# Patient Record
Sex: FEMALE | Race: WHITE | NOT HISPANIC OR LATINO | Employment: UNEMPLOYED | URBAN - METROPOLITAN AREA
[De-identification: names, ages, dates, MRNs, and addresses within clinical notes are randomized per-mention and may not be internally consistent; named-entity substitution may affect disease eponyms.]

---

## 2018-02-05 ENCOUNTER — CONVERSION ENCOUNTER (OUTPATIENT)
Dept: FAMILY MEDICINE CLINIC | Facility: CLINIC | Age: 48
End: 2018-02-05

## 2019-06-04 VITALS
DIASTOLIC BLOOD PRESSURE: 97 MMHG | SYSTOLIC BLOOD PRESSURE: 162 MMHG | HEIGHT: 64 IN | WEIGHT: 160 LBS | BODY MASS INDEX: 27.31 KG/M2 | RESPIRATION RATE: 20 BRPM | OXYGEN SATURATION: 97 % | HEART RATE: 94 BPM

## 2019-08-31 ENCOUNTER — APPOINTMENT (OUTPATIENT)
Dept: GENERAL RADIOLOGY | Facility: HOSPITAL | Age: 49
End: 2019-08-31

## 2019-08-31 ENCOUNTER — HOSPITAL ENCOUNTER (EMERGENCY)
Facility: HOSPITAL | Age: 49
Discharge: HOME | End: 2019-09-01
Attending: EMERGENCY MEDICINE | Admitting: EMERGENCY MEDICINE

## 2019-08-31 ENCOUNTER — APPOINTMENT (OUTPATIENT)
Dept: CT IMAGING | Facility: HOSPITAL | Age: 49
End: 2019-08-31

## 2019-08-31 DIAGNOSIS — M79.604 PAIN OF RIGHT LOWER EXTREMITY: ICD-10-CM

## 2019-08-31 DIAGNOSIS — R60.9 EDEMA, UNSPECIFIED TYPE: Primary | ICD-10-CM

## 2019-08-31 LAB
ALBUMIN SERPL-MCNC: 3.4 G/DL (ref 3.5–4.8)
ALBUMIN/GLOB SERPL: 1.2 G/DL (ref 1–1.7)
ALP SERPL-CCNC: 77 U/L (ref 32–91)
ALT SERPL W P-5'-P-CCNC: 17 U/L (ref 14–54)
ANION GAP SERPL CALCULATED.3IONS-SCNC: 14.8 MMOL/L (ref 5–15)
APTT PPP: 24 SECONDS (ref 24–31)
AST SERPL-CCNC: 17 U/L (ref 15–41)
BASOPHILS # BLD AUTO: 0.1 10*3/MM3 (ref 0–0.2)
BASOPHILS NFR BLD AUTO: 0.8 % (ref 0–1.5)
BILIRUB SERPL-MCNC: 0.3 MG/DL (ref 0.3–1.2)
BNP SERPL-MCNC: 12 PG/ML
BUN BLD-MCNC: 15 MG/DL (ref 8–20)
BUN/CREAT SERPL: 16.7 (ref 5.4–26.2)
CALCIUM SPEC-SCNC: 8.7 MG/DL (ref 8.9–10.3)
CHLORIDE SERPL-SCNC: 104 MMOL/L (ref 101–111)
CO2 SERPL-SCNC: 24 MMOL/L (ref 22–32)
CREAT BLD-MCNC: 0.9 MG/DL (ref 0.4–1)
D DIMER PPP FEU-MCNC: 0.66 MCGFEU/ML (ref 0.17–0.59)
DEPRECATED RDW RBC AUTO: 44.6 FL (ref 37–54)
EOSINOPHIL # BLD AUTO: 0.1 10*3/MM3 (ref 0–0.4)
EOSINOPHIL NFR BLD AUTO: 1.6 % (ref 0.3–6.2)
ERYTHROCYTE [DISTWIDTH] IN BLOOD BY AUTOMATED COUNT: 12.9 % (ref 12.3–15.4)
GFR SERPL CREATININE-BSD FRML MDRD: 67 ML/MIN/1.73
GLOBULIN UR ELPH-MCNC: 2.8 GM/DL (ref 2.5–3.8)
GLUCOSE BLD-MCNC: 100 MG/DL (ref 65–99)
HCT VFR BLD AUTO: 40.3 % (ref 34–46.6)
HGB BLD-MCNC: 13.8 G/DL (ref 12–15.9)
HOLD SPECIMEN: NORMAL
INR PPP: 0.94 (ref 0.9–1.1)
LYMPHOCYTES # BLD AUTO: 2.1 10*3/MM3 (ref 0.7–3.1)
LYMPHOCYTES NFR BLD AUTO: 26.9 % (ref 19.6–45.3)
MCH RBC QN AUTO: 34.1 PG (ref 26.6–33)
MCHC RBC AUTO-ENTMCNC: 34.2 G/DL (ref 31.5–35.7)
MCV RBC AUTO: 99.7 FL (ref 79–97)
MONOCYTES # BLD AUTO: 0.5 10*3/MM3 (ref 0.1–0.9)
MONOCYTES NFR BLD AUTO: 6.2 % (ref 5–12)
NEUTROPHILS # BLD AUTO: 5.1 10*3/MM3 (ref 1.7–7)
NEUTROPHILS NFR BLD AUTO: 64.5 % (ref 42.7–76)
NRBC BLD AUTO-RTO: 0 /100 WBC (ref 0–0.2)
PLATELET # BLD AUTO: 287 10*3/MM3 (ref 140–450)
PMV BLD AUTO: 6.4 FL (ref 6–12)
POTASSIUM BLD-SCNC: 3.8 MMOL/L (ref 3.6–5.1)
PROT SERPL-MCNC: 6.2 G/DL (ref 6.1–7.9)
PROTHROMBIN TIME: 9.8 SECONDS (ref 9.6–11.7)
RBC # BLD AUTO: 4.04 10*6/MM3 (ref 3.77–5.28)
SODIUM BLD-SCNC: 139 MMOL/L (ref 136–144)
TROPONIN I SERPL-MCNC: <0.03 NG/ML (ref 0–0.03)
TSH SERPL DL<=0.05 MIU/L-ACNC: 2.21 UIU/ML (ref 0.34–5.6)
WBC NRBC COR # BLD: 7.9 10*3/MM3 (ref 3.4–10.8)
WHOLE BLOOD HOLD SPECIMEN: NORMAL
WHOLE BLOOD HOLD SPECIMEN: NORMAL

## 2019-08-31 PROCEDURE — 85379 FIBRIN DEGRADATION QUANT: CPT | Performed by: NURSE PRACTITIONER

## 2019-08-31 PROCEDURE — 84484 ASSAY OF TROPONIN QUANT: CPT | Performed by: NURSE PRACTITIONER

## 2019-08-31 PROCEDURE — 85730 THROMBOPLASTIN TIME PARTIAL: CPT | Performed by: EMERGENCY MEDICINE

## 2019-08-31 PROCEDURE — 71275 CT ANGIOGRAPHY CHEST: CPT

## 2019-08-31 PROCEDURE — 85610 PROTHROMBIN TIME: CPT | Performed by: EMERGENCY MEDICINE

## 2019-08-31 PROCEDURE — 80053 COMPREHEN METABOLIC PANEL: CPT | Performed by: NURSE PRACTITIONER

## 2019-08-31 PROCEDURE — 85025 COMPLETE CBC W/AUTO DIFF WBC: CPT | Performed by: NURSE PRACTITIONER

## 2019-08-31 PROCEDURE — 99283 EMERGENCY DEPT VISIT LOW MDM: CPT

## 2019-08-31 PROCEDURE — 84443 ASSAY THYROID STIM HORMONE: CPT | Performed by: EMERGENCY MEDICINE

## 2019-08-31 PROCEDURE — 71045 X-RAY EXAM CHEST 1 VIEW: CPT

## 2019-08-31 PROCEDURE — 0 IOPAMIDOL PER 1 ML: Performed by: EMERGENCY MEDICINE

## 2019-08-31 PROCEDURE — 83880 ASSAY OF NATRIURETIC PEPTIDE: CPT | Performed by: EMERGENCY MEDICINE

## 2019-08-31 PROCEDURE — 93005 ELECTROCARDIOGRAM TRACING: CPT | Performed by: NURSE PRACTITIONER

## 2019-08-31 PROCEDURE — 96372 THER/PROPH/DIAG INJ SC/IM: CPT

## 2019-08-31 RX ORDER — SODIUM CHLORIDE 0.9 % (FLUSH) 0.9 %
10 SYRINGE (ML) INJECTION AS NEEDED
Status: DISCONTINUED | OUTPATIENT
Start: 2019-08-31 | End: 2019-09-01 | Stop reason: HOSPADM

## 2019-08-31 RX ADMIN — IOPAMIDOL 100 ML: 755 INJECTION, SOLUTION INTRAVENOUS at 23:27

## 2019-09-01 ENCOUNTER — HOSPITAL ENCOUNTER (OUTPATIENT)
Dept: CARDIOLOGY | Facility: HOSPITAL | Age: 49
Discharge: HOME | End: 2019-09-01

## 2019-09-01 ENCOUNTER — TRANSCRIBE ORDERS (OUTPATIENT)
Dept: ADMINISTRATIVE | Facility: HOSPITAL | Age: 49
End: 2019-09-01

## 2019-09-01 VITALS
SYSTOLIC BLOOD PRESSURE: 150 MMHG | WEIGHT: 162.26 LBS | TEMPERATURE: 98.4 F | HEIGHT: 64 IN | OXYGEN SATURATION: 98 % | DIASTOLIC BLOOD PRESSURE: 88 MMHG | RESPIRATION RATE: 18 BRPM | BODY MASS INDEX: 27.7 KG/M2 | HEART RATE: 71 BPM

## 2019-09-01 DIAGNOSIS — M79.604 PAIN IN BOTH LOWER EXTREMITIES: Primary | ICD-10-CM

## 2019-09-01 DIAGNOSIS — M79.604 PAIN IN BOTH LOWER EXTREMITIES: ICD-10-CM

## 2019-09-01 DIAGNOSIS — M79.605 PAIN IN BOTH LOWER EXTREMITIES: Primary | ICD-10-CM

## 2019-09-01 DIAGNOSIS — M79.605 PAIN IN BOTH LOWER EXTREMITIES: ICD-10-CM

## 2019-09-01 LAB

## 2019-09-01 PROCEDURE — 96372 THER/PROPH/DIAG INJ SC/IM: CPT

## 2019-09-01 PROCEDURE — 25010000002 ENOXAPARIN PER 10 MG: Performed by: EMERGENCY MEDICINE

## 2019-09-01 PROCEDURE — 93970 EXTREMITY STUDY: CPT

## 2019-09-01 RX ADMIN — ENOXAPARIN SODIUM 70 MG: 80 INJECTION SUBCUTANEOUS at 00:36

## 2019-09-01 NOTE — ED NOTES
outpt script given to pt with instructions to come back between 8-10AM on 9-1-19 alondra Elizabeth Mills, RN  09/01/19 0047

## 2019-09-01 NOTE — ED PROVIDER NOTES
Subjective   49-year-old female complains of bilateral lower extremity swelling, right greater than left upon awakening this morning.  She does stand on her feet for long hours working but states this is a normal routine for her and denies any change in routine.  She had episode of substernal chest pain 2 weeks ago for 30 minutes, none since then.  Patient has had some mild shortness of air with cough and congestion with productive purulent sputum but no hemoptysis.  She denies any history of DVT or PE, recent surgeries or procedures or admissions to the hospital.  Patient did travel to [place] but this was in early July.            Review of Systems   HENT: Positive for rhinorrhea.    Respiratory: Positive for cough and shortness of breath.    Cardiovascular: Positive for chest pain and leg swelling.   All other systems reviewed and are negative.      Past Medical History:   Diagnosis Date   • COPD (chronic obstructive pulmonary disease) (CMS/[facility])        Allergies   Allergen Reactions   • Aspirin GI Intolerance   • Ibuprofen Itching       Past Surgical History:   Procedure Laterality Date   • LUNG SURGERY         History reviewed. No pertinent family history.    Social History     Socioeconomic History   • Marital status: Single     Spouse name: Not on file   • Number of children: Not on file   • Years of education: Not on file   • Highest education level: Not on file   Tobacco Use   • Smoking status: Current Every Day Smoker     Packs/day: 0.50     Types: Cigarettes           Objective   Physical Exam   Constitutional: She is oriented to person, place, and time. She appears well-developed and well-nourished.   HENT:   Head: Normocephalic and atraumatic.   Mouth/Throat: Oropharynx is clear and moist.   Eyes: Conjunctivae are normal. Pupils are equal, round, and reactive to light.   Neck: Normal range of motion. Neck supple.   Cardiovascular: Normal rate, regular rhythm, normal heart sounds and intact distal  pulses.   Pulmonary/Chest: Effort normal and breath sounds normal.   Abdominal: Soft. Bowel sounds are normal.   Musculoskeletal: Normal range of motion.   Right lower extremity 1+ edema with calf tenderness and mild associated venous stasis.  Lower extremity without tenderness or pitting edema.   Neurological: She is alert and oriented to person, place, and time.   Skin: Skin is warm and dry. Capillary refill takes less than 2 seconds.   Psychiatric: She has a normal mood and affect. Her behavior is normal.       Procedures           ED Course  ED Course as of Sep 01 0026   Sat Aug 31, 2019   2239 EKG interpretation: Normal sinus rhythm, rate 84, no acute ST change  [JR]      ED Course User Index  [JR] Rohit Mitchell MD                  [facility]  Number of Diagnoses or Management Options  Edema, unspecified type:   Pain of right lower extremity:   Diagnosis management comments: Results for orders placed or performed during the hospital encounter of 08/31/19  -Comprehensive Metabolic Panel       Result                      Value             Ref Range           Glucose                     100 (H)           65 - 99 mg/dL       BUN                         15                8 - 20 mg/dL        Creatinine                  0.90              0.40 - 1.00 *       Sodium                      139               136 - 144 mm*       Potassium                   3.8               3.6 - 5.1 mm*       Chloride                    104               101 - 111 mm*       CO2                         24.0              22.0 - 32.0 *       Calcium                     8.7 (L)           8.9 - 10.3 m*       Total Protein               6.2               6.1 - 7.9 g/*       Albumin                     3.40 (L)          3.50 - 4.80 *       ALT (SGPT)                  17                14 - 54 U/L         AST (SGOT)                  17                15 - 41 U/L         Alkaline Phosphatase        77                32 - 91 U/L         Total Bilirubin              0.3               0.3 - 1.2 mg*       eGFR Non  Amer       67                >60 mL/min/1*       Globulin                    2.8               2.5 - 3.8 gm*       A/G Ratio                   1.2               1.0 - 1.7 g/*       BUN/Creatinine Ratio        16.7              5.4 - 26.2          Anion Gap                   14.8              5.0 - 15.0 m*  -Troponin       Result                      Value             Ref Range           Troponin I                  <0.030            0.000 - 0.03*  -D-dimer, Quantitative       Result                      Value             Ref Range           D-Dimer, Quantitative       0.66 (H)          0.17 - 0.59 *  -CBC Auto Differential       Result                      Value             Ref Range           WBC                         7.90              3.40 - 10.80*       RBC                         4.04              3.77 - 5.28 *       Hemoglobin                  13.8              12.0 - 15.9 *       Hematocrit                  40.3              34.0 - 46.6 %       MCV                         99.7 (H)          79.0 - 97.0 *       MCH                         34.1 (H)          26.6 - 33.0 *       MCHC                        34.2              31.5 - 35.7 *       RDW                         12.9              12.3 - 15.4 %       RDW-SD                      44.6              37.0 - 54.0 *       MPV                         6.4               6.0 - 12.0 fL       Platelets                   287               140 - 450 10*       Neutrophil %                64.5              42.7 - 76.0 %       Lymphocyte %                26.9              19.6 - 45.3 %       Monocyte %                  6.2               5.0 - 12.0 %        Eosinophil %                1.6               0.3 - 6.2 %         Basophil %                  0.8               0.0 - 1.5 %         Neutrophils, Absolute       5.10              1.70 - 7.00 *       Lymphocytes, Absolute       2.10              0.70 - 3.10 *        Monocytes, Absolute         0.50              0.10 - 0.90 *       Eosinophils, Absolute       0.10              0.00 - 0.40 *       Basophils, Absolute         0.10              0.00 - 0.20 *       nRBC                        0.0               0.0 - 0.2 /1*  -TSH       Result                      Value             Ref Range           TSH                         2.210             0.340 - 5.60*  -BNP       Result                      Value             Ref Range           BNP                         12.0              <=100.0 pg/mL  -Protime-INR       Result                      Value             Ref Range           Protime                     9.8               9.6 - 11.7 S*       INR                         0.94              0.90 - 1.10    -aPTT       Result                      Value             Ref Range           PTT                         24.0              24.0 - 31.0 *  -Light Blue Top       Result                      Value             Ref Range           Extra Tube                                                    hold for add-on  -Green Top (Gel)       Result                      Value             Ref Range           Extra Tube                                                    Hold for add-ons.  -Lavender Top       Result                      Value             Ref Range           Extra Tube                                                    hold for add-on  Ct Chest Pulmonary Embolism With Contrast    Result Date: 8/31/2019  1. No evidence of pulmonary embolism. No evidence of an acute intrathoracic process. 2. Severe emphysema with bullous changes in the right apex. 3. Question postoperative changes to the left apex. 4. Atherosclerosis. Electronically signed by:  Shoaib Root M.D.  8/31/2019 9:54 PM      Patient has had surgery on her left lung in the past.  Feels well, VSS, Lovenox will be given and patient understands need to return for US in the morning.  Did explain lovenox only protective for 12  hours.  Patient also understands need to return for any cp or soa, none at present.       Amount and/or Complexity of Data Reviewed  Clinical lab tests: reviewed  Tests in the radiology section of CPT®: reviewed  Tests in the medicine section of CPT®: reviewed          Final diagnoses:   Edema, unspecified type   Pain of right lower extremity            DannebrogRohit adair MD  09/01/19 0026

## 2019-09-02 ENCOUNTER — APPOINTMENT (OUTPATIENT)
Dept: CARDIOLOGY | Facility: HOSPITAL | Age: 49
End: 2019-09-02

## 2019-09-29 ENCOUNTER — HOSPITAL ENCOUNTER (EMERGENCY)
Facility: HOSPITAL | Age: 49
Discharge: HOME | End: 2019-09-29
Attending: EMERGENCY MEDICINE | Admitting: EMERGENCY MEDICINE

## 2019-09-29 ENCOUNTER — APPOINTMENT (OUTPATIENT)
Dept: CT IMAGING | Facility: HOSPITAL | Age: 49
End: 2019-09-29

## 2019-09-29 VITALS
RESPIRATION RATE: 16 BRPM | OXYGEN SATURATION: 97 % | HEIGHT: 64 IN | DIASTOLIC BLOOD PRESSURE: 83 MMHG | TEMPERATURE: 97.9 F | SYSTOLIC BLOOD PRESSURE: 115 MMHG | WEIGHT: 154.54 LBS | BODY MASS INDEX: 26.38 KG/M2 | HEART RATE: 84 BPM

## 2019-09-29 DIAGNOSIS — S02.40CA CLOSED FRACTURE OF RIGHT SIDE OF MAXILLA, INITIAL ENCOUNTER (HCC): Primary | ICD-10-CM

## 2019-09-29 DIAGNOSIS — S02.85XA CLOSED FRACTURE OF RIGHT ORBIT, INITIAL ENCOUNTER (HCC): ICD-10-CM

## 2019-09-29 PROCEDURE — 70486 CT MAXILLOFACIAL W/O DYE: CPT

## 2019-09-29 PROCEDURE — 70450 CT HEAD/BRAIN W/O DYE: CPT

## 2019-09-29 PROCEDURE — 99283 EMERGENCY DEPT VISIT LOW MDM: CPT

## 2019-09-29 RX ORDER — HYDROCODONE BITARTRATE AND ACETAMINOPHEN 5; 325 MG/1; MG/1
1 TABLET ORAL ONCE
Status: COMPLETED | OUTPATIENT
Start: 2019-09-29 | End: 2019-09-29

## 2019-09-29 RX ADMIN — HYDROCODONE BITARTRATE AND ACETAMINOPHEN 1 TABLET: 5; 325 TABLET ORAL at 02:47

## 2020-01-09 ENCOUNTER — APPOINTMENT (OUTPATIENT)
Dept: CT IMAGING | Facility: HOSPITAL | Age: 50
End: 2020-01-09

## 2020-01-09 ENCOUNTER — HOSPITAL ENCOUNTER (EMERGENCY)
Facility: HOSPITAL | Age: 50
Discharge: HOME | End: 2020-01-09
Attending: EMERGENCY MEDICINE | Admitting: EMERGENCY MEDICINE

## 2020-01-09 VITALS
SYSTOLIC BLOOD PRESSURE: 134 MMHG | HEIGHT: 64 IN | WEIGHT: 140 LBS | DIASTOLIC BLOOD PRESSURE: 81 MMHG | TEMPERATURE: 97.9 F | HEART RATE: 89 BPM | OXYGEN SATURATION: 99 % | RESPIRATION RATE: 15 BRPM | BODY MASS INDEX: 23.9 KG/M2

## 2020-01-09 DIAGNOSIS — S02.32XA CLOSED FRACTURE OF LEFT ORBITAL FLOOR, INITIAL ENCOUNTER (HCC): ICD-10-CM

## 2020-01-09 DIAGNOSIS — S02.40DA CLOSED FRACTURE OF LEFT SIDE OF MAXILLA, INITIAL ENCOUNTER (HCC): ICD-10-CM

## 2020-01-09 DIAGNOSIS — S02.2XXA CLOSED NONDISPLACED FRACTURE OF NASAL BONE, INITIAL ENCOUNTER: Primary | ICD-10-CM
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
## 2020-01-09 PROCEDURE — 70450 CT HEAD/BRAIN W/O DYE: CPT

## 2020-01-09 PROCEDURE — 99284 EMERGENCY DEPT VISIT MOD MDM: CPT

## 2020-01-09 PROCEDURE — 70486 CT MAXILLOFACIAL W/O DYE: CPT

## 2020-01-09 RX ORDER — TRAMADOL HYDROCHLORIDE 50 MG/1
50 TABLET ORAL EVERY 8 HOURS PRN
Qty: 12 TABLET | Refills: 0 | OUTPATIENT
Start: 2020-01-09 | End: 2021-04-04

## 2020-01-09 RX ORDER — TRAMADOL HYDROCHLORIDE 50 MG/1
50 TABLET ORAL ONCE
Status: COMPLETED | OUTPATIENT
Start: 2020-01-09 | End: 2020-01-09

## 2020-01-09 RX ADMIN — TRAMADOL HYDROCHLORIDE 50 MG: 50 TABLET, FILM COATED ORAL at 06:30
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
## 2020-01-09 NOTE — ED NOTES
Phan the advocate from [facility] called back and reports she will be here to talk to the pt around 0700, nurse asked pt if she would be willing to stay here to talk to the advocate and pt agreed that she would stay to talk to the advocate     Hannah Elaine, RN  01/09/20 0628
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
## 2020-01-09 NOTE — ED NOTES
Pt given cab voucher and again agreed to use cab service to ride home. Pt states she feels safe going to her home.     Guera Carpio, RN  01/09/20 0983

## 2020-01-09 NOTE — ED NOTES
Cab service says they will be at hospital to  pt in 30 minutes.  Pt notified, agreed, and assisted to get dressed.     Guera Carpio RN  01/09/20 0918

## 2020-01-09 NOTE — ED NOTES
Pt drowsy, case mgt to call  and attempt to obtain a cab voucher home d/t pt arriving via ems and not having anyone to come and get her.      Brynn Live RN  01/09/20 0794
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
## 2020-01-09 NOTE — ED NOTES
Cab voucher obtained from , placed phone calls to both numbers listed on voucher to cab company twice and left voicemail. No answer and no return call at this time. Will attempt again if no return call. Pt sleeping in room, no distress noted, will cont to monitor. ED charge informed on delay in discharge.      Brynn Live RN  01/09/20 0869
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
## 2020-01-09 NOTE — ED NOTES
Cab voucher given by case management.  Called cab service but no one answered, left message.     Guera Carpio, RN  01/09/20 0801

## 2020-01-09 NOTE — ED NOTES
[facility] contacted, nurse spoke with Dolores about getting an advocate for the patient.     Hannah Elaine, RN  01/09/20 0600

## 2020-01-09 NOTE — ED NOTES
Second call to cab service with no answer. Left another voicemail     Carlos Chamorro  01/09/20 2288

## 2020-04-09 ENCOUNTER — HOSPITAL ENCOUNTER (EMERGENCY)
Facility: HOSPITAL | Age: 50
Discharge: HOME | End: 2020-04-09
Admitting: EMERGENCY MEDICINE

## 2020-04-09 VITALS
BODY MASS INDEX: 26.72 KG/M2 | WEIGHT: 156.53 LBS | OXYGEN SATURATION: 100 % | TEMPERATURE: 97.7 F | SYSTOLIC BLOOD PRESSURE: 165 MMHG | RESPIRATION RATE: 18 BRPM | DIASTOLIC BLOOD PRESSURE: 95 MMHG | HEART RATE: 98 BPM | HEIGHT: 64 IN

## 2020-04-09 DIAGNOSIS — F41.9 ANXIETY: Primary | ICD-10-CM

## 2020-04-09 PROCEDURE — 99283 EMERGENCY DEPT VISIT LOW MDM: CPT

## 2020-04-09 RX ORDER — HYDROXYZINE PAMOATE 50 MG/1
50 CAPSULE ORAL 3 TIMES DAILY PRN
Qty: 20 CAPSULE | Refills: 0 | OUTPATIENT
Start: 2020-04-09 | End: 2021-04-04

## 2020-04-09 RX ORDER — LORAZEPAM 1 MG/1
1 TABLET ORAL ONCE
Status: COMPLETED | OUTPATIENT
Start: 2020-04-09 | End: 2020-04-09

## 2020-04-09 RX ADMIN — LORAZEPAM 1 MG: 1 TABLET ORAL at 00:36

## 2020-04-09 NOTE — DISCHARGE INSTRUCTIONS
Follow-up with your primary care provider for further management of your anxiety    Use Vistaril as needed-for anxiety.    Return if worse

## 2020-04-09 NOTE — ED NOTES
Patient presents to ED with complaints of increased anxiety. Denies chest pain or palpitation.      Rani Browne RN  04/09/20 0033
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
## 2020-05-12 ENCOUNTER — LAB REQUISITION (OUTPATIENT)
Dept: LAB | Facility: HOSPITAL | Age: 50
End: 2020-05-12

## 2020-05-12 DIAGNOSIS — Z00.00 ENCOUNTER FOR GENERAL ADULT MEDICAL EXAMINATION WITHOUT ABNORMAL FINDINGS: ICD-10-CM

## 2020-05-12 PROCEDURE — U0003 INFECTIOUS AGENT DETECTION BY NUCLEIC ACID (DNA OR RNA); SEVERE ACUTE RESPIRATORY SYNDROME CORONAVIRUS 2 (SARS-COV-2) (CORONAVIRUS DISEASE [COVID-19]), AMPLIFIED PROBE TECHNIQUE, MAKING USE OF HIGH THROUGHPUT TECHNOLOGIES AS DESCRIBED BY CMS-2020-01-R: HCPCS | Performed by: EMERGENCY MEDICINE

## 2020-05-14 LAB — SARS-COV-2 RNA RESP QL NAA+PROBE: NOT DETECTED

## 2021-04-03 PROCEDURE — 99283 EMERGENCY DEPT VISIT LOW MDM: CPT

## 2021-04-03 RX ORDER — BUDESONIDE AND FORMOTEROL FUMARATE DIHYDRATE 80; 4.5 UG/1; UG/1
2 AEROSOL RESPIRATORY (INHALATION)
COMMUNITY
End: 2021-11-16 | Stop reason: SDUPTHER

## 2021-04-04 ENCOUNTER — HOSPITAL ENCOUNTER (EMERGENCY)
Facility: HOSPITAL | Age: 51
Discharge: HOME | End: 2021-04-04
Admitting: EMERGENCY MEDICINE

## 2021-04-04 ENCOUNTER — APPOINTMENT (OUTPATIENT)
Dept: CT IMAGING | Facility: HOSPITAL | Age: 51
End: 2021-04-04

## 2021-04-04 VITALS
TEMPERATURE: 98 F | WEIGHT: 177.91 LBS | HEIGHT: 64 IN | BODY MASS INDEX: 30.37 KG/M2 | RESPIRATION RATE: 16 BRPM | HEART RATE: 78 BPM | SYSTOLIC BLOOD PRESSURE: 121 MMHG | OXYGEN SATURATION: 98 % | DIASTOLIC BLOOD PRESSURE: 85 MMHG
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
## 2021-04-04 DIAGNOSIS — S16.1XXA STRAIN OF NECK MUSCLE, INITIAL ENCOUNTER: ICD-10-CM

## 2021-04-04 DIAGNOSIS — S39.012A STRAIN OF LUMBAR REGION, INITIAL ENCOUNTER: ICD-10-CM

## 2021-04-04 PROCEDURE — 72125 CT NECK SPINE W/O DYE: CPT

## 2021-04-04 PROCEDURE — 72131 CT LUMBAR SPINE W/O DYE: CPT

## 2021-04-04 RX ORDER — METHOCARBAMOL 500 MG/1
500 TABLET, FILM COATED ORAL ONCE
Status: COMPLETED | OUTPATIENT
Start: 2021-04-04 | End: 2021-04-04

## 2021-04-04 RX ORDER — LIDOCAINE 50 MG/G
1 PATCH TOPICAL ONCE
Status: DISCONTINUED | OUTPATIENT
Start: 2021-04-04 | End: 2021-04-04 | Stop reason: HOSPADM

## 2021-04-04 RX ORDER — METHOCARBAMOL 750 MG/1
750 TABLET, FILM COATED ORAL 3 TIMES DAILY
Qty: 45 TABLET | Refills: 0 | Status: SHIPPED | OUTPATIENT
Start: 2021-04-04 | End: 2021-11-24

## 2021-04-04 RX ORDER — LIDOCAINE 50 MG/G
1 PATCH TOPICAL EVERY 24 HOURS
Qty: 30 PATCH | Refills: 0 | Status: SHIPPED | OUTPATIENT
Start: 2021-04-04

## 2021-04-04 RX ORDER — ACETAMINOPHEN 500 MG
500 TABLET ORAL EVERY 6 HOURS PRN
Qty: 45 TABLET | Refills: 0 | Status: SHIPPED | OUTPATIENT
End: 2021-11-16

## 2021-04-04 RX ADMIN — METHOCARBAMOL 500 MG: 500 TABLET, FILM COATED ORAL at 00:53

## 2021-04-04 RX ADMIN — LIDOCAINE 1 PATCH: 50 PATCH TOPICAL at 00:53

## 2021-04-04 NOTE — DISCHARGE INSTRUCTIONS
Return to the ER for any worsening symptoms follow-up with your primary care provider for any further work-up and management if you do not have a PCP see the above referral.  Use heat or ice for 15-minute increments at home if helpful.
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
## 2021-05-14 ENCOUNTER — HOSPITAL ENCOUNTER (EMERGENCY)
Facility: HOSPITAL | Age: 51
Discharge: HOME | End: 2021-05-14
Admitting: EMERGENCY MEDICINE

## 2021-05-14 VITALS
TEMPERATURE: 98.4 F | HEIGHT: 64 IN | HEART RATE: 95 BPM | DIASTOLIC BLOOD PRESSURE: 65 MMHG | SYSTOLIC BLOOD PRESSURE: 145 MMHG | WEIGHT: 176.59 LBS | RESPIRATION RATE: 16 BRPM | OXYGEN SATURATION: 100 % | BODY MASS INDEX: 30.15 KG/M2

## 2021-05-14 DIAGNOSIS — N76.4 ABSCESS OF RIGHT GENITAL LABIA: Primary | ICD-10-CM

## 2021-05-14 PROCEDURE — 87070 CULTURE OTHR SPECIMN AEROBIC: CPT | Performed by: NURSE PRACTITIONER

## 2021-05-14 PROCEDURE — 87147 CULTURE TYPE IMMUNOLOGIC: CPT | Performed by: NURSE PRACTITIONER

## 2021-05-14 PROCEDURE — 99283 EMERGENCY DEPT VISIT LOW MDM: CPT

## 2021-05-14 PROCEDURE — 87205 SMEAR GRAM STAIN: CPT | Performed by: NURSE PRACTITIONER

## 2021-05-14 PROCEDURE — 87186 SC STD MICRODIL/AGAR DIL: CPT | Performed by: NURSE PRACTITIONER

## 2021-05-14 RX ORDER — SULFAMETHOXAZOLE AND TRIMETHOPRIM 800; 160 MG/1; MG/1
1 TABLET ORAL 2 TIMES DAILY
Qty: 20 TABLET | Refills: 0 | Status: SHIPPED | OUTPATIENT
Start: 2021-05-14 | End: 2021-05-24

## 2021-05-16 LAB
BACTERIA SPEC AEROBE CULT: ABNORMAL
GRAM STN SPEC: ABNORMAL
GRAM STN SPEC: ABNORMAL

## 2021-06-13 ENCOUNTER — HOSPITAL ENCOUNTER (EMERGENCY)
Facility: HOSPITAL | Age: 51
Discharge: HOME | End: 2021-06-13
Attending: EMERGENCY MEDICINE | Admitting: EMERGENCY MEDICINE

## 2021-06-13 VITALS
HEIGHT: 64 IN | TEMPERATURE: 98.4 F | BODY MASS INDEX: 30.63 KG/M2 | SYSTOLIC BLOOD PRESSURE: 142 MMHG | OXYGEN SATURATION: 97 % | HEART RATE: 84 BPM | WEIGHT: 179.4 LBS | RESPIRATION RATE: 16 BRPM | DIASTOLIC BLOOD PRESSURE: 61 MMHG

## 2021-06-13 DIAGNOSIS — R60.9 DEPENDENT EDEMA: Primary | ICD-10-CM

## 2021-06-13 LAB
ANION GAP SERPL CALCULATED.3IONS-SCNC: 11 MMOL/L (ref 5–15)
BASOPHILS # BLD AUTO: 0.1 10*3/MM3 (ref 0–0.2)
BASOPHILS NFR BLD AUTO: 1 % (ref 0–1.5)
BUN SERPL-MCNC: 20 MG/DL (ref 6–20)
BUN/CREAT SERPL: 29.4 (ref 7–25)
CALCIUM SPEC-SCNC: 9.1 MG/DL (ref 8.6–10.5)
CHLORIDE SERPL-SCNC: 104 MMOL/L (ref 98–107)
CO2 SERPL-SCNC: 23 MMOL/L (ref 22–29)
CREAT SERPL-MCNC: 0.68 MG/DL (ref 0.57–1)
D DIMER PPP FEU-MCNC: 0.57 MG/L (FEU) (ref 0–0.59)
DEPRECATED RDW RBC AUTO: 43.8 FL (ref 37–54)
EOSINOPHIL # BLD AUTO: 0.1 10*3/MM3 (ref 0–0.4)
EOSINOPHIL NFR BLD AUTO: 1.8 % (ref 0.3–6.2)
ERYTHROCYTE [DISTWIDTH] IN BLOOD BY AUTOMATED COUNT: 13.1 % (ref 12.3–15.4)
GFR SERPL CREATININE-BSD FRML MDRD: 92 ML/MIN/1.73
GLUCOSE SERPL-MCNC: 150 MG/DL (ref 65–99)
HCT VFR BLD AUTO: 37.1 % (ref 34–46.6)
HGB BLD-MCNC: 12.8 G/DL (ref 12–15.9)
HOLD SPECIMEN: NORMAL
LYMPHOCYTES # BLD AUTO: 2.9 10*3/MM3 (ref 0.7–3.1)
LYMPHOCYTES NFR BLD AUTO: 34.6 % (ref 19.6–45.3)
MCH RBC QN AUTO: 32.8 PG (ref 26.6–33)
MCHC RBC AUTO-ENTMCNC: 34.5 G/DL (ref 31.5–35.7)
MCV RBC AUTO: 95.1 FL (ref 79–97)
MONOCYTES # BLD AUTO: 0.5 10*3/MM3 (ref 0.1–0.9)
MONOCYTES NFR BLD AUTO: 6 % (ref 5–12)
NEUTROPHILS NFR BLD AUTO: 4.7 10*3/MM3 (ref 1.7–7)
NEUTROPHILS NFR BLD AUTO: 56.6 % (ref 42.7–76)
NRBC BLD AUTO-RTO: 0.1 /100 WBC (ref 0–0.2)
NT-PROBNP SERPL-MCNC: 32 PG/ML (ref 0–900)
PLATELET # BLD AUTO: 321 10*3/MM3 (ref 140–450)
PMV BLD AUTO: 6.4 FL (ref 6–12)
POTASSIUM SERPL-SCNC: 3.9 MMOL/L (ref 3.5–5.2)
RBC # BLD AUTO: 3.9 10*6/MM3 (ref 3.77–5.28)
SODIUM SERPL-SCNC: 138 MMOL/L (ref 136–145)
WBC # BLD AUTO: 8.3 10*3/MM3 (ref 3.4–10.8)

## 2021-06-13 PROCEDURE — 83880 ASSAY OF NATRIURETIC PEPTIDE: CPT | Performed by: EMERGENCY MEDICINE

## 2021-06-13 PROCEDURE — 99283 EMERGENCY DEPT VISIT LOW MDM: CPT

## 2021-06-13 PROCEDURE — 85379 FIBRIN DEGRADATION QUANT: CPT | Performed by: EMERGENCY MEDICINE

## 2021-06-13 PROCEDURE — 80048 BASIC METABOLIC PNL TOTAL CA: CPT | Performed by: EMERGENCY MEDICINE

## 2021-06-13 PROCEDURE — 85025 COMPLETE CBC W/AUTO DIFF WBC: CPT | Performed by: EMERGENCY MEDICINE

## 2021-06-13 RX ORDER — FUROSEMIDE 20 MG/1
20 TABLET ORAL DAILY
Qty: 4 TABLET | Refills: 0 | Status: SHIPPED | OUTPATIENT
Start: 2021-06-13 | End: 2021-11-16 | Stop reason: SDUPTHER

## 2021-06-13 NOTE — DISCHARGE INSTRUCTIONS
Rest and elevate the extremities, avoid the heat when possible.  Lasix for edema.  Consider support hose for the lower extremity swelling.  Return for shortness of breath, increased pain or any other concerns.

## 2021-06-13 NOTE — ED PROVIDER NOTES
Subjective   History of Present Illness  Bilateral leg edema  51-year-old female describes some bilateral leg edema present for the last 1 week.  She states it improves when she elevates her legs and comes back during the day when she is up and working.  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  She reports no chest pain or shortness of breath or trauma.  Review of Systems   Constitutional: Negative.    HENT: Negative.    Respiratory: Negative.    Cardiovascular: Positive for leg swelling.   Gastrointestinal: Negative.    Genitourinary: Negative.    Musculoskeletal: Negative.    Skin: Negative.    Neurological: Negative.        Past Medical History:   Diagnosis Date   • COPD (chronic obstructive pulmonary disease) (CMS/[facility])        Allergies   Allergen Reactions   • Aspirin GI Intolerance   • Ibuprofen Itching       Past Surgical History:   Procedure Laterality Date   • LUNG SURGERY         No family history on file.    Social History     Socioeconomic History   • Marital status:      Spouse name: Not on file   • Number of children: Not on file   • Years of education: Not on file   • Highest education level: Not on file   Tobacco Use   • Smoking status: Current Every Day Smoker     Packs/day: 0.50     Types: Cigarettes     Prior to Admission medications    Medication Sig Start Date End Date Taking? Authorizing Provider   acetaminophen (TYLENOL) 500 MG tablet Take 1 tablet by mouth Every 6 (Six) Hours As Needed for Mild Pain .    Gisele Cesar PA-C   budesonide-formoterol (SYMBICORT) 80-4.5 MCG/ACT inhaler Inhale 2 puffs 2 (Two) Times a Day.    Provider, MD Yamsani   lidocaine (LIDODERM) 5 % Place 1 patch on the skin as directed by provider Daily. Remove & Discard patch within 12 hours or as directed by MD 4/4/21   Gisele Cesar PA-C   methocarbamol (ROBAXIN) 750 MG tablet Take 1 tablet by mouth 3 (Three) Times a Day. 4/4/21   Gisele Cesar PA-C     /62   Pulse 88  "  Temp 98.6 °F (37 °C) (Oral)   Resp 16   Ht 162.6 cm (64\")   Wt 81.4 kg (179 lb 6.4 oz)   LMP  (LMP Unknown)   SpO2 96%   Breastfeeding No   BMI 30.79 kg/m²   I examined the patient using the appropriate personal protective equipment.          Objective   Physical Exam  General: Well-developed well-appearing, no acute distress, alert and appropriate  Eyes:  sclera nonicteric  HEENT: Mucous membranes moist, no mucosal swelling  Neck: Supple, no nuchal rigidity, no lymphadenopathy  Respirations: Respirations nonlabored, equal breath sounds bilaterally, clear lungs  Heart regular rate and rhythm, no murmurs rubs or gallops,   Abdomen soft nontender nondistended, no hepatosplenomegaly, no hernia, no mass, normal bowel sounds, no CVA tenderness  Extremities bilateral lower extremity pitting edema in the lower legs and ankles and feet, normal pulses in the bilateral lower extremities normal sensorimotor function, calves and thighs are symmetric and nontender  Neuro cranial nerves grossly intact, no focal limb deficits  Psych oriented, pleasant affect  Skin no rash, brisk cap refill  Procedures           ED Course            Results for orders placed or performed during the hospital encounter of 06/13/21   Basic Metabolic Panel    Specimen: Blood   Result Value Ref Range    Glucose 150 (H) 65 - 99 mg/dL    BUN 20 6 - 20 mg/dL    Creatinine 0.68 0.57 - 1.00 mg/dL    Sodium 138 136 - 145 mmol/L    Potassium 3.9 3.5 - 5.2 mmol/L    Chloride 104 98 - 107 mmol/L    CO2 23.0 22.0 - 29.0 mmol/L    Calcium 9.1 8.6 - 10.5 mg/dL    eGFR Non African Amer 92 >60 mL/min/1.73    BUN/Creatinine Ratio 29.4 (H) 7.0 - 25.0    Anion Gap 11.0 5.0 - 15.0 mmol/L   D-dimer, Quantitative    Specimen: Blood   Result Value Ref Range    D-Dimer, Quantitative 0.57 0.00 - 0.59 mg/L (FEU)   BNP    Specimen: Blood   Result Value Ref Range    proBNP 32.0 0.0 - 900.0 pg/mL   CBC Auto Differential    Specimen: Blood   Result Value Ref Range    WBC " 8.30 3.40 - 10.80 10*3/mm3    RBC 3.90 3.77 - 5.28 10*6/mm3    Hemoglobin 12.8 12.0 - 15.9 g/dL    Hematocrit 37.1 34.0 - 46.6 %    MCV 95.1 79.0 - 97.0 fL    MCH 32.8 26.6 - 33.0 pg    MCHC 34.5 31.5 - 35.7 g/dL    RDW 13.1 12.3 - 15.4 %    RDW-SD 43.8 37.0 - 54.0 fl    MPV 6.4 6.0 - 12.0 fL    Platelets 321 140 - 450 10*3/mm3    Neutrophil % 56.6 42.7 - 76.0 %    Lymphocyte % 34.6 19.6 - 45.3 %    Monocyte % 6.0 5.0 - 12.0 %    Eosinophil % 1.8 0.3 - 6.2 %    Basophil % 1.0 0.0 - 1.5 %    Neutrophils, Absolute 4.70 1.70 - 7.00 10*3/mm3    Lymphocytes, Absolute 2.90 0.70 - 3.10 10*3/mm3    Monocytes, Absolute 0.50 0.10 - 0.90 10*3/mm3    Eosinophils, Absolute 0.10 0.00 - 0.40 10*3/mm3    Basophils, Absolute 0.10 0.00 - 0.20 10*3/mm3    nRBC 0.1 0.0 - 0.2 /100 WBC   Gold Top - SST   Result Value Ref Range    Extra Tube Hold for add-ons.                                        MDM  Patient has findings of some lower extremity edema bilaterally.  DVT was felt to be unlikely due to the bilateral nature and the normal D-dimer.  There is no cellulitic change.  BNP is normal and she is having no respiratory symptoms to suggest congestive heart failure.  She has normal renal function.  She is prescribed a very short course of Lasix for the edema but was advised to also avoid the heat and to consider wearing compression stockings for the dependent edema.  She was given warning signs for return and discharged in good condition.  Final diagnoses:   Dependent edema       ED Disposition  ED Disposition     ED Disposition Condition Comment    Discharge Stable           Dwight Weber MD  [address] [place], [place] [id number] [phone]    Schedule an appointment as soon as possible for a visit in 1 week           Medication List      New Prescriptions    furosemide 20 MG tablet  Commonly known as: LASIX  Take 1 tablet by mouth Daily.           Where to Get Your Medications      These medications were sent to [id number] - [place], [place] - [address] AT [address] [facility] [address] - [phone]  - [phone] FX  [address] [facility] [place] [address]    Phone: [phone]   · furosemide 20 MG tablet          Stiven Evans MD  06/13/21 1950

## 2021-09-29 ENCOUNTER — HOSPITAL ENCOUNTER (EMERGENCY)
Facility: HOSPITAL | Age: 51
Discharge: HOME | End: 2021-09-29
Attending: EMERGENCY MEDICINE | Admitting: EMERGENCY MEDICINE

## 2021-09-29 VITALS
RESPIRATION RATE: 16 BRPM | SYSTOLIC BLOOD PRESSURE: 155 MMHG | WEIGHT: 192.68 LBS | OXYGEN SATURATION: 100 % | DIASTOLIC BLOOD PRESSURE: 88 MMHG | BODY MASS INDEX: 32.9 KG/M2 | HEIGHT: 64 IN | TEMPERATURE: 98.1 F | HEART RATE: 76 BPM

## 2021-09-29 DIAGNOSIS — R60.9 DEPENDENT EDEMA: Primary | ICD-10-CM

## 2021-09-29 LAB
ALBUMIN SERPL-MCNC: 3.7 G/DL (ref 3.5–5.2)
ALBUMIN/GLOB SERPL: 1.2 G/DL
ALP SERPL-CCNC: 88 U/L (ref 39–117)
ALT SERPL W P-5'-P-CCNC: 21 U/L (ref 1–33)
ANION GAP SERPL CALCULATED.3IONS-SCNC: 13 MMOL/L (ref 5–15)
AST SERPL-CCNC: 17 U/L (ref 1–32)
BASOPHILS # BLD AUTO: 0 10*3/MM3 (ref 0–0.2)
BASOPHILS NFR BLD AUTO: 0.5 % (ref 0–1.5)
BILIRUB SERPL-MCNC: 0.3 MG/DL (ref 0–1.2)
BUN SERPL-MCNC: 15 MG/DL (ref 6–20)
BUN/CREAT SERPL: 21.1 (ref 7–25)
CALCIUM SPEC-SCNC: 8.6 MG/DL (ref 8.6–10.5)
CHLORIDE SERPL-SCNC: 104 MMOL/L (ref 98–107)
CO2 SERPL-SCNC: 22 MMOL/L (ref 22–29)
CREAT SERPL-MCNC: 0.71 MG/DL (ref 0.57–1)
DEPRECATED RDW RBC AUTO: 43.8 FL (ref 37–54)
EOSINOPHIL # BLD AUTO: 0.2 10*3/MM3 (ref 0–0.4)
EOSINOPHIL NFR BLD AUTO: 2.8 % (ref 0.3–6.2)
ERYTHROCYTE [DISTWIDTH] IN BLOOD BY AUTOMATED COUNT: 13.2 % (ref 12.3–15.4)
GFR SERPL CREATININE-BSD FRML MDRD: 87 ML/MIN/1.73
GLOBULIN UR ELPH-MCNC: 3 GM/DL
GLUCOSE SERPL-MCNC: 115 MG/DL (ref 65–99)
HCT VFR BLD AUTO: 37.2 % (ref 34–46.6)
HGB BLD-MCNC: 12.9 G/DL (ref 12–15.9)
HOLD SPECIMEN: NORMAL
LYMPHOCYTES # BLD AUTO: 3 10*3/MM3 (ref 0.7–3.1)
LYMPHOCYTES NFR BLD AUTO: 35.5 % (ref 19.6–45.3)
MCH RBC QN AUTO: 33.2 PG (ref 26.6–33)
MCHC RBC AUTO-ENTMCNC: 34.7 G/DL (ref 31.5–35.7)
MCV RBC AUTO: 95.8 FL (ref 79–97)
MONOCYTES # BLD AUTO: 0.6 10*3/MM3 (ref 0.1–0.9)
MONOCYTES NFR BLD AUTO: 6.7 % (ref 5–12)
NEUTROPHILS NFR BLD AUTO: 4.6 10*3/MM3 (ref 1.7–7)
NEUTROPHILS NFR BLD AUTO: 54.5 % (ref 42.7–76)
NRBC BLD AUTO-RTO: 0.1 /100 WBC (ref 0–0.2)
NT-PROBNP SERPL-MCNC: 23.2 PG/ML (ref 0–900)
PLATELET # BLD AUTO: 291 10*3/MM3 (ref 140–450)
PMV BLD AUTO: 6.6 FL (ref 6–12)
POTASSIUM SERPL-SCNC: 3.8 MMOL/L (ref 3.5–5.2)
PROT SERPL-MCNC: 6.7 G/DL (ref 6–8.5)
RBC # BLD AUTO: 3.88 10*6/MM3 (ref 3.77–5.28)
SODIUM SERPL-SCNC: 139 MMOL/L (ref 136–145)
TROPONIN T SERPL-MCNC: <0.01 NG/ML (ref 0–0.03)
TSH SERPL DL<=0.05 MIU/L-ACNC: 4.06 UIU/ML (ref 0.27–4.2)
WBC # BLD AUTO: 8.4 10*3/MM3 (ref 3.4–10.8)
WHOLE BLOOD HOLD SPECIMEN: NORMAL

## 2021-09-29 PROCEDURE — 80050 GENERAL HEALTH PANEL: CPT | Performed by: EMERGENCY MEDICINE

## 2021-09-29 PROCEDURE — 83880 ASSAY OF NATRIURETIC PEPTIDE: CPT | Performed by: EMERGENCY MEDICINE

## 2021-09-29 PROCEDURE — 99283 EMERGENCY DEPT VISIT LOW MDM: CPT

## 2021-09-29 PROCEDURE — 84484 ASSAY OF TROPONIN QUANT: CPT | Performed by: EMERGENCY MEDICINE

## 2021-09-29 PROCEDURE — 93005 ELECTROCARDIOGRAM TRACING: CPT | Performed by: EMERGENCY MEDICINE

## 2021-09-30 LAB — QT INTERVAL: 375 MS

## 2021-11-16 ENCOUNTER — OFFICE VISIT (OUTPATIENT)
Dept: FAMILY MEDICINE CLINIC | Facility: CLINIC | Age: 51
End: 2021-11-16

## 2021-11-16 VITALS
SYSTOLIC BLOOD PRESSURE: 170 MMHG | DIASTOLIC BLOOD PRESSURE: 98 MMHG | WEIGHT: 199 LBS | BODY MASS INDEX: 35.26 KG/M2 | HEIGHT: 63 IN | HEART RATE: 103 BPM | OXYGEN SATURATION: 95 % | TEMPERATURE: 98.4 F | RESPIRATION RATE: 16 BRPM

## 2021-11-16 DIAGNOSIS — M51.36 LUMBAR DEGENERATIVE DISC DISEASE: ICD-10-CM

## 2021-11-16 DIAGNOSIS — R60.9 DEPENDENT EDEMA: ICD-10-CM

## 2021-11-16 DIAGNOSIS — M54.12 CERVICAL RADICULOPATHY: ICD-10-CM

## 2021-11-16 DIAGNOSIS — J43.9 BULLOUS EMPHYSEMA (HCC): ICD-10-CM

## 2021-11-16 DIAGNOSIS — Z12.11 ENCOUNTER FOR SCREENING COLONOSCOPY: ICD-10-CM

## 2021-11-16 DIAGNOSIS — I10 BENIGN ESSENTIAL HYPERTENSION: Primary | ICD-10-CM

## 2021-11-16 DIAGNOSIS — Z23 NEED FOR VACCINATION: ICD-10-CM

## 2021-11-16 DIAGNOSIS — Z12.31 SCREENING MAMMOGRAM FOR BREAST CANCER: ICD-10-CM

## 2021-11-16 DIAGNOSIS — F17.200 TOBACCO USE DISORDER: ICD-10-CM

## 2021-11-16 DIAGNOSIS — E78.2 MIXED HYPERLIPIDEMIA: ICD-10-CM

## 2021-11-16 PROCEDURE — 90732 PPSV23 VACC 2 YRS+ SUBQ/IM: CPT | Performed by: PHYSICIAN ASSISTANT

## 2021-11-16 PROCEDURE — 90471 IMMUNIZATION ADMIN: CPT | Performed by: PHYSICIAN ASSISTANT

## 2021-11-16 PROCEDURE — 99203 OFFICE O/P NEW LOW 30 MIN: CPT | Performed by: PHYSICIAN ASSISTANT

## 2021-11-16 RX ORDER — GABAPENTIN 300 MG/1
300 CAPSULE ORAL 3 TIMES DAILY
Qty: 90 CAPSULE | Refills: 0 | Status: SHIPPED | OUTPATIENT
Start: 2021-11-16 | End: 2021-12-20

## 2021-11-16 RX ORDER — LISINOPRIL 10 MG/1
10 TABLET ORAL DAILY
Qty: 30 TABLET | Refills: 2 | Status: SHIPPED | OUTPATIENT
Start: 2021-11-16 | End: 2022-03-07

## 2021-11-16 RX ORDER — NICOTINE 21 MG/24HR
1 PATCH, TRANSDERMAL 24 HOURS TRANSDERMAL EVERY 24 HOURS
Qty: 30 PATCH | Refills: 2 | Status: SHIPPED | OUTPATIENT
Start: 2021-11-16

## 2021-11-16 RX ORDER — POTASSIUM CHLORIDE 750 MG/1
10 TABLET, FILM COATED, EXTENDED RELEASE ORAL DAILY
Qty: 30 TABLET | Refills: 0 | Status: SHIPPED | OUTPATIENT
Start: 2021-11-16 | End: 2021-12-20

## 2021-11-16 RX ORDER — FUROSEMIDE 20 MG/1
20 TABLET ORAL DAILY
Qty: 30 TABLET | Refills: 0 | Status: SHIPPED | OUTPATIENT
Start: 2021-11-16 | End: 2021-12-20

## 2021-11-16 RX ORDER — BUDESONIDE AND FORMOTEROL FUMARATE DIHYDRATE 160; 4.5 UG/1; UG/1
AEROSOL RESPIRATORY (INHALATION)
COMMUNITY
Start: 2015-06-10 | End: 2021-11-16 | Stop reason: SDUPTHER

## 2021-11-16 RX ORDER — BUDESONIDE AND FORMOTEROL FUMARATE DIHYDRATE 160; 4.5 UG/1; UG/1
2 AEROSOL RESPIRATORY (INHALATION)
Qty: 1 EACH | Refills: 12 | Status: SHIPPED | OUTPATIENT
Start: 2021-11-16
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
## 2021-11-24 ENCOUNTER — OFFICE VISIT (OUTPATIENT)
Dept: PAIN MEDICINE | Facility: CLINIC | Age: 51
End: 2021-11-24

## 2021-11-24 VITALS
SYSTOLIC BLOOD PRESSURE: 123 MMHG | HEART RATE: 92 BPM | DIASTOLIC BLOOD PRESSURE: 82 MMHG | RESPIRATION RATE: 16 BRPM | OXYGEN SATURATION: 96 % | HEIGHT: 63 IN | BODY MASS INDEX: 35.26 KG/M2 | WEIGHT: 199 LBS

## 2021-11-24 DIAGNOSIS — M47.812 CERVICAL SPONDYLOSIS: ICD-10-CM

## 2021-11-24 DIAGNOSIS — M51.36 DDD (DEGENERATIVE DISC DISEASE), LUMBAR: Primary | ICD-10-CM

## 2021-11-24 DIAGNOSIS — M47.816 LUMBAR SPONDYLOSIS: ICD-10-CM

## 2021-11-24 DIAGNOSIS — M50.30 DDD (DEGENERATIVE DISC DISEASE), CERVICAL: ICD-10-CM

## 2021-11-24 PROCEDURE — 99204 OFFICE O/P NEW MOD 45 MIN: CPT | Performed by: STUDENT IN AN ORGANIZED HEALTH CARE EDUCATION/TRAINING PROGRAM

## 2021-11-24 RX ORDER — MELOXICAM 15 MG/1
15 TABLET ORAL DAILY
Qty: 30 TABLET | Refills: 0 | Status: SHIPPED | OUTPATIENT
Start: 2021-11-24 | End: 2021-12-24

## 2021-11-24 NOTE — PROGRESS NOTES
Patient screened positive for depression based on a PHQ-9 score of 11 on 11/24/2021. Follow-up recommendations include: Suicide Risk Assessment performed.
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
## 2021-12-07 ENCOUNTER — HOSPITAL ENCOUNTER (OUTPATIENT)
Dept: MAMMOGRAPHY | Facility: HOSPITAL | Age: 51
Discharge: HOME | End: 2021-12-07
Admitting: PHYSICIAN ASSISTANT

## 2021-12-07 DIAGNOSIS — Z12.31 SCREENING MAMMOGRAM FOR BREAST CANCER: ICD-10-CM

## 2021-12-07 DIAGNOSIS — R92.8 ABNORMAL MAMMOGRAM OF BOTH BREASTS: Primary | ICD-10-CM

## 2021-12-07 PROCEDURE — 77063 BREAST TOMOSYNTHESIS BI: CPT

## 2021-12-07 PROCEDURE — 77067 SCR MAMMO BI INCL CAD: CPT

## 2021-12-15 ENCOUNTER — APPOINTMENT (OUTPATIENT)
Dept: MRI IMAGING | Facility: HOSPITAL | Age: 51
End: 2021-12-15

## 2021-12-17 ENCOUNTER — TREATMENT (OUTPATIENT)
Dept: PHYSICAL THERAPY | Facility: CLINIC | Age: 51
End: 2021-12-17

## 2021-12-17 DIAGNOSIS — M50.30 DDD (DEGENERATIVE DISC DISEASE), CERVICAL: Primary | ICD-10-CM

## 2021-12-17 DIAGNOSIS — M51.36 LUMBAR DEGENERATIVE DISC DISEASE: ICD-10-CM

## 2021-12-17 DIAGNOSIS — M54.12 CERVICAL RADICULOPATHY: ICD-10-CM

## 2021-12-17 DIAGNOSIS — M51.36 DDD (DEGENERATIVE DISC DISEASE), LUMBAR: ICD-10-CM

## 2021-12-17 DIAGNOSIS — R60.9 DEPENDENT EDEMA: ICD-10-CM

## 2021-12-17 PROCEDURE — 97162 PT EVAL MOD COMPLEX 30 MIN: CPT | Performed by: PHYSICAL THERAPIST

## 2021-12-17 NOTE — PROGRESS NOTES
Physical Therapy Initial Evaluation and Plan of Care    Patient: Yarelis Jackson   : 1970  Diagnosis/ICD-10 Code:  DDD (degenerative disc disease), cervical [M50.30]  Referring practitioner: Candido Irving DO  Date of Initial Visit: 2021  Today's Date: 2021  Patient seen for 1 sessions           Subjective Questionnaire: NDI: 52%;  Oswestry:  58%       Subjective Evaluation    History of Present Illness  Mechanism of injury: Patient presents to physical therapy with cc of neck pain, pain in BUE's (right worse than left).  Patient also reports back pain, along with pain in BLE's (right worse than left).  Reports that these symptoms have been present for years, but have gotten worse recently.  Patient feels that her arms and legs have gotten weak recently.  Patient reports difficulty with ADL's that include prolonged sitting and standing.  Reports difficulty sleeping due to pain.  Patient wishes to decrease pain with ADL's.      Patient did PT intervention years ago for same symptoms and reports decrease in pain level with manual therapy.  Reports that she had tried mechanical traction, however had increase in pain after this intervention.      Pain  Current pain ratin  Quality: throbbing  Relieving factors: heat and change in position (does bridge activity to reduce back pain)  Aggravating factors: sleeping, prolonged positioning, lifting and standing  Progression: worsening    Social Support  Lives in: one-story house  Lives with: parents    Hand dominance: right    Treatments  Previous treatment: medication  Patient Goals  Patient goals for therapy: decreased pain, increased strength and increased motion             Objective          Palpation     Right   Hypertonic in the lumbar paraspinals. Tenderness of the lumbar paraspinals.     Tenderness     Left Hip   Tenderness in the PSIS.     Right Hip   Tenderness in the PSIS.     Additional Tenderness Details  TTP T2-T4, alondra upper trapezius,  C5-C6     Active Range of Motion   Cervical/Thoracic Spine   Cervical    Flexion: 35 degrees   Extension: 20 degrees with pain  Left lateral flexion: 20 degrees   Right lateral flexion: 20 degrees with pain  Left rotation: 60 degrees   Right rotation: 40 degrees with pain    Additional Active Range of Motion Details  Lumbar AROM:    Extension:  50% with pain  Flexion:  25%   R/L sidebendin%     Passive Range of Motion   Left Hip   External rotation (prone): 45 degrees with pain  Internal rotation (90/90): 20 degrees with pain    Right Hip   External rotation (prone): 45 degrees with pain  Internal rotation (90/90): 20 degrees with pain    Strength/Myotome Testing     Left Shoulder     Planes of Motion   Flexion: 4+   Abduction: 4+   External rotation at 0°: 4+   Internal rotation at 0°: 4+     Right Shoulder     Planes of Motion   Flexion: 4   Abduction: 4   External rotation at 0°: 4   Internal rotation at 0°: 4+     Left Elbow   Flexion: 4+  Extension: 4+    Right Elbow   Flexion: 4+  Extension: 4+    Left Wrist/Hand   Wrist extension: 5  Wrist flexion: 5    Right Wrist/Hand   Wrist extension: 4+  Wrist flexion: 5    Left Hip   Planes of Motion   Flexion: 5  Extension: 4+  Abduction: 4+  External rotation: 5  Internal rotation: 5    Right Hip   Planes of Motion   Flexion: 5  Extension: 4  Abduction: 4  External rotation: 5  Internal rotation: 5    Left Knee   Flexion: 4+  Extension: 5    Right Knee   Flexion: 4  Extension: 5    Left Ankle/Foot   Dorsiflexion: 5    Right Ankle/Foot   Dorsiflexion: 5    Tests     Lumbar     Left   Positive quadrant.     Right   Positive quadrant.           Assessment & Plan     Assessment  Impairments: abnormal or restricted ROM, impaired physical strength, lacks appropriate home exercise program and pain with function  Functional Limitations: lifting, sleeping, uncomfortable because of pain, sitting and standing  Assessment details: Patient presents to physical therapy with  s/s congruent with MD diagnosis of DDD in cervical/lumbar spine.  Patient demonstrates AROM deficits in both cervical and lumbar spine.  Also noted strength deficits in BUE's and BLE's.  Patient would benefit from PT intervention in order to decrease pain, as well as address these deficits so that she may complete household chore activities with less pain/limitation.   Prognosis: good    Goals  Plan Goals: In two weeks, patient will report at least 25% reduction in pain level.    In two weeks, patient will demonstrate at least 25% improvement in AROM in cervical spine and lumbar spine.     In four weeks, patient will demonstrate at least 60 degrees of cervical rotation bilaterally.   In four weeks, patient will demonstrate lumbar AROM WFL without pain level over 2/10.  In four weeks, patient will demonstrate decreased perceived disability by decreasing score NDI and Oswestry by at least 12% each.    Plan  Therapy options: will be seen for skilled therapy services  Planned modality interventions: cryotherapy and thermotherapy (hydrocollator packs)  Planned therapy interventions: manual therapy, neuromuscular re-education, soft tissue mobilization, spinal/joint mobilization, strengthening, stretching, therapeutic activities, joint mobilization, home exercise program, functional ROM exercises and abdominal trunk stabilization  Duration in visits: 20  Plan details: 1-2 times per week        Manual Therapy:         mins  55097;  Therapeutic Exercise:    5     mins  61559;     Neuromuscular Britney:        mins  31731;    Therapeutic Activity:          mins  62363;     Gait Training:           mins  27572;     Ultrasound:          mins  25606;    Electrical Stimulation:         mins  20259 ( );  Dry Needling          mins self-pay    Timed Treatment:   5   mins   Total Treatment:     45   mins    PT SIGNATURE: David Oneal PT   DATE TREATMENT INITIATED: 12/17/2021    Initial Certification  Certification Period:  3/17/2022  I certify that the therapy services are furnished while this patient is under my care.  The services outlined above are required by this patient, and will be reviewed every 90 days.     PHYSICIAN: Candido Irving, DO      DATE:     Please sign and return via fax to [phone].. Thank you, [facility].

## 2021-12-20 RX ORDER — FUROSEMIDE 20 MG/1
20 TABLET ORAL DAILY
Qty: 30 TABLET | Refills: 0 | Status: SHIPPED | OUTPATIENT
Start: 2021-12-20 | End: 2021-12-23 | Stop reason: SDUPTHER

## 2021-12-20 RX ORDER — GABAPENTIN 300 MG/1
300 CAPSULE ORAL 3 TIMES DAILY
Qty: 90 CAPSULE | Refills: 0 | Status: SHIPPED | OUTPATIENT
Start: 2021-12-20 | End: 2022-01-24

## 2021-12-20 RX ORDER — POTASSIUM CHLORIDE 750 MG/1
TABLET, FILM COATED, EXTENDED RELEASE ORAL
Qty: 30 TABLET | Refills: 0 | Status: SHIPPED | OUTPATIENT
Start: 2021-12-20 | End: 2021-12-23 | Stop reason: SDUPTHER

## 2021-12-22 ENCOUNTER — HOSPITAL ENCOUNTER (OUTPATIENT)
Dept: ULTRASOUND IMAGING | Facility: HOSPITAL | Age: 51
Discharge: HOME | End: 2021-12-22

## 2021-12-22 ENCOUNTER — HOSPITAL ENCOUNTER (OUTPATIENT)
Dept: MAMMOGRAPHY | Facility: HOSPITAL | Age: 51
Discharge: HOME | End: 2021-12-22

## 2021-12-22 DIAGNOSIS — N63.10 MASS OF RIGHT BREAST: ICD-10-CM

## 2021-12-22 PROCEDURE — 77066 DX MAMMO INCL CAD BI: CPT

## 2021-12-22 PROCEDURE — G0279 TOMOSYNTHESIS, MAMMO: HCPCS

## 2021-12-22 PROCEDURE — 76642 ULTRASOUND BREAST LIMITED: CPT

## 2021-12-23 ENCOUNTER — OFFICE VISIT (OUTPATIENT)
Dept: FAMILY MEDICINE CLINIC | Facility: CLINIC | Age: 51
End: 2021-12-23

## 2021-12-23 VITALS
WEIGHT: 198 LBS | HEIGHT: 63 IN | RESPIRATION RATE: 16 BRPM | OXYGEN SATURATION: 96 % | DIASTOLIC BLOOD PRESSURE: 84 MMHG | TEMPERATURE: 98 F | SYSTOLIC BLOOD PRESSURE: 144 MMHG | BODY MASS INDEX: 35.08 KG/M2 | HEART RATE: 111 BPM

## 2021-12-23 DIAGNOSIS — Z01.419 WELL WOMAN EXAM WITH ROUTINE GYNECOLOGICAL EXAM: Primary | ICD-10-CM

## 2021-12-23 DIAGNOSIS — Z11.59 NEED FOR HEPATITIS C SCREENING TEST: ICD-10-CM

## 2021-12-23 DIAGNOSIS — R92.8 ABNORMAL MAMMOGRAM OF LEFT BREAST: ICD-10-CM

## 2021-12-23 DIAGNOSIS — F41.9 ANXIETY AND DEPRESSION: ICD-10-CM

## 2021-12-23 DIAGNOSIS — I10 BENIGN ESSENTIAL HYPERTENSION: ICD-10-CM

## 2021-12-23 DIAGNOSIS — R60.9 DEPENDENT EDEMA: ICD-10-CM

## 2021-12-23 DIAGNOSIS — F32.A ANXIETY AND DEPRESSION: ICD-10-CM

## 2021-12-23 PROCEDURE — 99396 PREV VISIT EST AGE 40-64: CPT | Performed by: PHYSICIAN ASSISTANT

## 2021-12-23 RX ORDER — CITALOPRAM 20 MG/1
20 TABLET ORAL DAILY
Qty: 30 TABLET | Refills: 2 | Status: SHIPPED | OUTPATIENT
Start: 2021-12-23 | End: 2022-04-11

## 2021-12-23 RX ORDER — POTASSIUM CHLORIDE 750 MG/1
10 TABLET, FILM COATED, EXTENDED RELEASE ORAL 2 TIMES DAILY
Qty: 60 TABLET | Refills: 2 | Status: SHIPPED | OUTPATIENT
Start: 2021-12-23 | End: 2022-01-28

## 2021-12-23 RX ORDER — HYDROXYZINE 50 MG/1
50 TABLET, FILM COATED ORAL 3 TIMES DAILY PRN
Qty: 60 TABLET | Refills: 2 | Status: SHIPPED | OUTPATIENT
Start: 2021-12-23 | End: 2022-04-11

## 2021-12-23 RX ORDER — FUROSEMIDE 20 MG/1
20 TABLET ORAL 2 TIMES DAILY
Qty: 60 TABLET | Refills: 2 | Status: SHIPPED | OUTPATIENT
Start: 2021-12-23 | End: 2022-01-28

## 2021-12-23 NOTE — PROGRESS NOTES
Subjective   Yarelis Jackson is a 51 y.o. female.     Pt presents for well woman with pap.  She recently had mammogram which showed some abnormalities.  She had diagnostic mammogram and it is suggested to repeat diagnostic mammogram of left breast in 6 months. Order has been placed in system.  Smoking- She is still smoking but working on decreasing smoking.  She had to recently put down her dog who was 16 years old and has been having a difficult time with anxiety and depression.  She also has been having more depression this time of year due to the death of her father around this time of year.  She denies any SI/HI.  Colon cancer screening- referral placed at last visit 1 month ago. She didn't realize she needed to take the paperwork to them but is going to do so.  She is due for lipid panel which was ordered at last visit. Also needs screening hepatitis C done.       The following portions of the patient's history were reviewed and updated as appropriate: allergies, current medications, past family history, past medical history, past social history, past surgical history and problem list.  Past Medical History:   Diagnosis Date   • COPD (chronic obstructive pulmonary disease) (HCC)      Past Surgical History:   Procedure Laterality Date   • LUNG SURGERY       Family History   Problem Relation Age of Onset   • Diabetes Mother    • Cancer Paternal Uncle    • Cancer Paternal Grandmother    • COPD Paternal Grandmother    • Diabetes Paternal Grandmother      Social History     Socioeconomic History   • Marital status:    Tobacco Use   • Smoking status: Current Every Day Smoker     Packs/day: 0.50     Types: Cigarettes   • Smokeless tobacco: Never Used   Vaping Use   • Vaping Use: Never used   Substance and Sexual Activity   • Alcohol use: Not Currently   • Drug use: Never   • Sexual activity: Defer         Current Outpatient Medications:   •  budesonide-formoterol (Symbicort) 160-4.5 MCG/ACT inhaler, Inhale 2  puffs 2 (Two) Times a Day., Disp: 1 each, Rfl: 12  •  citalopram (CeleXA) 20 MG tablet, Take 1 tablet by mouth Daily., Disp: 30 tablet, Rfl: 2  •  furosemide (LASIX) 20 MG tablet, Take 1 tablet by mouth 2 (Two) Times a Day., Disp: 60 tablet, Rfl: 2  •  gabapentin (NEURONTIN) 300 MG capsule, Take 1 capsule by mouth 3 (Three) Times a Day., Disp: 90 capsule, Rfl: 0  •  hydrOXYzine (ATARAX) 50 MG tablet, Take 1 tablet by mouth 3 (Three) Times a Day As Needed for Anxiety., Disp: 60 tablet, Rfl: 2  •  lidocaine (LIDODERM) 5 %, Place 1 patch on the skin as directed by provider Daily. Remove & Discard patch within 12 hours or as directed by MD, Disp: 30 patch, Rfl: 0  •  lisinopril (PRINIVIL,ZESTRIL) 10 MG tablet, Take 1 tablet by mouth Daily., Disp: 30 tablet, Rfl: 2  •  meloxicam (MOBIC) 15 MG tablet, Take 1 tablet by mouth Daily for 30 days., Disp: 30 tablet, Rfl: 0  •  nicotine (NICODERM CQ) 14 MG/24HR patch, Place 1 patch on the skin as directed by provider Daily., Disp: 30 patch, Rfl: 2  •  potassium chloride 10 MEQ CR tablet, Take 1 tablet by mouth 2 (Two) Times a Day., Disp: 60 tablet, Rfl: 2    Review of Systems   Constitutional: Negative for activity change, appetite change, chills, diaphoresis, fatigue, fever, unexpected weight gain and unexpected weight loss.   Respiratory: Negative for cough, chest tightness, shortness of breath and wheezing.    Cardiovascular: Positive for leg swelling. Negative for chest pain and palpitations.   Gastrointestinal: Negative for abdominal pain, constipation, diarrhea, nausea, vomiting and indigestion.   Genitourinary: Negative for decreased urine volume, dysuria, vaginal bleeding, vaginal discharge and vaginal pain.   Musculoskeletal: Negative for gait problem.   Neurological: Negative for dizziness, weakness, light-headedness, headache and confusion.   Psychiatric/Behavioral: Positive for sleep disturbance, depressed mood and stress. Negative for self-injury and suicidal ideas.  "The patient is nervous/anxious.      /84 (BP Location: Right arm, Patient Position: Sitting, Cuff Size: Large Adult)   Pulse 111   Temp 98 °F (36.7 °C) (Temporal)   Resp 16   Ht 160 cm (62.99\")   Wt 89.8 kg (198 lb)   LMP  (LMP Unknown)   SpO2 96%   BMI 35.09 kg/m²       Objective   Physical Exam  Vitals and nursing note reviewed. Exam conducted with a chaperone present.   Constitutional:       General: She is not in acute distress.     Appearance: Normal appearance. She is normal weight.   HENT:      Head: Normocephalic and atraumatic.      Right Ear: Tympanic membrane, ear canal and external ear normal.      Left Ear: Tympanic membrane, ear canal and external ear normal.      Nose: Nose normal.      Mouth/Throat:      Mouth: Mucous membranes are moist.      Pharynx: Oropharynx is clear.   Eyes:      Extraocular Movements: Extraocular movements intact.      Conjunctiva/sclera: Conjunctivae normal.      Pupils: Pupils are equal, round, and reactive to light.   Neck:      Thyroid: No thyroid mass, thyromegaly or thyroid tenderness.   Cardiovascular:      Rate and Rhythm: Normal rate and regular rhythm.      Heart sounds: Normal heart sounds.   Pulmonary:      Effort: Pulmonary effort is normal. No respiratory distress.      Breath sounds: Normal breath sounds. No wheezing, rhonchi or rales.   Chest:   Breasts:      Right: Normal. No swelling, bleeding, inverted nipple, mass, nipple discharge, skin change or tenderness.      Left: Tenderness present. No swelling, bleeding, inverted nipple, mass, nipple discharge or skin change.        Comments: Slight tenderness with palpation of left breast on lateral aspect.  Abdominal:      General: Abdomen is flat. Bowel sounds are normal. There is no distension.      Palpations: Abdomen is soft. There is no mass.      Tenderness: There is no abdominal tenderness.   Genitourinary:     Vagina: Tenderness present.      Cervix: Friability present. No lesion.      " Uterus: Normal.       Adnexa: Right adnexa normal and left adnexa normal.   Musculoskeletal:         General: Normal range of motion.      Cervical back: Normal range of motion and neck supple.      Right lower leg: No edema.      Left lower leg: No edema.   Skin:     General: Skin is warm and dry.   Neurological:      General: No focal deficit present.      Mental Status: She is alert and oriented to person, place, and time.   Psychiatric:         Attention and Perception: Attention and perception normal.         Mood and Affect: Mood is anxious and depressed.         Speech: Speech normal.         Behavior: Behavior normal. Behavior is cooperative.         Thought Content: Thought content normal.         Cognition and Memory: Cognition normal.         Judgment: Judgment normal.         Procedures     Assessment/Plan   Diagnoses and all orders for this visit:    1. Well woman exam with routine gynecological exam (Primary)  Comments:  well woman with pap done today.  Pt to work on increasing exercise to at least 20 minutes per day and also work on health diet.  Orders:  -     IGP,Aptima HPV,Age Gdln    2. Abnormal mammogram of left breast  Comments:  Pt to get repeat follow up diagnostic mammogram of left breast done in 6 months.  Orders placed.  Orders:  -     Mammo Diagnostic Digital Tomosynthesis Left With CAD; Future  -     US Breast Left Limited; Future    3. Need for hepatitis C screening test  Comments:  Pt to return for labs at her convenience.  Orders:  -     Hepatitis C antibody; Future    4. Dependent edema  Comments:  Restart lasix and may take as needed for edema.  Discussed that when taking lasix, also take potassium.  If not improving, pt to let me know.  Orders:  -     furosemide (LASIX) 20 MG tablet; Take 1 tablet by mouth 2 (Two) Times a Day.  Dispense: 60 tablet; Refill: 2  -     potassium chloride 10 MEQ CR tablet; Take 1 tablet by mouth 2 (Two) Times a Day.  Dispense: 60 tablet; Refill: 2  -      Basic metabolic panel; Future    5. Benign essential hypertension  Comments:  continue current meds. Work on low sodium diet and weight loss.  Orders:  -     Basic metabolic panel; Future    6. Anxiety and depression  Comments:  Will start on celexa and pt to use hydroxyzine as needed.  Orders:  -     citalopram (CeleXA) 20 MG tablet; Take 1 tablet by mouth Daily.  Dispense: 30 tablet; Refill: 2  -     hydrOXYzine (ATARAX) 50 MG tablet; Take 1 tablet by mouth 3 (Three) Times a Day As Needed for Anxiety.  Dispense: 60 tablet; Refill: 2

## 2021-12-23 NOTE — PATIENT INSTRUCTIONS
Please return for fasting labs at your convenience.  Work on exercising at least 20 minutes daily and eating healthy diet. Please schedule an appointment for dental visit.

## 2021-12-28 DIAGNOSIS — A59.01 TRICHOMONIASIS OF VAGINA: Primary | ICD-10-CM

## 2021-12-28 LAB
AGE GDLN ACOG TESTING: NORMAL
CYTOLOGIST CVX/VAG CYTO: NORMAL
CYTOLOGY CVX/VAG DOC CYTO: NORMAL
CYTOLOGY CVX/VAG DOC THIN PREP: NORMAL
DX ICD CODE: NORMAL
HIV 1 & 2 AB SER-IMP: NORMAL
HPV I/H RISK 4 DNA CVX QL PROBE+SIG AMP: NEGATIVE
OTHER STN SPEC: NORMAL
STAT OF ADQ CVX/VAG CYTO-IMP: NORMAL

## 2021-12-28 RX ORDER — METRONIDAZOLE 500 MG/1
2000 TABLET ORAL ONCE
Qty: 4 TABLET | Refills: 0 | Status: SHIPPED | OUTPATIENT
Start: 2021-12-28 | End: 2021-12-28
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
## 2022-01-24 ENCOUNTER — OFFICE VISIT (OUTPATIENT)
Dept: PAIN MEDICINE | Facility: CLINIC | Age: 52
End: 2022-01-24

## 2022-01-24 ENCOUNTER — TELEPHONE (OUTPATIENT)
Dept: PAIN MEDICINE | Facility: CLINIC | Age: 52
End: 2022-01-24

## 2022-01-24 VITALS
RESPIRATION RATE: 16 BRPM | SYSTOLIC BLOOD PRESSURE: 156 MMHG | BODY MASS INDEX: 35.08 KG/M2 | WEIGHT: 198 LBS | OXYGEN SATURATION: 98 % | HEART RATE: 101 BPM | DIASTOLIC BLOOD PRESSURE: 89 MMHG | HEIGHT: 63 IN

## 2022-01-24 DIAGNOSIS — M50.30 DDD (DEGENERATIVE DISC DISEASE), CERVICAL: ICD-10-CM

## 2022-01-24 DIAGNOSIS — M47.816 LUMBAR SPONDYLOSIS: ICD-10-CM

## 2022-01-24 DIAGNOSIS — M51.36 DDD (DEGENERATIVE DISC DISEASE), LUMBAR: Primary | ICD-10-CM

## 2022-01-24 DIAGNOSIS — M47.812 CERVICAL SPONDYLOSIS: ICD-10-CM

## 2022-01-24 DIAGNOSIS — G43.009 MIGRAINE WITHOUT AURA AND WITHOUT STATUS MIGRAINOSUS, NOT INTRACTABLE: ICD-10-CM

## 2022-01-24 PROCEDURE — 99214 OFFICE O/P EST MOD 30 MIN: CPT | Performed by: STUDENT IN AN ORGANIZED HEALTH CARE EDUCATION/TRAINING PROGRAM

## 2022-01-24 RX ORDER — DICLOFENAC SODIUM 75 MG/1
75 TABLET, DELAYED RELEASE ORAL 2 TIMES DAILY
Qty: 60 TABLET | Refills: 1 | Status: SHIPPED | OUTPATIENT
Start: 2022-01-24 | End: 2022-04-11

## 2022-01-24 RX ORDER — GABAPENTIN 400 MG/1
400 CAPSULE ORAL 3 TIMES DAILY
Qty: 90 CAPSULE | Refills: 1 | Status: SHIPPED | OUTPATIENT
Start: 2022-01-24 | End: 2022-04-11

## 2022-01-28 DIAGNOSIS — R60.9 DEPENDENT EDEMA: ICD-10-CM

## 2022-01-28 RX ORDER — FUROSEMIDE 20 MG/1
TABLET ORAL
Qty: 30 TABLET | Refills: 0 | Status: SHIPPED | OUTPATIENT
Start: 2022-01-28

## 2022-01-28 RX ORDER — POTASSIUM CHLORIDE 750 MG/1
TABLET, FILM COATED, EXTENDED RELEASE ORAL
Qty: 30 TABLET | Refills: 0 | Status: SHIPPED | OUTPATIENT
Start: 2022-01-28

## 2022-01-31 ENCOUNTER — PROCEDURE VISIT (OUTPATIENT)
Dept: PAIN MEDICINE | Facility: CLINIC | Age: 52
End: 2022-01-31

## 2022-01-31 VITALS
DIASTOLIC BLOOD PRESSURE: 82 MMHG | RESPIRATION RATE: 16 BRPM | HEART RATE: 89 BPM | WEIGHT: 198 LBS | SYSTOLIC BLOOD PRESSURE: 145 MMHG | BODY MASS INDEX: 35.08 KG/M2 | OXYGEN SATURATION: 97 % | HEIGHT: 63 IN

## 2022-01-31 DIAGNOSIS — M54.81 BILATERAL OCCIPITAL NEURALGIA: Primary | ICD-10-CM

## 2022-01-31 PROCEDURE — 64405 NJX AA&/STRD GR OCPL NRV: CPT | Performed by: STUDENT IN AN ORGANIZED HEALTH CARE EDUCATION/TRAINING PROGRAM

## 2022-01-31 PROCEDURE — 64450 NJX AA&/STRD OTHER PN/BRANCH: CPT | Performed by: STUDENT IN AN ORGANIZED HEALTH CARE EDUCATION/TRAINING PROGRAM

## 2022-01-31 RX ORDER — BUPIVACAINE HYDROCHLORIDE 2.5 MG/ML
10 INJECTION, SOLUTION INFILTRATION; PERINEURAL ONCE
Status: COMPLETED | OUTPATIENT
Start: 2022-01-31 | End: 2022-01-31

## 2022-01-31 RX ORDER — METHYLPREDNISOLONE ACETATE 40 MG/ML
40 INJECTION, SUSPENSION INTRA-ARTICULAR; INTRALESIONAL; INTRAMUSCULAR; SOFT TISSUE ONCE
Status: COMPLETED | OUTPATIENT
Start: 2022-01-31 | End: 2022-01-31

## 2022-01-31 RX ADMIN — METHYLPREDNISOLONE ACETATE 40 MG: 40 INJECTION, SUSPENSION INTRA-ARTICULAR; INTRALESIONAL; INTRAMUSCULAR; SOFT TISSUE at 16:19

## 2022-01-31 RX ADMIN — BUPIVACAINE HYDROCHLORIDE 10 ML: 2.5 INJECTION, SOLUTION INFILTRATION; PERINEURAL at 16:13

## 2022-01-31 NOTE — PROGRESS NOTES
H&P:   H and P reviewed from previous visit and no changes to patient's clinical presentation. Will proceed with procedure as planned. Patient denies history of DM and being on blood thinners.    Candido Irving DO  Pain Management   [facility]         Procedure note:   Pre-procedure diagnosis: B/l occipital neuralgia    The patient's chart was reviewed.  After obtaining written informed consent, the patient was placed in a sitting position with the cervical spine flexed and the forehead on a padded bedside table.  The right occipital artery was palpated at the level of the superior nuchal ridge.  The area was prepped with antiseptic solution.  A 27 gauge 1 1/2 inch needle is inserted just medial to the artery and advanced perpendicular until the needle approached the periostium of the underlying occipital bone.  Then, the needle was redirected superiorly.  After gentle aspiration, 2 ml of the solution was injected in a fanlike manner.  The right lesser occipital nerve was blocked by redirecting the needle laterally and slightly inferiorly.  After gentle aspiration, an additional 2 ml of the solution was injected. The procedure was repeated to inject around the left greater and lesser occipital nerve. A total mixture of 10 ml of 0.25% marcaine with 40 mg depo-medrol was injected slowly.    The patient tolerated the procedure well.  The needle was flushed and withdrawn, and a Band-Aid was applied.    Candido Irving DO  Pain Management   [facility]

## 2022-03-07 DIAGNOSIS — I10 BENIGN ESSENTIAL HYPERTENSION: ICD-10-CM

## 2022-03-07 RX ORDER — LISINOPRIL 10 MG/1
TABLET ORAL
Qty: 30 TABLET | Refills: 2 | Status: SHIPPED | OUTPATIENT
Start: 2022-03-07

## 2022-04-11 DIAGNOSIS — F32.A ANXIETY AND DEPRESSION: ICD-10-CM

## 2022-04-11 DIAGNOSIS — F41.9 ANXIETY AND DEPRESSION: ICD-10-CM

## 2022-04-11 RX ORDER — HYDROXYZINE 50 MG/1
TABLET, FILM COATED ORAL
Qty: 60 TABLET | Refills: 2 | Status: SHIPPED | OUTPATIENT
Start: 2022-04-11

## 2022-04-11 RX ORDER — GABAPENTIN 400 MG/1
400 CAPSULE ORAL 3 TIMES DAILY
Qty: 90 CAPSULE | Refills: 0 | Status: SHIPPED | OUTPATIENT
Start: 2022-04-11 | End: 2022-05-11

## 2022-04-11 RX ORDER — CITALOPRAM 20 MG/1
TABLET ORAL
Qty: 30 TABLET | Refills: 2 | Status: SHIPPED | OUTPATIENT
Start: 2022-04-11

## 2022-04-11 RX ORDER — DICLOFENAC SODIUM 75 MG/1
75 TABLET, DELAYED RELEASE ORAL 2 TIMES DAILY
Qty: 60 TABLET | Refills: 0 | Status: SHIPPED | OUTPATIENT
Start: 2022-04-11 | End: 2022-05-11

## 2022-04-11 NOTE — TELEPHONE ENCOUNTER
Rx Refill Note  Requested Prescriptions     Pending Prescriptions Disp Refills   • diclofenac (VOLTAREN) 75 MG EC tablet [Pharmacy Med Name: diclofenac sodium 75 mg tablet,delayed release] 60 tablet 1     Sig: TAKE ONE TABLET BY MOUTH TWICE DAILY   • gabapentin (NEURONTIN) 400 MG capsule [Pharmacy Med Name: gabapentin 400 mg capsule] 90 capsule 1     Sig: Take 1 capsule by mouth 3 (Three) Times a Day for 60 days.      Last office visit with prescribing clinician: 1/24/2022      Next office visit with prescribing clinician: Visit date not found            Dolly Queen MA  04/11/22, 10:28 EDT

## 2022-04-25 ENCOUNTER — TELEPHONE (OUTPATIENT)
Dept: PAIN MEDICINE | Facility: CLINIC | Age: 52
End: 2022-04-25
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
## 2022-06-29 ENCOUNTER — APPOINTMENT (OUTPATIENT)
Dept: MAMMOGRAPHY | Facility: HOSPITAL | Age: 52
End: 2022-06-29

## 2022-06-29 ENCOUNTER — HOSPITAL ENCOUNTER (OUTPATIENT)
Dept: ULTRASOUND IMAGING | Facility: HOSPITAL | Age: 52
End: 2022-06-29

## 2022-08-09 ENCOUNTER — TELEPHONE (OUTPATIENT)
Dept: NEUROLOGY | Facility: CLINIC | Age: 52
End: 2022-08-09

## 2022-12-12 ENCOUNTER — HOSPITAL ENCOUNTER (EMERGENCY)
Facility: HOSPITAL | Age: 52
Discharge: HOME | End: 2022-12-12
Attending: EMERGENCY MEDICINE | Admitting: EMERGENCY MEDICINE

## 2022-12-12 VITALS
OXYGEN SATURATION: 99 % | RESPIRATION RATE: 17 BRPM | DIASTOLIC BLOOD PRESSURE: 99 MMHG | HEIGHT: 64 IN | BODY MASS INDEX: 32.17 KG/M2 | SYSTOLIC BLOOD PRESSURE: 169 MMHG | TEMPERATURE: 98.2 F | HEART RATE: 89 BPM | WEIGHT: 188.4 LBS

## 2022-12-12 DIAGNOSIS — S01.01XA LACERATION OF SCALP, INITIAL ENCOUNTER: Primary | ICD-10-CM
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
## 2022-12-12 DIAGNOSIS — S00.83XA CONTUSION OF FACE, INITIAL ENCOUNTER: ICD-10-CM

## 2022-12-12 PROCEDURE — 25010000002 TETANUS-DIPHTH-ACELL PERTUSSIS 5-2.5-18.5 LF-MCG/0.5 SUSPENSION PREFILLED SYRINGE: Performed by: NURSE PRACTITIONER

## 2022-12-12 PROCEDURE — 99282 EMERGENCY DEPT VISIT SF MDM: CPT

## 2022-12-12 PROCEDURE — 90715 TDAP VACCINE 7 YRS/> IM: CPT | Performed by: NURSE PRACTITIONER

## 2022-12-12 PROCEDURE — 90471 IMMUNIZATION ADMIN: CPT | Performed by: NURSE PRACTITIONER

## 2022-12-12 RX ADMIN — TETANUS TOXOID, REDUCED DIPHTHERIA TOXOID AND ACELLULAR PERTUSSIS VACCINE, ADSORBED 0.5 ML: 5; 2.5; 8; 8; 2.5 SUSPENSION INTRAMUSCULAR at 11:57

## 2022-12-12 NOTE — DISCHARGE INSTRUCTIONS
Cleanse twice daily with antibacterial soap and water, then apply bacitracin.  Rotate Tylenol Motrin as needed for pain.  Apply ice every 2 hours while awake, on for 20 minutes.  Have staples removed in 10 days by primary care.  Return to the ER for new or worsening symptoms.

## 2022-12-12 NOTE — ED NOTES
Wound cleansed with ns and peroxide with provider at bedside. Small laceration noted to posterior scalp. Bleeding controlled.
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
## 2023-01-30 ENCOUNTER — TELEPHONE (OUTPATIENT)
Dept: FAMILY MEDICINE CLINIC | Facility: CLINIC | Age: 53
End: 2023-01-30
Payer: MEDICAID

## 2023-02-02 NOTE — TELEPHONE ENCOUNTER
She states she had a bad reaction to the flu shot years ago and is worried about having one with the COVID vaccine. She also said she has bad lungs and is worried about not being able to breathe if she were to get sick from the vaccine.

## 2023-04-04 ENCOUNTER — APPOINTMENT (OUTPATIENT)
Dept: CT IMAGING | Facility: HOSPITAL | Age: 53
End: 2023-04-04
Payer: MEDICAID

## 2023-04-04 ENCOUNTER — APPOINTMENT (OUTPATIENT)
Dept: GENERAL RADIOLOGY | Facility: HOSPITAL | Age: 53
End: 2023-04-04
Payer: MEDICAID

## 2023-04-04 ENCOUNTER — HOSPITAL ENCOUNTER (EMERGENCY)
Facility: HOSPITAL | Age: 53
Discharge: HOME | End: 2023-04-04
Attending: EMERGENCY MEDICINE | Admitting: EMERGENCY MEDICINE
Payer: MEDICAID

## 2023-04-04 VITALS
DIASTOLIC BLOOD PRESSURE: 93 MMHG | OXYGEN SATURATION: 91 % | HEIGHT: 64 IN | WEIGHT: 216.27 LBS | TEMPERATURE: 97.7 F | SYSTOLIC BLOOD PRESSURE: 155 MMHG | BODY MASS INDEX: 36.92 KG/M2 | HEART RATE: 95 BPM | RESPIRATION RATE: 22 BRPM

## 2023-04-04 DIAGNOSIS — J44.1 COPD EXACERBATION: ICD-10-CM

## 2023-04-04 DIAGNOSIS — J18.9 PNEUMONIA OF RIGHT LOWER LOBE DUE TO INFECTIOUS ORGANISM: ICD-10-CM

## 2023-04-04 DIAGNOSIS — R06.02 SHORTNESS OF BREATH: Primary | ICD-10-CM

## 2023-04-04 LAB
ALBUMIN SERPL-MCNC: 3.8 G/DL (ref 3.5–5.2)
ALBUMIN/GLOB SERPL: 1.1 G/DL
ALP SERPL-CCNC: 109 U/L (ref 39–117)
ALT SERPL W P-5'-P-CCNC: 32 U/L (ref 1–33)
ANION GAP SERPL CALCULATED.3IONS-SCNC: 10 MMOL/L (ref 5–15)
APTT PPP: 24.3 SECONDS (ref 24–31)
AST SERPL-CCNC: 24 U/L (ref 1–32)
BASOPHILS # BLD AUTO: 0 10*3/MM3 (ref 0–0.2)
BASOPHILS NFR BLD AUTO: 0.4 % (ref 0–1.5)
BILIRUB SERPL-MCNC: 0.2 MG/DL (ref 0–1.2)
BUN SERPL-MCNC: 17 MG/DL (ref 6–20)
BUN/CREAT SERPL: 23.3 (ref 7–25)
CALCIUM SPEC-SCNC: 9.4 MG/DL (ref 8.6–10.5)
CHLORIDE SERPL-SCNC: 104 MMOL/L (ref 98–107)
CO2 SERPL-SCNC: 24 MMOL/L (ref 22–29)
CREAT SERPL-MCNC: 0.73 MG/DL (ref 0.57–1)
D DIMER PPP FEU-MCNC: 0.68 MG/L (FEU) (ref 0–0.51)
DEPRECATED RDW RBC AUTO: 44.6 FL (ref 37–54)
EGFRCR SERPLBLD CKD-EPI 2021: 99.7 ML/MIN/1.73
EOSINOPHIL # BLD AUTO: 0.1 10*3/MM3 (ref 0–0.4)
EOSINOPHIL NFR BLD AUTO: 1.6 % (ref 0.3–6.2)
ERYTHROCYTE [DISTWIDTH] IN BLOOD BY AUTOMATED COUNT: 13.3 % (ref 12.3–15.4)
FLUAV SUBTYP SPEC NAA+PROBE: NOT DETECTED
FLUBV RNA ISLT QL NAA+PROBE: NOT DETECTED
GLOBULIN UR ELPH-MCNC: 3.5 GM/DL
GLUCOSE SERPL-MCNC: 111 MG/DL (ref 65–99)
HCT VFR BLD AUTO: 39.2 % (ref 34–46.6)
HGB BLD-MCNC: 12.9 G/DL (ref 12–15.9)
INR PPP: 0.97 (ref 0.93–1.1)
LYMPHOCYTES # BLD AUTO: 3.4 10*3/MM3 (ref 0.7–3.1)
LYMPHOCYTES NFR BLD AUTO: 48 % (ref 19.6–45.3)
MCH RBC QN AUTO: 31.9 PG (ref 26.6–33)
MCHC RBC AUTO-ENTMCNC: 33 G/DL (ref 31.5–35.7)
MCV RBC AUTO: 96.6 FL (ref 79–97)
MONOCYTES # BLD AUTO: 0.5 10*3/MM3 (ref 0.1–0.9)
MONOCYTES NFR BLD AUTO: 7.4 % (ref 5–12)
NEUTROPHILS NFR BLD AUTO: 3 10*3/MM3 (ref 1.7–7)
NEUTROPHILS NFR BLD AUTO: 42.6 % (ref 42.7–76)
NRBC BLD AUTO-RTO: 0.1 /100 WBC (ref 0–0.2)
NT-PROBNP SERPL-MCNC: <36 PG/ML (ref 0–900)
PLATELET # BLD AUTO: 345 10*3/MM3 (ref 140–450)
PMV BLD AUTO: 6.7 FL (ref 6–12)
POTASSIUM SERPL-SCNC: 4 MMOL/L (ref 3.5–5.2)
PROT SERPL-MCNC: 7.3 G/DL (ref 6–8.5)
PROTHROMBIN TIME: 10 SECONDS (ref 9.6–11.7)
QT INTERVAL: 338 MS
RBC # BLD AUTO: 4.06 10*6/MM3 (ref 3.77–5.28)
SARS-COV-2 RNA RESP QL NAA+PROBE: NOT DETECTED
SODIUM SERPL-SCNC: 138 MMOL/L (ref 136–145)
TROPONIN T SERPL HS-MCNC: 8 NG/L
WBC NRBC COR # BLD: 7 10*3/MM3 (ref 3.4–10.8)

## 2023-04-04 PROCEDURE — 83880 ASSAY OF NATRIURETIC PEPTIDE: CPT | Performed by: NURSE PRACTITIONER

## 2023-04-04 PROCEDURE — 71045 X-RAY EXAM CHEST 1 VIEW: CPT

## 2023-04-04 PROCEDURE — 85025 COMPLETE CBC W/AUTO DIFF WBC: CPT | Performed by: NURSE PRACTITIONER

## 2023-04-04 PROCEDURE — 99284 EMERGENCY DEPT VISIT MOD MDM: CPT

## 2023-04-04 PROCEDURE — 84484 ASSAY OF TROPONIN QUANT: CPT | Performed by: NURSE PRACTITIONER

## 2023-04-04 PROCEDURE — 93005 ELECTROCARDIOGRAM TRACING: CPT | Performed by: EMERGENCY MEDICINE

## 2023-04-04 PROCEDURE — 80053 COMPREHEN METABOLIC PANEL: CPT | Performed by: NURSE PRACTITIONER

## 2023-04-04 PROCEDURE — 85610 PROTHROMBIN TIME: CPT | Performed by: NURSE PRACTITIONER

## 2023-04-04 PROCEDURE — 87636 SARSCOV2 & INF A&B AMP PRB: CPT | Performed by: EMERGENCY MEDICINE

## 2023-04-04 PROCEDURE — 71275 CT ANGIOGRAPHY CHEST: CPT

## 2023-04-04 PROCEDURE — 25510000001 IOPAMIDOL PER 1 ML: Performed by: EMERGENCY MEDICINE

## 2023-04-04 PROCEDURE — 85730 THROMBOPLASTIN TIME PARTIAL: CPT | Performed by: NURSE PRACTITIONER

## 2023-04-04 PROCEDURE — 85379 FIBRIN DEGRADATION QUANT: CPT | Performed by: NURSE PRACTITIONER

## 2023-04-04 PROCEDURE — 93005 ELECTROCARDIOGRAM TRACING: CPT

## 2023-04-04 RX ORDER — SODIUM CHLORIDE 0.9 % (FLUSH) 0.9 %
10 SYRINGE (ML) INJECTION AS NEEDED
Status: DISCONTINUED | OUTPATIENT
Start: 2023-04-04 | End: 2023-04-04 | Stop reason: HOSPADM

## 2023-04-04 RX ORDER — METHYLPREDNISOLONE 4 MG/1
TABLET ORAL
Qty: 21 TABLET | Refills: 0 | Status: SHIPPED | OUTPATIENT
Start: 2023-04-04

## 2023-04-04 RX ORDER — ALBUTEROL SULFATE 90 UG/1
2 AEROSOL, METERED RESPIRATORY (INHALATION) EVERY 4 HOURS PRN
Qty: 18 G | Refills: 0 | Status: SHIPPED | OUTPATIENT
Start: 2023-04-04

## 2023-04-04 RX ORDER — DOXYCYCLINE HYCLATE 100 MG
100 TABLET ORAL 2 TIMES DAILY
Qty: 20 TABLET | Refills: 0 | Status: SHIPPED | OUTPATIENT
Start: 2023-04-04 | End: 2023-04-14

## 2023-04-04 RX ADMIN — IOPAMIDOL 100 ML: 755 INJECTION, SOLUTION INTRAVENOUS at 09:39

## 2023-04-04 NOTE — ED PROVIDER NOTES
Subjective   History of Present Illness  Patient is a 52-year-old obese white female with history of COPD not oxygen dependent presents today from home with complaints of increased shortness of breath and nonproductive cough for the last 2 to 3 days.  She states she has not felt well.  She does complain of some sharp chest pain when she coughs or takes a deep breath.  She denies any nasal congestion drainage.  She denies fever today but states she felt like she had a fever yesterday.  No other chest pain.  She denies nausea vomiting diarrhea black or bloody stools.  She does report some swelling in both lower extremities over the last week or so.  She denies any calf tenderness.  No recent travel or prolonged immobilization.        Review of Systems   Constitutional: Positive for fever.   HENT: Negative for congestion.    Respiratory: Positive for cough and shortness of breath.    Cardiovascular: Positive for chest pain and leg swelling. Negative for palpitations.   Gastrointestinal: Negative for abdominal pain, blood in stool, diarrhea, nausea and vomiting.       Past Medical History:   Diagnosis Date   • COPD (chronic obstructive pulmonary disease)    • Low back pain    • Migraine    • Neck pain        Allergies   Allergen Reactions   • Aspirin GI Intolerance   • Ibuprofen Itching       Past Surgical History:   Procedure Laterality Date   • LUNG SURGERY         Family History   Problem Relation Age of Onset   • Diabetes Mother    • Cancer Paternal Uncle    • Cancer Paternal Grandmother    • COPD Paternal Grandmother    • Diabetes Paternal Grandmother        Social History     Socioeconomic History   • Marital status:    Tobacco Use   • Smoking status: Every Day     Packs/day: 0.50     Types: Cigarettes   • Smokeless tobacco: Never   Vaping Use   • Vaping Use: Never used   Substance and Sexual Activity   • Alcohol use: Not Currently   • Drug use: Never   • Sexual activity: Defer           Objective    Physical Exam  Vital signs and triage nurse note reviewed.  Constitutional: Awake, alert; well-developed and well-nourished. No acute distress is noted.  Obese.  HEENT: Normocephalic, atraumatic; pupils are PERRL with intact EOM; oropharynx is pink and moist without exudate or erythema.  No drooling or pooling of oral secretions.  Neck: Supple, full range of motion without pain; no cervical lymphadenopathy. Normal phonation.  Cardiovascular: Regular rate and rhythm, normal S1-S2.  No murmur noted.  Pulmonary: Respiratory effort regular nonlabored, breath sounds clear to auscultation all fields.  Abdomen: Soft, nontender, nondistended with normoactive bowel sounds; no rebound or guarding.  Musculoskeletal: Independent range of motion of all extremities with no palpable tenderness or edema.  Neuro: Alert oriented x3, speech is clear and appropriate, GCS 15.    Skin: Flesh tone, warm, dry, intact; no erythematous or petechial rash or lesion.      Procedures           ED Course      Labs Reviewed   COMPREHENSIVE METABOLIC PANEL - Abnormal; Notable for the following components:       Result Value    Glucose 111 (*)     All other components within normal limits    Narrative:     GFR Normal >60  Chronic Kidney Disease <60  Kidney Failure <15     D-DIMER, QUANTITATIVE - Abnormal; Notable for the following components:    D-Dimer, Quantitative 0.68 (*)     All other components within normal limits    Narrative:     According to the assay 's published package insert, a normal (<0.50 mg/L (FEU)) D-dimer result in conjunction with a non-high clinical probability assessment, excludes deep vein thrombosis (DVT) and pulmonary embolism (PE) with high sensitivity.    D-dimer values increase with age and this can make VTE exclusion of an older population difficult. To address this, the [employer] College [employer], based on best available evidence and recent guidelines, recommends that clinicians use age-adjusted  "D-dimer thresholds in patients greater than 50 years of age with: a) a low probability of PE who do not meet all Pulmonary Embolism Rule Out Criteria, or b) in those with intermediate probability of PE.   The formula for an age-adjusted D-dimer cut-off is \"age/100\".  For example, a 60 year old patient would have an age-adjusted cut-off of 0.60 mg/L (FEU) and an 80 year old 0.80 mg/L (FEU).   CBC WITH AUTO DIFFERENTIAL - Abnormal; Notable for the following components:    Neutrophil % 42.6 (*)     Lymphocyte % 48.0 (*)     Lymphocytes, Absolute 3.40 (*)     All other components within normal limits   COVID-19 AND FLU A/B, NP SWAB IN TRANSPORT MEDIA 8-12 HR TAT - Normal    Narrative:     Fact sheet for providers: https://www.fda.gov/media/736275/download    Fact sheet for patients: https://www.[employer].gov/media/611485/download    Test performed by PCR.   PROTIME-INR - Normal   APTT - Normal   TROPONIN - Normal    Narrative:     High Sensitive Troponin T Reference Range:  <10.0 ng/L- Negative Female for AMI  <15.0 ng/L- Negative Male for AMI  >=10 - Abnormal Female indicating possible myocardial injury.  >=15 - Abnormal Male indicating possible myocardial injury.   Clinicians would have to utilize clinical acumen, EKG, Troponin, and serial changes to determine if it is an Acute Myocardial Infarction or myocardial injury due to an underlying chronic condition.        BNP (IN-HOUSE) - Normal    Narrative:     Among patients with dyspnea, NT-proBNP is highly sensitive for the detection of acute congestive heart failure. In addition NT-proBNP of <300 pg/ml effectively rules out acute congestive heart failure with 99% negative predictive value.     CBC AND DIFFERENTIAL    Narrative:     The following orders were created for panel order CBC & Differential.  Procedure                               Abnormality         Status                     ---------                               -----------         ------                   "   CBC Auto Differential[[id number]]        Abnormal            Final result                 Please view results for these tests on the individual orders.     XR Chest 1 View    Result Date: 4/4/2023  Impression: 1. No acute process. 2. Emphysema with biapical scarring. Electronically Signed: Gaurav Jd  4/4/2023 8:16 AM EDT  Workstation ID: [id number]    CT Angiogram Chest Pulmonary Embolism    Result Date: 4/4/2023  1.No evidence of acute pulmonary embolism. 2.Centrilobular/tree-in-bud nodularity within the right lower lobe, concerning for atypical infiltrate. 3.Enlarged right hilar lymph node measuring 2.7 x 1.8 cm, possibly reactive. 4.Pulmonary emphysema. 5.Follow-up chest CT in 3 months may be useful to ensure resolution of these findings. Electronically Signed: Prabhu Godoy  4/4/2023 10:02 AM EDT  Workstation ID: [id number]    Medications   sodium chloride 0.9 % flush 10 mL (has no administration in time range)   iopamidol (ISOVUE-370) 76 % injection 100 mL (100 mL Intravenous Given 4/4/23 0939)                                          Medical Decision Making  Patient presents today from home with complaints of increased shortness of breath over the last couple of days with a nonproductive cough and some sharp pain in her chest when she coughs or takes a deep breath.  Also reports some swelling in both of her legs.  Differentials include but not limited to COPD exacerbation, CHF, bronchitis, viral respiratory illness, pleural effusion, PE    Patient had above exam evaluation.  She is placed on continuous cardiac monitor.  IV was established.  Labs EKG and chest x-ray were obtained.    Work-up: My EKG interpretation reveals a sinus rhythm with ventricular rate of 95, no acute ST or T wave changes, this is corroborated by Dr. Evans.  CBC and metabolic panel are unremarkable.  High-sensitivity troponin within normal limits at 8.  proBNP within normal limits at less than 36.0.  COVID and flu both negative.  D-dimer  mildly elevated at 0.68.  Chest x-ray interpretation shows no acute cardiopulmonary process, emphysema.  CT PE protocol reviewed by me interpreted by the radiologist shows no evidence of pulmonary embolism.  Concern for atypical infiltrate in the right lower lobe, enlarged right hilar lymph node likely reactive.  Emphysema.    On reexamination, the patient is resting quietly and in no distress.  She has no new complaints.  She remains hemodynamically stable.  Her O2 saturation remained stable in the mid 90s on room air.  She is ambulated to the restroom without difficulty.  Findings were discussed with her.    Diagnosis and treatment plan discussed with patient.  Patient agreeable to plan.   I discussed findings with patient who voices understanding of discharge instructions, signs and symptoms requiring return to ED; discharged improved and in stable condition with follow up for re-evaluation.  Prescriptions for doxycycline, Medrol Dosepak, albuterol inhaler.    Amount and/or Complexity of Data Reviewed  Labs: ordered.  Radiology: ordered.  ECG/medicine tests: ordered.      Risk  Prescription drug management.          Final diagnoses:   Shortness of breath   Pneumonia of right lower lobe due to infectious organism   COPD exacerbation       ED Disposition  ED Disposition     ED Disposition   Discharge    Condition   Stable    Comment   --             Ania Mayes MD  [address] [facility] [address]  [phone]    Schedule an appointment as soon as possible for a visit       PATIENT CONNECTION - [facility] [address] [phone]  Schedule an appointment as soon as possible for a visit            Medication List      New Prescriptions    albuterol sulfate  (90 Base) MCG/ACT inhaler  Commonly known as: PROVENTIL HFA;VENTOLIN HFA;PROAIR HFA  Inhale 2 puffs Every 4 (Four) Hours As Needed for Wheezing.     doxycycline 100 MG tablet  Commonly known as: VIBRAMYICN  Take 1 tablet by mouth 2  (Two) Times a Day for 10 days.     methylPREDNISolone 4 MG dose pack  Commonly known as: MEDROL  Take as directed on package instructions.           Where to Get Your Medications      These medications were sent to [facility] - [place], [place] -[id number] - [place], [place] - [address] [facility] - [phone]  - [phone]   [address] [place] [address] [place] [address]    Phone: [phone]   · albuterol sulfate  (90 Base) MCG/ACT inhaler  · doxycycline 100 MG tablet  · methylPREDNISolone 4 MG dose pack          Maria Isabel Urrutia, NICHOLAS  04/04/23 1026

## 2023-04-07 ENCOUNTER — TRANSCRIBE ORDERS (OUTPATIENT)
Dept: ADMINISTRATIVE | Facility: HOSPITAL | Age: 53
End: 2023-04-07
Payer: MEDICAID

## 2023-04-07 DIAGNOSIS — R59.0 HILAR LYMPHADENOPATHY: ICD-10-CM

## 2023-04-07 DIAGNOSIS — J44.9 CHRONIC OBSTRUCTIVE PULMONARY DISEASE, UNSPECIFIED COPD TYPE: Primary | ICD-10-CM

## 2023-04-13 ENCOUNTER — TRANSCRIBE ORDERS (OUTPATIENT)
Dept: ADMINISTRATIVE | Facility: HOSPITAL | Age: 53
End: 2023-04-13
Payer: MEDICAID

## 2023-04-13 DIAGNOSIS — J44.9 CHRONIC OBSTRUCTIVE PULMONARY DISEASE, UNSPECIFIED COPD TYPE: Primary | ICD-10-CM

## 2023-05-05 ENCOUNTER — HOSPITAL ENCOUNTER (OUTPATIENT)
Dept: RESPIRATORY THERAPY | Facility: HOSPITAL | Age: 53
Discharge: HOME | End: 2023-05-05
Payer: MEDICAID

## 2023-05-05 VITALS — HEART RATE: 84 BPM | RESPIRATION RATE: 18 BRPM

## 2023-05-05 DIAGNOSIS — J44.9 CHRONIC OBSTRUCTIVE PULMONARY DISEASE, UNSPECIFIED COPD TYPE: ICD-10-CM

## 2023-05-05 PROCEDURE — 94727 GAS DIL/WSHOT DETER LNG VOL: CPT

## 2023-05-05 PROCEDURE — 94060 EVALUATION OF WHEEZING: CPT

## 2023-05-05 PROCEDURE — 94729 DIFFUSING CAPACITY: CPT

## 2023-05-05 PROCEDURE — 94664 DEMO&/EVAL PT USE INHALER: CPT

## 2023-05-05 RX ORDER — ALBUTEROL SULFATE 90 UG/1
2 AEROSOL, METERED RESPIRATORY (INHALATION) ONCE
Status: COMPLETED | OUTPATIENT
Start: 2023-05-05 | End: 2023-05-05

## 2023-05-05 RX ADMIN — ALBUTEROL SULFATE 2 PUFF: 108 INHALANT RESPIRATORY (INHALATION) at 07:15

## 2024-06-21 ENCOUNTER — TELEPHONE (OUTPATIENT)
Dept: FAMILY MEDICINE CLINIC | Facility: CLINIC | Age: 54
End: 2024-06-21

## 2024-06-21 NOTE — TELEPHONE ENCOUNTER
Caller: DAREN MORALES    Best call back number [phone]    What orders are you requesting (i.e. lab or imaging): INCONTINENCE SUPPLIES FAXED ON 6/18/2024    CALLING TO CONFIRM RECEIPT

## 2024-12-18 ENCOUNTER — APPOINTMENT (OUTPATIENT)
Dept: GENERAL RADIOLOGY | Facility: HOSPITAL | Age: 54
End: 2024-12-18
Payer: MEDICAID

## 2024-12-18 ENCOUNTER — APPOINTMENT (OUTPATIENT)
Dept: CT IMAGING | Facility: HOSPITAL | Age: 54
End: 2024-12-18
Payer: MEDICAID

## 2024-12-18 ENCOUNTER — HOSPITAL ENCOUNTER (INPATIENT)
Facility: HOSPITAL | Age: 54
LOS: 23 days | Discharge: HOME | End: 2025-01-10
Attending: EMERGENCY MEDICINE | Admitting: INTERNAL MEDICINE
Payer: MEDICAID

## 2024-12-18 ENCOUNTER — APPOINTMENT (OUTPATIENT)
Dept: ULTRASOUND IMAGING | Facility: HOSPITAL | Age: 54
End: 2024-12-18
Payer: MEDICAID

## 2024-12-18 DIAGNOSIS — N17.9 AKI (ACUTE KIDNEY INJURY): ICD-10-CM

## 2024-12-18 DIAGNOSIS — E87.0 HYPERNATREMIA: ICD-10-CM

## 2024-12-18 DIAGNOSIS — G93.40 ACUTE ENCEPHALOPATHY: ICD-10-CM

## 2024-12-18 DIAGNOSIS — R57.9 SHOCK: ICD-10-CM

## 2024-12-18 DIAGNOSIS — E13.10 DIABETIC KETOACIDOSIS WITHOUT COMA ASSOCIATED WITH OTHER SPECIFIED DIABETES MELLITUS: Primary | ICD-10-CM

## 2024-12-18 DIAGNOSIS — I74.9 ARTERIAL THROMBOSIS: ICD-10-CM

## 2024-12-18 DIAGNOSIS — Z98.890 H/O FASCIOTOMY: ICD-10-CM

## 2024-12-18 PROBLEM — E11.10 DKA (DIABETIC KETOACIDOSIS): Status: ACTIVE | Noted: 2024-12-18

## 2024-12-18 LAB
ACETONE BLD QL: ABNORMAL
ALBUMIN SERPL-MCNC: 3.8 G/DL (ref 3.5–5.2)
ALBUMIN/GLOB SERPL: 0.9 G/DL
ALP SERPL-CCNC: 246 U/L (ref 39–117)
ALT SERPL W P-5'-P-CCNC: 178 U/L (ref 1–33)
AMMONIA BLD-SCNC: <10 UMOL/L (ref 11–51)
AMPHET+METHAMPHET UR QL: NEGATIVE
AMPHETAMINES UR QL: NEGATIVE
ANION GAP SERPL CALCULATED.3IONS-SCNC: 15 MMOL/L (ref 10–20)
ANION GAP SERPL CALCULATED.3IONS-SCNC: 18.7 MMOL/L (ref 5–15)
ANION GAP SERPL CALCULATED.3IONS-SCNC: 29.3 MMOL/L (ref 5–15)
ANION GAP SERPL CALCULATED.3IONS-SCNC: 31.9 MMOL/L (ref 5–15)
ANION GAP SERPL CALCULATED.3IONS-SCNC: ABNORMAL MMOL/L
APAP SERPL-MCNC: <5 MCG/ML (ref 0–30)
ARTERIAL PATENCY WRIST A: POSITIVE
ARTERIAL PATENCY WRIST A: POSITIVE
AST SERPL-CCNC: 106 U/L (ref 1–32)
ATMOSPHERIC PRESS: ABNORMAL MM[HG]
ATMOSPHERIC PRESS: ABNORMAL MM[HG]
B PARAPERT DNA SPEC QL NAA+PROBE: NOT DETECTED
B PERT DNA SPEC QL NAA+PROBE: NOT DETECTED
B-OH-BUTYR SERPL-SCNC: 10.79 MMOL/L (ref 0.02–0.27)
BACTERIA UR QL AUTO: ABNORMAL /HPF
BARBITURATES UR QL SCN: NEGATIVE
BASE EXCESS BLDA CALC-SCNC: -0.7 MMOL/L (ref 0–3)
BASE EXCESS BLDA CALC-SCNC: -13.6 MMOL/L (ref 0–3)
BASOPHILS # BLD AUTO: 0.07 10*3/MM3 (ref 0–0.2)
BASOPHILS NFR BLD AUTO: 0.5 % (ref 0–1.5)
BDY SITE: ABNORMAL
BDY SITE: ABNORMAL
BENZODIAZ UR QL SCN: NEGATIVE
BILIRUB SERPL-MCNC: 0.2 MG/DL (ref 0–1.2)
BILIRUB UR QL STRIP: NEGATIVE
BUN BLDA-MCNC: 80 MG/DL (ref 8–26)
BUN SERPL-MCNC: 71 MG/DL (ref 6–20)
BUN SERPL-MCNC: 79 MG/DL (ref 6–20)
BUN SERPL-MCNC: 88 MG/DL (ref 6–20)
BUN SERPL-MCNC: 88 MG/DL (ref 6–20)
BUN/CREAT SERPL: 41.3 (ref 7–25)
BUN/CREAT SERPL: 41.9 (ref 7–25)
BUN/CREAT SERPL: 50 (ref 7–25)
BUN/CREAT SERPL: 53.7 (ref 7–25)
BUPRENORPHINE SERPL-MCNC: NEGATIVE NG/ML
C PNEUM DNA NPH QL NAA+NON-PROBE: NOT DETECTED
CA-I BLDA-SCNC: 1.04 MMOL/L (ref 1.12–1.32)
CA-I BLDA-SCNC: 1.29 MMOL/L (ref 1.15–1.33)
CALCIUM SPEC-SCNC: 10.2 MG/DL (ref 8.6–10.5)
CALCIUM SPEC-SCNC: 9.2 MG/DL (ref 8.6–10.5)
CALCIUM SPEC-SCNC: 9.7 MG/DL (ref 8.6–10.5)
CALCIUM SPEC-SCNC: 9.9 MG/DL (ref 8.6–10.5)
CANNABINOIDS SERPL QL: NEGATIVE
CHLORIDE BLDA-SCNC: 136 MMOL/L (ref 98–109)
CHLORIDE SERPL-SCNC: 114 MMOL/L (ref 98–107)
CHLORIDE SERPL-SCNC: 117 MMOL/L (ref 98–107)
CHLORIDE SERPL-SCNC: 139 MMOL/L (ref 98–107)
CHLORIDE SERPL-SCNC: >140 MMOL/L (ref 98–107)
CK SERPL-CCNC: 123 U/L (ref 20–180)
CK SERPL-CCNC: 17 U/L (ref 20–180)
CLARITY UR: CLEAR
CO2 BLDA-SCNC: 12 MMOL/L (ref 24–29)
CO2 BLDA-SCNC: 15.1 MMOL/L (ref 22–29)
CO2 SERPL-SCNC: 10.1 MMOL/L (ref 22–29)
CO2 SERPL-SCNC: 14.3 MMOL/L (ref 22–29)
CO2 SERPL-SCNC: 14.7 MMOL/L (ref 22–29)
CO2 SERPL-SCNC: 2.2 MMOL/L (ref 22–29)
COCAINE UR QL: NEGATIVE
COLOR UR: YELLOW
CREAT BLDA-MCNC: 1.35 MG/DL (ref 0.6–1.3)
CREAT BLDA-MCNC: 1.8 MG/DL (ref 0.6–1.3)
CREAT SERPL-MCNC: 1.42 MG/DL (ref 0.57–1)
CREAT SERPL-MCNC: 1.47 MG/DL (ref 0.57–1)
CREAT SERPL-MCNC: 2.1 MG/DL (ref 0.57–1)
CREAT SERPL-MCNC: 2.13 MG/DL (ref 0.57–1)
D-LACTATE SERPL-SCNC: 1.5 MMOL/L (ref 0.2–2)
D-LACTATE SERPL-SCNC: 1.9 MMOL/L (ref 0.5–2)
D-LACTATE SERPL-SCNC: 2 MMOL/L (ref 0.2–2)
D-LACTATE SERPL-SCNC: 2.1 MMOL/L (ref 0.3–2)
DEPRECATED RDW RBC AUTO: 49.1 FL (ref 37–54)
EGFRCR SERPLBLD CKD-EPI 2021: 27.2 ML/MIN/1.73
EGFRCR SERPLBLD CKD-EPI 2021: 27.7 ML/MIN/1.73
EGFRCR SERPLBLD CKD-EPI 2021: 33.3 ML/MIN/1.73
EGFRCR SERPLBLD CKD-EPI 2021: 42.5 ML/MIN/1.73
EGFRCR SERPLBLD CKD-EPI 2021: 44.3 ML/MIN/1.73
EGFRCR SERPLBLD CKD-EPI 2021: 47.1 ML/MIN/1.73
EOSINOPHIL # BLD AUTO: 0 10*3/MM3 (ref 0–0.4)
EOSINOPHIL NFR BLD AUTO: 0 % (ref 0.3–6.2)
ERYTHROCYTE [DISTWIDTH] IN BLOOD BY AUTOMATED COUNT: 12.1 % (ref 12.3–15.4)
ETHANOL UR QL: <0.01 %
FLUAV SUBTYP SPEC NAA+PROBE: NOT DETECTED
FLUBV RNA ISLT QL NAA+PROBE: NOT DETECTED
GEN 5 1HR TROPONIN T REFLEX: 13 NG/L
GGT SERPL-CCNC: 120 U/L (ref 5–36)
GLOBULIN UR ELPH-MCNC: 4.3 GM/DL
GLUCOSE BLDC GLUCOMTR-MCNC: 136 MG/DL (ref 74–100)
GLUCOSE BLDC GLUCOMTR-MCNC: 136 MG/DL (ref 74–100)
GLUCOSE BLDC GLUCOMTR-MCNC: 138 MG/DL (ref 70–105)
GLUCOSE BLDC GLUCOMTR-MCNC: 144 MG/DL (ref 70–105)
GLUCOSE BLDC GLUCOMTR-MCNC: 197 MG/DL (ref 70–105)
GLUCOSE BLDC GLUCOMTR-MCNC: 237 MG/DL (ref 70–105)
GLUCOSE BLDC GLUCOMTR-MCNC: 257 MG/DL (ref 70–105)
GLUCOSE BLDC GLUCOMTR-MCNC: 287 MG/DL (ref 70–105)
GLUCOSE BLDC GLUCOMTR-MCNC: 311 MG/DL (ref 70–105)
GLUCOSE BLDC GLUCOMTR-MCNC: 322 MG/DL (ref 70–105)
GLUCOSE BLDC GLUCOMTR-MCNC: 345 MG/DL (ref 70–105)
GLUCOSE BLDC GLUCOMTR-MCNC: 386 MG/DL (ref 70–105)
GLUCOSE BLDC GLUCOMTR-MCNC: 416 MG/DL (ref 70–105)
GLUCOSE BLDC GLUCOMTR-MCNC: 505 MG/DL (ref 70–105)
GLUCOSE BLDC GLUCOMTR-MCNC: 585 MG/DL (ref 70–105)
GLUCOSE BLDC GLUCOMTR-MCNC: 599 MG/DL (ref 70–105)
GLUCOSE BLDC GLUCOMTR-MCNC: >600 MG/DL (ref 70–105)
GLUCOSE BLDC GLUCOMTR-MCNC: >700 MG/DL (ref 70–105)
GLUCOSE BLDC GLUCOMTR-MCNC: >700 MG/DL (ref 74–100)
GLUCOSE SERPL-MCNC: 179 MG/DL (ref 65–99)
GLUCOSE SERPL-MCNC: 384 MG/DL (ref 65–99)
GLUCOSE SERPL-MCNC: 976 MG/DL (ref 65–99)
GLUCOSE SERPL-MCNC: 987 MG/DL (ref 65–99)
GLUCOSE UR STRIP-MCNC: ABNORMAL MG/DL
HADV DNA SPEC NAA+PROBE: NOT DETECTED
HAV IGM SERPL QL IA: NORMAL
HBA1C MFR BLD: 15.8 % (ref 4.8–5.6)
HBV CORE IGM SERPL QL IA: NORMAL
HBV SURFACE AG SERPL QL IA: NORMAL
HCO3 BLDA-SCNC: 14 MMOL/L (ref 21–28)
HCO3 BLDA-SCNC: 24.7 MMOL/L (ref 21–28)
HCOV 229E RNA SPEC QL NAA+PROBE: NOT DETECTED
HCOV HKU1 RNA SPEC QL NAA+PROBE: NOT DETECTED
HCOV NL63 RNA SPEC QL NAA+PROBE: NOT DETECTED
HCOV OC43 RNA SPEC QL NAA+PROBE: NOT DETECTED
HCT VFR BLD AUTO: 54.9 % (ref 34–46.6)
HCT VFR BLDA CALC: 43 % (ref 38–51)
HCT VFR BLDA CALC: 53 % (ref 38–51)
HCV AB SER QL: NORMAL
HEMODILUTION: NO
HEMODILUTION: NO
HGB BLD-MCNC: 17.1 G/DL (ref 12–15.9)
HGB BLDA-MCNC: 14.6 G/DL (ref 12–17)
HGB BLDA-MCNC: 18 G/DL (ref 12–17)
HGB UR QL STRIP.AUTO: ABNORMAL
HIV 1+2 AB+HIV1 P24 AG SERPL QL IA: NORMAL
HMPV RNA NPH QL NAA+NON-PROBE: NOT DETECTED
HOLD SPECIMEN: NORMAL
HOLD SPECIMEN: NORMAL
HPIV1 RNA ISLT QL NAA+PROBE: NOT DETECTED
HPIV2 RNA SPEC QL NAA+PROBE: NOT DETECTED
HPIV3 RNA NPH QL NAA+PROBE: NOT DETECTED
HPIV4 P GENE NPH QL NAA+PROBE: NOT DETECTED
HYALINE CASTS UR QL AUTO: ABNORMAL /LPF
IMM GRANULOCYTES # BLD AUTO: 0.18 10*3/MM3 (ref 0–0.05)
IMM GRANULOCYTES NFR BLD AUTO: 1.2 % (ref 0–0.5)
INHALED O2 CONCENTRATION: 21 %
INHALED O2 CONCENTRATION: 21 %
KETONES UR QL STRIP: ABNORMAL
LEUKOCYTE ESTERASE UR QL STRIP.AUTO: NEGATIVE
LYMPHOCYTES # BLD AUTO: 2.22 10*3/MM3 (ref 0.7–3.1)
LYMPHOCYTES NFR BLD AUTO: 15.2 % (ref 19.6–45.3)
Lab: ABNORMAL
M PNEUMO IGG SER IA-ACNC: NOT DETECTED
MAGNESIUM SERPL-MCNC: 2.6 MG/DL (ref 1.6–2.6)
MAGNESIUM SERPL-MCNC: 3.1 MG/DL (ref 1.6–2.6)
MAGNESIUM SERPL-MCNC: 3.3 MG/DL (ref 1.6–2.6)
MAGNESIUM SERPL-MCNC: 3.7 MG/DL (ref 1.6–2.6)
MAGNESIUM SERPL-MCNC: 3.7 MG/DL (ref 1.6–2.6)
MCH RBC QN AUTO: 33.6 PG (ref 26.6–33)
MCHC RBC AUTO-ENTMCNC: 31.1 G/DL (ref 31.5–35.7)
MCV RBC AUTO: 107.9 FL (ref 79–97)
METHADONE UR QL SCN: NEGATIVE
MODALITY: ABNORMAL
MODALITY: ABNORMAL
MONOCYTES # BLD AUTO: 0.74 10*3/MM3 (ref 0.1–0.9)
MONOCYTES NFR BLD AUTO: 5.1 % (ref 5–12)
MRSA DNA SPEC QL NAA+PROBE: ABNORMAL
NEUTROPHILS NFR BLD AUTO: 11.44 10*3/MM3 (ref 1.7–7)
NEUTROPHILS NFR BLD AUTO: 78 % (ref 42.7–76)
NITRITE UR QL STRIP: NEGATIVE
NOTIFIED WHO: ABNORMAL
NRBC BLD AUTO-RTO: 0 /100 WBC (ref 0–0.2)
OPIATES UR QL: NEGATIVE
OSMOLALITY SERPL: 461 MOSM/KG (ref 275–295)
OXYCODONE UR QL SCN: NEGATIVE
PCO2 BLDA: 37.5 MM HG (ref 35–48)
PCO2 BLDA: 42.6 MM HG (ref 35–48)
PCP UR QL SCN: NEGATIVE
PH BLDA: 7.18 PH UNITS (ref 7.35–7.45)
PH BLDA: 7.37 PH UNITS (ref 7.35–7.45)
PH UR STRIP.AUTO: <=5 [PH] (ref 5–8)
PHOSPHATE SERPL-MCNC: 1.5 MG/DL (ref 2.5–4.5)
PHOSPHATE SERPL-MCNC: 2.4 MG/DL (ref 2.5–4.5)
PHOSPHATE SERPL-MCNC: 2.9 MG/DL (ref 2.5–4.5)
PHOSPHATE SERPL-MCNC: 7 MG/DL (ref 2.5–4.5)
PHOSPHATE SERPL-MCNC: 7.1 MG/DL (ref 2.5–4.5)
PLATELET # BLD AUTO: 370 10*3/MM3 (ref 140–450)
PMV BLD AUTO: 11 FL (ref 6–12)
PO2 BLD: 309 MM[HG] (ref 0–500)
PO2 BLD: 370 MM[HG] (ref 0–500)
PO2 BLDA: 64.8 MM HG (ref 83–108)
PO2 BLDA: 77.7 MM HG (ref 83–108)
POTASSIUM BLDA-SCNC: 3.6 MMOL/L (ref 3.5–4.9)
POTASSIUM BLDA-SCNC: 5.3 MMOL/L (ref 3.5–4.5)
POTASSIUM SERPL-SCNC: 3.6 MMOL/L (ref 3.5–5.2)
POTASSIUM SERPL-SCNC: 3.7 MMOL/L (ref 3.5–5.2)
POTASSIUM SERPL-SCNC: 3.8 MMOL/L (ref 3.5–5.2)
POTASSIUM SERPL-SCNC: 4.4 MMOL/L (ref 3.5–5.2)
PROT SERPL-MCNC: 8.1 G/DL (ref 6–8.5)
PROT UR QL STRIP: ABNORMAL
QT INTERVAL: 355 MS
QTC INTERVAL: 473 MS
RBC # BLD AUTO: 5.09 10*6/MM3 (ref 3.77–5.28)
RBC # UR STRIP: ABNORMAL /HPF
READ BACK: ABNORMAL
REF LAB TEST METHOD: ABNORMAL
RHINOVIRUS RNA SPEC NAA+PROBE: NOT DETECTED
RSV RNA NPH QL NAA+NON-PROBE: NOT DETECTED
SALICYLATES SERPL-MCNC: <0.5 MG/DL
SAO2 % BLDCOA: 91.7 % (ref 94–98)
SAO2 % BLDCOA: 91.7 % (ref 94–98)
SARS-COV-2 RNA RESP QL NAA+PROBE: NOT DETECTED
SODIUM BLD-SCNC: 159 MMOL/L (ref 138–146)
SODIUM BLD-SCNC: >180 MMOL/L (ref 138–146)
SODIUM SERPL-SCNC: 158 MMOL/L (ref 136–145)
SODIUM SERPL-SCNC: 159 MMOL/L (ref 136–145)
SODIUM SERPL-SCNC: 172 MMOL/L (ref 136–145)
SODIUM SERPL-SCNC: 176 MMOL/L (ref 136–145)
SP GR UR STRIP: 1.03 (ref 1–1.03)
SQUAMOUS #/AREA URNS HPF: ABNORMAL /HPF
TRICYCLICS UR QL SCN: NEGATIVE
TROPONIN T % DELTA: -13 %
TROPONIN T NUMERIC DELTA: -2 NG/L
TROPONIN T SERPL HS-MCNC: 15 NG/L
UROBILINOGEN UR QL STRIP: ABNORMAL
WBC # UR STRIP: ABNORMAL /HPF
WBC NRBC COR # BLD AUTO: 14.65 10*3/MM3 (ref 3.4–10.8)
WHOLE BLOOD HOLD COAG: NORMAL
WHOLE BLOOD HOLD SPECIMEN: NORMAL

## 2024-12-18 PROCEDURE — 36415 COLL VENOUS BLD VENIPUNCTURE: CPT

## 2024-12-18 PROCEDURE — 25010000002 SODIUM CHLORIDE 0.45 % WITH KCL 20 MEQ 20-0.45 MEQ/L-% SOLUTION: Performed by: EMERGENCY MEDICINE

## 2024-12-18 PROCEDURE — 84100 ASSAY OF PHOSPHORUS: CPT | Performed by: EMERGENCY MEDICINE

## 2024-12-18 PROCEDURE — 84484 ASSAY OF TROPONIN QUANT: CPT | Performed by: EMERGENCY MEDICINE

## 2024-12-18 PROCEDURE — 25010000003 DEXTROSE 5 % SOLUTION: Performed by: NURSE PRACTITIONER

## 2024-12-18 PROCEDURE — 80053 COMPREHEN METABOLIC PANEL: CPT | Performed by: EMERGENCY MEDICINE

## 2024-12-18 PROCEDURE — 36600 WITHDRAWAL OF ARTERIAL BLOOD: CPT | Performed by: EMERGENCY MEDICINE

## 2024-12-18 PROCEDURE — 87641 MR-STAPH DNA AMP PROBE: CPT | Performed by: NURSE PRACTITIONER

## 2024-12-18 PROCEDURE — 25010000002 ZIPRASIDONE MESYLATE PER 10 MG: Performed by: EMERGENCY MEDICINE

## 2024-12-18 PROCEDURE — 99291 CRITICAL CARE FIRST HOUR: CPT

## 2024-12-18 PROCEDURE — 25010000002 HEPARIN (PORCINE) PER 1000 UNITS: Performed by: NURSE PRACTITIONER

## 2024-12-18 PROCEDURE — 83605 ASSAY OF LACTIC ACID: CPT

## 2024-12-18 PROCEDURE — 80143 DRUG ASSAY ACETAMINOPHEN: CPT | Performed by: EMERGENCY MEDICINE

## 2024-12-18 PROCEDURE — 70450 CT HEAD/BRAIN W/O DYE: CPT

## 2024-12-18 PROCEDURE — 82550 ASSAY OF CK (CPK): CPT | Performed by: EMERGENCY MEDICINE

## 2024-12-18 PROCEDURE — 82550 ASSAY OF CK (CPK): CPT | Performed by: NURSE PRACTITIONER

## 2024-12-18 PROCEDURE — 82009 KETONE BODYS QUAL: CPT | Performed by: EMERGENCY MEDICINE

## 2024-12-18 PROCEDURE — 82977 ASSAY OF GGT: CPT | Performed by: NURSE PRACTITIONER

## 2024-12-18 PROCEDURE — G0432 EIA HIV-1/HIV-2 SCREEN: HCPCS | Performed by: NURSE PRACTITIONER

## 2024-12-18 PROCEDURE — 25810000003 SODIUM CHLORIDE 0.9 % SOLUTION: Performed by: NURSE PRACTITIONER

## 2024-12-18 PROCEDURE — P9612 CATHETERIZE FOR URINE SPEC: HCPCS

## 2024-12-18 PROCEDURE — 82010 KETONE BODYS QUAN: CPT | Performed by: NURSE PRACTITIONER

## 2024-12-18 PROCEDURE — 80074 ACUTE HEPATITIS PANEL: CPT | Performed by: NURSE PRACTITIONER

## 2024-12-18 PROCEDURE — 83036 HEMOGLOBIN GLYCOSYLATED A1C: CPT | Performed by: EMERGENCY MEDICINE

## 2024-12-18 PROCEDURE — 80051 ELECTROLYTE PANEL: CPT

## 2024-12-18 PROCEDURE — 71045 X-RAY EXAM CHEST 1 VIEW: CPT

## 2024-12-18 PROCEDURE — 85025 COMPLETE CBC W/AUTO DIFF WBC: CPT | Performed by: EMERGENCY MEDICINE

## 2024-12-18 PROCEDURE — 82077 ASSAY SPEC XCP UR&BREATH IA: CPT | Performed by: EMERGENCY MEDICINE

## 2024-12-18 PROCEDURE — 25010000002 HYDROMORPHONE PER 4 MG: Performed by: NURSE PRACTITIONER

## 2024-12-18 PROCEDURE — 76705 ECHO EXAM OF ABDOMEN: CPT

## 2024-12-18 PROCEDURE — 36600 WITHDRAWAL OF ARTERIAL BLOOD: CPT | Performed by: STUDENT IN AN ORGANIZED HEALTH CARE EDUCATION/TRAINING PROGRAM

## 2024-12-18 PROCEDURE — 81001 URINALYSIS AUTO W/SCOPE: CPT | Performed by: EMERGENCY MEDICINE

## 2024-12-18 PROCEDURE — 93005 ELECTROCARDIOGRAM TRACING: CPT | Performed by: EMERGENCY MEDICINE

## 2024-12-18 PROCEDURE — 85014 HEMATOCRIT: CPT

## 2024-12-18 PROCEDURE — 0202U NFCT DS 22 TRGT SARS-COV-2: CPT | Performed by: EMERGENCY MEDICINE

## 2024-12-18 PROCEDURE — 83605 ASSAY OF LACTIC ACID: CPT | Performed by: EMERGENCY MEDICINE

## 2024-12-18 PROCEDURE — 80047 BASIC METABLC PNL IONIZED CA: CPT

## 2024-12-18 PROCEDURE — 82140 ASSAY OF AMMONIA: CPT | Performed by: EMERGENCY MEDICINE

## 2024-12-18 PROCEDURE — 80179 DRUG ASSAY SALICYLATE: CPT | Performed by: EMERGENCY MEDICINE

## 2024-12-18 PROCEDURE — 82948 REAGENT STRIP/BLOOD GLUCOSE: CPT

## 2024-12-18 PROCEDURE — 85018 HEMOGLOBIN: CPT

## 2024-12-18 PROCEDURE — 83735 ASSAY OF MAGNESIUM: CPT | Performed by: EMERGENCY MEDICINE

## 2024-12-18 PROCEDURE — 82803 BLOOD GASES ANY COMBINATION: CPT | Performed by: EMERGENCY MEDICINE

## 2024-12-18 PROCEDURE — 82565 ASSAY OF CREATININE: CPT

## 2024-12-18 PROCEDURE — 25810000003 SODIUM CHLORIDE 0.9 % SOLUTION: Performed by: EMERGENCY MEDICINE

## 2024-12-18 PROCEDURE — 87040 BLOOD CULTURE FOR BACTERIA: CPT | Performed by: EMERGENCY MEDICINE

## 2024-12-18 PROCEDURE — 80306 DRUG TEST PRSMV INSTRMNT: CPT | Performed by: EMERGENCY MEDICINE

## 2024-12-18 PROCEDURE — 93010 ELECTROCARDIOGRAM REPORT: CPT | Performed by: INTERNAL MEDICINE

## 2024-12-18 PROCEDURE — 83930 ASSAY OF BLOOD OSMOLALITY: CPT | Performed by: EMERGENCY MEDICINE

## 2024-12-18 PROCEDURE — 82330 ASSAY OF CALCIUM: CPT

## 2024-12-18 PROCEDURE — 82803 BLOOD GASES ANY COMBINATION: CPT | Performed by: STUDENT IN AN ORGANIZED HEALTH CARE EDUCATION/TRAINING PROGRAM

## 2024-12-18 RX ORDER — FAMOTIDINE 10 MG/ML
20 INJECTION, SOLUTION INTRAVENOUS DAILY
Status: DISCONTINUED | OUTPATIENT
Start: 2024-12-18 | End: 2024-12-19

## 2024-12-18 RX ORDER — IBUPROFEN 600 MG/1
1 TABLET ORAL
Status: DISCONTINUED | OUTPATIENT
Start: 2024-12-18 | End: 2024-12-23

## 2024-12-18 RX ORDER — SODIUM CHLORIDE 0.9 % (FLUSH) 0.9 %
10 SYRINGE (ML) INJECTION EVERY 12 HOURS SCHEDULED
Status: DISCONTINUED | OUTPATIENT
Start: 2024-12-18 | End: 2025-01-10 | Stop reason: HOSPADM

## 2024-12-18 RX ORDER — AMOXICILLIN 250 MG
2 CAPSULE ORAL 2 TIMES DAILY PRN
Status: DISCONTINUED | OUTPATIENT
Start: 2024-12-18 | End: 2025-01-10 | Stop reason: HOSPADM

## 2024-12-18 RX ORDER — ACETAMINOPHEN 325 MG/1
650 TABLET ORAL EVERY 4 HOURS PRN
Status: DISCONTINUED | OUTPATIENT
Start: 2024-12-18 | End: 2025-01-10 | Stop reason: HOSPADM

## 2024-12-18 RX ORDER — NICOTINE POLACRILEX 4 MG
15 LOZENGE BUCCAL
Status: DISCONTINUED | OUTPATIENT
Start: 2024-12-18 | End: 2024-12-23

## 2024-12-18 RX ORDER — DEXTROSE MONOHYDRATE, SODIUM CHLORIDE, AND POTASSIUM CHLORIDE 50; 1.49; 4.5 G/1000ML; G/1000ML; G/1000ML
250 INJECTION, SOLUTION INTRAVENOUS CONTINUOUS PRN
Status: DISCONTINUED | OUTPATIENT
Start: 2024-12-18 | End: 2024-12-19

## 2024-12-18 RX ORDER — NYSTATIN 100000 U/G
1 CREAM TOPICAL EVERY 8 HOURS SCHEDULED
Status: DISCONTINUED | OUTPATIENT
Start: 2024-12-18 | End: 2025-01-10 | Stop reason: HOSPADM

## 2024-12-18 RX ORDER — ONDANSETRON 2 MG/ML
4 INJECTION INTRAMUSCULAR; INTRAVENOUS EVERY 6 HOURS PRN
Status: DISCONTINUED | OUTPATIENT
Start: 2024-12-18 | End: 2025-01-10 | Stop reason: HOSPADM

## 2024-12-18 RX ORDER — HEPARIN SODIUM 5000 [USP'U]/ML
5000 INJECTION, SOLUTION INTRAVENOUS; SUBCUTANEOUS EVERY 8 HOURS SCHEDULED
Status: DISCONTINUED | OUTPATIENT
Start: 2024-12-18 | End: 2024-12-19

## 2024-12-18 RX ORDER — FENTANYL/ROPIVACAINE/NS/PF 2-625MCG/1
15 PLASTIC BAG, INJECTION (ML) EPIDURAL
Status: COMPLETED | OUTPATIENT
Start: 2024-12-18 | End: 2024-12-18

## 2024-12-18 RX ORDER — IPRATROPIUM BROMIDE AND ALBUTEROL SULFATE 2.5; .5 MG/3ML; MG/3ML
3 SOLUTION RESPIRATORY (INHALATION) EVERY 6 HOURS PRN
Status: DISCONTINUED | OUTPATIENT
Start: 2024-12-18 | End: 2024-12-26 | Stop reason: DRUGHIGH

## 2024-12-18 RX ORDER — NITROGLYCERIN 0.4 MG/1
0.4 TABLET SUBLINGUAL
Status: DISCONTINUED | OUTPATIENT
Start: 2024-12-18 | End: 2024-12-25

## 2024-12-18 RX ORDER — POLYETHYLENE GLYCOL 3350 17 G/17G
17 POWDER, FOR SOLUTION ORAL DAILY PRN
Status: DISCONTINUED | OUTPATIENT
Start: 2024-12-18 | End: 2025-01-10 | Stop reason: HOSPADM

## 2024-12-18 RX ORDER — SODIUM CHLORIDE 450 MG/100ML
200 INJECTION, SOLUTION INTRAVENOUS CONTINUOUS PRN
Status: DISCONTINUED | OUTPATIENT
Start: 2024-12-18 | End: 2024-12-19

## 2024-12-18 RX ORDER — SODIUM CHLORIDE 9 MG/ML
200 INJECTION, SOLUTION INTRAVENOUS CONTINUOUS PRN
Status: DISCONTINUED | OUTPATIENT
Start: 2024-12-18 | End: 2024-12-18

## 2024-12-18 RX ORDER — SODIUM CHLORIDE 9 MG/ML
40 INJECTION, SOLUTION INTRAVENOUS AS NEEDED
Status: DISCONTINUED | OUTPATIENT
Start: 2024-12-18 | End: 2024-12-23

## 2024-12-18 RX ORDER — SODIUM CHLORIDE AND POTASSIUM CHLORIDE 150; 900 MG/100ML; MG/100ML
200 INJECTION, SOLUTION INTRAVENOUS CONTINUOUS PRN
Status: DISCONTINUED | OUTPATIENT
Start: 2024-12-18 | End: 2024-12-18

## 2024-12-18 RX ORDER — SODIUM CHLORIDE AND POTASSIUM CHLORIDE 300; 900 MG/100ML; MG/100ML
200 INJECTION, SOLUTION INTRAVENOUS CONTINUOUS PRN
Status: DISCONTINUED | OUTPATIENT
Start: 2024-12-18 | End: 2024-12-18

## 2024-12-18 RX ORDER — DEXTROSE MONOHYDRATE AND SODIUM CHLORIDE 5; .9 G/100ML; G/100ML
125 INJECTION, SOLUTION INTRAVENOUS CONTINUOUS PRN
Status: DISCONTINUED | OUTPATIENT
Start: 2024-12-18 | End: 2024-12-19

## 2024-12-18 RX ORDER — SODIUM CHLORIDE AND POTASSIUM CHLORIDE 150; 450 MG/100ML; MG/100ML
200 INJECTION, SOLUTION INTRAVENOUS CONTINUOUS PRN
Status: DISCONTINUED | OUTPATIENT
Start: 2024-12-18 | End: 2024-12-19

## 2024-12-18 RX ORDER — SODIUM CHLORIDE 0.9 % (FLUSH) 0.9 %
10 SYRINGE (ML) INJECTION AS NEEDED
Status: DISCONTINUED | OUTPATIENT
Start: 2024-12-18 | End: 2025-01-10 | Stop reason: HOSPADM

## 2024-12-18 RX ORDER — PERMETHRIN 50 MG/G
1 CREAM TOPICAL ONCE
Status: COMPLETED | OUTPATIENT
Start: 2024-12-18 | End: 2024-12-19

## 2024-12-18 RX ORDER — DEXTROSE MONOHYDRATE, SODIUM CHLORIDE, AND POTASSIUM CHLORIDE 50; 2.98; 9 G/1000ML; G/1000ML; G/1000ML
125 INJECTION, SOLUTION INTRAVENOUS CONTINUOUS PRN
Status: DISCONTINUED | OUTPATIENT
Start: 2024-12-18 | End: 2024-12-19

## 2024-12-18 RX ORDER — DEXTROSE MONOHYDRATE 25 G/50ML
10-50 INJECTION, SOLUTION INTRAVENOUS
Status: DISCONTINUED | OUTPATIENT
Start: 2024-12-18 | End: 2024-12-23

## 2024-12-18 RX ORDER — ONDANSETRON 4 MG/1
4 TABLET, ORALLY DISINTEGRATING ORAL EVERY 6 HOURS PRN
Status: DISCONTINUED | OUTPATIENT
Start: 2024-12-18 | End: 2025-01-10 | Stop reason: HOSPADM

## 2024-12-18 RX ORDER — BISACODYL 5 MG/1
5 TABLET, DELAYED RELEASE ORAL DAILY PRN
Status: DISCONTINUED | OUTPATIENT
Start: 2024-12-18 | End: 2025-01-10 | Stop reason: HOSPADM

## 2024-12-18 RX ORDER — PERMETHRIN 50 MG/G
1 CREAM TOPICAL ONCE
Status: COMPLETED | OUTPATIENT
Start: 2024-12-25 | End: 2024-12-25

## 2024-12-18 RX ORDER — SODIUM CHLORIDE 0.9 % (FLUSH) 0.9 %
10 SYRINGE (ML) INJECTION EVERY 12 HOURS SCHEDULED
Status: DISCONTINUED | OUTPATIENT
Start: 2024-12-18 | End: 2024-12-23

## 2024-12-18 RX ORDER — DEXTROSE MONOHYDRATE, SODIUM CHLORIDE, AND POTASSIUM CHLORIDE 50; 2.98; 4.5 G/1000ML; G/1000ML; G/1000ML
250 INJECTION, SOLUTION INTRAVENOUS CONTINUOUS PRN
Status: DISCONTINUED | OUTPATIENT
Start: 2024-12-18 | End: 2024-12-19

## 2024-12-18 RX ORDER — SODIUM CHLORIDE 9 MG/ML
40 INJECTION, SOLUTION INTRAVENOUS AS NEEDED
Status: DISCONTINUED | OUTPATIENT
Start: 2024-12-18 | End: 2025-01-10 | Stop reason: HOSPADM

## 2024-12-18 RX ORDER — HYDROMORPHONE HYDROCHLORIDE 1 MG/ML
0.5 INJECTION, SOLUTION INTRAMUSCULAR; INTRAVENOUS; SUBCUTANEOUS
Status: DISCONTINUED | OUTPATIENT
Start: 2024-12-18 | End: 2024-12-23

## 2024-12-18 RX ORDER — DEXTROSE MONOHYDRATE, SODIUM CHLORIDE, AND POTASSIUM CHLORIDE 50; 1.49; 9 G/1000ML; G/1000ML; G/1000ML
125 INJECTION, SOLUTION INTRAVENOUS CONTINUOUS PRN
Status: DISCONTINUED | OUTPATIENT
Start: 2024-12-18 | End: 2024-12-19

## 2024-12-18 RX ORDER — SODIUM CHLORIDE 0.9 % (FLUSH) 0.9 %
10 SYRINGE (ML) INJECTION AS NEEDED
Status: DISCONTINUED | OUTPATIENT
Start: 2024-12-18 | End: 2024-12-23

## 2024-12-18 RX ORDER — ACETAMINOPHEN 650 MG/1
650 SUPPOSITORY RECTAL EVERY 4 HOURS PRN
Status: DISCONTINUED | OUTPATIENT
Start: 2024-12-18 | End: 2025-01-10 | Stop reason: HOSPADM

## 2024-12-18 RX ORDER — DEXTROSE MONOHYDRATE AND SODIUM CHLORIDE 5; .45 G/100ML; G/100ML
250 INJECTION, SOLUTION INTRAVENOUS CONTINUOUS PRN
Status: DISCONTINUED | OUTPATIENT
Start: 2024-12-18 | End: 2024-12-19

## 2024-12-18 RX ORDER — ALUMINA, MAGNESIA, AND SIMETHICONE 2400; 2400; 240 MG/30ML; MG/30ML; MG/30ML
15 SUSPENSION ORAL EVERY 6 HOURS PRN
Status: DISCONTINUED | OUTPATIENT
Start: 2024-12-18 | End: 2025-01-10 | Stop reason: HOSPADM

## 2024-12-18 RX ORDER — BISACODYL 10 MG
10 SUPPOSITORY, RECTAL RECTAL DAILY PRN
Status: DISCONTINUED | OUTPATIENT
Start: 2024-12-18 | End: 2025-01-10 | Stop reason: HOSPADM

## 2024-12-18 RX ORDER — DEXTROSE MONOHYDRATE 50 MG/ML
150 INJECTION, SOLUTION INTRAVENOUS CONTINUOUS
Status: DISCONTINUED | OUTPATIENT
Start: 2024-12-18 | End: 2024-12-19

## 2024-12-18 RX ADMIN — INSULIN HUMAN 5.4 UNITS/HR: 1 INJECTION, SOLUTION INTRAVENOUS at 05:28

## 2024-12-18 RX ADMIN — DEXTROSE MONOHYDRATE 150 ML/HR: 50 INJECTION, SOLUTION INTRAVENOUS at 21:02

## 2024-12-18 RX ADMIN — HEPARIN SODIUM 5000 UNITS: 5000 INJECTION INTRAVENOUS; SUBCUTANEOUS at 22:09

## 2024-12-18 RX ADMIN — NYSTATIN 1 APPLICATION: 100000 CREAM TOPICAL at 22:09

## 2024-12-18 RX ADMIN — HYDROMORPHONE HYDROCHLORIDE 0.5 MG: 1 INJECTION, SOLUTION INTRAMUSCULAR; INTRAVENOUS; SUBCUTANEOUS at 22:22

## 2024-12-18 RX ADMIN — SODIUM CHLORIDE AND POTASSIUM CHLORIDE 200 ML/HR: 4.5; 1.49 INJECTION, SOLUTION INTRAVENOUS at 07:36

## 2024-12-18 RX ADMIN — HYDROMORPHONE HYDROCHLORIDE 0.5 MG: 1 INJECTION, SOLUTION INTRAMUSCULAR; INTRAVENOUS; SUBCUTANEOUS at 20:11

## 2024-12-18 RX ADMIN — PERMETHRIN CREAM 5% W/W 1 APPLICATION: 50 CREAM TOPICAL at 12:46

## 2024-12-18 RX ADMIN — SODIUM CHLORIDE 15 MMOL: 9 INJECTION, SOLUTION INTRAVENOUS at 20:56

## 2024-12-18 RX ADMIN — Medication 10 ML: at 08:05

## 2024-12-18 RX ADMIN — FAMOTIDINE 20 MG: 10 INJECTION INTRAVENOUS at 11:19

## 2024-12-18 RX ADMIN — MUPIROCIN 1 APPLICATION: 20 OINTMENT TOPICAL at 22:09

## 2024-12-18 RX ADMIN — DEXTROSE MONOHYDRATE, SODIUM CHLORIDE, AND POTASSIUM CHLORIDE 250 ML/HR: 50; 1.49; 4.5 INJECTION, SOLUTION INTRAVENOUS at 16:25

## 2024-12-18 RX ADMIN — SODIUM CHLORIDE 15 MMOL: 9 INJECTION, SOLUTION INTRAVENOUS at 17:42

## 2024-12-18 RX ADMIN — HEPARIN SODIUM 5000 UNITS: 5000 INJECTION INTRAVENOUS; SUBCUTANEOUS at 13:37

## 2024-12-18 RX ADMIN — Medication 10 ML: at 22:09

## 2024-12-18 RX ADMIN — SODIUM CHLORIDE 1000 ML/HR: 9 INJECTION, SOLUTION INTRAVENOUS at 05:26

## 2024-12-18 RX ADMIN — Medication 10 ML: at 22:08

## 2024-12-18 RX ADMIN — SODIUM CHLORIDE AND POTASSIUM CHLORIDE 200 ML/HR: 4.5; 1.49 INJECTION, SOLUTION INTRAVENOUS at 14:49

## 2024-12-18 RX ADMIN — ZIPRASIDONE MESYLATE 10 MG: 20 INJECTION, POWDER, LYOPHILIZED, FOR SOLUTION INTRAMUSCULAR at 07:09

## 2024-12-18 RX ADMIN — INSULIN HUMAN 10.8 UNITS/HR: 1 INJECTION, SOLUTION INTRAVENOUS at 19:00

## 2024-12-18 RX ADMIN — NYSTATIN 1 APPLICATION: 100000 CREAM TOPICAL at 13:17

## 2024-12-18 NOTE — ED NOTES
Nursing report ED to floor  Yarelis Jackson  54 y.o.  female    HPI:   Chief Complaint   Patient presents with    Altered Mental Status       Admitting doctor:   Shamar Rodriguez DO    Admitting diagnosis:   The primary encounter diagnosis was Diabetic ketoacidosis without coma associated with other specified diabetes mellitus. Diagnoses of MARGOT (acute kidney injury) and Hypernatremia were also pertinent to this visit.    Code status:   Current Code Status       Date Active Code Status Order ID Comments User Context       12/18/2024 0830 CPR (Attempt to Resuscitate) [id number]  Charly Hernandez APRN ED        Question Answer    Code Status (Patient has no pulse and is not breathing) CPR (Attempt to Resuscitate)    Medical Interventions (Patient has pulse or is breathing) Full Support                    Allergies:   Aspirin and Ibuprofen    Isolation:  No active isolations     Fall Risk:  Fall Risk Assessment was completed, and patient is at high risk for falls.   Predictive Model Details         11 (Low) Factor Value    Calculated 12/18/2024 09:26 Age 54    Risk of Fall Model Active Peripheral IV Present     Imaging order in this encounter Present     Chloride 117 mmol/L     Magnesium 2.6 mg/dL     Number of Distinct Medication Classes administered 4     Diastolic BP 67     Creatinine 2.13 mg/dL     Tobacco Use Current     Carlos Scale not on file      U/L     Albumin 3.8 g/dL     Cardiac Assessment X     Respiratory Rate 16     Days after Admission 0.196     Calcium 9.7 mg/dL     Total Bilirubin 0.2 mg/dL     Potassium 3.8 mmol/L         Weight:       12/18/24  0527   Weight: 92.5 kg (203 lb 14.4 oz)       Intake and Output    Intake/Output Summary (Last 24 hours) at 12/18/2024 0930  Last data filed at 12/18/2024 0650  Gross per 24 hour   Intake 1000 ml   Output --   Net 1000 ml       Diet:   Dietary Orders (From admission, onward)       Start     Ordered    12/18/24 0512  Patient is on Glucommander   Continuous        Question Answer Comment   Tray Note: Glucommander    Patient is on Glucommander: Yes        12/18/24 0512    12/18/24 0512  NPO Diet NPO Type: Strict NPO  Diet Effective Now        Question:  NPO Type  Answer:  Strict NPO    12/18/24 0512                     Most recent vitals:   Vitals:    12/18/24 0758 12/18/24 0759 12/18/24 0850 12/18/24 0916   BP:   98/64 104/67   Pulse: 109  103 104   Resp:       Temp:       TempSrc:       SpO2:  92% 92% 93%   Weight:       Height:           Active LDAs/IV Access:   Lines, Drains & Airways       Active LDAs       Name Placement date Placement time Site Days    Peripheral IV 12/18/24 0443 Left Antecubital 12/18/24 0443  Antecubital  less than 1    Peripheral IV 12/18/24 0531 Right Antecubital 12/18/24 0531  Antecubital  less than 1    Peripheral IV 12/18/24 0531 Left;Posterior Hand 12/18/24 0531  Hand  less than 1                    Skin Condition:   Skin Assessments (last day)       None             Labs (abnormal labs have a star):   Labs Reviewed   BLOOD GAS, ARTERIAL - Abnormal; Notable for the following components:       Result Value    pH, Arterial 7.179 (*)     pO2, Arterial 77.7 (*)     HCO3, Arterial 14.0 (*)     Base Excess, Arterial -13.6 (*)     O2 Saturation, Arterial 91.7 (*)     CO2 Content 15.1 (*)     All other components within normal limits   COMPREHENSIVE METABOLIC PANEL - Abnormal; Notable for the following components:    Glucose 987 (*)     BUN 88 (*)     Creatinine 2.10 (*)     Sodium 158 (*)     Chloride 114 (*)     CO2 14.7 (*)     ALT (SGPT) 178 (*)     AST (SGOT) 106 (*)     Alkaline Phosphatase 246 (*)     BUN/Creatinine Ratio 41.9 (*)     Anion Gap 29.3 (*)     eGFR 27.7 (*)     All other components within normal limits    Narrative:     GFR Categories in Chronic Kidney Disease (CKD)      GFR Category          GFR (mL/min/1.73)    Interpretation  G1                     90 or greater         Normal or high (1)  G2                       60-89                Mild decrease (1)  G3a                   45-59                Mild to moderate decrease  G3b                   30-44                Moderate to severe decrease  G4                    15-29                Severe decrease  G5                    14 or less           Kidney failure          (1)In the absence of evidence of kidney disease, neither GFR category G1 or G2 fulfill the criteria for CKD.    eGFR calculation 2021 CKD-EPI creatinine equation, which does not include race as a factor   AMMONIA - Abnormal; Notable for the following components:    Ammonia <10 (*)     All other components within normal limits   CBC WITH AUTO DIFFERENTIAL - Abnormal; Notable for the following components:    WBC 14.65 (*)     Hemoglobin 17.1 (*)     Hematocrit 54.9 (*)     .9 (*)     MCH 33.6 (*)     MCHC 31.1 (*)     RDW 12.1 (*)     Neutrophil % 78.0 (*)     Lymphocyte % 15.2 (*)     Eosinophil % 0.0 (*)     Immature Grans % 1.2 (*)     Neutrophils, Absolute 11.44 (*)     Immature Grans, Absolute 0.18 (*)     All other components within normal limits   URINALYSIS W/ CULTURE IF INDICATED - Abnormal; Notable for the following components:    Specific Gravity, UA 1.031 (*)     Glucose, UA >=1000 mg/dL (3+) (*)     Ketones, UA 15 mg/dL (1+) (*)     Blood, UA Moderate (2+) (*)     Protein,  mg/dL (2+) (*)     All other components within normal limits    Narrative:     In absence of clinical symptoms, the presence of pyuria, bacteria, and/or nitrites on the urinalysis result does not correlate with infection.   ACETONE - Abnormal; Notable for the following components:    Acetone Moderate (*)     All other components within normal limits   PHOSPHORUS - Abnormal; Notable for the following components:    Phosphorus 7.0 (*)     All other components within normal limits   MAGNESIUM - Abnormal; Notable for the following components:    Magnesium 3.7 (*)     All other components within normal limits    OSMOLALITY, SERUM - Abnormal; Notable for the following components:    Osmolality 461 (*)     All other components within normal limits   HEMOGLOBIN A1C - Abnormal; Notable for the following components:    Hemoglobin A1C 15.80 (*)     All other components within normal limits   TROPONIN - Abnormal; Notable for the following components:    HS Troponin T 15 (*)     All other components within normal limits    Narrative:     High Sensitive Troponin T Reference Range:  <14.0 ng/L- Negative Female for AMI  <22.0 ng/L- Negative Male for AMI  >=14 - Abnormal Female indicating possible myocardial injury.  >=22 - Abnormal Male indicating possible myocardial injury.   Clinicians would have to utilize clinical acumen, EKG, Troponin, and serial changes to determine if it is an Acute Myocardial Infarction or myocardial injury due to an underlying chronic condition.        BASIC METABOLIC PANEL - Abnormal; Notable for the following components:    Glucose 976 (*)     BUN 88 (*)     Creatinine 2.13 (*)     Sodium 159 (*)     Chloride 117 (*)     CO2 10.1 (*)     BUN/Creatinine Ratio 41.3 (*)     Anion Gap 31.9 (*)     eGFR 27.2 (*)     All other components within normal limits    Narrative:     GFR Categories in Chronic Kidney Disease (CKD)      GFR Category          GFR (mL/min/1.73)    Interpretation  G1                     90 or greater         Normal or high (1)  G2                      60-89                Mild decrease (1)  G3a                   45-59                Mild to moderate decrease  G3b                   30-44                Moderate to severe decrease  G4                    15-29                Severe decrease  G5                    14 or less           Kidney failure          (1)In the absence of evidence of kidney disease, neither GFR category G1 or G2 fulfill the criteria for CKD.    eGFR calculation 2021 CKD-EPI creatinine equation, which does not include race as a factor   MAGNESIUM - Abnormal; Notable for  the following components:    Magnesium 3.7 (*)     All other components within normal limits   PHOSPHORUS - Abnormal; Notable for the following components:    Phosphorus 7.1 (*)     All other components within normal limits   PHOSPHORUS - Abnormal; Notable for the following components:    Phosphorus 2.4 (*)     All other components within normal limits   URINALYSIS, MICROSCOPIC ONLY - Abnormal; Notable for the following components:    WBC, UA 3-5 (*)     All other components within normal limits   POCT GLUCOSE FINGERSTICK - Abnormal; Notable for the following components:    Glucose >600 (*)     All other components within normal limits   POC LACTATE - Abnormal; Notable for the following components:    Lactate 2.1 (*)     All other components within normal limits   POCT CHEM 8 - Abnormal; Notable for the following components:    Glucose >700 (*)     BUN 80 (*)     Creatinine 1.80 (*)     Sodium 159 (*)     Chloride 136 (*)     Total CO2 12 (*)     Hemoglobin 18.0 (*)     Hematocrit 53 (*)     Ionized Calcium 1.04 (*)     eGFR 33.3 (*)     All other components within normal limits   POCT GLUCOSE FINGERSTICK - Abnormal; Notable for the following components:    Glucose >700 (*)     All other components within normal limits   POCT GLUCOSE FINGERSTICK - Abnormal; Notable for the following components:    Glucose >600 (*)     All other components within normal limits   POCT GLUCOSE FINGERSTICK - Abnormal; Notable for the following components:    Glucose >600 (*)     All other components within normal limits   POCT GLUCOSE FINGERSTICK - Abnormal; Notable for the following components:    Glucose 585 (*)     All other components within normal limits   RESPIRATORY PANEL PCR W/ COVID-19 (SARS-COV-2), NP SWAB IN UTM/VTP, 2 HR TAT - Normal    Narrative:     In the setting of a positive respiratory panel with a viral infection PLUS a negative procalcitonin without other underlying concern for bacterial infection, consider observing  off antibiotics or discontinuation of antibiotics and continue supportive care. If the respiratory panel is positive for atypical bacterial infection (Bordetella pertussis, Chlamydophila pneumoniae, or Mycoplasma pneumoniae), consider antibiotic de-escalation to target atypical bacterial infection.   ACETAMINOPHEN LEVEL - Normal    Narrative:     Acetaminophen Therapeutic Range  5-20 ug/mL      Hours after ingestion            Toxic Value    4 Hours                           150 ug/mL    8 Hours                            70 ug/mL   12 Hours                            40 ug/mL   16 Hours                            20 ug/mL    These values apply to a single ingestion only.    URINE DRUG SCREEN - Normal    Narrative:     Cutoff For Drugs Screened:    Amphetamines               500 ng/ml  Barbiturates               200 ng/ml  Benzodiazepines            150 ng/ml  Cocaine                    150 ng/ml  Methadone                  200 ng/ml  Opiates                    100 ng/ml  Phencyclidine               25 ng/ml  THC                         50 ng/ml  Methamphetamine            500 ng/ml  Tricyclic Antidepressants  300 ng/ml  Oxycodone                  100 ng/ml  Buprenorphine               10 ng/ml    The normal value for all drugs tested is negative. This report includes unconfirmed screening results, with the cutoff values listed, to be used for medical treatment purposes only.  Unconfirmed results must not be used for non-medical purposes such as employment or legal testing.  Clinical consideration should be applied to any drug of abuse test, particularly when unconfirmed results are used.    All urine drugs of abuse requests without chain of custody are for medical screening purposes only.  False positives are possible.     SALICYLATE LEVEL - Normal   CK - Normal   LACTIC ACID, REFLEX - Normal   MAGNESIUM - Normal   HEPATITIS PANEL, ACUTE - Normal    Narrative:     Results may be falsely decreased if patient taking  Biotin.    POC LACTATE - Normal   BLOOD CULTURE   BLOOD CULTURE   MRSA SCREEN, PCR   RAINBOW DRAW    Narrative:     The following orders were created for panel order [place] Draw.  Procedure                               Abnormality         Status                     ---------                               -----------         ------                     Green Top (Gel)[[id number]]                                  Final result               Lavender Top[[id number]]                                     Final result               Gold Top - SST[[id number]]                                   Final result               Light Blue Top[[id number]]                                   Final result                 Please view results for these tests on the individual orders.   ETHANOL    Narrative:     Plasma Ethanol Clinical Symptoms:    ETOH (%)               Clinical Symptom  .01-.05              No apparent influence  .03-.12              Euphoria, Diminished judgment and attention   .09-.25              Impaired comprehension, Muscle incoordination  .18-.30              Confusion, Staggered gait, Slurred speech  .25-.40              Markedly decreased response to stimuli, unable to stand or                        walk, vomitting, sleep or stupor  .35-.50              Comatose, Anesthesia, Subnormal body temperature       HIGH SENSITIVITIY TROPONIN T 1HR    Narrative:     High Sensitive Troponin T Reference Range:  <14.0 ng/L- Negative Female for AMI  <22.0 ng/L- Negative Male for AMI  >=14 - Abnormal Female indicating possible myocardial injury.  >=22 - Abnormal Male indicating possible myocardial injury.   Clinicians would have to utilize clinical acumen, EKG, Troponin, and serial changes to determine if it is an Acute Myocardial Infarction or myocardial injury due to an underlying chronic condition.        BASIC METABOLIC PANEL   BASIC METABOLIC PANEL   MAGNESIUM   PHOSPHORUS   BETA HYDROXYBUTYRATE QUANTITATIVE   HIV-1 AND  HIV-2 ANTIBODIES    Narrative:     The following orders were created for panel order HIV-1 & HIV-2 Antibodies.  Procedure                               Abnormality         Status                     ---------                               -----------         ------                     HIV-1 / O / 2 Ag / Antibody[[id number]]                                                   Please view results for these tests on the individual orders.   HIV-1/O/2 ANTIGEN/ANTIBODY   CBC AND DIFFERENTIAL    Narrative:     The following orders were created for panel order CBC & Differential.  Procedure                               Abnormality         Status                     ---------                               -----------         ------                     CBC Auto Differential[[id number]]        Abnormal            Final result               Scan Slide[[id number]]                                                                    Please view results for these tests on the individual orders.   GREEN TOP   LAVENDER TOP   GOLD TOP - SST   LIGHT BLUE TOP   KETONE BODIES SERUM    Narrative:     The following orders were created for panel order Ketone Bodies, Serum (Not performed at [place]).  Procedure                               Abnormality         Status                     ---------                               -----------         ------                     Acetone[[id number]]                      Abnormal            Final result                 Please view results for these tests on the individual orders.       LOC:  responds to painful stimuli    Telemetry:  Critical Care    Cardiac Monitoring Ordered: yes    EKG:   ECG 12 Lead Altered Mental Status   Preliminary Result   HEART QVKQ=807  bpm   RR Zstsjgmd=734  ms   UT Ohijyxso=504  ms   P Horizontal Axis=-8  deg   P Front Axis=64  deg   QRSD Interval=94  ms   QT Vzanwjgz=701  ms   PIdD=278  ms   QRS Axis=42  deg   T Wave Axis=73  deg   - OTHERWISE NORMAL ECG -   Sinus  tachycardia   Date and Time of Study:2024-12-18 04:52:23          Medications Given in the ED:   Medications   sodium chloride 0.9 % flush 10 mL (has no administration in time range)   sodium chloride 0.9 % flush 10 mL (10 mL Intravenous Given 12/18/24 0805)   sodium chloride 0.9 % flush 10 mL (has no administration in time range)   sodium chloride 0.9 % infusion 40 mL (has no administration in time range)   dextrose (GLUTOSE) oral gel 15 g (has no administration in time range)   dextrose (D50W) (25 g/50 mL) IV injection 10-50 mL (has no administration in time range)   Glucagon (GLUCAGEN) injection 1 mg (has no administration in time range)   sodium chloride 0.9 % bolus (0 mL/hr Intravenous Stopped 12/18/24 0650)   sodium chloride 0.9 % infusion (has no administration in time range)   sodium chloride 0.9 % with KCl 20 mEq/L infusion (has no administration in time range)   sodium chloride 0.9 % with KCl 40 mEq/L infusion (has no administration in time range)   dextrose 5 % and sodium chloride 0.9 % infusion (has no administration in time range)   dextrose 5 % and sodium chloride 0.9 % with KCl 20 mEq/L infusion (has no administration in time range)   dextrose 5 % and sodium chloride 0.9 % with KCl 40 mEq/L infusion (has no administration in time range)   sodium chloride 0.45 % infusion (has no administration in time range)   sodium chloride 0.45 % with KCl 20 mEq/L infusion (200 mL/hr Intravenous New Bag 12/18/24 0736)   sodium chloride 0.45 % 1,000 mL with potassium chloride 40 mEq infusion (has no administration in time range)   dextrose 5 % and sodium chloride 0.45 % infusion (has no administration in time range)   dextrose 5 % and sodium chloride 0.45 % with KCl 20 mEq/L infusion (has no administration in time range)   dextrose 5 % and sodium chloride 0.45 % with KCl 40 mEq/L infusion (has no administration in time range)   insulin regular 1 unit/mL in 0.9% sodium chloride (Glucommander) (6.6 Units/hr Intravenous  Rate Change (DUAL SIGN) 12/18/24 8348)   Potassium Replacement - Follow Nurse / BPA Driven Protocol (has no administration in time range)   Magnesium Standard Dose Replacement - Follow Nurse / BPA Driven Protocol (has no administration in time range)   Phosphorus Replacement - Follow Nurse / BPA Driven Protocol (has no administration in time range)   Calcium Replacement - Follow Nurse / BPA Driven Protocol (has no administration in time range)   nitroglycerin (NITROSTAT) SL tablet 0.4 mg (has no administration in time range)   sodium chloride 0.9 % flush 10 mL (has no administration in time range)   sodium chloride 0.9 % flush 10 mL (has no administration in time range)   sodium chloride 0.9 % infusion 40 mL (has no administration in time range)   mupirocin (BACTROBAN) 2 % nasal ointment 1 Application (has no administration in time range)   ipratropium-albuterol (DUO-NEB) nebulizer solution 3 mL (has no administration in time range)   aluminum-magnesium hydroxide-simethicone (MAALOX MAX) 400-400-40 MG/5ML suspension 15 mL (has no administration in time range)   sennosides-docusate (PERICOLACE) 8.6-50 MG per tablet 2 tablet (has no administration in time range)     And   polyethylene glycol (MIRALAX) packet 17 g (has no administration in time range)     And   bisacodyl (DULCOLAX) EC tablet 5 mg (has no administration in time range)     And   bisacodyl (DULCOLAX) suppository 10 mg (has no administration in time range)   acetaminophen (TYLENOL) tablet 650 mg (has no administration in time range)     Or   acetaminophen (TYLENOL) suppository 650 mg (has no administration in time range)   ondansetron ODT (ZOFRAN-ODT) disintegrating tablet 4 mg (has no administration in time range)     Or   ondansetron (ZOFRAN) injection 4 mg (has no administration in time range)   heparin (porcine) 5000 UNIT/ML injection 5,000 Units (has no administration in time range)   famotidine (PEPCID) injection 20 mg (has no administration in time  range)   ziprasidone (GEODON) 10 mg in sterile water (preservative free) 0.5 mL injection (10 mg Intramuscular Given 12/18/24 0709)       Imaging results:  CT Head Without Contrast    Result Date: 12/18/2024  Impression: 1. No acute intracranial findings. 2. Features suggestive of mild chronic microvascular disease. Electronically Signed: Mariah Keenan MD  12/18/2024 8:09 AM EST  Workstation ID: [id number]    XR Chest 1 View    Result Date: 12/18/2024  Impression: 1. No acute process. 2. Severe emphysema. Electronically Signed: Gaurav Miles MD  12/18/2024 7:53 AM EST  Workstation ID: [id number]     Social issues:   Social History     Socioeconomic History    Marital status:    Tobacco Use    Smoking status: Every Day     Current packs/day: 0.50     Types: Cigarettes    Smokeless tobacco: Never   Vaping Use    Vaping status: Never Used   Substance and Sexual Activity    Alcohol use: Not Currently    Drug use: Never    Sexual activity: Defer       NIH Stroke Scale:  Interval: (not recorded)  1a. Level of Consciousness: (not recorded)  1b. LOC Questions: (not recorded)  1c. LOC Commands: (not recorded)  2. Best Gaze: (not recorded)  3. Visual: (not recorded)  4. Facial Palsy: (not recorded)  5a. Motor Arm, Left: (not recorded)  5b. Motor Arm, Right: (not recorded)  6a. Motor Leg, Left: (not recorded)  6b. Motor Leg, Right: (not recorded)  7. Limb Ataxia: (not recorded)  8. Sensory: (not recorded)  9. Best Language: (not recorded)  10. Dysarthria: (not recorded)  11. Extinction and Inattention (formerly Neglect): (not recorded)    Total (NIH Stroke Scale): (not recorded)     Additional notable assessment information:     Nursing report ED to floor:  LORRAINE Coon RN   12/18/24 09:30 EST

## 2024-12-18 NOTE — ED PROVIDER NOTES
Subjective   History of Present Illness  54-year-old female with altered mental status of uncertain duration.  Patient is drowsy and mumbles only for me.  Patient was oriented to person and time of her nurse.  Patient was found to be hyperglycemic with Accu-Chek greater than 600 with no history of diabetes.      Review of Systems   Unable to perform ROS: Mental status change       Past Medical History:   Diagnosis Date    COPD (chronic obstructive pulmonary disease)     Low back pain     Migraine     Neck pain        Allergies   Allergen Reactions    Aspirin GI Intolerance    Ibuprofen Itching       Past Surgical History:   Procedure Laterality Date    LUNG SURGERY         Family History   Problem Relation Age of Onset    Diabetes Mother     Cancer Paternal Uncle     Cancer Paternal Grandmother     COPD Paternal Grandmother     Diabetes Paternal Grandmother        Social History     Socioeconomic History    Marital status:    Tobacco Use    Smoking status: Every Day     Current packs/day: 0.50     Types: Cigarettes    Smokeless tobacco: Never   Vaping Use    Vaping status: Never Used   Substance and Sexual Activity    Alcohol use: Not Currently    Drug use: Never    Sexual activity: Defer           Objective   Physical Exam  Constitutional:       Comments: Drowsy, follows simple commands mumbles only, airway intact   HENT:      Head: Normocephalic and atraumatic.      Mouth/Throat:      Mouth: Mucous membranes are dry.   Eyes:      Pupils: Pupils are equal, round, and reactive to light.   Cardiovascular:      Rate and Rhythm: Tachycardia present.   Pulmonary:      Effort: Pulmonary effort is normal.      Breath sounds: Normal breath sounds.   Abdominal:      General: Bowel sounds are normal.      Palpations: Abdomen is soft.   Skin:     General: Skin is warm and dry.   Neurological:      Comments: Drowsy, oriented to person for me, moves all extremities, no gross focal deficits appreciated          Procedures           ED Course                                                       Medical Decision Making  Patient became agitated requiring sedation with Geodon which has helped significantly.  Patient is resting comfortably, arouses to voice, gag intact, stable on the monitor.  Per patient's mother, patient has been ill since Friday with polyuria and polydipsia.  Unfortunately the patient elected to manage this with milk and grape soft drink.  Patient has no known history of diabetes.  Patient is on the DKA protocol, case discussed with Khalif with intensivist service, agrees with admission to the ICU.    Critical care time 45 minutes    Problems Addressed:  MARGOT (acute kidney injury): complicated acute illness or injury  Diabetic ketoacidosis without coma associated with other specified diabetes mellitus: complicated acute illness or injury  Hypernatremia: complicated acute illness or injury    Amount and/or Complexity of Data Reviewed  Independent Historian: parent and caregiver     Details: As above  Labs: ordered.     Details: BUN 88  Radiology: ordered.  ECG/medicine tests: ordered and independent interpretation performed.     Details: EKG interpretation: Sinus tachycardia, rate 106, no acute ST elevation    Risk  OTC drugs.  Prescription drug management.  Decision regarding hospitalization.        Final diagnoses:   Diabetic ketoacidosis without coma associated with other specified diabetes mellitus   MARGOT (acute kidney injury)   Hypernatremia       ED Disposition  ED Disposition       ED Disposition   Decision to Admit    Condition   --    Comment   Level of Care: Critical Care [6]   Diagnosis: DKA (diabetic ketoacidosis) [297458]   Admitting Physician: VIVI CLARK [[id number]]   Attending Physician: VIVI CLARK [[id number]]   Certification: I Certify That Inpatient Hospital Services Are Medically Necessary For Greater Than 2 Midnights                 No follow-up provider specified.        Medication List      No changes were made to your prescriptions during this visit.            Rohit Mitchell MD  12/18/24 0828       Rohit Mitchell MD  12/18/24 0856

## 2024-12-18 NOTE — CASE MANAGEMENT/SOCIAL WORK
Discharge Planning Assessment   Cesar     Patient Name: Yarelis Jackson  MRN: [id number]  Today's Date: 12/18/2024    Admit Date: 12/18/2024    Plan: Home with parents   Discharge Needs Assessment       Row Name 12/18/24 1119       Living Environment    People in Home parent(s)    Name(s) of People in Home Ledy-Mom, Dad    Current Living Arrangements home    Potentially Unsafe Housing Conditions none    In the past 12 months has the electric, gas, oil, or water company threatened to shut off services in your home? No    Primary Care Provided by self    Provides Primary Care For no one    Family Caregiver if Needed child(patricia), adult    Quality of Family Relationships helpful;involved;supportive    Able to Return to Prior Arrangements yes       Resource/Environmental Concerns    Resource/Environmental Concerns none    Transportation Concerns none       Transportation Needs    In the past 12 months, has lack of transportation kept you from medical appointments or from getting medications? no    In the past 12 months, has lack of transportation kept you from meetings, work, or from getting things needed for daily living? No       Food Insecurity    Within the past 12 months, you worried that your food would run out before you got the money to buy more. Never true    Within the past 12 months, the food you bought just didn't last and you didn't have money to get more. Never true       Transition Planning    Patient/Family Anticipates Transition to home    Patient/Family Anticipated Services at Transition none    Transportation Anticipated car, drives self;family or friend will provide       Discharge Needs Assessment    Equipment Currently Used at Home none    Concerns to be Addressed discharge planning    Do you want help finding or keeping work or a job? I do not need or want help    Do you want help with school or training? For example, starting or completing job training or getting a high school diploma, GED or  equivalent No    Anticipated Changes Related to Illness none    Equipment Needed After Discharge glucometer                   Discharge Plan       Row Name 12/18/24 1126       Plan    Plan Home with parents    Plan Comments CM met with patient at the bedside to review discharge planning. Patient lives in her parent's house.  Pt. has been independent with all IADLs. Son or parents to transport patient at d/c. Confirmed PCP, insurance, and pharmacy. Patient accepts [facility]. Patient denies any difficulty affording food, utilities, or medications. Patient is not current with any HHC/OPPT/OT services. DC Barriers: Insulin gtt, Lab monitoring, telemetry, Diabetes Educator, Intensivist.                Demographic Summary       Row Name 12/18/24 1114       General Information    Admission Type inpatient    Arrived From home    Referral Source admission list    Reason for Consult discharge planning    Preferred Language English       Contact Information    Permission Granted to Share Info With ;facility                    Functional Status       Row Name 12/18/24 1115       Functional Status    Usual Activity Tolerance good    Current Activity Tolerance poor       Functional Status, IADL    Medications independent    Meal Preparation independent    Housekeeping independent    Laundry independent    Shopping independent    If for any reason you need help with day-to-day activities such as bathing, preparing meals, shopping, managing finances, etc., do you get the help you need? I don't need any help     [place] [address]  Phone: [phone]  Fax: [phone]

## 2024-12-18 NOTE — H&P
Critical Care History and Physical     Yarelis Jackson : 1970 MRN:[id number] LOS:0 ROOM:      Reason for admission: DKA (diabetic ketoacidosis)     Assessment / Plan     Diabetic ketoacidosis without coma, with new onset diabetes mellitus, type 2  Etiology uncertain, though new onset diabetes mellitus.  A1c 15.80 on admission, no prior available.  Anion gap of 31.9 on admission.  On no home medication regimen.  Acetone moderate, checking beta hydroxybutyrate.  DKA protocol initiated with IVF and insulin gtt.    Insulin drip initiated per DKA protocol.  Adjust iv fluids based on glucoses, corrected sodium, and potassium per protocol.  Accuchecks every hour and serial labs as ordered.  Continue protocol until resolved per protocol recommendations.  Leukocytosis (WBC 14.65 without bandemia), Lactate 1.5, no evidence of infection.  Follow blood cultures; monitor off antimicrobials.    Metabolic acidosis, increased anion gap  Secondary to DKA.  IVF as ordered.  Monitor and trend labs.    Acute Kidney Injury  Remains nonoliguric. Baseline creatinine 0.90.  Creatinine 2.10 on admission.  Likely prerenal. Possible intrinsic component due to ATN from dehydration, nausea, vomiting, and and hypotension.   C/w IV fluids as ordered.  Monitor Input/Output very closely.   Avoids NSAIDs, nephrotoxic medications, and hypotension.  Net IO Since Admission: 1,000 mL [24 1213]   Check CK.    Acute metabolic encephalopathy  Likely metabolic encephalopathy, secondary to DKA.  CT of the head: Without acute abnormalities.  UDS-negative.  Required Geodon x 1 dose in ED.  Currently in restraints as patient is pulling at lines.  Monitor for further needs for antipsychotic administration.    Acute hypernatremia  Secondary to KA and dehydration  Corrected sodium 180 on admission, Osmolality 461.  IV fluids per DKA protocol  Monitor and trend labs as ordered.    Cutaneous candidiasis of groin  Likely secondary to uncontrolled  diabetes mellitus  Nystatin ordered    Bilateral leg rash, concern for scabies infection  Permethrin regimen ordered.  Place in contract precautions.    Transaminitis  Check US of Liver.  Hepatitis panel-negative.  Monitor and trend LFTs.  Check GGT.    COPD: Not in exacerbation.  Duonebs ordered as needed.  Obesity: Body mass index is 35 kg/m².      Code Status (Patient has no pulse and is not breathing): CPR (Attempt to Resuscitate)  Medical Interventions (Patient has pulse or is breathing): Full Support       Nutrition:   NPO Diet NPO Type: Strict NPO     VTE Prophylaxis:  Pharmacologic VTE prophylaxis orders are present.       History of Present illness     Yarelis Jackson is a 54 y.o. female with PMH of COPD, migraine, and obesity, and presented to the hospital for altered mental status, and was admitted with a principal diagnosis of DKA (diabetic ketoacidosis).  Patient presented and per ER documentation, patient was oriented to person and time.  Per patient's mother, patient had been feeling ill since Friday with noted polyuria and polydipsia.  It was managed by giving patient milk and a grape soft drink.  Patient became increasingly agitated, preventing treatment, and was given a dose of Geodon in the ED.    In the ED, labs were obtained with the following abnormalities: Sodium 159, chloride 117, CO2 10.1, anion gap 31.9, BUN 88, creatinine 2.13, glucose 976, alk phos 246, , , hemoglobin A1c 15.8, moderate acetone, osmolality 461, WBC 14.65 without bandemia.  UA with >1000 glucose, 15 mg/dL of ketones, moderate blood and proteinuria noted, this did not reflex to culture.  Patient had an ABG with pH 7.179, pCO2 37.5, pO2 77.7, HCO3 14, with O2 saturation 91.7%.  Patient was determined to be in DKA, and was started with IV fluid boluses per DKA protocol as well as an insulin drip.  Patient did have blood cultures obtained, but per ED provider, patient has no obvious signs or symptoms of active  section, therefore no antimicrobials were strict.    ACP: No advance care planning documentation on file.  Patient is listed as , and in that event, her spouse would be next of kin and decision maker.  Patient's son and mother have been physically present during this admission.    Patient was seen and examined on 12/18/24 at 08:32 EST .      Past Medical/Surgical/Social/Family History & Allergies     Past Medical History:   Diagnosis Date    COPD (chronic obstructive pulmonary disease)     Low back pain     Migraine     Neck pain       Past Surgical History:   Procedure Laterality Date    LUNG SURGERY        Social History     Socioeconomic History    Marital status:    Tobacco Use    Smoking status: Every Day     Current packs/day: 0.50     Types: Cigarettes    Smokeless tobacco: Never   Vaping Use    Vaping status: Never Used   Substance and Sexual Activity    Alcohol use: Not Currently    Drug use: Never    Sexual activity: Defer      Family History   Problem Relation Age of Onset    Diabetes Mother     Cancer Paternal Uncle     Cancer Paternal Grandmother     COPD Paternal Grandmother     Diabetes Paternal Grandmother       Allergies   Allergen Reactions    Aspirin GI Intolerance    Ibuprofen Itching      Social Drivers of Health     Tobacco Use: High Risk (1/31/2022)    Patient History     Smoking Tobacco Use: Every Day     Smokeless Tobacco Use: Never     Passive Exposure: Not on file   Alcohol Use: Not on file   Financial Resource Strain: Not on file   Food Insecurity: Not on file   Transportation Needs: Not on file   Physical Activity: Not on file   Stress: Not on file   Social Connections: Not on file   Interpersonal Safety: Unknown (12/18/2024)    Abuse Screen     Unsafe at Home or Work/School: unable to answer (comment required)     Feels Threatened by Someone?: unable to answer (comment required)     Does Anyone Keep You from Contacting Others or Doint Things Outside the Home?: unable to  answer (comment required)     Physical Sign of Abuse Present: patient unable to answer   Depression: At risk (1/24/2022)    PHQ-2     PHQ-2 Score: 13   Housing Stability: Not on file   Utilities: Not on file   Health Literacy: Not on file   Employment: Not on file   Disabilities: Not on file        Home Medications     Prior to Admission medications    Medication Sig Start Date End Date Taking? Authorizing Provider   albuterol sulfate  (90 Base) MCG/ACT inhaler Inhale 2 puffs Every 4 (Four) Hours As Needed for Wheezing. 4/4/23   Maria Isabel Urrutia APRN   budesonide-formoterol (Symbicort) 160-4.5 MCG/ACT inhaler Inhale 2 puffs 2 (Two) Times a Day. 11/16/21   Katie Duran PA   citalopram (CeleXA) 20 MG tablet TAKE ONE TABLET BY MOUTH DAILY 4/11/22   Katie Duran PA   furosemide (LASIX) 20 MG tablet TAKE ONE TABLET BY MOUTH DAILY 1/28/22   Cintia Marrero MD   gabapentin (NEURONTIN) 400 MG capsule Take 1 capsule by mouth 3 (Three) Times a Day for 30 days. 4/11/22 5/11/22  Candido Irving DO   hydrOXYzine (ATARAX) 50 MG tablet TAKE ONE TABLET BY MOUTH THREE TIMES DAILY AS NEEDED FOR ANXIETY 4/11/22   Katie Duran PA   lidocaine (LIDODERM) 5 % Place 1 patch on the skin as directed by provider Daily. Remove & Discard patch within 12 hours or as directed by MD 4/4/21   Gisele Cesar PA-C   lisinopril (PRINIVIL,ZESTRIL) 10 MG tablet TAKE ONE TABLET BY MOUTH EVERY DAY 3/7/22   Katie Duran PA   methylPREDNISolone (MEDROL) 4 MG dose pack Take as directed on package instructions. 4/4/23   Maria Isabel Urrutia APRN   nicotine (NICODERM CQ) 14 MG/24HR patch Place 1 patch on the skin as directed by provider Daily. 11/16/21   Katie Duran PA   potassium chloride 10 MEQ CR tablet TAKE ONE TABLET BY MOUTH EVERY DAY 1/28/22   Cintia Marrero MD        Objective / Physical Exam     Vital signs:  Temp: 97 °F (36.1 °C)  BP: 108/69  Heart Rate: 109  Resp: 16  SpO2: 92 %  Weight: 92.5 kg (203 lb 14.4  oz)    Admission Weight: Weight: 92.5 kg (203 lb 14.4 oz)    Physical Exam  Vitals and nursing note reviewed.   Constitutional:       General: She is not in acute distress.     Appearance: She is ill-appearing and toxic-appearing. She is not diaphoretic.      Interventions: She is restrained.      Comments: Drowsy and confused.   HENT:      Head: Normocephalic and atraumatic.      Nose: Nose normal.      Mouth/Throat:      Mouth: Mucous membranes are dry.      Pharynx: No oropharyngeal exudate.   Eyes:      General: No scleral icterus.     Extraocular Movements: Extraocular movements intact.      Pupils: Pupils are equal, round, and reactive to light.   Cardiovascular:      Rate and Rhythm: Regular rhythm. Tachycardia present.      Pulses: Normal pulses.      Heart sounds: Normal heart sounds.   Pulmonary:      Effort: No respiratory distress.      Comments: Diminished bibasilar breath sounds, tachypneic but without accessory muscle use.  Abdominal:      General: Bowel sounds are normal. There is no distension.      Palpations: Abdomen is soft.      Tenderness: There is no abdominal tenderness.   Musculoskeletal:      Right lower leg: No edema.      Left lower leg: No edema.      Comments: Mottling of bilateral lower extremities.   Skin:     General: Skin is dry.      Findings: Erythema and rash present.      Comments: Erythemic rash noted in bilateral groin folds and perennial area, consistent with fungal component.  Bilateral lower extremities with mottling as well as red bumps almost appearing as bites, after discussion with Dr. Rodriguez, concern for scabies.  Cool extremities.   Neurological:      Mental Status: She is easily aroused. She is disoriented.      Comments: Incomprehensive speech, eyes open, looking around, unable to assess orientation, moving all extremities.  No obvious focal deficits noted.   Psychiatric:      Comments: Anxious/restless.          Labs     Results from last 7 days   Lab Units  12/18/24  0510 12/18/24  0504   WBC 10*3/mm3  --  14.65*   HEMATOCRIT %  --  54.9*   HEMATOCRIT POC % 53*  --    PLATELETS 10*3/mm3  --  370      Results from last 7 days   Lab Units 12/18/24  0557 12/18/24  0510 12/18/24  0504   SODIUM mmol/L 159*  --  158*   POTASSIUM mmol/L 3.8  --  3.6   CHLORIDE mmol/L 117*  --  114*   CO2 mmol/L 10.1*  --  14.7*   BUN mg/dL 88*  --  88*   CREATININE mg/dL 2.13* 1.80* 2.10*        Imaging     Chest X ray: My independent assessment showed advanced emphysema but without effusions or infiltrates.    EKG: My independent evaluation showed sinus tachycardia without ST or T wave abnormalities, heart rate 106, QTc 470.    Current Medications     Scheduled Meds:  famotidine, 20 mg, Intravenous, Daily  heparin (porcine), 5,000 Units, Subcutaneous, Q8H  mupirocin, 1 Application, Each Nare, BID  nystatin, 1 Application, Topical, Q8H  permethrin, 1 Application, Topical, Once   And  [START ON 12/25/2024] permethrin, 1 Application, Topical, Once  sodium chloride, 10 mL, Intravenous, Q12H  sodium chloride, 10 mL, Intravenous, Q12H         Continuous Infusions:  dextrose 5 % and sodium chloride 0.45 %, 125 mL/hr  dextrose 5 % and sodium chloride 0.45 % with KCl 20 mEq/L, 125 mL/hr  dextrose 5 % and sodium chloride 0.45 % with KCl 40 mEq/L, 125 mL/hr  dextrose 5 % and sodium chloride 0.9 %, 125 mL/hr  dextrose 5 % and sodium chloride 0.9 % with KCl 20 mEq, 125 mL/hr  dextrose 5% and sodium chloride 0.9% with KCl 40 mEq/L, 125 mL/hr  insulin, 0-100 Units/hr, Last Rate: 5.4 Units/hr (12/18/24 0528)  sodium chloride 0.45 % 1,000 mL with potassium chloride 40 mEq infusion, 200 mL/hr  sodium chloride, 200 mL/hr  sodium chloride 0.45 % with KCl 20 mEq, 200 mL/hr, Last Rate: 200 mL/hr (12/18/24 0736)  sodium chloride, 200 mL/hr  sodium chloride 0.9 % with KCl 20 mEq, 200 mL/hr  sodium chloride 0.9 % with KCl 40 mEq/L, 200 mL/hr       Plan discussed with RN. Reviewed all other data in the last 24 hours,  including but not limited to vitals, labs, microbiology, imaging and pertinent notes from other providers.  Plan also discussed with patient's family at the bedside.    Charly Hernandez, APRN   Critical Care  12/18/24   08:32 EST

## 2024-12-18 NOTE — SIGNIFICANT NOTE
12/18/24 1109   Living Situation   Current Living Arrangements home   Potentially Unsafe Housing Conditions none   Food Insecurity   Within the past 12 months, you worried that your food would run out before you got the money to buy more. Never true   Within the past 12 months, the food you bought just didn't last and you didn't have money to get more. Never true   Transportation Needs   In the past 12 months, has lack of transportation kept you from medical appointments or from getting medications? no   In the past 12 months, has lack of transportation kept you from meetings, work, or from getting things needed for daily living? No   Utilities   In the past 12 months has the electric, gas, oil, or water company threatened to shut off services in your home? No   Abuse Screen (yes response referral indicated)   Feels Unsafe at Home or Work/School no   Feels Threatened by Someone no   Does Anyone Try to Keep You From Having Contact with Others or Doing Things Outside Your Home? no   Physical Signs of Abuse Present no   Employment   Do you want help finding or keeping work or a job? I do not need or want help   Family and Community Support   If for any reason you need help with day-to-day activities such as bathing, preparing meals, shopping, managing finances, etc., do you get the help you need? I don't need any help   How often do you feel lonely or isolated from those around you? Patient unable to answer   Education   Preferred Language English   Do you want help with school or training? For example, starting or completing job training or getting a high school diploma, GED or equivalent No   Physical Activity   On average, how many days per week do you engage in moderate to strenuous exercise (like a brisk walk)? Pt Unable   On average, how many minutes do you engage in exercise at this level? Pt Unable   Alcohol Use   Q1: How often do you have a drink containing alcohol? Never   Q2: How many drinks containing  alcohol do you have on a typical day when you are drinking? None   Q3: How often do you have six or more drinks on one occasion? Never   Stress   Do you feel stress - tense, restless, nervous, or anxious, or unable to sleep at night because your mind is troubled all the time - these days? Pt Unable   Mental Health   Little interest or pleasure in doing things Not at all   Feeling down, depressed, or hopeless Not at all   Disabilities   Difficulty Concentrating, Remembering or Making Decisions no   Difficulty Managing Errands Independently no     Lucre[place] [address]  Phone: [phone]  Fax: [phone]

## 2024-12-19 ENCOUNTER — APPOINTMENT (OUTPATIENT)
Dept: CARDIOLOGY | Facility: HOSPITAL | Age: 54
End: 2024-12-19
Payer: MEDICAID

## 2024-12-19 ENCOUNTER — ANESTHESIA (OUTPATIENT)
Dept: PERIOP | Facility: HOSPITAL | Age: 54
End: 2024-12-19
Payer: MEDICAID

## 2024-12-19 ENCOUNTER — APPOINTMENT (OUTPATIENT)
Dept: GENERAL RADIOLOGY | Facility: HOSPITAL | Age: 54
End: 2024-12-19
Payer: MEDICAID

## 2024-12-19 ENCOUNTER — ANESTHESIA EVENT (OUTPATIENT)
Dept: PERIOP | Facility: HOSPITAL | Age: 54
End: 2024-12-19
Payer: MEDICAID

## 2024-12-19 ENCOUNTER — ANCILLARY PROCEDURE (OUTPATIENT)
Dept: PERIOP | Facility: HOSPITAL | Age: 54
End: 2024-12-19
Payer: MEDICAID

## 2024-12-19 ENCOUNTER — APPOINTMENT (OUTPATIENT)
Dept: ULTRASOUND IMAGING | Facility: HOSPITAL | Age: 54
End: 2024-12-19
Payer: MEDICAID

## 2024-12-19 PROBLEM — I74.9 ARTERIAL THROMBOSIS: Status: ACTIVE | Noted: 2024-12-19

## 2024-12-19 LAB
ALBUMIN SERPL-MCNC: 2.8 G/DL (ref 3.5–5.2)
ALBUMIN SERPL-MCNC: 3.2 G/DL (ref 3.5–5.2)
ALBUMIN/GLOB SERPL: 0.9 G/DL
ALP SERPL-CCNC: 145 U/L (ref 39–117)
ALT SERPL W P-5'-P-CCNC: 124 U/L (ref 1–33)
AMYLASE SERPL-CCNC: 311 U/L (ref 28–100)
ANION GAP SERPL CALCULATED.3IONS-SCNC: 16.5 MMOL/L (ref 5–15)
ANION GAP SERPL CALCULATED.3IONS-SCNC: 21.1 MMOL/L (ref 5–15)
ANION GAP SERPL CALCULATED.3IONS-SCNC: 8.1 MMOL/L (ref 5–15)
ANION GAP SERPL CALCULATED.3IONS-SCNC: ABNORMAL MMOL/L
ANION GAP SERPL CALCULATED.3IONS-SCNC: ABNORMAL MMOL/L
APTT PPP: 147.9 SECONDS (ref 22.7–35.4)
APTT PPP: <20 SECONDS (ref 22.7–35.4)
APTT PPP: >200 SECONDS (ref 22.7–35.4)
ARTERIAL PATENCY WRIST A: ABNORMAL
ARTERIAL PATENCY WRIST A: ABNORMAL
ARTICHOKE IGE QN: 67 MG/DL (ref 0–100)
AST SERPL-CCNC: 64 U/L (ref 1–32)
ATMOSPHERIC PRESS: ABNORMAL MM[HG]
ATMOSPHERIC PRESS: ABNORMAL MM[HG]
BASE EXCESS BLDA CALC-SCNC: -3 MMOL/L (ref 0–3)
BASE EXCESS BLDA CALC-SCNC: -5.6 MMOL/L (ref 0–3)
BASOPHILS # BLD AUTO: 0.08 10*3/MM3 (ref 0–0.2)
BASOPHILS NFR BLD AUTO: 0.6 % (ref 0–1.5)
BDY SITE: ABNORMAL
BDY SITE: ABNORMAL
BH CV GRAFT BRACHIAL PRESSURE LEFT: 135 MMHG
BH CV LEA LEFT DPA PRESSURE: 119 MMHG
BH CV LEA LEFT PTA PRESSURE: 137 MMHG
BH CV LEA RIGHT ANT TIBIAL A MID PSV: 0 CM/S
BH CV LEA RIGHT CFA DISTAL PSV: 32.7 CM/S
BH CV LEA RIGHT DFA PROX EDV: 7.5 CM/S
BH CV LEA RIGHT DFA PROX PSV: 12.8 CM/S
BH CV LEA RIGHT DPA PRESSURE: 0 MMHG
BH CV LEA RIGHT EXT ILIAC PSV: 20 CM/S
BH CV LEA RIGHT PERONEAL  MID PSV: 0 CM/S
BH CV LEA RIGHT POPITEAL A  PROX EDV: -5.9 CM/S
BH CV LEA RIGHT POPITEAL A  PROX PSV: -12.4 CM/S
BH CV LEA RIGHT PTA MID PSV: 0 CM/S
BH CV LEA RIGHT PTA PRESSURE: 0 MMHG
BH CV LEA RIGHT SFA DISTAL EDV: -5.7 CM/S
BH CV LEA RIGHT SFA DISTAL PSV: -11.6 CM/S
BH CV LEA RIGHT SFA MID EDV: -8.1 CM/S
BH CV LEA RIGHT SFA MID PSV: -12.4 CM/S
BH CV LEA RIGHT SFA PROX EDV: 9.1 CM/S
BH CV LEA RIGHT SFA PROX PSV: 12.6 CM/S
BH CV LOWER ARTERIAL LEFT ABI RATIO: 1.01
BH CV LOWER ARTERIAL LEFT ABI RATIO: 1.01
BH CV LOWER ARTERIAL LEFT DORSALIS PEDIS SYS MAX: 119
BH CV LOWER ARTERIAL LEFT GREAT TOE SYS MAX: 61
BH CV LOWER ARTERIAL LEFT POST TIBIAL SYS MAX: 137
BH CV LOWER ARTERIAL LEFT TBI RATIO: 0.45
BH CV LOWER ARTERIAL RIGHT ABI RATIO: 0
BH CV LOWER ARTERIAL RIGHT ABI RATIO: 0
BH CV LOWER ARTERIAL RIGHT DORSALIS PEDIS SYS MAX: 0
BH CV LOWER ARTERIAL RIGHT POST TIBIAL SYS MAX: 0
BH CV LOWER ARTERIAL RIGHT TBI RATIO: 0
BH CV VAS PRELIMINARY FINDINGS SCRIPTING: 1
BILIRUB SERPL-MCNC: 0.2 MG/DL (ref 0–1.2)
BUN SERPL-MCNC: 38 MG/DL (ref 6–20)
BUN SERPL-MCNC: 55 MG/DL (ref 6–20)
BUN SERPL-MCNC: 57 MG/DL (ref 6–20)
BUN SERPL-MCNC: 66 MG/DL (ref 6–20)
BUN SERPL-MCNC: 69 MG/DL (ref 6–20)
BUN/CREAT SERPL: 29 (ref 7–25)
BUN/CREAT SERPL: 41 (ref 7–25)
BUN/CREAT SERPL: 41.3 (ref 7–25)
BUN/CREAT SERPL: 43 (ref 7–25)
BUN/CREAT SERPL: 45.4 (ref 7–25)
CA-I SERPL ISE-MCNC: 0.93 MMOL/L (ref 1.15–1.3)
CALCIUM SPEC-SCNC: 6.8 MG/DL (ref 8.6–10.5)
CALCIUM SPEC-SCNC: 7.9 MG/DL (ref 8.6–10.5)
CALCIUM SPEC-SCNC: 8.7 MG/DL (ref 8.6–10.5)
CALCIUM SPEC-SCNC: 8.9 MG/DL (ref 8.6–10.5)
CALCIUM SPEC-SCNC: 9 MG/DL (ref 8.6–10.5)
CHLORIDE SERPL-SCNC: 120 MMOL/L (ref 98–107)
CHLORIDE SERPL-SCNC: 134 MMOL/L (ref 98–107)
CHLORIDE SERPL-SCNC: 136 MMOL/L (ref 98–107)
CHLORIDE SERPL-SCNC: >140 MMOL/L (ref 98–107)
CHLORIDE SERPL-SCNC: >140 MMOL/L (ref 98–107)
CHOLEST SERPL-MCNC: 565 MG/DL (ref 0–200)
CK SERPL-CCNC: 1123 U/L (ref 20–180)
CO2 BLDA-SCNC: 27.1 MMOL/L (ref 22–29)
CO2 BLDA-SCNC: 28.9 MMOL/L (ref 22–29)
CO2 SERPL-SCNC: 14.5 MMOL/L (ref 22–29)
CO2 SERPL-SCNC: 14.9 MMOL/L (ref 22–29)
CO2 SERPL-SCNC: 2.5 MMOL/L (ref 22–29)
CO2 SERPL-SCNC: 2.7 MMOL/L (ref 22–29)
CO2 SERPL-SCNC: 22.9 MMOL/L (ref 22–29)
CORTIS SERPL-MCNC: 30.1 MCG/DL
CREAT SERPL-MCNC: 1.28 MG/DL (ref 0.57–1)
CREAT SERPL-MCNC: 1.31 MG/DL (ref 0.57–1)
CREAT SERPL-MCNC: 1.38 MG/DL (ref 0.57–1)
CREAT SERPL-MCNC: 1.52 MG/DL (ref 0.57–1)
CREAT SERPL-MCNC: 1.61 MG/DL (ref 0.57–1)
D-LACTATE SERPL-SCNC: 2.4 MMOL/L (ref 0.5–2)
DEPRECATED RDW RBC AUTO: 49.6 FL (ref 37–54)
EGFRCR SERPLBLD CKD-EPI 2021: 38.1 ML/MIN/1.73
EGFRCR SERPLBLD CKD-EPI 2021: 40.8 ML/MIN/1.73
EGFRCR SERPLBLD CKD-EPI 2021: 45.9 ML/MIN/1.73
EGFRCR SERPLBLD CKD-EPI 2021: 48.8 ML/MIN/1.73
EGFRCR SERPLBLD CKD-EPI 2021: 50.2 ML/MIN/1.73
EOSINOPHIL # BLD AUTO: 0.01 10*3/MM3 (ref 0–0.4)
EOSINOPHIL NFR BLD AUTO: 0.1 % (ref 0.3–6.2)
EOSINOPHIL SPEC QL MICRO: 0 % EOS/100 CELLS (ref 0–0)
ERYTHROCYTE [DISTWIDTH] IN BLOOD BY AUTOMATED COUNT: 13.1 % (ref 12.3–15.4)
FENTANYL UR-MCNC: NEGATIVE NG/ML
FIBRINOGEN PPP-MCNC: 524 MG/DL (ref 219–464)
GLOBULIN UR ELPH-MCNC: 3.6 GM/DL
GLUCOSE BLDC GLUCOMTR-MCNC: 102 MG/DL (ref 70–105)
GLUCOSE BLDC GLUCOMTR-MCNC: 105 MG/DL (ref 70–105)
GLUCOSE BLDC GLUCOMTR-MCNC: 109 MG/DL (ref 70–105)
GLUCOSE BLDC GLUCOMTR-MCNC: 125 MG/DL (ref 70–105)
GLUCOSE BLDC GLUCOMTR-MCNC: 137 MG/DL (ref 70–105)
GLUCOSE BLDC GLUCOMTR-MCNC: 141 MG/DL (ref 70–105)
GLUCOSE BLDC GLUCOMTR-MCNC: 145 MG/DL (ref 70–105)
GLUCOSE BLDC GLUCOMTR-MCNC: 145 MG/DL (ref 70–105)
GLUCOSE BLDC GLUCOMTR-MCNC: 148 MG/DL (ref 70–105)
GLUCOSE BLDC GLUCOMTR-MCNC: 153 MG/DL (ref 70–105)
GLUCOSE BLDC GLUCOMTR-MCNC: 179 MG/DL (ref 70–105)
GLUCOSE BLDC GLUCOMTR-MCNC: 183 MG/DL (ref 74–100)
GLUCOSE BLDC GLUCOMTR-MCNC: 190 MG/DL (ref 70–105)
GLUCOSE BLDC GLUCOMTR-MCNC: 200 MG/DL (ref 70–105)
GLUCOSE BLDC GLUCOMTR-MCNC: 211 MG/DL (ref 70–105)
GLUCOSE BLDC GLUCOMTR-MCNC: 212 MG/DL (ref 70–105)
GLUCOSE BLDC GLUCOMTR-MCNC: 251 MG/DL (ref 70–105)
GLUCOSE BLDC GLUCOMTR-MCNC: 321 MG/DL (ref 70–105)
GLUCOSE BLDC GLUCOMTR-MCNC: 83 MG/DL (ref 70–105)
GLUCOSE BLDC GLUCOMTR-MCNC: 88 MG/DL (ref 70–105)
GLUCOSE BLDC GLUCOMTR-MCNC: 89 MG/DL (ref 70–105)
GLUCOSE BLDC GLUCOMTR-MCNC: 95 MG/DL (ref 70–105)
GLUCOSE BLDC GLUCOMTR-MCNC: 99 MG/DL (ref 70–105)
GLUCOSE SERPL-MCNC: 151 MG/DL (ref 65–99)
GLUCOSE SERPL-MCNC: 158 MG/DL (ref 65–99)
GLUCOSE SERPL-MCNC: 160 MG/DL (ref 65–99)
GLUCOSE SERPL-MCNC: 168 MG/DL (ref 65–99)
GLUCOSE SERPL-MCNC: 203 MG/DL (ref 65–99)
HBV SURFACE AG SERPL QL IA: NORMAL
HCO3 BLDA-SCNC: 25.3 MMOL/L (ref 21–28)
HCO3 BLDA-SCNC: 26.3 MMOL/L (ref 21–28)
HCT VFR BLD AUTO: 52.2 % (ref 34–46.6)
HDLC SERPL-MCNC: 29 MG/DL (ref 40–60)
HEMODILUTION: NO
HEMODILUTION: NO
HGB BLD-MCNC: 17.1 G/DL (ref 12–15.9)
IMM GRANULOCYTES # BLD AUTO: 0.18 10*3/MM3 (ref 0–0.05)
IMM GRANULOCYTES NFR BLD AUTO: 1.3 % (ref 0–0.5)
INHALED O2 CONCENTRATION: 100 %
INHALED O2 CONCENTRATION: 40 %
INR PPP: 1.05 (ref 0.9–1.1)
L PNEUMO1 AG UR QL IA: NEGATIVE
LDLC SERPL CALC-MCNC: ABNORMAL MG/DL
LDLC/HDLC SERPL: ABNORMAL {RATIO}
LIPASE SERPL-CCNC: 33 U/L (ref 13–60)
LYMPHOCYTES # BLD AUTO: 2.55 10*3/MM3 (ref 0.7–3.1)
LYMPHOCYTES NFR BLD AUTO: 18.5 % (ref 19.6–45.3)
Lab: ABNORMAL
MAGNESIUM SERPL-MCNC: 1.9 MG/DL (ref 1.6–2.6)
MAGNESIUM SERPL-MCNC: 2.4 MG/DL (ref 1.6–2.6)
MAGNESIUM SERPL-MCNC: 2.6 MG/DL (ref 1.6–2.6)
MAGNESIUM SERPL-MCNC: 2.9 MG/DL (ref 1.6–2.6)
MAGNESIUM SERPL-MCNC: 3 MG/DL (ref 1.6–2.6)
MCH RBC QN AUTO: 33.3 PG (ref 26.6–33)
MCHC RBC AUTO-ENTMCNC: 32.8 G/DL (ref 31.5–35.7)
MCV RBC AUTO: 101.8 FL (ref 79–97)
MODALITY: ABNORMAL
MODALITY: ABNORMAL
MONOCYTES # BLD AUTO: 0.97 10*3/MM3 (ref 0.1–0.9)
MONOCYTES NFR BLD AUTO: 7 % (ref 5–12)
NEUTROPHILS NFR BLD AUTO: 10.02 10*3/MM3 (ref 1.7–7)
NEUTROPHILS NFR BLD AUTO: 72.5 % (ref 42.7–76)
NOTIFIED WHO: ABNORMAL
NRBC BLD AUTO-RTO: 0.1 /100 WBC (ref 0–0.2)
OSMOLALITY UR: 843 MOSM/KG (ref 300–800)
PAW @ PEAK INSP FLOW SETTING VENT: 16 CMH2O
PCO2 BLDA: 58.9 MM HG (ref 35–48)
PCO2 BLDA: 86 MM HG (ref 35–48)
PEEP RESPIRATORY: 5 CM[H2O]
PEEP RESPIRATORY: 8 CM[H2O]
PH BLDA: 7.09 PH UNITS (ref 7.35–7.45)
PH BLDA: 7.24 PH UNITS (ref 7.35–7.45)
PHOSPHATE SERPL-MCNC: 3.7 MG/DL (ref 2.5–4.5)
PHOSPHATE SERPL-MCNC: 4.7 MG/DL (ref 2.5–4.5)
PHOSPHATE SERPL-MCNC: 5.1 MG/DL (ref 2.5–4.5)
PHOSPHATE SERPL-MCNC: 6.1 MG/DL (ref 2.5–4.5)
PHOSPHATE SERPL-MCNC: 6.7 MG/DL (ref 2.5–4.5)
PHOSPHATE SERPL-MCNC: 6.7 MG/DL (ref 2.5–4.5)
PLATELET # BLD AUTO: 266 10*3/MM3 (ref 140–450)
PMV BLD AUTO: 10.3 FL (ref 6–12)
PO2 BLD: 144 MM[HG] (ref 0–500)
PO2 BLD: 438 MM[HG] (ref 0–500)
PO2 BLDA: 437.6 MM HG (ref 83–108)
PO2 BLDA: 57.5 MM HG (ref 83–108)
POTASSIUM SERPL-SCNC: 4.3 MMOL/L (ref 3.5–5.2)
POTASSIUM SERPL-SCNC: 4.4 MMOL/L (ref 3.5–5.2)
POTASSIUM SERPL-SCNC: 4.5 MMOL/L (ref 3.5–5.2)
POTASSIUM SERPL-SCNC: 4.7 MMOL/L (ref 3.5–5.2)
POTASSIUM SERPL-SCNC: 5.2 MMOL/L (ref 3.5–5.2)
PROT SERPL-MCNC: 6.8 G/DL (ref 6–8.5)
PROTHROMBIN TIME: 13.8 SECONDS (ref 11.7–14.2)
RBC # BLD AUTO: 5.13 10*6/MM3 (ref 3.77–5.28)
READ BACK: ABNORMAL
RESPIRATORY RATE: 16
RESPIRATORY RATE: 18
RIGHT GROIN CFA SYS: 32.7 CM/SEC
S PNEUM AG SPEC QL LA: NEGATIVE
SAO2 % BLDCOA: 83.6 % (ref 94–98)
SAO2 % BLDCOA: 99.9 % (ref 94–98)
SODIUM SERPL-SCNC: 151 MMOL/L (ref 136–145)
SODIUM SERPL-SCNC: 167 MMOL/L (ref 136–145)
SODIUM SERPL-SCNC: 170 MMOL/L (ref 136–145)
SODIUM SERPL-SCNC: 175 MMOL/L (ref 136–145)
SODIUM SERPL-SCNC: 178 MMOL/L (ref 136–145)
SODIUM UR-SCNC: <20 MMOL/L
T4 FREE SERPL-MCNC: 0.47 NG/DL (ref 0.93–1.7)
TRIGL SERPL-MCNC: 1700 MG/DL (ref 0–150)
TSH SERPL DL<=0.05 MIU/L-ACNC: 4.36 UIU/ML (ref 0.27–4.2)
UPPER ARTERIAL LEFT ARM BRACHIAL SYS MAX: 135
URATE SERPL-MCNC: 14.1 MG/DL (ref 2.4–5.7)
VENTILATOR MODE: ABNORMAL
VENTILATOR MODE: AC
VLDLC SERPL-MCNC: ABNORMAL MG/DL
VT ON VENT VENT: 450 ML
WBC NRBC COR # BLD AUTO: 13.81 10*3/MM3 (ref 3.4–10.8)

## 2024-12-19 PROCEDURE — B548ZZA ULTRASONOGRAPHY OF SUPERIOR VENA CAVA, GUIDANCE: ICD-10-PCS | Performed by: INTERNAL MEDICINE

## 2024-12-19 PROCEDURE — 93926 LOWER EXTREMITY STUDY: CPT

## 2024-12-19 PROCEDURE — C1894 INTRO/SHEATH, NON-LASER: HCPCS | Performed by: SURGERY

## 2024-12-19 PROCEDURE — 84132 ASSAY OF SERUM POTASSIUM: CPT

## 2024-12-19 PROCEDURE — 93926 LOWER EXTREMITY STUDY: CPT | Performed by: SURGERY

## 2024-12-19 PROCEDURE — 34201 REMOVAL OF ARTERY CLOT: CPT | Performed by: SURGERY

## 2024-12-19 PROCEDURE — 25010000002 DESMOPRESSIN PER 1 MCG: Performed by: INTERNAL MEDICINE

## 2024-12-19 PROCEDURE — 82330 ASSAY OF CALCIUM: CPT | Performed by: SURGERY

## 2024-12-19 PROCEDURE — 36246 INS CATH ABD/L-EXT ART 2ND: CPT | Performed by: SURGERY

## 2024-12-19 PROCEDURE — 25510000001 IOPAMIDOL PER 1 ML: Performed by: SURGERY

## 2024-12-19 PROCEDURE — 25010000002 FENTANYL CITRATE (PF) 50 MCG/ML SOLUTION

## 2024-12-19 PROCEDURE — 25010000002 LORAZEPAM PER 2 MG: Performed by: STUDENT IN AN ORGANIZED HEALTH CARE EDUCATION/TRAINING PROGRAM

## 2024-12-19 PROCEDURE — 87899 AGENT NOS ASSAY W/OPTIC: CPT | Performed by: NURSE PRACTITIONER

## 2024-12-19 PROCEDURE — 85347 COAGULATION TIME ACTIVATED: CPT

## 2024-12-19 PROCEDURE — 74018 RADEX ABDOMEN 1 VIEW: CPT

## 2024-12-19 PROCEDURE — 31500 INSERT EMERGENCY AIRWAY: CPT | Performed by: NURSE PRACTITIONER

## 2024-12-19 PROCEDURE — 85730 THROMBOPLASTIN TIME PARTIAL: CPT | Performed by: STUDENT IN AN ORGANIZED HEALTH CARE EDUCATION/TRAINING PROGRAM

## 2024-12-19 PROCEDURE — 83605 ASSAY OF LACTIC ACID: CPT | Performed by: INTERNAL MEDICINE

## 2024-12-19 PROCEDURE — 83735 ASSAY OF MAGNESIUM: CPT | Performed by: EMERGENCY MEDICINE

## 2024-12-19 PROCEDURE — 25810000003 DEXTROSE 5 % AND SODIUM CHLORIDE 0.9 % 5-0.9 % SOLUTION: Performed by: NURSE ANESTHETIST, CERTIFIED REGISTERED

## 2024-12-19 PROCEDURE — 87205 SMEAR GRAM STAIN: CPT | Performed by: INTERNAL MEDICINE

## 2024-12-19 PROCEDURE — 80050 GENERAL HEALTH PANEL: CPT | Performed by: NURSE PRACTITIONER

## 2024-12-19 PROCEDURE — 94002 VENT MGMT INPAT INIT DAY: CPT

## 2024-12-19 PROCEDURE — 25010000002 CEFTRIAXONE PER 250 MG: Performed by: NURSE PRACTITIONER

## 2024-12-19 PROCEDURE — 03HY32Z INSERTION OF MONITORING DEVICE INTO UPPER ARTERY, PERCUTANEOUS APPROACH: ICD-10-PCS | Performed by: INTERNAL MEDICINE

## 2024-12-19 PROCEDURE — 85610 PROTHROMBIN TIME: CPT | Performed by: NURSE PRACTITIONER

## 2024-12-19 PROCEDURE — 82330 ASSAY OF CALCIUM: CPT

## 2024-12-19 PROCEDURE — 80069 RENAL FUNCTION PANEL: CPT | Performed by: SURGERY

## 2024-12-19 PROCEDURE — 25010000002 MIDAZOLAM PER 1 MG

## 2024-12-19 PROCEDURE — 25010000002 VANCOMYCIN 1.75-0.9 GM/500ML-% SOLUTION: Performed by: SURGERY

## 2024-12-19 PROCEDURE — 82533 TOTAL CORTISOL: CPT | Performed by: INTERNAL MEDICINE

## 2024-12-19 PROCEDURE — 25010000002 HEPARIN (PORCINE) PER 1000 UNITS: Performed by: SURGERY

## 2024-12-19 PROCEDURE — 84100 ASSAY OF PHOSPHORUS: CPT | Performed by: EMERGENCY MEDICINE

## 2024-12-19 PROCEDURE — 84550 ASSAY OF BLOOD/URIC ACID: CPT | Performed by: INTERNAL MEDICINE

## 2024-12-19 PROCEDURE — 87186 SC STD MICRODIL/AGAR DIL: CPT | Performed by: NURSE PRACTITIONER

## 2024-12-19 PROCEDURE — 82803 BLOOD GASES ANY COMBINATION: CPT

## 2024-12-19 PROCEDURE — 85730 THROMBOPLASTIN TIME PARTIAL: CPT | Performed by: SURGERY

## 2024-12-19 PROCEDURE — 83721 ASSAY OF BLOOD LIPOPROTEIN: CPT | Performed by: NURSE PRACTITIONER

## 2024-12-19 PROCEDURE — 80307 DRUG TEST PRSMV CHEM ANLYZR: CPT | Performed by: INTERNAL MEDICINE

## 2024-12-19 PROCEDURE — 93922 UPR/L XTREMITY ART 2 LEVELS: CPT

## 2024-12-19 PROCEDURE — 82948 REAGENT STRIP/BLOOD GLUCOSE: CPT

## 2024-12-19 PROCEDURE — 93922 UPR/L XTREMITY ART 2 LEVELS: CPT | Performed by: SURGERY

## 2024-12-19 PROCEDURE — 76705 ECHO EXAM OF ABDOMEN: CPT

## 2024-12-19 PROCEDURE — 84439 ASSAY OF FREE THYROXINE: CPT | Performed by: INTERNAL MEDICINE

## 2024-12-19 PROCEDURE — 94799 UNLISTED PULMONARY SVC/PX: CPT

## 2024-12-19 PROCEDURE — 87449 NOS EACH ORGANISM AG IA: CPT | Performed by: NURSE PRACTITIONER

## 2024-12-19 PROCEDURE — 87205 SMEAR GRAM STAIN: CPT | Performed by: NURSE PRACTITIONER

## 2024-12-19 PROCEDURE — 85014 HEMATOCRIT: CPT

## 2024-12-19 PROCEDURE — 83935 ASSAY OF URINE OSMOLALITY: CPT | Performed by: INTERNAL MEDICINE

## 2024-12-19 PROCEDURE — 99223 1ST HOSP IP/OBS HIGH 75: CPT | Performed by: SURGERY

## 2024-12-19 PROCEDURE — 4A133J1 MONITORING OF ARTERIAL PULSE, PERIPHERAL, PERCUTANEOUS APPROACH: ICD-10-PCS | Performed by: INTERNAL MEDICINE

## 2024-12-19 PROCEDURE — C1769 GUIDE WIRE: HCPCS | Performed by: SURGERY

## 2024-12-19 PROCEDURE — 94761 N-INVAS EAR/PLS OXIMETRY MLT: CPT

## 2024-12-19 PROCEDURE — 87070 CULTURE OTHR SPECIMN AEROBIC: CPT | Performed by: NURSE PRACTITIONER

## 2024-12-19 PROCEDURE — 82803 BLOOD GASES ANY COMBINATION: CPT | Performed by: NURSE PRACTITIONER

## 2024-12-19 PROCEDURE — 85730 THROMBOPLASTIN TIME PARTIAL: CPT | Performed by: NURSE PRACTITIONER

## 2024-12-19 PROCEDURE — C1752 CATH,HEMODIALYSIS,SHORT-TERM: HCPCS

## 2024-12-19 PROCEDURE — 85384 FIBRINOGEN ACTIVITY: CPT | Performed by: STUDENT IN AN ORGANIZED HEALTH CARE EDUCATION/TRAINING PROGRAM

## 2024-12-19 PROCEDURE — 76775 US EXAM ABDO BACK WALL LIM: CPT

## 2024-12-19 PROCEDURE — 84300 ASSAY OF URINE SODIUM: CPT | Performed by: INTERNAL MEDICINE

## 2024-12-19 PROCEDURE — 87340 HEPATITIS B SURFACE AG IA: CPT | Performed by: INTERNAL MEDICINE

## 2024-12-19 PROCEDURE — 25010000003 DEXTROSE 5 % SOLUTION: Performed by: NURSE PRACTITIONER

## 2024-12-19 PROCEDURE — 25010000002 HYDROMORPHONE 1 MG/ML SOLUTION: Performed by: NURSE PRACTITIONER

## 2024-12-19 PROCEDURE — 25010000002 HEPARIN (PORCINE) 25000-0.45 UT/250ML-% SOLUTION: Performed by: NURSE PRACTITIONER

## 2024-12-19 PROCEDURE — 82150 ASSAY OF AMYLASE: CPT | Performed by: NURSE PRACTITIONER

## 2024-12-19 PROCEDURE — 87147 CULTURE TYPE IMMUNOLOGIC: CPT | Performed by: NURSE PRACTITIONER

## 2024-12-19 PROCEDURE — 0BH17EZ INSERTION OF ENDOTRACHEAL AIRWAY INTO TRACHEA, VIA NATURAL OR ARTIFICIAL OPENING: ICD-10-PCS | Performed by: INTERNAL MEDICINE

## 2024-12-19 PROCEDURE — 80048 BASIC METABOLIC PNL TOTAL CA: CPT | Performed by: EMERGENCY MEDICINE

## 2024-12-19 PROCEDURE — 82947 ASSAY GLUCOSE BLOOD QUANT: CPT

## 2024-12-19 PROCEDURE — 5A1955Z RESPIRATORY VENTILATION, GREATER THAN 96 CONSECUTIVE HOURS: ICD-10-PCS | Performed by: SURGERY

## 2024-12-19 PROCEDURE — C1887 CATHETER, GUIDING: HCPCS | Performed by: SURGERY

## 2024-12-19 PROCEDURE — 25010000002 HYDROMORPHONE PER 4 MG: Performed by: NURSE PRACTITIONER

## 2024-12-19 PROCEDURE — 25010000002 MIDAZOLAM PER 1 MG: Performed by: NURSE ANESTHETIST, CERTIFIED REGISTERED

## 2024-12-19 PROCEDURE — 83690 ASSAY OF LIPASE: CPT | Performed by: NURSE PRACTITIONER

## 2024-12-19 PROCEDURE — 25010000002 HEPARIN (PORCINE) PER 1000 UNITS: Performed by: NURSE ANESTHETIST, CERTIFIED REGISTERED

## 2024-12-19 PROCEDURE — 71045 X-RAY EXAM CHEST 1 VIEW: CPT

## 2024-12-19 PROCEDURE — 25010000003 DEXTROSE 5 % SOLUTION: Performed by: INTERNAL MEDICINE

## 2024-12-19 PROCEDURE — 02HV33Z INSERTION OF INFUSION DEVICE INTO SUPERIOR VENA CAVA, PERCUTANEOUS APPROACH: ICD-10-PCS | Performed by: INTERNAL MEDICINE

## 2024-12-19 PROCEDURE — 84100 ASSAY OF PHOSPHORUS: CPT | Performed by: SURGERY

## 2024-12-19 PROCEDURE — C1757 CATH, THROMBECTOMY/EMBOLECT: HCPCS | Performed by: SURGERY

## 2024-12-19 PROCEDURE — 4A133B1 MONITORING OF ARTERIAL PRESSURE, PERIPHERAL, PERCUTANEOUS APPROACH: ICD-10-PCS | Performed by: INTERNAL MEDICINE

## 2024-12-19 PROCEDURE — 83735 ASSAY OF MAGNESIUM: CPT | Performed by: SURGERY

## 2024-12-19 PROCEDURE — 80061 LIPID PANEL: CPT | Performed by: NURSE PRACTITIONER

## 2024-12-19 PROCEDURE — 25010000003 DEXTROSE 5 % SOLUTION: Performed by: SURGERY

## 2024-12-19 PROCEDURE — 25010000002 HEPARIN (PORCINE) PER 1000 UNITS: Performed by: NURSE PRACTITIONER

## 2024-12-19 PROCEDURE — 82550 ASSAY OF CK (CPK): CPT | Performed by: INTERNAL MEDICINE

## 2024-12-19 PROCEDURE — 88304 TISSUE EXAM BY PATHOLOGIST: CPT | Performed by: SURGERY

## 2024-12-19 PROCEDURE — 84295 ASSAY OF SERUM SODIUM: CPT

## 2024-12-19 PROCEDURE — 25810000003 SODIUM CHLORIDE 0.9 % SOLUTION: Performed by: NURSE ANESTHETIST, CERTIFIED REGISTERED

## 2024-12-19 PROCEDURE — 75710 ARTERY X-RAYS ARM/LEG: CPT | Performed by: SURGERY

## 2024-12-19 RX ORDER — CALCIUM CHLORIDE, MAGNESIUM CHLORIDE, SODIUM CHLORIDE, SODIUM BICARBONATE, POTASSIUM CHLORIDE AND SODIUM PHOSPHATE DIBASIC DIHYDRATE 3.68; 3.05; 6.34; 3.09; .314; .187 G/L; G/L; G/L; G/L; G/L; G/L
2000 INJECTION INTRAVENOUS CONTINUOUS
Status: DISCONTINUED | OUTPATIENT
Start: 2024-12-19 | End: 2024-12-20

## 2024-12-19 RX ORDER — LORAZEPAM 2 MG/ML
2 INJECTION INTRAMUSCULAR ONCE
Status: COMPLETED | OUTPATIENT
Start: 2024-12-19 | End: 2024-12-19

## 2024-12-19 RX ORDER — DEXTROSE MONOHYDRATE 50 MG/ML
150 INJECTION, SOLUTION INTRAVENOUS CONTINUOUS
Status: DISCONTINUED | OUTPATIENT
Start: 2024-12-19 | End: 2024-12-20

## 2024-12-19 RX ORDER — CHLORHEXIDINE GLUCONATE ORAL RINSE 1.2 MG/ML
15 SOLUTION DENTAL EVERY 12 HOURS SCHEDULED
Status: DISCONTINUED | OUTPATIENT
Start: 2024-12-19 | End: 2024-12-27

## 2024-12-19 RX ORDER — LANSOPRAZOLE 30 MG/1
30 TABLET, ORALLY DISINTEGRATING, DELAYED RELEASE ORAL
Status: DISCONTINUED | OUTPATIENT
Start: 2024-12-20 | End: 2024-12-30

## 2024-12-19 RX ORDER — ETOMIDATE 2 MG/ML
20 INJECTION INTRAVENOUS ONCE
Status: COMPLETED | OUTPATIENT
Start: 2024-12-19 | End: 2024-12-19

## 2024-12-19 RX ORDER — DEXTROSE MONOHYDRATE AND SODIUM CHLORIDE 5; .9 G/100ML; G/100ML
INJECTION, SOLUTION INTRAVENOUS CONTINUOUS PRN
Status: DISCONTINUED | OUTPATIENT
Start: 2024-12-19 | End: 2024-12-19 | Stop reason: SURG

## 2024-12-19 RX ORDER — ETOMIDATE 2 MG/ML
INJECTION INTRAVENOUS
Status: COMPLETED
Start: 2024-12-19 | End: 2024-12-19

## 2024-12-19 RX ORDER — HEPARIN SODIUM 10000 [USP'U]/100ML
16.9 INJECTION, SOLUTION INTRAVENOUS
Status: DISCONTINUED | OUTPATIENT
Start: 2024-12-19 | End: 2024-12-31

## 2024-12-19 RX ORDER — DEXTROSE MONOHYDRATE 50 MG/ML
999 INJECTION, SOLUTION INTRAVENOUS ONCE
Status: COMPLETED | OUTPATIENT
Start: 2024-12-19 | End: 2024-12-19

## 2024-12-19 RX ORDER — QUETIAPINE FUMARATE 25 MG/1
25 TABLET, FILM COATED ORAL EVERY 12 HOURS SCHEDULED
Status: DISCONTINUED | OUTPATIENT
Start: 2024-12-19 | End: 2024-12-25

## 2024-12-19 RX ORDER — MIDAZOLAM HYDROCHLORIDE 1 MG/ML
2 INJECTION, SOLUTION INTRAMUSCULAR; INTRAVENOUS ONCE
Status: COMPLETED | OUTPATIENT
Start: 2024-12-19 | End: 2024-12-19

## 2024-12-19 RX ORDER — NOREPINEPHRINE BITARTRATE 0.03 MG/ML
.02-.5 INJECTION, SOLUTION INTRAVENOUS
Status: DISCONTINUED | OUTPATIENT
Start: 2024-12-19 | End: 2024-12-19

## 2024-12-19 RX ORDER — LORAZEPAM 2 MG/ML
1 INJECTION INTRAMUSCULAR EVERY 4 HOURS PRN
Status: DISCONTINUED | OUTPATIENT
Start: 2024-12-19 | End: 2024-12-23

## 2024-12-19 RX ORDER — SODIUM CHLORIDE 9 MG/ML
INJECTION, SOLUTION INTRAVENOUS CONTINUOUS PRN
Status: DISCONTINUED | OUTPATIENT
Start: 2024-12-19 | End: 2024-12-19 | Stop reason: SURG

## 2024-12-19 RX ORDER — FENTANYL CITRATE 50 UG/ML
INJECTION, SOLUTION INTRAMUSCULAR; INTRAVENOUS
Status: COMPLETED
Start: 2024-12-19 | End: 2024-12-20

## 2024-12-19 RX ORDER — HEPARIN SODIUM 1000 [USP'U]/ML
INJECTION, SOLUTION INTRAVENOUS; SUBCUTANEOUS AS NEEDED
Status: DISCONTINUED | OUTPATIENT
Start: 2024-12-19 | End: 2024-12-19 | Stop reason: SURG

## 2024-12-19 RX ORDER — ROCURONIUM BROMIDE 10 MG/ML
INJECTION, SOLUTION INTRAVENOUS AS NEEDED
Status: DISCONTINUED | OUTPATIENT
Start: 2024-12-19 | End: 2024-12-19 | Stop reason: SURG

## 2024-12-19 RX ORDER — DEXMEDETOMIDINE HYDROCHLORIDE 4 UG/ML
.2-1.5 INJECTION, SOLUTION INTRAVENOUS
Status: DISCONTINUED | OUTPATIENT
Start: 2024-12-19 | End: 2024-12-21

## 2024-12-19 RX ORDER — VANCOMYCIN 1.75 GRAM/500 ML IN 0.9 % SODIUM CHLORIDE INTRAVENOUS
1750 ONCE
Status: COMPLETED | OUTPATIENT
Start: 2024-12-19 | End: 2024-12-20

## 2024-12-19 RX ORDER — FENTANYL CITRATE-0.9 % NACL/PF 10 MCG/ML
50-300 PLASTIC BAG, INJECTION (ML) INTRAVENOUS
Status: DISCONTINUED | OUTPATIENT
Start: 2024-12-19 | End: 2024-12-21

## 2024-12-19 RX ORDER — HEPARIN SODIUM 1000 [USP'U]/ML
INJECTION, SOLUTION INTRAVENOUS; SUBCUTANEOUS AS NEEDED
Status: DISCONTINUED | OUTPATIENT
Start: 2024-12-19 | End: 2024-12-20

## 2024-12-19 RX ORDER — FENTANYL CITRATE 50 UG/ML
100 INJECTION, SOLUTION INTRAMUSCULAR; INTRAVENOUS ONCE
Status: COMPLETED | OUTPATIENT
Start: 2024-12-19 | End: 2024-12-19

## 2024-12-19 RX ORDER — IOPAMIDOL 755 MG/ML
INJECTION, SOLUTION INTRAVASCULAR AS NEEDED
Status: DISCONTINUED | OUTPATIENT
Start: 2024-12-19 | End: 2024-12-19 | Stop reason: HOSPADM

## 2024-12-19 RX ORDER — CALCIUM GLUCONATE 20 MG/ML
2000 INJECTION, SOLUTION INTRAVENOUS EVERY 8 HOURS
Status: DISPENSED | OUTPATIENT
Start: 2024-12-19 | End: 2024-12-20

## 2024-12-19 RX ORDER — MIDAZOLAM HYDROCHLORIDE 1 MG/ML
INJECTION, SOLUTION INTRAMUSCULAR; INTRAVENOUS
Status: COMPLETED
Start: 2024-12-19 | End: 2024-12-19

## 2024-12-19 RX ORDER — DEXMEDETOMIDINE HYDROCHLORIDE 4 UG/ML
.2-1.5 INJECTION, SOLUTION INTRAVENOUS
Status: DISCONTINUED | OUTPATIENT
Start: 2024-12-19 | End: 2024-12-19

## 2024-12-19 RX ORDER — MIDAZOLAM HYDROCHLORIDE 1 MG/ML
INJECTION, SOLUTION INTRAMUSCULAR; INTRAVENOUS AS NEEDED
Status: DISCONTINUED | OUTPATIENT
Start: 2024-12-19 | End: 2024-12-19 | Stop reason: SURG

## 2024-12-19 RX ORDER — FENTANYL CITRATE 50 UG/ML
INJECTION, SOLUTION INTRAMUSCULAR; INTRAVENOUS
Status: COMPLETED
Start: 2024-12-19 | End: 2024-12-19

## 2024-12-19 RX ADMIN — QUETIAPINE FUMARATE 25 MG: 25 TABLET ORAL at 12:56

## 2024-12-19 RX ADMIN — HEPARIN SODIUM 5000 UNITS: 5000 INJECTION INTRAVENOUS; SUBCUTANEOUS at 06:05

## 2024-12-19 RX ADMIN — Medication 50 MCG/HR: at 11:42

## 2024-12-19 RX ADMIN — MIDAZOLAM HYDROCHLORIDE 2 MG: 1 INJECTION, SOLUTION INTRAMUSCULAR; INTRAVENOUS at 11:40

## 2024-12-19 RX ADMIN — CEFTRIAXONE SODIUM 2000 MG: 2 INJECTION, POWDER, FOR SOLUTION INTRAMUSCULAR; INTRAVENOUS at 15:42

## 2024-12-19 RX ADMIN — ETOMIDATE 20 MG: 2 INJECTION INTRAVENOUS at 11:17

## 2024-12-19 RX ADMIN — DEXMEDETOMIDINE HYDROCHLORIDE IN SODIUM CHLORIDE 0.2 MCG/KG/HR: 4 INJECTION INTRAVENOUS at 01:39

## 2024-12-19 RX ADMIN — HYDROMORPHONE HYDROCHLORIDE 0.5 MG: 1 INJECTION, SOLUTION INTRAMUSCULAR; INTRAVENOUS; SUBCUTANEOUS at 00:23

## 2024-12-19 RX ADMIN — CALCIUM CHLORIDE, MAGNESIUM CHLORIDE, SODIUM CHLORIDE, SODIUM BICARBONATE, POTASSIUM CHLORIDE AND SODIUM PHOSPHATE DIBASIC DIHYDRATE 2000 ML/HR: 3.68; 3.05; 6.34; 3.09; .314; .187 INJECTION INTRAVENOUS at 20:45

## 2024-12-19 RX ADMIN — LORAZEPAM 1 MG: 2 INJECTION INTRAMUSCULAR; INTRAVENOUS at 08:40

## 2024-12-19 RX ADMIN — FAMOTIDINE 20 MG: 10 INJECTION INTRAVENOUS at 08:08

## 2024-12-19 RX ADMIN — LORAZEPAM 2 MG: 2 INJECTION INTRAMUSCULAR; INTRAVENOUS at 00:45

## 2024-12-19 RX ADMIN — INSULIN HUMAN 3.1 UNITS/HR: 1 INJECTION, SOLUTION INTRAVENOUS at 08:42

## 2024-12-19 RX ADMIN — MUPIROCIN 1 APPLICATION: 20 OINTMENT TOPICAL at 08:08

## 2024-12-19 RX ADMIN — NOREPINEPHRINE BITARTRATE 0.06 MCG/KG/MIN: 0.03 INJECTION, SOLUTION INTRAVENOUS at 10:58

## 2024-12-19 RX ADMIN — HYDROMORPHONE HYDROCHLORIDE 0.5 MG: 1 INJECTION, SOLUTION INTRAMUSCULAR; INTRAVENOUS; SUBCUTANEOUS at 08:47

## 2024-12-19 RX ADMIN — CALCIUM CHLORIDE, MAGNESIUM CHLORIDE, SODIUM CHLORIDE, SODIUM BICARBONATE, POTASSIUM CHLORIDE AND SODIUM PHOSPHATE DIBASIC DIHYDRATE 2000 ML/HR: 3.68; 3.05; 6.34; 3.09; .314; .187 INJECTION INTRAVENOUS at 23:15

## 2024-12-19 RX ADMIN — NYSTATIN 1 APPLICATION: 100000 CREAM TOPICAL at 06:06

## 2024-12-19 RX ADMIN — ROCURONIUM BROMIDE 20 MG: 10 INJECTION, SOLUTION INTRAVENOUS at 17:45

## 2024-12-19 RX ADMIN — SODIUM CHLORIDE: 9 INJECTION, SOLUTION INTRAVENOUS at 16:32

## 2024-12-19 RX ADMIN — HEPARIN SODIUM 16.9 UNITS/KG/HR: 10000 INJECTION, SOLUTION INTRAVENOUS at 15:02

## 2024-12-19 RX ADMIN — DEXTROSE MONOHYDRATE AND SODIUM CHLORIDE 150 ML/HR: 5; .9 INJECTION, SOLUTION INTRAVENOUS at 16:24

## 2024-12-19 RX ADMIN — DEXTROSE MONOHYDRATE 999 ML/HR: 50 INJECTION, SOLUTION INTRAVENOUS at 00:45

## 2024-12-19 RX ADMIN — NYSTATIN 1 APPLICATION: 100000 CREAM TOPICAL at 14:26

## 2024-12-19 RX ADMIN — FENTANYL CITRATE 100 MCG: 50 INJECTION, SOLUTION INTRAMUSCULAR; INTRAVENOUS at 11:17

## 2024-12-19 RX ADMIN — HEPARIN SODIUM 7000 UNITS: 1000 INJECTION INTRAVENOUS; SUBCUTANEOUS at 16:55

## 2024-12-19 RX ADMIN — IPRATROPIUM BROMIDE AND ALBUTEROL SULFATE 3 ML: .5; 3 SOLUTION RESPIRATORY (INHALATION) at 12:37

## 2024-12-19 RX ADMIN — CALCIUM CHLORIDE, MAGNESIUM CHLORIDE, SODIUM CHLORIDE, SODIUM BICARBONATE, POTASSIUM CHLORIDE AND SODIUM PHOSPHATE DIBASIC DIHYDRATE 2000 ML/HR: 3.68; 3.05; 6.34; 3.09; .314; .187 INJECTION INTRAVENOUS at 23:14

## 2024-12-19 RX ADMIN — CALCIUM CHLORIDE, MAGNESIUM CHLORIDE, SODIUM CHLORIDE, SODIUM BICARBONATE, POTASSIUM CHLORIDE AND SODIUM PHOSPHATE DIBASIC DIHYDRATE 2000 ML/HR: 3.68; 3.05; 6.34; 3.09; .314; .187 INJECTION INTRAVENOUS at 14:26

## 2024-12-19 RX ADMIN — MIDAZOLAM 2 MG: 1 INJECTION INTRAMUSCULAR; INTRAVENOUS at 11:40

## 2024-12-19 RX ADMIN — DESMOPRESSIN ACETATE 2 MCG: 4 INJECTION, SOLUTION INTRAVENOUS; SUBCUTANEOUS at 11:58

## 2024-12-19 RX ADMIN — Medication 1750 MG: at 22:52

## 2024-12-19 RX ADMIN — Medication 0.16 MCG/KG/MIN: at 16:24

## 2024-12-19 RX ADMIN — ROCURONIUM BROMIDE 50 MG: 10 INJECTION, SOLUTION INTRAVENOUS at 17:05

## 2024-12-19 RX ADMIN — Medication 0.08 MCG/KG/MIN: at 18:43

## 2024-12-19 RX ADMIN — QUETIAPINE FUMARATE 25 MG: 25 TABLET ORAL at 22:56

## 2024-12-19 RX ADMIN — Medication 10 ML: at 08:11

## 2024-12-19 RX ADMIN — MIDAZOLAM 2 MG: 1 INJECTION INTRAMUSCULAR; INTRAVENOUS at 16:32

## 2024-12-19 RX ADMIN — DEXTROSE MONOHYDRATE 150 ML/HR: 50 INJECTION, SOLUTION INTRAVENOUS at 03:10

## 2024-12-19 RX ADMIN — ROCURONIUM BROMIDE 50 MG: 10 INJECTION, SOLUTION INTRAVENOUS at 16:35

## 2024-12-19 RX ADMIN — Medication 300 MCG/HR: at 20:49

## 2024-12-19 RX ADMIN — DEXMEDETOMIDINE HYDROCHLORIDE IN SODIUM CHLORIDE 1 MCG/KG/HR: 4 INJECTION INTRAVENOUS at 09:08

## 2024-12-19 RX ADMIN — DEXMEDETOMIDINE HYDROCHLORIDE IN SODIUM CHLORIDE 1.5 MCG/KG/HR: 4 INJECTION INTRAVENOUS at 11:46

## 2024-12-19 RX ADMIN — DEXTROSE MONOHYDRATE 150 ML/HR: 50 INJECTION, SOLUTION INTRAVENOUS at 20:51

## 2024-12-19 RX ADMIN — CHLORHEXIDINE GLUCONATE, 0.12% ORAL RINSE 15 ML: 1.2 SOLUTION DENTAL at 14:27

## 2024-12-19 RX ADMIN — CHLORHEXIDINE GLUCONATE, 0.12% ORAL RINSE 15 ML: 1.2 SOLUTION DENTAL at 21:42

## 2024-12-19 RX ADMIN — MUPIROCIN 1 APPLICATION: 20 OINTMENT TOPICAL at 21:42

## 2024-12-19 NOTE — CONSULTS
NEPHROLOGY CONSULTATION-----[facility]    [facility]  [phone]  Deidra Starr MD    Patient Care Team:  Katie Duran PA as PCP - General (Physician Assistant)  Uli Starr MD as Consulting Physician (Nephrology)    CC/REASON FOR CONSULTATION: RENAL FAILURE/HYPERNATREMIA    PHYSICIAN REQUESTING CONSULTATION:     History of Present Illness    HPI    Patient is a 54 y.o.  WF whom I was asked to see in consultation for evaluation and management of renal failure/elevated serum creatinine and hypernatremia and acidosis. Patient was admitted with    Patient denies prior knowledge of functional kidney disease, proteinuria, or hematuria. No documented NSAIDs or recent IV dye exposure. No known h/o hepatitis, TB, rheumatic fever, jaundice, SLE, bleeding/bruising disorders. Unknown if on diuretics prior to admission. Unknown if on ACE-I/ARB prior to admission. No herbal med use.    Review of Systems   Unable to perform ROS: Mental status change          Past Medical History:   Diagnosis Date    COPD (chronic obstructive pulmonary disease)     Low back pain     Migraine     Neck pain        Past Surgical History:   Procedure Laterality Date    LUNG SURGERY         Family History   Problem Relation Age of Onset    Diabetes Mother     Cancer Paternal Uncle     Cancer Paternal Grandmother     COPD Paternal Grandmother     Diabetes Paternal Grandmother        Social History     Tobacco Use    Smoking status: Every Day     Current packs/day: 0.50     Types: Cigarettes    Smokeless tobacco: Never   Vaping Use    Vaping status: Never Used   Substance Use Topics    Alcohol use: Not Currently    Drug use: Never       Home Meds:   No medications prior to admission.       Scheduled Meds:  famotidine, 20 mg, Intravenous, Daily  heparin (porcine), 5,000 Units, Subcutaneous, Q8H  mupirocin, 1 Application, Each Nare, BID  nystatin, 1  Application, Topical, Q8H  [START ON 12/25/2024] permethrin, 1 Application, Topical, Once  sodium chloride, 10 mL, Intravenous, Q12H  sodium chloride, 10 mL, Intravenous, Q12H        Continuous Infusions:  dextrose, 150 mL/hr, Last Rate: 150 mL/hr (12/19/24 0310)  dextrose 5 % and sodium chloride 0.45 %, 250 mL/hr  dextrose 5 % and sodium chloride 0.45 % with KCl 20 mEq/L, 250 mL/hr, Last Rate: 250 mL/hr (12/18/24 1625)  dextrose 5 % and sodium chloride 0.45 % with KCl 40 mEq/L, 250 mL/hr  [Held by provider] dextrose 5 % and sodium chloride 0.9 %, 125 mL/hr  [Held by provider] dextrose 5 % and sodium chloride 0.9 % with KCl 20 mEq, 125 mL/hr  [Held by provider] dextrose 5% and sodium chloride 0.9% with KCl 40 mEq/L, 125 mL/hr  insulin, 0-100 Units/hr, Last Rate: 3.1 Units/hr (12/19/24 0743)  sodium chloride 0.45 % 1,000 mL with potassium chloride 40 mEq infusion, 200 mL/hr  sodium chloride, 200 mL/hr  sodium chloride 0.45 % with KCl 20 mEq, 200 mL/hr, Last Rate: Stopped (12/18/24 1543)        PRN Meds:    acetaminophen **OR** acetaminophen    aluminum-magnesium hydroxide-simethicone    senna-docusate sodium **AND** polyethylene glycol **AND** bisacodyl **AND** bisacodyl    Calcium Replacement - Follow Nurse / BPA Driven Protocol    dextrose    dextrose    dextrose 5 % and sodium chloride 0.45 %    dextrose 5 % and sodium chloride 0.45 % with KCl 20 mEq/L    dextrose 5 % and sodium chloride 0.45 % with KCl 40 mEq/L    [Held by provider] dextrose 5 % and sodium chloride 0.9 %    [Held by provider] dextrose 5 % and sodium chloride 0.9 % with KCl 20 mEq    [Held by provider] dextrose 5% and sodium chloride 0.9% with KCl 40 mEq/L    glucagon (human recombinant)    HYDROmorphone    ipratropium-albuterol    LORazepam    Magnesium Standard Dose Replacement - Follow Nurse / BPA Driven Protocol    nitroglycerin    ondansetron ODT **OR** ondansetron    Phosphorus Replacement - Follow Nurse / BPA Driven Protocol    Potassium  "Replacement - Follow Nurse / BPA Driven Protocol    sodium chloride 0.45 % 1,000 mL with potassium chloride 40 mEq infusion    sodium chloride    sodium chloride 0.45 % with KCl 20 mEq    sodium chloride    sodium chloride    sodium chloride    sodium chloride    sodium chloride    Allergies:  Aspirin and Ibuprofen    OBJECTIVE    Vital Signs  Temp:  [98.8 °F (37.1 °C)-100 °F (37.8 °C)] 99.5 °F (37.5 °C)  Heart Rate:  [] 99  Resp:  [20-24] 22  BP: ()/(63-95) 144/84    No intake/output data recorded.  I/O last 3 completed shifts:  In: 5224 [I.V.:3724; Other:500; IV Piggyback:1000]  Out: 100 [Urine:100]    Physical Exam:  General Appearance: CONFUSED AND RESTLESS AND IN RESTRAINTS  Head: normocephalic, without obvious abnormality and atraumatic +DRY/PARCHED OP  Eyes: conjunctivae and sclerae normal and no icterus  Neck: supple and no JVD  Lungs: +SCATTERED RHONCHI  Heart: regular rhythm & normal rate and normal S1, S2 +THOMAS  Chest Wall: no abnormalities observed  Abdomen: normal bowel sounds and soft, nontender  Extremities: moves extremities well, no edema, no cyanosis +DJD  Skin: no bleeding, bruising or rash. +DECREASED SKIN TURGOR  Neurologic: Alert, and oriented. No focal deficits    Results Review:    I reviewed the patient's new clinical results.    WBC WBC   Date Value Ref Range Status   12/18/2024 14.65 (H) 3.40 - 10.80 10*3/mm3 Final      HGB Hemoglobin   Date Value Ref Range Status   12/18/2024 14.6 12.0 - 17.0 g/dL Final   12/18/2024 18.0 (H) 12.0 - 17.0 g/dL Final   12/18/2024 17.1 (H) 12.0 - 15.9 g/dL Final      HCT Hematocrit   Date Value Ref Range Status   12/18/2024 43 38 - 51 % Final   12/18/2024 53 (H) 38 - 51 % Final   12/18/2024 54.9 (H) 34.0 - 46.6 % Final      Platelets No results found for: \"LABPLAT\"   MCV MCV   Date Value Ref Range Status   12/18/2024 107.9 (H) 79.0 - 97.0 fL Final          Sodium Sodium   Date Value Ref Range Status   12/19/2024 175 (C) 136 - 145 mmol/L Final "   12/19/2024 178 (C) 136 - 145 mmol/L Final   12/18/2024 176 (C) 136 - 145 mmol/L Final   12/18/2024 172 (C) 136 - 145 mmol/L Final   12/18/2024 159 (H) 136 - 145 mmol/L Final   12/18/2024 158 (H) 136 - 145 mmol/L Final      Potassium Potassium   Date Value Ref Range Status   12/19/2024 4.5 3.5 - 5.2 mmol/L Final     Comment:     Slight hemolysis detected by analyzer. Result may be falsely elevated.   12/19/2024 4.7 3.5 - 5.2 mmol/L Final     Comment:     Specimen hemolyzed.  Result may be falsely elevated.   12/18/2024 4.4 3.5 - 5.2 mmol/L Final     Comment:     Specimen hemolyzed.  Result may be falsely elevated.   12/18/2024 3.7 3.5 - 5.2 mmol/L Final     Comment:     Specimen hemolyzed.  Result may be falsely elevated.   12/18/2024 3.8 3.5 - 5.2 mmol/L Final     Comment:     Specimen hemolyzed.  Result may be falsely elevated.   12/18/2024 3.6 3.5 - 5.2 mmol/L Final     Comment:     Slight hemolysis detected by analyzer. Result may be falsely elevated.      Chloride Chloride   Date Value Ref Range Status   12/19/2024 >140 (H) 98 - 107 mmol/L Final   12/19/2024 >140 (H) 98 - 107 mmol/L Final   12/18/2024 >140 (H) 98 - 107 mmol/L Final   12/18/2024 139 (H) 98 - 107 mmol/L Final   12/18/2024 117 (H) 98 - 107 mmol/L Final   12/18/2024 114 (H) 98 - 107 mmol/L Final      CO2 CO2   Date Value Ref Range Status   12/19/2024 2.7 (C) 22.0 - 29.0 mmol/L Final   12/19/2024 2.5 (C) 22.0 - 29.0 mmol/L Final   12/18/2024 2.2 (C) 22.0 - 29.0 mmol/L Final   12/18/2024 14.3 (L) 22.0 - 29.0 mmol/L Final   12/18/2024 10.1 (L) 22.0 - 29.0 mmol/L Final   12/18/2024 14.7 (L) 22.0 - 29.0 mmol/L Final      BUN BUN   Date Value Ref Range Status   12/19/2024 66 (H) 6 - 20 mg/dL Final   12/19/2024 69 (H) 6 - 20 mg/dL Final   12/18/2024 71 (H) 6 - 20 mg/dL Final   12/18/2024 79 (H) 6 - 20 mg/dL Final   12/18/2024 88 (H) 6 - 20 mg/dL Final   12/18/2024 88 (H) 6 - 20 mg/dL Final      Creatinine Creatinine   Date Value Ref Range Status  "  12/19/2024 1.61 (H) 0.57 - 1.00 mg/dL Final   12/19/2024 1.52 (H) 0.57 - 1.00 mg/dL Final   12/18/2024 1.35 (H) 0.60 - 1.30 mg/dL Final     Comment:     Serial Number: [id number]:  [id number]   12/18/2024 1.42 (H) 0.57 - 1.00 mg/dL Final   12/18/2024 1.47 (H) 0.57 - 1.00 mg/dL Final   12/18/2024 2.13 (H) 0.57 - 1.00 mg/dL Final   12/18/2024 1.80 (H) 0.60 - 1.30 mg/dL Final   12/18/2024 2.10 (H) 0.57 - 1.00 mg/dL Final      Calcium Calcium   Date Value Ref Range Status   12/19/2024 8.7 8.6 - 10.5 mg/dL Final   12/19/2024 9.0 8.6 - 10.5 mg/dL Final   12/18/2024 9.2 8.6 - 10.5 mg/dL Final   12/18/2024 9.9 8.6 - 10.5 mg/dL Final   12/18/2024 9.7 8.6 - 10.5 mg/dL Final   12/18/2024 10.2 8.6 - 10.5 mg/dL Final      PO4 No results found for: \"CAPO4\"   Albumin Albumin   Date Value Ref Range Status   12/19/2024 3.2 (L) 3.5 - 5.2 g/dL Final   12/18/2024 3.8 3.5 - 5.2 g/dL Final      Magnesium Magnesium   Date Value Ref Range Status   12/19/2024 2.9 (H) 1.6 - 2.6 mg/dL Final   12/19/2024 3.0 (H) 1.6 - 2.6 mg/dL Final   12/18/2024 3.1 (H) 1.6 - 2.6 mg/dL Final   12/18/2024 3.3 (H) 1.6 - 2.6 mg/dL Final   12/18/2024 2.6 1.6 - 2.6 mg/dL Final   12/18/2024 3.7 (H) 1.6 - 2.6 mg/dL Final   12/18/2024 3.7 (H) 1.6 - 2.6 mg/dL Final      Uric Acid No results found for: \"URICACID\"       Imaging Results (Last 72 Hours)       Procedure Component Value Units Date/Time    XR Abdomen KUB [[id number]] Collected: 12/19/24 0111     Updated: 12/19/24 0113    Narrative:      XR ABDOMEN KUB    Date of Exam: 12/19/2024 1:00 AM EST    Indication: ng    Comparison: None available.    Findings:  There is a feeding tube in the stomach. The gas pattern is nonspecific.      Impression:      Impression:  Feeding tube is in the stomach.          Electronically Signed: Maxwell Carrion MD    12/19/2024 1:11 AM EST    Workstation ID: [id number]    US Liver [[id number]] Collected: 12/18/24 0953     Updated: 12/18/24 0956    Narrative:      US LIVER    Date of Exam: " 12/18/2024 9:26 AM EST    Indication: Elevated LFTs.    Comparison: No comparisons available.    Technique: Grayscale and color Doppler ultrasound evaluation of the liver was performed.      Findings:  LIVER:  The liver appears increased in echogenicity.No focal lesions identified. Normal flow is present within the hepatic vasculature. The liver is enlarged measuring 19.1 cm in length. The common bile duct is normal in caliber.        Impression:      Impression:  Hepatomegaly with steatosis.        Electronically Signed: Alea Subramanian MD    12/18/2024 9:54 AM EST    Workstation ID: [id number]    CT Head Without Contrast [[id number]] Collected: 12/18/24 0807     Updated: 12/18/24 0811    Narrative:      CT HEAD WO CONTRAST    Date of Exam: 12/18/2024 7:55 AM EST    Indication: ams.    Comparison: CT head without contrast 1/9/2020.    Technique: Axial CT images were obtained of the head without contrast administration.  Coronal reconstructions were performed.  Automated exposure control and iterative reconstruction methods were used.      Findings:  Study is mildly degraded by motion. Allowing for this limitation, no gross acute intracranial hemorrhage, mass lesion, mass effect or midline shift is seen. The ventricular configuration is stable and within normal limits.. No evidence of acute or   evolving large territory infarct. Mild deep white matter hypodensities are nonspecific but favored to reflect changes of chronic microvascular disease. The paranasal sinuses and mastoid air cells are clear. The calvarium is within normal limits.      Impression:      Impression:    1. No acute intracranial findings.  2. Features suggestive of mild chronic microvascular disease.      Electronically Signed: Mariah Keenan MD    12/18/2024 8:09 AM EST    Workstation ID: [id number]    XR Chest 1 View [[id number]] Collected: 12/18/24 0752     Updated: 12/18/24 0755    Narrative:      XR CHEST 1 VW    Date of Exam: 12/18/2024 7:16 AM  EST    Indication: Assess for Fluid Overload  Assess for Fluid Overload    Comparison: 7/15/2023    Findings:  Heart size is normal. Advanced emphysema with bulla formation at the right lung apex. Stable scarring at the left upper lobe. Suture overlies the left and right lung apices. No discrete consolidation. No pneumothorax or pleural effusion. No findings of   pulmonary edema. Osseous structures appear intact.      Impression:      Impression:  1. No acute process.  2. Severe emphysema.          Electronically Signed: Gaurav Miles MD    12/18/2024 7:53 AM EST    Workstation ID: [id number]              Results for orders placed during the hospital encounter of 12/18/24    XR Abdomen KUB    Narrative  XR ABDOMEN KUB    Date of Exam: 12/19/2024 1:00 AM EST    Indication: ng    Comparison: None available.    Findings:  There is a feeding tube in the stomach. The gas pattern is nonspecific.    Impression  Impression:  Feeding tube is in the stomach.          Electronically Signed: Maxwell Carrion MD  12/19/2024 1:11 AM EST  Workstation ID: [id number]      XR Chest 1 View    Narrative  XR CHEST 1 VW    Date of Exam: 12/18/2024 7:16 AM EST    Indication: Assess for Fluid Overload  Assess for Fluid Overload    Comparison: 7/15/2023    Findings:  Heart size is normal. Advanced emphysema with bulla formation at the right lung apex. Stable scarring at the left upper lobe. Suture overlies the left and right lung apices. No discrete consolidation. No pneumothorax or pleural effusion. No findings of  pulmonary edema. Osseous structures appear intact.    Impression  Impression:  1. No acute process.  2. Severe emphysema.          Electronically Signed: Gaurav Miles MD  12/18/2024 7:53 AM EST  Workstation ID: [id number]      Results for orders placed during the hospital encounter of 07/15/23    XR Chest 1 View    Narrative  XR CHEST 1 VW    Date of Exam: 7/15/2023 12:08 PM EDT    Indication: soa    Comparison:  4/4/2023    Findings:  There is emphysematous change of the lungs. Patient is status post partial right pneumonectomy. Heart size and pulmonary vessels are within normal limits. Lungs are clear bilaterally. There is pleural scarring within the left costophrenic angle. No  evidence pneumothorax. Some minimal parenchymal scarring is seen within the left lung apex.    Impression  Impression:    1. Emphysema.  2. No acute cardiopulmonary disease.      Electronically Signed: Julio Thorpe  7/15/2023 12:26 PM EDT  Workstation ID: [id number]        Results for orders placed during the hospital encounter of 09/01/19    Duplex Venous Lower Extremity - Bilateral CAR    Interpretation Summary  · Normal bilateral lower extremity venous duplex scan.  · Vascularized lymph nodes noted bilaterally.      ASSESSMENT / PLAN      DKA (diabetic ketoacidosis)      RENAL FAILURE------Oliguric. +ARF/MARGOT with historically normal renal function. +ARF/MARGOT secondary to severe prerenal state and ATN from hypotension and Rhabdomyolysis. Hydrate aggressively. Pressor support. Will start emergent CRRT given severe hypersomolar state. Continue aggressive hydration. No NSAIDs. Renal US without obstructive uropathy. Check further urine and serum studies and follow. Dose meds for CrCl less than 10 cc/min    2. HYPERNATREMIA/HYPEROSMOLAR STATE------Insulin gtt, Hypotonic IVFs, and will start CRRT. Free water deficit of greater than 10 liters. Give DDAVP x one. Water flushes with DHT    3. ACIDOSIS----Metabolic. Elevated AGAP. Insulin gtt/IVFs. Start CRRT. Follow lactic    4. DVT PROPHYLAXIS-----On Heparin gtt    5. HYPOCALCEMIA------Replace IV and follow ionized levels    6. DM------Insulin gtt per protocol    7. ANEMIA---------Check Fe     8. OA/DJD/HYPERURICEMIA------Allopurinol . No NSAIDs    9. RHABDOMYOLYSIS-------Hydrate. CRRT. Follow CK. Check Free T4 given elevated TSH    10. GERD/PUD PROPHYLAXIS-----Renal dose adjusted Pepcid    11.  DELIRIUM/TME    12. COPD/ACUTE HYPOXIC RESPIRATORY FAILURE------May need intubated      I discussed the patient's findings and my recommendations with nursing staff and consulting provider    Will follow along closely. Thank you for allowing us to see this patient in renal consultation.    [facility]  [phone]  MD Deidra Mario MD  12/19/24  07:57 EST

## 2024-12-19 NOTE — OP NOTE
Operative Note  Location: [facility]  Date of Admission:  12/18/2024  OR Date: 12/19/2024    Pre-op Diagnosis:  Acute right lower extremity ischemia due to iliac artery thromboembolism    Post-op Diagnosis:  Acute right lower extremity ischemia due to right iliac artery thromboembolism    Procedure:   Right iliac thrombectomy via open groin incision.  Iliac and right lower extremity arteriogram with navigation and cannulation of the below-knee popliteal artery from the groin    Surgeon: Ghassan Celestin MD    Assistant: Olga Barboza [facility], Provided critical assistance in exposure, retraction, and suction that overall decrease blood loss and operative time.    Anesthesia: General    Staff:   Circulator: Breanna Frederick RN  Radiology Technologist: Darinel De La Cruz RRT; Jyothi Roach  Scrub Person: Jojo Davison; Fany Stevens  Assistant: Olga Barboza CSA  was responsible for performing the following activities: Retraction, Suction, Irrigation, and Placing Dressing and her skilled assistance was necessary for the success of this case.    Estimated Blood Loss: 100 mL    Specimen: Right iliofemoral thrombus for permanent histology    Complications: None immediate    Findings: Occlusive clot in iliac artery.  Excellent inflow and normal iliac arteriogram.  Persistent mottling of RLE despite patent femoral vessels.  Arteriogram demonstrates patency all the way down with sluggish flow in tibial arteries, but no thrombus.  Spasm in posterior tibial artery from thrombectomy catheter.    Implants:   Implant Name Type Inv. Item Serial No.  Lot No. LRB No. Used Action   CLIPAPPLR M/ ENDO LIGACLIP 9 3/8IN SM - [id number] Implant CLIPAPPLR M/ ENDO LIGACLIP 9 3/8IN   [employer]  DIV [employer] J AND J [id number] 1 Implanted     Indications: 54-year-old woman critically ill in intensive care unit with new diagnosis of diabetes and now with DKA.  Intubated today, started on CRRT.  Developed acute RLE  ischemia, with no Doppler signals in pedal, popliteal or right femoral artery.  ABIs right 0, left normal.  Duplex with patent external iliac, femoral-popliteal segments, but reduced signal velocity.  Submits now for thrombectomy.  Will forego preoperative imaging in favor of intraoperative imaging.       Procedure:  The patient was brought directly from the intensive care unit to the operating room.  The patient was on scheduled antibiotics.  General anesthesia was induced.  The right and left groins were shaven, and then washed with Hibiclens.  The groins and right lower extremity were then prepared with ChloraPrep and draped in a sterile fashion.  An oblique right inguinal incision was made and carried through the subcutaneous tissue.  The inguinal ligament was exposed and the femoral sheath was opened to reveal a normal appearing but somewhat small common femoral artery, no pulse.  The femoral artery was isolated and the patient was given an additional dose of heparin, given a subtherapeutic preoperative ACT.  Femoral arteries clamped and transverse arteriotomy made.  A single pass of an embolectomy catheter distally revealed no thrombus or obstruction.  The catheter was passed proximally with return of a large amount of acute thrombus and restoration of pulsatile inflow.  An additional catheter passage produced no more clot.  An 8 Arabic sheath was preloaded onto an angled Glidewire and the Glidewire and sheath combination was passed retrograde into the right external iliac artery where its position was held in place with a small Tad shunt clamp.  A gentle arteriogram was performed, demonstrating normal-appearing common, external and internal iliac arteries, with no residual thrombus.  The sheath was removed and the artery reclamped.  The transverse arteriotomy was closed with continuous Prolene suture.  Following restoration of flow, an excellent pulse was palpated in the common femoral artery.   Continuous-wave Doppler signals demonstrated excellent flow in the superficial and profunda femoris arteries, but with the passage of time, the leg remained mottled, and there were no Doppler signals in the pedal arteries.  In the ischemic time of less than 6 hours, I judged that fasciotomy was not indicated.  After a time, I elected to perform a runoff arteriogram.  The common femoral artery was cannulated with a micropuncture kit oriented antegrade with respect to direction of flow.  A runoff arteriogram demonstrated widely patent femoral-popliteal segment with rapid flow to the tibial arteries.  To demonstrate tibial artery anatomy as well as possible, I passed the Glidewire down the micropuncture catheter and exchanged the micropuncture catheter for a 65 cm Navicross catheter.  The catheter was advanced over the wire into the below-knee popliteal artery.  The tibial arteries were patent, with segmental spasm in the posterior tibial artery.  Tibial artery flow was sluggish however.  Having not identified any residual thrombus, the Navicross catheter was removed and I repaired the puncture site with a 6-0 Prolene figure-of-eight suture.  Hemostasis was complete.  Platelet rich plasma was instilled into the wound, and the wound was closed in layers with Vicryl sutures.  Platelet poor plasma was instilled and the skin was closed with staples.  A sterile dressing was applied.  At its conclusion the patient had tolerated the procedure well, and without apparent complications.  Sponge and needle counts were correct.  The patient was returned to the intensive care unit in critical but stable condition.    Ghassan Celestin MD     12/19/2024, 18:18 EST

## 2024-12-19 NOTE — PROCEDURES
"Non-Tunneled Catheter    Date/Time: 12/19/2024 11:40 AM    Performed by: Charly Hernandez APRN  Authorized by: Charly Hernandez APRN  Consent: Written consent obtained.  Risks and benefits: risks, benefits and alternatives were discussed  Consent given by: Next of kin.  Patient understanding: patient states understanding of the procedure being performed  Patient consent: the patient's understanding of the procedure matches consent given  Procedure consent: procedure consent matches procedure scheduled  Relevant documents: relevant documents present and verified  Test results: test results available and properly labeled  Site marked: the operative site was marked  Imaging studies: imaging studies available  Required items: required blood products, implants, devices, and special equipment available  Patient identity confirmed: arm band, hospital-assigned identification number and anonymous protocol, patient vented/unresponsive  Time out: Immediately prior to procedure a \"time out\" was called to verify the correct patient, procedure, equipment, support staff and site/side marked as required.  Indications: vascular access  Anesthesia: local infiltration    Anesthesia:  Local Anesthetic: lidocaine 1% without epinephrine  Anesthetic total: 5 mL    Sedation:  Patient sedated: no    Preparation: skin prepped with ChloraPrep  Skin prep agent dried: skin prep agent completely dried prior to procedure  Sterile barriers: all five maximum sterile barriers used - cap, mask, sterile gown, sterile gloves, and large sterile sheet  Hand hygiene: hand hygiene performed prior to central venous catheter insertion  Location details: right internal jugular  Patient position: flat  Catheter type: triple lumen  Catheter size: 12 Fr (12 Fr. x 16 cm Mahurkar High Pressure Triple Lumen Catheter)  Ultrasound guidance: yes (Sterile probe cover utilized.)  Number of attempts: 1  Successful placement: yes  Post-procedure: line sutured and dressing " applied  Assessment: blood return through all ports, placement verified by x-ray and no pneumothorax on x-ray  Patient tolerance: patient tolerated the procedure well with no immediate complications        Electronically signed by NICHOLAS Chiang, 12/19/24, 2:49 PM EST.

## 2024-12-19 NOTE — PROGRESS NOTES
"Pharmacy Antimicrobial Dosing Service    Subjective:  Yarelis Jackson is a 54 y.o.female admitted with DKA. Pharmacy has been consulted to dose Vancomycin for possible PNA.    PMH: COPD, HTN, T2DM diagnosis this admission      Assessment/Plan    1. Day #1 Vancomycin: Pulse dosing d/t renal dysfxn. CRRT was started today, will plan to pulse dose for now. Loaded with vanc 1.75 g (~20 mg/kg ABW) IV x1. Obtain random levels about every 12 hours. Will push first level out to 06:00 12/20 so day shift can address. Plan to re-dose when level is expected to be < 20 mcg/mL.     2. Day #1 Ceftriaxone: 2 g IV q24h.    Will continue to monitor drug levels, renal function, culture and sensitivities, and patient clinical status.       Objective:  Relevant clinical data and objective history reviewed:  162.6 cm (64\")   88.5 kg (195 lb 1.7 oz)   Ideal body weight: 54.7 kg (120 lb 9.5 oz)  Adjusted ideal body weight: 68.2 kg (150 lb 6.4 oz)  Body mass index is 33.49 kg/m².        Results from last 7 days   Lab Units 12/19/24  1126 12/19/24  0830 12/19/24  0406   CREATININE mg/dL 1.28* 1.38* 1.61*     Estimated Creatinine Clearance: 54.7 mL/min (A) (by C-G formula based on SCr of 1.28 mg/dL (H)).  I/O last 3 completed shifts:  In: 5224 [I.V.:3724; Other:500; IV Piggyback:1000]  Out: 100 [Urine:100]    Results from last 7 days   Lab Units 12/19/24  0831 12/18/24  0504   WBC 10*3/mm3 13.81* 14.65*     Temperature    12/19/24 0400 12/19/24 0800 12/19/24 1000   Temp: 99.5 °F (37.5 °C) 99.2 °F (37.3 °C) 98.8 °F (37.1 °C)     Baseline culture/source/susceptibility:  Microbiology Results (last 10 days)       Procedure Component Value - Date/Time    Eosinophil Smear - Urine, Urine, Clean Catch [[id number]]  (Normal) Collected: 12/19/24 0905    Lab Status: Final result Specimen: Urine, Clean Catch Updated: 12/19/24 0946     Eosinophil Smear 0 % EOS/100 Cells     MRSA Screen, PCR (Inpatient) - Swab, Nares [[id number]]  (Abnormal) Collected: " 12/18/24 0839    Lab Status: Final result Specimen: Swab from Nares Updated: 12/18/24 1024     MRSA PCR MRSA Detected    Narrative:      The negative predictive value of this diagnostic test is high and should only be used to consider de-escalating anti-MRSA therapy. A positive result may indicate colonization with MRSA and must be correlated clinically.    Blood Culture - Blood, Arm, Right [[id number]]  (Normal) Collected: 12/18/24 0557    Lab Status: Preliminary result Specimen: Blood from Arm, Right Updated: 12/19/24 0615     Blood Culture No growth at 24 hours    Narrative:      Less than seven (7) mL's of blood was collected.  Insufficient quantity may yield false negative results.    Blood Culture - Blood, Arm, Right [[id number]]  (Normal) Collected: 12/18/24 0504    Lab Status: Preliminary result Specimen: Blood from Arm, Right Updated: 12/19/24 0515     Blood Culture No growth at 24 hours    Narrative:      Less than seven (7) mL's of blood was collected.  Insufficient quantity may yield false negative results.    Respiratory Panel PCR w/COVID-19(SARS-CoV-2) REBEKAH/JAN/ALFREDO/PAD/COR/VERONICA In-House, NP Swab in UTM/VTM, 2 HR TAT - Swab, Nasopharynx [[id number]]  (Normal) Collected: 12/18/24 0503    Lab Status: Final result Specimen: Swab from Nasopharynx Updated: 12/18/24 0610     ADENOVIRUS, PCR Not Detected     Coronavirus 229E Not Detected     Coronavirus HKU1 Not Detected     Coronavirus NL63 Not Detected     Coronavirus OC43 Not Detected     COVID19 Not Detected     Human Metapneumovirus Not Detected     Human Rhinovirus/Enterovirus Not Detected     Influenza A PCR Not Detected     Influenza B PCR Not Detected     Parainfluenza Virus 1 Not Detected     Parainfluenza Virus 2 Not Detected     Parainfluenza Virus 3 Not Detected     Parainfluenza Virus 4 Not Detected     RSV, PCR Not Detected     Bordetella pertussis pcr Not Detected     Bordetella parapertussis PCR Not Detected     Chlamydophila pneumoniae PCR Not  Detected     Mycoplasma pneumo by PCR Not Detected    Narrative:      In the setting of a positive respiratory panel with a viral infection PLUS a negative procalcitonin without other underlying concern for bacterial infection, consider observing off antibiotics or discontinuation of antibiotics and continue supportive care. If the respiratory panel is positive for atypical bacterial infection (Bordetella pertussis, Chlamydophila pneumoniae, or Mycoplasma pneumoniae), consider antibiotic de-escalation to target atypical bacterial infection.            Zahra Navarro RPH  12/19/24 15:57 EST

## 2024-12-19 NOTE — PROGRESS NOTES
Vascular Surgery    Call placed to patient's mother's phone at number previously documented.  Call went directly to voicemail.  Message left.

## 2024-12-19 NOTE — NURSING NOTE
Pt arrived to ICU from OR at approx 1820.   Upon arrival, BLE appeared mottled and dusky, R > L.   Per CRNA, Dr. Celestin aware of the continued mottling.     No doppler signals on RLE. Doppler signal present on LLE.     CRRT to continue.

## 2024-12-19 NOTE — ANESTHESIA POSTPROCEDURE EVALUATION
Patient: Yarelis Jackson    Procedure Summary       Date: 12/19/24 Room / Location: [facility] OR  / [facility] HYBRID OR    Anesthesia Start: 1624 Anesthesia Stop:     Procedure: FEMORAL right iliofemoral thrombectomy, arteriogram (Right: Thigh) Diagnosis:       Arterial thrombosis      (Arterial thrombosis [I74.9])    Surgeons: Ghassan Celestin MD Provider: Valdez Castillo MD    Anesthesia Type: general ASA Status: 4 - Emergent            Anesthesia Type: general    Vitals  No vitals data found for the desired time range.          Post Anesthesia Care and Evaluation    Patient location during evaluation: ICU  Patient participation: complete - patient cannot participate  Level of consciousness: obtunded/minimal responses  Pain scale: See nurse's notes for pain score.  Pain management: adequate    Airway patency: patent  Anesthetic complications: No anesthetic complications  PONV Status: none  Cardiovascular status: acceptable  Respiratory status: acceptable, ventilator and ETT  Hydration status: acceptable    Comments: Patient seen and examined postoperatively; vital signs stable; SpO2 greater than or equal to 90%; cardiopulmonary status stable; nausea/vomiting adequately controlled; pain adequately controlled; no apparent anesthesia complications; patient discharged from anesthesia care when discharge criteria were met

## 2024-12-19 NOTE — CONSULTS
Diabetes Education    Patient Name:  Yarelis Jackson  YOB: 1970  MRN: [id number]  Admit Date:  12/18/2024    Follow up note: Per nurse documentation, pt condition not appropriate for education. Will attempt follow up at later time.       Electronically signed by:  Dolly Montejo RN  12/19/24 11:34 EST

## 2024-12-19 NOTE — PROCEDURES
"Intubation    Date/Time: 12/19/2024 11:20 AM    Performed by: Charly Hernandez APRN  Authorized by: Charly Hernandez APRN  Consent: Written consent obtained.  Risks and benefits: risks, benefits and alternatives were discussed  Consent given by: Next of kin.  Patient understanding: patient states understanding of the procedure being performed  Patient consent: the patient's understanding of the procedure matches consent given  Procedure consent: procedure consent matches procedure scheduled  Relevant documents: relevant documents present and verified  Test results: test results available and properly labeled  Site marked: the operative site was marked  Imaging studies: imaging studies available  Required items: required blood products, implants, devices, and special equipment available  Patient identity confirmed: arm band, hospital-assigned identification number and anonymous protocol, patient vented/unresponsive  Time out: Immediately prior to procedure a \"time out\" was called to verify the correct patient, procedure, equipment, support staff and site/side marked as required.  Indications: airway protection  Intubation method: GlideScope.  Patient status: Obtunded.  Preoxygenation: BVM  Sedatives: etomidate, fentanyl, midazolam and see MAR for details  Paralytic: none  Laryngoscope size: Mac 3  Tube size: 8.0 mm  Tube type: cuffed  Number of attempts: 1  Cricoid pressure: no  Cords visualized: yes  Post-procedure assessment: CO2 detector  Breath sounds: equal and absent over the epigastrium  Cuff inflated: yes  ETT to lip: 21 cm  Tube secured with: ETT fonseca  Chest x-ray interpreted by me.  Chest x-ray findings: endotracheal tube in appropriate position  Patient tolerance: patient tolerated the procedure well with no immediate complications        Electronically signed by NICHOLAS Chiang, 12/19/24, 2:47 PM EST.    "

## 2024-12-19 NOTE — PLAN OF CARE
Goal Outcome Evaluation:      Patient remains in the ICU but currently is off the floor in the OR. Patient was on CRRT per MD orders prior to leaving for OR blood was returned without difficulty. Current drips was Levophed, Insulin, D5W, Heparin, Fentanyl, and Presadex. Patient has an Mikki placed in the right Radial, A right IJ with pigtail for Dialysis. Patient was sent to OR per Vascular and ICU MD. Pt mother was at bedside and received an update, Son Sukh at bedside also and was given patients 4 rings in denture cup.  Patient Currently has a pillai cath placed.

## 2024-12-19 NOTE — PLAN OF CARE
Goal Outcome Evaluation:      Pt woke up early this shift, able to follow commands and communicate pain level.    Pt became extremely restless and combative, pulling at lines. 1 dose of ativan given, pt has been subdued since. 4 point restraints added at this time.     Nephrology notified of elevated sodium. 1L D5W given. Maintenance rate infusing. Insulin infusing, BGL variable despite glucomander. Dobhoff inserted, q1h water flush of 100 mL going.    Good UOP. No BM. Contact precautions maintained.

## 2024-12-19 NOTE — ANESTHESIA PREPROCEDURE EVALUATION
Anesthesia Evaluation     NPO Solid Status: > 8 hours  NPO Liquid Status: > 8 hours           Airway   Comment: ETT in situ  Dental      Pulmonary - normal exam   (+) COPD,    ROS comment: intubated  Cardiovascular - normal exam    (+) hypertension, hyperlipidemia      Neuro/Psych  (+) headaches, numbness  GI/Hepatic/Renal/Endo    (+) diabetes mellitus (DKA) type 2 poorly controlled    Musculoskeletal     (+) neck pain  Abdominal  - normal exam    Bowel sounds: normal.   Substance History      OB/GYN          Other                    Anesthesia Plan    ASA 4 - emergent     general         Plan discussed with CRNA.    CODE STATUS:    Code Status (Patient has no pulse and is not breathing): CPR (Attempt to Resuscitate)  Medical Interventions (Patient has pulse or is breathing): Full Support

## 2024-12-19 NOTE — PROCEDURES
Insert Arterial Line    Date/Time: 12/19/2024 1:20 PM    Performed by: Charly Hernandez APRN  Authorized by: Charly Hernandez APRN    Universal Protocol:     Written consent obtained?: Yes      Risks and benefits: Risks, benefits and alternatives were discussed      Consent given by: Next of kin.    Patient states understanding of procedure being performed: Yes      Patient's understanding of procedure matches consent: Yes      Procedure consent matches procedure scheduled: Yes      Relevant documents present and verified: Yes      Test results available and properly labeled: Yes      Site marked: Yes      Imaging studies available: Yes      Required items: Required blood products, implants, devices and special equipment available      Patient identity confirmed:  Arm band, hospital-assigned identification number and anonymous protocol, patient vented/unresponsive    Time out: Immediately prior to the procedure a time out was called    A time out verifies correct patient, procedure, equipment, support staff and site/side marked as required:   Preparation:     Preparation: Patient was prepped and draped in usual sterile fashion    Indications:     Indications: multiple ABGs and hemodynamic monitoring    Location:     Location:  Right radial  Anesthesia:     Patient sedated: No    Procedure Details:     Ultrasound Guidance: yes  The ultrasound was used for evaluation of possible access sites.  Vessel patency was confirmed with the ultrasound.  Needle entry into vessel was visualized in realtime with the ultrasound.       Roman's test normal?: Yes      Needle gauge:  20    Number of attempts:  2 (1 failed attempt at left radial, accessed vessel, but was unable to float catheter into vessel.  No adverse events noted regarding patient's left hand.  No evidence of hematoma.)  Post-procedure:     Post-procedure:  Line sutured and dressing applied    Post-procedure CMS:  Unchanged     patient tolerated the procedure well  with no immediate complications      Electronically signed by NICHOLAS Chiang, 12/19/24, 2:52 PM EST.

## 2024-12-19 NOTE — PLAN OF CARE
Goal Outcome Evaluation:     Patient arrived to ICU from ED around 11:00. Family was at bedside. She was wiped down with CHG bath wipes. She is on an insulin gtt, fluids and her phos is being replaced. She is confused, not following any commands and very restless at times. Vitals have been WNL with exception of tachycardia at times.

## 2024-12-19 NOTE — CASE MANAGEMENT/SOCIAL WORK
Continued Stay Note   Cesar     Patient Name: Yarelis Jackson  MRN: [id number]  Today's Date: 12/19/2024    Admit Date: 12/18/2024    Plan: Plan to dc hoem with parents, pending clinical course.   Discharge Plan       Row Name 12/19/24 1009       Plan    Plan Plan to dc hoem with parents, pending clinical course.    Plan Comments DC Barriers: NG TF /w 100ml H2O, NPO, DKA, restraints, DDAVP, FC, 2L NC, Insulin/Dex/D5W @ 150, Nephro/Endocrine following.                 Expected Discharge Date and Time       Expected Discharge Date Expected Discharge Time    Dec 20, 2024               WAYNE Brown RN  ICU/CVU   O: [phone]  C: [phone]  [email or web address]

## 2024-12-19 NOTE — CONSULTS
Name: Yarelis Jackson ADMIT: 2024   : 1970  PCP: Katie Duran PA    MRN: [id number] LOS: 1 days   AGE/SEX: 54 y.o. female  ROOM: [address] [place] [address]    Patient Care Team:  Katie Duran PA as PCP - General (Physician Assistant)  Uli Starr MD as Consulting Physician (Nephrology)    CC: Acute RLE ischemia    History of Present Illness  54-year-old critically ill in ICU after presenting with new diagnosis diabetes and diabetic ketoacidosis.  Asked to see for abrupt change in appearance and vascular examination of the right lower extremity.  Patient intubated, sedated, unresponsive.  Unable to provide any history.    Past Medical History:   Diagnosis Date    COPD (chronic obstructive pulmonary disease)     Low back pain     Migraine     Neck pain      Past Surgical History:   Procedure Laterality Date    LUNG SURGERY       Family History   Problem Relation Age of Onset    Diabetes Mother     Cancer Paternal Uncle     Cancer Paternal Grandmother     COPD Paternal Grandmother     Diabetes Paternal Grandmother      Social History     Tobacco Use    Smoking status: Every Day     Current packs/day: 0.50     Types: Cigarettes    Smokeless tobacco: Never   Vaping Use    Vaping status: Never Used   Substance Use Topics    Alcohol use: Not Currently    Drug use: Never     No medications prior to admission.     chlorhexidine, 15 mL, Mouth/Throat, Q12H  famotidine, 20 mg, Intravenous, Daily  fentaNYL citrate (PF), , ,   heparin, 80 Units/kg, Intravenous, Once  mupirocin, 1 Application, Each Nare, BID  nystatin, 1 Application, Topical, Q8H  [START ON 2024] permethrin, 1 Application, Topical, Once  QUEtiapine, 25 mg, Nasogastric, Q12H  sodium chloride, 10 mL, Intravenous, Q12H  sodium chloride, 10 mL, Intravenous, Q12H    dexmedetomidine, 0.2-1.5 mcg/kg/hr, Last Rate: 1.5 mcg/kg/hr (24 1146)  dextrose, 150 mL/hr, Last Rate: 150 mL/hr (24 0310)  dextrose 5 % and sodium chloride 0.45  %, 250 mL/hr  dextrose 5 % and sodium chloride 0.45 % with KCl 20 mEq/L, 250 mL/hr, Last Rate: 250 mL/hr (12/18/24 1625)  dextrose 5 % and sodium chloride 0.45 % with KCl 40 mEq/L, 250 mL/hr  [Held by provider] dextrose 5 % and sodium chloride 0.9 %, 125 mL/hr  [Held by provider] dextrose 5 % and sodium chloride 0.9 % with KCl 20 mEq, 125 mL/hr  [Held by provider] dextrose 5% and sodium chloride 0.9% with KCl 40 mEq/L, 125 mL/hr  fentanyl 10 mcg/mL,  mcg/hr, Last Rate: 50 mcg/hr (12/19/24 1142)  heparin, 16.9 Units/kg/hr  insulin, 0-100 Units/hr, Last Rate: 12.1 Units/hr (12/19/24 1345)  norepinephrine, 0.02-0.5 mcg/kg/min, Last Rate: 0.16 mcg/kg/min (12/19/24 1430)  Phoxillum BK4/2.5, 2,000 mL/hr, Last Rate: 2,000 mL/hr (12/19/24 1426)  Phoxillum BK4/2.5, 2,000 mL/hr, Last Rate: 2,000 mL/hr (12/19/24 1426)  Phoxillum BK4/2.5, 2,000 mL/hr, Last Rate: 2,000 mL/hr (12/19/24 1426)  sodium chloride 0.45 % 1,000 mL with potassium chloride 40 mEq infusion, 200 mL/hr  sodium chloride, 200 mL/hr  sodium chloride 0.45 % with KCl 20 mEq, 200 mL/hr, Last Rate: Stopped (12/18/24 1543)    Aspirin and Ibuprofen    Vital Signs and Labs:  Vital Signs Patient Vitals for the past 24 hrs:   BP Temp Temp src Pulse Resp SpO2 Weight   12/19/24 1430 -- -- -- 95 -- 94 % --   12/19/24 1415 -- -- -- 90 -- 98 % --   12/19/24 1400 -- -- -- 81 -- 92 % --   12/19/24 1345 -- -- -- 85 -- 92 % --   12/19/24 1330 -- -- -- 84 -- 91 % --   12/19/24 1315 -- -- -- 80 -- 92 % --   12/19/24 1300 -- -- -- 80 -- 94 % --   12/19/24 1245 (!) 82/57 -- -- 69 -- 100 % --   12/19/24 1240 -- -- -- 67 16 100 % --   12/19/24 1237 -- -- -- 66 16 100 % --   12/19/24 1230 (!) 89/58 -- -- 65 -- 100 % --   12/19/24 1215 95/63 -- -- 64 -- 100 % --   12/19/24 1200 96/60 -- -- 64 -- 100 % --   12/19/24 1145 99/66 -- -- 64 -- 100 % --   12/19/24 1130 (!) 86/56 -- -- 64 -- 100 % --   12/19/24 1124 -- -- -- 63 16 100 % --   12/19/24 1115 98/79 -- -- 74 -- 100 % --    12/19/24 1100 (!) 81/46 -- -- 76 -- 95 % --   12/19/24 1045 (!) 76/42 -- -- 77 -- 94 % --   12/19/24 1030 (!) 80/53 -- -- 77 -- 96 % --   12/19/24 1015 90/43 -- -- 81 -- 94 % --   12/19/24 1000 (!) 86/39 98.8 °F (37.1 °C) Axillary 86 25 90 % --   12/19/24 0945 -- -- -- 89 -- 93 % --   12/19/24 0930 112/68 -- -- 91 -- 96 % --   12/19/24 0915 -- -- -- 96 -- 96 % --   12/19/24 0900 150/77 -- -- 104 -- 94 % --   12/19/24 0800 142/95 99.2 °F (37.3 °C) Axillary 99 15 96 % --   12/19/24 0700 154/84 -- -- 96 -- 99 % --   12/19/24 0600 144/84 -- -- 99 22 97 % 88.5 kg (195 lb 1.7 oz)   12/19/24 0500 148/84 -- -- 98 -- 97 % --   12/19/24 0400 131/85 99.5 °F (37.5 °C) Axillary 99 22 96 % --   12/19/24 0300 131/85 -- -- 99 -- 100 % --   12/19/24 0200 141/83 -- -- 107 24 90 % --   12/19/24 0100 -- -- -- 109 -- 92 % --   12/19/24 0000 138/91 98.9 °F (37.2 °C) Axillary 103 -- 90 % --   12/18/24 2300 -- -- -- 100 -- 99 % --   12/18/24 2230 -- -- -- -- -- 92 % --   12/18/24 2200 130/88 -- -- 103 20 97 % --   12/18/24 2100 116/78 -- -- 104 -- 93 % --   12/18/24 2000 134/95 100 °F (37.8 °C) Axillary 109 20 97 % --   12/18/24 1900 118/71 -- -- 106 -- 98 % --   12/18/24 1800 120/78 -- -- 106 -- 98 % --   12/18/24 1700 125/72 -- -- 106 -- 93 % --   12/18/24 1600 115/72 -- -- 106 20 92 % --   12/18/24 1500 117/76 -- -- 106 -- 92 % --     BMI:  Body mass index is 33.49 kg/m².    Physical Exam: BMI 33.5.  Intubated, sedated.  Full ventilatory support, moderate oxygen settings.  Levophed 0.2 mics.  Rhythm sinus, no ectopy.  No right femoral pulse.  Left femoral pulse palpated.  Pedal pulses not palpated in either extremity.  No Doppler signal in right popliteal and pedal arteries.  Good signals on left.  Right calf and thigh cyanotic and cool, left thigh and calf pink, warm.  Good capillary refill.    CBC    Results from last 7 days   Lab Units 12/19/24  0831 12/18/24  2228 12/18/24  0510 12/18/24  0504   WBC 10*3/mm3 13.81*  --   --   14.65*   HEMOGLOBIN g/dL 17.1*  --   --  17.1*   HEMOGLOBIN, POC g/dL  --  14.6 18.0*  --    PLATELETS 10*3/mm3 266  --   --  370     Cr Clearance Estimated Creatinine Clearance: 54.7 mL/min (A) (by C-G formula based on SCr of 1.28 mg/dL (H)).  Coag     HbA1C   Lab Results   Component Value Date    HGBA1C 15.80 (H) 12/18/2024     CMP   Results from last 7 days   Lab Units 12/19/24  1126 12/19/24  0830 12/19/24  0406 12/19/24  0009 12/18/24  2228 12/18/24  2111 12/18/24  1444 12/18/24  0646 12/18/24  0557 12/18/24  0510 12/18/24  0504   SODIUM mmol/L 167* 170* 175* 178*  --  176* 172*  --  159*  --  158*   POTASSIUM mmol/L 4.4 4.3 4.5 4.7  --  4.4 3.7  --  3.8  --  3.6   CHLORIDE mmol/L 136* 134* >140* >140*  --  >140* 139*  --  117*  --  114*   CO2 mmol/L 14.5* 14.9* 2.7* 2.5*  --  2.2* 14.3*  --  10.1*  --  14.7*   BUN mg/dL 55* 57* 66* 69*  --  71* 79*  --  88*  --  88*   CREATININE mg/dL 1.28* 1.38* 1.61* 1.52* 1.35* 1.42* 1.47*  --  2.13*   < > 2.10*   GLUCOSE mg/dL 203* 160* 158* 151*  --  179* 384*  --  976*  --  987*   ALBUMIN g/dL  --   --  3.2*  --   --   --   --   --   --   --  3.8   BILIRUBIN mg/dL  --   --  0.2  --   --   --   --   --   --   --  0.2   ALK PHOS U/L  --   --  145*  --   --   --   --   --   --   --  246*   AST (SGOT) U/L  --   --  64*  --   --   --   --   --   --   --  106*   ALT (SGPT) U/L  --   --  124*  --   --   --   --   --   --   --  178*   AMYLASE U/L  --   --   --  311*  --   --   --   --   --   --   --    LIPASE U/L  --   --   --  33  --   --   --   --   --   --   --    AMMONIA umol/L  --   --   --   --   --   --   --  <10*  --   --   --     < > = values in this interval not displayed.     ABG    Results from last 7 days   Lab Units 12/19/24  1354 12/18/24  2228 12/18/24  0512   PH, ARTERIAL pH units 7.242* 7.371 7.179*   PCO2, ARTERIAL mm Hg 58.9* 42.6 37.5   PO2 ART mm Hg 57.5* 64.8* 77.7*   O2 SATURATION ART % 83.6* 91.7* 91.7*   BASE EXCESS ART mmol/L -3.0* -0.7* -13.6*      Personal data review: Previous notes.    Active Hospital Problems    Diagnosis  POA    **DKA (diabetic ketoacidosis) [E11.10]  Yes    Arterial thrombosis [I74.9]  No      Resolved Hospital Problems   No resolved problems to display.     Assessment & Plan       DKA (diabetic ketoacidosis)    Arterial thrombosis    54 y.o. female who presented to the hospital with altered mental status, identified with diabetes and hemoglobin A1c of 15 with diabetic ketoacidosis.  Had severe electrolyte abnormalities and was critically ill.  Developed worsening ventilatory status today with metabolic acidosis and respiratory compensation, treated with intubation.  Arterial line and right jugular nontunneled dialysis catheter placed both on first attempt without groin manipulation.  Patient developed acute coolness and cyanosis of the right lower extremity with absent Doppler signals in right pedal, popliteal and arteries has good signals and pink, warm lower extremity on left.  Patient being heparinized.  Just now being initiated on CRRT.  Will obtain STACEY and arterial duplex scan.  Based upon involvement of only 1 limb, it appears as if this is acute right lower extremity ischemia rather than pressor-induced ischemia.    I discussed my findings and recommendations with nursing staff and consulting provider.    Ghassan Celestin MD  12/19/24, 14:31 EST    Office contact: [phone]

## 2024-12-19 NOTE — PROGRESS NOTES
Critical Care Progress Note     Yarelis Jackson : 1970 MRN:[id number] LOS:1  Rm: 2308/1     Principal Problem: DKA (diabetic ketoacidosis)     Reason for follow up: All the medical problems listed below    Summary     Yarelis Jackson is a 54 y.o. female with PMH of COPD, migraine, and obesity, and presented to the hospital for altered mental status, and was admitted with a principal diagnosis of DKA (diabetic ketoacidosis).  Patient presented and per ER documentation, patient was oriented to person and time.  Per patient's mother, patient had been feeling ill since Friday with noted polyuria and polydipsia.  It was managed by giving patient milk and a grape soft drink.  Patient became increasingly agitated, preventing treatment, and was given a dose of Geodon in the ED.     In the ED, labs were obtained with the following abnormalities: Sodium 159, chloride 117, CO2 10.1, anion gap 31.9, BUN 88, creatinine 2.13, glucose 976, alk phos 246, , , hemoglobin A1c 15.8, moderate acetone, osmolality 461, WBC 14.65 without bandemia.  UA with >1000 glucose, 15 mg/dL of ketones, moderate blood and proteinuria noted, this did not reflex to culture.  Patient had an ABG with pH 7.179, pCO2 37.5, pO2 77.7, HCO3 14, with O2 saturation 91.7%.  Patient was determined to be in DKA, and was started with IV fluid boluses per DKA protocol as well as an insulin drip.  Patient did have blood cultures obtained, but per ED provider, patient has no obvious signs or symptoms of active section, therefore no antimicrobials were started.    Patient sodium level remained persistent, and IV fluids were changed to withhold any sodium containing products.  Nephrology was consulted.  Patient also underwent placement of feeding tube for free water flush hourly overnight.  On , nephrology decided that patient would proceed with hemodialysis and requested nontunneled catheter placement.  Patient was becoming increasingly  agitated, with concerned that patient was no longer able to protect her own airway.  After discussion with patient's family, patient was intubated, nontunneled catheter placement was completed, and patient did require initiation of vasopressors.  HD was transitioned to CRRT.  Shortly after initiation of CRRT, patient developed an acute change in patient's skin color of her right lower extremity with subsequent loss of pulse.  Vascular surgery was stat consulted as well as completion of stat arterial duplex and ABIs.  Patient was started on a heparin drip.  Vascular surgery promptly evaluated patient and patient was scheduled to go to the OR for emergent thrombectomy.    Significant Events / Subjective     12/19/24 : Decision was made to proceed with hemodialysis, required initiation of Precedex due to agitation, concern for inability to protect airways, after family discussion, proceeded with intubation.  Patient was started on CRRT.  She developed an acute right lower extremity ischemic leg, and vascular surgery was consulted along with initiation of heparin drip.  Patient planned to undergo emergent thrombectomy tonight.  After patient proceeded down for emergent surgery, patient's son came and discussed with the critical care team that they would like to honor a prior wish of this patient, which was that if patient was sick enough to require surgeries and needed very focused type of care, that she would like to undergo all of this at a [facility] facility.  They have requested for patient to be transferred to [facility].  The family was informed that patient is very critically ill, and currently in preparation for planned emergent thrombectomy.  It was stated that it is not currently appropriate to proceed with trying to get patient transferred until after patient undergoes her surgical procedure, and that it would also not be in the patient's best interest to interrupt the treatment and transfer her  overnight.  It would be more prudent to transfer patient in the morning, but that we will do our best as a team to contact [facility] for transfer.  Patient's son excepted this and expressed appreciation and honoring his mother's wishes.  Endocrinology was also consulted.  Check echocardiogram.    IV fluids containing sodium were discontinued, remains on D5 with IV insulin.  Sodium level is starting to trend down 178--175--167.  Sodium HCO3 level beginning to show signs of improvement, anion gap with slow improvement.  Lipid panel reviewed with triglycerides of 1700, amylase 311, and lipase of 33, ultrasound of pancreas was without acute abnormalities.  Following intubation, patient was noted to have purulent sputum in oropharynx, with concerns for potential silent aspiration.  Sputum culture to be obtained.  Patient was initiated on ceftriaxone and vancomycin.  Night shift nurse practitioner is planning to attempt to contact [facility] transfer center, but there may be some issues with getting patient fully excepted due to the number of consulting physicians that is currently seeing this patient.    Assessment / Plan     Diabetic ketoacidosis without coma, with new onset diabetes mellitus, type 2 / Metabolic acidosis, increased anion gap  Etiology uncertain, though new onset diabetes mellitus.  A1c 15.80 on admission, no prior available.  Anion gap of 31.9 on admission.  On no home medication regimen.  Acetone moderate, checking beta hydroxybutyrate.  DKA protocol initiated with IVF and insulin gtt.    Protocol has been tapered to utilize D5 and insulin given patient's profound hypernatremia.  Endocrinology consulted.  Nephrology also consulted.     Acute Kidney Injury  Remains nonoliguric. Baseline creatinine 0.90.  Creatinine 2.10 on admission.  Likely prerenal. Possible intrinsic component due to ATN from dehydration, nausea, vomiting, and and hypotension.   C/w IV fluids as ordered.  Monitor Input/Output very closely.    Avoids NSAIDs, nephrotoxic medications, and hypotension.  Nephrology consulted, planned initiation of CRRT.  Net IO Since Admission: 9,215 mL [12/19/24 1834]    Acute hypernatremia  Secondary to KA and dehydration  Corrected sodium 180 on admission, Osmolality 461.  Sodium is starting to have evidence of trending down, most recent is 175.  IV fluids were changed from DKA protocol to eliminate sodium containing products, with D5W being the IV fluid of choice.  Nephrology was consulted overnight, and patient underwent NG tube placement and was started on free water flush.  Planned initiation of HD, however due to vasopressor needs this was transitioned to CRRT.  Monitor and trend labs as ordered.    Septic Shock   Likely due to aspiration pneumonia.  Check sputum culture  Blood cultures-pending with no growth.  Respiratory viral panel-negative.  MRSA screen positive.  UA did not reflex to culture.  Started on ceftriaxone and vancomycin on 12/19.  On 12/19, patient began to experience hypotension that was felt to potentially be sepsis related, Levophed drip was initiated as patient had been adequately fluid resuscitated in the prior 24 hours.  Titrate vasopressors for a target MAP of 65.     Acute occlusive thrombus of the right iliac artery with limb ischemia  Shortly after patient had been lined in preparation for CRRT, patient developed sudden onset right lower extremity ischemia.  Stat arterial duplex was completed with noted right femoral, deep femoral, and popliteal arteries being patent but with very low amplitude monophasic signals, suggesting severe inflow stenosis or occlusion; no measurable Doppler flow in the anterior or posterior tibial and peroneal arteries.  Bilateral lower extremity ABIs ordered: Right STACEY critically reduced with STACEY of 0 and severe digital insufficiency; left STACEY is normal with moderate digital insufficiency.  Vascular surgery was immediately consulted at the time of the limb  ischemia finding, and he promptly presented to evaluate patient on the floor.  Once the above studies had been completed, patient was scheduled for emergent thrombectomy.  Heparin drip had been ordered stat, when the limb ischemia was first discovered.    Nontraumatic rhabdomyolysis  Initial CK 17, has increased to 1123.  Continue IV fluids as ordered.  Nephrology consulted.    Hypertriglyceridemia, concern for pancreatitis  Triglyceride 1700, amylase 311, lipase 33.  Ultrasound of pancreas ordered, and was with negative findings.  Recheck lipid panel in AM.   Avoid lipid containing substances.    Acute metabolic encephalopathy  Likely metabolic encephalopathy, secondary to DKA.  CT of the head: Without acute abnormalities.  UDS-negative.  Track marks noted on patient's right hand per nursing, checking for urine fentanyl-negative.  Hey WhatsApp 60 yes HIV-negative.  Given IV push of Ativan overnight, discussed with patient's mother, who states that patient does not have a known history of alcohol use or drug use.  Started on Precedex.     Acute respiratory failure without hypercapnia / On mechanical Ventilation  Intubated for airway protection, there was concern with patient's mentation and her inability to clear her own secretions.  Discussed with patient's family, and proceeded with intubation so that patient's care could not be delayed as patient was becoming increasingly restless and agitated.  Ventilator settings noted and adjusted as needed.   When patient is more awake, will attempt a spontaneous breathing trial. Wean PEEP and Fio2 as tolerated.  Minimize sedation and analgesia to target a RASS of 0 to -1.  Sedation with Precedex and fentanyl drips.    Cutaneous candidiasis of groin  Likely secondary to uncontrolled diabetes mellitus  Nystatin ordered     Bilateral leg rash, concern for scabies infection  Permethrin regimen ordered.  Completed first treatment on 12/18, second treatment planned on  12/25/2024.  Keep in contract precautions.     Transaminitis  US of Liver: Hepatomegaly with steatosis.  Hepatitis panel-negative.  Monitor and trend LFTs.  .     COPD: Not in exacerbation.  Duonebs ordered as needed.  Obesity: Body mass index is 35 kg/m².    Disposition: On ventilator, vasopressors, CRRT, appropriate to remain in ICU.    Code status:   Code Status (Patient has no pulse and is not breathing): CPR (Attempt to Resuscitate)  Medical Interventions (Patient has pulse or is breathing): Full Support       Nutrition:   NPO Diet NPO Type: Strict NPO   Tube Feeding: Formula: Other; : tap water flush only; Feeding Type: Continuous; Start at: Other; Other: 0; Then Advance By: Do Not Advance; Water Flush: Other; Other: 100 mL; Every: 1 hour; Water Bolus: None     VTE Prophylaxis:  Pharmacologic VTE prophylaxis orders are present.      Objective / Physical Exam     Vital signs:  Temp: 98.8 °F (37.1 °C)  BP: (!) 89/58  Heart Rate: 67  Resp: 16  SpO2: 100 %  Weight: 88.5 kg (195 lb 1.7 oz)    Admission Weight: Weight: 92.5 kg (203 lb 14.4 oz)  Current Weight: Weight: 88.5 kg (195 lb 1.7 oz)    Input/Output in last 24 hours:    Intake/Output Summary (Last 24 hours) at 12/19/2024 1336  Last data filed at 12/19/2024 1229  Gross per 24 hour   Intake 7832 ml   Output 350 ml   Net 7482 ml      Net IO Since Admission: 8,482 mL [12/19/24 1336]     Physical Exam  Vitals and nursing note reviewed.   Constitutional:       General: She is in acute distress.      Appearance: She is obese. She is ill-appearing and toxic-appearing. She is not diaphoretic.      Interventions: She is sedated, intubated and restrained.   HENT:      Head: Normocephalic and atraumatic.      Nose: Nose normal.      Comments: NGT in place.     Mouth/Throat:      Mouth: Mucous membranes are dry.      Pharynx: No oropharyngeal exudate.      Comments: ET tube in place  Eyes:      General: No scleral icterus.     Comments: Eyelids closed.    Cardiovascular:      Rate and Rhythm: Normal rate and regular rhythm.      Pulses: Normal pulses.      Heart sounds: Normal heart sounds. No murmur heard.     No gallop.   Pulmonary:      Effort: Pulmonary effort is normal. She is intubated.      Breath sounds: Normal breath sounds. No wheezing or rales.   Abdominal:      General: Bowel sounds are normal. There is no distension.      Palpations: Abdomen is soft.      Tenderness: There is no abdominal tenderness.   Musculoskeletal:      Right lower leg: No edema.      Left lower leg: No edema.   Skin:     Comments: Inflammation in groin folds and underneath abdominal fold, consistent with a yeast infection.  Bilateral lower extremities with red bumps, concern for possible scabies.  Patient assessed earlier in the shift with temperature and color being equal in bilateral lower extremities though patient was with persistent mottling.  The mottling was slightly more improved today compared to the date of her admission.  This afternoon, when called to the room due to an acute change to patient's right lower extremity, patient's extremity was cool to the touch, no dopplerable pulse, unable to assess sensation.  Patient's right lower extremity was cyanotic.   Neurological:      General: No focal deficit present.      Comments: Intubated, sedated, easily aroused, not following commands, previously moving all extremities spontaneously.   Psychiatric:      Comments: Intubated, sedated, anxious/restless at times.          Radiology and Labs     Results from last 7 days   Lab Units 12/19/24  0831 12/18/24  2228 12/18/24  0510 12/18/24  0504   WBC 10*3/mm3 13.81*  --   --  14.65*   HEMOGLOBIN g/dL 17.1*  --   --  17.1*   HEMOGLOBIN, POC g/dL  --  14.6 18.0*  --    HEMATOCRIT % 52.2*  --   --  54.9*   HEMATOCRIT POC %  --  43 53*  --    PLATELETS 10*3/mm3 266  --   --  370           Results from last 7 days   Lab Units 12/19/24  1126 12/19/24  0830 12/19/24  0406 12/19/24  0009  12/18/24 2228 12/18/24  2111   SODIUM mmol/L 167* 170* 175* 178*  --  176*   POTASSIUM mmol/L 4.4 4.3 4.5 4.7  --  4.4   CHLORIDE mmol/L 136* 134* >140* >140*  --  >140*   CO2 mmol/L 14.5* 14.9* 2.7* 2.5*  --  2.2*   BUN mg/dL 55* 57* 66* 69*  --  71*   CREATININE mg/dL 1.28* 1.38* 1.61* 1.52* 1.35* 1.42*   GLUCOSE mg/dL 203* 160* 158* 151*  --  179*   MAGNESIUM mg/dL 2.4 2.6 2.9* 3.0*  --  3.1*   PHOSPHORUS mg/dL 4.7* 3.7 6.1* 5.1*  --  2.9      Results from last 7 days   Lab Units 12/19/24  0406 12/18/24  0504   ALK PHOS U/L 145* 246*   AST (SGOT) U/L 64* 106*   ALT (SGPT) U/L 124* 178*     Results from last 7 days   Lab Units 12/18/24  2228 12/18/24  0512   PH, ARTERIAL pH units 7.371 7.179*   PCO2, ARTERIAL mm Hg 42.6 37.5   PO2 ART mm Hg 64.8* 77.7*   O2 SATURATION ART % 91.7* 91.7*   FIO2 % 21 21   HCO3 ART mmol/L 24.7 14.0*   BASE EXCESS ART mmol/L -0.7* -13.6*       XR Chest 1 View    Result Date: 12/19/2024  Impression: Interval placement of various support lines as noted. No acute process of the lung fields. Electronically Signed: Emilia Orellana MD  12/19/2024 12:27 PM EST  Workstation ID: [id number]    US Renal Bilateral    Result Date: 12/19/2024  Impression: Unremarkable renal ultrasound. Electronically Signed: Luciana Guerra MD  12/19/2024 11:22 AM EST  Workstation ID: [id number]    US Pancreas    Result Date: 12/19/2024  Impression: Negative exam. Electronically Signed: Emilia Orellana MD  12/19/2024 11:05 AM EST  Workstation ID: [id number]    XR Abdomen KUB    Result Date: 12/19/2024  Impression: Feeding tube is in the stomach. Electronically Signed: Maxwell Carrion MD  12/19/2024 1:11 AM EST  Workstation ID: [id number]    US Liver    Result Date: 12/18/2024  Impression: Hepatomegaly with steatosis. Electronically Signed: Alea Subramanian MD  12/18/2024 9:54 AM EST  Workstation ID: [id number]    CT Head Without Contrast    Result Date: 12/18/2024  Impression: 1. No acute intracranial findings. 2. Features  suggestive of mild chronic microvascular disease. Electronically Signed: Mariah Keenan MD  12/18/2024 8:09 AM EST  Workstation ID: [id number]    XR Chest 1 View    Result Date: 12/18/2024  Impression: 1. No acute process. 2. Severe emphysema. Electronically Signed: Gaurav Miles MD  12/18/2024 7:53 AM EST  Workstation ID: [id number]     Current medications     Scheduled Meds:   famotidine, 20 mg, Intravenous, Daily  fentaNYL citrate (PF), , ,   heparin (porcine), 5,000 Units, Subcutaneous, Q8H  mupirocin, 1 Application, Each Nare, BID  nystatin, 1 Application, Topical, Q8H  [START ON 12/25/2024] permethrin, 1 Application, Topical, Once  QUEtiapine, 25 mg, Nasogastric, Q12H  sodium chloride, 10 mL, Intravenous, Q12H  sodium chloride, 10 mL, Intravenous, Q12H        Continuous Infusions:   dexmedetomidine, 0.2-1.5 mcg/kg/hr, Last Rate: 1.5 mcg/kg/hr (12/19/24 1146)  dextrose, 150 mL/hr, Last Rate: 150 mL/hr (12/19/24 0310)  dextrose 5 % and sodium chloride 0.45 %, 250 mL/hr  dextrose 5 % and sodium chloride 0.45 % with KCl 20 mEq/L, 250 mL/hr, Last Rate: 250 mL/hr (12/18/24 1625)  dextrose 5 % and sodium chloride 0.45 % with KCl 40 mEq/L, 250 mL/hr  [Held by provider] dextrose 5 % and sodium chloride 0.9 %, 125 mL/hr  [Held by provider] dextrose 5 % and sodium chloride 0.9 % with KCl 20 mEq, 125 mL/hr  [Held by provider] dextrose 5% and sodium chloride 0.9% with KCl 40 mEq/L, 125 mL/hr  fentanyl 10 mcg/mL,  mcg/hr, Last Rate: 50 mcg/hr (12/19/24 1142)  insulin, 0-100 Units/hr, Last Rate: 11.3 Units/hr (12/19/24 1238)  norepinephrine, 0.02-0.5 mcg/kg/min, Last Rate: 0.2 mcg/kg/min (12/19/24 1141)  Phoxillum BK4/2.5, 2,000 mL/hr  Phoxillum BK4/2.5, 2,000 mL/hr  Phoxillum BK4/2.5, 2,000 mL/hr  sodium chloride 0.45 % 1,000 mL with potassium chloride 40 mEq infusion, 200 mL/hr  sodium chloride, 200 mL/hr  sodium chloride 0.45 % with KCl 20 mEq, 200 mL/hr, Last Rate: Stopped (12/18/24 5188)        Patient continues to be  critically ill, remains at risk of clinical deterioration or death and needed high complexity decision making. I have spent a total of 55 minutes providing critical care services to this patient including but not limited to: review of labs/ microbiology/imaging/medications, serial monitoring of vital signs, adjusting ventilator settings as needed, review of other consultant's notes, review of events in the last 24 hrs, monitoring input/output, review of treatment plan with bedside nurse, RT and other treatment team, management of life support and nutrition needs. I, as well as Dr. Rodriguez, have spoken with patient's mother and son about the plan of care and answered all questions.    Time spent in performing separately billable procedures and updating family is not included in the critical care time.       Charly Hernandez, APRN   Critical Care  12/19/24   13:36 EST

## 2024-12-19 NOTE — PROGRESS NOTES
Vascular Surgery    Lower extremity STACEY shows critical right lower extremity ischemia with STACEY of 0.  Normal perfusion on the left.  Arterial duplex with no obvious infrainguinal thrombus, but reduced waveforms and amplitudes suggesting occlusive stenosis or occlusion at the common iliac artery level.  At present her right leg is nonviable, and if her ligament dies, she would likely die as well.  She is a candidate for open right groin approach, iliofemoral thrombectomy, arteriogram, possible angioplasty and stent.  This is major surgery in a critically ill patient and there are significant risks including transportation, discontinuation of CRRT and undetermined prognosis that would have to be assumed should we choose to proceed.  I telephoned her mother Ledy Laurent at [phone] and apprised her of the nature of the problem.  She says she understands and hopes that we proceed as outlined as soon as possible.

## 2024-12-19 NOTE — CONSULTS
Diabetes Education    Patient Name:  Yarelis Jackson  YOB: 1970  MRN: [id number]  Admit Date:  12/18/2024        MD consult for [facility], admission blood sugar 987, and on 12/18/2024 A1c was 15.8%. Per nurse patient not appropriate for education at this time. Will follow-up when patient condition appropriate.       Electronically signed by:  Gisele Rojas RN  12/18/24 20:25 EST

## 2024-12-20 ENCOUNTER — APPOINTMENT (OUTPATIENT)
Dept: CARDIOLOGY | Facility: HOSPITAL | Age: 54
End: 2024-12-20
Payer: MEDICAID

## 2024-12-20 ENCOUNTER — APPOINTMENT (OUTPATIENT)
Dept: GENERAL RADIOLOGY | Facility: HOSPITAL | Age: 54
End: 2024-12-20
Payer: MEDICAID

## 2024-12-20 ENCOUNTER — ANESTHESIA EVENT (OUTPATIENT)
Dept: PERIOP | Facility: HOSPITAL | Age: 54
End: 2024-12-20
Payer: MEDICAID

## 2024-12-20 ENCOUNTER — ANCILLARY PROCEDURE (OUTPATIENT)
Dept: PERIOP | Facility: HOSPITAL | Age: 54
End: 2024-12-20
Payer: MEDICAID

## 2024-12-20 ENCOUNTER — ANESTHESIA (OUTPATIENT)
Dept: PERIOP | Facility: HOSPITAL | Age: 54
End: 2024-12-20
Payer: MEDICAID

## 2024-12-20 LAB
ACT BLD: 181 SECONDS (ref 89–137)
ALBUMIN SERPL-MCNC: 2.5 G/DL (ref 3.5–5.2)
ALBUMIN SERPL-MCNC: 2.7 G/DL (ref 3.5–5.2)
ALBUMIN SERPL-MCNC: 2.9 G/DL (ref 3.5–5.2)
ALBUMIN/GLOB SERPL: 0.9 G/DL
ALP SERPL-CCNC: 103 U/L (ref 39–117)
ALT SERPL W P-5'-P-CCNC: 116 U/L (ref 1–33)
ANION GAP SERPL CALCULATED.3IONS-SCNC: 10.1 MMOL/L (ref 5–15)
ANION GAP SERPL CALCULATED.3IONS-SCNC: 8.7 MMOL/L (ref 5–15)
ANION GAP SERPL CALCULATED.3IONS-SCNC: 9.2 MMOL/L (ref 5–15)
ANION GAP SERPL CALCULATED.3IONS-SCNC: 9.2 MMOL/L (ref 5–15)
ANION GAP SERPL CALCULATED.3IONS-SCNC: 9.9 MMOL/L (ref 5–15)
AORTIC DIMENSIONLESS INDEX: 0.7 (DI)
APTT PPP: 112.5 SECONDS (ref 22.7–35.4)
APTT PPP: 54.6 SECONDS (ref 22.7–35.4)
ARTERIAL PATENCY WRIST A: ABNORMAL
ARTERIAL PATENCY WRIST A: ABNORMAL
AST SERPL-CCNC: 185 U/L (ref 1–32)
ATMOSPHERIC PRESS: ABNORMAL MM[HG]
ATMOSPHERIC PRESS: ABNORMAL MM[HG]
AV MEAN PRESS GRAD SYS DOP V1V2: 8 MMHG
AV VMAX SYS DOP: 196 CM/SEC
BASE DEFICIT: ABNORMAL
BASE EXCESS BLDA CALC-SCNC: -1.5 MMOL/L (ref 0–3)
BASE EXCESS BLDA CALC-SCNC: -3.1 MMOL/L (ref 0–3)
BASE EXCESS BLDA CALC-SCNC: <0 MMOL/L (ref 0–3)
BASOPHILS # BLD MANUAL: 0.26 10*3/MM3 (ref 0–0.2)
BASOPHILS NFR BLD MANUAL: 2 % (ref 0–1.5)
BDY SITE: ABNORMAL
BDY SITE: ABNORMAL
BH CV ECHO MEAS - AO MAX PG: 15.4 MMHG
BH CV ECHO MEAS - AO V2 VTI: 32.5 CM
BH CV ECHO MEAS - AVA(I,D): 2.41 CM2
BH CV ECHO MEAS - EDV(CUBED): 46.7 ML
BH CV ECHO MEAS - EDV(MOD-SP4): 65.9 ML
BH CV ECHO MEAS - EF(MOD-SP4): 66.9 %
BH CV ECHO MEAS - ESV(CUBED): 5.8 ML
BH CV ECHO MEAS - ESV(MOD-SP4): 21.8 ML
BH CV ECHO MEAS - FS: 50 %
BH CV ECHO MEAS - IVS/LVPW: 1 CM
BH CV ECHO MEAS - IVSD: 1 CM
BH CV ECHO MEAS - LAT PEAK E' VEL: 10.6 CM/SEC
BH CV ECHO MEAS - LV DIASTOLIC VOL/BSA (35-75): 34.1 CM2
BH CV ECHO MEAS - LV MASS(C)D: 107.9 GRAMS
BH CV ECHO MEAS - LV MAX PG: 7.5 MMHG
BH CV ECHO MEAS - LV MEAN PG: 4 MMHG
BH CV ECHO MEAS - LV SYSTOLIC VOL/BSA (12-30): 11.3 CM2
BH CV ECHO MEAS - LV V1 MAX: 137 CM/SEC
BH CV ECHO MEAS - LV V1 VTI: 22.6 CM
BH CV ECHO MEAS - LVIDD: 3.6 CM
BH CV ECHO MEAS - LVIDS: 1.8 CM
BH CV ECHO MEAS - LVOT AREA: 3.5 CM2
BH CV ECHO MEAS - LVOT DIAM: 2.1 CM
BH CV ECHO MEAS - LVPWD: 1 CM
BH CV ECHO MEAS - MED PEAK E' VEL: 6.7 CM/SEC
BH CV ECHO MEAS - MV A DUR: 0.1 SEC
BH CV ECHO MEAS - MV A MAX VEL: 92.2 CM/SEC
BH CV ECHO MEAS - MV DEC SLOPE: 502 CM/SEC2
BH CV ECHO MEAS - MV DEC TIME: 0.23 SEC
BH CV ECHO MEAS - MV E MAX VEL: 87 CM/SEC
BH CV ECHO MEAS - MV E/A: 0.94
BH CV ECHO MEAS - MV MAX PG: 3.9 MMHG
BH CV ECHO MEAS - MV MEAN PG: 2 MMHG
BH CV ECHO MEAS - MV P1/2T: 61.8 MSEC
BH CV ECHO MEAS - MV V2 VTI: 29.1 CM
BH CV ECHO MEAS - MVA(P1/2T): 3.6 CM2
BH CV ECHO MEAS - MVA(VTI): 2.7 CM2
BH CV ECHO MEAS - PA ACC TIME: 0.14 SEC
BH CV ECHO MEAS - PA V2 MAX: 108 CM/SEC
BH CV ECHO MEAS - RV MAX PG: 1.8 MMHG
BH CV ECHO MEAS - RV V1 MAX: 67.1 CM/SEC
BH CV ECHO MEAS - RV V1 VTI: 11.7 CM
BH CV ECHO MEAS - SV(LVOT): 78.3 ML
BH CV ECHO MEAS - SV(MOD-SP4): 44.1 ML
BH CV ECHO MEAS - SVI(LVOT): 40.5 ML/M2
BH CV ECHO MEAS - SVI(MOD-SP4): 22.8 ML/M2
BH CV ECHO MEAS - TAPSE (>1.6): 1.3 CM
BH CV ECHO MEASUREMENTS AVERAGE E/E' RATIO: 10.06
BH CV LEA RIGHT EXT ILIAC PSV: 78 CM/S
BH CV LEA RIGHT POPITEAL A  DISTAL PSV: 17 CM/S
BH CV LEA RIGHT POPITEAL A  PROX PSV: 18 CM/S
BH CV LEA RIGHT PTA MID PSV: 13 CM/S
BH CV LEA RIGHT SFA DISTAL PSV: 19 CM/S
BH CV LEA RIGHT SFA MID PSV: 16 CM/S
BH CV LEA RIGHT SFA PROX PSV: 15 CM/S
BH CV XLRA - TDI S': 13.6 CM/SEC
BILIRUB SERPL-MCNC: 0.3 MG/DL (ref 0–1.2)
BUN SERPL-MCNC: 20 MG/DL (ref 6–20)
BUN SERPL-MCNC: 22 MG/DL (ref 6–20)
BUN SERPL-MCNC: 24 MG/DL (ref 6–20)
BUN SERPL-MCNC: 25 MG/DL (ref 6–20)
BUN SERPL-MCNC: 34 MG/DL (ref 6–20)
BUN/CREAT SERPL: 14.8 (ref 7–25)
BUN/CREAT SERPL: 16.6 (ref 7–25)
BUN/CREAT SERPL: 16.7 (ref 7–25)
BUN/CREAT SERPL: 17.2 (ref 7–25)
BUN/CREAT SERPL: 21.6 (ref 7–25)
CA-I BLDA-SCNC: 0.92 MMOL/L (ref 1.12–1.32)
CA-I SERPL ISE-MCNC: 0.9 MMOL/L (ref 1.15–1.3)
CA-I SERPL ISE-MCNC: 0.92 MMOL/L (ref 1.15–1.3)
CALCIUM SPEC-SCNC: 6.7 MG/DL (ref 8.6–10.5)
CALCIUM SPEC-SCNC: 6.8 MG/DL (ref 8.6–10.5)
CALCIUM SPEC-SCNC: 6.9 MG/DL (ref 8.6–10.5)
CALCIUM SPEC-SCNC: 7.1 MG/DL (ref 8.6–10.5)
CALCIUM SPEC-SCNC: 7.1 MG/DL (ref 8.6–10.5)
CHLORIDE SERPL-SCNC: 104 MMOL/L (ref 98–107)
CHLORIDE SERPL-SCNC: 105 MMOL/L (ref 98–107)
CHLORIDE SERPL-SCNC: 111 MMOL/L (ref 98–107)
CHOLEST SERPL-MCNC: 256 MG/DL (ref 0–200)
CK SERPL-CCNC: 9475 U/L (ref 20–180)
CLUMPED PLATELETS: PRESENT
CO2 BLDA-SCNC: 23 MMOL/L (ref 23–27)
CO2 BLDA-SCNC: 27.6 MMOL/L (ref 22–29)
CO2 BLDA-SCNC: 28.2 MMOL/L (ref 22–29)
CO2 SERPL-SCNC: 19.8 MMOL/L (ref 22–29)
CO2 SERPL-SCNC: 19.9 MMOL/L (ref 22–29)
CO2 SERPL-SCNC: 20.8 MMOL/L (ref 22–29)
CO2 SERPL-SCNC: 21.1 MMOL/L (ref 22–29)
CO2 SERPL-SCNC: 22.3 MMOL/L (ref 22–29)
CREAT SERPL-MCNC: 1.11 MG/DL (ref 0.57–1)
CREAT SERPL-MCNC: 1.2 MG/DL (ref 0.57–1)
CREAT SERPL-MCNC: 1.49 MG/DL (ref 0.57–1)
CREAT SERPL-MCNC: 1.51 MG/DL (ref 0.57–1)
CREAT SERPL-MCNC: 1.98 MG/DL (ref 0.57–1)
DACRYOCYTES BLD QL SMEAR: ABNORMAL
DEPRECATED RDW RBC AUTO: 48.8 FL (ref 37–54)
DEPRECATED RDW RBC AUTO: 49.1 FL (ref 37–54)
DEPRECATED RDW RBC AUTO: 50 FL (ref 37–54)
EGFRCR SERPLBLD CKD-EPI 2021: 29.7 ML/MIN/1.73
EGFRCR SERPLBLD CKD-EPI 2021: 41.2 ML/MIN/1.73
EGFRCR SERPLBLD CKD-EPI 2021: 41.8 ML/MIN/1.73
EGFRCR SERPLBLD CKD-EPI 2021: 54.2 ML/MIN/1.73
EGFRCR SERPLBLD CKD-EPI 2021: 59.6 ML/MIN/1.73
ERYTHROCYTE [DISTWIDTH] IN BLOOD BY AUTOMATED COUNT: 13 % (ref 12.3–15.4)
ERYTHROCYTE [DISTWIDTH] IN BLOOD BY AUTOMATED COUNT: 13.1 % (ref 12.3–15.4)
ERYTHROCYTE [DISTWIDTH] IN BLOOD BY AUTOMATED COUNT: 13.2 % (ref 12.3–15.4)
GLOBULIN UR ELPH-MCNC: 2.9 GM/DL
GLUCOSE BLDC GLUCOMTR-MCNC: 109 MG/DL (ref 70–105)
GLUCOSE BLDC GLUCOMTR-MCNC: 112 MG/DL (ref 70–105)
GLUCOSE BLDC GLUCOMTR-MCNC: 117 MG/DL (ref 70–105)
GLUCOSE BLDC GLUCOMTR-MCNC: 122 MG/DL (ref 70–105)
GLUCOSE BLDC GLUCOMTR-MCNC: 125 MG/DL (ref 70–105)
GLUCOSE BLDC GLUCOMTR-MCNC: 130 MG/DL (ref 70–105)
GLUCOSE BLDC GLUCOMTR-MCNC: 131 MG/DL (ref 70–105)
GLUCOSE BLDC GLUCOMTR-MCNC: 138 MG/DL (ref 74–100)
GLUCOSE BLDC GLUCOMTR-MCNC: 141 MG/DL (ref 70–105)
GLUCOSE BLDC GLUCOMTR-MCNC: 150 MG/DL (ref 70–105)
GLUCOSE BLDC GLUCOMTR-MCNC: 154 MG/DL (ref 74–100)
GLUCOSE BLDC GLUCOMTR-MCNC: 161 MG/DL (ref 70–105)
GLUCOSE BLDC GLUCOMTR-MCNC: 168 MG/DL (ref 70–105)
GLUCOSE BLDC GLUCOMTR-MCNC: 197 MG/DL (ref 70–105)
GLUCOSE BLDC GLUCOMTR-MCNC: 203 MG/DL (ref 70–105)
GLUCOSE BLDC GLUCOMTR-MCNC: 223 MG/DL (ref 70–105)
GLUCOSE BLDC GLUCOMTR-MCNC: 42 MG/DL (ref 70–105)
GLUCOSE BLDC GLUCOMTR-MCNC: 83 MG/DL (ref 70–105)
GLUCOSE SERPL-MCNC: 116 MG/DL (ref 65–99)
GLUCOSE SERPL-MCNC: 119 MG/DL (ref 65–99)
GLUCOSE SERPL-MCNC: 142 MG/DL (ref 65–99)
GLUCOSE SERPL-MCNC: 170 MG/DL (ref 65–99)
GLUCOSE SERPL-MCNC: 217 MG/DL (ref 65–99)
HCO3 BLDA-SCNC: 21.4 MMOL/L (ref 22–26)
HCO3 BLDA-SCNC: 25.7 MMOL/L (ref 21–28)
HCO3 BLDA-SCNC: 26.4 MMOL/L (ref 21–28)
HCT VFR BLD AUTO: 33.8 % (ref 34–46.6)
HCT VFR BLD AUTO: 35.3 % (ref 34–46.6)
HCT VFR BLD AUTO: 38.5 % (ref 34–46.6)
HCT VFR BLD AUTO: 41.2 % (ref 34–46.6)
HCT VFR BLDA CALC: 42 % (ref 38–51)
HDLC SERPL-MCNC: 45 MG/DL (ref 40–60)
HEMODILUTION: NO
HEMODILUTION: NO
HGB BLD-MCNC: 11 G/DL (ref 12–15.9)
HGB BLD-MCNC: 11.5 G/DL (ref 12–15.9)
HGB BLD-MCNC: 12.1 G/DL (ref 12–15.9)
HGB BLD-MCNC: 12.9 G/DL (ref 12–15.9)
HGB BLDA-MCNC: 14.3 G/DL (ref 12–17)
INHALED O2 CONCENTRATION: 50 %
INHALED O2 CONCENTRATION: 70 %
LARGE PLATELETS: ABNORMAL
LARGE PLATELETS: ABNORMAL
LDLC SERPL CALC-MCNC: 144 MG/DL (ref 0–100)
LDLC/HDLC SERPL: 3.06 {RATIO}
LEFT ATRIUM VOLUME INDEX: 18.4 ML/M2
LYMPHOCYTES # BLD MANUAL: 3.41 10*3/MM3 (ref 0.7–3.1)
LYMPHOCYTES # BLD MANUAL: 3.88 10*3/MM3 (ref 0.7–3.1)
LYMPHOCYTES NFR BLD MANUAL: 2 % (ref 5–12)
MAGNESIUM SERPL-MCNC: 1.9 MG/DL (ref 1.6–2.6)
MAGNESIUM SERPL-MCNC: 2 MG/DL (ref 1.6–2.6)
MCH RBC QN AUTO: 31.8 PG (ref 26.6–33)
MCH RBC QN AUTO: 32 PG (ref 26.6–33)
MCH RBC QN AUTO: 33 PG (ref 26.6–33)
MCHC RBC AUTO-ENTMCNC: 31.3 G/DL (ref 31.5–35.7)
MCHC RBC AUTO-ENTMCNC: 31.4 G/DL (ref 31.5–35.7)
MCHC RBC AUTO-ENTMCNC: 32.6 G/DL (ref 31.5–35.7)
MCV RBC AUTO: 101 FL (ref 79–97)
MCV RBC AUTO: 101.4 FL (ref 79–97)
MCV RBC AUTO: 102.2 FL (ref 79–97)
METAMYELOCYTES NFR BLD MANUAL: 10 % (ref 0–0)
METAMYELOCYTES NFR BLD MANUAL: 4 % (ref 0–0)
MODALITY: ABNORMAL
MODALITY: ABNORMAL
MONOCYTES # BLD: 0.26 10*3/MM3 (ref 0.1–0.9)
MYELOCYTES NFR BLD MANUAL: 1 % (ref 0–0)
NEUTROPHILS # BLD AUTO: 11.33 10*3/MM3 (ref 1.7–7)
NEUTROPHILS # BLD AUTO: 7.24 10*3/MM3 (ref 1.7–7)
NEUTROPHILS NFR BLD MANUAL: 35 % (ref 42.7–76)
NEUTROPHILS NFR BLD MANUAL: 55 % (ref 42.7–76)
NEUTS BAND NFR BLD MANUAL: 18 % (ref 0–5)
NEUTS BAND NFR BLD MANUAL: 21 % (ref 0–5)
NEUTS VAC BLD QL SMEAR: ABNORMAL
NRBC SPEC MANUAL: 1 /100 WBC (ref 0–0.2)
NRBC SPEC MANUAL: 1 /100 WBC (ref 0–0.2)
PATHOLOGY REVIEW: YES
PCO2 BLDA: 57.4 MM HG (ref 35–48)
PCO2 BLDA: 61.7 MM HG (ref 35–48)
PCO2 BLDA: 69.1 MM HG (ref 35–45)
PEEP RESPIRATORY: 5 CM[H2O]
PEEP RESPIRATORY: 5 CM[H2O]
PH BLDA: 7.1 PH UNITS (ref 7.35–7.45)
PH BLDA: 7.23 PH UNITS (ref 7.35–7.45)
PH BLDA: 7.27 PH UNITS (ref 7.35–7.45)
PHOSPHATE SERPL-MCNC: 5.3 MG/DL (ref 2.5–4.5)
PHOSPHATE SERPL-MCNC: 5.7 MG/DL (ref 2.5–4.5)
PHOSPHATE SERPL-MCNC: 6.5 MG/DL (ref 2.5–4.5)
PHOSPHATE SERPL-MCNC: 6.7 MG/DL (ref 2.5–4.5)
PHOSPHATE SERPL-MCNC: 7.3 MG/DL (ref 2.5–4.5)
PLATELET # BLD AUTO: 156 10*3/MM3 (ref 140–450)
PLATELET # BLD AUTO: 160 10*3/MM3 (ref 140–450)
PLATELET # BLD AUTO: 175 10*3/MM3 (ref 140–450)
PMV BLD AUTO: 9.6 FL (ref 6–12)
PMV BLD AUTO: 9.8 FL (ref 6–12)
PMV BLD AUTO: 9.8 FL (ref 6–12)
PO2 BLD: 189 MM[HG] (ref 0–500)
PO2 BLD: 310 MM[HG] (ref 0–500)
PO2 BLDA: 216.7 MM HG (ref 83–108)
PO2 BLDA: 278 MM HG (ref 80–105)
PO2 BLDA: 94.5 MM HG (ref 83–108)
POIKILOCYTOSIS BLD QL SMEAR: ABNORMAL
POTASSIUM BLDA-SCNC: 4.1 MMOL/L (ref 3.5–4.9)
POTASSIUM SERPL-SCNC: 4.9 MMOL/L (ref 3.5–5.2)
POTASSIUM SERPL-SCNC: 5 MMOL/L (ref 3.5–5.2)
POTASSIUM SERPL-SCNC: 5.1 MMOL/L (ref 3.5–5.2)
POTASSIUM SERPL-SCNC: 5.1 MMOL/L (ref 3.5–5.2)
POTASSIUM SERPL-SCNC: 5.3 MMOL/L (ref 3.5–5.2)
PROT SERPL-MCNC: 5.6 G/DL (ref 6–8.5)
RBC # BLD AUTO: 3.48 10*6/MM3 (ref 3.77–5.28)
RBC # BLD AUTO: 3.81 10*6/MM3 (ref 3.77–5.28)
RBC # BLD AUTO: 4.03 10*6/MM3 (ref 3.77–5.28)
RBC MORPH BLD: NORMAL
RESPIRATORY RATE: 26
RESPIRATORY RATE: 450
SAO2 % BLDCOA: 100 % (ref 95–98)
SAO2 % BLDCOA: 96 % (ref 94–98)
SAO2 % BLDCOA: 99.6 % (ref 94–98)
SCAN SLIDE: NORMAL
SCAN SLIDE: NORMAL
SINUS: 2.7 CM
SMALL PLATELETS BLD QL SMEAR: ADEQUATE
SODIUM BLD-SCNC: 155 MMOL/L (ref 138–146)
SODIUM SERPL-SCNC: 134 MMOL/L (ref 136–145)
SODIUM SERPL-SCNC: 135 MMOL/L (ref 136–145)
SODIUM SERPL-SCNC: 142 MMOL/L (ref 136–145)
STJ: 3 CM
TOXIC GRANULATION: ABNORMAL
TRIGL SERPL-MCNC: 367 MG/DL (ref 0–150)
VANCOMYCIN SERPL-MCNC: 18.9 MCG/ML (ref 5–40)
VARIANT LYMPHS NFR BLD MANUAL: 1 % (ref 0–5)
VARIANT LYMPHS NFR BLD MANUAL: 21 % (ref 19.6–45.3)
VARIANT LYMPHS NFR BLD MANUAL: 27 % (ref 19.6–45.3)
VARIANT LYMPHS NFR BLD MANUAL: 3 % (ref 0–5)
VENTILATOR MODE: AC
VENTILATOR MODE: AC
VLDLC SERPL-MCNC: 67 MG/DL (ref 5–40)
VT ON VENT VENT: 26 ML
VT ON VENT VENT: 450 ML
WBC MORPH BLD: NORMAL
WBC NRBC COR # BLD AUTO: 10.37 10*3/MM3 (ref 3.4–10.8)
WBC NRBC COR # BLD AUTO: 12.92 10*3/MM3 (ref 3.4–10.8)
WBC NRBC COR # BLD AUTO: 15.52 10*3/MM3 (ref 3.4–10.8)

## 2024-12-20 PROCEDURE — 85014 HEMATOCRIT: CPT | Performed by: INTERNAL MEDICINE

## 2024-12-20 PROCEDURE — 85347 COAGULATION TIME ACTIVATED: CPT

## 2024-12-20 PROCEDURE — 25010000002 CALCIUM GLUCONATE 2-0.675 GM/100ML-% SOLUTION: Performed by: INTERNAL MEDICINE

## 2024-12-20 PROCEDURE — 80061 LIPID PANEL: CPT | Performed by: NURSE PRACTITIONER

## 2024-12-20 PROCEDURE — C1757 CATH, THROMBECTOMY/EMBOLECT: HCPCS | Performed by: SURGERY

## 2024-12-20 PROCEDURE — 99222 1ST HOSP IP/OBS MODERATE 55: CPT | Performed by: INTERNAL MEDICINE

## 2024-12-20 PROCEDURE — 27600 DECOMPRESSION OF LOWER LEG: CPT | Performed by: SURGERY

## 2024-12-20 PROCEDURE — 94003 VENT MGMT INPAT SUBQ DAY: CPT

## 2024-12-20 PROCEDURE — 25510000001 IOPAMIDOL PER 1 ML: Performed by: SURGERY

## 2024-12-20 PROCEDURE — 04CC0ZZ EXTIRPATION OF MATTER FROM RIGHT COMMON ILIAC ARTERY, OPEN APPROACH: ICD-10-PCS | Performed by: SURGERY

## 2024-12-20 PROCEDURE — 25010000002 CALCIUM GLUCONATE 2-0.675 GM/100ML-% SOLUTION: Performed by: SURGERY

## 2024-12-20 PROCEDURE — 25010000002 HEPARIN (PORCINE) 25000-0.45 UT/250ML-% SOLUTION: Performed by: SURGERY

## 2024-12-20 PROCEDURE — 94761 N-INVAS EAR/PLS OXIMETRY MLT: CPT

## 2024-12-20 PROCEDURE — 86038 ANTINUCLEAR ANTIBODIES: CPT | Performed by: INTERNAL MEDICINE

## 2024-12-20 PROCEDURE — 85027 COMPLETE CBC AUTOMATED: CPT | Performed by: STUDENT IN AN ORGANIZED HEALTH CARE EDUCATION/TRAINING PROGRAM

## 2024-12-20 PROCEDURE — 85613 RUSSELL VIPER VENOM DILUTED: CPT | Performed by: INTERNAL MEDICINE

## 2024-12-20 PROCEDURE — 25010000002 HEPARIN (PORCINE) PER 1000 UNITS: Performed by: NURSE ANESTHETIST, CERTIFIED REGISTERED

## 2024-12-20 PROCEDURE — 25010000002 CEFAZOLIN PER 500 MG: Performed by: SURGERY

## 2024-12-20 PROCEDURE — 25810000003 SODIUM CHLORIDE 0.9 % SOLUTION: Performed by: NURSE ANESTHETIST, CERTIFIED REGISTERED

## 2024-12-20 PROCEDURE — C1768 GRAFT, VASCULAR: HCPCS | Performed by: SURGERY

## 2024-12-20 PROCEDURE — 81241 F5 GENE: CPT | Performed by: INTERNAL MEDICINE

## 2024-12-20 PROCEDURE — 82947 ASSAY GLUCOSE BLOOD QUANT: CPT

## 2024-12-20 PROCEDURE — 80048 BASIC METABOLIC PNL TOTAL CA: CPT | Performed by: SURGERY

## 2024-12-20 PROCEDURE — 80053 COMPREHEN METABOLIC PANEL: CPT | Performed by: SURGERY

## 2024-12-20 PROCEDURE — 25810000003 SODIUM CHLORIDE 0.9 % SOLUTION: Performed by: INTERNAL MEDICINE

## 2024-12-20 PROCEDURE — 85007 BL SMEAR W/DIFF WBC COUNT: CPT | Performed by: STUDENT IN AN ORGANIZED HEALTH CARE EDUCATION/TRAINING PROGRAM

## 2024-12-20 PROCEDURE — 84295 ASSAY OF SERUM SODIUM: CPT

## 2024-12-20 PROCEDURE — 71045 X-RAY EXAM CHEST 1 VIEW: CPT

## 2024-12-20 PROCEDURE — 82803 BLOOD GASES ANY COMBINATION: CPT

## 2024-12-20 PROCEDURE — C1894 INTRO/SHEATH, NON-LASER: HCPCS | Performed by: SURGERY

## 2024-12-20 PROCEDURE — 94799 UNLISTED PULMONARY SVC/PX: CPT

## 2024-12-20 PROCEDURE — 85007 BL SMEAR W/DIFF WBC COUNT: CPT | Performed by: SURGERY

## 2024-12-20 PROCEDURE — 83735 ASSAY OF MAGNESIUM: CPT | Performed by: SURGERY

## 2024-12-20 PROCEDURE — 99024 POSTOP FOLLOW-UP VISIT: CPT | Performed by: SURGERY

## 2024-12-20 PROCEDURE — 86148 ANTI-PHOSPHOLIPID ANTIBODY: CPT | Performed by: INTERNAL MEDICINE

## 2024-12-20 PROCEDURE — 84132 ASSAY OF SERUM POTASSIUM: CPT

## 2024-12-20 PROCEDURE — 85014 HEMATOCRIT: CPT

## 2024-12-20 PROCEDURE — C1769 GUIDE WIRE: HCPCS | Performed by: SURGERY

## 2024-12-20 PROCEDURE — 25010000002 VANCOMYCIN 1.5-0.9 GM/500ML-% SOLUTION: Performed by: INTERNAL MEDICINE

## 2024-12-20 PROCEDURE — 85025 COMPLETE CBC W/AUTO DIFF WBC: CPT | Performed by: STUDENT IN AN ORGANIZED HEALTH CARE EDUCATION/TRAINING PROGRAM

## 2024-12-20 PROCEDURE — 85025 COMPLETE CBC W/AUTO DIFF WBC: CPT | Performed by: SURGERY

## 2024-12-20 PROCEDURE — C1887 CATHETER, GUIDING: HCPCS | Performed by: SURGERY

## 2024-12-20 PROCEDURE — B41F1ZZ FLUOROSCOPY OF RIGHT LOWER EXTREMITY ARTERIES USING LOW OSMOLAR CONTRAST: ICD-10-PCS | Performed by: SURGERY

## 2024-12-20 PROCEDURE — 04CM0ZZ EXTIRPATION OF MATTER FROM RIGHT POPLITEAL ARTERY, OPEN APPROACH: ICD-10-PCS | Performed by: SURGERY

## 2024-12-20 PROCEDURE — 04CK0ZZ EXTIRPATION OF MATTER FROM RIGHT FEMORAL ARTERY, OPEN APPROACH: ICD-10-PCS | Performed by: SURGERY

## 2024-12-20 PROCEDURE — 25810000003 SODIUM CHLORIDE 0.9 % SOLUTION: Performed by: SURGERY

## 2024-12-20 PROCEDURE — 82330 ASSAY OF CALCIUM: CPT | Performed by: SURGERY

## 2024-12-20 PROCEDURE — 82948 REAGENT STRIP/BLOOD GLUCOSE: CPT

## 2024-12-20 PROCEDURE — 84100 ASSAY OF PHOSPHORUS: CPT | Performed by: SURGERY

## 2024-12-20 PROCEDURE — 04UK0KZ SUPPLEMENT RIGHT FEMORAL ARTERY WITH NONAUTOLOGOUS TISSUE SUBSTITUTE, OPEN APPROACH: ICD-10-PCS | Performed by: SURGERY

## 2024-12-20 PROCEDURE — 25010000002 HYDROMORPHONE PER 4 MG: Performed by: SURGERY

## 2024-12-20 PROCEDURE — 0KNQ0ZZ RELEASE RIGHT UPPER LEG MUSCLE, OPEN APPROACH: ICD-10-PCS | Performed by: SURGERY

## 2024-12-20 PROCEDURE — 81291 MTHFR GENE: CPT | Performed by: INTERNAL MEDICINE

## 2024-12-20 PROCEDURE — 25010000003 DEXTROSE 5 % SOLUTION: Performed by: SURGERY

## 2024-12-20 PROCEDURE — 85730 THROMBOPLASTIN TIME PARTIAL: CPT | Performed by: INTERNAL MEDICINE

## 2024-12-20 PROCEDURE — 93926 LOWER EXTREMITY STUDY: CPT | Performed by: SURGERY

## 2024-12-20 PROCEDURE — 86147 CARDIOLIPIN ANTIBODY EA IG: CPT | Performed by: INTERNAL MEDICINE

## 2024-12-20 PROCEDURE — 82550 ASSAY OF CK (CPK): CPT | Performed by: INTERNAL MEDICINE

## 2024-12-20 PROCEDURE — 80202 ASSAY OF VANCOMYCIN: CPT | Performed by: SURGERY

## 2024-12-20 PROCEDURE — 35371 RECHANNELING OF ARTERY: CPT | Performed by: SURGERY

## 2024-12-20 PROCEDURE — 93306 TTE W/DOPPLER COMPLETE: CPT

## 2024-12-20 PROCEDURE — 93926 LOWER EXTREMITY STUDY: CPT

## 2024-12-20 PROCEDURE — 83520 IMMUNOASSAY QUANT NOS NONAB: CPT | Performed by: INTERNAL MEDICINE

## 2024-12-20 PROCEDURE — 85018 HEMOGLOBIN: CPT | Performed by: INTERNAL MEDICINE

## 2024-12-20 PROCEDURE — 93306 TTE W/DOPPLER COMPLETE: CPT | Performed by: STUDENT IN AN ORGANIZED HEALTH CARE EDUCATION/TRAINING PROGRAM

## 2024-12-20 PROCEDURE — 82330 ASSAY OF CALCIUM: CPT

## 2024-12-20 PROCEDURE — 25010000002 FENTANYL CITRATE (PF) 100 MCG/2ML SOLUTION: Performed by: NURSE ANESTHETIST, CERTIFIED REGISTERED

## 2024-12-20 PROCEDURE — 85210 CLOT FACTOR II PROTHROM SPEC: CPT | Performed by: INTERNAL MEDICINE

## 2024-12-20 DEVICE — LIGACLIP MCA MULTIPLE CLIP APPLIERS, 20 SMALL CLIPS
Type: IMPLANTABLE DEVICE | Site: FEMUR | Status: FUNCTIONAL
Brand: LIGACLIP

## 2024-12-20 DEVICE — PTCH VASC VASCUGUARD TPR/END 0.8X8CM STRL: Type: IMPLANTABLE DEVICE | Site: FEMUR | Status: FUNCTIONAL

## 2024-12-20 DEVICE — ABSORBABLE HEMOSTAT (OXIDIZED REGENERATED CELLULOSE, U.S.P.)
Type: IMPLANTABLE DEVICE | Site: FEMUR | Status: FUNCTIONAL
Brand: SURGICEL FIBRILLAR

## 2024-12-20 RX ORDER — NALOXONE HCL 0.4 MG/ML
0.4 VIAL (ML) INJECTION AS NEEDED
Status: DISCONTINUED | OUTPATIENT
Start: 2024-12-20 | End: 2024-12-20

## 2024-12-20 RX ORDER — HYDROMORPHONE HYDROCHLORIDE 1 MG/ML
0.5 INJECTION, SOLUTION INTRAMUSCULAR; INTRAVENOUS; SUBCUTANEOUS
Status: DISCONTINUED | OUTPATIENT
Start: 2024-12-20 | End: 2024-12-20

## 2024-12-20 RX ORDER — IOPAMIDOL 755 MG/ML
INJECTION, SOLUTION INTRAVASCULAR AS NEEDED
Status: DISCONTINUED | OUTPATIENT
Start: 2024-12-20 | End: 2024-12-20 | Stop reason: HOSPADM

## 2024-12-20 RX ORDER — CALCIUM GLUCONATE 20 MG/ML
2000 INJECTION, SOLUTION INTRAVENOUS EVERY 8 HOURS
Status: COMPLETED | OUTPATIENT
Start: 2024-12-20 | End: 2024-12-21

## 2024-12-20 RX ORDER — FENTANYL CITRATE 50 UG/ML
INJECTION, SOLUTION INTRAMUSCULAR; INTRAVENOUS AS NEEDED
Status: DISCONTINUED | OUTPATIENT
Start: 2024-12-20 | End: 2024-12-20 | Stop reason: SURG

## 2024-12-20 RX ORDER — DIPHENHYDRAMINE HYDROCHLORIDE 50 MG/ML
12.5 INJECTION INTRAMUSCULAR; INTRAVENOUS ONCE AS NEEDED
Status: DISCONTINUED | OUTPATIENT
Start: 2024-12-20 | End: 2024-12-20

## 2024-12-20 RX ORDER — EPHEDRINE SULFATE 5 MG/ML
5 INJECTION INTRAVENOUS ONCE AS NEEDED
Status: DISCONTINUED | OUTPATIENT
Start: 2024-12-20 | End: 2024-12-20

## 2024-12-20 RX ORDER — ONDANSETRON 2 MG/ML
4 INJECTION INTRAMUSCULAR; INTRAVENOUS ONCE AS NEEDED
Status: DISCONTINUED | OUTPATIENT
Start: 2024-12-20 | End: 2024-12-20

## 2024-12-20 RX ORDER — SODIUM CHLORIDE 9 MG/ML
INJECTION, SOLUTION INTRAVENOUS CONTINUOUS PRN
Status: DISCONTINUED | OUTPATIENT
Start: 2024-12-20 | End: 2024-12-20 | Stop reason: SURG

## 2024-12-20 RX ORDER — VANCOMYCIN/0.9 % SOD CHLORIDE 1.5G/250ML
1500 PLASTIC BAG, INJECTION (ML) INTRAVENOUS ONCE
Status: COMPLETED | OUTPATIENT
Start: 2024-12-20 | End: 2024-12-20

## 2024-12-20 RX ORDER — SODIUM CHLORIDE 9 MG/ML
125 INJECTION, SOLUTION INTRAVENOUS CONTINUOUS
Status: DISPENSED | OUTPATIENT
Start: 2024-12-20 | End: 2024-12-21

## 2024-12-20 RX ORDER — OXYCODONE HYDROCHLORIDE 5 MG/1
10 TABLET ORAL EVERY 4 HOURS PRN
Status: DISCONTINUED | OUTPATIENT
Start: 2024-12-20 | End: 2024-12-20

## 2024-12-20 RX ORDER — HEPARIN SODIUM 1000 [USP'U]/ML
INJECTION, SOLUTION INTRAVENOUS; SUBCUTANEOUS AS NEEDED
Status: DISCONTINUED | OUTPATIENT
Start: 2024-12-20 | End: 2024-12-20 | Stop reason: SURG

## 2024-12-20 RX ORDER — LABETALOL HYDROCHLORIDE 5 MG/ML
5 INJECTION, SOLUTION INTRAVENOUS
Status: DISCONTINUED | OUTPATIENT
Start: 2024-12-20 | End: 2024-12-20

## 2024-12-20 RX ORDER — ROCURONIUM BROMIDE 10 MG/ML
INJECTION, SOLUTION INTRAVENOUS AS NEEDED
Status: DISCONTINUED | OUTPATIENT
Start: 2024-12-20 | End: 2024-12-20 | Stop reason: SURG

## 2024-12-20 RX ORDER — SODIUM CHLORIDE 9 MG/ML
100 INJECTION, SOLUTION INTRAVENOUS CONTINUOUS
Status: DISCONTINUED | OUTPATIENT
Start: 2024-12-20 | End: 2024-12-20

## 2024-12-20 RX ORDER — IPRATROPIUM BROMIDE AND ALBUTEROL SULFATE 2.5; .5 MG/3ML; MG/3ML
3 SOLUTION RESPIRATORY (INHALATION) ONCE AS NEEDED
Status: DISCONTINUED | OUTPATIENT
Start: 2024-12-20 | End: 2024-12-20

## 2024-12-20 RX ORDER — HYDRALAZINE HYDROCHLORIDE 20 MG/ML
5 INJECTION INTRAMUSCULAR; INTRAVENOUS
Status: DISCONTINUED | OUTPATIENT
Start: 2024-12-20 | End: 2024-12-20

## 2024-12-20 RX ORDER — DIPHENHYDRAMINE HYDROCHLORIDE 50 MG/ML
12.5 INJECTION INTRAMUSCULAR; INTRAVENOUS
Status: DISCONTINUED | OUTPATIENT
Start: 2024-12-20 | End: 2024-12-20

## 2024-12-20 RX ORDER — OXYCODONE HYDROCHLORIDE 5 MG/1
5 TABLET ORAL ONCE AS NEEDED
Status: DISCONTINUED | OUTPATIENT
Start: 2024-12-20 | End: 2024-12-20

## 2024-12-20 RX ADMIN — CALCIUM GLUCONATE 2000 MG: 20 INJECTION, SOLUTION INTRAVENOUS at 09:07

## 2024-12-20 RX ADMIN — NYSTATIN 1 APPLICATION: 100000 CREAM TOPICAL at 06:23

## 2024-12-20 RX ADMIN — SODIUM CHLORIDE 125 ML/HR: 9 INJECTION, SOLUTION INTRAVENOUS at 21:30

## 2024-12-20 RX ADMIN — LANSOPRAZOLE 30 MG: 30 TABLET, ORALLY DISINTEGRATING ORAL at 06:22

## 2024-12-20 RX ADMIN — DEXTROSE MONOHYDRATE 150 ML/HR: 50 INJECTION, SOLUTION INTRAVENOUS at 03:11

## 2024-12-20 RX ADMIN — Medication 10 ML: at 21:55

## 2024-12-20 RX ADMIN — SODIUM CHLORIDE: 9 INJECTION, SOLUTION INTRAVENOUS at 15:30

## 2024-12-20 RX ADMIN — HEPARIN SODIUM 14.9 UNITS/KG/HR: 10000 INJECTION, SOLUTION INTRAVENOUS at 10:20

## 2024-12-20 RX ADMIN — INSULIN HUMAN 4.6 UNITS/HR: 1 INJECTION, SOLUTION INTRAVENOUS at 13:16

## 2024-12-20 RX ADMIN — HEPARIN SODIUM 5000 UNITS: 1000 INJECTION INTRAVENOUS; SUBCUTANEOUS at 15:42

## 2024-12-20 RX ADMIN — CEFTRIAXONE SODIUM 2000 MG: 2 INJECTION, POWDER, FOR SOLUTION INTRAMUSCULAR; INTRAVENOUS at 15:44

## 2024-12-20 RX ADMIN — CALCIUM CHLORIDE, MAGNESIUM CHLORIDE, SODIUM CHLORIDE, SODIUM BICARBONATE, POTASSIUM CHLORIDE AND SODIUM PHOSPHATE DIBASIC DIHYDRATE 2000 ML/HR: 3.68; 3.05; 6.34; 3.09; .314; .187 INJECTION INTRAVENOUS at 01:51

## 2024-12-20 RX ADMIN — HYDROMORPHONE HYDROCHLORIDE 0.5 MG: 1 INJECTION, SOLUTION INTRAMUSCULAR; INTRAVENOUS; SUBCUTANEOUS at 18:35

## 2024-12-20 RX ADMIN — NYSTATIN 1 APPLICATION: 100000 CREAM TOPICAL at 21:55

## 2024-12-20 RX ADMIN — CALCIUM GLUCONATE 2000 MG: 20 INJECTION, SOLUTION INTRAVENOUS at 18:21

## 2024-12-20 RX ADMIN — Medication 1500 MG: at 09:53

## 2024-12-20 RX ADMIN — QUETIAPINE FUMARATE 25 MG: 25 TABLET ORAL at 09:07

## 2024-12-20 RX ADMIN — FENTANYL CITRATE 50 MCG: 50 INJECTION, SOLUTION INTRAMUSCULAR; INTRAVENOUS at 15:38

## 2024-12-20 RX ADMIN — Medication 10 ML: at 09:07

## 2024-12-20 RX ADMIN — MUPIROCIN 1 APPLICATION: 20 OINTMENT TOPICAL at 21:55

## 2024-12-20 RX ADMIN — SODIUM CHLORIDE 100 ML/HR: 9 INJECTION, SOLUTION INTRAVENOUS at 09:07

## 2024-12-20 RX ADMIN — CHLORHEXIDINE GLUCONATE, 0.12% ORAL RINSE 15 ML: 1.2 SOLUTION DENTAL at 09:18

## 2024-12-20 RX ADMIN — NYSTATIN 1 APPLICATION: 100000 CREAM TOPICAL at 14:56

## 2024-12-20 RX ADMIN — CALCIUM GLUCONATE 2000 MG: 20 INJECTION, SOLUTION INTRAVENOUS at 01:12

## 2024-12-20 RX ADMIN — CHLORHEXIDINE GLUCONATE, 0.12% ORAL RINSE 15 ML: 1.2 SOLUTION DENTAL at 21:55

## 2024-12-20 RX ADMIN — SODIUM BICARBONATE 50 MEQ: 84 INJECTION INTRAVENOUS at 10:08

## 2024-12-20 RX ADMIN — MUPIROCIN 1 APPLICATION: 20 OINTMENT TOPICAL at 09:07

## 2024-12-20 RX ADMIN — Medication 10 ML: at 21:56

## 2024-12-20 RX ADMIN — FENTANYL CITRATE 50 MCG: 50 INJECTION, SOLUTION INTRAMUSCULAR; INTRAVENOUS at 16:05

## 2024-12-20 RX ADMIN — SODIUM CHLORIDE 125 ML/HR: 9 INJECTION, SOLUTION INTRAVENOUS at 11:07

## 2024-12-20 RX ADMIN — ROCURONIUM BROMIDE 50 MG: 10 INJECTION, SOLUTION INTRAVENOUS at 15:30

## 2024-12-20 RX ADMIN — QUETIAPINE FUMARATE 25 MG: 25 TABLET ORAL at 21:55

## 2024-12-20 RX ADMIN — HEPARIN SODIUM 2000 UNITS: 1000 INJECTION INTRAVENOUS; SUBCUTANEOUS at 16:01

## 2024-12-20 RX ADMIN — SODIUM BICARBONATE 50 MEQ: 84 INJECTION INTRAVENOUS at 18:19

## 2024-12-20 RX ADMIN — SODIUM CHLORIDE 100 ML/HR: 9 INJECTION, SOLUTION INTRAVENOUS at 10:08

## 2024-12-20 NOTE — PROGRESS NOTES
NEPHROLOGY PROGRESS NOTE------[facility]    [facility]  [phone]  Deidra Starr MD      Patient Care Team:  Katie Duran PA as PCP - General (Physician Assistant)  Uli Starr MD as Consulting Physician (Nephrology)      Provider:  Deidra Starr MD  Patient Name: Yarelis Jackson  :  1970    SUBJECTIVE:    F/U ARF/MARGOT/ELECTROLYTE ABNORMALITIES    SEDATED AND INTUBATED ON VENT    Medication:  calcium gluconate, 2,000 mg, Intravenous, Q8H  cefTRIAXone, 2,000 mg, Intravenous, Q24H  chlorhexidine, 15 mL, Mouth/Throat, Q12H  lansoprazole, 30 mg, Nasogastric, Q AM  mupirocin, 1 Application, Each Nare, BID  nystatin, 1 Application, Topical, Q8H  [START ON 2024] permethrin, 1 Application, Topical, Once  QUEtiapine, 25 mg, Nasogastric, Q12H  sodium chloride, 10 mL, Intravenous, Q12H  sodium chloride, 10 mL, Intravenous, Q12H  vancomycin, 1,500 mg, Intravenous, Once      dexmedetomidine, 0.2-1.5 mcg/kg/hr, Last Rate: 1.5 mcg/kg/hr (24 1146)  dextrose, 150 mL/hr, Last Rate: 150 mL/hr (24 0311)  fentanyl 10 mcg/mL,  mcg/hr, Last Rate: 100 mcg/hr (24 2349)  heparin, 16.9 Units/kg/hr, Last Rate: 16.9 Units/kg/hr (24 0325)  insulin, 0-100 Units/hr, Last Rate: 8.2 Units/hr (24 0746)  norepinephrine, 0.05-0.5 mcg/kg/min, Last Rate: 0.16 mcg/kg/min (24 0632)  Pharmacy to dose vancomycin,   Phoxillum BK4/2.5, 2,000 mL/hr, Last Rate: 2,000 mL/hr (24 0151)  Phoxillum BK4/2.5, 2,000 mL/hr, Last Rate: 2,000 mL/hr (24 015)  Phoxillum BK4/2.5, 2,000 mL/hr, Last Rate: 2,000 mL/hr (24)        OBJECTIVE    Vital Sign Min/Max for last 24 hours  Temp  Min: 98.4 °F (36.9 °C)  Max: 99.4 °F (37.4 °C)   BP  Min: 76/42  Max: 150/77   Pulse  Min: 63  Max: 108   Resp  Min: 16  Max: 26   SpO2  Min: 83 %  Max: 100 %   No data recorded   No data recorded     Flowsheet Rows   "    Flowsheet Row First Filed Value   Admission Height 162.6 cm (64\") Documented at 12/18/2024 0443   Admission Weight 92.5 kg (203 lb 14.4 oz) Documented at 12/18/2024 0597            No intake/output data recorded.  I/O last 3 completed shifts:  In: 9184 [P.O.:301; I.V.:8083; Other:800]  Out: 1047 [Urine:345; Blood:100]    Physical Exam: ON CRRT AND PRESSORS  General Appearance: SEDATED AND INTUBATED ON VENT  Head: normocephalic, without obvious abnormality and atraumatic +ETT INTACT  Eyes: conjunctivae and sclerae normal and no icterus  Neck: supple and no JVD  Lungs: clear to auscultation and respirations regular  Heart: regular rhythm & normal rate and normal S1, S2 +THOMAS  Chest: Wall no abnormalities observed  Abdomen: normal bowel sounds and soft   Extremities: moves extremities well, no edema, no cyanosis and no redness. +DJD  Skin: no bleeding, bruising or rash, turgor normal, color normal and no lesions noted. +DECREASED SKIN TURGOR  Neurologic: SEDATED    Labs:    WBC WBC   Date Value Ref Range Status   12/20/2024 15.52 (H) 3.40 - 10.80 10*3/mm3 Final   12/20/2024 12.92 (H) 3.40 - 10.80 10*3/mm3 Final   12/20/2024 10.37 3.40 - 10.80 10*3/mm3 Final   12/19/2024 13.81 (H) 3.40 - 10.80 10*3/mm3 Final   12/18/2024 14.65 (H) 3.40 - 10.80 10*3/mm3 Final      HGB Hemoglobin   Date Value Ref Range Status   12/20/2024 11.5 (L) 12.0 - 15.9 g/dL Final   12/20/2024 12.1 12.0 - 15.9 g/dL Final   12/20/2024 12.9 12.0 - 15.9 g/dL Final   12/19/2024 14.3 12.0 - 17.0 g/dL Final   12/19/2024 17.1 (H) 12.0 - 15.9 g/dL Final   12/18/2024 14.6 12.0 - 17.0 g/dL Final   12/18/2024 18.0 (H) 12.0 - 17.0 g/dL Final   12/18/2024 17.1 (H) 12.0 - 15.9 g/dL Final      HCT Hematocrit   Date Value Ref Range Status   12/20/2024 35.3 34.0 - 46.6 % Final   12/20/2024 38.5 34.0 - 46.6 % Final   12/20/2024 41.2 34.0 - 46.6 % Final   12/19/2024 42 38 - 51 % Final   12/19/2024 52.2 (H) 34.0 - 46.6 % Final   12/18/2024 43 38 - 51 % Final " "  12/18/2024 53 (H) 38 - 51 % Final   12/18/2024 54.9 (H) 34.0 - 46.6 % Final      Platelets No results found for: \"LABPLAT\"   MCV MCV   Date Value Ref Range Status   12/20/2024 101.4 (H) 79.0 - 97.0 fL Final   12/20/2024 101.0 (H) 79.0 - 97.0 fL Final   12/20/2024 102.2 (H) 79.0 - 97.0 fL Final   12/19/2024 101.8 (H) 79.0 - 97.0 fL Final   12/18/2024 107.9 (H) 79.0 - 97.0 fL Final          Sodium Sodium   Date Value Ref Range Status   12/20/2024 135 (L) 136 - 145 mmol/L Final   12/20/2024 142 136 - 145 mmol/L Final   12/19/2024 151 (H) 136 - 145 mmol/L Final   12/19/2024 167 (C) 136 - 145 mmol/L Final   12/19/2024 170 (C) 136 - 145 mmol/L Final   12/19/2024 175 (C) 136 - 145 mmol/L Final   12/19/2024 178 (C) 136 - 145 mmol/L Final   12/18/2024 176 (C) 136 - 145 mmol/L Final   12/18/2024 172 (C) 136 - 145 mmol/L Final   12/18/2024 159 (H) 136 - 145 mmol/L Final   12/18/2024 158 (H) 136 - 145 mmol/L Final      Potassium Potassium   Date Value Ref Range Status   12/20/2024 5.1 3.5 - 5.2 mmol/L Final   12/20/2024 4.9 3.5 - 5.2 mmol/L Final     Comment:     Specimen hemolyzed.  Result may be falsely elevated.   12/19/2024 5.2 3.5 - 5.2 mmol/L Final     Comment:     Specimen hemolyzed.  Result may be falsely elevated.   12/19/2024 4.4 3.5 - 5.2 mmol/L Final     Comment:     Specimen hemolyzed.  Result may be falsely elevated.   12/19/2024 4.3 3.5 - 5.2 mmol/L Final   12/19/2024 4.5 3.5 - 5.2 mmol/L Final     Comment:     Slight hemolysis detected by analyzer. Result may be falsely elevated.   12/19/2024 4.7 3.5 - 5.2 mmol/L Final     Comment:     Specimen hemolyzed.  Result may be falsely elevated.   12/18/2024 4.4 3.5 - 5.2 mmol/L Final     Comment:     Specimen hemolyzed.  Result may be falsely elevated.   12/18/2024 3.7 3.5 - 5.2 mmol/L Final     Comment:     Specimen hemolyzed.  Result may be falsely elevated.   12/18/2024 3.8 3.5 - 5.2 mmol/L Final     Comment:     Specimen hemolyzed.  Result may be falsely " elevated.   12/18/2024 3.6 3.5 - 5.2 mmol/L Final     Comment:     Slight hemolysis detected by analyzer. Result may be falsely elevated.      Chloride Chloride   Date Value Ref Range Status   12/20/2024 105 98 - 107 mmol/L Final   12/20/2024 111 (H) 98 - 107 mmol/L Final   12/19/2024 120 (H) 98 - 107 mmol/L Final   12/19/2024 136 (H) 98 - 107 mmol/L Final   12/19/2024 134 (H) 98 - 107 mmol/L Final   12/19/2024 >140 (H) 98 - 107 mmol/L Final   12/19/2024 >140 (H) 98 - 107 mmol/L Final   12/18/2024 >140 (H) 98 - 107 mmol/L Final   12/18/2024 139 (H) 98 - 107 mmol/L Final   12/18/2024 117 (H) 98 - 107 mmol/L Final   12/18/2024 114 (H) 98 - 107 mmol/L Final      CO2 CO2   Date Value Ref Range Status   12/20/2024 20.8 (L) 22.0 - 29.0 mmol/L Final   12/20/2024 22.3 22.0 - 29.0 mmol/L Final   12/19/2024 22.9 22.0 - 29.0 mmol/L Final   12/19/2024 14.5 (L) 22.0 - 29.0 mmol/L Final   12/19/2024 14.9 (L) 22.0 - 29.0 mmol/L Final   12/19/2024 2.7 (C) 22.0 - 29.0 mmol/L Final   12/19/2024 2.5 (C) 22.0 - 29.0 mmol/L Final   12/18/2024 2.2 (C) 22.0 - 29.0 mmol/L Final   12/18/2024 14.3 (L) 22.0 - 29.0 mmol/L Final   12/18/2024 10.1 (L) 22.0 - 29.0 mmol/L Final   12/18/2024 14.7 (L) 22.0 - 29.0 mmol/L Final      BUN BUN   Date Value Ref Range Status   12/20/2024 20 6 - 20 mg/dL Final   12/20/2024 24 (H) 6 - 20 mg/dL Final   12/19/2024 38 (H) 6 - 20 mg/dL Final   12/19/2024 55 (H) 6 - 20 mg/dL Final   12/19/2024 57 (H) 6 - 20 mg/dL Final   12/19/2024 66 (H) 6 - 20 mg/dL Final   12/19/2024 69 (H) 6 - 20 mg/dL Final   12/18/2024 71 (H) 6 - 20 mg/dL Final   12/18/2024 79 (H) 6 - 20 mg/dL Final   12/18/2024 88 (H) 6 - 20 mg/dL Final   12/18/2024 88 (H) 6 - 20 mg/dL Final      Creatinine Creatinine   Date Value Ref Range Status   12/20/2024 1.20 (H) 0.57 - 1.00 mg/dL Final   12/20/2024 1.11 (H) 0.57 - 1.00 mg/dL Final   12/19/2024 1.31 (H) 0.57 - 1.00 mg/dL Final   12/19/2024 1.28 (H) 0.57 - 1.00 mg/dL Final   12/19/2024 1.38 (H) 0.57 -  "1.00 mg/dL Final   12/19/2024 1.61 (H) 0.57 - 1.00 mg/dL Final   12/19/2024 1.52 (H) 0.57 - 1.00 mg/dL Final   12/18/2024 1.35 (H) 0.60 - 1.30 mg/dL Final     Comment:     Serial Number: [id number]:  [id number]   12/18/2024 1.42 (H) 0.57 - 1.00 mg/dL Final   12/18/2024 1.47 (H) 0.57 - 1.00 mg/dL Final   12/18/2024 2.13 (H) 0.57 - 1.00 mg/dL Final   12/18/2024 1.80 (H) 0.60 - 1.30 mg/dL Final   12/18/2024 2.10 (H) 0.57 - 1.00 mg/dL Final      Calcium Calcium   Date Value Ref Range Status   12/20/2024 7.1 (L) 8.6 - 10.5 mg/dL Final   12/20/2024 6.8 (L) 8.6 - 10.5 mg/dL Final   12/19/2024 6.8 (L) 8.6 - 10.5 mg/dL Final   12/19/2024 7.9 (L) 8.6 - 10.5 mg/dL Final   12/19/2024 8.9 8.6 - 10.5 mg/dL Final   12/19/2024 8.7 8.6 - 10.5 mg/dL Final   12/19/2024 9.0 8.6 - 10.5 mg/dL Final   12/18/2024 9.2 8.6 - 10.5 mg/dL Final   12/18/2024 9.9 8.6 - 10.5 mg/dL Final   12/18/2024 9.7 8.6 - 10.5 mg/dL Final   12/18/2024 10.2 8.6 - 10.5 mg/dL Final      PO4 No components found for: \"PO4\"   Albumin Albumin   Date Value Ref Range Status   12/20/2024 2.7 (L) 3.5 - 5.2 g/dL Final   12/20/2024 2.9 (L) 3.5 - 5.2 g/dL Final   12/19/2024 2.8 (L) 3.5 - 5.2 g/dL Final   12/19/2024 3.2 (L) 3.5 - 5.2 g/dL Final   12/18/2024 3.8 3.5 - 5.2 g/dL Final      Magnesium Magnesium   Date Value Ref Range Status   12/20/2024 2.0 1.6 - 2.6 mg/dL Final   12/20/2024 2.0 1.6 - 2.6 mg/dL Final   12/19/2024 1.9 1.6 - 2.6 mg/dL Final   12/19/2024 2.4 1.6 - 2.6 mg/dL Final   12/19/2024 2.6 1.6 - 2.6 mg/dL Final   12/19/2024 2.9 (H) 1.6 - 2.6 mg/dL Final   12/19/2024 3.0 (H) 1.6 - 2.6 mg/dL Final   12/18/2024 3.1 (H) 1.6 - 2.6 mg/dL Final   12/18/2024 3.3 (H) 1.6 - 2.6 mg/dL Final   12/18/2024 2.6 1.6 - 2.6 mg/dL Final   12/18/2024 3.7 (H) 1.6 - 2.6 mg/dL Final   12/18/2024 3.7 (H) 1.6 - 2.6 mg/dL Final      Uric Acid No components found for: \"URIC ACID\"     Imaging Results (Last 72 Hours)       Procedure Component Value Units Date/Time    XR Chest 1 View " [[id number]] Collected: 12/20/24 0717     Updated: 12/20/24 0721    Narrative:      XR CHEST 1 VW    Date of Exam: 12/20/2024 12:10 AM EST    Indication: Intubated Patient    Comparison: 12/19/2024    Findings:  Endotracheal tube approximately 2.5 cm above the carmina. Feeding tube and right IJ dialysis catheter remain in place. No new tubes or lines heart size and pulmonary vasculature are stable. Bullous changes in the right upper lobe. Postoperative changes in   the left upper lobe. Lungs grossly clear. No pneumothorax      Impression:      Impression:  Stable chest demonstrating COPD and postoperative changes with no acute cardiopulmonary process demonstrated      Electronically Signed: Shon Ponce    12/20/2024 7:19 AM EST    Workstation ID: [id number]    Hybrid Vascular OR Imaging ([employer]) [[id number]] Resulted: 12/19/24 1810     Updated: 12/19/24 1810    Narrative:      Please see performing physician's note for result.    XR Chest 1 View [[id number]] Collected: 12/19/24 1224     Updated: 12/19/24 1229    Narrative:      XR CHEST 1 VW    Date of Exam: 12/19/2024 11:53 AM EST    Indication: intubation    Comparison: 12/18/2024.    Findings:  There has been placement of an ET tube with the tip 1.7 cm above the carmina. There is a right IJ approach catheter with its tip in the mid SVC. There is an esophageal gastric tube directed towards the left upper quadrant although tip is not included.   Heart size is normal. Lung fields are clear of acute infiltrates or effusions. Mildly prominent interstitial pattern appears chronic.      Impression:      Impression:  Interval placement of various support lines as noted. No acute process of the lung fields.      Electronically Signed: Emilia Orellana MD    12/19/2024 12:27 PM EST    Workstation ID: [id number]    US Renal Bilateral [[id number]] Collected: 12/19/24 1121     Updated: 12/19/24 1124    Narrative:      US RENAL BILATERAL    Date of Exam: 12/19/2024 10:09 AM  EST    Indication: ARF/MARGOT/CRF/CKD.    Comparison: No comparisons available.    Technique: Grayscale and color Doppler ultrasound evaluation of the kidneys and urinary bladder was performed.      Findings:  RIGHT kidney measures 10.9 cm.  Kidney echogenicity, size, and vascularity appear within normal limits. There is no solid kidney mass.  No echogenic shadowing stone.  No hydronephrosis.    LEFT kidney measures 10.3 cm. Kidney echogenicity, size, and vascularity appear within normal limits. There is no solid kidney mass.  No echogenic shadowing stone.  No hydronephrosis.    The urinary bladder is decompressed and contains a Malik catheter.        Impression:      Impression:  Unremarkable renal ultrasound.        Electronically Signed: Luciana Guerra MD    12/19/2024 11:22 AM EST    Workstation ID: [id number]    US Pancreas [[id number]] Collected: 12/19/24 1104     Updated: 12/19/24 1107    Narrative:      US PANCREAS    Date of Exam: 12/19/2024 10:10 AM EST    Indication: Elevated triglycerides.    Comparison: No comparisons available.    Technique: Grayscale and color Doppler ultrasound evaluation of the right upper quadrant was performed.      Findings:  The pancreas is normal in size and echogenicity. There are no pancreatic or peripancreatic fluid collections. No masses are identified.      Impression:      Impression:  Negative exam.        Electronically Signed: Emilia Orellana MD    12/19/2024 11:05 AM EST    Workstation ID: [id number]    XR Abdomen KUB [[id number]] Collected: 12/19/24 0111     Updated: 12/19/24 0113    Narrative:      XR ABDOMEN KUB    Date of Exam: 12/19/2024 1:00 AM EST    Indication: ng    Comparison: None available.    Findings:  There is a feeding tube in the stomach. The gas pattern is nonspecific.      Impression:      Impression:  Feeding tube is in the stomach.          Electronically Signed: Maxwell Carrion MD    12/19/2024 1:11 AM EST    Workstation ID: [id number]    US Liver  [[id number]] Collected: 12/18/24 0953     Updated: 12/18/24 0956    Narrative:      US LIVER    Date of Exam: 12/18/2024 9:26 AM EST    Indication: Elevated LFTs.    Comparison: No comparisons available.    Technique: Grayscale and color Doppler ultrasound evaluation of the liver was performed.      Findings:  LIVER:  The liver appears increased in echogenicity.No focal lesions identified. Normal flow is present within the hepatic vasculature. The liver is enlarged measuring 19.1 cm in length. The common bile duct is normal in caliber.        Impression:      Impression:  Hepatomegaly with steatosis.        Electronically Signed: Alea Subramanian MD    12/18/2024 9:54 AM EST    Workstation ID: [id number]    CT Head Without Contrast [[id number]] Collected: 12/18/24 0807     Updated: 12/18/24 0811    Narrative:      CT HEAD WO CONTRAST    Date of Exam: 12/18/2024 7:55 AM EST    Indication: ams.    Comparison: CT head without contrast 1/9/2020.    Technique: Axial CT images were obtained of the head without contrast administration.  Coronal reconstructions were performed.  Automated exposure control and iterative reconstruction methods were used.      Findings:  Study is mildly degraded by motion. Allowing for this limitation, no gross acute intracranial hemorrhage, mass lesion, mass effect or midline shift is seen. The ventricular configuration is stable and within normal limits.. No evidence of acute or   evolving large territory infarct. Mild deep white matter hypodensities are nonspecific but favored to reflect changes of chronic microvascular disease. The paranasal sinuses and mastoid air cells are clear. The calvarium is within normal limits.      Impression:      Impression:    1. No acute intracranial findings.  2. Features suggestive of mild chronic microvascular disease.      Electronically Signed: Mariah Keenan MD    12/18/2024 8:09 AM EST    Workstation ID: [id number]    XR Chest 1 View [[id number]] Collected:  12/18/24 0752     Updated: 12/18/24 0755    Narrative:      XR CHEST 1 VW    Date of Exam: 12/18/2024 7:16 AM EST    Indication: Assess for Fluid Overload  Assess for Fluid Overload    Comparison: 7/15/2023    Findings:  Heart size is normal. Advanced emphysema with bulla formation at the right lung apex. Stable scarring at the left upper lobe. Suture overlies the left and right lung apices. No discrete consolidation. No pneumothorax or pleural effusion. No findings of   pulmonary edema. Osseous structures appear intact.      Impression:      Impression:  1. No acute process.  2. Severe emphysema.          Electronically Signed: Gaurav Miles MD    12/18/2024 7:53 AM EST    Workstation ID: [id number]            Results for orders placed during the hospital encounter of 12/18/24    XR Chest 1 View    Narrative  XR CHEST 1 VW    Date of Exam: 12/20/2024 12:10 AM EST    Indication: Intubated Patient    Comparison: 12/19/2024    Findings:  Endotracheal tube approximately 2.5 cm above the carmina. Feeding tube and right IJ dialysis catheter remain in place. No new tubes or lines heart size and pulmonary vasculature are stable. Bullous changes in the right upper lobe. Postoperative changes in  the left upper lobe. Lungs grossly clear. No pneumothorax    Impression  Impression:  Stable chest demonstrating COPD and postoperative changes with no acute cardiopulmonary process demonstrated      Electronically Signed: Shno Ponce  12/20/2024 7:19 AM EST  Workstation ID: [id number]      XR Chest 1 View    Narrative  XR CHEST 1 VW    Date of Exam: 12/19/2024 11:53 AM EST    Indication: intubation    Comparison: 12/18/2024.    Findings:  There has been placement of an ET tube with the tip 1.7 cm above the carmina. There is a right IJ approach catheter with its tip in the mid SVC. There is an esophageal gastric tube directed towards the left upper quadrant although tip is not included.  Heart size is normal. Lung fields are clear of  acute infiltrates or effusions. Mildly prominent interstitial pattern appears chronic.    Impression  Impression:  Interval placement of various support lines as noted. No acute process of the lung fields.      Electronically Signed: Emilia Orellana MD  12/19/2024 12:27 PM EST  Workstation ID: [id number]      XR Abdomen KUB    Narrative  XR ABDOMEN KUB    Date of Exam: 12/19/2024 1:00 AM EST    Indication: ng    Comparison: None available.    Findings:  There is a feeding tube in the stomach. The gas pattern is nonspecific.    Impression  Impression:  Feeding tube is in the stomach.          Electronically Signed: Maxwell Carrion MD  12/19/2024 1:11 AM EST  Workstation ID: [id number]      Results for orders placed during the hospital encounter of 12/18/24    Duplex Lower Extremity Art / Grafts - Right CAR    Interpretation Summary    The right femoral, femoral, deep femoral and popliteal arteries are patent, with very low amplitude monophasic signals, suggesting severe inflow stenosis or occlusion.  The external iliac artery also appears patent.  No measurable Doppler flow in the anterior and posterior tibial and peroneal arteries.        ASSESSMENT / PLAN      DKA (diabetic ketoacidosis)    Arterial thrombosis    ARF/MARGOT------Oliguric. +ARF/MARGOT with historically normal renal function. +ARF/MARGOT secondary to severe prerenal state and ATN from hypotension and Rhabdomyolysis. Hydrate aggressively. Pressor support. D/C CRRT this AM and follow. Continue aggressive hydration. No NSAIDs. Renal US without obstructive uropathy.   Dose meds for CrCl less than 10 cc/min     2. HYPERNATREMIA/HYPEROSMOLAR STATE-----Better. D/C CRRT and follow.      3. ACIDOSIS----Metabolic. Elevated AGAP. Insulin gtt/IVFs. Follow off of CRRT and with intermittent IV NaHCO3     4. DVT PROPHYLAXIS-----On Heparin gtt     5. HYPOCALCEMIA------Replace IV and follow ionized levels     6. DM------Insulin gtt per protocol     7. ANEMIA---------H/H stable      8. OA/DJD/HYPERURICEMIA------Allopurinol . No NSAIDs     9. RHABDOMYOLYSIS-------Hydrate and alkalinize. Follow CK.       10. GERD/PUD PROPHYLAXIS-----Renal dose adjusted Pepcid     11. DELIRIUM/TME     12. COPD/ACUTE HYPOXIC RESPIRATORY FAILURE------intubated on vent        Deidra Starr MD  [facility]  [phone]  12/20/24  08:09 EST

## 2024-12-20 NOTE — PLAN OF CARE
Goal Outcome Evaluation:      Pt unresponsive, GCS 3. Fent gtt decreased with no change. Restraints in place.      Pt intubated, 50% O2, 5 peep. Bright red secretions from mouth, nose, in line suction, and subglottal suction. PTT resulted >200, heparin turned off since start of shift. Most recent PTT resulted below therapeutic range, heparin gtt restarted at prescribed dose per APRN. Hgb drop from baseline, provider aware.     Vascular made aware of heparin being off  and pts neurovascular changes, see flowsheet.     Pt had CRRT running even most of the night. Access pressures became a persistent issue despite changing dressing, pt position, filter set x2, reversing lines several times, and flushing. Blood unable to be returned. CRRT stopped, awaiting effluent setup from central supply at this time.     Insulin, D5W, fent, heparin, levophed infusing. Levophed requirements increased overnight. Vanc and calcium administered.     Malik in place. Dobhoff in place with q1 hr 100 mL water flushes going. Sodium within normal limits.     Family at pts bedside throughout the night.

## 2024-12-20 NOTE — OP NOTE
Operative Note  Location: [facility]  Date of Admission:  12/18/2024  OR Date: 12/20/2024    Pre-op Diagnosis:  Recurrent right lower extremity ischemia due to arterial thrombosis    Post-op Diagnosis:  Recurrent right lower extremity ischemia due to arterial thrombosis    Procedure:   Right iliofemoral popliteal thrombectomy  Right femoral endarterectomy with patch  Right lower extremity arteriogram  Closed right calf fasciotomies    Surgeon: Ghassan Celestin MD    Assistant: Olga REEVESCA, Provided critical assistance in exposure, retraction, and suction that overall decrease blood loss and operative time.    Anesthesia: General Endotracheal    Staff:   Circulator: Breanna Frederick RN  Radiology Technologist: Darinel De La Cruz, RRT; Jyothi Roach  Scrub Person: Maddie Lentz  Assistant: Olga Barboza CSA  was responsible for performing the following activities: Retraction, Suction, Irrigation, and Placing Dressing and their skilled assistance was necessary for the success of this case.    Estimated Blood Loss:  50 mL    Specimen: None    Complications: None immediate    Findings: There was good pulse in the proximal common femoral artery, with an abrupt transition at the mid common femoral artery proximal to the previous arteriotomy.  There was no pulse in the distal common femoral artery.  On opening the artery, there was a dissected portion of the mid to proximal common femoral artery with a small amount of thrombus.  After endarterectomy, thrombectomy catheter was passed into the iliac segment, with no return of thrombus but with an excellent pulse noted.  Thrombectomy catheter was passed distally, with no return of thrombus.  Following patch closure of the arteriotomy, arteriogram demonstrated patency of the external and internal iliac arteries with nonfilling of the common iliac artery because of blood coming down from above even with the common femoral artery clamped.  Lower extremity runoff views  demonstrated wide patency with prompt flow of contrast into the tibial arteries.  Minimal tapering of the tibial arteries.  Upon inspection of the calf muscles at the time of fasciotomy, the muscles appeared normal, viable with vigorous response to electrocautery.  Mild bulging of anterior calf musculature.  No bulging of lateral or superficial/deep posterior compartments.    Implants:   Implant Name Type Inv. Item Serial No.  Lot No. LRB No. Used Action   CLIPAPPLR M/ ENDO LIGACLIP 9 3/8IN SM - [id number] Implant CLIPAPPLR M/ ENDO LIGACLIP 9 3/8IN SM  [employer]  DIV OF J [employer] J [id number] Right 1 Implanted   PTCH VASC VASCUGUARD TPR/END 0.8X8CM STRL - [id number] Implant PTCH VASC VASCUGUARD TPR/END 0.8X8CM STRL  [id number] Right 1 Implanted   HEMOST ABS SURGICEL FIBRILLAR 4X4IN - [id number] Implant HEMOST ABS SURGICEL FIBRILLAR 4X4IN  [employer]  DIV OF J [employer] J [id number] Right 1 Implanted     Indications: 54-year-old woman critically ill in intensive care, who developed an acute right lower extremity yesterday related to common iliac artery occlusion.  Underwent iliofemoral thrombectomy by open groin incision with arteriograms up and down the leg.  She had radiographic evidence of spasm in the arteries and I expected that she would develop good lower extremity perfusion with time.  She did not, and duplex demonstrated abnormal velocities in the arteries, require visualization of the common femoral artery.  With this in mind, she was returned to the operating room for repeat thrombectomy and arteriogram, and other procedures as indicated.       Procedure:  The patient was brought from the intensive care unit to the operating room.  She was positioned supine.  General anesthesia was induced.  Once again we washed the abdomen, right groin including the staple line, and the entire right lower extremity with Hibiclens.  The limb was then prepared with ChloraPrep and  draped in a sterile fashion.  Repeat ACT was a little low, so heparin was redosed.  Additional heparin was given as indicated by time and ACT.  The staples were removed from the right groin incision and the Vicryl sutures were removed from the various levels.  There was small amount of liquid hematoma, but no evidence of compression.  The proximal common femoral artery had a good pulse and it, but there was a transition in the proximal to mid common femoral artery.  There was no pulse in the mid common femoral artery and at the site of the arteriotomy closure or beyond.  The proximal common femoral artery was clamped as practical, and the distal common femoral artery was clamped near the its bifurcation.  A longitudinal common femoral arteriotomy was made, 2 revealed a dissection in the proximal to midportion of the artery.  The artery appeared normal proximally and distally.  A femoral endarterectomy was performed and remaining debris was removed from the endarterectomy site.  Inflow was assessed.  Bovine pericardial patch closure was performed.  Flushing maneuvers performed and flow restored.  The entire common femoral artery had a good pulse, and there was an excellent Doppler signal in the proximal superficial and deep femoral arteries.  In addition, there was a Doppler signal in the popliteal and in the tibial arteries.  The patch was punctured with a micropuncture kit and micropuncture wire used to exchange the needle for a micropuncture catheter.  The common femoral artery was clamped just distal to the puncture site and a iliac arteriogram was performed.  There was no residual thrombus and immediate clearing of contrast from the iliac segment.  A runoff arteriogram was obtained from the same puncture site, with oblique imaging of the common femoral artery, demonstrating a normal appearance.  There was patency of the superficial and deep femoral, popliteal and tibial arteries.  The result was accepted.  A 6-0  Prolene repair suture was placed at the puncture site following catheter removal.  An excellent pulse was palpated.  Topical hemostasis was achieved with fibrillar.  The heparin was not reversed nor was the heparin drip discontinued.  Wound hemostasis was seen to be complete.  The wound was closed in layers with Vicryl sutures and staples.  The ankle had good range of motion, but the anterior compartment was becoming firm.  I elected to perform fasciotomies.  A longitudinal incision was made in the mid calf over a distance of about 10 cm.  Subcutaneous tissue was dissected.  The fascia was incised, to reveal normal-appearing, briskly reactive muscle and what turned out to be the anterior compartment.  Attachments were freed from the underlying muscle, and close fasciotomy was performed proximally and distally.  Happily, there was no bleeding.  The maneuver was repeated on the medial side to release the superficial posterior compartment.  The medial gastrocnemius and soleus muscles appeared normal.  Small incision was made in the deep fascia to reveal normal appearing soleus muscle.  The subcutaneous tissue was not reapproximated, but the skin was closed in a single layer with staples.  Sterile dressings were applied.  At its conclusion the patient had tolerated the procedure well, and without apparent complications.  Sponge and needle counts were correct.  The patient was taken to the recovery area in stable condition.    Ghassan Celestin MD     12/20/2024, 17:30 EST

## 2024-12-20 NOTE — PROGRESS NOTES
"Pharmacy Antimicrobial Dosing Service    Subjective:  Yarelis Jackson is a 54 y.o.female admitted with DKA. Pharmacy has been consulted to dose Vancomycin for possible PNA.    PMH: COPD, HTN, T2DM diagnosis this admission      Assessment/Plan    1. Day #2 Vancomycin: Pulse dosing d/t renal dysfxn. CRRT stopped this AM. Random level this morning was 18.9 mcg/mL and gave vancomycin 1500 mg IV x1. Will obtain random with AM labs and plan to re-dose when level is expected to be <20 mcg/mL.     2. Day #2 Ceftriaxone: 2 g IV q24h.    Will continue to monitor drug levels, renal function, culture and sensitivities, and patient clinical status.       Objective:  Relevant clinical data and objective history reviewed:  162.6 cm (64\")   88.5 kg (195 lb 1.7 oz)   Ideal body weight: 54.7 kg (120 lb 9.5 oz)  Adjusted ideal body weight: 68.2 kg (150 lb 6.4 oz)  Body mass index is 33.49 kg/m².    Results from last 7 days   Lab Units 12/20/24  0436   VANCOMYCIN RM mcg/mL 18.90     Results from last 7 days   Lab Units 12/20/24  1210 12/20/24  0745 12/20/24  0436   CREATININE mg/dL 1.51* 1.49* 1.20*     Estimated Creatinine Clearance: 46.4 mL/min (A) (by C-G formula based on SCr of 1.51 mg/dL (H)).  I/O last 3 completed shifts:  In: 9184 [P.O.:301; I.V.:8083; Other:800]  Out: 1047 [Urine:345; Blood:100]    Results from last 7 days   Lab Units 12/20/24  0639 12/20/24  0436 12/20/24  0010   WBC 10*3/mm3 15.52* 12.92* 10.37     Temperature    12/20/24 0400 12/20/24 0800 12/20/24 1200   Temp: 98.4 °F (36.9 °C) 98 °F (36.7 °C) 98.6 °F (37 °C)     Baseline culture/source/susceptibility:  Microbiology Results (last 10 days)       Procedure Component Value - Date/Time    Respiratory Culture - Sputum, ET Suction [[id number]] Collected: 12/19/24 1907    Lab Status: Preliminary result Specimen: Sputum from ET Suction Updated: 12/20/24 1224     Respiratory Culture Culture in progress     Gram Stain Many (4+) WBCs per low power field      Rare (1+) " Epithelial cells per low power field      Few (2+) Mixed bacterial morphotypes seen on Gram Stain    S. Pneumo Ag Urine or CSF - Urine, Indwelling Urethral Catheter [[id number]]  (Normal) Collected: 12/19/24 1152    Lab Status: Final result Specimen: Urine from Indwelling Urethral Catheter Updated: 12/19/24 1928     Strep Pneumo Ag Negative    Legionella Antigen, Urine - Urine, Indwelling Urethral Catheter [[id number]]  (Normal) Collected: 12/19/24 1152    Lab Status: Final result Specimen: Urine from Indwelling Urethral Catheter Updated: 12/19/24 1928     LEGIONELLA ANTIGEN, URINE Negative    Eosinophil Smear - Urine, Urine, Clean Catch [[id number]]  (Normal) Collected: 12/19/24 0905    Lab Status: Final result Specimen: Urine, Clean Catch Updated: 12/19/24 0946     Eosinophil Smear 0 % EOS/100 Cells     MRSA Screen, PCR (Inpatient) - Swab, Nares [[id number]]  (Abnormal) Collected: 12/18/24 0839    Lab Status: Final result Specimen: Swab from Nares Updated: 12/18/24 1024     MRSA PCR MRSA Detected    Narrative:      The negative predictive value of this diagnostic test is high and should only be used to consider de-escalating anti-MRSA therapy. A positive result may indicate colonization with MRSA and must be correlated clinically.    Blood Culture - Blood, Arm, Right [[id number]]  (Normal) Collected: 12/18/24 0557    Lab Status: Preliminary result Specimen: Blood from Arm, Right Updated: 12/20/24 0615     Blood Culture No growth at 2 days    Narrative:      Less than seven (7) mL's of blood was collected.  Insufficient quantity may yield false negative results.    Blood Culture - Blood, Arm, Right [[id number]]  (Normal) Collected: 12/18/24 0504    Lab Status: Preliminary result Specimen: Blood from Arm, Right Updated: 12/20/24 0515     Blood Culture No growth at 2 days    Narrative:      Less than seven (7) mL's of blood was collected.  Insufficient quantity may yield false negative results.    Respiratory Panel PCR  w/COVID-19(SARS-CoV-2) REBEKAH/JAN/ALFREDO/PAD/COR/VERONICA In-House, NP Swab in UTM/VTM, 2 HR TAT - Swab, Nasopharynx [[id number]]  (Normal) Collected: 12/18/24 0503    Lab Status: Final result Specimen: Swab from Nasopharynx Updated: 12/18/24 0610     ADENOVIRUS, PCR Not Detected     Coronavirus 229E Not Detected     Coronavirus HKU1 Not Detected     Coronavirus NL63 Not Detected     Coronavirus OC43 Not Detected     COVID19 Not Detected     Human Metapneumovirus Not Detected     Human Rhinovirus/Enterovirus Not Detected     Influenza A PCR Not Detected     Influenza B PCR Not Detected     Parainfluenza Virus 1 Not Detected     Parainfluenza Virus 2 Not Detected     Parainfluenza Virus 3 Not Detected     Parainfluenza Virus 4 Not Detected     RSV, PCR Not Detected     Bordetella pertussis pcr Not Detected     Bordetella parapertussis PCR Not Detected     Chlamydophila pneumoniae PCR Not Detected     Mycoplasma pneumo by PCR Not Detected    Narrative:      In the setting of a positive respiratory panel with a viral infection PLUS a negative procalcitonin without other underlying concern for bacterial infection, consider observing off antibiotics or discontinuation of antibiotics and continue supportive care. If the respiratory panel is positive for atypical bacterial infection (Bordetella pertussis, Chlamydophila pneumoniae, or Mycoplasma pneumoniae), consider antibiotic de-escalation to target atypical bacterial infection.            Roxana Espinoza, PharmD  12/20/24 18:13 EST

## 2024-12-20 NOTE — PROGRESS NOTES
Name: Yarelis Jackson ADMIT: 2024   : 1970  PCP: Katie Duran PA    MRN: [id number] LOS: 2 days   AGE/SEX: 54 y.o. female  ROOM: [address] [place] [address]    54 y.o. female critically ill with DKA and respiratory failure.  Had acute RLE ischemia last night, underwent iliofemoral thrombectomy and arteriogram, demonstrating arterial patency.  Hemodynamically about the same.  No CRRT because of access issues.    Scheduled Medications:   calcium gluconate, 2,000 mg, Intravenous, Q8H  cefTRIAXone, 2,000 mg, Intravenous, Q24H  chlorhexidine, 15 mL, Mouth/Throat, Q12H  lansoprazole, 30 mg, Nasogastric, Q AM  mupirocin, 1 Application, Each Nare, BID  nystatin, 1 Application, Topical, Q8H  [START ON 2024] permethrin, 1 Application, Topical, Once  QUEtiapine, 25 mg, Nasogastric, Q12H  sodium chloride, 10 mL, Intravenous, Q12H  sodium chloride, 10 mL, Intravenous, Q12H  vancomycin, 1,500 mg, Intravenous, Once    Active Infusions:dexmedetomidine, 0.2-1.5 mcg/kg/hr, Last Rate: 1.5 mcg/kg/hr (24 1146)  dextrose, 150 mL/hr, Last Rate: 150 mL/hr (24 0311)  fentanyl 10 mcg/mL,  mcg/hr, Last Rate: 100 mcg/hr (24 2349)  heparin, 16.9 Units/kg/hr, Last Rate: 16.9 Units/kg/hr (24 0325)  insulin, 0-100 Units/hr, Last Rate: 6.8 Units/hr (24 0643)  norepinephrine, 0.05-0.5 mcg/kg/min, Last Rate: 0.16 mcg/kg/min (24 0632)  Pharmacy to dose vancomycin,   Phoxillum BK4/2.5, 2,000 mL/hr, Last Rate: 2,000 mL/hr (24 0151)  Phoxillum BK4/2.5, 2,000 mL/hr, Last Rate: 2,000 mL/hr (24)  Phoxillum BK4/2.5, 2,000 mL/hr, Last Rate: 2,000 mL/hr (24)    Vital Signs  Vital Signs Patient Vitals for the past 24 hrs:   BP Temp Temp src Pulse Resp SpO2   24 0700 -- -- -- 77 -- 99 %   24 0630 -- -- -- 78 -- 98 %   24 0600 93/55 -- -- 80 24 97 %   24 0542 (!) 88/52 -- -- -- -- --   24 0530 -- -- -- 79 -- 100 %   24 0500 -- -- -- 81 -- 99 %    12/20/24 0430 -- -- -- 81 -- 100 %   12/20/24 0400 99/57 98.4 °F (36.9 °C) Oral 82 24 100 %   12/20/24 0330 -- -- -- 86 -- 99 %   12/20/24 0308 (!) 85/41 -- -- -- -- --   12/20/24 0300 -- -- -- 89 -- 99 %   12/20/24 0256 92/55 -- -- -- -- --   12/20/24 0250 93/56 -- -- -- -- --   12/20/24 0230 -- -- -- 91 -- 99 %   12/20/24 0200 116/63 -- -- 92 24 100 %   12/20/24 0130 -- -- -- 93 -- 100 %   12/20/24 0100 -- -- -- 94 -- (!) 83 %   12/20/24 0030 -- -- -- 97 -- 100 %   12/20/24 0000 124/66 99.4 °F (37.4 °C) Oral 98 24 100 %   12/19/24 2330 -- -- -- 99 -- 100 %   12/19/24 2300 -- -- -- 97 -- 100 %   12/19/24 2230 -- -- -- 98 -- 100 %   12/19/24 2200 122/67 -- -- 98 24 100 %   12/19/24 2130 -- -- -- 98 -- 100 %   12/19/24 2115 -- -- -- 99 -- 100 %   12/19/24 2100 -- -- -- 99 -- 100 %   12/19/24 2045 -- -- -- 99 -- 100 %   12/19/24 2030 -- -- -- 101 -- 100 %   12/19/24 2015 -- -- -- 101 -- 100 %   12/19/24 2000 135/73 99.1 °F (37.3 °C) Oral 101 24 100 %   12/19/24 1945 -- -- -- 101 -- 100 %   12/19/24 1930 -- -- -- 101 -- 99 %   12/19/24 1915 -- -- -- 100 -- 100 %   12/19/24 1900 -- -- -- 98 -- 100 %   12/19/24 1845 -- -- -- 97 -- 100 %   12/19/24 1605 -- -- -- 108 24 97 %   12/19/24 1515 -- -- -- 100 -- 90 %   12/19/24 1500 -- -- -- 98 -- 92 %   12/19/24 1445 -- -- -- 98 -- 93 %   12/19/24 1430 -- -- -- 95 -- 94 %   12/19/24 1415 -- -- -- 90 -- 98 %   12/19/24 1400 -- -- -- 81 -- 92 %   12/19/24 1345 -- -- -- 85 -- 92 %   12/19/24 1330 -- -- -- 84 -- 91 %   12/19/24 1315 -- -- -- 80 -- 92 %   12/19/24 1300 -- -- -- 80 -- 94 %   12/19/24 1245 (!) 82/57 -- -- 69 -- 100 %   12/19/24 1240 -- -- -- 67 16 100 %   12/19/24 1237 -- -- -- 66 16 100 %   12/19/24 1230 (!) 89/58 -- -- 65 -- 100 %   12/19/24 1215 95/63 -- -- 64 -- 100 %   12/19/24 1200 96/60 99 °F (37.2 °C) -- 64 22 100 %   12/19/24 1145 99/66 -- -- 64 -- 100 %   12/19/24 1130 (!) 86/56 -- -- 64 -- 100 %   12/19/24 1124 -- -- -- 63 16 100 %   12/19/24 1115 98/79  -- -- 74 -- 100 %   12/19/24 1100 (!) 81/46 -- -- 76 -- 95 %   12/19/24 1045 (!) 76/42 -- -- 77 -- 94 %   12/19/24 1030 (!) 80/53 -- -- 77 -- 96 %   12/19/24 1015 90/43 -- -- 81 -- 94 %   12/19/24 1000 (!) 86/39 98.8 °F (37.1 °C) Axillary 86 25 90 %   12/19/24 0945 -- -- -- 89 -- 93 %   12/19/24 0930 112/68 -- -- 91 -- 96 %   12/19/24 0915 -- -- -- 96 -- 96 %   12/19/24 0900 150/77 -- -- 104 -- 94 %   12/19/24 0800 142/95 99.2 °F (37.3 °C) Axillary 99 15 96 %     I/O:  I/O last 3 completed shifts:  In: 9184 [P.O.:301; I.V.:8083; Other:800]  Out: 1047 [Urine:345; Blood:100]    Physical Exam: Intubated, blood in ETT tube aspirate.  Sedated.  Right groin without hematoma.  Palpable left femoral pulse.  Both lower extremities mottled, with complete rings.  No signals in right pedal arteries.  Signals in left pedal arteries.    CBC    Results from last 7 days   Lab Units 12/20/24  0639 12/20/24  0436 12/20/24  0010 12/19/24  1653 12/19/24  0831 12/18/24  2228 12/18/24  0510 12/18/24  0504   WBC 10*3/mm3 15.52* 12.92* 10.37  --  13.81*  --   --  14.65*   HEMOGLOBIN g/dL 11.5* 12.1 12.9  --  17.1*  --   --  17.1*   HEMOGLOBIN, POC g/dL  --   --   --  14.3  --  14.6 18.0*  --    PLATELETS 10*3/mm3 156 175 160  --  266  --   --  370     BMP   Results from last 7 days   Lab Units 12/20/24  0436 12/20/24  0010 12/19/24  1952 12/19/24  1126 12/19/24  0830 12/19/24  0406 12/19/24  0009   SODIUM mmol/L 135* 142 151* 167* 170* 175* 178*   POTASSIUM mmol/L 5.1 4.9 5.2 4.4 4.3 4.5 4.7   CHLORIDE mmol/L 105 111* 120* 136* 134* >140* >140*   CO2 mmol/L 20.8* 22.3 22.9 14.5* 14.9* 2.7* 2.5*   BUN mg/dL 20 24* 38* 55* 57* 66* 69*   CREATININE mg/dL 1.20* 1.11* 1.31* 1.28* 1.38* 1.61* 1.52*   GLUCOSE mg/dL 170* 119* 168* 203* 160* 158* 151*   MAGNESIUM mg/dL 2.0 2.0 1.9 2.4 2.6 2.9* 3.0*   PHOSPHORUS mg/dL 5.7* 5.3* 6.7*  6.7* 4.7* 3.7 6.1* 5.1*     Cr Clearance Estimated Creatinine Clearance: 58.4 mL/min (A) (by C-G formula based on  SCr of 1.2 mg/dL (H)).  Coag   Results from last 7 days   Lab Units 12/20/24  0240 12/19/24  2232 12/19/24  1952 12/19/24  1428   INR   --   --   --  1.05   APTT seconds 54.6* 147.9* >200.0* <20.0*     Infection   Results from last 7 days   Lab Units 12/18/24  0557 12/18/24  0504   BLOODCX  No growth at 2 days No growth at 2 days     Assessment & Plan   Assessment & Plan    DKA (diabetic ketoacidosis)    Arterial thrombosis    54 y.o. female critically ill with history of right femoral thrombectomy, and arteriogram demonstrating arterial patency down through the tibial arteries.  Performed arteriogram yesterday with continuous heparin infusion and additional bolus.  Did not reverse heparin and has been on heparin all along.  Both lower extremities appear mottled, but has Doppler signals and left pedal arteries, but not on right.  Repeat arterial duplex right lower extremity.  May have experienced recurrent thrombosis.    Ghassan Celestin MD  12/20/24, 07:33 EST    Office contact: [phone]

## 2024-12-20 NOTE — ANESTHESIA PREPROCEDURE EVALUATION
Anesthesia Evaluation     Patient summary reviewed and Nursing notes reviewed   no history of anesthetic complications:   NPO Solid Status: > 8 hours  NPO Liquid Status: > 8 hours           Airway   Mallampati: II  TM distance: >3 FB  Neck ROM: full  No difficulty expected  Comment: ETT in situ  Dental      Pulmonary - normal exam   (+) COPD,    ROS comment: intubated  Cardiovascular - normal exam    (+) hypertension, hyperlipidemia      Neuro/Psych  (+) headaches, numbness  GI/Hepatic/Renal/Endo    (+) diabetes mellitus (DKA) type 2 poorly controlled    Musculoskeletal     (+) neck pain  Abdominal  - normal exam    Bowel sounds: normal.   Substance History      OB/GYN          Other                        Anesthesia Plan    ASA 4 - emergent     general     intravenous induction     Anesthetic plan, risks, benefits, and alternatives have been provided, discussed and informed consent has been obtained with: patient.    Plan discussed with CRNA.      CODE STATUS:    Code Status (Patient has no pulse and is not breathing): CPR (Attempt to Resuscitate)  Medical Interventions (Patient has pulse or is breathing): Full Support

## 2024-12-20 NOTE — NURSING NOTE
Precedex already off when coming onto shift this morning but was not stopped on MAR. Precedex stopped on MAR at 0800 this morning.

## 2024-12-20 NOTE — CONSULTS
Diabetes Education    Patient Name:  Yarelis Jackson  YOB: 1970  MRN: [id number]  Admit Date:  12/18/2024        Patient to be seen for diabetes education. Unable to see patient at this time due to physical condition.  Will follow up at a later date.      Electronically signed by:  Suzy Diaz RN  12/20/24 09:11 EST

## 2024-12-20 NOTE — CASE MANAGEMENT/SOCIAL WORK
Continued Stay Note   Cesar     Patient Name: Yarelis Jackson  MRN: [id number]  Today's Date: 12/20/2024    Admit Date: 12/18/2024    Plan: Plan to dc home with parents, pending clinical course.   Discharge Plan       Row Name 12/20/24 0937       Plan    Plan Plan to dc home with parents, pending clinical course.    Plan Comments DC Barriers: vent 50/5, NG TF, NPO, DKA, restraints, DDAVP, FC, HD CRRT, Insulin/Fent/Dex/Levo/NS @100/Heparin gtt, Vasc Sx/Nephro/Endocrine following.                 Expected Discharge Date and Time       Expected Discharge Date Expected Discharge Time    Dec 26, 2024               WAYNE Brown RN  ICU/CVU   O: [phone]  C: [phone]  [email or web address]

## 2024-12-20 NOTE — CONSULTS
Inpatient Endocrine Consult  Consultation requested by intensivist team for new diagnosis of type 1 diabetes  Patient Care Team:  Katie Duran PA as PCP - General (Physician Assistant)  Uli Starr MD as Consulting Physician (Nephrology)    Chief Complaint: New diagnosis of type 1 diabetes    HPI: This is a 54-year-old female who is currently in ICU intubated and not able to respond I have reviewed the chart apparently has past medical history of COPD, migraine, obesity presented with altered mental status and was admitted with a diagnosis of diabetic ketoacidosis as well as acute renal failure and being followed by nephrology and intensivist team.  She is currently on IV insulin.  Her DKA is improved significantly.      Past Medical History:   Diagnosis Date    COPD (chronic obstructive pulmonary disease)     Low back pain     Migraine     Neck pain        Social History     Socioeconomic History    Marital status:    Tobacco Use    Smoking status: Every Day     Current packs/day: 0.50     Types: Cigarettes    Smokeless tobacco: Never   Vaping Use    Vaping status: Never Used   Substance and Sexual Activity    Alcohol use: Not Currently    Drug use: Never    Sexual activity: Defer       Family History   Problem Relation Age of Onset    Diabetes Mother     Cancer Paternal Uncle     Cancer Paternal Grandmother     COPD Paternal Grandmother     Diabetes Paternal Grandmother        Allergies   Allergen Reactions    Aspirin GI Intolerance    Ibuprofen Itching       ROS:   Unable to obtain as patient is intubated and not responding      Vitals:    12/20/24 1300   BP:    Pulse: 81   Resp:    Temp:    SpO2: 100%      Body mass index is 33.49 kg/m².     Physical Exam:  GEN: Intubated  CV: RRR  LUNG: CTA      Results Review:     I reviewed the patient's new clinical results.    Lab Results   Component Value Date    GLUCOSE 116 (H) 12/20/2024    BUN 25 (H) 12/20/2024    CREATININE 1.51 (H)  12/20/2024    EGFRIFNONA 87 09/29/2021    BCR 16.6 12/20/2024    K 5.0 12/20/2024    CO2 19.9 (L) 12/20/2024    CALCIUM 6.9 (L) 12/20/2024    ALBUMIN 2.5 (L) 12/20/2024     (H) 12/20/2024     (H) 12/20/2024       Lab Results   Component Value Date    HGBA1C 15.80 (H) 12/18/2024     Lab Results   Component Value Date    CREATININE 1.51 (H) 12/20/2024     Results from last 7 days   Lab Units 12/20/24  1316 12/20/24  1209 12/20/24  1111 12/20/24  1008 12/20/24  0852 12/20/24  0744   GLUCOSE mg/dL 117* 122* 131* 161* 223* 197*       Medication Review: Reviewed.       Current Facility-Administered Medications:     acetaminophen (TYLENOL) tablet 650 mg, 650 mg, Oral, Q4H PRN **OR** acetaminophen (TYLENOL) suppository 650 mg, 650 mg, Rectal, Q4H PRN, Ghassan Celestin MD    aluminum-magnesium hydroxide-simethicone (MAALOX MAX) 400-400-40 MG/5ML suspension 15 mL, 15 mL, Oral, Q6H PRN, Ghassan Celestin MD    sennosides-docusate (PERICOLACE) 8.6-50 MG per tablet 2 tablet, 2 tablet, Oral, BID PRN **AND** polyethylene glycol (MIRALAX) packet 17 g, 17 g, Oral, Daily PRN **AND** bisacodyl (DULCOLAX) EC tablet 5 mg, 5 mg, Oral, Daily PRN **AND** bisacodyl (DULCOLAX) suppository 10 mg, 10 mg, Rectal, Daily PRN, Ghassan Celestin MD    calcium gluconate 2000-675 MG/100ML NACL IVPB, 2,000 mg, Intravenous, Q8H, Uli Starr MD, 2,000 mg at 12/20/24 0907    Calcium Replacement - Follow Nurse / BPA Driven Protocol, , Not Applicable, PRN, Ghassan Celestin MD    cefTRIAXone (ROCEPHIN) 2,000 mg in sodium chloride 0.9 % 100 mL MBP, 2,000 mg, Intravenous, Q24H, Ghassan Celestin MD, Last Rate: 200 mL/hr at 12/19/24 1542, 2,000 mg at 12/19/24 1542    chlorhexidine (PERIDEX) 0.12 % solution 15 mL, 15 mL, Mouth/Throat, Q12H, Ghassan Celestin MD, 15 mL at 12/20/24 0918    dexmedetomidine (PRECEDEX) 400 mcg in 100 mL NS infusion, 0.2-1.5 mcg/kg/hr, Intravenous, Titrated, Ghassan Celestin  MD Rey, Stopped at 12/20/24 0800    dextrose (D50W) (25 g/50 mL) IV injection 10-50 mL, 10-50 mL, Intravenous, Q15 Min PRN, Ghassan Celestin MD    dextrose (GLUTOSE) oral gel 15 g, 15 g, Oral, Q15 Min PRN, Ghassan Celestin MD    fentaNYL (SUBLIMAZE) bolus from bag 10 mcg/mL injection 50 mcg, 50 mcg, Intravenous, Q30 Min PRN, Ghassan Celestin MD, 50 mcg at 12/19/24 1500    fentaNYL 2500 mcg in 250 mL NS infusion,  mcg/hr, Intravenous, Titrated, Ghassan Celestin MD, Stopped at 12/20/24 0910    Glucagon (GLUCAGEN) injection 1 mg, 1 mg, Intramuscular, Q15 Min PRN, Ghassan Celestin MD    heparin 47065 units/250 mL (100 units/mL) in 0.45 % NaCl infusion, 16.9 Units/kg/hr, Intravenous, Titrated, Ghassan Celestin MD, Last Rate: 13.18 mL/hr at 12/20/24 1020, 14.9 Units/kg/hr at 12/20/24 1020    heparin bolus from bag solution 3,500 Units, 40 Units/kg, Intravenous, Q6H PRN, Ghassan Celestin MD    heparin bolus from bag solution 7,100 Units, 80 Units/kg, Intravenous, Q6H PRN, Ghassan Celestin MD    HYDROmorphone (DILAUDID) injection 0.5 mg, 0.5 mg, Intravenous, Q2H PRN, Ghassan Celestin MD, 0.5 mg at 12/19/24 0847    insulin regular 1 unit/mL in 0.9% sodium chloride (Glucommander), 0-100 Units/hr, Intravenous, Titrated, Ghassan Celestin MD, Last Rate: 3.4 mL/hr at 12/20/24 1317, 3.4 Units/hr at 12/20/24 1317    ipratropium-albuterol (DUO-NEB) nebulizer solution 3 mL, 3 mL, Nebulization, Q6H PRN, Ghassan Celestin MD, 3 mL at 12/19/24 1237    lansoprazole (PREVACID SOLUTAB) disintegrating tablet Tablet Delayed Release Dispersible 30 mg, 30 mg, Nasogastric, Q AM, Ghassan Celestin MD, 30 mg at 12/20/24 0622    LORazepam (ATIVAN) injection 1 mg, 1 mg, Intravenous, Q4H PRN, Ghassan Celestin MD, 1 mg at 12/19/24 0840    Magnesium Standard Dose Replacement - Follow Nurse / BPA Driven Protocol, , Not Applicable, PRN, Ghassan Celestin MD     mupirocin (BACTROBAN) 2 % nasal ointment 1 Application, 1 Application, Each Nare, BID, Ghassan Celestin MD, 1 Application at 12/20/24 0907    nitroglycerin (NITROSTAT) SL tablet 0.4 mg, 0.4 mg, Sublingual, Q5 Min PRN, Ghassan Celestin MD    norepinephrine (LEVOPHED) 16 mg in 250 mL NS infusion, 0.05-0.5 mcg/kg/min, Intravenous, Titrated, Ghassan Celestin MD, Last Rate: 13.28 mL/hr at 12/20/24 0632, 0.16 mcg/kg/min at 12/20/24 0632    nystatin (MYCOSTATIN) 015031 UNIT/GM cream 1 Application, 1 Application, Topical, Q8H, Ghassan Celestin MD, 1 Application at 12/20/24 0623    ondansetron ODT (ZOFRAN-ODT) disintegrating tablet 4 mg, 4 mg, Oral, Q6H PRN **OR** ondansetron (ZOFRAN) injection 4 mg, 4 mg, Intravenous, Q6H PRN, Ghassan Celestin MD    [COMPLETED] permethrin (ELIMITE) 5 % cream 1 Application, 1 Application, Topical, Once, 1 Application at 12/18/24 1246 **AND** [START ON 12/25/2024] permethrin (ELIMITE) 5 % cream 1 Application, 1 Application, Topical, Once, Ghassan Celestin MD    Pharmacy to dose vancomycin, , Not Applicable, Continuous PRN, Ghassan Celestin MD    Phosphorus Replacement - Follow Nurse / BPA Driven Protocol, , Not Applicable, PRN, Ghassan Celestin MD    Potassium Replacement - Follow Nurse / BPA Driven Protocol, , Not Applicable, PRN, Ghassan Celestin MD    QUEtiapine (SEROquel) tablet 25 mg, 25 mg, Nasogastric, Q12H, Ghassan Celestin MD, 25 mg at 12/20/24 0907    sodium bicarbonate injection 8.4% 50 mEq, 50 mEq, Intravenous, Q8H, Uli Starr MD, 50 mEq at 12/20/24 1008    sodium chloride 0.9 % flush 10 mL, 10 mL, Intravenous, PRN, Ghassan Celestin MD    sodium chloride 0.9 % flush 10 mL, 10 mL, Intravenous, Q12H, Ghassan Celestin MD, 10 mL at 12/20/24 0907    sodium chloride 0.9 % flush 10 mL, 10 mL, Intravenous, PRN, Ghassan Celestin MD    sodium chloride 0.9 % flush 10 mL, 10 mL, Intravenous, Q12H,  Ghassan Celestin MD, 10 mL at 12/20/24 0907    sodium chloride 0.9 % flush 10 mL, 10 mL, Intravenous, PRN, Ghassan Celestin MD    sodium chloride 0.9 % infusion 40 mL, 40 mL, Intravenous, PRN, Ghassan Celestin MD    sodium chloride 0.9 % infusion 40 mL, 40 mL, Intravenous, PRN, Ghassan Celestin MD    sodium chloride 0.9 % infusion, 125 mL/hr, Intravenous, Continuous, Uli Starr MD, Last Rate: 125 mL/hr at 12/20/24 1107, 125 mL/hr at 12/20/24 1107    Vancomycin Pharmacy Intermittent/Pulse Dosing, , Not Applicable, Fawad GONZALES David Anthony, MD          Assessment and plan:  Diabetic ketoacidosis: Resolved    Diabetes mellitus type 1 with hyperglycemia: Currently on IV insulin, given she is critically sick and intubated in ICU I will recommend to continue IV insulin for now.  Once clinically stable we will consider changing her to subcu insulin.    Acute renal failure: Followed by nephrology    Hypernatremia/acidosis: Followed by nephrology    Thank you very much for the consultation.      Rodriguez Worthy MD FACE.

## 2024-12-20 NOTE — PROGRESS NOTES
Vascular Surgery    Repeat duplex demonstrates poor flow in the visualized right lower extremity arteries, unchanged from yesterday prior to thrombectomy.  I suspect recurrent thrombosis.  She has a mottled skin pattern involving both lower extremities, but she has Doppler signals on the left.  No signals on the right.  Yesterday I performed thrombectomy, and restored excellent inflow to the limb and performed a single pass thrombectomy, with no thrombus, and documented no distal thrombus in the infrainguinal segment.  There was fine tapering of the tibial arteries, what I interpreted to be spasm in those segments.  There was a beaded appearance in the posterior tibial artery only, which I felt was due to Adam catheter irritation and spasm.  Overnight, the patient has been receiving a continuous heparin infusion with acceptable PTTs.  I am concerned she has experienced recurrent thrombosis with resulting ischemia.  Needs redo iliofemoral thrombectomy and arteriograms up and down her leg.  At this time, she may also need fasciotomies.  I do not have an explanation for why she occluded, nor do I have any degree of confidence that repeat thrombectomy will be successful where the first one failed within 12-18 hours.  I telephoned her sons Sukh and Rohit, who are now listed on the computer as her contacts and on a conference telephone spoke to them both about the nature of the problem.  Although she is critically ill, and may potentially die from her illness, I do not believe she has a chance that viable without repeat attempt at limb salvage.  They agree and asked that I proceed.

## 2024-12-21 ENCOUNTER — APPOINTMENT (OUTPATIENT)
Dept: NEPHROLOGY | Facility: HOSPITAL | Age: 54
End: 2024-12-21
Payer: MEDICAID

## 2024-12-21 ENCOUNTER — APPOINTMENT (OUTPATIENT)
Dept: GENERAL RADIOLOGY | Facility: HOSPITAL | Age: 54
End: 2024-12-21
Payer: MEDICAID

## 2024-12-21 LAB
ABO GROUP BLD: NORMAL
ACT BLD: 176 SECONDS (ref 89–137)
ACT BLD: 245 SECONDS (ref 89–137)
ALBUMIN SERPL-MCNC: 2.3 G/DL (ref 3.5–5.2)
ALBUMIN/GLOB SERPL: 0.9 G/DL
ALP SERPL-CCNC: 91 U/L (ref 39–117)
ALT SERPL W P-5'-P-CCNC: 101 U/L (ref 1–33)
ANION GAP SERPL CALCULATED.3IONS-SCNC: 11.5 MMOL/L (ref 5–15)
ANION GAP SERPL CALCULATED.3IONS-SCNC: 14.2 MMOL/L (ref 5–15)
ANISOCYTOSIS BLD QL: ABNORMAL
APTT PPP: 185.2 SECONDS (ref 22.7–35.4)
APTT PPP: 38.8 SECONDS (ref 22.7–35.4)
APTT PPP: 42.8 SECONDS (ref 22.7–35.4)
APTT PPP: 57.3 SECONDS (ref 22.7–35.4)
APTT PPP: >200 SECONDS (ref 22.7–35.4)
ARTERIAL PATENCY WRIST A: ABNORMAL
ARTERIAL PATENCY WRIST A: ABNORMAL
AST SERPL-CCNC: 234 U/L (ref 1–32)
ATMOSPHERIC PRESS: ABNORMAL MM[HG]
ATMOSPHERIC PRESS: ABNORMAL MM[HG]
BASE DEFICIT: ABNORMAL
BASE EXCESS BLDA CALC-SCNC: -5 MMOL/L (ref 0–3)
BASE EXCESS BLDA CALC-SCNC: 0.3 MMOL/L (ref 0–3)
BASE EXCESS BLDA CALC-SCNC: <0 MMOL/L (ref 0–3)
BDY SITE: ABNORMAL
BDY SITE: ABNORMAL
BILIRUB SERPL-MCNC: 0.2 MG/DL (ref 0–1.2)
BLD GP AB SCN SERPL QL: NEGATIVE
BUN SERPL-MCNC: 38 MG/DL (ref 6–20)
BUN SERPL-MCNC: 46 MG/DL (ref 6–20)
BUN/CREAT SERPL: 18.2 (ref 7–25)
BUN/CREAT SERPL: 18.9 (ref 7–25)
CA-I BLDA-SCNC: 0.89 MMOL/L (ref 1.12–1.32)
CA-I BLDA-SCNC: 1.09 MMOL/L (ref 1.15–1.33)
CA-I SERPL ISE-MCNC: 0.94 MMOL/L (ref 1.15–1.3)
CALCIUM SPEC-SCNC: 6.7 MG/DL (ref 8.6–10.5)
CALCIUM SPEC-SCNC: 6.9 MG/DL (ref 8.6–10.5)
CHLORIDE SERPL-SCNC: 104 MMOL/L (ref 98–107)
CHLORIDE SERPL-SCNC: 105 MMOL/L (ref 98–107)
CK SERPL-CCNC: ABNORMAL U/L (ref 20–180)
CO2 BLDA-SCNC: 21 MMOL/L (ref 22–29)
CO2 BLDA-SCNC: 24 MMOL/L (ref 23–27)
CO2 SERPL-SCNC: 18.8 MMOL/L (ref 22–29)
CO2 SERPL-SCNC: 20.5 MMOL/L (ref 22–29)
CREAT BLDA-MCNC: 2.09 MG/DL (ref 0.6–1.3)
CREAT SERPL-MCNC: 2.09 MG/DL (ref 0.57–1)
CREAT SERPL-MCNC: 2.43 MG/DL (ref 0.57–1)
D-LACTATE SERPL-SCNC: 1.3 MMOL/L (ref 0.2–2)
D-LACTATE SERPL-SCNC: 1.5 MMOL/L (ref 0.5–2)
DEPRECATED RDW RBC AUTO: 46.6 FL (ref 37–54)
DEPRECATED RDW RBC AUTO: 46.7 FL (ref 37–54)
DEPRECATED RDW RBC AUTO: 47 FL (ref 37–54)
DOHLE BOD BLD QL SMEAR: PRESENT
EGFRCR SERPLBLD CKD-EPI 2021: 23.3 ML/MIN/1.73
EGFRCR SERPLBLD CKD-EPI 2021: 27.9 ML/MIN/1.73
EGFRCR SERPLBLD CKD-EPI 2021: 27.9 ML/MIN/1.73
ERYTHROCYTE [DISTWIDTH] IN BLOOD BY AUTOMATED COUNT: 12.7 % (ref 12.3–15.4)
ERYTHROCYTE [DISTWIDTH] IN BLOOD BY AUTOMATED COUNT: 12.8 % (ref 12.3–15.4)
ERYTHROCYTE [DISTWIDTH] IN BLOOD BY AUTOMATED COUNT: 12.9 % (ref 12.3–15.4)
GLOBULIN UR ELPH-MCNC: 2.6 GM/DL
GLUCOSE BLDC GLUCOMTR-MCNC: 110 MG/DL (ref 70–105)
GLUCOSE BLDC GLUCOMTR-MCNC: 111 MG/DL (ref 70–105)
GLUCOSE BLDC GLUCOMTR-MCNC: 112 MG/DL (ref 70–105)
GLUCOSE BLDC GLUCOMTR-MCNC: 113 MG/DL (ref 70–105)
GLUCOSE BLDC GLUCOMTR-MCNC: 115 MG/DL (ref 70–105)
GLUCOSE BLDC GLUCOMTR-MCNC: 117 MG/DL (ref 70–105)
GLUCOSE BLDC GLUCOMTR-MCNC: 123 MG/DL (ref 70–105)
GLUCOSE BLDC GLUCOMTR-MCNC: 126 MG/DL (ref 70–105)
GLUCOSE BLDC GLUCOMTR-MCNC: 130 MG/DL (ref 70–105)
GLUCOSE BLDC GLUCOMTR-MCNC: 131 MG/DL (ref 70–105)
GLUCOSE BLDC GLUCOMTR-MCNC: 139 MG/DL (ref 70–105)
GLUCOSE BLDC GLUCOMTR-MCNC: 149 MG/DL (ref 70–105)
GLUCOSE BLDC GLUCOMTR-MCNC: 157 MG/DL (ref 70–105)
GLUCOSE BLDC GLUCOMTR-MCNC: 160 MG/DL (ref 74–100)
GLUCOSE BLDC GLUCOMTR-MCNC: 160 MG/DL (ref 74–100)
GLUCOSE BLDC GLUCOMTR-MCNC: 161 MG/DL (ref 70–105)
GLUCOSE BLDC GLUCOMTR-MCNC: 162 MG/DL (ref 70–105)
GLUCOSE BLDC GLUCOMTR-MCNC: 168 MG/DL (ref 70–105)
GLUCOSE BLDC GLUCOMTR-MCNC: 176 MG/DL (ref 70–105)
GLUCOSE BLDC GLUCOMTR-MCNC: 190 MG/DL (ref 70–105)
GLUCOSE BLDC GLUCOMTR-MCNC: 74 MG/DL (ref 70–105)
GLUCOSE BLDC GLUCOMTR-MCNC: 76 MG/DL (ref 70–105)
GLUCOSE BLDC GLUCOMTR-MCNC: 96 MG/DL (ref 70–105)
GLUCOSE SERPL-MCNC: 154 MG/DL (ref 65–99)
GLUCOSE SERPL-MCNC: 181 MG/DL (ref 65–99)
HCO3 BLDA-SCNC: 19.9 MMOL/L (ref 21–28)
HCO3 BLDA-SCNC: 22.1 MMOL/L (ref 22–26)
HCO3 BLDA-SCNC: 25.3 MMOL/L (ref 21–28)
HCT VFR BLD AUTO: 20.1 % (ref 34–46.6)
HCT VFR BLD AUTO: 22.6 % (ref 34–46.6)
HCT VFR BLD AUTO: 27.2 % (ref 34–46.6)
HCT VFR BLDA CALC: 20 % (ref 38–51)
HCT VFR BLDA CALC: 33 % (ref 38–51)
HEMODILUTION: NO
HEMODILUTION: NO
HGB BLD-MCNC: 6.7 G/DL (ref 12–15.9)
HGB BLD-MCNC: 7.5 G/DL (ref 12–15.9)
HGB BLD-MCNC: 8.7 G/DL (ref 12–15.9)
HGB BLDA-MCNC: 11.2 G/DL (ref 12–17)
HGB BLDA-MCNC: 6.8 G/DL (ref 12–17)
INHALED O2 CONCENTRATION: 35 %
INHALED O2 CONCENTRATION: 35 %
INSPIRATORY TIME: 1
LYMPHOCYTES # BLD MANUAL: 1.07 10*3/MM3 (ref 0.7–3.1)
LYMPHOCYTES # BLD MANUAL: 1.18 10*3/MM3 (ref 0.7–3.1)
LYMPHOCYTES # BLD MANUAL: 1.22 10*3/MM3 (ref 0.7–3.1)
LYMPHOCYTES NFR BLD MANUAL: 3 % (ref 5–12)
LYMPHOCYTES NFR BLD MANUAL: 4 % (ref 5–12)
LYMPHOCYTES NFR BLD MANUAL: 6 % (ref 5–12)
Lab: ABNORMAL
MAGNESIUM SERPL-MCNC: 2 MG/DL (ref 1.6–2.6)
MAGNESIUM SERPL-MCNC: 2.1 MG/DL (ref 1.6–2.6)
MCH RBC QN AUTO: 32 PG (ref 26.6–33)
MCH RBC QN AUTO: 33.2 PG (ref 26.6–33)
MCH RBC QN AUTO: 33.2 PG (ref 26.6–33)
MCHC RBC AUTO-ENTMCNC: 32 G/DL (ref 31.5–35.7)
MCHC RBC AUTO-ENTMCNC: 33.2 G/DL (ref 31.5–35.7)
MCHC RBC AUTO-ENTMCNC: 33.3 G/DL (ref 31.5–35.7)
MCV RBC AUTO: 100 FL (ref 79–97)
MCV RBC AUTO: 100 FL (ref 79–97)
MCV RBC AUTO: 99.5 FL (ref 79–97)
METAMYELOCYTES NFR BLD MANUAL: 2 % (ref 0–0)
MODALITY: ABNORMAL
MODALITY: ABNORMAL
MONOCYTES # BLD: 0.34 10*3/MM3 (ref 0.1–0.9)
MONOCYTES # BLD: 0.46 10*3/MM3 (ref 0.1–0.9)
MONOCYTES # BLD: 0.49 10*3/MM3 (ref 0.1–0.9)
MYELOCYTES NFR BLD MANUAL: 1 % (ref 0–0)
NEUTROPHILS # BLD AUTO: 13.22 10*3/MM3 (ref 1.7–7)
NEUTROPHILS # BLD AUTO: 6.66 10*3/MM3 (ref 1.7–7)
NEUTROPHILS # BLD AUTO: 6.84 10*3/MM3 (ref 1.7–7)
NEUTROPHILS NFR BLD MANUAL: 58 % (ref 42.7–76)
NEUTROPHILS NFR BLD MANUAL: 74 % (ref 42.7–76)
NEUTROPHILS NFR BLD MANUAL: 78 % (ref 42.7–76)
NEUTS BAND NFR BLD MANUAL: 29 % (ref 0–5)
NEUTS BAND NFR BLD MANUAL: 3 % (ref 0–5)
NEUTS BAND NFR BLD MANUAL: 7 % (ref 0–5)
NEUTS VAC BLD QL SMEAR: ABNORMAL
NOTIFIED WHO: ABNORMAL
PCO2 BLDA: 35.5 MM HG (ref 35–48)
PCO2 BLDA: 41.6 MM HG (ref 35–48)
PCO2 BLDA: 59.7 MM HG (ref 35–45)
PEEP RESPIRATORY: 5 CM[H2O]
PEEP RESPIRATORY: 5 CM[H2O]
PH BLDA: 7.18 PH UNITS (ref 7.35–7.45)
PH BLDA: 7.36 PH UNITS (ref 7.35–7.45)
PH BLDA: 7.39 PH UNITS (ref 7.35–7.45)
PHOSPHATE SERPL-MCNC: 6.8 MG/DL (ref 2.5–4.5)
PHOSPHATE SERPL-MCNC: 7 MG/DL (ref 2.5–4.5)
PLAT MORPH BLD: NORMAL
PLATELET # BLD AUTO: 68 10*3/MM3 (ref 140–450)
PLATELET # BLD AUTO: 85 10*3/MM3 (ref 140–450)
PLATELET # BLD AUTO: 99 10*3/MM3 (ref 140–450)
PMV BLD AUTO: 10.3 FL (ref 6–12)
PO2 BLD: 208 MM[HG] (ref 0–500)
PO2 BLD: 319 MM[HG] (ref 0–500)
PO2 BLDA: 111.6 MM HG (ref 83–108)
PO2 BLDA: 72.7 MM HG (ref 83–108)
PO2 BLDA: 78 MM HG (ref 80–105)
POTASSIUM BLDA-SCNC: 3.5 MMOL/L (ref 3.5–4.5)
POTASSIUM BLDA-SCNC: 4.8 MMOL/L (ref 3.5–4.9)
POTASSIUM SERPL-SCNC: 5 MMOL/L (ref 3.5–5.2)
POTASSIUM SERPL-SCNC: 5.3 MMOL/L (ref 3.5–5.2)
PROT SERPL-MCNC: 4.9 G/DL (ref 6–8.5)
RBC # BLD AUTO: 2.02 10*6/MM3 (ref 3.77–5.28)
RBC # BLD AUTO: 2.26 10*6/MM3 (ref 3.77–5.28)
RBC # BLD AUTO: 2.72 10*6/MM3 (ref 3.77–5.28)
READ BACK: ABNORMAL
RESPIRATORY RATE: 26
RESPIRATORY RATE: 26
RH BLD: POSITIVE
SAO2 % BLDCOA: 91 % (ref 95–98)
SAO2 % BLDCOA: 93.9 % (ref 94–98)
SAO2 % BLDCOA: 98.3 % (ref 94–98)
SCAN SLIDE: NORMAL
SMALL PLATELETS BLD QL SMEAR: ABNORMAL
SMALL PLATELETS BLD QL SMEAR: ABNORMAL
SODIUM BLD-SCNC: 134 MMOL/L (ref 138–146)
SODIUM BLD-SCNC: 139 MMOL/L (ref 138–146)
SODIUM SERPL-SCNC: 136 MMOL/L (ref 136–145)
SODIUM SERPL-SCNC: 138 MMOL/L (ref 136–145)
T&S EXPIRATION DATE: NORMAL
TOXIC GRANULATION: ABNORMAL
TRIGL SERPL-MCNC: 336 MG/DL (ref 0–150)
VANCOMYCIN SERPL-MCNC: 20.8 MCG/ML (ref 5–40)
VARIANT LYMPHS NFR BLD MANUAL: 13 % (ref 19.6–45.3)
VARIANT LYMPHS NFR BLD MANUAL: 14 % (ref 19.6–45.3)
VARIANT LYMPHS NFR BLD MANUAL: 8 % (ref 19.6–45.3)
VENTILATOR MODE: ABNORMAL
VENTILATOR MODE: AC
VT ON VENT VENT: 500 ML
VT ON VENT VENT: 500 ML
WBC NRBC COR # BLD AUTO: 15.2 10*3/MM3 (ref 3.4–10.8)
WBC NRBC COR # BLD AUTO: 8.22 10*3/MM3 (ref 3.4–10.8)
WBC NRBC COR # BLD AUTO: 8.44 10*3/MM3 (ref 3.4–10.8)

## 2024-12-21 PROCEDURE — 94761 N-INVAS EAR/PLS OXIMETRY MLT: CPT

## 2024-12-21 PROCEDURE — 25010000002 HEPARIN (PORCINE) 25000-0.45 UT/250ML-% SOLUTION: Performed by: SURGERY

## 2024-12-21 PROCEDURE — 86900 BLOOD TYPING SEROLOGIC ABO: CPT | Performed by: SURGERY

## 2024-12-21 PROCEDURE — 83605 ASSAY OF LACTIC ACID: CPT

## 2024-12-21 PROCEDURE — 86923 COMPATIBILITY TEST ELECTRIC: CPT

## 2024-12-21 PROCEDURE — 85007 BL SMEAR W/DIFF WBC COUNT: CPT | Performed by: SURGERY

## 2024-12-21 PROCEDURE — 94799 UNLISTED PULMONARY SVC/PX: CPT

## 2024-12-21 PROCEDURE — 25010000002 LORAZEPAM PER 2 MG: Performed by: SURGERY

## 2024-12-21 PROCEDURE — 82803 BLOOD GASES ANY COMBINATION: CPT

## 2024-12-21 PROCEDURE — 25010000002 HEPARIN (PORCINE) PER 1000 UNITS: Performed by: INTERNAL MEDICINE

## 2024-12-21 PROCEDURE — P9047 ALBUMIN (HUMAN), 25%, 50ML: HCPCS | Performed by: INTERNAL MEDICINE

## 2024-12-21 PROCEDURE — 86901 BLOOD TYPING SEROLOGIC RH(D): CPT

## 2024-12-21 PROCEDURE — 85730 THROMBOPLASTIN TIME PARTIAL: CPT | Performed by: STUDENT IN AN ORGANIZED HEALTH CARE EDUCATION/TRAINING PROGRAM

## 2024-12-21 PROCEDURE — 80202 ASSAY OF VANCOMYCIN: CPT | Performed by: INTERNAL MEDICINE

## 2024-12-21 PROCEDURE — 99024 POSTOP FOLLOW-UP VISIT: CPT | Performed by: SURGERY

## 2024-12-21 PROCEDURE — 85025 COMPLETE CBC W/AUTO DIFF WBC: CPT | Performed by: SURGERY

## 2024-12-21 PROCEDURE — 83735 ASSAY OF MAGNESIUM: CPT | Performed by: SURGERY

## 2024-12-21 PROCEDURE — 25010000002 ALBUMIN HUMAN 25% PER 50 ML: Performed by: INTERNAL MEDICINE

## 2024-12-21 PROCEDURE — 84100 ASSAY OF PHOSPHORUS: CPT | Performed by: SURGERY

## 2024-12-21 PROCEDURE — 85730 THROMBOPLASTIN TIME PARTIAL: CPT | Performed by: SURGERY

## 2024-12-21 PROCEDURE — 25810000003 SODIUM CHLORIDE 0.9 % SOLUTION: Performed by: INTERNAL MEDICINE

## 2024-12-21 PROCEDURE — 94003 VENT MGMT INPAT SUBQ DAY: CPT

## 2024-12-21 PROCEDURE — 80053 COMPREHEN METABOLIC PANEL: CPT | Performed by: SURGERY

## 2024-12-21 PROCEDURE — 80051 ELECTROLYTE PANEL: CPT

## 2024-12-21 PROCEDURE — 25010000002 CALCIUM GLUCONATE 2-0.675 GM/100ML-% SOLUTION: Performed by: INTERNAL MEDICINE

## 2024-12-21 PROCEDURE — 71045 X-RAY EXAM CHEST 1 VIEW: CPT

## 2024-12-21 PROCEDURE — 82330 ASSAY OF CALCIUM: CPT

## 2024-12-21 PROCEDURE — 85018 HEMOGLOBIN: CPT

## 2024-12-21 PROCEDURE — 25810000003 SODIUM CHLORIDE 0.9 % SOLUTION: Performed by: SURGERY

## 2024-12-21 PROCEDURE — 25010000002 VANCOMYCIN 1 G RECONSTITUTED SOLUTION 1 EACH VIAL: Performed by: INTERNAL MEDICINE

## 2024-12-21 PROCEDURE — 25010000002 NICARDIPINE 2.5 MG/ML SOLUTION 10 ML VIAL

## 2024-12-21 PROCEDURE — 86900 BLOOD TYPING SEROLOGIC ABO: CPT

## 2024-12-21 PROCEDURE — 82948 REAGENT STRIP/BLOOD GLUCOSE: CPT

## 2024-12-21 PROCEDURE — 83605 ASSAY OF LACTIC ACID: CPT | Performed by: INTERNAL MEDICINE

## 2024-12-21 PROCEDURE — 86901 BLOOD TYPING SEROLOGIC RH(D): CPT | Performed by: SURGERY

## 2024-12-21 PROCEDURE — 86850 RBC ANTIBODY SCREEN: CPT | Performed by: SURGERY

## 2024-12-21 PROCEDURE — 82330 ASSAY OF CALCIUM: CPT | Performed by: SURGERY

## 2024-12-21 PROCEDURE — 5A1D70Z PERFORMANCE OF URINARY FILTRATION, INTERMITTENT, LESS THAN 6 HOURS PER DAY: ICD-10-PCS | Performed by: INTERNAL MEDICINE

## 2024-12-21 PROCEDURE — 25010000002 HYDROMORPHONE PER 4 MG: Performed by: SURGERY

## 2024-12-21 PROCEDURE — 25810000003 SODIUM CHLORIDE 0.9 % SOLUTION 250 ML FLEX CONT

## 2024-12-21 PROCEDURE — 84478 ASSAY OF TRIGLYCERIDES: CPT | Performed by: INTERNAL MEDICINE

## 2024-12-21 PROCEDURE — 82550 ASSAY OF CK (CPK): CPT | Performed by: SURGERY

## 2024-12-21 PROCEDURE — 99232 SBSQ HOSP IP/OBS MODERATE 35: CPT | Performed by: INTERNAL MEDICINE

## 2024-12-21 PROCEDURE — 25810000003 SODIUM CHLORIDE 0.9 % SOLUTION 250 ML FLEX CONT: Performed by: INTERNAL MEDICINE

## 2024-12-21 PROCEDURE — 25010000002 CEFTRIAXONE PER 250 MG: Performed by: SURGERY

## 2024-12-21 PROCEDURE — P9016 RBC LEUKOCYTES REDUCED: HCPCS

## 2024-12-21 PROCEDURE — 82565 ASSAY OF CREATININE: CPT

## 2024-12-21 PROCEDURE — 25010000002 CALCIUM GLUCONATE 2-0.675 GM/100ML-% SOLUTION: Performed by: SURGERY

## 2024-12-21 RX ORDER — HEPARIN SODIUM 1000 [USP'U]/ML
3000 INJECTION, SOLUTION INTRAVENOUS; SUBCUTANEOUS AS NEEDED
Status: DISCONTINUED | OUTPATIENT
Start: 2024-12-21 | End: 2025-01-10 | Stop reason: HOSPADM

## 2024-12-21 RX ORDER — CALCIUM GLUCONATE 20 MG/ML
2000 INJECTION, SOLUTION INTRAVENOUS EVERY 8 HOURS
Status: COMPLETED | OUTPATIENT
Start: 2024-12-21 | End: 2024-12-21

## 2024-12-21 RX ORDER — SODIUM CHLORIDE 9 MG/ML
125 INJECTION, SOLUTION INTRAVENOUS CONTINUOUS
Status: DISCONTINUED | OUTPATIENT
Start: 2024-12-21 | End: 2024-12-22

## 2024-12-21 RX ORDER — ALBUMIN (HUMAN) 12.5 G/50ML
25 SOLUTION INTRAVENOUS EVERY 8 HOURS
Status: DISCONTINUED | OUTPATIENT
Start: 2024-12-21 | End: 2024-12-22

## 2024-12-21 RX ORDER — CALCIUM GLUCONATE 20 MG/ML
2000 INJECTION, SOLUTION INTRAVENOUS EVERY 12 HOURS
Status: DISCONTINUED | OUTPATIENT
Start: 2024-12-21 | End: 2024-12-21

## 2024-12-21 RX ADMIN — SODIUM BICARBONATE 50 MEQ: 84 INJECTION INTRAVENOUS at 16:26

## 2024-12-21 RX ADMIN — CALCIUM GLUCONATE 2000 MG: 20 INJECTION, SOLUTION INTRAVENOUS at 00:57

## 2024-12-21 RX ADMIN — CALCIUM GLUCONATE 2000 MG: 20 INJECTION, SOLUTION INTRAVENOUS at 16:44

## 2024-12-21 RX ADMIN — CHLORHEXIDINE GLUCONATE, 0.12% ORAL RINSE 15 ML: 1.2 SOLUTION DENTAL at 20:11

## 2024-12-21 RX ADMIN — SODIUM CHLORIDE 5 MG/HR: 9 INJECTION, SOLUTION INTRAVENOUS at 22:57

## 2024-12-21 RX ADMIN — QUETIAPINE FUMARATE 25 MG: 25 TABLET ORAL at 20:11

## 2024-12-21 RX ADMIN — CALCIUM GLUCONATE 2000 MG: 20 INJECTION, SOLUTION INTRAVENOUS at 09:21

## 2024-12-21 RX ADMIN — HEPARIN SODIUM 3000 UNITS: 1000 INJECTION INTRAVENOUS; SUBCUTANEOUS at 15:28

## 2024-12-21 RX ADMIN — ALBUMIN (HUMAN) 25 G: 0.25 INJECTION, SOLUTION INTRAVENOUS at 16:32

## 2024-12-21 RX ADMIN — MUPIROCIN 1 APPLICATION: 20 OINTMENT TOPICAL at 09:21

## 2024-12-21 RX ADMIN — HEPARIN SODIUM 14.9 UNITS/KG/HR: 10000 INJECTION, SOLUTION INTRAVENOUS at 04:47

## 2024-12-21 RX ADMIN — MUPIROCIN 1 APPLICATION: 20 OINTMENT TOPICAL at 22:33

## 2024-12-21 RX ADMIN — SODIUM CHLORIDE 125 ML/HR: 9 INJECTION, SOLUTION INTRAVENOUS at 16:43

## 2024-12-21 RX ADMIN — CALCIUM GLUCONATE 2000 MG: 20 INJECTION, SOLUTION INTRAVENOUS at 23:59

## 2024-12-21 RX ADMIN — NYSTATIN 1 APPLICATION: 100000 CREAM TOPICAL at 05:47

## 2024-12-21 RX ADMIN — CHLORHEXIDINE GLUCONATE, 0.12% ORAL RINSE 15 ML: 1.2 SOLUTION DENTAL at 09:20

## 2024-12-21 RX ADMIN — Medication 10 ML: at 09:21

## 2024-12-21 RX ADMIN — NYSTATIN 1 APPLICATION: 100000 CREAM TOPICAL at 22:33

## 2024-12-21 RX ADMIN — Medication 0.05 MCG/KG/MIN: at 01:44

## 2024-12-21 RX ADMIN — LORAZEPAM 1 MG: 2 INJECTION INTRAMUSCULAR; INTRAVENOUS at 22:40

## 2024-12-21 RX ADMIN — NYSTATIN 1 APPLICATION: 100000 CREAM TOPICAL at 14:22

## 2024-12-21 RX ADMIN — LANSOPRAZOLE 30 MG: 30 TABLET, ORALLY DISINTEGRATING ORAL at 05:29

## 2024-12-21 RX ADMIN — CEFTRIAXONE SODIUM 2000 MG: 2 INJECTION, POWDER, FOR SOLUTION INTRAMUSCULAR; INTRAVENOUS at 16:21

## 2024-12-21 RX ADMIN — HYDROMORPHONE HYDROCHLORIDE 0.5 MG: 1 INJECTION, SOLUTION INTRAMUSCULAR; INTRAVENOUS; SUBCUTANEOUS at 19:09

## 2024-12-21 RX ADMIN — HYDROMORPHONE HYDROCHLORIDE 0.5 MG: 1 INJECTION, SOLUTION INTRAMUSCULAR; INTRAVENOUS; SUBCUTANEOUS at 03:41

## 2024-12-21 RX ADMIN — SODIUM CHLORIDE 125 ML/HR: 9 INJECTION, SOLUTION INTRAVENOUS at 05:47

## 2024-12-21 RX ADMIN — LORAZEPAM 1 MG: 2 INJECTION INTRAMUSCULAR; INTRAVENOUS at 05:29

## 2024-12-21 RX ADMIN — SODIUM BICARBONATE 50 MEQ: 84 INJECTION INTRAVENOUS at 02:51

## 2024-12-21 RX ADMIN — SODIUM BICARBONATE 50 MEQ: 84 INJECTION INTRAVENOUS at 09:22

## 2024-12-21 RX ADMIN — ALBUMIN (HUMAN) 25 G: 0.25 INJECTION, SOLUTION INTRAVENOUS at 09:22

## 2024-12-21 RX ADMIN — SODIUM CHLORIDE 1000 MG: 9 INJECTION, SOLUTION INTRAVENOUS at 11:59

## 2024-12-21 RX ADMIN — QUETIAPINE FUMARATE 25 MG: 25 TABLET ORAL at 09:21

## 2024-12-21 RX ADMIN — HYDROMORPHONE HYDROCHLORIDE 0.5 MG: 1 INJECTION, SOLUTION INTRAMUSCULAR; INTRAVENOUS; SUBCUTANEOUS at 05:29

## 2024-12-21 RX ADMIN — HYDROMORPHONE HYDROCHLORIDE 0.5 MG: 1 INJECTION, SOLUTION INTRAMUSCULAR; INTRAVENOUS; SUBCUTANEOUS at 13:12

## 2024-12-21 NOTE — PLAN OF CARE
Goal Outcome Evaluation:         Pt remains on insulin and heparin gtts. Restarted levo. Vent 35% FiO2 and 5 PEEP. Nonviolent restraints still applied. Malik and dobhoff ng sill in place. Pt has become more responsive and can follow commands and answer yes and no questions appropriately. Pedal pulses can be heard with doppler. Legs are less mottled

## 2024-12-21 NOTE — CONSULTS
Nutrition Services    Patient Name: Yarelis Jackson  YOB: 1970  MRN: [id number]  Admission date: 12/18/2024    Comment:    --Start Novasource renal at 20 ml/hr, advance by 10 ml q4hrs to goal of 35 ml/hr + 30 ml/hr FWF     CLINICAL NUTRITION ASSESSMENT      Reason for Assessment 12/21: Tube feeding      H&P      Past Medical History:   Diagnosis Date    COPD (chronic obstructive pulmonary disease)     Low back pain     Migraine     Neck pain        Past Surgical History:   Procedure Laterality Date    FEMORAL THROMBECTOMY/EMBOLECTOMY Right 12/19/2024    Procedure: FEMORAL right iliofemoral thrombectomy, arteriogram;  Surgeon: Ghassan Celestin MD;  Location: [facility];  Service: Vascular;  Laterality: Right;    LUNG SURGERY          Current Problems   Diabetic ketoacidosis without coma, with new onset diabetes mellitus, type 2 / Metabolic acidosis, increased anion gap   -Endocrinology is following   Acute kidney injury  -Nephrology is following   Acute Hypernatremia  Septic shock  Acute occlusive thrombus of the right iliac artery with limb ischemia   -Vascular surgery is following   -underwent iliofemoral thrombectomy and arteriogram, demonstrating arterial patency 12/19  -OR for redo of right iliofemoral popliteal thrombectomy, right femoral endarterectomy with patch, right lower extremity arteriogram, and closed right calf fasciotomies. 12/20  Nontraumatic rhabdomyolysis   Hypertriglyceridemia, concern for pancreatitis   Acute metabolic encephalopathy   Acute respiratory failure without hypercapnia / On mechanical Ventilation   Cutaneous candidiasis of groin   Bilateral leg rash, concern for scabies infection   Transaminitis   COPD  Obesity      Encounter Information        Trending Narrative     12/21: Pt was admitted with DKA, endocrinology is following. Nephrology is following for MARGOT and hypernatremia. Vascular surgery is following. Underwent iliofemoral thrombectomy and  "arteriogram, demonstrating arterial patency 12/19, OR for redo of right iliofemoral popliteal thrombectomy, right femoral endarterectomy with patch, right lower extremity arteriogram, and closed right calf fasciotomies 12/20. Pt is currently intubated. On norepi. CRRT stopped, plan for HD. No family was in the room at my visit. NFPE completed and not consistent with nutrition diagnosis of malnutrition at this time using AND/ASPEN criteria       Anthropometrics        Current Height, Weight Height: 162.6 cm (64\")  Weight: 88.5 kg (195 lb 1.7 oz) (12/19/24 0600)       Usual Body Weight (UBW) Unknown        Trending Weight Hx     This admission: 12/21: 195# (obtained 12/19)              PTA: 12/21: No recent weight's documented     Wt Readings from Last 30 Encounters:   12/19/24 0600 88.5 kg (195 lb 1.7 oz)   12/18/24 0527 92.5 kg (203 lb 14.4 oz)   07/15/23 1132 98.1 kg (216 lb 4.3 oz)   04/04/23 0719 98.1 kg (216 lb 4.3 oz)   12/12/22 0952 85.5 kg (188 lb 6.4 oz)   01/31/22 1404 89.8 kg (198 lb)   01/24/22 1406 89.8 kg (198 lb)   12/23/21 1133 89.8 kg (198 lb)   11/24/21 1537 90.3 kg (199 lb)   11/16/21 0838 90.3 kg (199 lb)   09/29/21 0320 87.4 kg (192 lb 10.9 oz)   06/13/21 1652 81.4 kg (179 lb 6.4 oz)   05/14/21 1230 80.1 kg (176 lb 9.4 oz)   04/03/21 2304 80.7 kg (177 lb 14.6 oz)   04/09/20 0025 71 kg (156 lb 8.4 oz)   01/09/20 0532 63.5 kg (140 lb)   09/29/19 0216 70.1 kg (154 lb 8.7 oz)   08/31/19 2205 73.6 kg (162 lb 4.1 oz)   02/05/18 0902 72.6 kg (160 lb)   06/06/16 1303 69.8 kg (153 lb 14.4 oz)   05/04/16 0833 75.3 kg (165 lb 14.4 oz)   03/25/16 1313 78.2 kg (172 lb 6.4 oz)   03/03/16 1115 76 kg (167 lb 9.6 oz)   02/08/16 0952 77.1 kg (170 lb)      BMI kg/m2 Body mass index is 33.49 kg/m².       Labs        Pertinent Labs High phos, will use a renal containing formula    Results from last 7 days   Lab Units 12/21/24  0405 12/20/24  2322 12/20/24  2021 12/20/24  0745 12/20/24  0436 12/19/24  0830 " 12/19/24  0406   SODIUM mmol/L 138 136 135*   < > 135*   < > 175*   POTASSIUM mmol/L 5.0 5.3* 5.3*   < > 5.1   < > 4.5   CHLORIDE mmol/L 105 104 104   < > 105   < > >140*   CO2 mmol/L 18.8* 20.5* 21.1*   < > 20.8*   < > 2.7*   BUN mg/dL 46* 38* 34*   < > 20   < > 66*   CREATININE mg/dL 2.43* 2.09* 1.98*   < > 1.20*   < > 1.61*   CALCIUM mg/dL 6.9* 6.7* 7.1*   < > 7.1*   < > 8.7   BILIRUBIN mg/dL 0.2  --   --   --  0.3  --  0.2   ALK PHOS U/L 91  --   --   --  103  --  145*   ALT (SGPT) U/L 101*  --   --   --  116*  --  124*   AST (SGOT) U/L 234*  --   --   --  185*  --  64*   GLUCOSE mg/dL 181* 154* 142*   < > 170*   < > 158*    < > = values in this interval not displayed.     Results from last 7 days   Lab Units 12/21/24  0405 12/20/24 2322 12/20/24 2021   MAGNESIUM mg/dL 2.1 2.0 2.0   PHOSPHORUS mg/dL 6.8* 7.0* 7.3*   HEMOGLOBIN g/dL 8.7*  --   --    HEMATOCRIT % 27.2*  --   --    TRIGLYCERIDES mg/dL 336*  --   --      Lab Results   Component Value Date    HGBA1C 15.80 (H) 12/18/2024        Medications    Scheduled Medications albumin human, 25 g, Intravenous, Q8H  calcium gluconate, 2,000 mg, Intravenous, Q8H  cefTRIAXone, 2,000 mg, Intravenous, Q24H  chlorhexidine, 15 mL, Mouth/Throat, Q12H  lansoprazole, 30 mg, Nasogastric, Q AM  mupirocin, 1 Application, Each Nare, BID  nystatin, 1 Application, Topical, Q8H  [START ON 12/25/2024] permethrin, 1 Application, Topical, Once  QUEtiapine, 25 mg, Nasogastric, Q12H  sodium bicarbonate, 50 mEq, Intravenous, Q8H  sodium chloride, 10 mL, Intravenous, Q12H  sodium chloride, 10 mL, Intravenous, Q12H  vancomycin, 1,000 mg, Intravenous, Once        Infusions dexmedetomidine, 0.2-1.5 mcg/kg/hr, Last Rate: Stopped (12/20/24 0800)  fentanyl 10 mcg/mL,  mcg/hr, Last Rate: Stopped (12/20/24 0910)  heparin, 16.9 Units/kg/hr, Last Rate: Stopped (12/21/24 0950)  insulin, 0-100 Units/hr, Last Rate: 2.5 Units/hr (12/21/24 0923)  norepinephrine, 0.05-0.5 mcg/kg/min, Last Rate:  0.14 mcg/kg/min (12/21/24 0624)  Pharmacy to dose vancomycin,         PRN Medications   acetaminophen **OR** acetaminophen    aluminum-magnesium hydroxide-simethicone    senna-docusate sodium **AND** polyethylene glycol **AND** bisacodyl **AND** bisacodyl    Calcium Replacement - Follow Nurse / BPA Driven Protocol    dextrose    dextrose    fentaNYL    glucagon (human recombinant)    heparin    heparin    HYDROmorphone    ipratropium-albuterol    LORazepam    Magnesium Standard Dose Replacement - Follow Nurse / BPA Driven Protocol    nitroglycerin    ondansetron ODT **OR** ondansetron    Pharmacy to dose vancomycin    Phosphorus Replacement - Follow Nurse / BPA Driven Protocol    Potassium Replacement - Follow Nurse / BPA Driven Protocol    sodium chloride    sodium chloride    sodium chloride    sodium chloride    sodium chloride    Vancomycin Pharmacy Intermittent/Pulse Dosing     Physical Findings        Trending Physical   Appearance, NFPE 12/21: NFPE completed and not consistent with nutrition diagnosis of malnutrition at this time using AND/ASPEN criteria     --  Edema    2+ arms, hands, legs, feet    Bowel Function   No BM yet, pt has stool softeners ordered PRN    Tubes   DHT (placement confirmed in the stomach)   Chewing/Swallowing   Intubated    Skin   Likely surgical incisions      Estimated/Assessed Needs    Estimated 12/21/24    Energy Requirements    EST Needs, Method, Wt used 1787 kcal/day (PSU for critically ill)        Protein Requirements    EST Needs, Method, Wt used 66 g/day (1.2 g/kg using IBW)        Fluid Requirements     Estimated Needs (mL/day) 1 ml/kcal      Fluid Deficit    Current Na Level (mEq/L)    Desired Na Level (mEq/L)    Estimated Fluid Deficit (L)       Current Nutrition Orders & Evaluation of Intake       Oral Nutrition     Food Allergies NKFA   Current PO Diet NPO Diet NPO Type: Strict NPO   Supplement NPO   PO Evaluation     Trending % PO Intake 12/21: NPO      Enteral  Nutrition    Enteral Route DHT   Order, Modulars, Flushes    Tolerance    TF Observation         Parenteral Nutrition     TPN Route    Total # Days on TPN    TPN Order, Lipid Details    MVI & Trace Element Freq    TPN Observation       Nutritional Risk Screening        NRS-2002 Score          Nutrition Diagnosis         Nutrition Dx Problem 1   Chewing/swallowing difficulty related to intubation as evidenced by need for enteral nutrition     Nutrition Dx Problem 2        Intervention Goal         Intervention Goal(s) To tolerate enteral nutrition      Nutrition Intervention        RD Action NFPE complete, start tube feedings      Nutrition Prescription          Diet Prescription NPO   Supplement Prescription NPO     Enteral Prescription Initial Goal:  *no evidence of potential RFS, will increase as tolerated to goal      Novasource Renal at 20 mL/hr + 30 mL/hr water flush      End Goal:    Novasource Renal at 35 mL/hr + 30 mL/hr water flush      Calories  1540 kcals (86%) - slightly lower ok given obesity    Protein  70 g (106%)    Free water  547 mL   Flushes  660 ml      The above end goal rate is for 22 hrs/day to assume interruptions for ADLs. Water flushes adjusted based on clinical picture + Rx flushes/IV fluids     Specialized formula chosen r/t Elevated phos         TPN Prescription      Monitor/Evaluation        Monitor Pertinent labs, EN delivery/tolerance, POC/GOC         Electronically signed by:  Marina Vidales RD  12/21/24 11:29 EST

## 2024-12-21 NOTE — PROGRESS NOTES
"NEPHROLOGY PROGRESS NOTE------[facility]    [facility]  [phone]  Deidra Starr MD      Patient Care Team:  Katie Duran PA as PCP - General (Physician Assistant)  Uli Starr MD as Consulting Physician (Nephrology)      Provider:  Deidra Starr MD  Patient Name: Yarelis Jackson  :  1970    SUBJECTIVE:    F/U ARF/MARGOT/ELECTROLYTE ABNORMALITIES    SEDATED AND INTUBATED ON VENT    Medication:  cefTRIAXone, 2,000 mg, Intravenous, Q24H  chlorhexidine, 15 mL, Mouth/Throat, Q12H  lansoprazole, 30 mg, Nasogastric, Q AM  mupirocin, 1 Application, Each Nare, BID  nystatin, 1 Application, Topical, Q8H  [START ON 2024] permethrin, 1 Application, Topical, Once  QUEtiapine, 25 mg, Nasogastric, Q12H  sodium chloride, 10 mL, Intravenous, Q12H  sodium chloride, 10 mL, Intravenous, Q12H  vancomycin, 1,000 mg, Intravenous, Once      dexmedetomidine, 0.2-1.5 mcg/kg/hr, Last Rate: Stopped (24 0800)  fentanyl 10 mcg/mL,  mcg/hr, Last Rate: Stopped (24 0910)  heparin, 16.9 Units/kg/hr, Last Rate: 14.9 Units/kg/hr (24 0447)  insulin, 0-100 Units/hr, Last Rate: 5.8 Units/hr (24 0717)  norepinephrine, 0.05-0.5 mcg/kg/min, Last Rate: 0.14 mcg/kg/min (24 0624)  Pharmacy to dose vancomycin,   sodium chloride, 125 mL/hr, Last Rate: 125 mL/hr (24 0547)        OBJECTIVE    Vital Sign Min/Max for last 24 hours  Temp  Min: 98 °F (36.7 °C)  Max: 98.6 °F (37 °C)   No data recorded   Pulse  Min: 77  Max: 104   Resp  Min: 25  Max: 26   SpO2  Min: 93 %  Max: 100 %   No data recorded   No data recorded     Flowsheet Rows      Flowsheet Row First Filed Value   Admission Height 162.6 cm (64\") Documented at 2024 0443   Admission Weight 92.5 kg (203 lb 14.4 oz) Documented at 2024 0527            No intake/output data recorded.  I/O last 3 completed shifts:  In: 8037 [P.O.:301; I.V.:7636; IV " "Piggyback:100]  Out: 2543 [Urine:1305; Emesis/NG output:732]    Physical Exam: ON PRESSORS  General Appearance: SEDATED AND INTUBATED ON VENT  Head: normocephalic, without obvious abnormality and atraumatic +ETT INTACT  Eyes: conjunctivae and sclerae normal and no icterus  Neck: supple and no JVD  Lungs: clear to auscultation and respirations regular  Heart: regular rhythm & normal rate and normal S1, S2 +THOMAS  Chest: Wall no abnormalities observed  Abdomen: normal bowel sounds and soft   Extremities: moves extremities well, no edema, no cyanosis and no redness. +DJD  Skin: no bleeding, bruising or rash, turgor normal, color normal and no lesions noted. +DECREASED SKIN TURGOR  Neurologic: SEDATED    Labs:    WBC WBC   Date Value Ref Range Status   12/21/2024 15.20 (H) 3.40 - 10.80 10*3/mm3 Final   12/20/2024 15.52 (H) 3.40 - 10.80 10*3/mm3 Final   12/20/2024 12.92 (H) 3.40 - 10.80 10*3/mm3 Final   12/20/2024 10.37 3.40 - 10.80 10*3/mm3 Final   12/19/2024 13.81 (H) 3.40 - 10.80 10*3/mm3 Final      HGB Hemoglobin   Date Value Ref Range Status   12/21/2024 8.7 (L) 12.0 - 15.9 g/dL Final   12/20/2024 11.2 (L) 12.0 - 17.0 g/dL Final   12/20/2024 11.0 (L) 12.0 - 15.9 g/dL Final   12/20/2024 11.5 (L) 12.0 - 15.9 g/dL Final   12/20/2024 12.1 12.0 - 15.9 g/dL Final   12/20/2024 12.9 12.0 - 15.9 g/dL Final   12/19/2024 14.3 12.0 - 17.0 g/dL Final   12/19/2024 17.1 (H) 12.0 - 15.9 g/dL Final   12/18/2024 14.6 12.0 - 17.0 g/dL Final      HCT Hematocrit   Date Value Ref Range Status   12/21/2024 27.2 (L) 34.0 - 46.6 % Final   12/20/2024 33 (L) 38 - 51 % Final   12/20/2024 33.8 (L) 34.0 - 46.6 % Final   12/20/2024 35.3 34.0 - 46.6 % Final   12/20/2024 38.5 34.0 - 46.6 % Final   12/20/2024 41.2 34.0 - 46.6 % Final   12/19/2024 42 38 - 51 % Final   12/19/2024 52.2 (H) 34.0 - 46.6 % Final   12/18/2024 43 38 - 51 % Final      Platelets No results found for: \"LABPLAT\"   MCV MCV   Date Value Ref Range Status   12/21/2024 100.0 (H) 79.0 " - 97.0 fL Final   12/20/2024 101.4 (H) 79.0 - 97.0 fL Final   12/20/2024 101.0 (H) 79.0 - 97.0 fL Final   12/20/2024 102.2 (H) 79.0 - 97.0 fL Final   12/19/2024 101.8 (H) 79.0 - 97.0 fL Final          Sodium Sodium   Date Value Ref Range Status   12/21/2024 138 136 - 145 mmol/L Final   12/20/2024 136 136 - 145 mmol/L Final   12/20/2024 135 (L) 136 - 145 mmol/L Final   12/20/2024 135 (L) 136 - 145 mmol/L Final   12/20/2024 134 (L) 136 - 145 mmol/L Final   12/20/2024 135 (L) 136 - 145 mmol/L Final   12/20/2024 142 136 - 145 mmol/L Final   12/19/2024 151 (H) 136 - 145 mmol/L Final   12/19/2024 167 (C) 136 - 145 mmol/L Final   12/19/2024 170 (C) 136 - 145 mmol/L Final   12/19/2024 175 (C) 136 - 145 mmol/L Final   12/19/2024 178 (C) 136 - 145 mmol/L Final   12/18/2024 176 (C) 136 - 145 mmol/L Final   12/18/2024 172 (C) 136 - 145 mmol/L Final      Potassium Potassium   Date Value Ref Range Status   12/21/2024 5.0 3.5 - 5.2 mmol/L Final   12/20/2024 5.3 (H) 3.5 - 5.2 mmol/L Final   12/20/2024 5.3 (H) 3.5 - 5.2 mmol/L Final   12/20/2024 5.0 3.5 - 5.2 mmol/L Final   12/20/2024 5.1 3.5 - 5.2 mmol/L Final   12/20/2024 5.1 3.5 - 5.2 mmol/L Final   12/20/2024 4.9 3.5 - 5.2 mmol/L Final     Comment:     Specimen hemolyzed.  Result may be falsely elevated.   12/19/2024 5.2 3.5 - 5.2 mmol/L Final     Comment:     Specimen hemolyzed.  Result may be falsely elevated.   12/19/2024 4.4 3.5 - 5.2 mmol/L Final     Comment:     Specimen hemolyzed.  Result may be falsely elevated.   12/19/2024 4.3 3.5 - 5.2 mmol/L Final   12/19/2024 4.5 3.5 - 5.2 mmol/L Final     Comment:     Slight hemolysis detected by analyzer. Result may be falsely elevated.   12/19/2024 4.7 3.5 - 5.2 mmol/L Final     Comment:     Specimen hemolyzed.  Result may be falsely elevated.   12/18/2024 4.4 3.5 - 5.2 mmol/L Final     Comment:     Specimen hemolyzed.  Result may be falsely elevated.   12/18/2024 3.7 3.5 - 5.2 mmol/L Final     Comment:     Specimen hemolyzed.   Result may be falsely elevated.      Chloride Chloride   Date Value Ref Range Status   12/21/2024 105 98 - 107 mmol/L Final   12/20/2024 104 98 - 107 mmol/L Final   12/20/2024 104 98 - 107 mmol/L Final   12/20/2024 105 98 - 107 mmol/L Final   12/20/2024 105 98 - 107 mmol/L Final   12/20/2024 105 98 - 107 mmol/L Final   12/20/2024 111 (H) 98 - 107 mmol/L Final   12/19/2024 120 (H) 98 - 107 mmol/L Final   12/19/2024 136 (H) 98 - 107 mmol/L Final   12/19/2024 134 (H) 98 - 107 mmol/L Final   12/19/2024 >140 (H) 98 - 107 mmol/L Final   12/19/2024 >140 (H) 98 - 107 mmol/L Final   12/18/2024 >140 (H) 98 - 107 mmol/L Final   12/18/2024 139 (H) 98 - 107 mmol/L Final      CO2 CO2   Date Value Ref Range Status   12/21/2024 18.8 (L) 22.0 - 29.0 mmol/L Final   12/20/2024 20.5 (L) 22.0 - 29.0 mmol/L Final   12/20/2024 21.1 (L) 22.0 - 29.0 mmol/L Final   12/20/2024 19.9 (L) 22.0 - 29.0 mmol/L Final   12/20/2024 19.8 (L) 22.0 - 29.0 mmol/L Final   12/20/2024 20.8 (L) 22.0 - 29.0 mmol/L Final   12/20/2024 22.3 22.0 - 29.0 mmol/L Final   12/19/2024 22.9 22.0 - 29.0 mmol/L Final   12/19/2024 14.5 (L) 22.0 - 29.0 mmol/L Final   12/19/2024 14.9 (L) 22.0 - 29.0 mmol/L Final   12/19/2024 2.7 (C) 22.0 - 29.0 mmol/L Final   12/19/2024 2.5 (C) 22.0 - 29.0 mmol/L Final   12/18/2024 2.2 (C) 22.0 - 29.0 mmol/L Final   12/18/2024 14.3 (L) 22.0 - 29.0 mmol/L Final      BUN BUN   Date Value Ref Range Status   12/21/2024 46 (H) 6 - 20 mg/dL Final   12/20/2024 38 (H) 6 - 20 mg/dL Final   12/20/2024 34 (H) 6 - 20 mg/dL Final   12/20/2024 25 (H) 6 - 20 mg/dL Final   12/20/2024 22 (H) 6 - 20 mg/dL Final   12/20/2024 20 6 - 20 mg/dL Final   12/20/2024 24 (H) 6 - 20 mg/dL Final   12/19/2024 38 (H) 6 - 20 mg/dL Final   12/19/2024 55 (H) 6 - 20 mg/dL Final   12/19/2024 57 (H) 6 - 20 mg/dL Final   12/19/2024 66 (H) 6 - 20 mg/dL Final   12/19/2024 69 (H) 6 - 20 mg/dL Final   12/18/2024 71 (H) 6 - 20 mg/dL Final   12/18/2024 79 (H) 6 - 20 mg/dL Final     "  Creatinine Creatinine   Date Value Ref Range Status   12/21/2024 2.43 (H) 0.57 - 1.00 mg/dL Final   12/20/2024 2.09 (H) 0.57 - 1.00 mg/dL Final   12/20/2024 1.98 (H) 0.57 - 1.00 mg/dL Final   12/20/2024 1.51 (H) 0.57 - 1.00 mg/dL Final   12/20/2024 1.49 (H) 0.57 - 1.00 mg/dL Final   12/20/2024 1.20 (H) 0.57 - 1.00 mg/dL Final   12/20/2024 1.11 (H) 0.57 - 1.00 mg/dL Final   12/19/2024 1.31 (H) 0.57 - 1.00 mg/dL Final   12/19/2024 1.28 (H) 0.57 - 1.00 mg/dL Final   12/19/2024 1.38 (H) 0.57 - 1.00 mg/dL Final   12/19/2024 1.61 (H) 0.57 - 1.00 mg/dL Final   12/19/2024 1.52 (H) 0.57 - 1.00 mg/dL Final   12/18/2024 1.35 (H) 0.60 - 1.30 mg/dL Final     Comment:     Serial Number: [id number]:  [id number]   12/18/2024 1.42 (H) 0.57 - 1.00 mg/dL Final   12/18/2024 1.47 (H) 0.57 - 1.00 mg/dL Final      Calcium Calcium   Date Value Ref Range Status   12/21/2024 6.9 (L) 8.6 - 10.5 mg/dL Final   12/20/2024 6.7 (L) 8.6 - 10.5 mg/dL Final   12/20/2024 7.1 (L) 8.6 - 10.5 mg/dL Final   12/20/2024 6.9 (L) 8.6 - 10.5 mg/dL Final   12/20/2024 6.7 (L) 8.6 - 10.5 mg/dL Final   12/20/2024 7.1 (L) 8.6 - 10.5 mg/dL Final   12/20/2024 6.8 (L) 8.6 - 10.5 mg/dL Final   12/19/2024 6.8 (L) 8.6 - 10.5 mg/dL Final   12/19/2024 7.9 (L) 8.6 - 10.5 mg/dL Final   12/19/2024 8.9 8.6 - 10.5 mg/dL Final   12/19/2024 8.7 8.6 - 10.5 mg/dL Final   12/19/2024 9.0 8.6 - 10.5 mg/dL Final   12/18/2024 9.2 8.6 - 10.5 mg/dL Final   12/18/2024 9.9 8.6 - 10.5 mg/dL Final      PO4 No components found for: \"PO4\"   Albumin Albumin   Date Value Ref Range Status   12/21/2024 2.3 (L) 3.5 - 5.2 g/dL Final   12/20/2024 2.5 (L) 3.5 - 5.2 g/dL Final   12/20/2024 2.7 (L) 3.5 - 5.2 g/dL Final   12/20/2024 2.9 (L) 3.5 - 5.2 g/dL Final   12/19/2024 2.8 (L) 3.5 - 5.2 g/dL Final   12/19/2024 3.2 (L) 3.5 - 5.2 g/dL Final      Magnesium Magnesium   Date Value Ref Range Status   12/21/2024 2.1 1.6 - 2.6 mg/dL Final   12/20/2024 2.0 1.6 - 2.6 mg/dL Final   12/20/2024 2.0 1.6 - 2.6 " "mg/dL Final   12/20/2024 1.9 1.6 - 2.6 mg/dL Final   12/20/2024 1.9 1.6 - 2.6 mg/dL Final   12/20/2024 1.9 1.6 - 2.6 mg/dL Final   12/20/2024 2.0 1.6 - 2.6 mg/dL Final   12/20/2024 2.0 1.6 - 2.6 mg/dL Final   12/19/2024 1.9 1.6 - 2.6 mg/dL Final   12/19/2024 2.4 1.6 - 2.6 mg/dL Final   12/19/2024 2.6 1.6 - 2.6 mg/dL Final   12/19/2024 2.9 (H) 1.6 - 2.6 mg/dL Final   12/19/2024 3.0 (H) 1.6 - 2.6 mg/dL Final   12/18/2024 3.1 (H) 1.6 - 2.6 mg/dL Final   12/18/2024 3.3 (H) 1.6 - 2.6 mg/dL Final   12/18/2024 2.6 1.6 - 2.6 mg/dL Final      Uric Acid No components found for: \"URIC ACID\"     Imaging Results (Last 72 Hours)       Procedure Component Value Units Date/Time    XR Chest 1 View [[id number]] Resulted: 12/21/24 0537     Updated: 12/21/24 0538    Hybrid Vascular OR Imaging ([employer]) [[id number]] Resulted: 12/20/24 1719     Updated: 12/20/24 1719    Narrative:      Please see performing physician's note for result.    XR Chest 1 View [[id number]] Collected: 12/20/24 0717     Updated: 12/20/24 0721    Narrative:      XR CHEST 1 VW    Date of Exam: 12/20/2024 12:10 AM EST    Indication: Intubated Patient    Comparison: 12/19/2024    Findings:  Endotracheal tube approximately 2.5 cm above the carmina. Feeding tube and right IJ dialysis catheter remain in place. No new tubes or lines heart size and pulmonary vasculature are stable. Bullous changes in the right upper lobe. Postoperative changes in   the left upper lobe. Lungs grossly clear. No pneumothorax      Impression:      Impression:  Stable chest demonstrating COPD and postoperative changes with no acute cardiopulmonary process demonstrated      Electronically Signed: Shon Ponce    12/20/2024 7:19 AM EST    Workstation ID: [id number]    Hybrid Vascular OR Imaging ([employer]) [[id number]] Resulted: 12/19/24 1810     Updated: 12/19/24 1810    Narrative:      Please see performing physician's note for result.    XR Chest 1 View [[id number]] Collected: 12/19/24 1224 "     Updated: 12/19/24 1229    Narrative:      XR CHEST 1 VW    Date of Exam: 12/19/2024 11:53 AM EST    Indication: intubation    Comparison: 12/18/2024.    Findings:  There has been placement of an ET tube with the tip 1.7 cm above the carmina. There is a right IJ approach catheter with its tip in the mid SVC. There is an esophageal gastric tube directed towards the left upper quadrant although tip is not included.   Heart size is normal. Lung fields are clear of acute infiltrates or effusions. Mildly prominent interstitial pattern appears chronic.      Impression:      Impression:  Interval placement of various support lines as noted. No acute process of the lung fields.      Electronically Signed: Emilia Orellana MD    12/19/2024 12:27 PM EST    Workstation ID: [id number]    US Renal Bilateral [[id number]] Collected: 12/19/24 1121     Updated: 12/19/24 1124    Narrative:      US RENAL BILATERAL    Date of Exam: 12/19/2024 10:09 AM EST    Indication: ARF/MARGOT/CRF/CKD.    Comparison: No comparisons available.    Technique: Grayscale and color Doppler ultrasound evaluation of the kidneys and urinary bladder was performed.      Findings:  RIGHT kidney measures 10.9 cm.  Kidney echogenicity, size, and vascularity appear within normal limits. There is no solid kidney mass.  No echogenic shadowing stone.  No hydronephrosis.    LEFT kidney measures 10.3 cm. Kidney echogenicity, size, and vascularity appear within normal limits. There is no solid kidney mass.  No echogenic shadowing stone.  No hydronephrosis.    The urinary bladder is decompressed and contains a Malik catheter.        Impression:      Impression:  Unremarkable renal ultrasound.        Electronically Signed: Luciana Guerra MD    12/19/2024 11:22 AM EST    Workstation ID: [id number]    US Pancreas [[id number]] Collected: 12/19/24 1104     Updated: 12/19/24 1107    Narrative:      US PANCREAS    Date of Exam: 12/19/2024 10:10 AM EST    Indication: Elevated  triglycerides.    Comparison: No comparisons available.    Technique: Grayscale and color Doppler ultrasound evaluation of the right upper quadrant was performed.      Findings:  The pancreas is normal in size and echogenicity. There are no pancreatic or peripancreatic fluid collections. No masses are identified.      Impression:      Impression:  Negative exam.        Electronically Signed: Emilia Orellana MD    12/19/2024 11:05 AM EST    Workstation ID: [id number]    XR Abdomen KUB [[id number]] Collected: 12/19/24 0111     Updated: 12/19/24 0113    Narrative:      XR ABDOMEN KUB    Date of Exam: 12/19/2024 1:00 AM EST    Indication: ng    Comparison: None available.    Findings:  There is a feeding tube in the stomach. The gas pattern is nonspecific.      Impression:      Impression:  Feeding tube is in the stomach.          Electronically Signed: Maxwell Carrion MD    12/19/2024 1:11 AM EST    Workstation ID: [id number]    US Liver [[id number]] Collected: 12/18/24 0953     Updated: 12/18/24 0956    Narrative:      US LIVER    Date of Exam: 12/18/2024 9:26 AM EST    Indication: Elevated LFTs.    Comparison: No comparisons available.    Technique: Grayscale and color Doppler ultrasound evaluation of the liver was performed.      Findings:  LIVER:  The liver appears increased in echogenicity.No focal lesions identified. Normal flow is present within the hepatic vasculature. The liver is enlarged measuring 19.1 cm in length. The common bile duct is normal in caliber.        Impression:      Impression:  Hepatomegaly with steatosis.        Electronically Signed: Alea Subramanian MD    12/18/2024 9:54 AM EST    Workstation ID: [id number]    CT Head Without Contrast [[id number]] Collected: 12/18/24 0807     Updated: 12/18/24 0811    Narrative:      CT HEAD WO CONTRAST    Date of Exam: 12/18/2024 7:55 AM EST    Indication: ams.    Comparison: CT head without contrast 1/9/2020.    Technique: Axial CT images were obtained of the head  without contrast administration.  Coronal reconstructions were performed.  Automated exposure control and iterative reconstruction methods were used.      Findings:  Study is mildly degraded by motion. Allowing for this limitation, no gross acute intracranial hemorrhage, mass lesion, mass effect or midline shift is seen. The ventricular configuration is stable and within normal limits.. No evidence of acute or   evolving large territory infarct. Mild deep white matter hypodensities are nonspecific but favored to reflect changes of chronic microvascular disease. The paranasal sinuses and mastoid air cells are clear. The calvarium is within normal limits.      Impression:      Impression:    1. No acute intracranial findings.  2. Features suggestive of mild chronic microvascular disease.      Electronically Signed: Mariah Keenan MD    12/18/2024 8:09 AM EST    Workstation ID: [id number]    XR Chest 1 View [[id number]] Collected: 12/18/24 0752     Updated: 12/18/24 0755    Narrative:      XR CHEST 1 VW    Date of Exam: 12/18/2024 7:16 AM EST    Indication: Assess for Fluid Overload  Assess for Fluid Overload    Comparison: 7/15/2023    Findings:  Heart size is normal. Advanced emphysema with bulla formation at the right lung apex. Stable scarring at the left upper lobe. Suture overlies the left and right lung apices. No discrete consolidation. No pneumothorax or pleural effusion. No findings of   pulmonary edema. Osseous structures appear intact.      Impression:      Impression:  1. No acute process.  2. Severe emphysema.          Electronically Signed: Gaurav Miles MD    12/18/2024 7:53 AM EST    Workstation ID: [id number]            Results for orders placed during the hospital encounter of 12/18/24    XR Chest 1 View    Narrative  XR CHEST 1 VW    Date of Exam: 12/20/2024 12:10 AM EST    Indication: Intubated Patient    Comparison: 12/19/2024    Findings:  Endotracheal tube approximately 2.5 cm above the carmina.  Feeding tube and right IJ dialysis catheter remain in place. No new tubes or lines heart size and pulmonary vasculature are stable. Bullous changes in the right upper lobe. Postoperative changes in  the left upper lobe. Lungs grossly clear. No pneumothorax    Impression  Impression:  Stable chest demonstrating COPD and postoperative changes with no acute cardiopulmonary process demonstrated      Electronically Signed: Shon Ponce  12/20/2024 7:19 AM EST  Workstation ID: [id number]      XR Chest 1 View    Narrative  XR CHEST 1 VW    Date of Exam: 12/19/2024 11:53 AM EST    Indication: intubation    Comparison: 12/18/2024.    Findings:  There has been placement of an ET tube with the tip 1.7 cm above the carmina. There is a right IJ approach catheter with its tip in the mid SVC. There is an esophageal gastric tube directed towards the left upper quadrant although tip is not included.  Heart size is normal. Lung fields are clear of acute infiltrates or effusions. Mildly prominent interstitial pattern appears chronic.    Impression  Impression:  Interval placement of various support lines as noted. No acute process of the lung fields.      Electronically Signed: Emilia Orellana MD  12/19/2024 12:27 PM EST  Workstation ID: [id number]      XR Abdomen KUB    Narrative  XR ABDOMEN KUB    Date of Exam: 12/19/2024 1:00 AM EST    Indication: ng    Comparison: None available.    Findings:  There is a feeding tube in the stomach. The gas pattern is nonspecific.    Impression  Impression:  Feeding tube is in the stomach.          Electronically Signed: Maxwell Carrion MD  12/19/2024 1:11 AM EST  Workstation ID: [id number]      Results for orders placed during the hospital encounter of 12/18/24    Duplex Lower Extremity Art / Grafts - Right CAR    Interpretation Summary    Limited study due to body habitus and current dressing site.  Patent external iliac and femoral-popliteal arterial flow with low flow noted below the common femoral.   Common femoral artery poorly visualized.  Cannot rule out occlusion of the common femoral artery.        ASSESSMENT / PLAN      DKA (diabetic ketoacidosis)    Arterial thrombosis    ARF/MARGOT------Oliguric. +ARF/MARGOT with historically normal renal function. +ARF/MARGOT secondary to severe prerenal state and ATN from hypotension and Rhabdomyolysis. Hydrate aggressively. Pressor support. Will do regular HD today. Continue aggressive hydration. No NSAIDs. Renal US without obstructive uropathy.   Dose meds for CrCl less than 10 cc/min     2. HYPERNATREMIA/HYPEROSMOLAR STATE-----Better. D/C CRRT and follow.      3. ACIDOSIS----Metabolic. Elevated AGAP. Insulin gtt/IVFs. Follow off of CRRT and with intermittent IV NaHCO3     4. DVT PROPHYLAXIS-----On Heparin gtt     5. HYPOCALCEMIA------Replace IV and follow ionized levels     6. DM------Insulin gtt per protocol     7. ANEMIA---------H/H stable     8. OA/DJD/HYPERURICEMIA------Allopurinol . No NSAIDs     9. RHABDOMYOLYSIS-------Hydrate and alkalinize. And do regular HD today     10. GERD/PUD PROPHYLAXIS-----Renal dose adjusted Pepcid     11. DELIRIUM/TME     12. COPD/ACUTE HYPOXIC RESPIRATORY FAILURE------intubated on vent    13. HYPOALBUMINEMIA-----IV Albumin to temporize      Deidra Starr MD  [facility]  [phone]  12/21/24  07:49 EST

## 2024-12-21 NOTE — PROGRESS NOTES
Critical Care Progress Note     Yarelis Jackson : 1970 MRN:[id number] LOS:2  Rm: 2308/1     Principal Problem: DKA (diabetic ketoacidosis)     Reason for follow up: All the medical problems listed below    Summary     Yarelis Jackson is a 54 y.o. female with PMH of COPD, migraine, and obesity, and presented to the hospital for altered mental status, and was admitted with a principal diagnosis of DKA (diabetic ketoacidosis).  Patient presented and per ER documentation, patient was oriented to person and time.  Per patient's mother, patient had been feeling ill since Friday with noted polyuria and polydipsia.  It was managed by giving patient milk and a grape soft drink.  Patient became increasingly agitated, preventing treatment, and was given a dose of Geodon in the ED.     In the ED, labs were obtained with the following abnormalities: Sodium 159, chloride 117, CO2 10.1, anion gap 31.9, BUN 88, creatinine 2.13, glucose 976, alk phos 246, , , hemoglobin A1c 15.8, moderate acetone, osmolality 461, WBC 14.65 without bandemia.  UA with >1000 glucose, 15 mg/dL of ketones, moderate blood and proteinuria noted, this did not reflex to culture.  Patient had an ABG with pH 7.179, pCO2 37.5, pO2 77.7, HCO3 14, with O2 saturation 91.7%.  Patient was determined to be in DKA, and was started with IV fluid boluses per DKA protocol as well as an insulin drip.  Patient did have blood cultures obtained, but per ED provider, patient has no obvious signs or symptoms of active section, therefore no antimicrobials were started.    Patient sodium level remained persistent, and IV fluids were changed to withhold any sodium containing products.  Nephrology was consulted.  Patient also underwent placement of feeding tube for free water flush hourly overnight.  On , nephrology decided that patient would proceed with hemodialysis and requested nontunneled catheter placement.  Patient was becoming increasingly  agitated, with concerned that patient was no longer able to protect her own airway.  After discussion with patient's family, patient was intubated, nontunneled catheter placement was completed, and patient did require initiation of vasopressors.  HD was transitioned to CRRT.  Shortly after initiation of CRRT, patient developed an acute change in patient's skin color of her right lower extremity with subsequent loss of pulse.  Vascular surgery was stat consulted as well as completion of stat arterial duplex and ABIs.  Patient was started on a heparin drip.  Vascular surgery promptly evaluated patient and patient was scheduled to go to the OR for emergent thrombectomy.    Significant Events / Subjective     12/20/24 : Held off on dialysis for today and continue to follow labs. However, the patient's repeat duplex demonstrated poor flow in the right lower extremity which vascular surgery felt was possibly recurrent thrombosis. Patient has remained on heparin after thrombectomy on 12/19. There was concern for recurrent thrombosis resulting in ischemia. Therefore, patient was taken back to OR for redo of right iliofemoral popliteal thrombectomy, right femoral endarterectomy with patch, right lower extremity arteriogram, and closed right calf fasciotomies.     Assessment / Plan     Diabetic ketoacidosis without coma, with new onset diabetes mellitus, type 2 / Metabolic acidosis, increased anion gap  Etiology uncertain, though new onset diabetes mellitus.  A1c 15.80 on admission, no prior available.  Anion gap of 31.9 on admission.  On no home medication regimen.  Acetone moderate, checking beta hydroxybutyrate.  DKA protocol initiated with IVF and insulin gtt.    Protocol has been tapered to utilize D5 and insulin given patient's profound hypernatremia.  Endocrinology consulted.  Nephrology also consulted.     Acute Kidney Injury  Remains nonoliguric. Baseline creatinine 0.90.  Creatinine 2.10 on admission.  Likely  prerenal. Possible intrinsic component due to ATN from dehydration, nausea, vomiting, and and hypotension.   C/w IV fluids as ordered.  Monitor Input/Output very closely.   Avoids NSAIDs, nephrotoxic medications, and hypotension.  Nephrology consulted, planned initiation of CRRT.  Net IO Since Admission: 12,942 mL [12/20/24 2358]    Acute hypernatremia  Secondary to KA and dehydration  Corrected sodium 180 on admission, Osmolality 461.  Sodium is starting to have evidence of trending down, most recent is 175.  IV fluids were changed from DKA protocol to eliminate sodium containing products, with D5W being the IV fluid of choice.  Nephrology was consulted overnight, and patient underwent NG tube placement and was started on free water flush.  Planned initiation of HD, however due to vasopressor needs this was transitioned to CRRT.  Monitor and trend labs as ordered.    Septic Shock   Likely due to aspiration pneumonia.  Check sputum culture  Blood cultures-pending with no growth.  Respiratory viral panel-negative.  MRSA screen positive.  UA did not reflex to culture.  Started on ceftriaxone and vancomycin on 12/19.  On 12/19, patient began to experience hypotension that was felt to potentially be sepsis related, Levophed drip was initiated as patient had been adequately fluid resuscitated in the prior 24 hours.  Titrate vasopressors for a target MAP of 65.     Acute occlusive thrombus of the right iliac artery with limb ischemia  Shortly after patient had been lined in preparation for CRRT, patient developed sudden onset right lower extremity ischemia.  Stat arterial duplex was completed with noted right femoral, deep femoral, and popliteal arteries being patent but with very low amplitude monophasic signals, suggesting severe inflow stenosis or occlusion; no measurable Doppler flow in the anterior or posterior tibial and peroneal arteries.  Bilateral lower extremity ABIs ordered: Right STACEY critically reduced with  STACEY of 0 and severe digital insufficiency; left STACEY is normal with moderate digital insufficiency.  Vascular surgery was immediately consulted at the time of the limb ischemia finding, and he promptly presented to evaluate patient on the floor.  Once the above studies had been completed, patient was scheduled for emergent thrombectomy.  Heparin drip had been ordered stat, when the limb ischemia was first discovered.    Nontraumatic rhabdomyolysis  Initial CK 17, has increased to 1123.  Continue IV fluids as ordered.  Nephrology consulted.    Hypertriglyceridemia, concern for pancreatitis  Triglyceride 1700, amylase 311, lipase 33.  Ultrasound of pancreas ordered, and was with negative findings.  Recheck lipid panel in AM.   Avoid lipid containing substances.    Acute metabolic encephalopathy  Likely metabolic encephalopathy, secondary to DKA.  CT of the head: Without acute abnormalities.  UDS-negative.  Track marks noted on patient's right hand per nursing, checking for urine fentanyl-negative.  Hey WhatsApp 60 yes HIV-negative.  Given IV push of Ativan overnight, discussed with patient's mother, who states that patient does not have a known history of alcohol use or drug use.  Started on Precedex.     Acute respiratory failure without hypercapnia / On mechanical Ventilation  Intubated for airway protection, there was concern with patient's mentation and her inability to clear her own secretions.  Discussed with patient's family, and proceeded with intubation so that patient's care could not be delayed as patient was becoming increasingly restless and agitated.  Ventilator settings noted and adjusted as needed.   When patient is more awake, will attempt a spontaneous breathing trial. Wean PEEP and Fio2 as tolerated.  Minimize sedation and analgesia to target a RASS of 0 to -1.  Sedation with Precedex and fentanyl drips.    Cutaneous candidiasis of groin  Likely secondary to uncontrolled diabetes mellitus  Nystatin  ordered     Bilateral leg rash, concern for scabies infection  Permethrin regimen ordered.  Completed first treatment on 12/18, second treatment planned on 12/25/2024.  Keep in contract precautions.     Transaminitis  US of Liver: Hepatomegaly with steatosis.  Hepatitis panel-negative.  Monitor and trend LFTs.  .     COPD: Not in exacerbation.  Duonebs ordered as needed.  Obesity: Body mass index is 35 kg/m².    Disposition: On ventilator, vasopressors, CRRT, appropriate to remain in ICU.    Code status:   Code Status (Patient has no pulse and is not breathing): CPR (Attempt to Resuscitate)  Medical Interventions (Patient has pulse or is breathing): Full Support       Nutrition:   NPO Diet NPO Type: Strict NPO   Tube Feeding: Formula: Other; : tap water flush only; Feeding Type: Continuous; Start at: Other; Other: 0; Then Advance By: Do Not Advance; Water Flush: 30 mL; Every: 1 hour; Water Bolus: None     VTE Prophylaxis:  Pharmacologic VTE prophylaxis orders are present.      Objective / Physical Exam     Vital signs:  Temp: 98.6 °F (37 °C)  BP: 93/55  Heart Rate: 92  Resp: 26  SpO2: 98 %  Weight: 88.5 kg (195 lb 1.7 oz)    Admission Weight: Weight: 92.5 kg (203 lb 14.4 oz)  Current Weight: Weight: 88.5 kg (195 lb 1.7 oz)    Input/Output in last 24 hours:    Intake/Output Summary (Last 24 hours) at 12/20/2024 2358  Last data filed at 12/20/2024 1544  Gross per 24 hour   Intake 3311 ml   Output 1270 ml   Net 2041 ml      Net IO Since Admission: 12,942 mL [12/20/24 2358]     Physical Exam  Vitals and nursing note reviewed.   Constitutional:       General: She is in acute distress.      Appearance: She is obese. She is ill-appearing and toxic-appearing. She is not diaphoretic.      Interventions: She is sedated, intubated and restrained.   HENT:      Head: Normocephalic and atraumatic.      Nose: Nose normal.      Comments: NGT in place.     Mouth/Throat:      Mouth: Mucous membranes are dry.      Pharynx: No  oropharyngeal exudate.      Comments: ET tube in place  Eyes:      General: No scleral icterus.     Comments: Eyelids closed.   Cardiovascular:      Rate and Rhythm: Normal rate and regular rhythm.      Pulses: Normal pulses.      Heart sounds: Normal heart sounds. No murmur heard.     No gallop.   Pulmonary:      Effort: Pulmonary effort is normal. She is intubated.      Breath sounds: Normal breath sounds. No wheezing or rales.   Abdominal:      General: Bowel sounds are normal. There is no distension.      Palpations: Abdomen is soft.      Tenderness: There is no abdominal tenderness.   Musculoskeletal:      Right lower leg: No edema.      Left lower leg: No edema.   Skin:     Comments: Inflammation in groin folds and underneath abdominal fold, consistent with a yeast infection.  Bilateral lower extremities with red bumps, concern for possible scabies.  Patient assessed earlier in the shift with temperature and color being equal in bilateral lower extremities though patient was with persistent mottling.  The mottling was slightly more improved today compared to the date of her admission.  This afternoon, when called to the room due to an acute change to patient's right lower extremity, patient's extremity was cool to the touch, no dopplerable pulse, unable to assess sensation.  Patient's right lower extremity was cyanotic.   Neurological:      General: No focal deficit present.      Comments: Intubated, sedated, easily aroused, not following commands, previously moving all extremities spontaneously.   Psychiatric:      Comments: Intubated, sedated, anxious/restless at times.          Radiology and Labs     Results from last 7 days   Lab Units 12/20/24  1210 12/20/24  0639 12/20/24  0436 12/20/24  0010 12/19/24  1653 12/19/24  0831 12/18/24  0510 12/18/24  0504   WBC 10*3/mm3  --  15.52* 12.92* 10.37  --  13.81*  --  14.65*   HEMOGLOBIN g/dL 11.0* 11.5* 12.1 12.9  --  17.1*  --  17.1*   HEMOGLOBIN, POC g/dL  --    --   --   --  14.3  --    < >  --    HEMATOCRIT % 33.8* 35.3 38.5 41.2  --  52.2*  --  54.9*   HEMATOCRIT POC %  --   --   --   --  42  --    < >  --    PLATELETS 10*3/mm3  --  156 175 160  --  266  --  370    < > = values in this interval not displayed.      Results from last 7 days   Lab Units 12/20/24  0921 12/20/24  0240 12/19/24 2232 12/19/24 1952 12/19/24  1428   PROTIME Seconds  --   --   --   --  13.8   INR   --   --   --   --  1.05   APTT seconds 112.5* 54.6* 147.9* >200.0* <20.0*      Results from last 7 days   Lab Units 12/20/24  2021 12/20/24  1830 12/20/24  1210 12/20/24  0745 12/20/24  0436 12/20/24  0010   SODIUM mmol/L 135*  --  135* 134* 135* 142   POTASSIUM mmol/L 5.3*  --  5.0 5.1 5.1 4.9   CHLORIDE mmol/L 104  --  105 105 105 111*   CO2 mmol/L 21.1*  --  19.9* 19.8* 20.8* 22.3   BUN mg/dL 34*  --  25* 22* 20 24*   CREATININE mg/dL 1.98*  --  1.51* 1.49* 1.20* 1.11*   GLUCOSE mg/dL 142*  --  116* 217* 170* 119*   MAGNESIUM mg/dL 2.0 1.9 1.9 1.9 2.0 2.0   PHOSPHORUS mg/dL 7.3*  --  6.7* 6.5* 5.7* 5.3*      Results from last 7 days   Lab Units 12/20/24  0436 12/19/24  0406 12/18/24  0504   ALK PHOS U/L 103 145* 246*   AST (SGOT) U/L 185* 64* 106*   ALT (SGPT) U/L 116* 124* 178*     Results from last 7 days   Lab Units 12/20/24  0328 12/20/24  0138 12/19/24  1850 12/19/24  1653 12/19/24  1354 12/18/24  2228   PH, ARTERIAL pH units 7.271* 7.228* 7.093* 7.100* 7.242* 7.371   PCO2, ARTERIAL mm Hg 57.4* 61.7* 86.0* 69.1* 58.9* 42.6   PO2 ART mm Hg 94.5 216.7* 437.6* 278.0* 57.5* 64.8*   O2 SATURATION ART % 96.0 99.6* 99.9* 100.0* 83.6* 91.7*   FIO2 % 50 70 100  --  40 21   HCO3 ART mmol/L 26.4 25.7 26.3 21.4* 25.3 24.7   BASE EXCESS ART mmol/L -1.5* -3.1* -5.6* <0.0* -3.0* -0.7*       XR Chest 1 View    Result Date: 12/20/2024  Impression: Stable chest demonstrating COPD and postoperative changes with no acute cardiopulmonary process demonstrated Electronically Signed: Shon Ponce  12/20/2024 7:19  AM EST  Workstation ID: [id number]    XR Chest 1 View    Result Date: 12/19/2024  Impression: Interval placement of various support lines as noted. No acute process of the lung fields. Electronically Signed: Emilia Orellana MD  12/19/2024 12:27 PM EST  Workstation ID: [id number]    US Renal Bilateral    Result Date: 12/19/2024  Impression: Unremarkable renal ultrasound. Electronically Signed: Luciana Guerra MD  12/19/2024 11:22 AM EST  Workstation ID: [id number]    US Pancreas    Result Date: 12/19/2024  Impression: Negative exam. Electronically Signed: Emilia Orellana MD  12/19/2024 11:05 AM EST  Workstation ID: [id number]    XR Abdomen KUB    Result Date: 12/19/2024  Impression: Feeding tube is in the stomach. Electronically Signed: Maxwell Carrion MD  12/19/2024 1:11 AM EST  Workstation ID: [id number]     Current medications     Scheduled Meds:   calcium gluconate, 2,000 mg, Intravenous, Q8H  cefTRIAXone, 2,000 mg, Intravenous, Q24H  chlorhexidine, 15 mL, Mouth/Throat, Q12H  lansoprazole, 30 mg, Nasogastric, Q AM  mupirocin, 1 Application, Each Nare, BID  nystatin, 1 Application, Topical, Q8H  [START ON 12/25/2024] permethrin, 1 Application, Topical, Once  QUEtiapine, 25 mg, Nasogastric, Q12H  sodium bicarbonate, 50 mEq, Intravenous, Q8H  sodium chloride, 10 mL, Intravenous, Q12H  sodium chloride, 10 mL, Intravenous, Q12H        Continuous Infusions:   dexmedetomidine, 0.2-1.5 mcg/kg/hr, Last Rate: Stopped (12/20/24 0800)  fentanyl 10 mcg/mL,  mcg/hr, Last Rate: Stopped (12/20/24 0910)  heparin, 16.9 Units/kg/hr, Last Rate: 14.9 Units/kg/hr (12/20/24 1515)  insulin, 0-100 Units/hr, Last Rate: 2.7 Units/hr (12/20/24 2320)  norepinephrine, 0.05-0.5 mcg/kg/min, Last Rate: 0.12 mcg/kg/min (12/20/24 1515)  Pharmacy to dose vancomycin,   sodium chloride, 125 mL/hr, Last Rate: 125 mL/hr (12/20/24 2130)        Patient continues to be critically ill, remains at risk of clinical deterioration or death and needed high complexity  decision making. I have spent a total of 55 minutes providing critical care services to this patient including but not limited to: review of labs/ microbiology/imaging/medications, serial monitoring of vital signs, adjusting ventilator settings as needed, review of other consultant's notes, review of events in the last 24 hrs, monitoring input/output, review of treatment plan with bedside nurse, RT and other treatment team, management of life support and nutrition needs. I, as well as Dr. Rodriguez, have spoken with patient's mother and son about the plan of care and answered all questions.    Time spent in performing separately billable procedures and updating family is not included in the critical care time.       NICHOLAS Hess   Critical Care  12/20/24   23:58 EST

## 2024-12-21 NOTE — PROGRESS NOTES
Critical Care Progress Note     Yarelis Jackson : 1970 MRN:[id number] LOS:3  Rm: 2308/1     Principal Problem: DKA (diabetic ketoacidosis)     Reason for follow up: All the medical problems listed below    Summary     Yarelis Jackson is a 54 y.o. female with PMH of COPD, migraine, and obesity, and presented to the hospital for altered mental status, and was admitted with a principal diagnosis of DKA (diabetic ketoacidosis).  Patient presented and per ER documentation, patient was oriented to person and time.  Per patient's mother, patient had been feeling ill since Friday with noted polyuria and polydipsia.  It was managed by giving patient milk and a grape soft drink.  Patient became increasingly agitated, preventing treatment, and was given a dose of Geodon in the ED.     In the ED, labs were obtained with the following abnormalities: Sodium 159, chloride 117, CO2 10.1, anion gap 31.9, BUN 88, creatinine 2.13, glucose 976, alk phos 246, , , hemoglobin A1c 15.8, moderate acetone, osmolality 461, WBC 14.65 without bandemia.  UA with >1000 glucose, 15 mg/dL of ketones, moderate blood and proteinuria noted, this did not reflex to culture.  Patient had an ABG with pH 7.179, pCO2 37.5, pO2 77.7, HCO3 14, with O2 saturation 91.7%.  Patient was determined to be in DKA, and was started with IV fluid boluses per DKA protocol as well as an insulin drip.  Patient did have blood cultures obtained, but per ED provider, patient has no obvious signs or symptoms of active section, therefore no antimicrobials were started.    Patient sodium level remained persistent, and IV fluids were changed to withhold any sodium containing products.  Nephrology was consulted.  Patient also underwent placement of feeding tube for free water flush hourly overnight.  On , nephrology decided that patient would proceed with hemodialysis and requested nontunneled catheter placement.  Patient was becoming increasingly  agitated, with concerned that patient was no longer able to protect her own airway.  After discussion with patient's family, patient was intubated, nontunneled catheter placement was completed, and patient did require initiation of vasopressors.  HD was transitioned to CRRT.  Shortly after initiation of CRRT, patient developed an acute change in patient's skin color of her right lower extremity with subsequent loss of pulse.  Vascular surgery was stat consulted as well as completion of stat arterial duplex and ABIs.  Patient was started on a heparin drip.  Vascular surgery promptly evaluated patient and patient was scheduled to go to the OR for emergent thrombectomy.    Significant Events / Subjective     12/21/24 :  Intubated and minimally responsive.  Continuous sedation off since midnight. She received one push of Dilaudid ~4am.  Heparin gtt currently off due to supra-therapeutic PTT and bleeding from ETT. Heparin gtt management per vascular surgery, started after thrombectomy. She remains on an insulin gtt per endocrinology. HD planned for today.     Assessment / Plan     Diabetic ketoacidosis without coma, with new onset diabetes mellitus, type 2 / Metabolic acidosis, increased anion gap  Etiology uncertain, though new onset diabetes mellitus.  A1c 15.80 on admission, no prior available.  Anion gap of 31.9 on admission.  On no home medication regimen.  Acetone moderate, beta hydroxybutyrate 10.79  Initially started on DKA protocol.  Insulin gtt now managed by Endocrinology  Nephrology consulted.     Acute Kidney Injury  Remains nonoliguric. Baseline creatinine 0.90.  Creatinine 2.10 on admission.  Likely prerenal. Possible intrinsic component due to ATN from dehydration, nausea, vomiting, and hypotension.   C/w IV fluids as ordered.  Monitor Input/Output very closely.   Avoids NSAIDs, nephrotoxic medications, and hypotension.  Nephrology consulted, initially treated with CRRT which has been transitioned to HD.  Planned HD session for today  Net IO Since Admission: 15,269 mL [12/21/24 1047]    Acute hypernatremia--resolved  Secondary to dehydration  Corrected sodium 180 on admission, Osmolality 461.  Sodium has corrected  IV fluids management per endocrinology, electrolyte management per nephrology  Continue free water flush.  Planned initiation of HD today  Monitor and trend labs as ordered.    Septic Shock   Likely due to aspiration pneumonia.  Sputum culture preliminary with Staphylococcus aureus & Streptococcus agalactiae (group B)  Blood cultures-pending with no growth  Respiratory viral panel-negative.  Urine antigens for legionella and strep pna negative  MRSA screen positive.  UA did not reflex to culture.  Started on ceftriaxone and vancomycin on 12/19.  Fluid refractory hypotension after receiving sepsis bundle IVF resuscitation  Titrate vasopressors for a target MAP of 65.     Acute occlusive thrombus of the right iliac artery with limb ischemia  Arterial duplex with noted right femoral, deep femoral, and popliteal arteries being patent but with very low amplitude monophasic signals, suggesting severe inflow stenosis or occlusion; no measurable Doppler flow in the anterior or posterior tibial and peroneal arteries.  Bilateral lower extremity ABIs ordered: Right STACEY critically reduced with STACEY of 0 and severe digital insufficiency; left STACEY is normal with moderate digital insufficiency.  Emergent surgery per vascular surgeon 12/19/2024 for right iliac thrombectomy via open groin incision then return to surgery on 12/20/2024 for recurrent right lower extremity ischemia due to arterial thrombosis and underwent right iliofemoral popliteal thrombectomy, right femoral endarterectomy with patch, right lower extremity arteriogram, and closed right calf fasciotomies  Heparin drip was initiated upon diagnosis of acute occlusive thrombus however PTTs have been extremely labile and supratherapeutic.  Heparin drip is  currently off as the patient is bleeding from the mouth and via ET tube.  Vascular surgery aware and managing    Nontraumatic rhabdomyolysis  CK continues to climb, now 16,119  Continue IV fluids as ordered.  Nephrology following.    Hypertriglyceridemia, concern for pancreatitis  Triglyceride 1700 now down to 336  Amylase 311, lipase 33  Ultrasound of pancreas 12/19 with no acute findings  Avoid lipid containing substances.    Acute metabolic encephalopathy  Metabolic encephalopathy secondary to DKA and septic shock  CT of the head: Without acute abnormalities.  UDS-negative.  HIV-negative.  Off continuous sedation     Acute respiratory failure without hypercapnia / On mechanical Ventilation  Intubated for airway protection due to altered mental status and secretions with ineffective airway clearance  Ventilator settings noted and adjusted as needed.   When patient is more awake, will attempt a spontaneous breathing trial. Wean PEEP and FiO2 as tolerated.  Minimize sedation and analgesia to target a RASS of 0 to -1.  Off continuous sedation    Cutaneous candidiasis of groin  Likely secondary to uncontrolled diabetes mellitus  Nystatin ordered     Bilateral leg rash, concern for scabies infection  Permethrin regimen ordered.  Completed first treatment on 12/18, second treatment planned on 12/25/2024.  Keep in contact precautions x 1 week post initial treatment (12/25)     Transaminitis  US of Liver: Hepatomegaly with steatosis.  Hepatitis panel-negative.  Monitor and trend LFTs.  .     COPD: Not in exacerbation.  Duonebs ordered as needed.  Obesity: Body mass index is 35 kg/m².    Disposition: On ventilator, appropriate to remain in ICU.    Code status:   Code Status (Patient has no pulse and is not breathing): CPR (Attempt to Resuscitate)  Medical Interventions (Patient has pulse or is breathing): Full Support       Nutrition:   NPO Diet NPO Type: Strict NPO   Tube Feeding: Formula: Other; : tap water flush  only; Feeding Type: Continuous; Start at: Other; Other: 0; Then Advance By: Do Not Advance; Water Flush: 30 mL; Every: 1 hour; Water Bolus: None     VTE Prophylaxis:  Pharmacologic VTE prophylaxis orders are present.      Objective / Physical Exam     Vital signs:  Temp: 98.6 °F (37 °C)  BP: 93/55  Heart Rate: 95  Resp: 27  SpO2: 100 %  Weight: 88.5 kg (195 lb 1.7 oz)    Admission Weight: Weight: 92.5 kg (203 lb 14.4 oz)  Current Weight: Weight: 88.5 kg (195 lb 1.7 oz)    Input/Output in last 24 hours:    Intake/Output Summary (Last 24 hours) at 12/21/2024 1047  Last data filed at 12/21/2024 1000  Gross per 24 hour   Intake 5795 ml   Output 2212 ml   Net 3583 ml      Net IO Since Admission: 15,269 mL [12/21/24 1047]     Physical Exam  Vitals and nursing note reviewed.   Constitutional:       Appearance: She is obese. She is ill-appearing and toxic-appearing.      Interventions: She is sedated, intubated and restrained.      Comments: Intubated, sedation off  Restless at times   HENT:      Head: Normocephalic and atraumatic.      Nose: Nose normal.      Comments: Right Dobbhoff tube in place.     Mouth/Throat:      Mouth: Mucous membranes are dry.      Comments: ET tube in place  Dried bloody secretions throughout the mouth  Eyes:      General: No scleral icterus.     Pupils: Pupils are equal, round, and reactive to light.   Cardiovascular:      Heart sounds: Normal heart sounds. No murmur heard.     No gallop.      Comments: Sinus rhythm  Pulmonary:      Effort: She is intubated.      Breath sounds: No wheezing or rales.      Comments: Mechanically ventilated  Coarse but clear throughout  Abdominal:      General: There is no distension.      Palpations: Abdomen is soft.      Comments: Morbid body habitus  Bowel sounds hypoactive   Musculoskeletal:      Comments: Left lower extremity with edema and chronic changes consistent with PVD  Bilateral upper thighs with dense erythema and warmth  Right lower extremity with  tight edema, chronic changes consistent with PVD, fasciotomy dressings, right groin dressing   Skin:     General: Skin is warm and dry.      Comments: Intertrigo   Neurological:      Comments: Intubated, sedation off  Not following commands, no purposeful movement   Psychiatric:      Comments: Occasionally appears restless          Radiology and Labs     Results from last 7 days   Lab Units 12/21/24  0405 12/20/24  1603 12/20/24  1210 12/20/24  0639 12/20/24  0436 12/20/24  0010 12/19/24  1653 12/19/24  0831   WBC 10*3/mm3 15.20*  --   --  15.52* 12.92* 10.37  --  13.81*   HEMOGLOBIN g/dL 8.7*  --  11.0* 11.5* 12.1 12.9  --  17.1*   HEMOGLOBIN, POC g/dL  --  11.2*  --   --   --   --    < >  --    HEMATOCRIT % 27.2*  --  33.8* 35.3 38.5 41.2  --  52.2*   HEMATOCRIT POC %  --  33*  --   --   --   --    < >  --    PLATELETS 10*3/mm3 99*  --   --  156 175 160  --  266    < > = values in this interval not displayed.      Results from last 7 days   Lab Units 12/21/24  0739 12/21/24  0405 12/21/24  0000 12/20/24  0921 12/20/24  0240 12/19/24  1952 12/19/24  1428   PROTIME Seconds  --   --   --   --   --   --  13.8   INR   --   --   --   --   --   --  1.05   APTT seconds >200.0* 57.3* 185.2* 112.5* 54.6*   < > <20.0*    < > = values in this interval not displayed.      Results from last 7 days   Lab Units 12/21/24  0405 12/20/24  2322 12/20/24 2021 12/20/24  1830 12/20/24  1210 12/20/24  0745   SODIUM mmol/L 138 136 135*  --  135* 134*   POTASSIUM mmol/L 5.0 5.3* 5.3*  --  5.0 5.1   CHLORIDE mmol/L 105 104 104  --  105 105   CO2 mmol/L 18.8* 20.5* 21.1*  --  19.9* 19.8*   BUN mg/dL 46* 38* 34*  --  25* 22*   CREATININE mg/dL 2.43* 2.09* 1.98*  --  1.51* 1.49*   GLUCOSE mg/dL 181* 154* 142*  --  116* 217*   MAGNESIUM mg/dL 2.1 2.0 2.0 1.9 1.9 1.9   PHOSPHORUS mg/dL 6.8* 7.0* 7.3*  --  6.7* 6.5*      Results from last 7 days   Lab Units 12/21/24  0405 12/20/24  0436 12/19/24  0406 12/18/24  0504   ALK PHOS U/L 91 103 145*  246*   AST (SGOT) U/L 234* 185* 64* 106*   ALT (SGPT) U/L 101* 116* 124* 178*     Results from last 7 days   Lab Units 12/21/24  0317 12/20/24  1603 12/20/24  0328 12/20/24  0138 12/19/24  1850 12/19/24  1653 12/19/24  1354   PH, ARTERIAL pH units 7.357 7.180* 7.271* 7.228* 7.093*   < > 7.242*   PCO2, ARTERIAL mm Hg 35.5 59.7* 57.4* 61.7* 86.0*   < > 58.9*   PO2 ART mm Hg 72.7* 78.0* 94.5 216.7* 437.6*   < > 57.5*   O2 SATURATION ART % 93.9* 91.0* 96.0 99.6* 99.9*   < > 83.6*   FIO2 % 35  --  50 70 100  --  40   HCO3 ART mmol/L 19.9* 22.1 26.4 25.7 26.3   < > 25.3   BASE EXCESS ART mmol/L -5.0* <0.0* -1.5* -3.1* -5.6*   < > -3.0*    < > = values in this interval not displayed.       XR Chest 1 View    Result Date: 12/21/2024  Impression: 1. No tube or line change 2. No focal airspace consolidation or other acute process. Electronically Signed: Julio Thorpe MD  12/21/2024 9:54 AM EST  Workstation ID: [id number]    XR Chest 1 View    Result Date: 12/20/2024  Impression: Stable chest demonstrating COPD and postoperative changes with no acute cardiopulmonary process demonstrated Electronically Signed: Shon Ponce  12/20/2024 7:19 AM EST  Workstation ID: [id number]    XR Chest 1 View    Result Date: 12/19/2024  Impression: Interval placement of various support lines as noted. No acute process of the lung fields. Electronically Signed: Emilia Orellana MD  12/19/2024 12:27 PM EST  Workstation ID: [id number]    US Renal Bilateral    Result Date: 12/19/2024  Impression: Unremarkable renal ultrasound. Electronically Signed: Luciana Guerra MD  12/19/2024 11:22 AM EST  Workstation ID: [id number]    US Pancreas    Result Date: 12/19/2024  Impression: Negative exam. Electronically Signed: Emilia Orellana MD  12/19/2024 11:05 AM EST  Workstation ID: [id number]     Current medications     Scheduled Meds:   albumin human, 25 g, Intravenous, Q8H  calcium gluconate, 2,000 mg, Intravenous, Q8H  cefTRIAXone, 2,000 mg, Intravenous,  Q24H  chlorhexidine, 15 mL, Mouth/Throat, Q12H  lansoprazole, 30 mg, Nasogastric, Q AM  mupirocin, 1 Application, Each Nare, BID  nystatin, 1 Application, Topical, Q8H  [START ON 12/25/2024] permethrin, 1 Application, Topical, Once  QUEtiapine, 25 mg, Nasogastric, Q12H  sodium bicarbonate, 50 mEq, Intravenous, Q8H  sodium chloride, 10 mL, Intravenous, Q12H  sodium chloride, 10 mL, Intravenous, Q12H  vancomycin, 1,000 mg, Intravenous, Once        Continuous Infusions:   dexmedetomidine, 0.2-1.5 mcg/kg/hr, Last Rate: Stopped (12/20/24 0800)  fentanyl 10 mcg/mL,  mcg/hr, Last Rate: Stopped (12/20/24 0910)  heparin, 16.9 Units/kg/hr, Last Rate: Stopped (12/21/24 0950)  insulin, 0-100 Units/hr, Last Rate: 2.5 Units/hr (12/21/24 0923)  norepinephrine, 0.05-0.5 mcg/kg/min, Last Rate: 0.14 mcg/kg/min (12/21/24 0624)  Pharmacy to dose vancomycin,   sodium chloride, 125 mL/hr, Last Rate: 125 mL/hr (12/21/24 0547)        Critical care services to this patient including but not limited to: review of labs/ microbiology/imaging/medications, serial monitoring of vital signs, adjusting ventilator settings as needed, review of other consultant's notes, review of events in the last 24 hrs, monitoring input/output, review of treatment plan with bedside nurse, RT and other treatment team, management of life support and nutrition needs.    Time spent in performing separately billable procedures and updating family is not included in the critical care time.       NICHOLAS Smart   Critical Care  12/21/24   10:47 EST

## 2024-12-21 NOTE — PLAN OF CARE
Goal Outcome Evaluation:    Patient remains on ventilator at 35%/5+. On NS, heparin, insulin gtts. Levo off for most of day, off around 1500. Withdraws to pain, with cough - gag. Attempts to reach for ETT with restraints untied. FC collected around 800cc, no BM on shift. TF started around 1700. Tolerated dialysis - no fluid removed.     Family updated, remains ICU level patient.

## 2024-12-21 NOTE — NURSING NOTE
Per APRN, bolus heparin from bag per protocol and restart heparin gtt at 14.9 units/kg/hr. Recheck ptt in 3 hours

## 2024-12-21 NOTE — ANESTHESIA POSTPROCEDURE EVALUATION
Patient: Yarelis Jackson    Procedure Summary       Date: 12/20/24 Room / Location: [facility] OR  / [facility] HYBRID OR    Anesthesia Start: 1515 Anesthesia Stop: 1724    Procedure: Right femoral thrombectomy, right lower extremity arteriogram and right lower leg facitomies (Right: Thigh) Diagnosis:       Arterial thrombosis      (Arterial thrombosis [I74.9])    Surgeons: Ghassna Celestin MD Provider: Blayne Carson MD    Anesthesia Type: general ASA Status: 4 - Emergent            Anesthesia Type: general    Vitals  Vitals Value Taken Time   BP     Temp     Pulse 94 12/20/24 2202   Resp 26 12/20/24 2000   SpO2 100 % 12/20/24 2202   Vitals shown include unfiled device data.        Post Anesthesia Care and Evaluation    Patient location during evaluation: PACU  Patient participation: complete - patient participated  Level of consciousness: awake  Pain management: adequate    Airway patency: patent  Anesthetic complications: No anesthetic complications  PONV Status: none  Cardiovascular status: acceptable  Respiratory status: acceptable  Hydration status: acceptable    Comments: Patient seen and examined postoperatively; vital signs stable; SpO2 greater than or equal to 90%; cardiopulmonary status stable; nausea/vomiting adequately controlled; pain adequately controlled; no apparent anesthesia complications; patient discharged from anesthesia care when discharge criteria were met

## 2024-12-21 NOTE — PROGRESS NOTES
"Pharmacy Antimicrobial Dosing Service    Subjective:  Yarelis Jackson is a 54 y.o.female admitted with DKA. Pharmacy has been consulted to dose Vancomycin for possible PNA.    PMH: COPD, HTN, T2DM diagnosis this admission      Assessment/Plan    1. Day #3 Vancomycin: Pulse dosing d/t renal dysfxn. CRRT was stopped yesterday AM and vancomycin 1500 mg IV x1 was given. Random level this AM = 20.8 mcg/mL (~18 hours post dose). Will give vancomycin 1000 mg (~11 mg/kg) IV x1 this morning. Will follow for plans for dialysis and adjust regimen as needed. Will obtain random with AM labs and plan to re-dose when level is expected to be <20 mcg/mL.     2. Day #3 Ceftriaxone: 2 g IV q24h.    Will continue to monitor drug levels, renal function, culture and sensitivities, and patient clinical status.       Objective:  Relevant clinical data and objective history reviewed:  162.6 cm (64\")   88.5 kg (195 lb 1.7 oz)   Ideal body weight: 54.7 kg (120 lb 9.5 oz)  Adjusted ideal body weight: 68.2 kg (150 lb 6.4 oz)  Body mass index is 33.49 kg/m².    Results from last 7 days   Lab Units 12/21/24  0405 12/20/24  0436   VANCOMYCIN RM mcg/mL 20.80 18.90     Results from last 7 days   Lab Units 12/21/24  0405 12/20/24  2322 12/20/24  2021   CREATININE mg/dL 2.43* 2.09* 1.98*     Estimated Creatinine Clearance: 28.8 mL/min (A) (by C-G formula based on SCr of 2.43 mg/dL (H)).  I/O last 3 completed shifts:  In: 8037 [P.O.:301; I.V.:7636; IV Piggyback:100]  Out: 2543 [Urine:1305; Emesis/NG output:732]    Results from last 7 days   Lab Units 12/21/24  0405 12/20/24  0639 12/20/24  0436   WBC 10*3/mm3 15.20* 15.52* 12.92*     Temperature    12/20/24 0400 12/20/24 0800 12/20/24 1200   Temp: 98.4 °F (36.9 °C) 98 °F (36.7 °C) 98.6 °F (37 °C)     Baseline culture/source/susceptibility:  Microbiology Results (last 10 days)       Procedure Component Value - Date/Time    Respiratory Culture - Sputum, ET Suction [[id number]] Collected: 12/19/24 1907    " Lab Status: Preliminary result Specimen: Sputum from ET Suction Updated: 12/20/24 1224     Respiratory Culture Culture in progress     Gram Stain Many (4+) WBCs per low power field      Rare (1+) Epithelial cells per low power field      Few (2+) Mixed bacterial morphotypes seen on Gram Stain    S. Pneumo Ag Urine or CSF - Urine, Indwelling Urethral Catheter [[id number]]  (Normal) Collected: 12/19/24 1152    Lab Status: Final result Specimen: Urine from Indwelling Urethral Catheter Updated: 12/19/24 1928     Strep Pneumo Ag Negative    Legionella Antigen, Urine - Urine, Indwelling Urethral Catheter [[id number]]  (Normal) Collected: 12/19/24 1152    Lab Status: Final result Specimen: Urine from Indwelling Urethral Catheter Updated: 12/19/24 1928     LEGIONELLA ANTIGEN, URINE Negative    Eosinophil Smear - Urine, Urine, Clean Catch [[id number]]  (Normal) Collected: 12/19/24 0905    Lab Status: Final result Specimen: Urine, Clean Catch Updated: 12/19/24 0946     Eosinophil Smear 0 % EOS/100 Cells     MRSA Screen, PCR (Inpatient) - Swab, Nares [[id number]]  (Abnormal) Collected: 12/18/24 0839    Lab Status: Final result Specimen: Swab from Nares Updated: 12/18/24 1024     MRSA PCR MRSA Detected    Narrative:      The negative predictive value of this diagnostic test is high and should only be used to consider de-escalating anti-MRSA therapy. A positive result may indicate colonization with MRSA and must be correlated clinically.    Blood Culture - Blood, Arm, Right [[id number]]  (Normal) Collected: 12/18/24 0557    Lab Status: Preliminary result Specimen: Blood from Arm, Right Updated: 12/21/24 0615     Blood Culture No growth at 3 days    Narrative:      Less than seven (7) mL's of blood was collected.  Insufficient quantity may yield false negative results.    Blood Culture - Blood, Arm, Right [[id number]]  (Normal) Collected: 12/18/24 0504    Lab Status: Preliminary result Specimen: Blood from Arm, Right Updated: 12/21/24  0515     Blood Culture No growth at 3 days    Narrative:      Less than seven (7) mL's of blood was collected.  Insufficient quantity may yield false negative results.    Respiratory Panel PCR w/COVID-19(SARS-CoV-2) REBEKAH/JAN/ALFREDO/PAD/COR/VERONICA In-House, NP Swab in UTM/VTM, 2 HR TAT - Swab, Nasopharynx [[id number]]  (Normal) Collected: 12/18/24 0503    Lab Status: Final result Specimen: Swab from Nasopharynx Updated: 12/18/24 0610     ADENOVIRUS, PCR Not Detected     Coronavirus 229E Not Detected     Coronavirus HKU1 Not Detected     Coronavirus NL63 Not Detected     Coronavirus OC43 Not Detected     COVID19 Not Detected     Human Metapneumovirus Not Detected     Human Rhinovirus/Enterovirus Not Detected     Influenza A PCR Not Detected     Influenza B PCR Not Detected     Parainfluenza Virus 1 Not Detected     Parainfluenza Virus 2 Not Detected     Parainfluenza Virus 3 Not Detected     Parainfluenza Virus 4 Not Detected     RSV, PCR Not Detected     Bordetella pertussis pcr Not Detected     Bordetella parapertussis PCR Not Detected     Chlamydophila pneumoniae PCR Not Detected     Mycoplasma pneumo by PCR Not Detected    Narrative:      In the setting of a positive respiratory panel with a viral infection PLUS a negative procalcitonin without other underlying concern for bacterial infection, consider observing off antibiotics or discontinuation of antibiotics and continue supportive care. If the respiratory panel is positive for atypical bacterial infection (Bordetella pertussis, Chlamydophila pneumoniae, or Mycoplasma pneumoniae), consider antibiotic de-escalation to target atypical bacterial infection.            Roxana Espinoza, PharmD  12/21/24 07:29 EST

## 2024-12-21 NOTE — NURSING NOTE
Heparin gtt stopped and held at 0010 per APRN due to ptt of 185.2. Will recheck ptt with am labs.

## 2024-12-21 NOTE — PROGRESS NOTES
Daily Progress Note    Patient Care Team:  Katie Duran PA as PCP - General (Physician Assistant)  Uli Starr MD as Consulting Physician (Nephrology)    Chief Complaint: Follow-up type 1 diabetes    HPI: Patient seen, remains in ICU, intubated.  Currently on pressors.  Tube feeds are going to be started currently on IV insulin.      ROS:    Remains intubated and not responsive        Vitals:    12/21/24 1450   BP:    Pulse: 90   Resp:    Temp:    SpO2: 100%     Body mass index is 33.49 kg/m².    Physical Exam:  GEN: Remains intubated      Results Review:     I reviewed the patient's new clinical results.    Glucose   Date Value Ref Range Status   12/21/2024 181 (H) 65 - 99 mg/dL Final     Sodium   Date Value Ref Range Status   12/21/2024 138 136 - 145 mmol/L Final     Potassium   Date Value Ref Range Status   12/21/2024 5.0 3.5 - 5.2 mmol/L Final     CO2   Date Value Ref Range Status   12/21/2024 18.8 (L) 22.0 - 29.0 mmol/L Final     Chloride   Date Value Ref Range Status   12/21/2024 105 98 - 107 mmol/L Final     Anion Gap   Date Value Ref Range Status   12/21/2024 14.2 5.0 - 15.0 mmol/L Final     Creatinine   Date Value Ref Range Status   12/21/2024 2.43 (H) 0.57 - 1.00 mg/dL Final   12/18/2024 1.35 (H) 0.60 - 1.30 mg/dL Final     Comment:     Serial Number: [id number]:  [id number]     BUN   Date Value Ref Range Status   12/21/2024 46 (H) 6 - 20 mg/dL Final     BUN/Creatinine Ratio   Date Value Ref Range Status   12/21/2024 18.9 7.0 - 25.0 Final     Calcium   Date Value Ref Range Status   12/21/2024 6.9 (L) 8.6 - 10.5 mg/dL Final     Alkaline Phosphatase   Date Value Ref Range Status   12/21/2024 91 39 - 117 U/L Final     Total Protein   Date Value Ref Range Status   12/21/2024 4.9 (L) 6.0 - 8.5 g/dL Final     ALT (SGPT)   Date Value Ref Range Status   12/21/2024 101 (H) 1 - 33 U/L Final     AST (SGOT)   Date Value Ref Range Status   12/21/2024 234 (H) 1 - 32 U/L Final     Total Bilirubin  "  Date Value Ref Range Status   12/21/2024 0.2 0.0 - 1.2 mg/dL Final     Albumin   Date Value Ref Range Status   12/21/2024 2.3 (L) 3.5 - 5.2 g/dL Final     Globulin   Date Value Ref Range Status   12/21/2024 2.6 gm/dL Final     Magnesium   Date Value Ref Range Status   12/21/2024 2.1 1.6 - 2.6 mg/dL Final     Phosphorus   Date Value Ref Range Status   12/21/2024 6.8 (H) 2.5 - 4.5 mg/dL Final     Lab Results   Component Value Date    HGBA1C 15.80 (H) 12/18/2024     No results found for: \"GLUF\", \"MICROALBUR\"  Results from last 7 days   Lab Units 12/21/24  1419 12/21/24  1306 12/21/24  1208 12/21/24  1016 12/21/24  0920 12/21/24  0821   GLUCOSE mg/dL 113* 110* 126* 111* 112* 130*     Medication Review: Reviewed.     albumin human, 25 g, Intravenous, Q8H  calcium gluconate, 2,000 mg, Intravenous, Q8H  cefTRIAXone, 2,000 mg, Intravenous, Q24H  chlorhexidine, 15 mL, Mouth/Throat, Q12H  lansoprazole, 30 mg, Nasogastric, Q AM  mupirocin, 1 Application, Each Nare, BID  nystatin, 1 Application, Topical, Q8H  [START ON 12/25/2024] permethrin, 1 Application, Topical, Once  QUEtiapine, 25 mg, Nasogastric, Q12H  sodium bicarbonate, 50 mEq, Intravenous, Q8H  sodium chloride, 10 mL, Intravenous, Q12H  sodium chloride, 10 mL, Intravenous, Q12H      Assessment and plan:  Diabetic ketoacidosis: Resolved    Diabetes mellitus type 1 with hyperglycemia: Currently on IV insulin, will be started on tube feeds, recommend to continue IV insulin for now.  She is currently intubated and on pressors.    Acute renal failure: Followed by nephrology.    Hypernatremia: Better.    Rodriguez Worthy MD. FACE                "

## 2024-12-21 NOTE — PROGRESS NOTES
Name: Yarelis Jackson ADMIT: 2024   : 1970  PCP: Katie Duran PA    MRN: [id number] LOS: 3 days   AGE/SEX: 54 y.o. female  ROOM: 2308/1   [facility]    Chief Complaint   Patient presents with    Altered Mental Status     CC: Postop day 2 3 and postop day 1 status post right leg revascularization  Subjective     54 y.o. female with right leg ischemia due to thromboembolism to the right common femoral artery that was thrombectomized.  Patient then was found to have a flap in her right femoral artery was repaired yesterday without any thrombus associated.  Patient has had a harvey postop course due to hypotension and now bleeding from heparin drip.  She is also dropping her platelet count precipitously.  Given these findings I am going to ask hematology to evaluate and while I do not actually suspect HIT it is of concern.  I think more likely this is platelet consumption from recurrent operation and blood loss.  We will be checking the every 6 hours labs for the time being and have held her heparin momentarily to allow her to get back down to normal and hopefully still her bleeding.    Review of Systems intubated and unresponsive    Objective     Scheduled Medications:   albumin human, 25 g, Intravenous, Q8H  calcium gluconate, 2,000 mg, Intravenous, Q8H  cefTRIAXone, 2,000 mg, Intravenous, Q24H  chlorhexidine, 15 mL, Mouth/Throat, Q12H  lansoprazole, 30 mg, Nasogastric, Q AM  mupirocin, 1 Application, Each Nare, BID  nystatin, 1 Application, Topical, Q8H  [START ON 2024] permethrin, 1 Application, Topical, Once  QUEtiapine, 25 mg, Nasogastric, Q12H  sodium bicarbonate, 50 mEq, Intravenous, Q8H  sodium chloride, 10 mL, Intravenous, Q12H  sodium chloride, 10 mL, Intravenous, Q12H        Active Infusions:  heparin, 16.9 Units/kg/hr, Last Rate: 10 Units/kg/hr (24 1447)  insulin, 0-100 Units/hr, Last Rate: 1.3 Units/hr (24 1421)  norepinephrine, 0.05-0.5 mcg/kg/min, Last Rate: 0.14  mcg/kg/min (12/21/24 0624)  Pharmacy to dose vancomycin,   sodium chloride, 125 mL/hr        As Needed Medications:    acetaminophen **OR** acetaminophen    aluminum-magnesium hydroxide-simethicone    senna-docusate sodium **AND** polyethylene glycol **AND** bisacodyl **AND** bisacodyl    Calcium Replacement - Follow Nurse / BPA Driven Protocol    dextrose    dextrose    fentaNYL    glucagon (human recombinant)    heparin    heparin    HYDROmorphone    ipratropium-albuterol    LORazepam    Magnesium Standard Dose Replacement - Follow Nurse / BPA Driven Protocol    nitroglycerin    ondansetron ODT **OR** ondansetron    Pharmacy to dose vancomycin    Phosphorus Replacement - Follow Nurse / BPA Driven Protocol    Potassium Replacement - Follow Nurse / BPA Driven Protocol    sodium chloride    sodium chloride    sodium chloride    sodium chloride    sodium chloride    Vancomycin Pharmacy Intermittent/Pulse Dosing    Vital Signs  Vital Signs Patient Vitals for the past 24 hrs:   BP Pulse Resp SpO2   12/21/24 1500 153/78 90 26 --   12/21/24 1450 -- 90 (!) 30 100 %   12/21/24 1445 155/76 84 26 --   12/21/24 1430 154/75 92 26 --   12/21/24 1415 143/71 89 26 --   12/21/24 1400 145/72 92 26 --   12/21/24 1345 150/74 92 26 --   12/21/24 1330 143/70 89 20 --   12/21/24 1315 144/71 98 26 --   12/21/24 1300 144/77 88 (!) 29 --   12/21/24 1245 151/71 96 26 --   12/21/24 1230 152/72 94 26 --   12/21/24 1215 155/79 94 (!) 29 --   12/21/24 1200 153/78 94 26 --   12/21/24 1155 143/68 94 26 --   12/21/24 1153 149/68 88 26 --   12/21/24 1015 -- 95 27 100 %   12/21/24 0604 -- 94 26 97 %   12/21/24 0600 -- 94 -- 97 %   12/21/24 0500 -- 104 -- 96 %   12/21/24 0400 -- 100 25 97 %   12/21/24 0326 -- 94 25 100 %   12/21/24 0300 -- 93 -- 94 %   12/21/24 0200 -- 93 -- 96 %   12/21/24 0100 -- 97 -- 97 %   12/21/24 0000 -- 93 26 100 %   12/20/24 2305 -- 97 -- 100 %   12/20/24 2300 -- 99 -- 100 %   12/20/24 2200 -- 95 -- 100 %   12/20/24 2100 --  86 -- 99 %   12/20/24 2000 -- 92 26 98 %   12/20/24 1900 -- 96 -- 98 %   12/20/24 1800 -- 98 -- 93 %     Vital Signs (range)  Heart Rate:  [] 90  Resp:  [20-30] 26  BP: (143-155)/(68-79) 153/78  FiO2 (%):  [35 %] 35 %  I/O:  I/O last 3 completed shifts:  In: 8037 [P.O.:301; I.V.:7636; IV Piggyback:100]  Out: 2543 [Urine:1305; Emesis/NG output:732]  I/O:   Intake/Output Summary (Last 24 hours) at 12/21/2024 1502  Last data filed at 12/21/2024 1215  Gross per 24 hour   Intake 4646 ml   Output 1690 ml   Net 2956 ml     BMI:  Body mass index is 33.49 kg/m².    Physical Exam:  Physical Exam   Right groin dressing intact.  Right leg fasciotomy site dressings intact with blisters on the skin.  Bilateral feet little mottled but only mildly.  Swelling right worse than left leg.    Results Review:     CBC    Results from last 7 days   Lab Units 12/21/24  1307 12/21/24  0405 12/20/24  1603 12/20/24  1210 12/20/24  0639 12/20/24  0436 12/20/24  0010 12/19/24  1653 12/19/24  0831 12/18/24  0510 12/18/24  0504   WBC 10*3/mm3 8.44 15.20*  --   --  15.52* 12.92* 10.37  --  13.81*  --  14.65*   HEMOGLOBIN g/dL 7.5* 8.7*  --  11.0* 11.5* 12.1 12.9  --  17.1*  --  17.1*   HEMOGLOBIN, POC g/dL  --   --  11.2*  --   --   --   --    < >  --    < >  --    PLATELETS 10*3/mm3 85* 99*  --   --  156 175 160  --  266  --  370    < > = values in this interval not displayed.     BMP   Results from last 7 days   Lab Units 12/21/24  0405 12/20/24  2322 12/20/24  2021 12/20/24  1830 12/20/24  1210 12/20/24  0745 12/20/24  0436 12/20/24  0010   SODIUM mmol/L 138 136 135*  --  135* 134* 135* 142   POTASSIUM mmol/L 5.0 5.3* 5.3*  --  5.0 5.1 5.1 4.9   CHLORIDE mmol/L 105 104 104  --  105 105 105 111*   CO2 mmol/L 18.8* 20.5* 21.1*  --  19.9* 19.8* 20.8* 22.3   BUN mg/dL 46* 38* 34*  --  25* 22* 20 24*   CREATININE mg/dL 2.43* 2.09* 1.98*  --  1.51* 1.49* 1.20* 1.11*   GLUCOSE mg/dL 181* 154* 142*  --  116* 217* 170* 119*   MAGNESIUM mg/dL 2.1  2.0 2.0 1.9 1.9 1.9 2.0 2.0   PHOSPHORUS mg/dL 6.8* 7.0* 7.3*  --  6.7* 6.5* 5.7* 5.3*     Cr Clearance Estimated Creatinine Clearance: 28.8 mL/min (A) (by C-G formula based on SCr of 2.43 mg/dL (H)).  Coag   Results from last 7 days   Lab Units 12/21/24  1307 12/21/24  0739 12/21/24  0405 12/21/24  0000 12/20/24  0921 12/20/24  0240 12/19/24  2232 12/19/24 1952 12/19/24  1428   INR   --   --   --   --   --   --   --   --  1.05   APTT seconds 38.8* >200.0* 57.3* 185.2* 112.5* 54.6* 147.9*   < > <20.0*    < > = values in this interval not displayed.     HbA1C   Lab Results   Component Value Date    HGBA1C 15.80 (H) 12/18/2024     Blood Glucose   Glucose   Date/Time Value Ref Range Status   12/21/2024 1419 113 (H) 70 - 105 mg/dL Final     Comment:     Serial Number: [id number]:  [id number]   12/21/2024 1306 110 (H) 70 - 105 mg/dL Final     Comment:     Serial Number: [id number]:  [id number]   12/21/2024 1208 126 (H) 70 - 105 mg/dL Final     Comment:     Serial Number: [id number]:  [id number]   12/21/2024 1016 111 (H) 70 - 105 mg/dL Final     Comment:     Serial Number: [id number]:  [id number]   12/21/2024 0920 112 (H) 70 - 105 mg/dL Final     Comment:     Serial Number: [id number]:  [id number]   12/21/2024 0821 130 (H) 70 - 105 mg/dL Final     Comment:     Serial Number: [id number]:  [id number]   12/21/2024 0716 157 (H) 70 - 105 mg/dL Final     Comment:     Serial Number: [id number]:  [id number]   12/21/2024 0612 161 (H) 70 - 105 mg/dL Final     Comment:     Serial Number: [id number]:  [id number]     Infection   Results from last 7 days   Lab Units 12/19/24  1907 12/18/24  0557 12/18/24  0504   BLOODCX   --  No growth at 3 days No growth at 3 days   RESPCX  Moderate growth (3+) Staphylococcus aureus*  Moderate growth (3+) Streptococcus agalactiae (Group B)*  No Normal Respiratory Alina*  --   --      CMP   Results from last 7 days   Lab Units 12/21/24  4483  12/20/24  2322 12/20/24  2021 12/20/24  1210 12/20/24  0745 12/20/24  0436 12/20/24  0010 12/19/24  1952 12/19/24  0830 12/19/24  0406 12/19/24  0009 12/18/24  1444 12/18/24  0646 12/18/24  0510 12/18/24  0504   SODIUM mmol/L 138 136 135* 135* 134* 135* 142 151*   < > 175* 178*   < >  --    < > 158*   POTASSIUM mmol/L 5.0 5.3* 5.3* 5.0 5.1 5.1 4.9 5.2   < > 4.5 4.7   < >  --    < > 3.6   CHLORIDE mmol/L 105 104 104 105 105 105 111* 120*   < > >140* >140*   < >  --    < > 114*   CO2 mmol/L 18.8* 20.5* 21.1* 19.9* 19.8* 20.8* 22.3 22.9   < > 2.7* 2.5*   < >  --    < > 14.7*   BUN mg/dL 46* 38* 34* 25* 22* 20 24* 38*   < > 66* 69*   < >  --    < > 88*   CREATININE mg/dL 2.43* 2.09* 1.98* 1.51* 1.49* 1.20* 1.11* 1.31*   < > 1.61* 1.52*   < >  --    < > 2.10*   GLUCOSE mg/dL 181* 154* 142* 116* 217* 170* 119* 168*   < > 158* 151*   < >  --    < > 987*   ALBUMIN g/dL 2.3*  --   --   --  2.5* 2.7* 2.9* 2.8*  --  3.2*  --   --   --   --  3.8   BILIRUBIN mg/dL 0.2  --   --   --   --  0.3  --   --   --  0.2  --   --   --   --  0.2   ALK PHOS U/L 91  --   --   --   --  103  --   --   --  145*  --   --   --   --  246*   AST (SGOT) U/L 234*  --   --   --   --  185*  --   --   --  64*  --   --   --   --  106*   ALT (SGPT) U/L 101*  --   --   --   --  116*  --   --   --  124*  --   --   --   --  178*   AMYLASE U/L  --   --   --   --   --   --   --   --   --   --  311*  --   --   --   --    LIPASE U/L  --   --   --   --   --   --   --   --   --   --  33  --   --   --   --    AMMONIA umol/L  --   --   --   --   --   --   --   --   --   --   --   --  <10*  --   --     < > = values in this interval not displayed.     ABG    Results from last 7 days   Lab Units 12/21/24  0317 12/20/24  1603 12/20/24  0328 12/20/24  0138 12/19/24  1850 12/19/24  1653 12/19/24  1354   PH, ARTERIAL pH units 7.357 7.180* 7.271* 7.228* 7.093* 7.100* 7.242*   PCO2, ARTERIAL mm Hg 35.5 59.7* 57.4* 61.7* 86.0* 69.1* 58.9*   PO2 ART mm Hg 72.7* 78.0* 94.5  216.7* 437.6* 278.0* 57.5*   O2 SATURATION ART % 93.9* 91.0* 96.0 99.6* 99.9* 100.0* 83.6*   BASE EXCESS ART mmol/L -5.0* <0.0* -1.5* -3.1* -5.6* <0.0* -3.0*     Lysis Labs   Results from last 7 days   Lab Units 12/21/24  1307 12/21/24  0739 12/21/24  0405 12/21/24  0000 12/20/24  2322 12/20/24  2021 12/20/24  1603 12/20/24  1210 12/20/24  0921 12/20/24  0745 12/20/24  0639 12/20/24  0436 12/20/24  0240 12/20/24  0010 12/19/24  2232 12/19/24  1653 12/19/24  1428 12/19/24  1126 12/19/24  0831 12/18/24  0510 12/18/24  0504   INR   --   --   --   --   --   --   --   --   --   --   --   --   --   --   --   --  1.05  --   --   --   --    APTT seconds 38.8* >200.0* 57.3* 185.2*  --   --   --   --  112.5*  --   --   --  54.6*  --  147.9*   < > <20.0*  --   --   --   --    FIBRINOGEN mg/dL  --   --   --   --   --   --   --   --   --   --   --   --   --   --  524*  --   --   --   --   --   --    HEMOGLOBIN g/dL 7.5*  --  8.7*  --   --   --   --  11.0*  --   --  11.5* 12.1  --  12.9  --   --   --   --  17.1*  --  17.1*   HEMOGLOBIN, POC g/dL  --   --   --   --   --   --  11.2*  --   --   --   --   --   --   --   --    < >  --   --   --    < >  --    PLATELETS 10*3/mm3 85*  --  99*  --   --   --   --   --   --   --  156 175  --  160  --   --   --   --  266  --  370   CREATININE mg/dL  --   --  2.43*  --  2.09* 1.98*  --  1.51*  --  1.49*  --  1.20*  --  1.11*  --    < >  --    < >  --    < > 2.10*    < > = values in this interval not displayed.     Radiology(recent) XR Chest 1 View    Result Date: 12/21/2024  Impression: 1. No tube or line change 2. No focal airspace consolidation or other acute process. Electronically Signed: Julio Thorpe MD  12/21/2024 9:54 AM EST  Workstation ID: [id number]    XR Chest 1 View    Result Date: 12/20/2024  Impression: Stable chest demonstrating COPD and postoperative changes with no acute cardiopulmonary process demonstrated Electronically Signed: Shon Ponce  12/20/2024 7:19 AM EST   Workstation ID: [id number]     Assessment & Plan     Assessment & Plan      DKA (diabetic ketoacidosis)    Arterial thrombosis      54 y.o. female with critical ischemia to the leg treated with serial operations with improvement but still patient is critically ill with a guarded prognosis overall.  I do not believe any real revascularization will be helpful in the leg and the fasciotomies and skin injury may or may not be recoverable.  Concerns for her overall status also significant with decreasing platelet count in the face of need for anticoagulation as well as prolonged bleeding episodes in the pulmonary tract.  Will ask pulmonary and intensive care to evaluate and help.  Will ask hematology's opinion on HIT.  Obviously I am concerned about starting any other blood thinners in the face of her bleeding issues but she definitely needs to be on some type of anticoagulant.  Will continue heparin drip at lower dose for now and await hematology input.  Every 6 hours labs are being drawn and will be checked.      Sam Juárez MD  12/21/24  15:02 EST    Please call my office with any question: [phone]    Active Hospital Problems    Diagnosis  POA    **DKA (diabetic ketoacidosis) [E11.10]  Yes    Arterial thrombosis [I74.9]  No      Resolved Hospital Problems   No resolved problems to display.

## 2024-12-22 ENCOUNTER — APPOINTMENT (OUTPATIENT)
Dept: GENERAL RADIOLOGY | Facility: HOSPITAL | Age: 54
End: 2024-12-22
Payer: MEDICAID

## 2024-12-22 LAB
ALBUMIN SERPL-MCNC: 2.8 G/DL (ref 3.5–5.2)
ALBUMIN/GLOB SERPL: 1.2 G/DL
ALP SERPL-CCNC: 100 U/L (ref 39–117)
ALT SERPL W P-5'-P-CCNC: 96 U/L (ref 1–33)
ANION GAP SERPL CALCULATED.3IONS-SCNC: 12.7 MMOL/L (ref 5–15)
APTT PPP: 127.5 SECONDS (ref 22.7–35.4)
APTT PPP: 55.7 SECONDS (ref 22.7–35.4)
APTT PPP: 75.6 SECONDS (ref 22.7–35.4)
ARTERIAL PATENCY WRIST A: POSITIVE
AST SERPL-CCNC: 271 U/L (ref 1–32)
ATMOSPHERIC PRESS: ABNORMAL MM[HG]
BACTERIA SPEC RESP CULT: ABNORMAL
BASE EXCESS BLDA CALC-SCNC: 0.5 MMOL/L (ref 0–3)
BASOPHILS # BLD AUTO: 0.01 10*3/MM3 (ref 0–0.2)
BASOPHILS NFR BLD AUTO: 0.1 % (ref 0–1.5)
BDY SITE: ABNORMAL
BH BB BLOOD EXPIRATION DATE: NORMAL
BH BB BLOOD TYPE BARCODE: 5100
BH BB DISPENSE STATUS: NORMAL
BH BB PRODUCT CODE: NORMAL
BH BB UNIT NUMBER: NORMAL
BILIRUB SERPL-MCNC: 0.2 MG/DL (ref 0–1.2)
BUN SERPL-MCNC: 39 MG/DL (ref 6–20)
BUN/CREAT SERPL: 21.1 (ref 7–25)
CA-I SERPL ISE-MCNC: 1.08 MMOL/L (ref 1.15–1.3)
CALCIUM SPEC-SCNC: 8.2 MG/DL (ref 8.6–10.5)
CHLORIDE SERPL-SCNC: 103 MMOL/L (ref 98–107)
CK SERPL-CCNC: ABNORMAL U/L (ref 20–180)
CO2 BLDA-SCNC: 26 MMOL/L (ref 22–29)
CO2 SERPL-SCNC: 22.3 MMOL/L (ref 22–29)
CREAT SERPL-MCNC: 1.85 MG/DL (ref 0.57–1)
CROSSMATCH INTERPRETATION: NORMAL
D-LACTATE SERPL-SCNC: 0.9 MMOL/L (ref 0.5–2)
DEPRECATED RDW RBC AUTO: 55.2 FL (ref 37–54)
EGFRCR SERPLBLD CKD-EPI 2021: 32.3 ML/MIN/1.73
EOSINOPHIL # BLD AUTO: 0.05 10*3/MM3 (ref 0–0.4)
EOSINOPHIL NFR BLD AUTO: 0.7 % (ref 0.3–6.2)
ERYTHROCYTE [DISTWIDTH] IN BLOOD BY AUTOMATED COUNT: 15.6 % (ref 12.3–15.4)
FERRITIN SERPL-MCNC: 332 NG/ML (ref 13–150)
FIBRINOGEN PPP-MCNC: 660 MG/DL (ref 219–464)
GLOBULIN UR ELPH-MCNC: 2.3 GM/DL
GLUCOSE BLDC GLUCOMTR-MCNC: 111 MG/DL (ref 70–105)
GLUCOSE BLDC GLUCOMTR-MCNC: 120 MG/DL (ref 70–105)
GLUCOSE BLDC GLUCOMTR-MCNC: 130 MG/DL (ref 70–105)
GLUCOSE BLDC GLUCOMTR-MCNC: 138 MG/DL (ref 70–105)
GLUCOSE BLDC GLUCOMTR-MCNC: 146 MG/DL (ref 70–105)
GLUCOSE BLDC GLUCOMTR-MCNC: 150 MG/DL (ref 70–105)
GLUCOSE BLDC GLUCOMTR-MCNC: 151 MG/DL (ref 70–105)
GLUCOSE BLDC GLUCOMTR-MCNC: 156 MG/DL (ref 70–105)
GLUCOSE BLDC GLUCOMTR-MCNC: 159 MG/DL (ref 70–105)
GLUCOSE BLDC GLUCOMTR-MCNC: 164 MG/DL (ref 70–105)
GLUCOSE BLDC GLUCOMTR-MCNC: 170 MG/DL (ref 70–105)
GLUCOSE BLDC GLUCOMTR-MCNC: 170 MG/DL (ref 70–105)
GLUCOSE BLDC GLUCOMTR-MCNC: 210 MG/DL (ref 70–105)
GLUCOSE BLDC GLUCOMTR-MCNC: 213 MG/DL (ref 70–105)
GLUCOSE BLDC GLUCOMTR-MCNC: 225 MG/DL (ref 74–100)
GLUCOSE BLDC GLUCOMTR-MCNC: 238 MG/DL (ref 70–105)
GLUCOSE BLDC GLUCOMTR-MCNC: 247 MG/DL (ref 70–105)
GLUCOSE BLDC GLUCOMTR-MCNC: 255 MG/DL (ref 70–105)
GLUCOSE BLDC GLUCOMTR-MCNC: 89 MG/DL (ref 70–105)
GLUCOSE BLDC GLUCOMTR-MCNC: 91 MG/DL (ref 70–105)
GLUCOSE SERPL-MCNC: 236 MG/DL (ref 65–99)
GRAM STN SPEC: ABNORMAL
HCO3 BLDA-SCNC: 24.9 MMOL/L (ref 21–28)
HCT VFR BLD AUTO: 22.1 % (ref 34–46.6)
HEMODILUTION: YES
HGB BLD-MCNC: 7.2 G/DL (ref 12–15.9)
IMM GRANULOCYTES # BLD AUTO: 0.14 10*3/MM3 (ref 0–0.05)
IMM GRANULOCYTES NFR BLD AUTO: 1.8 % (ref 0–0.5)
INHALED O2 CONCENTRATION: 35 %
INR PPP: 1.07 (ref 0.9–1.1)
IRON 24H UR-MRATE: 46 MCG/DL (ref 37–145)
IRON SATN MFR SERPL: 34 % (ref 20–50)
LYMPHOCYTES # BLD AUTO: 1.04 10*3/MM3 (ref 0.7–3.1)
LYMPHOCYTES NFR BLD AUTO: 13.7 % (ref 19.6–45.3)
MAGNESIUM SERPL-MCNC: 2 MG/DL (ref 1.6–2.6)
MCH RBC QN AUTO: 31.2 PG (ref 26.6–33)
MCHC RBC AUTO-ENTMCNC: 32.6 G/DL (ref 31.5–35.7)
MCV RBC AUTO: 95.7 FL (ref 79–97)
MODALITY: ABNORMAL
MONOCYTES # BLD AUTO: 0.57 10*3/MM3 (ref 0.1–0.9)
MONOCYTES NFR BLD AUTO: 7.5 % (ref 5–12)
NEUTROPHILS NFR BLD AUTO: 5.78 10*3/MM3 (ref 1.7–7)
NEUTROPHILS NFR BLD AUTO: 76.2 % (ref 42.7–76)
NRBC BLD AUTO-RTO: 0.3 /100 WBC (ref 0–0.2)
PCO2 BLDA: 38 MM HG (ref 35–48)
PEEP RESPIRATORY: 5 CM[H2O]
PH BLDA: 7.42 PH UNITS (ref 7.35–7.45)
PHOSPHATE SERPL-MCNC: 3.6 MG/DL (ref 2.5–4.5)
PLATELET # BLD AUTO: 63 10*3/MM3 (ref 140–450)
PMV BLD AUTO: 10.8 FL (ref 6–12)
PO2 BLD: 235 MM[HG] (ref 0–500)
PO2 BLDA: 82.2 MM HG (ref 83–108)
POTASSIUM SERPL-SCNC: 3.1 MMOL/L (ref 3.5–5.2)
POTASSIUM SERPL-SCNC: 3.5 MMOL/L (ref 3.5–5.2)
PROT SERPL-MCNC: 5.1 G/DL (ref 6–8.5)
PROTHROMBIN TIME: 13.9 SECONDS (ref 11.7–14.2)
RBC # BLD AUTO: 2.31 10*6/MM3 (ref 3.77–5.28)
RESPIRATORY RATE: 26
SAO2 % BLDCOA: 96.3 % (ref 94–98)
SODIUM SERPL-SCNC: 138 MMOL/L (ref 136–145)
TIBC SERPL-MCNC: 137 MCG/DL (ref 298–536)
TRANSFERRIN SERPL-MCNC: 92 MG/DL (ref 200–360)
UNIT  ABO: NORMAL
UNIT  RH: NORMAL
VANCOMYCIN SERPL-MCNC: 12.3 MCG/ML (ref 5–40)
VENTILATOR MODE: AC
VT ON VENT VENT: 500 ML
WBC NRBC COR # BLD AUTO: 7.59 10*3/MM3 (ref 3.4–10.8)

## 2024-12-22 PROCEDURE — 25010000002 ALBUMIN HUMAN 5% PER 50 ML: Performed by: NURSE PRACTITIONER

## 2024-12-22 PROCEDURE — 85610 PROTHROMBIN TIME: CPT | Performed by: INTERNAL MEDICINE

## 2024-12-22 PROCEDURE — 82728 ASSAY OF FERRITIN: CPT | Performed by: INTERNAL MEDICINE

## 2024-12-22 PROCEDURE — 25010000002 POTASSIUM CHLORIDE 10 MEQ/100ML SOLUTION: Performed by: INTERNAL MEDICINE

## 2024-12-22 PROCEDURE — 25810000003 SODIUM CHLORIDE 0.9 % SOLUTION 250 ML FLEX CONT

## 2024-12-22 PROCEDURE — 85732 THROMBOPLASTIN TIME PARTIAL: CPT | Performed by: INTERNAL MEDICINE

## 2024-12-22 PROCEDURE — P9041 ALBUMIN (HUMAN),5%, 50ML: HCPCS | Performed by: NURSE PRACTITIONER

## 2024-12-22 PROCEDURE — 94799 UNLISTED PULMONARY SVC/PX: CPT

## 2024-12-22 PROCEDURE — 85730 THROMBOPLASTIN TIME PARTIAL: CPT | Performed by: INTERNAL MEDICINE

## 2024-12-22 PROCEDURE — 84132 ASSAY OF SERUM POTASSIUM: CPT | Performed by: INTERNAL MEDICINE

## 2024-12-22 PROCEDURE — 83540 ASSAY OF IRON: CPT | Performed by: INTERNAL MEDICINE

## 2024-12-22 PROCEDURE — 94003 VENT MGMT INPAT SUBQ DAY: CPT

## 2024-12-22 PROCEDURE — 86022 PLATELET ANTIBODIES: CPT | Performed by: INTERNAL MEDICINE

## 2024-12-22 PROCEDURE — 85670 THROMBIN TIME PLASMA: CPT | Performed by: INTERNAL MEDICINE

## 2024-12-22 PROCEDURE — 25010000002 HEPARIN (PORCINE) 25000-0.45 UT/250ML-% SOLUTION: Performed by: SURGERY

## 2024-12-22 PROCEDURE — 84466 ASSAY OF TRANSFERRIN: CPT | Performed by: INTERNAL MEDICINE

## 2024-12-22 PROCEDURE — 86147 CARDIOLIPIN ANTIBODY EA IG: CPT | Performed by: INTERNAL MEDICINE

## 2024-12-22 PROCEDURE — 25010000002 NICARDIPINE 2.5 MG/ML SOLUTION 10 ML VIAL

## 2024-12-22 PROCEDURE — 25010000002 FENTANYL CITRATE (PF) 50 MCG/ML SOLUTION

## 2024-12-22 PROCEDURE — 99222 1ST HOSP IP/OBS MODERATE 55: CPT | Performed by: INTERNAL MEDICINE

## 2024-12-22 PROCEDURE — 82803 BLOOD GASES ANY COMBINATION: CPT

## 2024-12-22 PROCEDURE — 80053 COMPREHEN METABOLIC PANEL: CPT | Performed by: SURGERY

## 2024-12-22 PROCEDURE — 82550 ASSAY OF CK (CPK): CPT | Performed by: INTERNAL MEDICINE

## 2024-12-22 PROCEDURE — 83735 ASSAY OF MAGNESIUM: CPT | Performed by: SURGERY

## 2024-12-22 PROCEDURE — 99232 SBSQ HOSP IP/OBS MODERATE 35: CPT | Performed by: INTERNAL MEDICINE

## 2024-12-22 PROCEDURE — 85025 COMPLETE CBC W/AUTO DIFF WBC: CPT | Performed by: SURGERY

## 2024-12-22 PROCEDURE — 94761 N-INVAS EAR/PLS OXIMETRY MLT: CPT

## 2024-12-22 PROCEDURE — 83605 ASSAY OF LACTIC ACID: CPT | Performed by: INTERNAL MEDICINE

## 2024-12-22 PROCEDURE — 85705 THROMBOPLASTIN INHIBITION: CPT | Performed by: INTERNAL MEDICINE

## 2024-12-22 PROCEDURE — 25010000002 CEFAZOLIN PER 500 MG: Performed by: INTERNAL MEDICINE

## 2024-12-22 PROCEDURE — 84100 ASSAY OF PHOSPHORUS: CPT | Performed by: SURGERY

## 2024-12-22 PROCEDURE — 85384 FIBRINOGEN ACTIVITY: CPT | Performed by: INTERNAL MEDICINE

## 2024-12-22 PROCEDURE — 82948 REAGENT STRIP/BLOOD GLUCOSE: CPT

## 2024-12-22 PROCEDURE — 25010000002 CALCIUM GLUCONATE 2-0.675 GM/100ML-% SOLUTION: Performed by: INTERNAL MEDICINE

## 2024-12-22 PROCEDURE — 25010000002 HEPARIN (PORCINE) PER 1000 UNITS: Performed by: INTERNAL MEDICINE

## 2024-12-22 PROCEDURE — 85730 THROMBOPLASTIN TIME PARTIAL: CPT | Performed by: SURGERY

## 2024-12-22 PROCEDURE — 80202 ASSAY OF VANCOMYCIN: CPT | Performed by: INTERNAL MEDICINE

## 2024-12-22 PROCEDURE — 85613 RUSSELL VIPER VENOM DILUTED: CPT | Performed by: INTERNAL MEDICINE

## 2024-12-22 PROCEDURE — 99024 POSTOP FOLLOW-UP VISIT: CPT | Performed by: SURGERY

## 2024-12-22 PROCEDURE — 71045 X-RAY EXAM CHEST 1 VIEW: CPT

## 2024-12-22 PROCEDURE — 82330 ASSAY OF CALCIUM: CPT | Performed by: INTERNAL MEDICINE

## 2024-12-22 RX ORDER — NOREPINEPHRINE BITARTRATE 0.03 MG/ML
.02-.3 INJECTION, SOLUTION INTRAVENOUS
Status: DISCONTINUED | OUTPATIENT
Start: 2024-12-22 | End: 2024-12-25

## 2024-12-22 RX ORDER — POTASSIUM CHLORIDE 1.5 G/1.58G
40 POWDER, FOR SOLUTION ORAL EVERY 4 HOURS
Status: COMPLETED | OUTPATIENT
Start: 2024-12-22 | End: 2024-12-23

## 2024-12-22 RX ORDER — FENTANYL CITRATE 50 UG/ML
INJECTION, SOLUTION INTRAMUSCULAR; INTRAVENOUS
Status: COMPLETED
Start: 2024-12-22 | End: 2024-12-22

## 2024-12-22 RX ORDER — ALBUMIN HUMAN 50 G/1000ML
500 SOLUTION INTRAVENOUS ONCE
Status: COMPLETED | OUTPATIENT
Start: 2024-12-22 | End: 2024-12-22

## 2024-12-22 RX ORDER — CLONIDINE 0.2 MG/24H
1 PATCH, EXTENDED RELEASE TRANSDERMAL WEEKLY
Status: DISCONTINUED | OUTPATIENT
Start: 2024-12-22 | End: 2024-12-24

## 2024-12-22 RX ORDER — FENTANYL CITRATE 50 UG/ML
100 INJECTION, SOLUTION INTRAMUSCULAR; INTRAVENOUS ONCE
Status: COMPLETED | OUTPATIENT
Start: 2024-12-22 | End: 2024-12-22

## 2024-12-22 RX ORDER — DEXMEDETOMIDINE HYDROCHLORIDE 4 UG/ML
.2-1.5 INJECTION, SOLUTION INTRAVENOUS
Status: DISCONTINUED | OUTPATIENT
Start: 2024-12-22 | End: 2024-12-26

## 2024-12-22 RX ORDER — NOREPINEPHRINE BITARTRATE 0.03 MG/ML
.02-.3 INJECTION, SOLUTION INTRAVENOUS
Status: DISCONTINUED | OUTPATIENT
Start: 2024-12-22 | End: 2024-12-22 | Stop reason: SDUPTHER

## 2024-12-22 RX ORDER — POTASSIUM CHLORIDE 7.45 MG/ML
10 INJECTION INTRAVENOUS
Status: COMPLETED | OUTPATIENT
Start: 2024-12-22 | End: 2024-12-22

## 2024-12-22 RX ORDER — FENTANYL CITRATE-0.9 % NACL/PF 10 MCG/ML
50-300 PLASTIC BAG, INJECTION (ML) INTRAVENOUS
Status: DISCONTINUED | OUTPATIENT
Start: 2024-12-22 | End: 2024-12-24

## 2024-12-22 RX ORDER — CALCIUM GLUCONATE 20 MG/ML
2000 INJECTION, SOLUTION INTRAVENOUS EVERY 8 HOURS
Status: COMPLETED | OUTPATIENT
Start: 2024-12-22 | End: 2024-12-23

## 2024-12-22 RX ADMIN — Medication 100 MCG/HR: at 14:27

## 2024-12-22 RX ADMIN — Medication 10 ML: at 09:57

## 2024-12-22 RX ADMIN — POTASSIUM CHLORIDE 10 MEQ: 7.46 INJECTION, SOLUTION INTRAVENOUS at 09:53

## 2024-12-22 RX ADMIN — POTASSIUM CHLORIDE 40 MEQ: 1.5 POWDER, FOR SOLUTION ORAL at 23:50

## 2024-12-22 RX ADMIN — ALBUMIN (HUMAN) 500 ML: 12.5 INJECTION, SOLUTION INTRAVENOUS at 17:21

## 2024-12-22 RX ADMIN — Medication 10 ML: at 20:48

## 2024-12-22 RX ADMIN — NYSTATIN 1 APPLICATION: 100000 CREAM TOPICAL at 21:10

## 2024-12-22 RX ADMIN — NYSTATIN 1 APPLICATION: 100000 CREAM TOPICAL at 16:02

## 2024-12-22 RX ADMIN — HEPARIN SODIUM 15 UNITS/KG/HR: 10000 INJECTION, SOLUTION INTRAVENOUS at 20:44

## 2024-12-22 RX ADMIN — CALCIUM GLUCONATE 2000 MG: 20 INJECTION, SOLUTION INTRAVENOUS at 17:36

## 2024-12-22 RX ADMIN — IPRATROPIUM BROMIDE AND ALBUTEROL SULFATE 3 ML: .5; 3 SOLUTION RESPIRATORY (INHALATION) at 18:20

## 2024-12-22 RX ADMIN — NYSTATIN 1 APPLICATION: 100000 CREAM TOPICAL at 04:52

## 2024-12-22 RX ADMIN — INSULIN HUMAN 5.7 UNITS/HR: 1 INJECTION, SOLUTION INTRAVENOUS at 09:53

## 2024-12-22 RX ADMIN — SODIUM CHLORIDE 5 MG/HR: 9 INJECTION, SOLUTION INTRAVENOUS at 02:36

## 2024-12-22 RX ADMIN — FENTANYL CITRATE 100 MCG: 50 INJECTION, SOLUTION INTRAMUSCULAR; INTRAVENOUS at 14:08

## 2024-12-22 RX ADMIN — DEXMEDETOMIDINE HYDROCHLORIDE IN SODIUM CHLORIDE 1.5 MCG/KG/HR: 4 INJECTION INTRAVENOUS at 20:45

## 2024-12-22 RX ADMIN — POTASSIUM CHLORIDE 10 MEQ: 7.46 INJECTION, SOLUTION INTRAVENOUS at 11:25

## 2024-12-22 RX ADMIN — SODIUM CHLORIDE 5 MG/HR: 9 INJECTION, SOLUTION INTRAVENOUS at 07:41

## 2024-12-22 RX ADMIN — DEXMEDETOMIDINE HYDROCHLORIDE IN SODIUM CHLORIDE 1 MCG/KG/HR: 4 INJECTION INTRAVENOUS at 14:15

## 2024-12-22 RX ADMIN — HEPARIN SODIUM 14 UNITS/KG/HR: 10000 INJECTION, SOLUTION INTRAVENOUS at 02:35

## 2024-12-22 RX ADMIN — DEXMEDETOMIDINE HYDROCHLORIDE IN SODIUM CHLORIDE 1.5 MCG/KG/HR: 4 INJECTION INTRAVENOUS at 17:53

## 2024-12-22 RX ADMIN — CALCIUM GLUCONATE 2000 MG: 20 INJECTION, SOLUTION INTRAVENOUS at 09:53

## 2024-12-22 RX ADMIN — CEFAZOLIN 1000 MG: 1 INJECTION, POWDER, FOR SOLUTION INTRAMUSCULAR; INTRAVENOUS at 17:21

## 2024-12-22 RX ADMIN — CLONIDINE 1 PATCH: 0.2 PATCH TRANSDERMAL at 11:29

## 2024-12-22 RX ADMIN — NOREPINEPHRINE BITARTRATE 0.02 MCG/KG/MIN: 0.03 INJECTION, SOLUTION INTRAVENOUS at 17:23

## 2024-12-22 RX ADMIN — MUPIROCIN 1 APPLICATION: 20 OINTMENT TOPICAL at 09:53

## 2024-12-22 RX ADMIN — CHLORHEXIDINE GLUCONATE, 0.12% ORAL RINSE 15 ML: 1.2 SOLUTION DENTAL at 20:53

## 2024-12-22 RX ADMIN — QUETIAPINE FUMARATE 25 MG: 25 TABLET ORAL at 09:53

## 2024-12-22 RX ADMIN — MUPIROCIN 1 APPLICATION: 20 OINTMENT TOPICAL at 20:44

## 2024-12-22 RX ADMIN — HEPARIN SODIUM 3000 UNITS: 1000 INJECTION INTRAVENOUS; SUBCUTANEOUS at 18:30

## 2024-12-22 RX ADMIN — POTASSIUM CHLORIDE 10 MEQ: 7.46 INJECTION, SOLUTION INTRAVENOUS at 09:03

## 2024-12-22 RX ADMIN — POTASSIUM CHLORIDE 10 MEQ: 7.46 INJECTION, SOLUTION INTRAVENOUS at 14:14

## 2024-12-22 RX ADMIN — QUETIAPINE FUMARATE 25 MG: 25 TABLET ORAL at 20:44

## 2024-12-22 RX ADMIN — LANSOPRAZOLE 30 MG: 30 TABLET, ORALLY DISINTEGRATING ORAL at 04:51

## 2024-12-22 RX ADMIN — CHLORHEXIDINE GLUCONATE, 0.12% ORAL RINSE 15 ML: 1.2 SOLUTION DENTAL at 09:53

## 2024-12-22 NOTE — PROGRESS NOTES
Nutrition Services  Patient Name: Yarelis Jackson  YOB: 1970  MRN: [id number]  Admission date: 12/18/2024    PROGRESS NOTE      Nutrition Intervention Updates: Continue current TF    May consider a scheduled bowel regimen     Monitor labs        Encounter Information: Checking in on tube feeding. Pt remains intubated. Pt is on heparin, insulin drip, cardene. K+ is now on the lower end of normal, recommend transition off of a renal formula if remains low.        PO Diet: NPO Diet NPO Type: Strict NPO   PO Supplements: NPO   PO Intake:  NPO       Current nutrition support: Novasource renal at 35 ml/hr + 30 ml/hr FWF    Nutrition support review: Running as ordered per EMR        Labs (reviewed below): Reviewed.        GI Function:  No BM yet, pt has stool softeners ordered PRN        Brief weight review   Wt Readings from Last 10 Encounters:   12/19/24 0600 88.5 kg (195 lb 1.7 oz)   12/18/24 0527 92.5 kg (203 lb 14.4 oz)   07/15/23 1132 98.1 kg (216 lb 4.3 oz)   04/04/23 0719 98.1 kg (216 lb 4.3 oz)   12/12/22 0952 85.5 kg (188 lb 6.4 oz)   01/31/22 1404 89.8 kg (198 lb)   01/24/22 1406 89.8 kg (198 lb)   12/23/21 1133 89.8 kg (198 lb)   11/24/21 1537 90.3 kg (199 lb)   11/16/21 0838 90.3 kg (199 lb)   09/29/21 0320 87.4 kg (192 lb 10.9 oz)        Results from last 7 days   Lab Units 12/22/24  0452 12/21/24  2351 12/21/24  0405 12/20/24  2322 12/20/24  0745 12/20/24  0436   SODIUM mmol/L 138  --  138 136   < > 135*   POTASSIUM mmol/L 3.5  --  5.0 5.3*   < > 5.1   CHLORIDE mmol/L 103  --  105 104   < > 105   CO2 mmol/L 22.3  --  18.8* 20.5*   < > 20.8*   BUN mg/dL 39*  --  46* 38*   < > 20   CREATININE mg/dL 1.85* 2.09* 2.43* 2.09*   < > 1.20*   CALCIUM mg/dL 8.2*  --  6.9* 6.7*   < > 7.1*   BILIRUBIN mg/dL 0.2  --  0.2  --   --  0.3   ALK PHOS U/L 100  --  91  --   --  103   ALT (SGPT) U/L 96*  --  101*  --   --  116*   AST (SGOT) U/L 271*  --  234*  --   --  185*   GLUCOSE mg/dL 236*  --  181* 154*   <  > 170*    < > = values in this interval not displayed.     Results from last 7 days   Lab Units 12/22/24  0452 12/21/24  1307 12/21/24  0405 12/20/24  2322   MAGNESIUM mg/dL 2.0  --  2.1 2.0   PHOSPHORUS mg/dL 3.6  --  6.8* 7.0*   HEMOGLOBIN g/dL 7.2*   < > 8.7*  --    HEMOGLOBIN, POC   --    < >  --   --    HEMATOCRIT % 22.1*   < > 27.2*  --    HEMATOCRIT POC   --    < >  --   --    TRIGLYCERIDES mg/dL  --   --  336*  --     < > = values in this interval not displayed.         RD to follow up per protocol.    Electronically signed by:  Marina Vidales RD  12/22/24 09:51 EST

## 2024-12-22 NOTE — CONSULTS
Hematology/Oncology Inpatient Consultation    Patient name: Yarelis Jackson  : 1970  MRN: [id number]  Referring Provider: Dr Juárez  Reason for Consultation: Thrombocytopenia    Chief complaint: Bleeding    History of present illness:      Yareils Jackson is a 54 y.o. female who presented to [facility] on 2024 with complaints of critical ischemia to the leg treated with serial operations with improvement.  Patient had right lower extremity ischemia due to thromboembolism to the right common femoral artery.  She underwent thrombectomy.  She was subsequently found to have a flap in the right femoral artery which was repaired on 2024 without any thrombus associated.  She was placed on IV heparin and developed hypotension with bleeding she has also had precipitous drop in her platelet count.  There is concern for possible HIT.  Heparin drip has since then been discontinued and we have been asked to see her for further evaluation and management of thrombocytopenia possibility of HIT  Review of her labs indicate that she had platelet counts of 99,000 as of 2024 subsequently dropped to 63,000.  She also has decreasing hemoglobin from 8.7-7.2 within the past 24 hours    24  Hematology/Oncology was consulted for thrombocytopenia and possible HIT.  Thrombophilia workup has also been initiated by primary team results are pending    He/She  has a past medical history of COPD (chronic obstructive pulmonary disease), Low back pain, Migraine, and Neck pain.    PCP: Katie Duran PA    History:  Past Medical History:   Diagnosis Date    COPD (chronic obstructive pulmonary disease)     Low back pain     Migraine     Neck pain    ,   Past Surgical History:   Procedure Laterality Date    FEMORAL THROMBECTOMY/EMBOLECTOMY Right 2024    Procedure: FEMORAL right iliofemoral thrombectomy, arteriogram;  Surgeon: Ghassan Celestin MD;  Location: [facility];  Service:  Vascular;  Laterality: Right;    LUNG SURGERY     ,   Family History   Problem Relation Age of Onset    Diabetes Mother     Cancer Paternal Uncle     Cancer Paternal Grandmother     COPD Paternal Grandmother     Diabetes Paternal Grandmother    ,   Social History     Tobacco Use    Smoking status: Every Day     Current packs/day: 0.50     Types: Cigarettes    Smokeless tobacco: Never   Vaping Use    Vaping status: Never Used   Substance Use Topics    Alcohol use: Not Currently    Drug use: Never   ,   No medications prior to admission.   , Scheduled Meds:  calcium gluconate, 2,000 mg, Intravenous, Q8H  cefTRIAXone, 2,000 mg, Intravenous, Q24H  chlorhexidine, 15 mL, Mouth/Throat, Q12H  lansoprazole, 30 mg, Nasogastric, Q AM  mupirocin, 1 Application, Each Nare, BID  nystatin, 1 Application, Topical, Q8H  [START ON 12/25/2024] permethrin, 1 Application, Topical, Once  potassium chloride, 10 mEq, Intravenous, Q1H  QUEtiapine, 25 mg, Nasogastric, Q12H  sodium chloride, 10 mL, Intravenous, Q12H  sodium chloride, 10 mL, Intravenous, Q12H    , Continuous Infusions:  heparin, 16.9 Units/kg/hr, Last Rate: 18 Units/kg/hr (12/22/24 3872)  insulin, 0-100 Units/hr, Last Rate: 5.7 Units/hr (12/22/24 0902)  niCARdipine, 5-15 mg/hr, Last Rate: 5 mg/hr (12/22/24 0741)  norepinephrine, 0.05-0.5 mcg/kg/min, Last Rate: Stopped (12/21/24 1505)  Pharmacy to dose vancomycin,   sodium chloride, 125 mL/hr, Last Rate: 125 mL/hr (12/21/24 1643)    , PRN Meds:    acetaminophen **OR** acetaminophen    aluminum-magnesium hydroxide-simethicone    senna-docusate sodium **AND** polyethylene glycol **AND** bisacodyl **AND** bisacodyl    Calcium Replacement - Follow Nurse / BPA Driven Protocol    dextrose    dextrose    glucagon (human recombinant)    heparin (porcine)    HYDROmorphone    ipratropium-albuterol    LORazepam    Magnesium Standard Dose Replacement - Follow Nurse / BPA Driven Protocol    nitroglycerin    ondansetron ODT **OR**  "ondansetron    Pharmacy to dose vancomycin    Phosphorus Replacement - Follow Nurse / BPA Driven Protocol    Potassium Replacement - Follow Nurse / BPA Driven Protocol    sodium chloride    sodium chloride    sodium chloride    sodium chloride    sodium chloride    Vancomycin Pharmacy Intermittent/Pulse Dosing   Allergies:  Aspirin and Ibuprofen    Subjective     ROS:  Review of Systems   Intubated on the vent    Objective   Vital Signs:   /60   Pulse 94   Temp 98.4 °F (36.9 °C) (Oral)   Resp 27   Ht 162.6 cm (64\")   Wt 88.5 kg (195 lb 1.7 oz)   LMP  (LMP Unknown)   SpO2 100%   BMI 33.49 kg/m²     Physical Exam: (performed by MD)  Physical Exam  Constitutional:       General: She is in acute distress.      Appearance: She is ill-appearing.   HENT:      Right Ear: External ear normal.      Left Ear: External ear normal.   Eyes:      Conjunctiva/sclera: Conjunctivae normal.      Pupils: Pupils are equal, round, and reactive to light.   Cardiovascular:      Rate and Rhythm: Normal rate and regular rhythm.   Pulmonary:      Effort: No respiratory distress.      Breath sounds: No wheezing or rhonchi.   Abdominal:      General: There is no distension.      Palpations: There is no mass.      Tenderness: There is no abdominal tenderness. There is no guarding or rebound.      Hernia: No hernia is present.   Musculoskeletal:      Right lower leg: Edema present.      Left lower leg: Edema present.         Results Review:  Lab Results (last 48 hours)       Procedure Component Value Units Date/Time    POC Glucose Once [[id number]]  (Abnormal) Collected: 12/22/24 0900    Specimen: Blood Updated: 12/22/24 0902     Glucose 210 mg/dL      Comment: Serial Number: [id number]:  [id number]       POC Glucose Once [[id number]]  (Abnormal) Collected: 12/22/24 0747    Specimen: Blood Updated: 12/22/24 0751     Glucose 247 mg/dL      Comment: Serial Number: [id number]:  [id number]       POC Glucose Once [[id number]]  " (Abnormal) Collected: 12/22/24 0634    Specimen: Blood Updated: 12/22/24 0635     Glucose 255 mg/dL      Comment: Serial Number: [id number]:  [id number]       Blood Culture - Blood, Arm, Right [[id number]]  (Normal) Collected: 12/18/24 0557    Specimen: Blood from Arm, Right Updated: 12/22/24 0615     Blood Culture No growth at 4 days    Narrative:      Less than seven (7) mL's of blood was collected.  Insufficient quantity may yield false negative results.    CK [[id number]]  (Abnormal) Collected: 12/22/24 0452    Specimen: Blood Updated: 12/22/24 0539     Creatine Kinase 14,218 U/L     POC Glucose Once [[id number]]  (Abnormal) Collected: 12/22/24 0530    Specimen: Blood Updated: 12/22/24 0532     Glucose 213 mg/dL      Comment: Serial Number: [id number]:  [id number]       Phosphorus [[id number]]  (Normal) Collected: 12/22/24 0452    Specimen: Blood Updated: 12/22/24 0530     Phosphorus 3.6 mg/dL     Magnesium [[id number]]  (Normal) Collected: 12/22/24 0452    Specimen: Blood Updated: 12/22/24 0529     Magnesium 2.0 mg/dL     Comprehensive Metabolic Panel [[id number]]  (Abnormal) Collected: 12/22/24 0452    Specimen: Blood Updated: 12/22/24 0529     Glucose 236 mg/dL      BUN 39 mg/dL      Creatinine 1.85 mg/dL      Sodium 138 mmol/L      Potassium 3.5 mmol/L      Chloride 103 mmol/L      CO2 22.3 mmol/L      Calcium 8.2 mg/dL      Total Protein 5.1 g/dL      Albumin 2.8 g/dL      ALT (SGPT) 96 U/L      AST (SGOT) 271 U/L      Alkaline Phosphatase 100 U/L      Total Bilirubin 0.2 mg/dL      Globulin 2.3 gm/dL      A/G Ratio 1.2 g/dL      BUN/Creatinine Ratio 21.1     Anion Gap 12.7 mmol/L      eGFR 32.3 mL/min/1.73     Narrative:      GFR Categories in Chronic Kidney Disease (CKD)      GFR Category          GFR (mL/min/1.73)    Interpretation  G1                     90 or greater         Normal or high (1)  G2                      60-89                Mild decrease (1)  G3a                   45-59                 Mild to moderate decrease  G3b                   30-44                Moderate to severe decrease  G4                    15-29                Severe decrease  G5                    14 or less           Kidney failure          (1)In the absence of evidence of kidney disease, neither GFR category G1 or G2 fulfill the criteria for CKD.    eGFR calculation 2021 CKD-EPI creatinine equation, which does not include race as a factor    Vancomycin, Random [[id number]]  (Normal) Collected: 12/22/24 0452    Specimen: Blood Updated: 12/22/24 0527     Vancomycin Random 12.30 mcg/mL     Narrative:      Therapeutic Ranges for Vancomycin    Vancomycin Random   5.0-40.0 mcg/mL  Vancomycin Trough   5.0-20.0 mcg/mL  Vancomycin Peak     20.0-40.0 mcg/mL    Lactic Acid, Plasma [[id number]]  (Normal) Collected: 12/22/24 0452    Specimen: Blood Updated: 12/22/24 0525     Lactate 0.9 mmol/L     aPTT [[id number]]  (Abnormal) Collected: 12/22/24 0452    Specimen: Blood Updated: 12/22/24 0515     PTT 55.7 seconds     Blood Culture - Blood, Arm, Right [[id number]]  (Normal) Collected: 12/18/24 0504    Specimen: Blood from Arm, Right Updated: 12/22/24 0515     Blood Culture No growth at 4 days    Narrative:      Less than seven (7) mL's of blood was collected.  Insufficient quantity may yield false negative results.    CBC & Differential [[id number]]  (Abnormal) Collected: 12/22/24 0452    Specimen: Blood Updated: 12/22/24 0512    Narrative:      The following orders were created for panel order CBC & Differential.  Procedure                               Abnormality         Status                     ---------                               -----------         ------                     CBC Auto Differential[[id number]]        Abnormal            Final result                 Please view results for these tests on the individual orders.    CBC Auto Differential [[id number]]  (Abnormal) Collected: 12/22/24 0452    Specimen: Blood Updated: 12/22/24  0512     WBC 7.59 10*3/mm3      RBC 2.31 10*6/mm3      Hemoglobin 7.2 g/dL      Hematocrit 22.1 %      MCV 95.7 fL      MCH 31.2 pg      MCHC 32.6 g/dL      RDW 15.6 %      RDW-SD 55.2 fl      MPV 10.8 fL      Platelets 63 10*3/mm3      Neutrophil % 76.2 %      Lymphocyte % 13.7 %      Monocyte % 7.5 %      Eosinophil % 0.7 %      Basophil % 0.1 %      Immature Grans % 1.8 %      Neutrophils, Absolute 5.78 10*3/mm3      Lymphocytes, Absolute 1.04 10*3/mm3      Monocytes, Absolute 0.57 10*3/mm3      Eosinophils, Absolute 0.05 10*3/mm3      Basophils, Absolute 0.01 10*3/mm3      Immature Grans, Absolute 0.14 10*3/mm3      nRBC 0.3 /100 WBC     Calcium, Ionized [[id number]]  (Abnormal) Collected: 12/22/24 0452    Specimen: Blood Updated: 12/22/24 0504     Ionized Calcium 1.08 mmol/L     POC Glucose Once [[id number]]  (Abnormal) Collected: 12/22/24 0424    Specimen: Arterial Blood Updated: 12/22/24 0427     Glucose 225 mg/dL      Comment: Serial Number: [id number]:  [id number]       Blood Gas, Arterial - [[id number]]  (Abnormal) Collected: 12/22/24 0424    Specimen: Arterial Blood Updated: 12/22/24 0427     Site Arterial Line     Roman's Test Positive     pH, Arterial 7.424 pH units      pCO2, Arterial 38.0 mm Hg      pO2, Arterial 82.2 mm Hg      HCO3, Arterial 24.9 mmol/L      Base Excess, Arterial 0.5 mmol/L      Comment: Serial Number: [id number]:  [id number]        O2 Saturation, Arterial 96.3 %      CO2 Content 26.0 mmol/L      Barometric Pressure for Blood Gas --     Comment: N/A        Modality Adult Vent     FIO2 35 %      Ventilator Mode AC     Set Tidal Volume 500     PEEP 5     Hemodilution Yes     Respiratory Rate 26     PO2/FIO2 235    POC Glucose Once [[id number]]  (Abnormal) Collected: 12/22/24 0234    Specimen: Blood Updated: 12/22/24 0235     Glucose 159 mg/dL      Comment: Serial Number: [id number]:  [id number]       POC Glucose Once [[id number]]  (Abnormal) Collected: 12/22/24 0039    Specimen: Blood  Updated: 12/22/24 0041     Glucose 151 mg/dL      Comment: Serial Number: [id number]:  [id number]       POCT Electrolytes +HGB +HCT [[id number]]  (Abnormal) Collected: 12/21/24 2351    Specimen: Arterial Blood Updated: 12/21/24 2356     Sodium 139 mmol/L      POC Potassium 3.5 mmol/L      Ionized Calcium 1.09 mmol/L      Comment: Serial Number: [id number]:  [id number]        Glucose 160 mg/dL      Hematocrit 20 %      Hemoglobin 6.8 g/dL      Notified Time --     Notified Who --     Read Back --    POC Glucose Once [[id number]]  (Abnormal) Collected: 12/21/24 2351    Specimen: Arterial Blood Updated: 12/21/24 2354     Glucose 160 mg/dL      Comment: Serial Number: [id number]:  [id number]       POC Creatinine [[id number]]  (Abnormal) Collected: 12/21/24 2351    Specimen: Arterial Blood Updated: 12/21/24 2354     Creatinine 2.09 mg/dL      Comment: Serial Number: [id number]:  [id number]        eGFR 27.9 mL/min/1.73     Blood Gas, Arterial - [[id number]]  (Abnormal) Collected: 12/21/24 2351    Specimen: Arterial Blood Updated: 12/21/24 2354     Site Arterial Line     Roman's Test N/A     pH, Arterial 7.392 pH units      pCO2, Arterial 41.6 mm Hg      pO2, Arterial 111.6 mm Hg      HCO3, Arterial 25.3 mmol/L      Base Excess, Arterial 0.3 mmol/L      Comment: Serial Number: [id number]:  [id number]        O2 Saturation, Arterial 98.3 %      Barometric Pressure for Blood Gas --     Comment: N/A        Modality Adult Vent     FIO2 35 %      Ventilator Mode PRVC     Set Tidal Volume 500     PEEP 5     Hemodilution No     Respiratory Rate 26     Inspiratory Time 1     PO2/FIO2 319    POC Lactate [[id number]]  (Normal) Collected: 12/21/24 2351    Specimen: Arterial Blood Updated: 12/21/24 2354     Lactate 1.3 mmol/L      Comment: Serial Number: [id number]:  [id number]       POC Glucose Once [[id number]]  (Abnormal) Collected: 12/21/24 2235    Specimen: Blood Updated: 12/21/24 2237     Glucose 123 mg/dL      Comment: Serial Number:  [id number]:  [id number]       POC Glucose Once [[id number]]  (Abnormal) Collected: 12/21/24 2119    Specimen: Blood Updated: 12/21/24 2121     Glucose 139 mg/dL      Comment: Serial Number: [id number]:  [id number]       CBC & Differential [[id number]]  (Abnormal) Collected: 12/21/24 2011    Specimen: Blood Updated: 12/21/24 2046    Narrative:      The following orders were created for panel order CBC & Differential.  Procedure                               Abnormality         Status                     ---------                               -----------         ------                     CBC Auto Differential[[id number]]        Abnormal            Final result               Scan Slide[[id number]]                                       Final result                 Please view results for these tests on the individual orders.    CBC Auto Differential [[id number]]  (Abnormal) Collected: 12/21/24 2011    Specimen: Blood Updated: 12/21/24 2046     WBC 8.22 10*3/mm3      RBC 2.02 10*6/mm3      Hemoglobin 6.7 g/dL      Hematocrit 20.1 %      MCV 99.5 fL      MCH 33.2 pg      MCHC 33.3 g/dL      RDW 12.7 %      RDW-SD 46.6 fl      MPV 10.3 fL      Platelets 68 10*3/mm3     Narrative:      The previously reported component NRBC is no longer being reported. Previous result was 0.2 /100 WBC (Reference Range: 0.0-0.2 /100 WBC) on 12/21/2024 at 2026 EST.    Scan Slide [[id number]] Collected: 12/21/24 2011    Specimen: Blood Updated: 12/21/24 2046     Scan Slide --     Comment: See Manual Differential Results       Manual Differential [[id number]]  (Abnormal) Collected: 12/21/24 2011    Specimen: Blood Updated: 12/21/24 2046     Neutrophil % 78.0 %      Lymphocyte % 13.0 %      Monocyte % 6.0 %      Bands %  3.0 %      Neutrophils Absolute 6.66 10*3/mm3      Lymphocytes Absolute 1.07 10*3/mm3      Monocytes Absolute 0.49 10*3/mm3      Anisocytosis Slight/1+     Toxic Granulation Slight/1+     Platelet Estimate Decreased     aPTT [[id number]]  (Abnormal) Collected: 12/21/24 2011    Specimen: Blood Updated: 12/21/24 2032     PTT 42.8 seconds     POC Glucose Once [[id number]]  (Abnormal) Collected: 12/21/24 2009    Specimen: Blood Updated: 12/21/24 2011     Glucose 149 mg/dL      Comment: Serial Number: [id number]:  [id number]       POC Glucose Once [[id number]]  (Abnormal) Collected: 12/21/24 1903    Specimen: Blood Updated: 12/21/24 1904     Glucose 131 mg/dL      Comment: Serial Number: [id number]:  [id number]       POC Glucose Once [[id number]]  (Abnormal) Collected: 12/21/24 1754    Specimen: Blood Updated: 12/21/24 1756     Glucose 112 mg/dL      Comment: Serial Number: [id number]:  [id number]       POC Glucose Once [[id number]]  (Abnormal) Collected: 12/21/24 1648    Specimen: Blood Updated: 12/21/24 1650     Glucose 112 mg/dL      Comment: Serial Number: [id number]:  [id number]       POC Glucose Once [[id number]]  (Normal) Collected: 12/21/24 1545    Specimen: Blood Updated: 12/21/24 1547     Glucose 96 mg/dL      Comment: Serial Number: [id number]:  [id number]       POC Glucose Once [[id number]]  (Abnormal) Collected: 12/21/24 1419    Specimen: Blood Updated: 12/21/24 1421     Glucose 113 mg/dL      Comment: Serial Number: [id number]:  [id number]       CBC & Differential [[id number]]  (Abnormal) Collected: 12/21/24 1307    Specimen: Blood Updated: 12/21/24 1337    Narrative:      The following orders were created for panel order CBC & Differential.  Procedure                               Abnormality         Status                     ---------                               -----------         ------                     CBC Auto Differential[[id number]]        Abnormal            Final result               Scan Slide[[id number]]                                       Final result                 Please view results for these tests on the individual orders.    CBC Auto Differential [[id number]]  (Abnormal)  Collected: 12/21/24 1307    Specimen: Blood Updated: 12/21/24 1337     WBC 8.44 10*3/mm3      RBC 2.26 10*6/mm3      Hemoglobin 7.5 g/dL      Hematocrit 22.6 %      .0 fL      MCH 33.2 pg      MCHC 33.2 g/dL      RDW 12.8 %      RDW-SD 46.7 fl      MPV 10.3 fL      Platelets 85 10*3/mm3     Scan Slide [[id number]] Collected: 12/21/24 1307    Specimen: Blood Updated: 12/21/24 1337     Scan Slide --     Comment: See Manual Differential Results       Manual Differential [[id number]]  (Abnormal) Collected: 12/21/24 1307    Specimen: Blood Updated: 12/21/24 1337     Neutrophil % 74.0 %      Lymphocyte % 14.0 %      Monocyte % 4.0 %      Bands %  7.0 %      Myelocyte % 1.0 %      Neutrophils Absolute 6.84 10*3/mm3      Lymphocytes Absolute 1.18 10*3/mm3      Monocytes Absolute 0.34 10*3/mm3      Anisocytosis Slight/1+     Toxic Granulation Slight/1+     Vacuolated Neutrophils Slight/1+     Platelet Estimate Decreased    aPTT [[id number]]  (Abnormal) Collected: 12/21/24 1307    Specimen: Blood Updated: 12/21/24 1333     PTT 38.8 seconds     POC Glucose Once [[id number]]  (Abnormal) Collected: 12/21/24 1306    Specimen: Blood Updated: 12/21/24 1308     Glucose 110 mg/dL      Comment: Serial Number: [id number]:  [id number]       POC Glucose Once [[id number]]  (Abnormal) Collected: 12/21/24 1208    Specimen: Blood Updated: 12/21/24 1210     Glucose 126 mg/dL      Comment: Serial Number: [id number]:  [id number]       POC Glucose Once [[id number]]  (Abnormal) Collected: 12/20/24 1755    Specimen: Blood Updated: 12/21/24 1156     Glucose 117 mg/dL      Comment: Serial Number: [id number]:  [id number]       Respiratory Culture - Sputum, ET Suction [[id number]]  (Abnormal) Collected: 12/19/24 1907    Specimen: Sputum from ET Suction Updated: 12/21/24 1151     Respiratory Culture Moderate growth (3+) Staphylococcus aureus      Moderate growth (3+) Streptococcus agalactiae (Group B)     Comment:   This organism is  considered to be universally susceptible to penicillin.  No further antibiotic testing will be performed. If Clindamycin or Erythromycin is the drug of choice, notify the laboratory within 7 days to request susceptibility testing.         No Normal Respiratory Alina     Gram Stain Many (4+) WBCs per low power field      Rare (1+) Epithelial cells per low power field      Few (2+) Mixed bacterial morphotypes seen on Gram Stain    POC Glucose Once [[id number]]  (Abnormal) Collected: 12/21/24 1016    Specimen: Blood Updated: 12/21/24 1018     Glucose 111 mg/dL      Comment: Serial Number: [id number]:  [id number]       POC Glucose Once [[id number]]  (Abnormal) Collected: 12/21/24 0920    Specimen: Blood Updated: 12/21/24 0922     Glucose 112 mg/dL      Comment: Serial Number: [id number]:  [id number]       POC Glucose Once [[id number]]  (Abnormal) Collected: 12/21/24 0821    Specimen: Blood Updated: 12/21/24 0823     Glucose 130 mg/dL      Comment: Serial Number: [id number]:  [id number]       aPTT [[id number]]  (Abnormal) Collected: 12/21/24 0739    Specimen: Blood Updated: 12/21/24 0808     PTT >200.0 seconds     POC Chem 8, arterial (ISTAT) [[id number]]  (Abnormal) Collected: 12/20/24 1603    Specimen: Arterial Blood Updated: 12/21/24 0742     Ionized Calcium 0.89 mmol/L      pH, Arterial 7.180 pH units      pCO2, Arterial 59.7 mm Hg      pO2, Arterial 78.0 mm Hg      HCO3, Arterial 22.1 mmol/L      Base Excess, Arterial <0.0 mmol/L      Base Deficit --     O2 Saturation, Arterial 91.0 %      Glucose 115 mg/dL      Comment: Serial Number: [id number]:  [id number]        Sodium 134 mmol/L      POC Potassium 4.8 mmol/L      Hematocrit 33 %      Hemoglobin 11.2 g/dL      CO2 Content 24 mmol/L     POC Activated Clotting Time [[id number]]  (Abnormal) Collected: 12/20/24 1559    Specimen: Arterial Blood Updated: 12/21/24 0741     Activated Clotting Time  245 Seconds      Comment: Serial Number: [id number]:   [id number]       POC Activated Clotting Time [[id number]]  (Abnormal) Collected: 12/20/24 1534    Specimen: Arterial Blood Updated: 12/21/24 0741     Activated Clotting Time  176 Seconds      Comment: Serial Number: [id number]:  [id number]       POC Glucose Once [[id number]]  (Abnormal) Collected: 12/21/24 0716    Specimen: Blood Updated: 12/21/24 0718     Glucose 157 mg/dL      Comment: Serial Number: [id number]:  [id number]       POC Glucose Once [[id number]]  (Abnormal) Collected: 12/21/24 0612    Specimen: Blood Updated: 12/21/24 0614     Glucose 161 mg/dL      Comment: Serial Number: [id number]:  [id number]       POC Glucose Once [[id number]]  (Abnormal) Collected: 12/21/24 0509    Specimen: Blood Updated: 12/21/24 0510     Glucose 162 mg/dL      Comment: Serial Number: [id number]:  [id number]       CK [[id number]]  (Abnormal) Collected: 12/21/24 0405    Specimen: Blood Updated: 12/21/24 0450     Creatine Kinase 16,119 U/L     Magnesium [[id number]]  (Normal) Collected: 12/21/24 0405    Specimen: Blood Updated: 12/21/24 0438     Magnesium 2.1 mg/dL     Phosphorus [[id number]]  (Abnormal) Collected: 12/21/24 0405    Specimen: Blood Updated: 12/21/24 0438     Phosphorus 6.8 mg/dL     Comprehensive Metabolic Panel [[id number]]  (Abnormal) Collected: 12/21/24 0405    Specimen: Blood Updated: 12/21/24 0438     Glucose 181 mg/dL      BUN 46 mg/dL      Creatinine 2.43 mg/dL      Sodium 138 mmol/L      Potassium 5.0 mmol/L      Chloride 105 mmol/L      CO2 18.8 mmol/L      Calcium 6.9 mg/dL      Total Protein 4.9 g/dL      Albumin 2.3 g/dL      ALT (SGPT) 101 U/L      AST (SGOT) 234 U/L      Alkaline Phosphatase 91 U/L      Total Bilirubin 0.2 mg/dL      Globulin 2.6 gm/dL      A/G Ratio 0.9 g/dL      BUN/Creatinine Ratio 18.9     Anion Gap 14.2 mmol/L      eGFR 23.3 mL/min/1.73     Narrative:      GFR Categories in Chronic Kidney Disease (CKD)      GFR Category          GFR (mL/min/1.73)    Interpretation  G1                      90 or greater         Normal or high (1)  G2                      60-89                Mild decrease (1)  G3a                   45-59                Mild to moderate decrease  G3b                   30-44                Moderate to severe decrease  G4                    15-29                Severe decrease  G5                    14 or less           Kidney failure          (1)In the absence of evidence of kidney disease, neither GFR category G1 or G2 fulfill the criteria for CKD.    eGFR calculation 2021 CKD-EPI creatinine equation, which does not include race as a factor    Triglycerides [[id number]]  (Abnormal) Collected: 12/21/24 0405    Specimen: Blood Updated: 12/21/24 0438     Triglycerides 336 mg/dL     Vancomycin, Random [[id number]]  (Normal) Collected: 12/21/24 0405    Specimen: Blood Updated: 12/21/24 0438     Vancomycin Random 20.80 mcg/mL     Narrative:      Therapeutic Ranges for Vancomycin    Vancomycin Random   5.0-40.0 mcg/mL  Vancomycin Trough   5.0-20.0 mcg/mL  Vancomycin Peak     20.0-40.0 mcg/mL    Lactic Acid, Plasma [[id number]]  (Normal) Collected: 12/21/24 0405    Specimen: Blood Updated: 12/21/24 0437     Lactate 1.5 mmol/L     CBC & Differential [[id number]]  (Abnormal) Collected: 12/21/24 0405    Specimen: Blood Updated: 12/21/24 0429    Narrative:      The following orders were created for panel order CBC & Differential.  Procedure                               Abnormality         Status                     ---------                               -----------         ------                     CBC Auto Differential[[id number]]        Abnormal            Final result               Scan Slide[[id number]]                                       Final result                 Please view results for these tests on the individual orders.    CBC Auto Differential [[id number]]  (Abnormal) Collected: 12/21/24 0405    Specimen: Blood Updated: 12/21/24 0429     WBC 15.20 10*3/mm3      RBC 2.72  10*6/mm3      Hemoglobin 8.7 g/dL      Hematocrit 27.2 %      .0 fL      MCH 32.0 pg      MCHC 32.0 g/dL      RDW 12.9 %      RDW-SD 47.0 fl      MPV 10.3 fL      Platelets 99 10*3/mm3     Narrative:      The previously reported component NRBC is no longer being reported. Previous result was 0.1 /100 WBC (Reference Range: 0.0-0.2 /100 WBC) on 12/21/2024 at 0414 EST.    Scan Slide [[id number]] Collected: 12/21/24 0405    Specimen: Blood Updated: 12/21/24 0429     Scan Slide --     Comment: See Manual Differential Results       Manual Differential [[id number]]  (Abnormal) Collected: 12/21/24 0405    Specimen: Blood Updated: 12/21/24 0429     Neutrophil % 58.0 %      Lymphocyte % 8.0 %      Monocyte % 3.0 %      Bands %  29.0 %      Metamyelocyte % 2.0 %      Neutrophils Absolute 13.22 10*3/mm3      Lymphocytes Absolute 1.22 10*3/mm3      Monocytes Absolute 0.46 10*3/mm3      Anisocytosis Slight/1+     Dohle Bodies Present     Toxic Granulation Slight/1+     Platelet Morphology Normal    aPTT [[id number]]  (Abnormal) Collected: 12/21/24 0405    Specimen: Blood Updated: 12/21/24 0423     PTT 57.3 seconds     Calcium, Ionized [[id number]]  (Abnormal) Collected: 12/21/24 0405    Specimen: Blood Updated: 12/21/24 0417     Ionized Calcium 0.94 mmol/L     POC Glucose Once [[id number]]  (Abnormal) Collected: 12/21/24 0406    Specimen: Blood Updated: 12/21/24 0409     Glucose 176 mg/dL      Comment: Serial Number: [id number]:  [id number]       Blood Gas, Arterial - [[id number]]  (Abnormal) Collected: 12/21/24 0317    Specimen: Arterial Blood Updated: 12/21/24 0320     Site Arterial Line     Roman's Test N/A     pH, Arterial 7.357 pH units      pCO2, Arterial 35.5 mm Hg      pO2, Arterial 72.7 mm Hg      HCO3, Arterial 19.9 mmol/L      Base Excess, Arterial -5.0 mmol/L      Comment: Serial Number: [id number]:  [id number]        O2 Saturation, Arterial 93.9 %      CO2 Content 21.0 mmol/L      Barometric Pressure for Blood  Gas --     Comment: N/A        Modality Adult Vent     FIO2 35 %      Ventilator Mode AC     Set Tidal Volume 500     PEEP 5     Hemodilution No     Respiratory Rate 26     PO2/FIO2 208    POC Glucose Once [[id number]]  (Abnormal) Collected: 12/21/24 0258    Specimen: Blood Updated: 12/21/24 0300     Glucose 168 mg/dL      Comment: Serial Number: [id number]:  [id number]       POC Glucose Once [[id number]]  (Abnormal) Collected: 12/21/24 0155    Specimen: Blood Updated: 12/21/24 0157     Glucose 190 mg/dL      Comment: Serial Number: [id number]:  [id number]       POC Glucose Once [[id number]]  (Normal) Collected: 12/21/24 0055    Specimen: Blood Updated: 12/21/24 0057     Glucose 74 mg/dL      Comment: Serial Number: [id number]:  [id number]       POC Glucose Once [[id number]]  (Normal) Collected: 12/21/24 0031    Specimen: Blood Updated: 12/21/24 0033     Glucose 76 mg/dL      Comment: Serial Number: [id number]:  [id number]       aPTT [[id number]]  (Abnormal) Collected: 12/21/24 0000    Specimen: Blood Updated: 12/21/24 0020     .2 seconds     Basic Metabolic Panel [[id number]]  (Abnormal) Collected: 12/20/24 2322    Specimen: Blood Updated: 12/21/24 0008     Glucose 154 mg/dL      BUN 38 mg/dL      Creatinine 2.09 mg/dL      Sodium 136 mmol/L      Potassium 5.3 mmol/L      Chloride 104 mmol/L      CO2 20.5 mmol/L      Calcium 6.7 mg/dL      BUN/Creatinine Ratio 18.2     Anion Gap 11.5 mmol/L      eGFR 27.9 mL/min/1.73     Narrative:      GFR Categories in Chronic Kidney Disease (CKD)      GFR Category          GFR (mL/min/1.73)    Interpretation  G1                     90 or greater         Normal or high (1)  G2                      60-89                Mild decrease (1)  G3a                   45-59                Mild to moderate decrease  G3b                   30-44                Moderate to severe decrease  G4                    15-29                Severe decrease  G5                     14 or less           Kidney failure          (1)In the absence of evidence of kidney disease, neither GFR category G1 or G2 fulfill the criteria for CKD.    eGFR calculation 2021 CKD-EPI creatinine equation, which does not include race as a factor    Magnesium [[id number]]  (Normal) Collected: 12/20/24 2322    Specimen: Blood Updated: 12/21/24 0008     Magnesium 2.0 mg/dL     Phosphorus [[id number]]  (Abnormal) Collected: 12/20/24 2322    Specimen: Blood Updated: 12/21/24 0008     Phosphorus 7.0 mg/dL     POC Glucose Once [[id number]]  (Abnormal) Collected: 12/20/24 2319    Specimen: Blood Updated: 12/20/24 2321     Glucose 150 mg/dL      Comment: Serial Number: [id number]:  [id number]       POC Glucose Once [[id number]]  (Normal) Collected: 12/20/24 2213    Specimen: Blood Updated: 12/20/24 2215     Glucose 83 mg/dL      Comment: Serial Number: [id number]:  [id number]       POC Glucose Once [[id number]]  (Abnormal) Collected: 12/20/24 2110    Specimen: Blood Updated: 12/20/24 2111     Glucose 109 mg/dL      Comment: Serial Number: [id number]:  [id number]       Basic Metabolic Panel [[id number]]  (Abnormal) Collected: 12/20/24 2021    Specimen: Blood Updated: 12/20/24 2055     Glucose 142 mg/dL      BUN 34 mg/dL      Creatinine 1.98 mg/dL      Sodium 135 mmol/L      Potassium 5.3 mmol/L      Chloride 104 mmol/L      CO2 21.1 mmol/L      Calcium 7.1 mg/dL      BUN/Creatinine Ratio 17.2     Anion Gap 9.9 mmol/L      eGFR 29.7 mL/min/1.73     Narrative:      GFR Categories in Chronic Kidney Disease (CKD)      GFR Category          GFR (mL/min/1.73)    Interpretation  G1                     90 or greater         Normal or high (1)  G2                      60-89                Mild decrease (1)  G3a                   45-59                Mild to moderate decrease  G3b                   30-44                Moderate to severe decrease  G4                    15-29                Severe decrease  G5                     14 or less           Kidney failure          (1)In the absence of evidence of kidney disease, neither GFR category G1 or G2 fulfill the criteria for CKD.    eGFR calculation 2021 CKD-EPI creatinine equation, which does not include race as a factor    Magnesium [[id number]]  (Normal) Collected: 12/20/24 2021    Specimen: Blood Updated: 12/20/24 2055     Magnesium 2.0 mg/dL     Phosphorus [[id number]]  (Abnormal) Collected: 12/20/24 2021    Specimen: Blood Updated: 12/20/24 2055     Phosphorus 7.3 mg/dL     POC Glucose Once [[id number]]  (Abnormal) Collected: 12/20/24 2000    Specimen: Blood Updated: 12/20/24 2001     Glucose 141 mg/dL      Comment: Serial Number: [id number]:  [id number]       Magnesium [[id number]]  (Normal) Collected: 12/20/24 1830    Specimen: Blood Updated: 12/20/24 1858     Magnesium 1.9 mg/dL     POC Glucose Once [[id number]]  (Abnormal) Collected: 12/20/24 1435    Specimen: Blood Updated: 12/20/24 1438     Glucose 125 mg/dL      Comment: Serial Number: [id number]:  [id number]       POC Glucose Once [[id number]]  (Abnormal) Collected: 12/20/24 1316    Specimen: Blood Updated: 12/20/24 1317     Glucose 117 mg/dL      Comment: Serial Number: [id number]:  [id number]       Basic Metabolic Panel [[id number]]  (Abnormal) Collected: 12/20/24 1210    Specimen: Blood Updated: 12/20/24 1252     Glucose 116 mg/dL      BUN 25 mg/dL      Creatinine 1.51 mg/dL      Sodium 135 mmol/L      Potassium 5.0 mmol/L      Chloride 105 mmol/L      CO2 19.9 mmol/L      Calcium 6.9 mg/dL      BUN/Creatinine Ratio 16.6     Anion Gap 10.1 mmol/L      eGFR 41.2 mL/min/1.73     Narrative:      GFR Categories in Chronic Kidney Disease (CKD)      GFR Category          GFR (mL/min/1.73)    Interpretation  G1                     90 or greater         Normal or high (1)  G2                      60-89                Mild decrease (1)  G3a                   45-59                Mild to moderate decrease  G3b                    30-44                Moderate to severe decrease  G4                    15-29                Severe decrease  G5                    14 or less           Kidney failure          (1)In the absence of evidence of kidney disease, neither GFR category G1 or G2 fulfill the criteria for CKD.    eGFR calculation 2021 CKD-EPI creatinine equation, which does not include race as a factor    Magnesium [[id number]]  (Normal) Collected: 12/20/24 1210    Specimen: Blood Updated: 12/20/24 1252     Magnesium 1.9 mg/dL     Phosphorus [[id number]]  (Abnormal) Collected: 12/20/24 1210    Specimen: Blood Updated: 12/20/24 1252     Phosphorus 6.7 mg/dL     Hemoglobin & Hematocrit, Blood [[id number]]  (Abnormal) Collected: 12/20/24 1210    Specimen: Blood Updated: 12/20/24 1220     Hemoglobin 11.0 g/dL      Hematocrit 33.8 %     POC Glucose Once [[id number]]  (Abnormal) Collected: 12/20/24 1209    Specimen: Blood Updated: 12/20/24 1215     Glucose 122 mg/dL      Comment: Serial Number: [id number]:  [id number]       POC Glucose Once [[id number]]  (Abnormal) Collected: 12/20/24 1111    Specimen: Blood Updated: 12/20/24 1215     Glucose 131 mg/dL      Comment: Serial Number: [id number]:  [id number]       OBDULIO by IFA, Reflex 9-biomarkers profile [[id number]] Collected: 12/20/24 1057    Specimen: Blood Updated: 12/20/24 1110    Dilute Last's Viper Venom [[id number]] Collected: 12/20/24 1057    Specimen: Blood Updated: 12/20/24 1110    Factor 2 Activity [[id number]] Collected: 12/20/24 1057    Specimen: Blood Updated: 12/20/24 1110    Antiphospholipid Antibody [[id number]] Collected: 12/20/24 1057    Specimen: Blood Updated: 12/20/24 1109    Factor 5 Leiden [[id number]] Collected: 12/20/24 1057    Specimen: Blood Updated: 12/20/24 1109    MTHFR Mutation [[id number]] Collected: 12/20/24 1101     Updated: 12/20/24 1109    Peripheral Blood Smear [[id number]] Collected: 12/20/24 0639    Specimen: Blood Updated: 12/20/24 1049      Pathology Review Yes    POC Glucose Once [[id number]]  (Abnormal) Collected: 12/20/24 1008    Specimen: Blood Updated: 12/20/24 1012     Glucose 161 mg/dL      Comment: Serial Number: [id number]:  [id number]                Pending Results:     Imaging Reviewed:   XR Chest 1 View    Result Date: 12/22/2024  1.Tubes and lines appear in satisfactory positions, as above. 2.Mild bibasilar opacities which could represent atelectasis or infiltrate. 3.Advanced emphysema. Electronically Signed: Sam Ivy  12/22/2024 7:07 AM EST  Workstation ID: [id number]    XR Chest 1 View    Result Date: 12/21/2024  Impression: 1. No tube or line change 2. No focal airspace consolidation or other acute process. Electronically Signed: Julio Thorpe MD  12/21/2024 9:54 AM EST  Workstation ID: [id number]    XR Chest 1 View    Result Date: 12/20/2024  Impression: Stable chest demonstrating COPD and postoperative changes with no acute cardiopulmonary process demonstrated Electronically Signed: Shon Ponce  12/20/2024 7:19 AM EST  Workstation ID: [id number]    XR Chest 1 View    Result Date: 12/19/2024  Impression: Interval placement of various support lines as noted. No acute process of the lung fields. Electronically Signed: Emilia Orellana MD  12/19/2024 12:27 PM EST  Workstation ID: [id number]    US Renal Bilateral    Result Date: 12/19/2024  Impression: Unremarkable renal ultrasound. Electronically Signed: Luciana Guerra MD  12/19/2024 11:22 AM EST  Workstation ID: [id number]    US Pancreas    Result Date: 12/19/2024  Impression: Negative exam. Electronically Signed: Emilia Orellana MD  12/19/2024 11:05 AM EST  Workstation ID: [id number]    XR Abdomen KUB    Result Date: 12/19/2024  Impression: Feeding tube is in the stomach. Electronically Signed: Maxwell Carrion MD  12/19/2024 1:11 AM EST  Workstation ID: [id number]    US Liver    Result Date: 12/18/2024  Impression: Hepatomegaly with steatosis. Electronically Signed: Alea Subramanian MD   12/18/2024 9:54 AM EST  Workstation ID: [id number]    CT Head Without Contrast    Result Date: 12/18/2024  Impression: 1. No acute intracranial findings. 2. Features suggestive of mild chronic microvascular disease. Electronically Signed: Mariah Keenan MD  12/18/2024 8:09 AM EST  Workstation ID: [id number]    XR Chest 1 View    Result Date: 12/18/2024  Impression: 1. No acute process. 2. Severe emphysema. Electronically Signed: Gaurav Miles MD  12/18/2024 7:53 AM EST  Workstation ID: [id number]          Assessment & Plan   ASSESSMENT  Progressive thrombocytopenia: Multifactorial including recent surgery, consumption, medications  Arterial Thrombosis status post thrombectomy: On Heparin drip  Anemia due to blood loss  Hypercoagulable state    PLAN  Check heparin antibodies, fibrinogen level  Await the results of thrombophilia panel.  Add anticardiolipin antibodies, lupus anticoagulant  Monitor CBCs closely  Ongoing risks of bleeding with anticoagulation therapy  Will continue to follow    Electronically signed by Fany Borja MD, 12/22/24, 9:48 AM EST.        Thank you for this consult. We will be happy to follow along with you.

## 2024-12-22 NOTE — PROGRESS NOTES
"NEPHROLOGY PROGRESS NOTE------[facility]    [facility]  [phone]  Deidra Starr MD      Patient Care Team:  Katie Duran PA as PCP - General (Physician Assistant)  Uli Starr MD as Consulting Physician (Nephrology)      Provider:  Deidra Starr MD  Patient Name: Yarelis Jackson  :  1970    SUBJECTIVE:    F/U ARF/MARGOT/ELECTROLYTE ABNORMALITIES     INTUBATED ON VENT    Medication:  cefTRIAXone, 2,000 mg, Intravenous, Q24H  chlorhexidine, 15 mL, Mouth/Throat, Q12H  lansoprazole, 30 mg, Nasogastric, Q AM  mupirocin, 1 Application, Each Nare, BID  nystatin, 1 Application, Topical, Q8H  [START ON 2024] permethrin, 1 Application, Topical, Once  potassium chloride, 10 mEq, Intravenous, Q1H  QUEtiapine, 25 mg, Nasogastric, Q12H  sodium chloride, 10 mL, Intravenous, Q12H  sodium chloride, 10 mL, Intravenous, Q12H      heparin, 16.9 Units/kg/hr, Last Rate: 18 Units/kg/hr (24 0527)  insulin, 0-100 Units/hr, Last Rate: 5.7 Units/hr (24 0902)  niCARdipine, 5-15 mg/hr, Last Rate: 5 mg/hr (24 0741)  norepinephrine, 0.05-0.5 mcg/kg/min, Last Rate: Stopped (24 1505)  Pharmacy to dose vancomycin,   sodium chloride, 125 mL/hr, Last Rate: 125 mL/hr (24 1643)        OBJECTIVE    Vital Sign Min/Max for last 24 hours  Temp  Min: 98.4 °F (36.9 °C)  Max: 99.4 °F (37.4 °C)   BP  Min: 120/60  Max: 159/62   Pulse  Min: 84  Max: 103   Resp  Min: 20  Max: 30   SpO2  Min: 97 %  Max: 100 %   No data recorded   No data recorded     Flowsheet Rows      Flowsheet Row First Filed Value   Admission Height 162.6 cm (64\") Documented at 2024 0443   Admission Weight 92.5 kg (203 lb 14.4 oz) Documented at 2024 0527            No intake/output data recorded.  I/O last 3 completed shifts:  In: 6070 [I.V.:5143; Blood:330; Other:300; NG/GT:297]  Out: 2690 [Urine:2690]    Physical Exam: ON CARDENE  General " Appearance:  INTUBATED ON VENT  Head: normocephalic, without obvious abnormality and atraumatic +ETT INTACT  Eyes: conjunctivae and sclerae normal and no icterus  Neck: supple and no JVD  Lungs: clear to auscultation and respirations regular  Heart: regular rhythm & normal rate and normal S1, S2 +THOMAS  Chest: Wall no abnormalities observed  Abdomen: normal bowel sounds and soft   Extremities: moves extremities well, +TRACE/1+ BILAT PRETIBIAL EDEMA, no cyanosis and no redness. +DJD  Skin: +BILAT PRETIBIAL FLUID FILLED BULLAE  Neurologic:  INTUBATED    Labs:    WBC WBC   Date Value Ref Range Status   12/22/2024 7.59 3.40 - 10.80 10*3/mm3 Final   12/21/2024 8.22 3.40 - 10.80 10*3/mm3 Final   12/21/2024 8.44 3.40 - 10.80 10*3/mm3 Final   12/21/2024 15.20 (H) 3.40 - 10.80 10*3/mm3 Final   12/20/2024 15.52 (H) 3.40 - 10.80 10*3/mm3 Final   12/20/2024 12.92 (H) 3.40 - 10.80 10*3/mm3 Final   12/20/2024 10.37 3.40 - 10.80 10*3/mm3 Final      HGB Hemoglobin   Date Value Ref Range Status   12/22/2024 7.2 (L) 12.0 - 15.9 g/dL Final   12/21/2024 6.8 (C) 12.0 - 17.0 g/dL Final   12/21/2024 6.7 (C) 12.0 - 15.9 g/dL Final   12/21/2024 7.5 (L) 12.0 - 15.9 g/dL Final   12/21/2024 8.7 (L) 12.0 - 15.9 g/dL Final   12/20/2024 11.2 (L) 12.0 - 17.0 g/dL Final   12/20/2024 11.0 (L) 12.0 - 15.9 g/dL Final   12/20/2024 11.5 (L) 12.0 - 15.9 g/dL Final   12/20/2024 12.1 12.0 - 15.9 g/dL Final   12/20/2024 12.9 12.0 - 15.9 g/dL Final   12/19/2024 14.3 12.0 - 17.0 g/dL Final      HCT Hematocrit   Date Value Ref Range Status   12/22/2024 22.1 (L) 34.0 - 46.6 % Final   12/21/2024 20 (L) 38 - 51 % Final   12/21/2024 20.1 (C) 34.0 - 46.6 % Final   12/21/2024 22.6 (L) 34.0 - 46.6 % Final   12/21/2024 27.2 (L) 34.0 - 46.6 % Final   12/20/2024 33 (L) 38 - 51 % Final   12/20/2024 33.8 (L) 34.0 - 46.6 % Final   12/20/2024 35.3 34.0 - 46.6 % Final   12/20/2024 38.5 34.0 - 46.6 % Final   12/20/2024 41.2 34.0 - 46.6 % Final   12/19/2024 42 38 - 51 % Final     "  Platelets No results found for: \"LABPLAT\"   MCV MCV   Date Value Ref Range Status   12/22/2024 95.7 79.0 - 97.0 fL Final   12/21/2024 99.5 (H) 79.0 - 97.0 fL Final   12/21/2024 100.0 (H) 79.0 - 97.0 fL Final   12/21/2024 100.0 (H) 79.0 - 97.0 fL Final   12/20/2024 101.4 (H) 79.0 - 97.0 fL Final   12/20/2024 101.0 (H) 79.0 - 97.0 fL Final   12/20/2024 102.2 (H) 79.0 - 97.0 fL Final          Sodium Sodium   Date Value Ref Range Status   12/22/2024 138 136 - 145 mmol/L Final   12/21/2024 138 136 - 145 mmol/L Final   12/20/2024 136 136 - 145 mmol/L Final   12/20/2024 135 (L) 136 - 145 mmol/L Final   12/20/2024 135 (L) 136 - 145 mmol/L Final   12/20/2024 134 (L) 136 - 145 mmol/L Final   12/20/2024 135 (L) 136 - 145 mmol/L Final   12/20/2024 142 136 - 145 mmol/L Final   12/19/2024 151 (H) 136 - 145 mmol/L Final   12/19/2024 167 (C) 136 - 145 mmol/L Final      Potassium Potassium   Date Value Ref Range Status   12/22/2024 3.5 3.5 - 5.2 mmol/L Final   12/21/2024 5.0 3.5 - 5.2 mmol/L Final   12/20/2024 5.3 (H) 3.5 - 5.2 mmol/L Final   12/20/2024 5.3 (H) 3.5 - 5.2 mmol/L Final   12/20/2024 5.0 3.5 - 5.2 mmol/L Final   12/20/2024 5.1 3.5 - 5.2 mmol/L Final   12/20/2024 5.1 3.5 - 5.2 mmol/L Final   12/20/2024 4.9 3.5 - 5.2 mmol/L Final     Comment:     Specimen hemolyzed.  Result may be falsely elevated.   12/19/2024 5.2 3.5 - 5.2 mmol/L Final     Comment:     Specimen hemolyzed.  Result may be falsely elevated.   12/19/2024 4.4 3.5 - 5.2 mmol/L Final     Comment:     Specimen hemolyzed.  Result may be falsely elevated.      Chloride Chloride   Date Value Ref Range Status   12/22/2024 103 98 - 107 mmol/L Final   12/21/2024 105 98 - 107 mmol/L Final   12/20/2024 104 98 - 107 mmol/L Final   12/20/2024 104 98 - 107 mmol/L Final   12/20/2024 105 98 - 107 mmol/L Final   12/20/2024 105 98 - 107 mmol/L Final   12/20/2024 105 98 - 107 mmol/L Final   12/20/2024 111 (H) 98 - 107 mmol/L Final   12/19/2024 120 (H) 98 - 107 mmol/L Final "   12/19/2024 136 (H) 98 - 107 mmol/L Final      CO2 CO2   Date Value Ref Range Status   12/22/2024 22.3 22.0 - 29.0 mmol/L Final   12/21/2024 18.8 (L) 22.0 - 29.0 mmol/L Final   12/20/2024 20.5 (L) 22.0 - 29.0 mmol/L Final   12/20/2024 21.1 (L) 22.0 - 29.0 mmol/L Final   12/20/2024 19.9 (L) 22.0 - 29.0 mmol/L Final   12/20/2024 19.8 (L) 22.0 - 29.0 mmol/L Final   12/20/2024 20.8 (L) 22.0 - 29.0 mmol/L Final   12/20/2024 22.3 22.0 - 29.0 mmol/L Final   12/19/2024 22.9 22.0 - 29.0 mmol/L Final   12/19/2024 14.5 (L) 22.0 - 29.0 mmol/L Final      BUN BUN   Date Value Ref Range Status   12/22/2024 39 (H) 6 - 20 mg/dL Final   12/21/2024 46 (H) 6 - 20 mg/dL Final   12/20/2024 38 (H) 6 - 20 mg/dL Final   12/20/2024 34 (H) 6 - 20 mg/dL Final   12/20/2024 25 (H) 6 - 20 mg/dL Final   12/20/2024 22 (H) 6 - 20 mg/dL Final   12/20/2024 20 6 - 20 mg/dL Final   12/20/2024 24 (H) 6 - 20 mg/dL Final   12/19/2024 38 (H) 6 - 20 mg/dL Final   12/19/2024 55 (H) 6 - 20 mg/dL Final      Creatinine Creatinine   Date Value Ref Range Status   12/22/2024 1.85 (H) 0.57 - 1.00 mg/dL Final   12/21/2024 2.09 (H) 0.60 - 1.30 mg/dL Final     Comment:     Serial Number: [id number]:  [id number]   12/21/2024 2.43 (H) 0.57 - 1.00 mg/dL Final   12/20/2024 2.09 (H) 0.57 - 1.00 mg/dL Final   12/20/2024 1.98 (H) 0.57 - 1.00 mg/dL Final   12/20/2024 1.51 (H) 0.57 - 1.00 mg/dL Final   12/20/2024 1.49 (H) 0.57 - 1.00 mg/dL Final   12/20/2024 1.20 (H) 0.57 - 1.00 mg/dL Final   12/20/2024 1.11 (H) 0.57 - 1.00 mg/dL Final   12/19/2024 1.31 (H) 0.57 - 1.00 mg/dL Final   12/19/2024 1.28 (H) 0.57 - 1.00 mg/dL Final      Calcium Calcium   Date Value Ref Range Status   12/22/2024 8.2 (L) 8.6 - 10.5 mg/dL Final   12/21/2024 6.9 (L) 8.6 - 10.5 mg/dL Final   12/20/2024 6.7 (L) 8.6 - 10.5 mg/dL Final   12/20/2024 7.1 (L) 8.6 - 10.5 mg/dL Final   12/20/2024 6.9 (L) 8.6 - 10.5 mg/dL Final   12/20/2024 6.7 (L) 8.6 - 10.5 mg/dL Final   12/20/2024 7.1 (L) 8.6 - 10.5 mg/dL Final  "  12/20/2024 6.8 (L) 8.6 - 10.5 mg/dL Final   12/19/2024 6.8 (L) 8.6 - 10.5 mg/dL Final   12/19/2024 7.9 (L) 8.6 - 10.5 mg/dL Final      PO4 No components found for: \"PO4\"   Albumin Albumin   Date Value Ref Range Status   12/22/2024 2.8 (L) 3.5 - 5.2 g/dL Final   12/21/2024 2.3 (L) 3.5 - 5.2 g/dL Final   12/20/2024 2.5 (L) 3.5 - 5.2 g/dL Final   12/20/2024 2.7 (L) 3.5 - 5.2 g/dL Final   12/20/2024 2.9 (L) 3.5 - 5.2 g/dL Final   12/19/2024 2.8 (L) 3.5 - 5.2 g/dL Final      Magnesium Magnesium   Date Value Ref Range Status   12/22/2024 2.0 1.6 - 2.6 mg/dL Final   12/21/2024 2.1 1.6 - 2.6 mg/dL Final   12/20/2024 2.0 1.6 - 2.6 mg/dL Final   12/20/2024 2.0 1.6 - 2.6 mg/dL Final   12/20/2024 1.9 1.6 - 2.6 mg/dL Final   12/20/2024 1.9 1.6 - 2.6 mg/dL Final   12/20/2024 1.9 1.6 - 2.6 mg/dL Final   12/20/2024 2.0 1.6 - 2.6 mg/dL Final   12/20/2024 2.0 1.6 - 2.6 mg/dL Final   12/19/2024 1.9 1.6 - 2.6 mg/dL Final   12/19/2024 2.4 1.6 - 2.6 mg/dL Final      Uric Acid No components found for: \"URIC ACID\"     Imaging Results (Last 72 Hours)       Procedure Component Value Units Date/Time    XR Chest 1 View [[id number]] Collected: 12/22/24 0704     Updated: 12/22/24 0709    Narrative:        XR CHEST 1 VW    Date of Exam: 12/22/2024 12:35 AM EST    Indication: Intubated Patient    Comparison:  12/21/2024    FINDINGS:  Endotracheal tube tip appears in satisfactory position at the level of the aortic arch. Right IJ catheter appears in stable position. Enteric tube extends into the left upper quadrant. Heart size and mediastinal contour appear within normal limits. No   definite pneumothorax or effusion. Mild bibasilar opacities. Findings of advanced emphysema with suspected apical scarring.      Impression:      1.Tubes and lines appear in satisfactory positions, as above.  2.Mild bibasilar opacities which could represent atelectasis or infiltrate.  3.Advanced emphysema.        Electronically Signed: Sam Haynes    12/22/2024 7:07 " AM EST    Workstation ID: [id number]    XR Chest 1 View [[id number]] Collected: 12/21/24 0953     Updated: 12/21/24 0956    Narrative:      XR CHEST 1 VW    Date of Exam: 12/21/2024 4:32 AM EST    Indication: Intubated Patient    Comparison: 12/20/2024    Findings:  There is been no interval tube or line changes. Heart size and pulmonary vessels are within normal limits. There are postoperative changes of the bilateral upper lobes. Lungs appear clear. No pleural effusion or pneumothorax.      Impression:      Impression:    1. No tube or line change  2. No focal airspace consolidation or other acute process.      Electronically Signed: Julio Thorpe MD    12/21/2024 9:54 AM EST    Workstation ID: [id number]    [facility]brid Vascular OR Imaging ([employer]) [[id number]] Resulted: 12/20/24 1719     Updated: 12/20/24 1719    Narrative:      Please see performing physician's note for result.    XR Chest 1 View [[id number]] Collected: 12/20/24 0717     Updated: 12/20/24 0721    Narrative:      XR CHEST 1 VW    Date of Exam: 12/20/2024 12:10 AM EST    Indication: Intubated Patient    Comparison: 12/19/2024    Findings:  Endotracheal tube approximately 2.5 cm above the carmina. Feeding tube and right IJ dialysis catheter remain in place. No new tubes or lines heart size and pulmonary vasculature are stable. Bullous changes in the right upper lobe. Postoperative changes in   the left upper lobe. Lungs grossly clear. No pneumothorax      Impression:      Impression:  Stable chest demonstrating COPD and postoperative changes with no acute cardiopulmonary process demonstrated      Electronically Signed: Shon Ponce    12/20/2024 7:19 AM EST    Workstation ID: [id number]    Hybrid Vascular OR Imaging ([employer]) [[id number]] Resulted: 12/19/24 1810     Updated: 12/19/24 1810    Narrative:      Please see performing physician's note for result.    XR Chest 1 View [[id number]] Collected: 12/19/24 1224     Updated: 12/19/24 1229     Narrative:      XR CHEST 1 VW    Date of Exam: 12/19/2024 11:53 AM EST    Indication: intubation    Comparison: 12/18/2024.    Findings:  There has been placement of an ET tube with the tip 1.7 cm above the carmina. There is a right IJ approach catheter with its tip in the mid SVC. There is an esophageal gastric tube directed towards the left upper quadrant although tip is not included.   Heart size is normal. Lung fields are clear of acute infiltrates or effusions. Mildly prominent interstitial pattern appears chronic.      Impression:      Impression:  Interval placement of various support lines as noted. No acute process of the lung fields.      Electronically Signed: Emilia Orellana MD    12/19/2024 12:27 PM EST    Workstation ID: [id number]    US Renal Bilateral [[id number]] Collected: 12/19/24 1121     Updated: 12/19/24 1124    Narrative:      US RENAL BILATERAL    Date of Exam: 12/19/2024 10:09 AM EST    Indication: ARF/MARGOT/CRF/CKD.    Comparison: No comparisons available.    Technique: Grayscale and color Doppler ultrasound evaluation of the kidneys and urinary bladder was performed.      Findings:  RIGHT kidney measures 10.9 cm.  Kidney echogenicity, size, and vascularity appear within normal limits. There is no solid kidney mass.  No echogenic shadowing stone.  No hydronephrosis.    LEFT kidney measures 10.3 cm. Kidney echogenicity, size, and vascularity appear within normal limits. There is no solid kidney mass.  No echogenic shadowing stone.  No hydronephrosis.    The urinary bladder is decompressed and contains a Malik catheter.        Impression:      Impression:  Unremarkable renal ultrasound.        Electronically Signed: Luciana Guerra MD    12/19/2024 11:22 AM EST    Workstation ID: [id number]    US Pancreas [[id number]] Collected: 12/19/24 1104     Updated: 12/19/24 1107    Narrative:      US PANCREAS    Date of Exam: 12/19/2024 10:10 AM EST    Indication: Elevated triglycerides.    Comparison: No  comparisons available.    Technique: Grayscale and color Doppler ultrasound evaluation of the right upper quadrant was performed.      Findings:  The pancreas is normal in size and echogenicity. There are no pancreatic or peripancreatic fluid collections. No masses are identified.      Impression:      Impression:  Negative exam.        Electronically Signed: Emilia Orellana MD    12/19/2024 11:05 AM EST    Workstation ID: [id number]            Results for orders placed during the hospital encounter of 12/18/24    XR Chest 1 View    Narrative  XR CHEST 1 VW    Date of Exam: 12/22/2024 12:35 AM EST    Indication: Intubated Patient    Comparison:  12/21/2024    FINDINGS:  Endotracheal tube tip appears in satisfactory position at the level of the aortic arch. Right IJ catheter appears in stable position. Enteric tube extends into the left upper quadrant. Heart size and mediastinal contour appear within normal limits. No  definite pneumothorax or effusion. Mild bibasilar opacities. Findings of advanced emphysema with suspected apical scarring.    Impression  1.Tubes and lines appear in satisfactory positions, as above.  2.Mild bibasilar opacities which could represent atelectasis or infiltrate.  3.Advanced emphysema.        Electronically Signed: Sam Haynes  12/22/2024 7:07 AM EST  Workstation ID: [id number]      XR Chest 1 View    Narrative  XR CHEST 1 VW    Date of Exam: 12/21/2024 4:32 AM EST    Indication: Intubated Patient    Comparison: 12/20/2024    Findings:  There is been no interval tube or line changes. Heart size and pulmonary vessels are within normal limits. There are postoperative changes of the bilateral upper lobes. Lungs appear clear. No pleural effusion or pneumothorax.    Impression  Impression:    1. No tube or line change  2. No focal airspace consolidation or other acute process.      Electronically Signed: Julio Thorpe MD  12/21/2024 9:54 AM EST  Workstation ID: [id number]      XR Chest 1  View    Narrative  XR CHEST 1 VW    Date of Exam: 12/20/2024 12:10 AM EST    Indication: Intubated Patient    Comparison: 12/19/2024    Findings:  Endotracheal tube approximately 2.5 cm above the carmina. Feeding tube and right IJ dialysis catheter remain in place. No new tubes or lines heart size and pulmonary vasculature are stable. Bullous changes in the right upper lobe. Postoperative changes in  the left upper lobe. Lungs grossly clear. No pneumothorax    Impression  Impression:  Stable chest demonstrating COPD and postoperative changes with no acute cardiopulmonary process demonstrated      Electronically Signed: Shon Ponce  12/20/2024 7:19 AM EST  Workstation ID: [id number]      Results for orders placed during the hospital encounter of 12/18/24    Duplex Lower Extremity Art / Grafts - Right CAR    Interpretation Summary    Limited study due to body habitus and current dressing site.  Patent external iliac and femoral-popliteal arterial flow with low flow noted below the common femoral.  Common femoral artery poorly visualized.  Cannot rule out occlusion of the common femoral artery.        ASSESSMENT / PLAN      DKA (diabetic ketoacidosis)    Arterial thrombosis    ARF/MARGOT------Oliguric. +ARF/MARGOT with historically normal renal function. +ARF/MARGOT secondary to severe prerenal state and ATN from hypotension and Rhabdomyolysis. Hydrate aggressively. Pressor support. Will do regular HD again today. Continue aggressive hydration. No NSAIDs. Renal US without obstructive uropathy.   Dose meds for CrCl less than 10 cc/min     2. HYPERNATREMIA/HYPEROSMOLAR STATE-----Better. D/C CRRT and follow.      3. ACIDOSIS----Metabolic. Elevated AGAP. Insulin gtt/IVFs. Follow off of CRRT and with intermittent IV NaHCO3     4. DVT PROPHYLAXIS-----On Heparin gtt     5. HYPOCALCEMIA------Replace IV and follow ionized levels     6. DM------Insulin gtt per protocol     7. ANEMIA---------H/H stable     8.  OA/DJD/HYPERURICEMIA------Allopurinol . No NSAIDs     9. RHABDOMYOLYSIS-------Hydrate and alkalinize. And do regular HD today     10. GERD/PUD PROPHYLAXIS-----Renal dose adjusted Pepcid     11. DELIRIUM/TME     12. COPD/ACUTE HYPOXIC RESPIRATORY FAILURE------intubated on vent    13. HYPOALBUMINEMIA-----IV Albumin to temporize    14. EDEMA/VOLUME EXCESS-----D/C IVFs. Gentle UF with HD    15. HTN-------Add Catapres patch and wean off of Cardene    Deidra Starr MD  [facility]  [phone]  12/22/24  09:32 EST

## 2024-12-22 NOTE — PLAN OF CARE
Goal Outcome Evaluation:         Pt stable overnight. Received 1 unit prbcs and started on cardene drip for BP control.

## 2024-12-22 NOTE — NURSING NOTE
Spoke with Dr Juárez with vascular regarding heparin drip and most recent ptt result. Per MD follow heparin protocol with no bolus.

## 2024-12-22 NOTE — PROGRESS NOTES
Critical Care Progress Note     Yarelis Jackson : 1970 MRN:[id number] LOS:4  Rm: 2308/1     Principal Problem: DKA (diabetic ketoacidosis)     Reason for follow up: All the medical problems listed below    Summary     Yarelis Jackson is a 54 y.o. female with PMH of COPD, migraine, and obesity, and presented to the hospital for altered mental status, and was admitted with a principal diagnosis of DKA (diabetic ketoacidosis).  Patient presented and per ER documentation, patient was oriented to person and time.  Per patient's mother, patient had been feeling ill since Friday with noted polyuria and polydipsia.  It was managed by giving patient milk and a grape soft drink.  Patient became increasingly agitated, preventing treatment, and was given a dose of Geodon in the ED.     In the ED, labs were obtained with the following abnormalities: Sodium 159, chloride 117, CO2 10.1, anion gap 31.9, BUN 88, creatinine 2.13, glucose 976, alk phos 246, , , hemoglobin A1c 15.8, moderate acetone, osmolality 461, WBC 14.65 without bandemia.  UA with >1000 glucose, 15 mg/dL of ketones, moderate blood and proteinuria noted, this did not reflex to culture.  Patient had an ABG with pH 7.179, pCO2 37.5, pO2 77.7, HCO3 14, with O2 saturation 91.7%.  Patient was determined to be in DKA, and was started with IV fluid boluses per DKA protocol as well as an insulin drip.  Patient did have blood cultures obtained, but per ED provider, patient has no obvious signs or symptoms of active section, therefore no antimicrobials were started.    Patient sodium level remained persistent, and IV fluids were changed to withhold any sodium containing products.  Nephrology was consulted.  Patient also underwent placement of feeding tube for free water flush hourly overnight.  On , nephrology decided that patient would proceed with hemodialysis and requested nontunneled catheter placement.  Patient was becoming increasingly  agitated, with concerned that patient was no longer able to protect her own airway.  After discussion with patient's family, patient was intubated, nontunneled catheter placement was completed, and patient did require initiation of vasopressors.  HD was transitioned to CRRT.  Shortly after initiation of CRRT, patient developed an acute change in patient's skin color of her right lower extremity with subsequent loss of pulse.  Vascular surgery was stat consulted as well as completion of stat arterial duplex and ABIs.  Patient was started on a heparin drip.  Vascular surgery promptly evaluated patient and patient was scheduled to go to the OR for emergent thrombectomy.    Significant Events / Subjective     12/22/24 :  Intubated and minimally responsive.  Continuous sedation off, the patient intermittently follows commands.  She is tolerating pressure support comfortably however weaning to extubation is hampered by her labile mental status.  Heparin gtt management per vascular surgery, started after thrombectomy.  Platelets have dropped to 63.  Hemoglobin dropped to 6.7 overnight and the patient was transfused 1 unit PRBC.  Hematology/oncology is now following the patient and working up for HIT.  She has only has small amount of blood coming from her epiglottal suction now however if it increases again the heparin drip will likely need to be changed to argatroban.  She remains on an insulin gtt per endocrinology. HD planned for today.     Assessment / Plan     Diabetic ketoacidosis without coma, with new onset diabetes mellitus, type 2 / Metabolic acidosis, increased anion gap  Etiology uncertain, though new onset diabetes mellitus.  A1c 15.80 on admission, no prior available.  Anion gap of 31.9 on admission.  On no home medication regimen.  Acetone moderate, beta hydroxybutyrate 10.79  Initially started on DKA protocol.  Insulin gtt now managed by Endocrinology  Nephrology consulted.     Acute Kidney Injury  Remains  nonoliguric. Baseline creatinine 0.90.  Creatinine 2.10 on admission.  Likely prerenal. Possible intrinsic component due to ATN from dehydration, nausea, vomiting, and hypotension.   C/w IV fluids as ordered.  Monitor Input/Output very closely.   Avoids NSAIDs, nephrotoxic medications, and hypotension.  Nephrology consulted, initially treated with CRRT which has been transitioned to HD. Planned HD session again today  Net IO Since Admission: 16,322 mL [12/22/24 1102]    Acute hypernatremia--resolved  Secondary to dehydration  Corrected sodium 180 on admission, Osmolality 461.  Sodium has corrected  IV fluids management per endocrinology, electrolyte management per nephrology  Continue free water flush.  HD again the day  Monitor and trend labs as ordered.    Septic Shock --shock state resolved  Likely due to aspiration pneumonia.  Sputum culture preliminary with Staphylococcus aureus & Streptococcus agalactiae (group B)  Blood cultures-pending with no growth  Respiratory viral panel-negative.  Urine antigens for legionella and strep pna negative  MRSA screen positive.  UA did not reflex to culture.  Started on ceftriaxone and vancomycin on 12/19.  Fluid refractory hypotension after receiving sepsis bundle IVF resuscitation  Titrate vasopressors if needed for a target MAP of 65.  She is currently hypertensive    Acute occlusive thrombus of the right iliac artery with limb ischemia  Arterial duplex with noted right femoral, deep femoral, and popliteal arteries being patent but with very low amplitude monophasic signals, suggesting severe inflow stenosis or occlusion; no measurable Doppler flow in the anterior or posterior tibial and peroneal arteries.  Bilateral lower extremity ABIs ordered: Right STACEY critically reduced with STACEY of 0 and severe digital insufficiency; left STACEY is normal with moderate digital insufficiency.  Emergent surgery per vascular surgeon 12/19/2024 for right iliac thrombectomy via open groin  incision then return to surgery on 12/20/2024 for recurrent right lower extremity ischemia due to arterial thrombosis and underwent right iliofemoral popliteal thrombectomy, right femoral endarterectomy with patch, right lower extremity arteriogram, and closed right calf fasciotomies  Heparin drip was initiated upon diagnosis of acute occlusive thrombus however PTTs have been extremely labile and supratherapeutic.    Heparin drip is currently on and no new active bleeding from mouth or via ET tube.  Vascular surgery aware and managing  Hematology/oncology consulted, HIT panel pending  May need to switch to argatroban if bleeding recurs    Nontraumatic rhabdomyolysis  CK has begun to trend down, now 14,218  Continue IV fluids as ordered.  Nephrology following.    Hypertriglyceridemia, concern for pancreatitis  Triglyceride 1700 now down to 336  Amylase 311, lipase 33  Ultrasound of pancreas 12/19 with no acute findings  Avoid lipid containing substances.    Acute metabolic encephalopathy  Metabolic encephalopathy secondary to DKA and septic shock  CT of the head: Without acute abnormalities.  UDS-negative.  HIV-negative.  Off continuous sedation     Acute respiratory failure without hypercapnia / On mechanical Ventilation  Intubated for airway protection due to altered mental status and secretions with ineffective airway clearance  Ventilator settings noted and adjusted as needed.   When patient is more awake, will attempt a spontaneous breathing trial. Wean PEEP and FiO2 as tolerated.  Minimize sedation and analgesia to target a RASS of 0 to -1.  Off continuous sedation    Cutaneous candidiasis of groin  Likely secondary to uncontrolled diabetes mellitus  Nystatin ordered    Essential Hypertension  Currently off Cardene  Clonidine patch ordered by nephrology  Titrate medications as needed.     Bilateral leg rash, concern for scabies infection  Permethrin regimen ordered.  Completed first treatment on 12/18, repeat  treatment in 2 weeks  Keep in contact precautions x 1 week post initial treatment (12/25)     Transaminitis  US of Liver: Hepatomegaly with steatosis.  Hepatitis panel-negative.  Monitor and trend LFTs.  .     COPD: Not in exacerbation.  Duonebs ordered as needed.  Obesity: Body mass index is 35 kg/m².    Disposition: On ventilator, appropriate to remain in ICU.    Code status:   Code Status (Patient has no pulse and is not breathing): CPR (Attempt to Resuscitate)  Medical Interventions (Patient has pulse or is breathing): Full Support       Nutrition:   NPO Diet NPO Type: Strict NPO   Tube Feeding: Formula: Novasource Renal (Nepro); Feeding Type: Continuous; Start at: 20 mL/hr; Then Advance By: 10 mL/hr; Every: 4 hours; To Goal Rate of: 35 mL/hr; Water Flush: 30 mL; Every: 1 hour; Water Bolus: None     VTE Prophylaxis:  Pharmacologic VTE prophylaxis orders are present.      Objective / Physical Exam     Vital signs:  Temp: 98.4 °F (36.9 °C)  BP: 120/60  Heart Rate: 106  Resp: 16  SpO2: 92 %  Weight: 88.5 kg (195 lb 1.7 oz)    Admission Weight: Weight: 92.5 kg (203 lb 14.4 oz)  Current Weight: Weight: 88.5 kg (195 lb 1.7 oz)    Input/Output in last 24 hours:    Intake/Output Summary (Last 24 hours) at 12/22/2024 1102  Last data filed at 12/22/2024 0426  Gross per 24 hour   Intake 2313 ml   Output 1160 ml   Net 1153 ml      Net IO Since Admission: 16,322 mL [12/22/24 1102]     Physical Exam  Vitals and nursing note reviewed.   Constitutional:       Appearance: She is obese. She is ill-appearing and toxic-appearing.      Interventions: She is sedated, intubated and restrained.      Comments: Intubated, sedation off  Restless at times   HENT:      Head: Normocephalic and atraumatic.      Nose: Nose normal.      Comments: Right Dobbhoff tube in place.     Mouth/Throat:      Mouth: Mucous membranes are dry.      Comments: ET tube in place  Dried bloody secretions  Eyes:      General: No scleral icterus.      Pupils: Pupils are equal, round, and reactive to light.   Cardiovascular:      Heart sounds: Normal heart sounds. No murmur heard.     No gallop.      Comments: Sinus rhythm to sinus tachycardia  Pulmonary:      Effort: She is intubated.      Breath sounds: No wheezing or rales.      Comments: Mechanically ventilated  Coarse but clear throughout  Abdominal:      General: There is no distension.      Palpations: Abdomen is soft.      Comments: Morbid body habitus  Bowel sounds active   Musculoskeletal:      Cervical back: Neck supple. No rigidity.      Comments: Left lower extremity with edema and chronic changes consistent with PVD  Right upper thigh with erythema and warmth  Right lower extremity with tight edema, chronic changes consistent with PVD, fasciotomy dressings, right groin dressing   Skin:     General: Skin is warm and dry.      Comments: Intertrigo  Mild mottling of the left lower extremity   Neurological:      Comments: Intubated, sedation off  Intermittently following commands   Psychiatric:      Comments: Occasionally appears restless          Radiology and Labs     Results from last 7 days   Lab Units 12/22/24  0452 12/21/24  2351 12/21/24 2011 12/21/24  1307 12/21/24  0405 12/20/24  1210 12/20/24  0639   WBC 10*3/mm3 7.59  --  8.22 8.44 15.20*  --  15.52*   HEMOGLOBIN g/dL 7.2*  --  6.7* 7.5* 8.7*   < > 11.5*   HEMOGLOBIN, POC g/dL  --  6.8*  --   --   --    < >  --    HEMATOCRIT % 22.1*  --  20.1* 22.6* 27.2*   < > 35.3   HEMATOCRIT POC %  --  20*  --   --   --    < >  --    PLATELETS 10*3/mm3 63*  --  68* 85* 99*  --  156    < > = values in this interval not displayed.      Results from last 7 days   Lab Units 12/22/24  0452 12/21/24 2011 12/21/24  1307 12/21/24  0739 12/21/24  0405 12/19/24  1952 12/19/24  1428   PROTIME Seconds  --   --   --   --   --   --  13.8   INR   --   --   --   --   --   --  1.05   APTT seconds 55.7* 42.8* 38.8* >200.0* 57.3*   < > <20.0*    < > = values in this  interval not displayed.      Results from last 7 days   Lab Units 12/22/24  0452 12/21/24  2351 12/21/24  0405 12/20/24  2322 12/20/24  2021 12/20/24  1830 12/20/24  1210   SODIUM mmol/L 138  --  138 136 135*  --  135*   POTASSIUM mmol/L 3.5  --  5.0 5.3* 5.3*  --  5.0   CHLORIDE mmol/L 103  --  105 104 104  --  105   CO2 mmol/L 22.3  --  18.8* 20.5* 21.1*  --  19.9*   BUN mg/dL 39*  --  46* 38* 34*  --  25*   CREATININE mg/dL 1.85* 2.09* 2.43* 2.09* 1.98*  --  1.51*   GLUCOSE mg/dL 236*  --  181* 154* 142*  --  116*   MAGNESIUM mg/dL 2.0  --  2.1 2.0 2.0 1.9 1.9   PHOSPHORUS mg/dL 3.6  --  6.8* 7.0* 7.3*  --  6.7*      Results from last 7 days   Lab Units 12/22/24  0452 12/21/24  0405 12/20/24  0436 12/19/24  0406 12/18/24  0504   ALK PHOS U/L 100 91 103 145* 246*   AST (SGOT) U/L 271* 234* 185* 64* 106*   ALT (SGPT) U/L 96* 101* 116* 124* 178*     Results from last 7 days   Lab Units 12/22/24  0424 12/21/24  2351 12/21/24  0317 12/20/24  1603 12/20/24  0328 12/20/24  0138   PH, ARTERIAL pH units 7.424 7.392 7.357 7.180* 7.271* 7.228*   PCO2, ARTERIAL mm Hg 38.0 41.6 35.5 59.7* 57.4* 61.7*   PO2 ART mm Hg 82.2* 111.6* 72.7* 78.0* 94.5 216.7*   O2 SATURATION ART % 96.3 98.3* 93.9* 91.0* 96.0 99.6*   FIO2 % 35 35 35  --  50 70   HCO3 ART mmol/L 24.9 25.3 19.9* 22.1 26.4 25.7   BASE EXCESS ART mmol/L 0.5 0.3 -5.0* <0.0* -1.5* -3.1*       XR Chest 1 View    Result Date: 12/22/2024  1.Tubes and lines appear in satisfactory positions, as above. 2.Mild bibasilar opacities which could represent atelectasis or infiltrate. 3.Advanced emphysema. Electronically Signed: Sam Haynes  12/22/2024 7:07 AM EST  Workstation ID: [id number]    XR Chest 1 View    Result Date: 12/21/2024  Impression: 1. No tube or line change 2. No focal airspace consolidation or other acute process. Electronically Signed: Julio Thorpe MD  12/21/2024 9:54 AM EST  Workstation ID: [id number]     Current medications     Scheduled Meds:   calcium gluconate,  2,000 mg, Intravenous, Q8H  cefTRIAXone, 2,000 mg, Intravenous, Q24H  chlorhexidine, 15 mL, Mouth/Throat, Q12H  cloNIDine, 1 patch, Transdermal, Weekly  lansoprazole, 30 mg, Nasogastric, Q AM  mupirocin, 1 Application, Each Nare, BID  nystatin, 1 Application, Topical, Q8H  [START ON 12/25/2024] permethrin, 1 Application, Topical, Once  potassium chloride, 10 mEq, Intravenous, Q1H  QUEtiapine, 25 mg, Nasogastric, Q12H  sodium chloride, 10 mL, Intravenous, Q12H  sodium chloride, 10 mL, Intravenous, Q12H        Continuous Infusions:   heparin, 16.9 Units/kg/hr, Last Rate: 18 Units/kg/hr (12/22/24 2455)  insulin, 0-100 Units/hr, Last Rate: 8.5 Units/hr (12/22/24 7697)  niCARdipine, 5-15 mg/hr, Last Rate: 5 mg/hr (12/22/24 1246)  norepinephrine, 0.05-0.5 mcg/kg/min, Last Rate: Stopped (12/21/24 7615)  Pharmacy to dose vancomycin,         Critical care services to this patient including but not limited to: review of labs/ microbiology/imaging/medications, serial monitoring of vital signs, adjusting ventilator settings as needed, review of other consultant's notes, review of events in the last 24 hrs, monitoring input/output, review of treatment plan with bedside nurse, RT and other treatment team, management of life support and nutrition needs.    Time spent in performing separately billable procedures and updating family is not included in the critical care time.       NICHOLAS Smart   Critical Care  12/22/24   11:02 EST

## 2024-12-22 NOTE — PROGRESS NOTES
Daily Progress Note    Patient Care Team:  Katie Duran PA as PCP - General (Physician Assistant)  Uli Starr MD as Consulting Physician (Nephrology)    Chief Complaint: Follow-up DKA/type 1 diabetes    HPI: Patient seen, remains in ICU intubated and currently on IV insulin.    ROS:   Remains intubated        Vitals:    12/22/24 1356   BP:    Pulse: 108   Resp: (!) 42   Temp:    SpO2: 96%     Body mass index is 33.49 kg/m².    Physical Exam:  GEN: Remains intubated      Results Review:     I reviewed the patient's new clinical results.    Glucose   Date Value Ref Range Status   12/22/2024 236 (H) 65 - 99 mg/dL Final     Sodium   Date Value Ref Range Status   12/22/2024 138 136 - 145 mmol/L Final     Potassium   Date Value Ref Range Status   12/22/2024 3.5 3.5 - 5.2 mmol/L Final     CO2   Date Value Ref Range Status   12/22/2024 22.3 22.0 - 29.0 mmol/L Final     Chloride   Date Value Ref Range Status   12/22/2024 103 98 - 107 mmol/L Final     Anion Gap   Date Value Ref Range Status   12/22/2024 12.7 5.0 - 15.0 mmol/L Final     Creatinine   Date Value Ref Range Status   12/22/2024 1.85 (H) 0.57 - 1.00 mg/dL Final   12/21/2024 2.09 (H) 0.60 - 1.30 mg/dL Final     Comment:     Serial Number: [id number]:  [id number]     BUN   Date Value Ref Range Status   12/22/2024 39 (H) 6 - 20 mg/dL Final     BUN/Creatinine Ratio   Date Value Ref Range Status   12/22/2024 21.1 7.0 - 25.0 Final     Calcium   Date Value Ref Range Status   12/22/2024 8.2 (L) 8.6 - 10.5 mg/dL Final     Alkaline Phosphatase   Date Value Ref Range Status   12/22/2024 100 39 - 117 U/L Final     Total Protein   Date Value Ref Range Status   12/22/2024 5.1 (L) 6.0 - 8.5 g/dL Final     ALT (SGPT)   Date Value Ref Range Status   12/22/2024 96 (H) 1 - 33 U/L Final     AST (SGOT)   Date Value Ref Range Status   12/22/2024 271 (H) 1 - 32 U/L Final     Total Bilirubin   Date Value Ref Range Status   12/22/2024 0.2 0.0 - 1.2 mg/dL Final  "    Albumin   Date Value Ref Range Status   12/22/2024 2.8 (L) 3.5 - 5.2 g/dL Final     Globulin   Date Value Ref Range Status   12/22/2024 2.3 gm/dL Final     Magnesium   Date Value Ref Range Status   12/22/2024 2.0 1.6 - 2.6 mg/dL Final     Phosphorus   Date Value Ref Range Status   12/22/2024 3.6 2.5 - 4.5 mg/dL Final     Lab Results   Component Value Date    HGBA1C 15.80 (H) 12/18/2024     No results found for: \"GLUF\", \"MICROALBUR\"  Results from last 7 days   Lab Units 12/22/24  1351 12/22/24  1245 12/22/24  1122 12/22/24  1008 12/22/24  0900 12/22/24  0747   GLUCOSE mg/dL 150* 138* 164* 238* 210* 247*     Medication Review: Reviewed.     calcium gluconate, 2,000 mg, Intravenous, Q8H  cefTRIAXone, 2,000 mg, Intravenous, Q24H  chlorhexidine, 15 mL, Mouth/Throat, Q12H  cloNIDine, 1 patch, Transdermal, Weekly  lansoprazole, 30 mg, Nasogastric, Q AM  mupirocin, 1 Application, Each Nare, BID  nystatin, 1 Application, Topical, Q8H  [START ON 12/25/2024] permethrin, 1 Application, Topical, Once  QUEtiapine, 25 mg, Nasogastric, Q12H  sodium chloride, 10 mL, Intravenous, Q12H  sodium chloride, 10 mL, Intravenous, Q12H      Assessment and plan:  Diabetic ketoacidosis: Resolved     Diabetes mellitus type 1 with hyperglycemia: Currently on IV insulin, will be started on tube feeds, recommend to continue IV insulin for now.  She is currently intubated and on pressors.     Acute renal failure: Followed by nephrology.     Hypernatremia: Better.    Rodriguez Worthy MD. FACE                "

## 2024-12-22 NOTE — PROGRESS NOTES
Name: Yarelis Jackson ADMIT: 2024   : 1970  PCP: Katie Duran PA    MRN: [id number] LOS: 4 days   AGE/SEX: 54 y.o. female  ROOM: 2308/1   [facility]    Chief Complaint   Patient presents with    Altered Mental Status     CC: Postop day 4 and postop day 2 status post right leg revascularization  Subjective     54 y.o. female with right leg ischemia due to thromboembolism to the right common femoral artery that was thrombectomized.  Patient then was found to have a flap in her right femoral artery was repaired yesterday without any thrombus associated.  Patient has had a harvey postop course due to hypotension and now bleeding from heparin drip.  Still has some subglottic bleeding on low-dose heparin.  Platelet count is stabilized in the 60s and she is better with some blood.  Her fasciotomy sites are starting necrosis and staples will be removed.    Review of Systems intubated and responsive to pain only    Objective     Scheduled Medications:   calcium gluconate, 2,000 mg, Intravenous, Q8H  ceFAZolin, 1,000 mg, Intravenous, Q24H  chlorhexidine, 15 mL, Mouth/Throat, Q12H  cloNIDine, 1 patch, Transdermal, Weekly  lansoprazole, 30 mg, Nasogastric, Q AM  mupirocin, 1 Application, Each Nare, BID  nystatin, 1 Application, Topical, Q8H  [START ON 2024] permethrin, 1 Application, Topical, Once  potassium chloride, 10 mEq, Intravenous, Q1H  QUEtiapine, 25 mg, Nasogastric, Q12H  sodium chloride, 10 mL, Intravenous, Q12H  sodium chloride, 10 mL, Intravenous, Q12H        Active Infusions:  dexmedetomidine, 0.2-1.5 mcg/kg/hr, Last Rate: 1 mcg/kg/hr (24 1416)  fentanyl 10 mcg/mL,  mcg/hr, Last Rate: 100 mcg/hr (24 1427)  heparin, 16.9 Units/kg/hr, Last Rate: 18 Units/kg/hr (24 1211)  insulin, 0-100 Units/hr, Last Rate: 4.3 Units/hr (24 1353)  niCARdipine, 5-15 mg/hr, Last Rate: 5 mg/hr (24 0741)  norepinephrine, 0.05-0.5 mcg/kg/min, Last Rate: Stopped (24  1505)        As Needed Medications:    acetaminophen **OR** acetaminophen    aluminum-magnesium hydroxide-simethicone    senna-docusate sodium **AND** polyethylene glycol **AND** bisacodyl **AND** bisacodyl    Calcium Replacement - Follow Nurse / BPA Driven Protocol    dextrose    dextrose    glucagon (human recombinant)    heparin (porcine)    HYDROmorphone    ipratropium-albuterol    LORazepam    Magnesium Standard Dose Replacement - Follow Nurse / BPA Driven Protocol    nitroglycerin    ondansetron ODT **OR** ondansetron    Phosphorus Replacement - Follow Nurse / BPA Driven Protocol    Potassium Replacement - Follow Nurse / BPA Driven Protocol    sodium chloride    sodium chloride    sodium chloride    sodium chloride    sodium chloride    Vital Signs  Vital Signs Patient Vitals for the past 24 hrs:   BP Temp Temp src Pulse Resp SpO2   12/22/24 1356 -- -- -- 108 (!) 42 96 %   12/22/24 1001 -- -- -- 106 16 92 %   12/22/24 0731 -- -- -- 94 27 100 %   12/22/24 0612 -- -- -- 95 (!) 29 98 %   12/22/24 0500 -- -- -- 97 -- 99 %   12/22/24 0420 -- -- -- 92 -- 99 %   12/22/24 0400 -- -- -- 91 26 97 %   12/22/24 0300 -- -- -- 93 -- 99 %   12/22/24 0232 -- -- -- 89 26 99 %   12/22/24 0200 -- -- -- 98 -- 100 %   12/22/24 0100 -- -- -- 96 -- 100 %   12/22/24 0000 -- -- -- 98 26 100 %   12/21/24 2345 120/60 98.4 °F (36.9 °C) Oral 103 26 100 %   12/21/24 2333 -- -- -- 99 27 100 %   12/21/24 2328 -- -- -- 100 -- 99 %   12/21/24 2324 -- -- -- 101 -- 98 %   12/21/24 2322 -- -- -- 101 -- 99 %   12/21/24 2308 -- -- -- 98 -- 100 %   12/21/24 2304 -- -- -- 99 -- 99 %   12/21/24 2259 -- -- -- 96 -- 99 %   12/21/24 2253 -- -- -- 99 -- 99 %   12/21/24 2200 159/62 98.4 °F (36.9 °C) Oral 97 26 97 %   12/21/24 2128 159/61 99.1 °F (37.3 °C) Oral 100 25 100 %   12/21/24 2002 -- 99.4 °F (37.4 °C) Axillary -- -- --   12/21/24 2000 -- -- -- 95 26 98 %   12/21/24 1950 -- -- -- 95 22 98 %   12/21/24 1800 -- -- -- 96 26 100 %   12/21/24 1745 -- --  -- 97 -- 100 %   12/21/24 1730 -- -- -- 96 -- 100 %   12/21/24 1715 -- -- -- 95 -- 100 %   12/21/24 1700 -- -- -- 97 -- 100 %   12/21/24 1645 -- -- -- 100 -- 100 %   12/21/24 1630 -- -- -- 94 -- 100 %   12/21/24 1615 -- -- -- 99 -- 100 %   12/21/24 1600 -- -- -- 96 26 100 %   12/21/24 1545 -- -- -- 93 -- 100 %   12/21/24 1536 151/71 -- -- 92 26 --   12/21/24 1530 -- -- -- 89 -- 100 %   12/21/24 1515 145/79 -- -- 90 26 100 %   12/21/24 1500 153/78 -- -- 90 26 100 %   12/21/24 1450 -- -- -- 90 (!) 30 100 %   12/21/24 1445 155/76 -- -- 84 26 100 %     Vital Signs (range)  Temp:  [98.4 °F (36.9 °C)-99.4 °F (37.4 °C)] 98.4 °F (36.9 °C)  Heart Rate:  [] 108  Resp:  [16-42] 42  BP: (120-159)/(60-79) 120/60  FiO2 (%):  [30 %-35 %] 30 %  I/O:  I/O last 3 completed shifts:  In: 6070 [I.V.:5143; Blood:330; Other:300; NG/GT:297]  Out: 2690 [Urine:2690]  I/O:   Intake/Output Summary (Last 24 hours) at 12/22/2024 1444  Last data filed at 12/22/2024 0426  Gross per 24 hour   Intake 1524 ml   Output 900 ml   Net 624 ml     BMI:  Body mass index is 33.49 kg/m².    Physical Exam:  Physical Exam   Right groin dressing intact.  Right leg fasciotomy site dressings intact with blisters on the skin.  Bilateral feet little mottled but only mildly.  Swelling right worse than left leg.  Staple lines necrotic on the right calf.  Staples removed and opened wounds with viable muscle and large amount of blood drained that was collected and not pulsatile or continuing to drain.    Results Review:     CBC    Results from last 7 days   Lab Units 12/22/24  0452 12/21/24  2351 12/21/24 2011 12/21/24  1307 12/21/24  0405 12/20/24  1603 12/20/24  1210 12/20/24  0639 12/20/24  0436 12/20/24  0010   WBC 10*3/mm3 7.59  --  8.22 8.44 15.20*  --   --  15.52* 12.92* 10.37   HEMOGLOBIN g/dL 7.2*  --  6.7* 7.5* 8.7*  --  11.0* 11.5* 12.1 12.9   HEMOGLOBIN, POC g/dL  --  6.8*  --   --   --  11.2*  --   --   --   --    PLATELETS 10*3/mm3 63*  --  68* 85*  99*  --   --  156 175 160     BMP   Results from last 7 days   Lab Units 12/22/24  0452 12/21/24  2351 12/21/24  0405 12/20/24  2322 12/20/24  2021 12/20/24  1830 12/20/24  1210 12/20/24  0745 12/20/24  0436   SODIUM mmol/L 138  --  138 136 135*  --  135* 134* 135*   POTASSIUM mmol/L 3.5  --  5.0 5.3* 5.3*  --  5.0 5.1 5.1   CHLORIDE mmol/L 103  --  105 104 104  --  105 105 105   CO2 mmol/L 22.3  --  18.8* 20.5* 21.1*  --  19.9* 19.8* 20.8*   BUN mg/dL 39*  --  46* 38* 34*  --  25* 22* 20   CREATININE mg/dL 1.85* 2.09* 2.43* 2.09* 1.98*  --  1.51* 1.49* 1.20*   GLUCOSE mg/dL 236*  --  181* 154* 142*  --  116* 217* 170*   MAGNESIUM mg/dL 2.0  --  2.1 2.0 2.0 1.9 1.9 1.9 2.0   PHOSPHORUS mg/dL 3.6  --  6.8* 7.0* 7.3*  --  6.7* 6.5* 5.7*     Cr Clearance Estimated Creatinine Clearance: 37.9 mL/min (A) (by C-G formula based on SCr of 1.85 mg/dL (H)).  Coag   Results from last 7 days   Lab Units 12/22/24  1136 12/22/24  0452 12/21/24 2011 12/21/24  1307 12/21/24  0739 12/21/24  0405 12/21/24  0000 12/19/24  1952 12/19/24  1428   INR  1.07  --   --   --   --   --   --   --  1.05   APTT seconds 75.6* 55.7* 42.8* 38.8* >200.0* 57.3* 185.2*   < > <20.0*    < > = values in this interval not displayed.     HbA1C   Lab Results   Component Value Date    HGBA1C 15.80 (H) 12/18/2024     Blood Glucose   Glucose   Date/Time Value Ref Range Status   12/22/2024 1351 150 (H) 70 - 105 mg/dL Final     Comment:     Serial Number: [id number]:  [id number]   12/22/2024 1245 138 (H) 70 - 105 mg/dL Final     Comment:     Serial Number: [id number]:  [id number]   12/22/2024 1122 164 (H) 70 - 105 mg/dL Final     Comment:     Serial Number: [id number]:  [id number]   12/22/2024 1008 238 (H) 70 - 105 mg/dL Final     Comment:     Serial Number: [id number]:  [id number]   12/22/2024 0900 210 (H) 70 - 105 mg/dL Final     Comment:     Serial Number: [id number]:  [id number]   12/22/2024 0747 247 (H) 70 - 105 mg/dL Final      Comment:     Serial Number: [id number]:  [id number]   12/22/2024 0634 255 (H) 70 - 105 mg/dL Final     Comment:     Serial Number: [id number]:  [id number]   12/22/2024 0530 213 (H) 70 - 105 mg/dL Final     Comment:     Serial Number: [id number]:  [id number]     Infection   Results from last 7 days   Lab Units 12/19/24  1907 12/18/24  0557 12/18/24  0504   BLOODCX   --  No growth at 4 days No growth at 4 days   RESPCX  Moderate growth (3+) Staphylococcus aureus*  Moderate growth (3+) Streptococcus agalactiae (Group B)*  No Normal Respiratory Alina*  --   --      CMP   Results from last 7 days   Lab Units 12/22/24  0452 12/21/24  2351 12/21/24  0405 12/20/24  2322 12/20/24  2021 12/20/24  1210 12/20/24  0745 12/20/24  0436 12/20/24  0010 12/19/24  1952 12/19/24  0830 12/19/24  0406 12/19/24  0009 12/18/24  1444 12/18/24  0646 12/18/24  0510 12/18/24  0504   SODIUM mmol/L 138  --  138 136 135* 135* 134* 135* 142 151*   < > 175* 178*   < >  --    < > 158*   POTASSIUM mmol/L 3.5  --  5.0 5.3* 5.3* 5.0 5.1 5.1 4.9 5.2   < > 4.5 4.7   < >  --    < > 3.6   CHLORIDE mmol/L 103  --  105 104 104 105 105 105 111* 120*   < > >140* >140*   < >  --    < > 114*   CO2 mmol/L 22.3  --  18.8* 20.5* 21.1* 19.9* 19.8* 20.8* 22.3 22.9   < > 2.7* 2.5*   < >  --    < > 14.7*   BUN mg/dL 39*  --  46* 38* 34* 25* 22* 20 24* 38*   < > 66* 69*   < >  --    < > 88*   CREATININE mg/dL 1.85* 2.09* 2.43* 2.09* 1.98* 1.51* 1.49* 1.20* 1.11* 1.31*   < > 1.61* 1.52*   < >  --    < > 2.10*   GLUCOSE mg/dL 236*  --  181* 154* 142* 116* 217* 170* 119* 168*   < > 158* 151*   < >  --    < > 987*   ALBUMIN g/dL 2.8*  --  2.3*  --   --   --  2.5* 2.7* 2.9* 2.8*  --  3.2*  --   --   --   --  3.8   BILIRUBIN mg/dL 0.2  --  0.2  --   --   --   --  0.3  --   --   --  0.2  --   --   --   --  0.2   ALK PHOS U/L 100  --  91  --   --   --   --  103  --   --   --  145*  --   --   --   --  246*   AST (SGOT) U/L 271*  --  234*  --   --   --   --   185*  --   --   --  64*  --   --   --   --  106*   ALT (SGPT) U/L 96*  --  101*  --   --   --   --  116*  --   --   --  124*  --   --   --   --  178*   AMYLASE U/L  --   --   --   --   --   --   --   --   --   --   --   --  311*  --   --   --   --    LIPASE U/L  --   --   --   --   --   --   --   --   --   --   --   --  33  --   --   --   --    AMMONIA umol/L  --   --   --   --   --   --   --   --   --   --   --   --   --   --  <10*  --   --     < > = values in this interval not displayed.     ABG    Results from last 7 days   Lab Units 12/22/24  0424 12/21/24 2351 12/21/24  0317 12/20/24  1603 12/20/24  0328 12/20/24  0138 12/19/24  1850   PH, ARTERIAL pH units 7.424 7.392 7.357 7.180* 7.271* 7.228* 7.093*   PCO2, ARTERIAL mm Hg 38.0 41.6 35.5 59.7* 57.4* 61.7* 86.0*   PO2 ART mm Hg 82.2* 111.6* 72.7* 78.0* 94.5 216.7* 437.6*   O2 SATURATION ART % 96.3 98.3* 93.9* 91.0* 96.0 99.6* 99.9*   BASE EXCESS ART mmol/L 0.5 0.3 -5.0* <0.0* -1.5* -3.1* -5.6*     Lysis Labs   Results from last 7 days   Lab Units 12/22/24  1136 12/22/24  0452 12/21/24 2351 12/21/24 2011 12/21/24  1307 12/21/24  0739 12/21/24  0405 12/21/24  0000 12/20/24  2322 12/20/24  2021 12/20/24  1603 12/20/24  1210 12/20/24  0921 12/20/24  0745 12/20/24  0639 12/20/24  0436 12/20/24  0240 12/20/24  0010 12/19/24  2232 12/19/24  1653 12/19/24  1428   INR  1.07  --   --   --   --   --   --   --   --   --   --   --   --   --   --   --   --   --   --   --  1.05   APTT seconds 75.6* 55.7*  --  42.8* 38.8* >200.0* 57.3* 185.2*  --   --   --   --    < >  --   --   --    < >  --  147.9*   < > <20.0*   FIBRINOGEN mg/dL 660*  --   --   --   --   --   --   --   --   --   --   --   --   --   --   --   --   --  524*  --   --    HEMOGLOBIN g/dL  --  7.2*  --  6.7* 7.5*  --  8.7*  --   --   --   --  11.0*  --   --  11.5* 12.1  --  12.9  --   --   --    HEMOGLOBIN, POC g/dL  --   --  6.8*  --   --   --   --   --   --   --  11.2*  --   --   --   --   --   --   --   --     < >  --    PLATELETS 10*3/mm3  --  63*  --  68* 85*  --  99*  --   --   --   --   --   --   --  156 175  --  160  --   --   --    CREATININE mg/dL  --  1.85* 2.09*  --   --   --  2.43*  --  2.09* 1.98*  --  1.51*  --  1.49*  --  1.20*  --  1.11*  --    < >  --     < > = values in this interval not displayed.     Radiology(recent) XR Chest 1 View    Result Date: 12/22/2024  1.Tubes and lines appear in satisfactory positions, as above. 2.Mild bibasilar opacities which could represent atelectasis or infiltrate. 3.Advanced emphysema. Electronically Signed: Sam Haynes  12/22/2024 7:07 AM EST  Workstation ID: [id number]    XR Chest 1 View    Result Date: 12/21/2024  Impression: 1. No tube or line change 2. No focal airspace consolidation or other acute process. Electronically Signed: Julio Thorpe MD  12/21/2024 9:54 AM EST  Workstation ID: [id number]     Assessment & Plan     Assessment & Plan      DKA (diabetic ketoacidosis)    Arterial thrombosis      54 y.o. female with critical ischemia to the leg treated with serial operations with improvement but still patient is critically ill with a guarded prognosis overall.  Fasciotomy sites were open to try to decompress the leg with some success.  Both legs are little bit mottled but both feet are viable at this time.  Tolerating heparin although concerns for HIT have been raised over awaiting hematology workup.  Platelet count has stabilized.  She is better after some blood.  Still critically ill with outlook that is guarded.  Our team will continue to watch closely with you.    Sam Juárez MD  12/22/24  14:44 EST    Please call my office with any question: [phone]    Active Hospital Problems    Diagnosis  POA    **DKA (diabetic ketoacidosis) [E11.10]  Yes    Arterial thrombosis [I74.9]  No      Resolved Hospital Problems   No resolved problems to display.

## 2024-12-23 ENCOUNTER — APPOINTMENT (OUTPATIENT)
Dept: NEPHROLOGY | Facility: HOSPITAL | Age: 54
End: 2024-12-23
Payer: MEDICAID

## 2024-12-23 ENCOUNTER — ANESTHESIA EVENT (OUTPATIENT)
Dept: PERIOP | Facility: HOSPITAL | Age: 54
End: 2024-12-23
Payer: MEDICAID

## 2024-12-23 ENCOUNTER — ANESTHESIA (OUTPATIENT)
Dept: PERIOP | Facility: HOSPITAL | Age: 54
End: 2024-12-23
Payer: MEDICAID

## 2024-12-23 LAB
ALBUMIN SERPL-MCNC: 3.1 G/DL (ref 3.5–5.2)
ALBUMIN/GLOB SERPL: 1.3 G/DL
ALP SERPL-CCNC: 135 U/L (ref 39–117)
ALT SERPL W P-5'-P-CCNC: 430 U/L (ref 1–33)
ANION GAP SERPL CALCULATED.3IONS-SCNC: 10.2 MMOL/L (ref 5–15)
APTT PPP: 60.8 SECONDS (ref 22.7–35.4)
APTT PPP: 69 SECONDS (ref 22.7–35.4)
APTT PPP: 81.7 SECONDS (ref 22.7–35.4)
APTT PPP: 83.1 SECONDS (ref 22.7–35.4)
ARTERIAL PATENCY WRIST A: ABNORMAL
ARTERIAL PATENCY WRIST A: NORMAL
AST SERPL-CCNC: 939 U/L (ref 1–32)
ATMOSPHERIC PRESS: ABNORMAL MM[HG]
ATMOSPHERIC PRESS: NORMAL MM[HG]
BACTERIA SPEC AEROBE CULT: NORMAL
BACTERIA SPEC AEROBE CULT: NORMAL
BASE EXCESS BLDA CALC-SCNC: 1.7 MMOL/L (ref 0–3)
BASE EXCESS BLDA CALC-SCNC: 1.7 MMOL/L (ref 0–3)
BDY SITE: ABNORMAL
BDY SITE: NORMAL
BILIRUB SERPL-MCNC: 0.7 MG/DL (ref 0–1.2)
BUN SERPL-MCNC: 29 MG/DL (ref 6–20)
BUN/CREAT SERPL: 16.8 (ref 7–25)
C3 FRG RBC-MCNC: ABNORMAL
CA-I SERPL ISE-MCNC: 1.2 MMOL/L (ref 1.15–1.3)
CALCIUM SPEC-SCNC: 9.2 MG/DL (ref 8.6–10.5)
CHLORIDE SERPL-SCNC: 104 MMOL/L (ref 98–107)
CK SERPL-CCNC: ABNORMAL U/L (ref 20–180)
CO2 BLDA-SCNC: 26.6 MMOL/L (ref 22–29)
CO2 BLDA-SCNC: 28 MMOL/L (ref 22–29)
CO2 SERPL-SCNC: 23.8 MMOL/L (ref 22–29)
CREAT SERPL-MCNC: 1.73 MG/DL (ref 0.57–1)
DEPRECATED RDW RBC AUTO: 53.2 FL (ref 37–54)
EGFRCR SERPLBLD CKD-EPI 2021: 35 ML/MIN/1.73
ERYTHROCYTE [DISTWIDTH] IN BLOOD BY AUTOMATED COUNT: 15.5 % (ref 12.3–15.4)
F5 GENE MUT ANL BLD/T: NORMAL
GLOBULIN UR ELPH-MCNC: 2.3 GM/DL
GLUCOSE BLDC GLUCOMTR-MCNC: 110 MG/DL (ref 70–105)
GLUCOSE BLDC GLUCOMTR-MCNC: 119 MG/DL (ref 70–105)
GLUCOSE BLDC GLUCOMTR-MCNC: 124 MG/DL (ref 70–105)
GLUCOSE BLDC GLUCOMTR-MCNC: 125 MG/DL (ref 70–105)
GLUCOSE BLDC GLUCOMTR-MCNC: 125 MG/DL (ref 70–105)
GLUCOSE BLDC GLUCOMTR-MCNC: 127 MG/DL (ref 70–105)
GLUCOSE BLDC GLUCOMTR-MCNC: 127 MG/DL (ref 70–105)
GLUCOSE BLDC GLUCOMTR-MCNC: 136 MG/DL (ref 70–105)
GLUCOSE BLDC GLUCOMTR-MCNC: 137 MG/DL (ref 70–105)
GLUCOSE BLDC GLUCOMTR-MCNC: 138 MG/DL (ref 70–105)
GLUCOSE BLDC GLUCOMTR-MCNC: 139 MG/DL (ref 70–105)
GLUCOSE BLDC GLUCOMTR-MCNC: 144 MG/DL (ref 70–105)
GLUCOSE BLDC GLUCOMTR-MCNC: 146 MG/DL (ref 70–105)
GLUCOSE BLDC GLUCOMTR-MCNC: 148 MG/DL (ref 70–105)
GLUCOSE BLDC GLUCOMTR-MCNC: 51 MG/DL (ref 70–105)
GLUCOSE BLDC GLUCOMTR-MCNC: 76 MG/DL (ref 70–105)
GLUCOSE SERPL-MCNC: 104 MG/DL (ref 65–99)
HCO3 BLDA-SCNC: 25.5 MMOL/L (ref 21–28)
HCO3 BLDA-SCNC: 26.7 MMOL/L (ref 21–28)
HCT VFR BLD AUTO: 21.9 % (ref 34–46.6)
HCT VFR BLD AUTO: 27.1 % (ref 34–46.6)
HEMODILUTION: NO
HEMODILUTION: NO
HGB BLD-MCNC: 7.5 G/DL (ref 12–15.9)
HGB BLD-MCNC: 9 G/DL (ref 12–15.9)
HIV 1+2 AB+HIV1 P24 AG SERPL QL IA: NORMAL
INHALED O2 CONCENTRATION: 30 %
INHALED O2 CONCENTRATION: 30 %
LAB AP CASE REPORT: NORMAL
LAB AP CASE REPORT: NORMAL
LYMPHOCYTES # BLD MANUAL: 3.23 10*3/MM3 (ref 0.7–3.1)
LYMPHOCYTES NFR BLD MANUAL: 1 % (ref 5–12)
MAGNESIUM SERPL-MCNC: 2 MG/DL (ref 1.6–2.6)
MCH RBC QN AUTO: 32.3 PG (ref 26.6–33)
MCHC RBC AUTO-ENTMCNC: 34.2 G/DL (ref 31.5–35.7)
MCV RBC AUTO: 94.4 FL (ref 79–97)
METAMYELOCYTES NFR BLD MANUAL: 3 % (ref 0–0)
MODALITY: ABNORMAL
MODALITY: NORMAL
MONOCYTES # BLD: 0.12 10*3/MM3 (ref 0.1–0.9)
NEUTROPHILS # BLD AUTO: 8.25 10*3/MM3 (ref 1.7–7)
NEUTROPHILS NFR BLD MANUAL: 63 % (ref 42.7–76)
NEUTS BAND NFR BLD MANUAL: 6 % (ref 0–5)
PATH REPORT.FINAL DX SPEC: NORMAL
PATH REPORT.FINAL DX SPEC: NORMAL
PATH REPORT.GROSS SPEC: NORMAL
PCO2 BLDA: 35.5 MM HG (ref 35–48)
PCO2 BLDA: 42.7 MM HG (ref 35–48)
PEEP RESPIRATORY: 5 CM[H2O]
PEEP RESPIRATORY: 5 CM[H2O]
PH BLDA: 7.4 PH UNITS (ref 7.35–7.45)
PH BLDA: 7.46 PH UNITS (ref 7.35–7.45)
PHOSPHATE SERPL-MCNC: 3.9 MG/DL (ref 2.5–4.5)
PLATELET # BLD AUTO: 115 10*3/MM3 (ref 140–450)
PMV BLD AUTO: 11.1 FL (ref 6–12)
PO2 BLD: 299 MM[HG] (ref 0–500)
PO2 BLD: 303 MM[HG] (ref 0–500)
PO2 BLDA: 89.8 MM HG (ref 83–108)
PO2 BLDA: 91 MM HG (ref 83–108)
POIKILOCYTOSIS BLD QL SMEAR: ABNORMAL
POTASSIUM SERPL-SCNC: 3.6 MMOL/L (ref 3.5–5.2)
POTASSIUM SERPL-SCNC: 4.7 MMOL/L (ref 3.5–5.2)
PROT SERPL-MCNC: 5.4 G/DL (ref 6–8.5)
PROTHROM ACT/NOR PPP: 94 % (ref 50–154)
RBC # BLD AUTO: 2.32 10*6/MM3 (ref 3.77–5.28)
RESPIRATORY RATE: 26
RESPIRATORY RATE: 26
SAO2 % BLDCOA: 96.9 % (ref 94–98)
SAO2 % BLDCOA: 97.6 % (ref 94–98)
SCAN SLIDE: NORMAL
SMALL PLATELETS BLD QL SMEAR: ABNORMAL
SODIUM SERPL-SCNC: 138 MMOL/L (ref 136–145)
VARIANT LYMPHS NFR BLD MANUAL: 27 % (ref 19.6–45.3)
VENTILATOR MODE: AC
VENTILATOR MODE: AC
VT ON VENT VENT: 500 ML
VT ON VENT VENT: 500 ML
WBC MORPH BLD: NORMAL
WBC NRBC COR # BLD AUTO: 11.95 10*3/MM3 (ref 3.4–10.8)

## 2024-12-23 PROCEDURE — 25810000003 SODIUM CHLORIDE 0.9 % SOLUTION: Performed by: SURGERY

## 2024-12-23 PROCEDURE — 25010000002 POTASSIUM CHLORIDE 10 MEQ/100ML SOLUTION: Performed by: INTERNAL MEDICINE

## 2024-12-23 PROCEDURE — 0KNS0ZZ RELEASE RIGHT LOWER LEG MUSCLE, OPEN APPROACH: ICD-10-PCS | Performed by: SURGERY

## 2024-12-23 PROCEDURE — 94761 N-INVAS EAR/PLS OXIMETRY MLT: CPT

## 2024-12-23 PROCEDURE — 94799 UNLISTED PULMONARY SVC/PX: CPT

## 2024-12-23 PROCEDURE — 25010000002 FENTANYL CITRATE (PF) 2500 MCG/50ML SOLUTION: Performed by: SURGERY

## 2024-12-23 PROCEDURE — 99232 SBSQ HOSP IP/OBS MODERATE 35: CPT | Performed by: INTERNAL MEDICINE

## 2024-12-23 PROCEDURE — 82803 BLOOD GASES ANY COMBINATION: CPT | Performed by: EMERGENCY MEDICINE

## 2024-12-23 PROCEDURE — 85018 HEMOGLOBIN: CPT | Performed by: EMERGENCY MEDICINE

## 2024-12-23 PROCEDURE — 82330 ASSAY OF CALCIUM: CPT | Performed by: INTERNAL MEDICINE

## 2024-12-23 PROCEDURE — G0432 EIA HIV-1/HIV-2 SCREEN: HCPCS | Performed by: EMERGENCY MEDICINE

## 2024-12-23 PROCEDURE — 85730 THROMBOPLASTIN TIME PARTIAL: CPT | Performed by: EMERGENCY MEDICINE

## 2024-12-23 PROCEDURE — 82550 ASSAY OF CK (CPK): CPT | Performed by: INTERNAL MEDICINE

## 2024-12-23 PROCEDURE — 85014 HEMATOCRIT: CPT | Performed by: EMERGENCY MEDICINE

## 2024-12-23 PROCEDURE — 25010000002 SUGAMMADEX 200 MG/2ML SOLUTION: Performed by: NURSE ANESTHETIST, CERTIFIED REGISTERED

## 2024-12-23 PROCEDURE — 82948 REAGENT STRIP/BLOOD GLUCOSE: CPT | Performed by: INTERNAL MEDICINE

## 2024-12-23 PROCEDURE — 25010000002 HEPARIN (PORCINE) PER 1000 UNITS: Performed by: SURGERY

## 2024-12-23 PROCEDURE — 25010000002 EPOETIN ALFA-EPBX 10000 UNIT/ML SOLUTION: Performed by: INTERNAL MEDICINE

## 2024-12-23 PROCEDURE — 25010000002 HYDROMORPHONE 1 MG/ML SOLUTION

## 2024-12-23 PROCEDURE — 25010000002 CALCIUM GLUCONATE 2-0.675 GM/100ML-% SOLUTION: Performed by: INTERNAL MEDICINE

## 2024-12-23 PROCEDURE — 82595 ASSAY OF CRYOGLOBULIN: CPT | Performed by: EMERGENCY MEDICINE

## 2024-12-23 PROCEDURE — 80053 COMPREHEN METABOLIC PANEL: CPT | Performed by: SURGERY

## 2024-12-23 PROCEDURE — 82803 BLOOD GASES ANY COMBINATION: CPT

## 2024-12-23 PROCEDURE — 85730 THROMBOPLASTIN TIME PARTIAL: CPT

## 2024-12-23 PROCEDURE — 82948 REAGENT STRIP/BLOOD GLUCOSE: CPT

## 2024-12-23 PROCEDURE — 85025 COMPLETE CBC W/AUTO DIFF WBC: CPT | Performed by: SURGERY

## 2024-12-23 PROCEDURE — 25010000002 MIDAZOLAM PER 1 MG: Performed by: NURSE ANESTHETIST, CERTIFIED REGISTERED

## 2024-12-23 PROCEDURE — 25010000002 CEFAZOLIN PER 500 MG: Performed by: INTERNAL MEDICINE

## 2024-12-23 PROCEDURE — 85007 BL SMEAR W/DIFF WBC COUNT: CPT | Performed by: SURGERY

## 2024-12-23 PROCEDURE — 84132 ASSAY OF SERUM POTASSIUM: CPT | Performed by: EMERGENCY MEDICINE

## 2024-12-23 PROCEDURE — 84100 ASSAY OF PHOSPHORUS: CPT | Performed by: SURGERY

## 2024-12-23 PROCEDURE — 25010000002 ONDANSETRON PER 1 MG: Performed by: NURSE ANESTHETIST, CERTIFIED REGISTERED

## 2024-12-23 PROCEDURE — 99024 POSTOP FOLLOW-UP VISIT: CPT | Performed by: NURSE PRACTITIONER

## 2024-12-23 PROCEDURE — 27602 DECOMPRESSION OF LOWER LEG: CPT | Performed by: SURGERY

## 2024-12-23 PROCEDURE — 25010000002 HEPARIN (PORCINE) 25000-0.45 UT/250ML-% SOLUTION: Performed by: SURGERY

## 2024-12-23 PROCEDURE — 25810000003 SODIUM CHLORIDE 0.9 % SOLUTION: Performed by: NURSE ANESTHETIST, CERTIFIED REGISTERED

## 2024-12-23 PROCEDURE — 86900 BLOOD TYPING SEROLOGIC ABO: CPT

## 2024-12-23 PROCEDURE — 83735 ASSAY OF MAGNESIUM: CPT | Performed by: SURGERY

## 2024-12-23 PROCEDURE — P9047 ALBUMIN (HUMAN), 25%, 50ML: HCPCS | Performed by: INTERNAL MEDICINE

## 2024-12-23 PROCEDURE — 25010000002 ALBUMIN HUMAN 25% PER 50 ML: Performed by: INTERNAL MEDICINE

## 2024-12-23 PROCEDURE — P9016 RBC LEUKOCYTES REDUCED: HCPCS

## 2024-12-23 RX ORDER — ALBUMIN (HUMAN) 12.5 G/50ML
12.5 SOLUTION INTRAVENOUS AS NEEDED
Status: DISCONTINUED | OUTPATIENT
Start: 2024-12-23 | End: 2024-12-23

## 2024-12-23 RX ORDER — ONDANSETRON 2 MG/ML
INJECTION INTRAMUSCULAR; INTRAVENOUS AS NEEDED
Status: DISCONTINUED | OUTPATIENT
Start: 2024-12-23 | End: 2024-12-23 | Stop reason: SURG

## 2024-12-23 RX ORDER — POLYETHYLENE GLYCOL 3350 17 G/17G
17 POWDER, FOR SOLUTION ORAL ONCE
Status: COMPLETED | OUTPATIENT
Start: 2024-12-23 | End: 2024-12-23

## 2024-12-23 RX ORDER — ALBUMIN (HUMAN) 12.5 G/50ML
12.5 SOLUTION INTRAVENOUS AS NEEDED
Status: DISCONTINUED | OUTPATIENT
Start: 2024-12-23 | End: 2024-12-24

## 2024-12-23 RX ORDER — POTASSIUM CHLORIDE 7.45 MG/ML
10 INJECTION INTRAVENOUS
Status: COMPLETED | OUTPATIENT
Start: 2024-12-23 | End: 2024-12-23

## 2024-12-23 RX ORDER — DEXTROSE MONOHYDRATE 25 G/50ML
25 INJECTION, SOLUTION INTRAVENOUS
Status: DISCONTINUED | OUTPATIENT
Start: 2024-12-23 | End: 2025-01-10 | Stop reason: HOSPADM

## 2024-12-23 RX ORDER — OXYCODONE HYDROCHLORIDE 5 MG/1
10 TABLET ORAL ONCE
Status: COMPLETED | OUTPATIENT
Start: 2024-12-23 | End: 2024-12-23

## 2024-12-23 RX ORDER — INSULIN LISPRO 100 [IU]/ML
2-7 INJECTION, SOLUTION INTRAVENOUS; SUBCUTANEOUS EVERY 6 HOURS
Status: DISCONTINUED | OUTPATIENT
Start: 2024-12-24 | End: 2024-12-31

## 2024-12-23 RX ORDER — IBUPROFEN 600 MG/1
1 TABLET ORAL
Status: DISCONTINUED | OUTPATIENT
Start: 2024-12-23 | End: 2025-01-10 | Stop reason: HOSPADM

## 2024-12-23 RX ORDER — NICOTINE POLACRILEX 4 MG
15 LOZENGE BUCCAL
Status: DISCONTINUED | OUTPATIENT
Start: 2024-12-23 | End: 2025-01-10 | Stop reason: HOSPADM

## 2024-12-23 RX ORDER — MIDAZOLAM HYDROCHLORIDE 1 MG/ML
INJECTION, SOLUTION INTRAMUSCULAR; INTRAVENOUS AS NEEDED
Status: DISCONTINUED | OUTPATIENT
Start: 2024-12-23 | End: 2024-12-23 | Stop reason: SURG

## 2024-12-23 RX ORDER — ROCURONIUM BROMIDE 10 MG/ML
INJECTION, SOLUTION INTRAVENOUS AS NEEDED
Status: DISCONTINUED | OUTPATIENT
Start: 2024-12-23 | End: 2024-12-23 | Stop reason: SURG

## 2024-12-23 RX ORDER — SODIUM CHLORIDE 9 MG/ML
INJECTION, SOLUTION INTRAVENOUS CONTINUOUS PRN
Status: DISCONTINUED | OUTPATIENT
Start: 2024-12-23 | End: 2024-12-23 | Stop reason: SURG

## 2024-12-23 RX ADMIN — NYSTATIN 1 APPLICATION: 100000 CREAM TOPICAL at 14:27

## 2024-12-23 RX ADMIN — HEPARIN SODIUM 19 UNITS/KG/HR: 10000 INJECTION, SOLUTION INTRAVENOUS at 15:09

## 2024-12-23 RX ADMIN — POTASSIUM CHLORIDE 10 MEQ: 7.46 INJECTION, SOLUTION INTRAVENOUS at 14:22

## 2024-12-23 RX ADMIN — SUGAMMADEX 200 MG: 100 INJECTION, SOLUTION INTRAVENOUS at 09:21

## 2024-12-23 RX ADMIN — DEXTROSE MONOHYDRATE 25 G: 25 INJECTION, SOLUTION INTRAVENOUS at 20:17

## 2024-12-23 RX ADMIN — HEPARIN SODIUM 2600 UNITS: 1000 INJECTION INTRAVENOUS; SUBCUTANEOUS at 16:37

## 2024-12-23 RX ADMIN — POTASSIUM CHLORIDE 40 MEQ: 1.5 POWDER, FOR SOLUTION ORAL at 04:07

## 2024-12-23 RX ADMIN — LANSOPRAZOLE 30 MG: 30 TABLET, ORALLY DISINTEGRATING ORAL at 05:43

## 2024-12-23 RX ADMIN — QUETIAPINE FUMARATE 25 MG: 25 TABLET ORAL at 08:01

## 2024-12-23 RX ADMIN — ROCURONIUM BROMIDE 50 MG: 10 INJECTION, SOLUTION INTRAVENOUS at 08:47

## 2024-12-23 RX ADMIN — DEXMEDETOMIDINE HYDROCHLORIDE IN SODIUM CHLORIDE 1.5 MCG/KG/HR: 4 INJECTION INTRAVENOUS at 12:18

## 2024-12-23 RX ADMIN — OXYCODONE 10 MG: 5 TABLET ORAL at 10:02

## 2024-12-23 RX ADMIN — POTASSIUM CHLORIDE 10 MEQ: 7.46 INJECTION, SOLUTION INTRAVENOUS at 12:40

## 2024-12-23 RX ADMIN — NOREPINEPHRINE BITARTRATE 0.06 MCG/KG/MIN: 0.03 INJECTION, SOLUTION INTRAVENOUS at 12:21

## 2024-12-23 RX ADMIN — EPOETIN ALFA-EPBX 10000 UNITS: 10000 INJECTION, SOLUTION INTRAVENOUS; SUBCUTANEOUS at 15:43

## 2024-12-23 RX ADMIN — CALCIUM GLUCONATE 2000 MG: 20 INJECTION, SOLUTION INTRAVENOUS at 01:40

## 2024-12-23 RX ADMIN — DEXMEDETOMIDINE HYDROCHLORIDE IN SODIUM CHLORIDE 1.5 MCG/KG/HR: 4 INJECTION INTRAVENOUS at 22:33

## 2024-12-23 RX ADMIN — Medication 10 ML: at 21:23

## 2024-12-23 RX ADMIN — DEXMEDETOMIDINE HYDROCHLORIDE IN SODIUM CHLORIDE 1.5 MCG/KG/HR: 4 INJECTION INTRAVENOUS at 00:00

## 2024-12-23 RX ADMIN — CHLORHEXIDINE GLUCONATE, 0.12% ORAL RINSE 15 ML: 1.2 SOLUTION DENTAL at 08:01

## 2024-12-23 RX ADMIN — Medication 10 ML: at 08:01

## 2024-12-23 RX ADMIN — ONDANSETRON 4 MG: 2 INJECTION INTRAMUSCULAR; INTRAVENOUS at 09:06

## 2024-12-23 RX ADMIN — DEXMEDETOMIDINE HYDROCHLORIDE IN SODIUM CHLORIDE 1.5 MCG/KG/HR: 4 INJECTION INTRAVENOUS at 05:43

## 2024-12-23 RX ADMIN — QUETIAPINE FUMARATE 25 MG: 25 TABLET ORAL at 21:22

## 2024-12-23 RX ADMIN — NYSTATIN 1 APPLICATION: 100000 CREAM TOPICAL at 21:22

## 2024-12-23 RX ADMIN — SODIUM CHLORIDE: 9 INJECTION, SOLUTION INTRAVENOUS at 08:46

## 2024-12-23 RX ADMIN — DEXMEDETOMIDINE HYDROCHLORIDE IN SODIUM CHLORIDE 1.5 MCG/KG/HR: 4 INJECTION INTRAVENOUS at 19:14

## 2024-12-23 RX ADMIN — HYDROMORPHONE HYDROCHLORIDE 1 MG: 1 INJECTION, SOLUTION INTRAMUSCULAR; INTRAVENOUS; SUBCUTANEOUS at 23:26

## 2024-12-23 RX ADMIN — CHLORHEXIDINE GLUCONATE, 0.12% ORAL RINSE 15 ML: 1.2 SOLUTION DENTAL at 21:23

## 2024-12-23 RX ADMIN — ALBUMIN HUMAN 12.5 G: 0.25 SOLUTION INTRAVENOUS at 15:42

## 2024-12-23 RX ADMIN — DEXMEDETOMIDINE HYDROCHLORIDE IN SODIUM CHLORIDE 1.2 MCG/KG/HR: 4 INJECTION INTRAVENOUS at 15:21

## 2024-12-23 RX ADMIN — POTASSIUM CHLORIDE 40 MEQ: 1.5 POWDER, FOR SOLUTION ORAL at 08:01

## 2024-12-23 RX ADMIN — Medication 1 MG: at 23:26

## 2024-12-23 RX ADMIN — INSULIN GLARGINE-YFGN 18 UNITS: 100 INJECTION, SOLUTION SUBCUTANEOUS at 18:24

## 2024-12-23 RX ADMIN — POLYETHYLENE GLYCOL 3350 17 G: 17 POWDER, FOR SOLUTION ORAL at 10:02

## 2024-12-23 RX ADMIN — CEFAZOLIN 1000 MG: 1 INJECTION, POWDER, FOR SOLUTION INTRAMUSCULAR; INTRAVENOUS at 18:25

## 2024-12-23 RX ADMIN — POTASSIUM CHLORIDE 10 MEQ: 7.46 INJECTION, SOLUTION INTRAVENOUS at 11:17

## 2024-12-23 RX ADMIN — MIDAZOLAM 2 MG: 1 INJECTION INTRAMUSCULAR; INTRAVENOUS at 08:44

## 2024-12-23 RX ADMIN — NYSTATIN 1 APPLICATION: 100000 CREAM TOPICAL at 05:43

## 2024-12-23 RX ADMIN — HYDROMORPHONE HYDROCHLORIDE 2 MG: 1 INJECTION, SOLUTION INTRAMUSCULAR; INTRAVENOUS; SUBCUTANEOUS at 01:40

## 2024-12-23 RX ADMIN — POTASSIUM CHLORIDE 10 MEQ: 7.46 INJECTION, SOLUTION INTRAVENOUS at 10:03

## 2024-12-23 RX ADMIN — DEXMEDETOMIDINE HYDROCHLORIDE IN SODIUM CHLORIDE 1.5 MCG/KG/HR: 4 INJECTION INTRAVENOUS at 08:38

## 2024-12-23 RX ADMIN — DEXMEDETOMIDINE HYDROCHLORIDE IN SODIUM CHLORIDE 1.5 MCG/KG/HR: 4 INJECTION INTRAVENOUS at 02:49

## 2024-12-23 RX ADMIN — Medication 50 MCG/HR: at 18:31

## 2024-12-23 NOTE — PROGRESS NOTES
"NEPHROLOGY PROGRESS NOTE------[facility] [place]/[facility]    [facility]  [phone]  Deidra Starr MD      Patient Care Team:  Katie Duran PA as PCP - General (Physician Assistant)  Uli Starr MD as Consulting Physician (Nephrology)      Provider:  Deidra Starr MD  Patient Name: Yarelis Jackson  :  1970    SUBJECTIVE:    F/U ARF/MARGOT/ELECTROLYTE ABNORMALITIES     INTUBATED ON VENT. Back to O.R. this AM    Medication:  ceFAZolin, 1,000 mg, Intravenous, Q24H  chlorhexidine, 15 mL, Mouth/Throat, Q12H  cloNIDine, 1 patch, Transdermal, Weekly  lansoprazole, 30 mg, Nasogastric, Q AM  mupirocin, 1 Application, Each Nare, BID  nystatin, 1 Application, Topical, Q8H  [START ON 2024] permethrin, 1 Application, Topical, Once  QUEtiapine, 25 mg, Nasogastric, Q12H  sodium chloride, 10 mL, Intravenous, Q12H  sodium chloride, 10 mL, Intravenous, Q12H      dexmedetomidine, 0.2-1.5 mcg/kg/hr, Last Rate: 1.5 mcg/kg/hr (24 0543)  fentanyl 10 mcg/mL,  mcg/hr, Last Rate: 100 mcg/hr (24 183)  heparin, 16.9 Units/kg/hr, Last Rate: 15 Units/kg/hr (24)  insulin, 0-100 Units/hr, Last Rate: 2 Units/hr (24 0756)  norepinephrine, 0.02-0.3 mcg/kg/min, Last Rate: 0.06 mcg/kg/min (24 0600)        OBJECTIVE    Vital Sign Min/Max for last 24 hours  Temp  Min: 96.5 °F (35.8 °C)  Max: 98.8 °F (37.1 °C)   BP  Min: 98/49  Max: 156/66   Pulse  Min: 57  Max: 114   Resp  Min: 16  Max: 42   SpO2  Min: 90 %  Max: 100 %   No data recorded   No data recorded     Flowsheet Rows      Flowsheet Row First Filed Value   Admission Height 162.6 cm (64\") Documented at 2024 0443   Admission Weight 92.5 kg (203 lb 14.4 oz) Documented at 2024 0527            No intake/output data recorded.  I/O last 3 completed shifts:  In: 6610 [I.V.:4260; Blood:330; Other:960; NG/GT:1060]  Out: 4075 [Urine:3075]    Physical Exam: ON " "CARDENE  General Appearance:  INTUBATED ON VENT  Head: normocephalic, without obvious abnormality and atraumatic +ETT INTACT  Eyes: conjunctivae and sclerae normal and no icterus  Neck: supple and no JVD  Lungs: clear to auscultation and respirations regular  Heart: regular rhythm & normal rate and normal S1, S2 +THOMAS  Chest: Wall no abnormalities observed  Abdomen: normal bowel sounds and soft   Extremities: moves extremities well, +TRACE/1+ BILAT PRETIBIAL EDEMA, no cyanosis and no redness. +DJD  Skin: +BILAT PRETIBIAL FLUID FILLED BULLAE  Neurologic:  INTUBATED    Labs:    WBC WBC   Date Value Ref Range Status   12/23/2024 11.95 (H) 3.40 - 10.80 10*3/mm3 Final   12/22/2024 7.59 3.40 - 10.80 10*3/mm3 Final   12/21/2024 8.22 3.40 - 10.80 10*3/mm3 Final   12/21/2024 8.44 3.40 - 10.80 10*3/mm3 Final   12/21/2024 15.20 (H) 3.40 - 10.80 10*3/mm3 Final      HGB Hemoglobin   Date Value Ref Range Status   12/23/2024 7.5 (L) 12.0 - 15.9 g/dL Final   12/22/2024 7.2 (L) 12.0 - 15.9 g/dL Final   12/21/2024 6.8 (C) 12.0 - 17.0 g/dL Final   12/21/2024 6.7 (C) 12.0 - 15.9 g/dL Final   12/21/2024 7.5 (L) 12.0 - 15.9 g/dL Final   12/21/2024 8.7 (L) 12.0 - 15.9 g/dL Final   12/20/2024 11.2 (L) 12.0 - 17.0 g/dL Final   12/20/2024 11.0 (L) 12.0 - 15.9 g/dL Final      HCT Hematocrit   Date Value Ref Range Status   12/23/2024 21.9 (L) 34.0 - 46.6 % Final   12/22/2024 22.1 (L) 34.0 - 46.6 % Final   12/21/2024 20 (L) 38 - 51 % Final   12/21/2024 20.1 (C) 34.0 - 46.6 % Final   12/21/2024 22.6 (L) 34.0 - 46.6 % Final   12/21/2024 27.2 (L) 34.0 - 46.6 % Final   12/20/2024 33 (L) 38 - 51 % Final   12/20/2024 33.8 (L) 34.0 - 46.6 % Final      Platelets No results found for: \"LABPLAT\"   MCV MCV   Date Value Ref Range Status   12/23/2024 94.4 79.0 - 97.0 fL Final   12/22/2024 95.7 79.0 - 97.0 fL Final   12/21/2024 99.5 (H) 79.0 - 97.0 fL Final   12/21/2024 100.0 (H) 79.0 - 97.0 fL Final   12/21/2024 100.0 (H) 79.0 - 97.0 fL Final          Sodium " Sodium   Date Value Ref Range Status   12/23/2024 138 136 - 145 mmol/L Final   12/22/2024 138 136 - 145 mmol/L Final   12/21/2024 138 136 - 145 mmol/L Final   12/20/2024 136 136 - 145 mmol/L Final   12/20/2024 135 (L) 136 - 145 mmol/L Final   12/20/2024 135 (L) 136 - 145 mmol/L Final      Potassium Potassium   Date Value Ref Range Status   12/23/2024 3.6 3.5 - 5.2 mmol/L Final   12/22/2024 3.1 (L) 3.5 - 5.2 mmol/L Final   12/22/2024 3.5 3.5 - 5.2 mmol/L Final   12/21/2024 5.0 3.5 - 5.2 mmol/L Final   12/20/2024 5.3 (H) 3.5 - 5.2 mmol/L Final   12/20/2024 5.3 (H) 3.5 - 5.2 mmol/L Final   12/20/2024 5.0 3.5 - 5.2 mmol/L Final      Chloride Chloride   Date Value Ref Range Status   12/23/2024 104 98 - 107 mmol/L Final   12/22/2024 103 98 - 107 mmol/L Final   12/21/2024 105 98 - 107 mmol/L Final   12/20/2024 104 98 - 107 mmol/L Final   12/20/2024 104 98 - 107 mmol/L Final   12/20/2024 105 98 - 107 mmol/L Final      CO2 CO2   Date Value Ref Range Status   12/23/2024 23.8 22.0 - 29.0 mmol/L Final   12/22/2024 22.3 22.0 - 29.0 mmol/L Final   12/21/2024 18.8 (L) 22.0 - 29.0 mmol/L Final   12/20/2024 20.5 (L) 22.0 - 29.0 mmol/L Final   12/20/2024 21.1 (L) 22.0 - 29.0 mmol/L Final   12/20/2024 19.9 (L) 22.0 - 29.0 mmol/L Final      BUN BUN   Date Value Ref Range Status   12/23/2024 29 (H) 6 - 20 mg/dL Final   12/22/2024 39 (H) 6 - 20 mg/dL Final   12/21/2024 46 (H) 6 - 20 mg/dL Final   12/20/2024 38 (H) 6 - 20 mg/dL Final   12/20/2024 34 (H) 6 - 20 mg/dL Final   12/20/2024 25 (H) 6 - 20 mg/dL Final      Creatinine Creatinine   Date Value Ref Range Status   12/23/2024 1.73 (H) 0.57 - 1.00 mg/dL Final   12/22/2024 1.85 (H) 0.57 - 1.00 mg/dL Final   12/21/2024 2.09 (H) 0.60 - 1.30 mg/dL Final     Comment:     Serial Number: [id number]:  [id number]   12/21/2024 2.43 (H) 0.57 - 1.00 mg/dL Final   12/20/2024 2.09 (H) 0.57 - 1.00 mg/dL Final   12/20/2024 1.98 (H) 0.57 - 1.00 mg/dL Final   12/20/2024 1.51 (H) 0.57 - 1.00 mg/dL Final     "  Calcium Calcium   Date Value Ref Range Status   12/23/2024 9.2 8.6 - 10.5 mg/dL Final   12/22/2024 8.2 (L) 8.6 - 10.5 mg/dL Final   12/21/2024 6.9 (L) 8.6 - 10.5 mg/dL Final   12/20/2024 6.7 (L) 8.6 - 10.5 mg/dL Final   12/20/2024 7.1 (L) 8.6 - 10.5 mg/dL Final   12/20/2024 6.9 (L) 8.6 - 10.5 mg/dL Final      PO4 No components found for: \"PO4\"   Albumin Albumin   Date Value Ref Range Status   12/23/2024 3.1 (L) 3.5 - 5.2 g/dL Final   12/22/2024 2.8 (L) 3.5 - 5.2 g/dL Final   12/21/2024 2.3 (L) 3.5 - 5.2 g/dL Final      Magnesium Magnesium   Date Value Ref Range Status   12/23/2024 2.0 1.6 - 2.6 mg/dL Final   12/22/2024 2.0 1.6 - 2.6 mg/dL Final   12/21/2024 2.1 1.6 - 2.6 mg/dL Final   12/20/2024 2.0 1.6 - 2.6 mg/dL Final   12/20/2024 2.0 1.6 - 2.6 mg/dL Final   12/20/2024 1.9 1.6 - 2.6 mg/dL Final   12/20/2024 1.9 1.6 - 2.6 mg/dL Final      Uric Acid No components found for: \"URIC ACID\"     Imaging Results (Last 72 Hours)       Procedure Component Value Units Date/Time    XR Chest 1 View [[id number]] Collected: 12/22/24 0704     Updated: 12/22/24 0709    Narrative:        XR CHEST 1 VW    Date of Exam: 12/22/2024 12:35 AM EST    Indication: Intubated Patient    Comparison:  12/21/2024    FINDINGS:  Endotracheal tube tip appears in satisfactory position at the level of the aortic arch. Right IJ catheter appears in stable position. Enteric tube extends into the left upper quadrant. Heart size and mediastinal contour appear within normal limits. No   definite pneumothorax or effusion. Mild bibasilar opacities. Findings of advanced emphysema with suspected apical scarring.      Impression:      1.Tubes and lines appear in satisfactory positions, as above.  2.Mild bibasilar opacities which could represent atelectasis or infiltrate.  3.Advanced emphysema.        Electronically Signed: Sam Haynes    12/22/2024 7:07 AM EST    Workstation ID: [id number]    XR Chest 1 View [[id number]] Collected: 12/21/24 0953     Updated: " 12/21/24 0956    Narrative:      XR CHEST 1 VW    Date of Exam: 12/21/2024 4:32 AM EST    Indication: Intubated Patient    Comparison: 12/20/2024    Findings:  There is been no interval tube or line changes. Heart size and pulmonary vessels are within normal limits. There are postoperative changes of the bilateral upper lobes. Lungs appear clear. No pleural effusion or pneumothorax.      Impression:      Impression:    1. No tube or line change  2. No focal airspace consolidation or other acute process.      Electronically Signed: Julio Thorpe MD    12/21/2024 9:54 AM EST    Workstation ID: [id number]    [facility] OR Imaging ([employer]) [[id number]] Resulted: 12/20/24 1719     Updated: 12/20/24 1719    Narrative:      Please see performing physician's note for result.            Results for orders placed during the hospital encounter of 12/18/24    XR Chest 1 View    Narrative  XR CHEST 1 VW    Date of Exam: 12/22/2024 12:35 AM EST    Indication: Intubated Patient    Comparison:  12/21/2024    FINDINGS:  Endotracheal tube tip appears in satisfactory position at the level of the aortic arch. Right IJ catheter appears in stable position. Enteric tube extends into the left upper quadrant. Heart size and mediastinal contour appear within normal limits. No  definite pneumothorax or effusion. Mild bibasilar opacities. Findings of advanced emphysema with suspected apical scarring.    Impression  1.Tubes and lines appear in satisfactory positions, as above.  2.Mild bibasilar opacities which could represent atelectasis or infiltrate.  3.Advanced emphysema.        Electronically Signed: Sam Haynes  12/22/2024 7:07 AM EST  Workstation ID: [id number]      XR Chest 1 View    Narrative  XR CHEST 1 VW    Date of Exam: 12/21/2024 4:32 AM EST    Indication: Intubated Patient    Comparison: 12/20/2024    Findings:  There is been no interval tube or line changes. Heart size and pulmonary vessels are within normal limits. There  are postoperative changes of the bilateral upper lobes. Lungs appear clear. No pleural effusion or pneumothorax.    Impression  Impression:    1. No tube or line change  2. No focal airspace consolidation or other acute process.      Electronically Signed: Julio Thorpe MD  12/21/2024 9:54 AM EST  Workstation ID: [id number]      XR Chest 1 View    Narrative  XR CHEST 1 VW    Date of Exam: 12/20/2024 12:10 AM EST    Indication: Intubated Patient    Comparison: 12/19/2024    Findings:  Endotracheal tube approximately 2.5 cm above the carmina. Feeding tube and right IJ dialysis catheter remain in place. No new tubes or lines heart size and pulmonary vasculature are stable. Bullous changes in the right upper lobe. Postoperative changes in  the left upper lobe. Lungs grossly clear. No pneumothorax    Impression  Impression:  Stable chest demonstrating COPD and postoperative changes with no acute cardiopulmonary process demonstrated      Electronically Signed: Shon Ponce  12/20/2024 7:19 AM EST  Workstation ID: [id number]      Results for orders placed during the hospital encounter of 12/18/24    Duplex Lower Extremity Art / Grafts - Right CAR    Interpretation Summary    Limited study due to body habitus and current dressing site.  Patent external iliac and femoral-popliteal arterial flow with low flow noted below the common femoral.  Common femoral artery poorly visualized.  Cannot rule out occlusion of the common femoral artery.        ASSESSMENT / PLAN      DKA (diabetic ketoacidosis)    Arterial thrombosis    ARF/MARGOT------Oliguric. +ARF/MARGOT with historically normal renal function. +ARF/MARGOT secondary to severe prerenal state and ATN from hypotension and Rhabdomyolysis. Hydrate aggressively. Pressor support. Will do regular HD again today. Off IVFs. No NSAIDs. Renal US without obstructive uropathy.   Dose meds for CrCl less than 10 cc/min     2. HYPERNATREMIA/HYPEROSMOLAR STATE-----Better.       3. ACIDOSIS--Better with  HD     4. DVT PROPHYLAXIS-----On Heparin gtt     5. HYPOCALCEMIA------Replace IV and follow ionized levels     6. DM------Insulin gtt per protocol     7. ANEMIA---------H/H stable     8. OA/DJD/HYPERURICEMIA------Allopurinol . No NSAIDs     9. RHABDOMYOLYSIS-------Off IVFs. HD again today     10. GERD/PUD PROPHYLAXIS-----Renal dose adjusted Pepcid     11. DELIRIUM/TME     12. COPD/ACUTE HYPOXIC RESPIRATORY FAILURE------intubated on vent    13. HYPOALBUMINEMIA-----S/P IV Albumin to temporize    14. EDEMA/VOLUME EXCESS-----D/C IVFs. Gentle UF with HD    15. HTN-------BP ok    16. ELEVATED LFTS/TRANSAMINASES------Secondary to shock liver and Rhabdo. Follow    17. NECROTIZING FASCITIS------O.R. this AM    Deidra Starr MD  [facility]  [phone]  12/23/24  08:18 EST

## 2024-12-23 NOTE — PROGRESS NOTES
Critical Care Progress Note     Yarelis Jackson : 1970 MRN:[id number] LOS:5  Rm: 2308/1     Principal Problem: DKA (diabetic ketoacidosis)     Reason for follow up: All the medical problems listed below    Summary     Yarelis Jackson is a 54 y.o. female with PMH of COPD, migraine, and obesity, and presented to the hospital for altered mental status, and was admitted with a principal diagnosis of DKA (diabetic ketoacidosis).  Patient presented and per ER documentation, patient was oriented to person and time.  Per patient's mother, patient had been feeling ill since Friday with noted polyuria and polydipsia.  It was managed by giving patient milk and a grape soft drink.  Patient became increasingly agitated, preventing treatment, and was given a dose of Geodon in the ED.     In the ED, labs were obtained with the following abnormalities: Sodium 159, chloride 117, CO2 10.1, anion gap 31.9, BUN 88, creatinine 2.13, glucose 976, alk phos 246, , , hemoglobin A1c 15.8, moderate acetone, osmolality 461, WBC 14.65 without bandemia.  UA with >1000 glucose, 15 mg/dL of ketones, moderate blood and proteinuria noted, this did not reflex to culture.  Patient had an ABG with pH 7.179, pCO2 37.5, pO2 77.7, HCO3 14, with O2 saturation 91.7%.  Patient was determined to be in DKA, and was started with IV fluid boluses per DKA protocol as well as an insulin drip.  Patient did have blood cultures obtained, but per ED provider, patient has no obvious signs or symptoms of active section, therefore no antimicrobials were started.    Patient sodium level remained persistent, and IV fluids were changed to withhold any sodium containing products.  Nephrology was consulted.  Patient also underwent placement of feeding tube for free water flush hourly overnight.  On , nephrology decided that patient would proceed with hemodialysis and requested nontunneled catheter placement.  Patient was becoming increasingly  agitated, with concerned that patient was no longer able to protect her own airway.  After discussion with patient's family, patient was intubated, nontunneled catheter placement was completed, and patient did require initiation of vasopressors.  HD was transitioned to CRRT.  Shortly after initiation of CRRT, patient developed an acute change in patient's skin color of her right lower extremity with subsequent loss of pulse.  Vascular surgery was stat consulted as well as completion of stat arterial duplex and ABIs.  Patient was started on a heparin drip.  Vascular surgery promptly evaluated patient and patient was scheduled to go to the OR for emergent thrombectomy.    Significant Events / Subjective     12/23/24 :  Intubated and sedated with fentanyl and Precedex.  She is still requiring Levophed for blood pressure support.  She remains on a heparin drip as ordered per vascular surgery secondary to recurrent arterial thrombosis.  Hemoglobin dropped to 7.5 this morning and she received 1 unit PRBC with a corresponding hemoglobin of 9.0.  Platelets have improved to 115 (63).  No signs of active/obvious bleeding.  This morning vascular surgery took the patient to surgery emergently for completion of right 4 compartment fasciotomies secondary to right leg ischemia.  She remains on an insulin drip as managed per endocrinology.  She is tolerating low volume tube feeds.  HD planned for today.  No documented bowel movement x 5 days, add bowel regimen.      Assessment / Plan     Diabetic ketoacidosis without coma, with new onset diabetes mellitus, type 2 / Metabolic acidosis, increased anion gap  Etiology uncertain, though new onset diabetes mellitus.  A1c 15.80 on admission, no prior available.  Anion gap of 31.9 on admission.  On no home medication regimen.  Acetone moderate, beta hydroxybutyrate 10.79  Initially started on DKA protocol.  Insulin gtt now managed by Endocrinology  Nephrology consulted.     Acute Kidney  Injury  Remains nonoliguric. Baseline creatinine 0.90.  Creatinine 2.10 on admission.  Likely prerenal. Possible intrinsic component due to ATN from dehydration, nausea, vomiting, and hypotension.   C/w IV fluids as ordered.  Monitor Input/Output very closely.   Avoid NSAIDs, nephrotoxic medications, and hypotension.  Nephrology consulted, initially treated with CRRT which has been transitioned to HD. Planned HD session again today  Net IO Since Admission: 19,270 mL [12/23/24 1406]    Acute hypernatremia--resolved  Secondary to dehydration  Corrected sodium 180 on admission, Osmolality 461.  Sodium has corrected  IV fluids management per endocrinology, electrolyte management per nephrology  Continue free water flush.  HD again the day  Monitor and trend labs as ordered.    Septic Shock  Etiology pneumonia Staphylococcus aureus & Streptococcus agalactiae (group B)  Sputum culture 12/19 with Staphylococcus aureus & Streptococcus agalactiae (group B)  Blood cultures-no growth  Respiratory viral panel-negative.  Urine antigens for legionella and strep pna negative  MRSA screen positive.  UA did not reflex to culture.  Started on ceftriaxone and vancomycin on 12/19.  Fluid refractory hypotension after receiving sepsis bundle IVF resuscitation  Titrate vasopressors if needed for a target MAP of 65.  Currently on Levophed    Acute occlusive thrombus of the right iliac artery with limb ischemia  Arterial duplex with noted right femoral, deep femoral, and popliteal arteries being patent but with very low amplitude monophasic signals, suggesting severe inflow stenosis or occlusion; no measurable Doppler flow in the anterior or posterior tibial and peroneal arteries.  Bilateral lower extremity ABIs ordered: Right STACEY critically reduced with STACEY of 0 and severe digital insufficiency; left STACEY is normal with moderate digital insufficiency.  Emergent surgery per vascular surgeon 12/19/2024 for right iliac thrombectomy via open  groin incision then return to surgery on 12/20/2024 for recurrent right lower extremity ischemia due to arterial thrombosis and underwent right iliofemoral popliteal thrombectomy, right femoral endarterectomy with patch, right lower extremity arteriogram, and closed right calf fasciotomies  Heparin drip was initiated upon diagnosis of acute occlusive thrombus however PTTs have been extremely labile and supratherapeutic at times.    No new active bleeding from mouth or via ET tube.   Hematology/oncology consulted, HIT panel pending  May need to switch to argatroban if bleeding recurs  Return to the OR this morning (12/23) for emergent 4 compartment fasciotomies due to critical right leg ischemia    Nontraumatic rhabdomyolysis  CK has begun to trend down, now 13,931  Continue IV fluids as ordered.  Nephrology following.    Hypertriglyceridemia, concern for pancreatitis  Triglyceride 1700 decreased to 336  Amylase 311, lipase 33  Ultrasound of pancreas 12/19 with no acute findings  Avoid lipid containing substances.    Acute metabolic encephalopathy  Metabolic encephalopathy secondary to DKA and septic shock  CT of the head: Without acute abnormalities.  UDS-negative.  HIV-negative.  Currently on continuous sedation, mechanically ventilated     Acute respiratory failure without hypercapnia / On mechanical Ventilation  Intubated for airway protection due to altered mental status and secretions with ineffective airway clearance  Ventilator settings noted and adjusted as needed.   When patient is more awake, will attempt a spontaneous breathing trial. Wean PEEP and FiO2 as tolerated.  Minimize sedation and analgesia to target a RASS of 0 to -1.      Cutaneous candidiasis of groin  Likely secondary to uncontrolled diabetes mellitus  Nystatin ordered    Essential Hypertension  Currently hypotensive, on Levophed  Clonidine patch ordered by nephrology-hold while on Levophed  Titrate medications as needed.     Bilateral leg  rash, concern for scabies infection  Permethrin regimen ordered.  Completed first treatment on 12/18, repeat treatment in 2 weeks  Keep in contact precautions x 1 week post initial treatment (12/25)     Transaminitis  US of Liver: Hepatomegaly with steatosis.  Hepatitis panel-negative.  Monitor and trend LFTs.  .     COPD: Not in exacerbation.  Duonebs ordered as needed.  Obesity: Body mass index is 33.49 kg/m².    Disposition: On ventilator, appropriate to remain in ICU.    Code status:   Code Status (Patient has no pulse and is not breathing): CPR (Attempt to Resuscitate)  Medical Interventions (Patient has pulse or is breathing): Full Support       Nutrition:   NPO Diet NPO Type: Strict NPO   Tube Feeding: Formula: Novasource Renal (Nepro); Feeding Type: Continuous; Start at: 20 mL/hr; Then Advance By: 10 mL/hr; Every: 4 hours; To Goal Rate of: 35 mL/hr; Water Flush: 30 mL; Every: 1 hour; Water Bolus: None     VTE Prophylaxis:  Pharmacologic VTE prophylaxis orders are present.      Objective / Physical Exam     Vital signs:  Temp: 99.3 °F (37.4 °C)  BP: 121/73  Heart Rate: 59  Resp: 26  SpO2: 99 %  Weight: 88.5 kg (195 lb 1.7 oz)    Admission Weight: Weight: 92.5 kg (203 lb 14.4 oz)  Current Weight: Weight: 88.5 kg (195 lb 1.7 oz)    Input/Output in last 24 hours:    Intake/Output Summary (Last 24 hours) at 12/23/2024 1406  Last data filed at 12/23/2024 1300  Gross per 24 hour   Intake 6283 ml   Output 1435 ml   Net 4848 ml      Net IO Since Admission: 19,270 mL [12/23/24 1406]     Physical Exam  Vitals and nursing note reviewed.   Constitutional:       Appearance: She is obese. She is ill-appearing and toxic-appearing.      Interventions: She is sedated, intubated and restrained.      Comments: Intubated and sedated   HENT:      Head: Normocephalic and atraumatic.      Nose: Nose normal.      Comments: Right Dobbhoff tube in place.     Mouth/Throat:      Mouth: Mucous membranes are dry.      Comments: ET  tube in place  Eyes:      General: No scleral icterus.     Pupils: Pupils are equal, round, and reactive to light.   Cardiovascular:      Heart sounds: Normal heart sounds. No murmur heard.     No gallop.      Comments: Sinus bradycardia to sinus rhythm  Pulmonary:      Effort: She is intubated.      Breath sounds: No wheezing or rales.      Comments: Mechanically ventilated  Coarse but clear throughout  Abdominal:      General: There is no distension.      Palpations: Abdomen is soft.      Comments: Morbid body habitus  Bowel sounds active   Musculoskeletal:      Cervical back: Neck supple. No rigidity.      Comments: Left lower extremity with edema and chronic changes consistent with PVD  Extensive mottling throughout the left lower extremity  Right upper thigh with erythema and warmth as well as mottling  Right lower extremity with compression wrap, toes warm and pink  Finger nailbeds and fingertips dusky   Skin:     General: Skin is warm and dry.      Comments: Intertrigo  Mild mottling of the left lower extremity   Neurological:      Comments: Intubated and sedated   Psychiatric:      Comments: Occasionally appears restless          Radiology and Labs     Results from last 7 days   Lab Units 12/23/24  0429 12/22/24  0452 12/21/24  2351 12/21/24 2011 12/21/24  1307 12/21/24  0405   WBC 10*3/mm3 11.95* 7.59  --  8.22 8.44 15.20*   HEMOGLOBIN g/dL 7.5* 7.2*  --  6.7* 7.5* 8.7*   HEMOGLOBIN, POC g/dL  --   --  6.8*  --   --   --    HEMATOCRIT % 21.9* 22.1*  --  20.1* 22.6* 27.2*   HEMATOCRIT POC %  --   --  20*  --   --   --    PLATELETS 10*3/mm3 115* 63*  --  68* 85* 99*      Results from last 7 days   Lab Units 12/23/24  0800 12/23/24  0208 12/22/24  1803 12/22/24  1136 12/22/24  0452 12/19/24  1952 12/19/24  1428   PROTIME Seconds  --   --   --  13.9  --   --  13.8   INR   --   --   --  1.07  --   --  1.05   APTT seconds 60.8* 83.1* 127.5* 75.6* 55.7*   < > <20.0*    < > = values in this interval not displayed.       Results from last 7 days   Lab Units 12/23/24  0429 12/22/24  2114 12/22/24  0452 12/21/24  2351 12/21/24  0405 12/20/24  2322 12/20/24 2021   SODIUM mmol/L 138  --  138  --  138 136 135*   POTASSIUM mmol/L 3.6 3.1* 3.5  --  5.0 5.3* 5.3*   CHLORIDE mmol/L 104  --  103  --  105 104 104   CO2 mmol/L 23.8  --  22.3  --  18.8* 20.5* 21.1*   BUN mg/dL 29*  --  39*  --  46* 38* 34*   CREATININE mg/dL 1.73*  --  1.85* 2.09* 2.43* 2.09* 1.98*   GLUCOSE mg/dL 104*  --  236*  --  181* 154* 142*   MAGNESIUM mg/dL 2.0  --  2.0  --  2.1 2.0 2.0   PHOSPHORUS mg/dL 3.9  --  3.6  --  6.8* 7.0* 7.3*      Results from last 7 days   Lab Units 12/23/24  0429 12/22/24  0452 12/21/24  0405 12/20/24  0436 12/19/24  0406   ALK PHOS U/L 135* 100 91 103 145*   AST (SGOT) U/L 939* 271* 234* 185* 64*   ALT (SGPT) U/L 430* 96* 101* 116* 124*     Results from last 7 days   Lab Units 12/23/24  0355 12/22/24  0424 12/21/24  2351 12/21/24  0317 12/20/24  1603 12/20/24  0328   PH, ARTERIAL pH units 7.465* 7.424 7.392 7.357 7.180* 7.271*   PCO2, ARTERIAL mm Hg 35.5 38.0 41.6 35.5 59.7* 57.4*   PO2 ART mm Hg 91.0 82.2* 111.6* 72.7* 78.0* 94.5   O2 SATURATION ART % 97.6 96.3 98.3* 93.9* 91.0* 96.0   FIO2 % 30 35 35 35  --  50   HCO3 ART mmol/L 25.5 24.9 25.3 19.9* 22.1 26.4   BASE EXCESS ART mmol/L 1.7 0.5 0.3 -5.0* <0.0* -1.5*       XR Chest 1 View    Result Date: 12/22/2024  1.Tubes and lines appear in satisfactory positions, as above. 2.Mild bibasilar opacities which could represent atelectasis or infiltrate. 3.Advanced emphysema. Electronically Signed: Sam Haynes  12/22/2024 7:07 AM EST  Workstation ID: [id number]     Current medications     Scheduled Meds:   ceFAZolin, 1,000 mg, Intravenous, Q24H  chlorhexidine, 15 mL, Mouth/Throat, Q12H  cloNIDine, 1 patch, Transdermal, Weekly  epoetin yvette/yvette-epbx, 10,000 Units, Intravenous, Once  lansoprazole, 30 mg, Nasogastric, Q AM  nystatin, 1 Application, Topical, Q8H  [START ON 12/25/2024]  permethrin, 1 Application, Topical, Once  QUEtiapine, 25 mg, Nasogastric, Q12H  sodium chloride, 10 mL, Intravenous, Q12H  sodium chloride, 10 mL, Intravenous, Q12H        Continuous Infusions:   dexmedetomidine, 0.2-1.5 mcg/kg/hr, Last Rate: 1.5 mcg/kg/hr (12/23/24 1218)  fentanyl 10 mcg/mL,  mcg/hr, Last Rate: 50 mcg/hr (12/23/24 1236)  heparin, 16.9 Units/kg/hr, Last Rate: Stopped (12/23/24 0935)  insulin, 0-100 Units/hr, Last Rate: 1.9 Units/hr (12/23/24 1316)  norepinephrine, 0.02-0.3 mcg/kg/min, Last Rate: 0.04 mcg/kg/min (12/23/24 1236)        Critical care services to this patient including but not limited to: review of labs/ microbiology/imaging/medications, serial monitoring of vital signs, adjusting ventilator settings as needed, review of other consultant's notes, review of events in the last 24 hrs, monitoring input/output, review of treatment plan with bedside nurse, RT and other treatment team, management of life support and nutrition needs.    Time spent in performing separately billable procedures and updating family is not included in the critical care time.       NICHOLAS Smart   Critical Care  12/23/24   14:06 EST

## 2024-12-23 NOTE — PLAN OF CARE
Goal Outcome Evaluation:              Outcome Evaluation: Heparin, insulin, precedex, fentanyl, levophed gtts infusing. On vent 30%, 5peep. Malik cath in place. Dialysis done today, 1 L taken off.

## 2024-12-23 NOTE — ACP (ADVANCE CARE PLANNING)
Social Work Assessment  [facility]     Patient Name: Yarelis Jackson  MRN: [id number]  Today's Date: 12/23/2024    Admit Date: 12/18/2024     Demographic Summary       Row Name 12/23/24 1410       General Information    Reason for Consult decision-making    General Information Comments SW reviewed chart and noted pt is listed as being , but spouse is not a listed contact. SW consulted with RNCM who reports she previously had this conversation with pt's son who stated pt has never been . Facesheet updated to correct marital status. Pt's children are considered legal NOK according to HB 1119.             STACI Zayas, LSW  Medical Social Worker  Ph [phone]  Fax [phone]  [email or web address]

## 2024-12-23 NOTE — OP NOTE
Date of Admission:  12/18/2024  Today's Date:  12/23/24  Chris Delgado MD  [facility]    Preoperative Diagnosis:   Right leg ischemia status post revascularization with    Postoperative Diagnosis:   Same    Procedure Performed:   Completion right 4 compartment fasciotomies.    Surgeon:   Chris Delgado MD    Assistant:    None    Anesthesia:   General    Estimated Blood Loss:   25-50 cc    Findings:    Completion of the right leg fasciotomy with extension of skin incisions from just below the knee to all muscle.    Implants:    Nothing was implanted during the procedure    Staff:   Circulator: Pamela Tucker RN; Breanna Frederick RN  Scrub Person: Jojo Davison    Specimen:   none    Complications:   none    Dispo:   to PACU    Indication for procedure:   54 y.o. female with prior acute right leg arterial ischemia.  She underwent revascularization/last week.  Rounds she had blistering of the skin but less extension in the areas of lesions.  Respiratory alternatives discussion had with son via phone.  Patient was proceeding to the operating underwent emergent repair        Description of procedure:   The patient was taken to the operating room.  Anesthesia was continued through her endotracheal tube.  The right leg was prepped and draped in the usual sterile manner.  Surgical timeout was performed.  Using the Bovie electrocautery, the skin incisions were extended both in the medial and lateral aspects of the legs to the ankle and from the knee to the ankle.  All fascial compartments were confirmed to be decompressed.  Muscle was viable and reactive in all 4 compartments.  Good hemostasis confirmed with Bovie electrocautery.  Medial and lateral incisions were packed with Betadine-soaked Kerlix and covered with ABD's and Ace wrap.  Given the sickness of the patient, she tolerated the procedure well.  She will go back to the intensive care unit for ongoing critical care.        At case completion all instrument  count, needle count, and sponge counts were correct x2.    Chris Delgado MD  12/23/24     Active Hospital Problems    Diagnosis  POA    **DKA (diabetic ketoacidosis) [E11.10]  Yes    Arterial thrombosis [I74.9]  No      Resolved Hospital Problems   No resolved problems to display.

## 2024-12-23 NOTE — ANESTHESIA PREPROCEDURE EVALUATION
Anesthesia Evaluation     Patient summary reviewed and Nursing notes reviewed   no history of anesthetic complications:   NPO Solid Status: > 8 hours  NPO Liquid Status: > 8 hours           Airway   Mallampati: II  TM distance: >3 FB  Neck ROM: full  No difficulty expected  Comment: ETT in situ  Dental      Pulmonary - normal exam   (+) COPD,    ROS comment: intubated  Cardiovascular - normal exam    ECG reviewed    (+) hypertension, hyperlipidemia      Neuro/Psych  (+) headaches, numbness  GI/Hepatic/Renal/Endo    (+) diabetes mellitus (DKA) type 2 poorly controlled    Musculoskeletal     (+) neck pain  Abdominal  - normal exam    Bowel sounds: normal.   Substance History      OB/GYN          Other          Other Comment: scabies  ROS/Med Hx Other: Yarelis Jackson is a 54 y.o. female with PMH of COPD, migraine, and obesity, and presented to the hospital for altered mental status, and was admitted with a principal diagnosis of DKA (diabetic ketoacidosis).  Patient presented and per ER documentation, patient was oriented to person and time.  Per patient's mother, patient had been feeling ill since Friday with noted polyuria and polydipsia.  It was managed by giving patient milk and a grape soft drink.  Patient became increasingly agitated, preventing treatment, and was given a dose of Geodon in the ED.     In the ED, labs were obtained with the following abnormalities: Sodium 159, chloride 117, CO2 10.1, anion gap 31.9, BUN 88, creatinine 2.13, glucose 976, alk phos 246, , , hemoglobin A1c 15.8, moderate acetone, osmolality 461, WBC 14.65 without bandemia.  UA with >1000 glucose, 15 mg/dL of ketones, moderate blood and proteinuria noted, this did not reflex to culture.  Patient had an ABG with pH 7.179, pCO2 37.5, pO2 77.7, HCO3 14, with O2 saturation 91.7%.  Patient was determined to be in DKA, and was started with IV fluid boluses per DKA protocol as well as an insulin drip.  Patient did have blood  cultures obtained, but per ED provider, patient has no obvious signs or symptoms of active section, therefore no antimicrobials were started.     Patient sodium level remained persistent, and IV fluids were changed to withhold any sodium containing products.  Nephrology was consulted.  Patient also underwent placement of feeding tube for free water flush hourly overnight.  On 12/19, nephrology decided that patient would proceed with hemodialysis and requested nontunneled catheter placement.  Patient was becoming increasingly agitated, with concerned that patient was no longer able to protect her own airway.  After discussion with patient's family, patient was intubated, nontunneled catheter placement was completed, and patient did require initiation of vasopressors.  HD was transitioned to CRRT.  Shortly after initiation of CRRT, patient developed an acute change in patient's skin color of her right lower extremity with subsequent loss of pulse.  Vascular surgery was stat consulted as well as completion of stat arterial duplex and ABIs.  Patient was started on a heparin drip.  Vascular surgery promptly evaluated patient and patient was scheduled to go to the OR for emergent thrombectomy                Anesthesia Plan    ASA 4 - emergent     general     intravenous induction     Anesthetic plan, risks, benefits, and alternatives have been provided, discussed and informed consent has been obtained with: patient.    Plan discussed with CRNA.    CODE STATUS:    Code Status (Patient has no pulse and is not breathing): CPR (Attempt to Resuscitate)  Medical Interventions (Patient has pulse or is breathing): Full Support

## 2024-12-23 NOTE — PLAN OF CARE
Goal Outcome Evaluation:  Plan of Care Reviewed With: patient, family        Progress: improving  Outcome Evaluation: Patient remains on the ventilator on 30% and a PEEP of 5. Patient remains in restraints. Patient has fentanyl, precedex, heparin, insulin, and levophed infusing. patient got dialysis today and had 2.5 liters off. Urine output has decreased with only 80 out on my shift, Provider has been notified.

## 2024-12-23 NOTE — PLAN OF CARE
Goal Outcome Evaluation:         Vent 30/5. Fentanyl and Precedex for sedation. Titrating down on levophed gtt. Potassium replacement. 375 urine output. No BM.

## 2024-12-23 NOTE — ANESTHESIA POSTPROCEDURE EVALUATION
Patient: Yarelis Jackson    Procedure Summary       Date: 12/23/24 Room / Location: [facility] OR 01 / [facility] MAIN OR    Anesthesia Start: 0846 Anesthesia Stop: 0935    Procedure: FASCIOTOMY LEG (Right) Diagnosis:     Surgeons: Chris Delgado MD Provider: Yelitza Josue MD    Anesthesia Type: general ASA Status: 4 - Emergent            Anesthesia Type: general    Vitals  Vitals Value Taken Time   /70 12/23/24 1058   Temp 99.1 °F (37.3 °C) 12/23/24 1058   Pulse 62 12/23/24 1203   Resp 26 12/23/24 1058   SpO2 95 % 12/23/24 1203   Vitals shown include unfiled device data.        Post Anesthesia Care and Evaluation    Patient location during evaluation: PACU  Patient participation: complete - patient participated  Level of consciousness: awake and alert  Pain management: satisfactory to patient    Airway patency: patent  Anesthetic complications: No anesthetic complications  PONV Status: none  Cardiovascular status: acceptable  Respiratory status: acceptable  Hydration status: acceptable

## 2024-12-23 NOTE — PROGRESS NOTES
Nutrition Services  Patient Name: Yarelis Jackson  YOB: 1970  MRN: [id number]  Admission date: 12/18/2024    PROGRESS NOTE      Nutrition Intervention Updates: Continue current TF prescription, at goal         Encounter Information: Checking in on tube feeding. Pt remains intubated. Pt is on precedex, insulin drip, levo. Patient discussed in AM rounds.  Hopeful for extubation today.  Plans for HD again today.         PO Diet: NPO Diet NPO Type: Strict NPO   PO Supplements: NPO   PO Intake:  NPO       Current nutrition support: Novasource renal at 35 ml/hr + 30 ml/hr FWF    Nutrition support review: Running as ordered per EMR        Labs (reviewed below): Reviewed.        GI Function:  No BM yet (x 5 days ago), pt has stool softeners ordered PRN. MD to add Miralax daily scheduled        Brief weight review   Wt Readings from Last 10 Encounters:   12/19/24 0600 88.5 kg (195 lb 1.7 oz)   12/18/24 0527 92.5 kg (203 lb 14.4 oz)   07/15/23 1132 98.1 kg (216 lb 4.3 oz)   04/04/23 0719 98.1 kg (216 lb 4.3 oz)   12/12/22 0952 85.5 kg (188 lb 6.4 oz)   01/31/22 1404 89.8 kg (198 lb)   01/24/22 1406 89.8 kg (198 lb)   12/23/21 1133 89.8 kg (198 lb)   11/24/21 1537 90.3 kg (199 lb)   11/16/21 0838 90.3 kg (199 lb)   09/29/21 0320 87.4 kg (192 lb 10.9 oz)        Results from last 7 days   Lab Units 12/23/24  0429 12/22/24  2114 12/22/24  0452 12/21/24  2351 12/21/24  0405   SODIUM mmol/L 138  --  138  --  138   POTASSIUM mmol/L 3.6 3.1* 3.5  --  5.0   CHLORIDE mmol/L 104  --  103  --  105   CO2 mmol/L 23.8  --  22.3  --  18.8*   BUN mg/dL 29*  --  39*  --  46*   CREATININE mg/dL 1.73*  --  1.85* 2.09* 2.43*   CALCIUM mg/dL 9.2  --  8.2*  --  6.9*   BILIRUBIN mg/dL 0.7  --  0.2  --  0.2   ALK PHOS U/L 135*  --  100  --  91   ALT (SGPT) U/L 430*  --  96*  --  101*   AST (SGOT) U/L 939*  --  271*  --  234*   GLUCOSE mg/dL 104*  --  236*  --  181*     Results from last 7 days   Lab Units 12/23/24  0429 12/22/24  0457  12/21/24  1307 12/21/24  0405   MAGNESIUM mg/dL 2.0 2.0  --  2.1   PHOSPHORUS mg/dL 3.9 3.6  --  6.8*   HEMOGLOBIN g/dL 7.5* 7.2*   < > 8.7*   HEMOGLOBIN, POC   --   --    < >  --    HEMATOCRIT % 21.9* 22.1*   < > 27.2*   HEMATOCRIT POC   --   --    < >  --    TRIGLYCERIDES mg/dL  --   --   --  336*    < > = values in this interval not displayed.         RD to follow up per protocol.    Electronically signed by:  Ashley Hernandez RD  12/23/24 07:34 EST

## 2024-12-23 NOTE — PROGRESS NOTES
ENDOCRINE CONSULT PROGRESS NOTE  DATE OF SERVICE: 24        PATIENT NAME: Yarelis Jackson  PATIENT : 1970 AGE: 54 y.o.  MRN NUMBER: [id number]    ==========================================================================    CHIEF COMPLAINT: Type 1 diabetes management    CARE TEAM:   Patient Care Team:  Katie Duran PA as PCP - General (Physician Assistant)  Uli Starr MD as Consulting Physician (Nephrology)    SUBJECTIVE    Pt seen and examined.  Intubated on mechanical ventilation.  Blood sugar reviewed for last 24 hours.    ==========================================================================    CURRENT ACTIVE HOSPITAL MEDICATIONS    Scheduled Medications:  ceFAZolin, 1,000 mg, Intravenous, Q24H  chlorhexidine, 15 mL, Mouth/Throat, Q12H  cloNIDine, 1 patch, Transdermal, Weekly  insulin glargine, 18 Units, Subcutaneous, Once When Specified  [START ON 2024] insulin glargine, 18 Units, Subcutaneous, Nightly  insulin lispro, 2-7 Units, Subcutaneous, Q6H  lansoprazole, 30 mg, Nasogastric, Q AM  nystatin, 1 Application, Topical, Q8H  [START ON 2024] permethrin, 1 Application, Topical, Once  QUEtiapine, 25 mg, Nasogastric, Q12H  sodium chloride, 10 mL, Intravenous, Q12H  sodium chloride, 10 mL, Intravenous, Q12H         PRN Medications:    acetaminophen **OR** acetaminophen    albumin human    aluminum-magnesium hydroxide-simethicone    senna-docusate sodium **AND** polyethylene glycol **AND** bisacodyl **AND** bisacodyl    Calcium Replacement - Follow Nurse / BPA Driven Protocol    Calcium Replacement - Follow Nurse / BPA Driven Protocol    dextrose    dextrose    dextrose    dextrose    glucagon (human recombinant)    glucagon (human recombinant)    heparin (porcine)    [Transfer Hold] HYDROmorphone    ipratropium-albuterol    [Transfer Hold] LORazepam    Magnesium Standard Dose Replacement - Follow Nurse / BPA Driven Protocol    Magnesium Standard Dose Replacement  - Follow Nurse / BPA Driven Protocol    nitroglycerin    ondansetron ODT **OR** ondansetron    Phosphorus Replacement - Follow Nurse / BPA Driven Protocol    Phosphorus Replacement - Follow Nurse / BPA Driven Protocol    Potassium Replacement - Follow Nurse / BPA Driven Protocol    Potassium Replacement - Follow Nurse / BPA Driven Protocol    sodium chloride    sodium chloride    sodium chloride    sodium chloride    sodium chloride     ==========================================================================    OBJECTIVE    Vitals:    12/23/24 1700   BP:    Pulse: 73   Resp:    Temp:    SpO2: 97%      Body mass index is 33.49 kg/m².     General - Intubated on mechanical ventilation    ==========================================================================    LAB EVALUATION    Lab Results   Component Value Date    GLUCOSE 104 (H) 12/23/2024    BUN 29 (H) 12/23/2024    CREATININE 1.73 (H) 12/23/2024    EGFRIFNONA 87 09/29/2021    BCR 16.8 12/23/2024    K 3.6 12/23/2024    CO2 23.8 12/23/2024    CALCIUM 9.2 12/23/2024    ALBUMIN 3.1 (L) 12/23/2024     (H) 12/23/2024     (H) 12/23/2024       Lab Results   Component Value Date    HGBA1C 15.80 (H) 12/18/2024     Lab Results   Component Value Date    CREATININE 1.73 (H) 12/23/2024     Results from last 7 days   Lab Units 12/23/24  1627 12/23/24  1523 12/23/24  1421 12/23/24  1316 12/23/24  1213 12/23/24  1006   GLUCOSE mg/dL 148* 146* 127* 137* 119* 127*     ==========================================================================    ASSESSMENT AND PLAN    # Type 1 diabetes with hyperglycemia  # DKA on admission, resolved  - Reviewed blood sugar for last 24 hours  - Will transition patient from insulin drip to subcu insulin  -For nutrition patient is currently on tube feeding with minimal residuals  - Will start patient on insulin therapy as follows:  Insulin Lantus 18 units nightly  Insulin lispro sliding scale every 6 hours  -Will review blood sugar  for next 24 hours and adjust therapy accordingly    Will follow with you.  Rest as per primary team.    Part of this note may be an electronic transcription/translation of spoken language to printed text using the Dragon Dictation System.     Note: Portions of this note may have been copied from previous notes but documentation have been reviewed and edited as necessary to support clinical decision making for today's visit.  The time of this note does not reflect the time I saw the patient but the time that this note was written.    ==========================================================================  Patel Winkler MD  Department of Endocrine, Diabetes and Metabolism  [place], [place]  ==========================================================================

## 2024-12-23 NOTE — CASE MANAGEMENT/SOCIAL WORK
Continued Stay Note   Cesar     Patient Name: Yarelis Jackson  MRN: [id number]  Today's Date: 12/23/2024    Admit Date: 12/18/2024    Plan: Plan to dc home with parents, pending clinical course.   Discharge Plan       Row Name 12/23/24 0949       Plan    Plan Plan to dc home with parents, pending clinical course.    Plan Comments DC Barriers: vent 50/5, A-line, NG TF, NPO, DKA, restraints, FC, IV ABxs, HD CRRT, Insulin//Levo/Fent, HemOnc/Vasc Sx/Nephro/Endocrine following.                 Expected Discharge Date and Time       Expected Discharge Date Expected Discharge Time    Dec 26, 2024               WAYNE Brown RN  ICU/CVU   O: [phone]  C: [phone]  [email or web address]

## 2024-12-24 ENCOUNTER — APPOINTMENT (OUTPATIENT)
Dept: NEPHROLOGY | Facility: HOSPITAL | Age: 54
End: 2024-12-24
Payer: MEDICAID

## 2024-12-24 LAB
ALBUMIN SERPL-MCNC: 2.9 G/DL (ref 3.5–5.2)
ALBUMIN/GLOB SERPL: 1.3 G/DL
ALP SERPL-CCNC: 126 U/L (ref 39–117)
ALT SERPL W P-5'-P-CCNC: 610 U/L (ref 1–33)
ANA SER QL IF: NEGATIVE
ANION GAP SERPL CALCULATED.3IONS-SCNC: 12.3 MMOL/L (ref 5–15)
ANISOCYTOSIS BLD QL: ABNORMAL
APTT PPP: 91.3 SECONDS (ref 22.7–35.4)
ARTERIAL PATENCY WRIST A: ABNORMAL
ARTERIAL PATENCY WRIST A: ABNORMAL
AST SERPL-CCNC: 970 U/L (ref 1–32)
ATMOSPHERIC PRESS: ABNORMAL MM[HG]
ATMOSPHERIC PRESS: ABNORMAL MM[HG]
BASE EXCESS BLDA CALC-SCNC: -1.2 MMOL/L (ref 0–3)
BASE EXCESS BLDA CALC-SCNC: 0.4 MMOL/L (ref 0–3)
BDY SITE: ABNORMAL
BDY SITE: ABNORMAL
BH BB BLOOD EXPIRATION DATE: NORMAL
BH BB BLOOD TYPE BARCODE: 5100
BH BB DISPENSE STATUS: NORMAL
BH BB PRODUCT CODE: NORMAL
BH BB UNIT NUMBER: NORMAL
BILIRUB SERPL-MCNC: 0.5 MG/DL (ref 0–1.2)
BUN SERPL-MCNC: 37 MG/DL (ref 6–20)
BUN/CREAT SERPL: 17.2 (ref 7–25)
CA-I SERPL ISE-MCNC: 0.99 MMOL/L (ref 1.15–1.3)
CALCIUM SPEC-SCNC: 7.6 MG/DL (ref 8.6–10.5)
CARDIOLIPIN IGG SER IA-ACNC: <9 GPL U/ML (ref 0–14)
CARDIOLIPIN IGM SER IA-ACNC: <9 MPL U/ML (ref 0–12)
CHLORIDE SERPL-SCNC: 102 MMOL/L (ref 98–107)
CK SERPL-CCNC: ABNORMAL U/L (ref 20–180)
CO2 BLDA-SCNC: 26 MMOL/L (ref 22–29)
CO2 BLDA-SCNC: 26.9 MMOL/L (ref 22–29)
CO2 SERPL-SCNC: 22.7 MMOL/L (ref 22–29)
CREAT SERPL-MCNC: 2.15 MG/DL (ref 0.57–1)
CROSSMATCH INTERPRETATION: NORMAL
D DIMER PPP FEU-MCNC: 3.41 MCGFEU/ML (ref 0–0.53)
DEPRECATED RDW RBC AUTO: 58.3 FL (ref 37–54)
EGFRCR SERPLBLD CKD-EPI 2021: 26.9 ML/MIN/1.73
EOSINOPHIL # BLD MANUAL: 0.36 10*3/MM3 (ref 0–0.4)
EOSINOPHIL NFR BLD MANUAL: 3 % (ref 0.3–6.2)
ERYTHROCYTE [DISTWIDTH] IN BLOOD BY AUTOMATED COUNT: 17.2 % (ref 12.3–15.4)
GLOBULIN UR ELPH-MCNC: 2.3 GM/DL
GLUCOSE BLDC GLUCOMTR-MCNC: 155 MG/DL (ref 70–105)
GLUCOSE BLDC GLUCOMTR-MCNC: 245 MG/DL (ref 74–100)
GLUCOSE BLDC GLUCOMTR-MCNC: 259 MG/DL (ref 70–105)
GLUCOSE BLDC GLUCOMTR-MCNC: 277 MG/DL (ref 70–105)
GLUCOSE BLDC GLUCOMTR-MCNC: 330 MG/DL (ref 70–105)
GLUCOSE SERPL-MCNC: 258 MG/DL (ref 65–99)
HCO3 BLDA-SCNC: 24.8 MMOL/L (ref 21–28)
HCO3 BLDA-SCNC: 25.3 MMOL/L (ref 21–28)
HCT VFR BLD AUTO: 23.6 % (ref 34–46.6)
HEMODILUTION: NO
HEMODILUTION: NO
HGB BLD-MCNC: 7.7 G/DL (ref 12–15.9)
INHALED O2 CONCENTRATION: 40 %
INHALED O2 CONCENTRATION: 52 %
LABORATORY COMMENT REPORT: NORMAL
LARGE PLATELETS: ABNORMAL
LYMPHOCYTES # BLD MANUAL: 1.58 10*3/MM3 (ref 0.7–3.1)
LYMPHOCYTES NFR BLD MANUAL: 5 % (ref 5–12)
MAGNESIUM SERPL-MCNC: 1.7 MG/DL (ref 1.6–2.6)
MCH RBC QN AUTO: 30.6 PG (ref 26.6–33)
MCHC RBC AUTO-ENTMCNC: 32.6 G/DL (ref 31.5–35.7)
MCV RBC AUTO: 93.7 FL (ref 79–97)
MODALITY: ABNORMAL
MODALITY: ABNORMAL
MONOCYTES # BLD: 0.61 10*3/MM3 (ref 0.1–0.9)
MYELOCYTES NFR BLD MANUAL: 2 % (ref 0–0)
NEUTROPHILS # BLD AUTO: 9.35 10*3/MM3 (ref 1.7–7)
NEUTROPHILS NFR BLD MANUAL: 66 % (ref 42.7–76)
NEUTS BAND NFR BLD MANUAL: 11 % (ref 0–5)
NRBC SPEC MANUAL: 3 /100 WBC (ref 0–0.2)
PCO2 BLDA: 37.8 MM HG (ref 35–48)
PCO2 BLDA: 52.2 MM HG (ref 35–48)
PEEP RESPIRATORY: 5 CM[H2O]
PF4 HEPARIN CMPLX IGG SERPL IA: 0.01 OD (ref 0–0.4)
PH BLDA: 7.29 PH UNITS (ref 7.35–7.45)
PH BLDA: 7.42 PH UNITS (ref 7.35–7.45)
PHOSPHATE SERPL-MCNC: 2.8 MG/DL (ref 2.5–4.5)
PLATELET # BLD AUTO: 131 10*3/MM3 (ref 140–450)
PMV BLD AUTO: 11.1 FL (ref 6–12)
PO2 BLD: 190 MM[HG] (ref 0–500)
PO2 BLD: 284 MM[HG] (ref 0–500)
PO2 BLDA: 113.7 MM HG (ref 83–108)
PO2 BLDA: 98.9 MM HG (ref 83–108)
POLYCHROMASIA BLD QL SMEAR: ABNORMAL
POTASSIUM SERPL-SCNC: 4.7 MMOL/L (ref 3.5–5.2)
PROT SERPL-MCNC: 5.2 G/DL (ref 6–8.5)
RBC # BLD AUTO: 2.52 10*6/MM3 (ref 3.77–5.28)
RESPIRATORY RATE: 26
SAO2 % BLDCOA: 96.7 % (ref 94–98)
SAO2 % BLDCOA: 98.6 % (ref 94–98)
SCAN SLIDE: NORMAL
SMALL PLATELETS BLD QL SMEAR: ABNORMAL
SODIUM SERPL-SCNC: 137 MMOL/L (ref 136–145)
TOXIC GRANULATION: ABNORMAL
UNIT  ABO: NORMAL
UNIT  RH: NORMAL
VARIANT LYMPHS NFR BLD MANUAL: 13 % (ref 19.6–45.3)
VENTILATOR MODE: AC
VT ON VENT VENT: 500 ML
WBC NRBC COR # BLD AUTO: 12.14 10*3/MM3 (ref 3.4–10.8)

## 2024-12-24 PROCEDURE — 82948 REAGENT STRIP/BLOOD GLUCOSE: CPT

## 2024-12-24 PROCEDURE — 82803 BLOOD GASES ANY COMBINATION: CPT

## 2024-12-24 PROCEDURE — 93005 ELECTROCARDIOGRAM TRACING: CPT | Performed by: EMERGENCY MEDICINE

## 2024-12-24 PROCEDURE — 94761 N-INVAS EAR/PLS OXIMETRY MLT: CPT

## 2024-12-24 PROCEDURE — 25010000002 HEPARIN (PORCINE) PER 1000 UNITS: Performed by: SURGERY

## 2024-12-24 PROCEDURE — 94799 UNLISTED PULMONARY SVC/PX: CPT

## 2024-12-24 PROCEDURE — 25010000002 ALBUMIN HUMAN 25% PER 50 ML: Performed by: INTERNAL MEDICINE

## 2024-12-24 PROCEDURE — 85730 THROMBOPLASTIN TIME PARTIAL: CPT | Performed by: SURGERY

## 2024-12-24 PROCEDURE — 85025 COMPLETE CBC W/AUTO DIFF WBC: CPT | Performed by: SURGERY

## 2024-12-24 PROCEDURE — 25010000002 HEPARIN (PORCINE) 25000-0.45 UT/250ML-% SOLUTION: Performed by: SURGERY

## 2024-12-24 PROCEDURE — 82550 ASSAY OF CK (CPK): CPT | Performed by: SURGERY

## 2024-12-24 PROCEDURE — 82330 ASSAY OF CALCIUM: CPT | Performed by: SURGERY

## 2024-12-24 PROCEDURE — 99232 SBSQ HOSP IP/OBS MODERATE 35: CPT | Performed by: INTERNAL MEDICINE

## 2024-12-24 PROCEDURE — 25010000002 HYDROMORPHONE 1 MG/ML SOLUTION: Performed by: NURSE PRACTITIONER

## 2024-12-24 PROCEDURE — 80053 COMPREHEN METABOLIC PANEL: CPT | Performed by: SURGERY

## 2024-12-24 PROCEDURE — 99024 POSTOP FOLLOW-UP VISIT: CPT | Performed by: NURSE PRACTITIONER

## 2024-12-24 PROCEDURE — 93010 ELECTROCARDIOGRAM REPORT: CPT | Performed by: INTERNAL MEDICINE

## 2024-12-24 PROCEDURE — 84100 ASSAY OF PHOSPHORUS: CPT | Performed by: SURGERY

## 2024-12-24 PROCEDURE — 63710000001 INSULIN LISPRO (HUMAN) PER 5 UNITS: Performed by: INTERNAL MEDICINE

## 2024-12-24 PROCEDURE — 25010000002 HYDROMORPHONE 1 MG/ML SOLUTION

## 2024-12-24 PROCEDURE — 85379 FIBRIN DEGRADATION QUANT: CPT | Performed by: SURGERY

## 2024-12-24 PROCEDURE — 94003 VENT MGMT INPAT SUBQ DAY: CPT

## 2024-12-24 PROCEDURE — 83735 ASSAY OF MAGNESIUM: CPT | Performed by: SURGERY

## 2024-12-24 PROCEDURE — 25010000002 CALCIUM GLUCONATE 2-0.675 GM/100ML-% SOLUTION: Performed by: INTERNAL MEDICINE

## 2024-12-24 PROCEDURE — 85007 BL SMEAR W/DIFF WBC COUNT: CPT | Performed by: SURGERY

## 2024-12-24 PROCEDURE — P9047 ALBUMIN (HUMAN), 25%, 50ML: HCPCS | Performed by: INTERNAL MEDICINE

## 2024-12-24 RX ORDER — CALCIUM GLUCONATE 20 MG/ML
2000 INJECTION, SOLUTION INTRAVENOUS EVERY 8 HOURS
Status: COMPLETED | OUTPATIENT
Start: 2024-12-24 | End: 2024-12-25

## 2024-12-24 RX ORDER — ALBUMIN (HUMAN) 12.5 G/50ML
25 SOLUTION INTRAVENOUS EVERY 8 HOURS
Status: COMPLETED | OUTPATIENT
Start: 2024-12-24 | End: 2024-12-25

## 2024-12-24 RX ORDER — INSULIN LISPRO 100 [IU]/ML
3 INJECTION, SOLUTION INTRAVENOUS; SUBCUTANEOUS EVERY 6 HOURS SCHEDULED
Status: DISCONTINUED | OUTPATIENT
Start: 2024-12-24 | End: 2024-12-25

## 2024-12-24 RX ORDER — OXYCODONE HYDROCHLORIDE 5 MG/1
10 TABLET ORAL EVERY 4 HOURS PRN
Status: DISPENSED | OUTPATIENT
Start: 2024-12-24 | End: 2024-12-29

## 2024-12-24 RX ORDER — ALBUMIN (HUMAN) 12.5 G/50ML
12.5 SOLUTION INTRAVENOUS AS NEEDED
Status: DISCONTINUED | OUTPATIENT
Start: 2024-12-24 | End: 2024-12-25

## 2024-12-24 RX ADMIN — LANSOPRAZOLE 30 MG: 30 TABLET, ORALLY DISINTEGRATING ORAL at 05:48

## 2024-12-24 RX ADMIN — INSULIN LISPRO 3 UNITS: 100 INJECTION, SOLUTION INTRAVENOUS; SUBCUTANEOUS at 17:21

## 2024-12-24 RX ADMIN — CALCIUM GLUCONATE 2000 MG: 20 INJECTION, SOLUTION INTRAVENOUS at 10:11

## 2024-12-24 RX ADMIN — ALBUMIN (HUMAN) 25 G: 0.25 INJECTION, SOLUTION INTRAVENOUS at 16:45

## 2024-12-24 RX ADMIN — DEXMEDETOMIDINE HYDROCHLORIDE IN SODIUM CHLORIDE 1 MCG/KG/HR: 4 INJECTION INTRAVENOUS at 14:10

## 2024-12-24 RX ADMIN — NYSTATIN 1 APPLICATION: 100000 CREAM TOPICAL at 22:26

## 2024-12-24 RX ADMIN — NYSTATIN 1 APPLICATION: 100000 CREAM TOPICAL at 05:48

## 2024-12-24 RX ADMIN — OXYCODONE 10 MG: 5 TABLET ORAL at 19:00

## 2024-12-24 RX ADMIN — Medication 1 MG: at 05:07

## 2024-12-24 RX ADMIN — HEPARIN SODIUM 19 UNITS/KG/HR: 10000 INJECTION, SOLUTION INTRAVENOUS at 05:13

## 2024-12-24 RX ADMIN — OXYCODONE 10 MG: 5 TABLET ORAL at 10:10

## 2024-12-24 RX ADMIN — CALCIUM GLUCONATE 2000 MG: 20 INJECTION, SOLUTION INTRAVENOUS at 16:45

## 2024-12-24 RX ADMIN — DEXMEDETOMIDINE HYDROCHLORIDE IN SODIUM CHLORIDE 1.5 MCG/KG/HR: 4 INJECTION INTRAVENOUS at 20:18

## 2024-12-24 RX ADMIN — DEXMEDETOMIDINE HYDROCHLORIDE IN SODIUM CHLORIDE 1.5 MCG/KG/HR: 4 INJECTION INTRAVENOUS at 02:10

## 2024-12-24 RX ADMIN — ALBUMIN (HUMAN) 25 G: 0.25 INJECTION, SOLUTION INTRAVENOUS at 10:11

## 2024-12-24 RX ADMIN — HYDROMORPHONE HYDROCHLORIDE 1 MG: 1 INJECTION, SOLUTION INTRAMUSCULAR; INTRAVENOUS; SUBCUTANEOUS at 05:07

## 2024-12-24 RX ADMIN — HYDROMORPHONE HYDROCHLORIDE 1 MG: 1 INJECTION, SOLUTION INTRAMUSCULAR; INTRAVENOUS; SUBCUTANEOUS at 07:34

## 2024-12-24 RX ADMIN — OXYCODONE 10 MG: 5 TABLET ORAL at 14:56

## 2024-12-24 RX ADMIN — CHLORHEXIDINE GLUCONATE, 0.12% ORAL RINSE 15 ML: 1.2 SOLUTION DENTAL at 08:44

## 2024-12-24 RX ADMIN — NOREPINEPHRINE BITARTRATE 0.03 MCG/KG/MIN: 0.03 INJECTION, SOLUTION INTRAVENOUS at 19:00

## 2024-12-24 RX ADMIN — DEXMEDETOMIDINE HYDROCHLORIDE IN SODIUM CHLORIDE 1.5 MCG/KG/HR: 4 INJECTION INTRAVENOUS at 08:48

## 2024-12-24 RX ADMIN — Medication 10 ML: at 20:18

## 2024-12-24 RX ADMIN — INSULIN LISPRO 4 UNITS: 100 INJECTION, SOLUTION INTRAVENOUS; SUBCUTANEOUS at 05:48

## 2024-12-24 RX ADMIN — Medication 10 ML: at 08:45

## 2024-12-24 RX ADMIN — INSULIN LISPRO 4 UNITS: 100 INJECTION, SOLUTION INTRAVENOUS; SUBCUTANEOUS at 00:16

## 2024-12-24 RX ADMIN — DEXMEDETOMIDINE HYDROCHLORIDE IN SODIUM CHLORIDE 1.5 MCG/KG/HR: 4 INJECTION INTRAVENOUS at 22:25

## 2024-12-24 RX ADMIN — INSULIN LISPRO 2 UNITS: 100 INJECTION, SOLUTION INTRAVENOUS; SUBCUTANEOUS at 17:20

## 2024-12-24 RX ADMIN — HEPARIN SODIUM 2600 UNITS: 1000 INJECTION INTRAVENOUS; SUBCUTANEOUS at 15:30

## 2024-12-24 RX ADMIN — INSULIN GLARGINE-YFGN 20 UNITS: 100 INJECTION, SOLUTION SUBCUTANEOUS at 20:28

## 2024-12-24 RX ADMIN — DEXMEDETOMIDINE HYDROCHLORIDE IN SODIUM CHLORIDE 1.5 MCG/KG/HR: 4 INJECTION INTRAVENOUS at 17:40

## 2024-12-24 RX ADMIN — DEXMEDETOMIDINE HYDROCHLORIDE IN SODIUM CHLORIDE 0.5 MCG/KG/HR: 4 INJECTION INTRAVENOUS at 12:44

## 2024-12-24 RX ADMIN — DEXMEDETOMIDINE HYDROCHLORIDE IN SODIUM CHLORIDE 1.5 MCG/KG/HR: 4 INJECTION INTRAVENOUS at 05:27

## 2024-12-24 RX ADMIN — QUETIAPINE FUMARATE 25 MG: 25 TABLET ORAL at 20:23

## 2024-12-24 RX ADMIN — NYSTATIN 1 APPLICATION: 100000 CREAM TOPICAL at 14:12

## 2024-12-24 RX ADMIN — HEPARIN SODIUM 19 UNITS/KG/HR: 10000 INJECTION, SOLUTION INTRAVENOUS at 22:22

## 2024-12-24 RX ADMIN — CHLORHEXIDINE GLUCONATE, 0.12% ORAL RINSE 15 ML: 1.2 SOLUTION DENTAL at 20:28

## 2024-12-24 RX ADMIN — INSULIN LISPRO 5 UNITS: 100 INJECTION, SOLUTION INTRAVENOUS; SUBCUTANEOUS at 11:31

## 2024-12-24 RX ADMIN — QUETIAPINE FUMARATE 25 MG: 25 TABLET ORAL at 10:10

## 2024-12-24 NOTE — PLAN OF CARE
Goal Outcome Evaluation:         Pt currently on 40FiO2/5 PEEP. Insulin gtt off @ 2010, transitioned to sliding scale. D50 given x1 (BS 50 recheck 76). Fent & Dex titrated to maintain adequate sedation levels, currently weaning attempting to get sedation off, but pt gets extremely agitated and anxious fighting vent, 1x dilaudid given. Last two aPTTs have been within therapeutic range, remains on hep gtt, no rate changes on my shift. Levo remains on as well, titration to maintain MAP >65. NG remains in R nare @60 w/TF running. UO *500cc overnight, increased from yesterday during day shift. VSS @ time of this note. Plan of care ongoing.

## 2024-12-24 NOTE — PROGRESS NOTES
Hematology/Oncology Inpatient Progress Note    PATIENT NAME: Yarelis Jackson  : 1970  MRN: [id number]    CHIEF COMPLAINT: Bleeding    HISTORY OF PRESENT ILLNESS:    Yarelis Jackson is a 54 y.o. female who presented to [facility] on 2024 with complaints of critical ischemia to the leg treated with serial operations with improvement.  Patient had right lower extremity ischemia due to thromboembolism to the right common femoral artery.  She underwent thrombectomy.  She was subsequently found to have a flap in the right femoral artery which was repaired on 2024 without any thrombus associated.  She was placed on IV heparin and developed hypotension with bleeding she has also had precipitous drop in her platelet count.  There is concern for possible HIT.  Heparin drip has since then been discontinued and we have been asked to see her for further evaluation and management of thrombocytopenia possibility of HIT  Review of her labs indicate that she had platelet counts of 99,000 as of 2024 subsequently dropped to 63,000.  She also has decreasing hemoglobin from 8.7-7.2 within the past 24 hours     24  Hematology/Oncology was consulted for thrombocytopenia and possible HIT.  Thrombophilia workup has also been initiated by primary team results are pending     He/She  has a past medical history of COPD (chronic obstructive pulmonary disease), Low back pain, Migraine, and Neck pain.     PCP: Sumit Duran PA  Subjective   2024: Remains intubated.     ROS:  Review of Systems   Unable to perform ROS: Intubated      MEDICATIONS:    Scheduled Meds:  albumin human, 25 g, Intravenous, Q8H  calcium gluconate, 2,000 mg, Intravenous, Q8H  chlorhexidine, 15 mL, Mouth/Throat, Q12H  insulin glargine, 18 Units, Subcutaneous, Nightly  insulin lispro, 2-7 Units, Subcutaneous, Q6H  lansoprazole, 30 mg, Nasogastric, Q AM  nystatin, 1 Application, Topical, Q8H  [START ON 2024]  "permethrin, 1 Application, Topical, Once  QUEtiapine, 25 mg, Nasogastric, Q12H  sodium chloride, 10 mL, Intravenous, Q12H       Continuous Infusions:  dexmedetomidine, 0.2-1.5 mcg/kg/hr, Last Rate: Stopped (12/24/24 1117)  fentanyl 10 mcg/mL,  mcg/hr, Last Rate: Stopped (12/24/24 1030)  heparin, 16.9 Units/kg/hr, Last Rate: 19 Units/kg/hr (12/24/24 0513)  norepinephrine, 0.02-0.3 mcg/kg/min, Last Rate: 0.05 mcg/kg/min (12/24/24 0610)       PRN Meds:    acetaminophen **OR** acetaminophen    aluminum-magnesium hydroxide-simethicone    senna-docusate sodium **AND** polyethylene glycol **AND** bisacodyl **AND** bisacodyl    Calcium Replacement - Follow Nurse / BPA Driven Protocol    dextrose    dextrose    glucagon (human recombinant)    heparin (porcine)    ipratropium-albuterol    Magnesium Standard Dose Replacement - Follow Nurse / BPA Driven Protocol    nitroglycerin    ondansetron ODT **OR** ondansetron    oxyCODONE    Phosphorus Replacement - Follow Nurse / BPA Driven Protocol    Potassium Replacement - Follow Nurse / BPA Driven Protocol    sodium chloride    sodium chloride    sodium chloride     ALLERGIES:    Allergies   Allergen Reactions    Aspirin GI Intolerance    Ibuprofen Itching     Objective    VITALS:   /58 Comment (BP Location): clark  Pulse 82   Temp 97.6 °F (36.4 °C) (Axillary)   Resp 18   Ht 162.6 cm (64\")   Wt 100 kg (221 lb 5.5 oz)   LMP  (LMP Unknown)   SpO2 (!) 89%   BMI 37.99 kg/m²     PHYSICAL EXAM: (performed by MD)  Physical Exam  Constitutional:       General: She is not in acute distress.     Appearance: She is ill-appearing.      Comments: Acutely ill appearing woman. Intubated and sedated. Not responsive to verbal stimulation.    HENT:      Right Ear: External ear normal.      Left Ear: External ear normal.      Mouth/Throat:      Pharynx: No oropharyngeal exudate or posterior oropharyngeal erythema.   Eyes:      General:         Right eye: No discharge.         Left " eye: No discharge.   Cardiovascular:      Rate and Rhythm: Tachycardia present.   Pulmonary:      Effort: No respiratory distress.      Breath sounds: Rales present. No wheezing or rhonchi.      Comments: Symmetrically diminished.   Abdominal:      General: There is no distension.      Palpations: There is no mass.      Tenderness: There is no abdominal tenderness. There is no guarding or rebound.      Hernia: No hernia is present.      Comments: Protuberant and soft. Not apparently tender. No palpable tumors.    Musculoskeletal:      Right lower leg: Edema present.      Left lower leg: Edema present.   Skin:     Coloration: Skin is not jaundiced or pale.      Findings: Bruising present.      I have reexamined the patient and the results are consistent with the previously documented exam. Dmitry Nielsen MD      RECENT LABS:    Lab Results (last 24 hours)       Procedure Component Value Units Date/Time    POC Glucose Once [[id number]]  (Abnormal) Collected: 12/24/24 1121    Specimen: Blood Updated: 12/24/24 1123     Glucose 330 mg/dL      Comment: Serial Number: [id number]:  [id number]       OBDULIO by IFA, Reflex 9-biomarkers profile [[id number]] Collected: 12/20/24 1057    Specimen: Blood Updated: 12/24/24 0708     OBDULIO Negative     Comment:                                      Negative   <1:80                                       Borderline  1:80                                       Positive   >1:80  ICAP nomenclature: AC-0  For more information about Hep-2 cell patterns use  ANApatterns.org, the official website for the International  Consensus on Antinuclear Antibody (OBDULIO) Patterns (ICAP).        Please note Comment     Comment: OBDULIO Multiplex methodology was designed to detect up to 11 antibodies  of the 100+ antibodies that may be detected by OBDULIO IFA methodology.       Narrative:      Performed at:  [address] [place] [address]  [id number]  : Evelio Fuller PhD, Phone:   [phone]    CBC & Differential [[id number]]  (Abnormal) Collected: 12/24/24 0344    Specimen: Blood Updated: 12/24/24 0516    Narrative:      The following orders were created for panel order CBC & Differential.  Procedure                               Abnormality         Status                     ---------                               -----------         ------                     CBC Auto Differential[[id number]]        Abnormal            Final result               Scan Slide[[id number]]                                       Final result                 Please view results for these tests on the individual orders.    CBC Auto Differential [[id number]]  (Abnormal) Collected: 12/24/24 0344    Specimen: Blood Updated: 12/24/24 0516     WBC 12.14 10*3/mm3      RBC 2.52 10*6/mm3      Hemoglobin 7.7 g/dL      Hematocrit 23.6 %      MCV 93.7 fL      MCH 30.6 pg      MCHC 32.6 g/dL      RDW 17.2 %      RDW-SD 58.3 fl      MPV 11.1 fL      Platelets 131 10*3/mm3     Scan Slide [[id number]] Collected: 12/24/24 0344    Specimen: Blood Updated: 12/24/24 0516     Scan Slide --     Comment: See Manual Differential Results       Manual Differential [[id number]]  (Abnormal) Collected: 12/24/24 0344    Specimen: Blood Updated: 12/24/24 0516     Neutrophil % 66.0 %      Lymphocyte % 13.0 %      Monocyte % 5.0 %      Eosinophil % 3.0 %      Bands %  11.0 %      Myelocyte % 2.0 %      Neutrophils Absolute 9.35 10*3/mm3      Lymphocytes Absolute 1.58 10*3/mm3      Monocytes Absolute 0.61 10*3/mm3      Eosinophils Absolute 0.36 10*3/mm3      nRBC 3.0 /100 WBC      Anisocytosis Slight/1+     Polychromasia Slight/1+     Toxic Granulation Slight/1+     Platelet Estimate Decreased     Large Platelets Slight/1+    POC Glucose Once [[id number]]  (Abnormal) Collected: 12/24/24 0501    Specimen: Blood Updated: 12/24/24 0503     Glucose 277 mg/dL      Comment: Serial Number: [id number]:  [id number]       Phosphorus [[id number]]  (Normal)  Collected: 12/24/24 0344    Specimen: Blood Updated: 12/24/24 0448     Phosphorus 2.8 mg/dL     Magnesium [[id number]]  (Normal) Collected: 12/24/24 0344    Specimen: Blood Updated: 12/24/24 0435     Magnesium 1.7 mg/dL     Comprehensive Metabolic Panel [[id number]]  (Abnormal) Collected: 12/24/24 0344    Specimen: Blood Updated: 12/24/24 0435     Glucose 258 mg/dL      BUN 37 mg/dL      Creatinine 2.15 mg/dL      Sodium 137 mmol/L      Potassium 4.7 mmol/L      Chloride 102 mmol/L      CO2 22.7 mmol/L      Calcium 7.6 mg/dL      Total Protein 5.2 g/dL      Albumin 2.9 g/dL      ALT (SGPT) 610 U/L      AST (SGOT) 970 U/L      Alkaline Phosphatase 126 U/L      Total Bilirubin 0.5 mg/dL      Globulin 2.3 gm/dL      A/G Ratio 1.3 g/dL      BUN/Creatinine Ratio 17.2     Anion Gap 12.3 mmol/L      eGFR 26.9 mL/min/1.73     Narrative:      GFR Categories in Chronic Kidney Disease (CKD)      GFR Category          GFR (mL/min/1.73)    Interpretation  G1                     90 or greater         Normal or high (1)  G2                      60-89                Mild decrease (1)  G3a                   45-59                Mild to moderate decrease  G3b                   30-44                Moderate to severe decrease  G4                    15-29                Severe decrease  G5                    14 or less           Kidney failure          (1)In the absence of evidence of kidney disease, neither GFR category G1 or G2 fulfill the criteria for CKD.    eGFR calculation 2021 CKD-EPI creatinine equation, which does not include race as a factor    CK [[id number]]  (Abnormal) Collected: 12/24/24 0344    Specimen: Blood Updated: 12/24/24 0435     Creatine Kinase 12,682 U/L     aPTT [[id number]]  (Abnormal) Collected: 12/24/24 0344    Specimen: Blood Updated: 12/24/24 0415     PTT 91.3 seconds     D-dimer, Quantitative [[id number]]  (Abnormal) Collected: 12/24/24 0344    Specimen: Blood Updated: 12/24/24 0415     D-Dimer, Quantitative  "3.41 MCGFEU/mL     Narrative:      According to the assay 's published package insert, a normal (<0.50 MCGFEU/mL) D-dimer result in conjunction with a non-high clinical probability assessment, excludes deep vein thrombosis (DVT) and pulmonary embolism (PE) with high sensitivity.    D-dimer values increase with age and this can make VTE exclusion of an older population difficult. To address this, the [employer], based on best available evidence and recent guidelines, recommends that clinicians use age-adjusted D-dimer thresholds in patients greater than 50 years of age with: a) a low probability of PE who do not meet all Pulmonary Embolism Rule Out Criteria, or b) in those with intermediate probability of PE.   The formula for an age-adjusted D-dimer cut-off is \"age/100\".  For example, a 60 year old patient would have an age-adjusted cut-off of 0.60 MCGFEU/mL and an 80 year old 0.80 MCGFEU/mL.    Calcium, Ionized [[id number]]  (Abnormal) Collected: 12/24/24 0344    Specimen: Blood Updated: 12/24/24 0349     Ionized Calcium 0.99 mmol/L     Blood Gas, Arterial - [[id number]]  (Abnormal) Collected: 12/24/24 0324    Specimen: Arterial Blood Updated: 12/24/24 0327     Site Arterial Line     Roman's Test N/A     pH, Arterial 7.425 pH units      pCO2, Arterial 37.8 mm Hg      pO2, Arterial 113.7 mm Hg      HCO3, Arterial 24.8 mmol/L      Base Excess, Arterial 0.4 mmol/L      Comment: Serial Number: [id number]:  [id number]        O2 Saturation, Arterial 98.6 %      CO2 Content 26.0 mmol/L      Barometric Pressure for Blood Gas --     Comment: N/A        Modality Adult Vent     FIO2 40 %      Ventilator Mode AC     Set Tidal Volume 500     PEEP 5     Hemodilution No     Respiratory Rate 26     PO2/FIO2 284    POC Glucose Once [[id number]]  (Abnormal) Collected: 12/24/24 0324    Specimen: Arterial Blood Updated: 12/24/24 0327     Glucose 245 mg/dL      Comment: Serial Number: [id number]:  [id number]       POC Glucose Once [[id number]]  (Abnormal) Collected: 12/24/24 0012    Specimen: Blood Updated: 12/24/24 0014     Glucose 259 mg/dL      Comment: Serial Number: [id number]:  [id number]       POC Glucose 4x Daily Before Meals & at Bedtime [[id number]]  (Abnormal) Collected: 12/23/24 2210    Specimen: Blood Updated: 12/23/24 2212     Glucose 139 mg/dL      Comment: Serial Number: [id number]:  [id number]       aPTT [[id number]]  (Abnormal) Collected: 12/23/24 2124    Specimen: Blood Updated: 12/23/24 2147     PTT 81.7 seconds     POC Glucose Once [[id number]]  (Normal) Collected: 12/23/24 2027    Specimen: Blood Updated: 12/23/24 2029     Glucose 76 mg/dL      Comment: Serial Number: [id number]:  [id number]       POC Glucose Once [[id number]]  (Abnormal) Collected: 12/23/24 2009    Specimen: Blood Updated: 12/23/24 2010     Glucose 51 mg/dL      Comment: Serial Number: [id number]:  [id number]       HIV-1 / O / 2 Ag / Antibody [[id number]]  (Normal) Collected: 12/23/24 1629    Specimen: Blood Updated: 12/23/24 1913     HIV-1/ HIV-2 Non-Reactive    Narrative:      The HIV antibody/antigen combo assay is a qualitative assay for HIV that includes the p24 antigen as well as antibodies to HIV types 1 and 2. This test is intended to be used as a screening assay in the diagnosis of HIV infection in patients over the age of 2.    Potassium [[id number]]  (Normal) Collected: 12/23/24 1823    Specimen: Blood Updated: 12/23/24 1845     Potassium 4.7 mmol/L     POC Glucose Once [[id number]]  (Abnormal) Collected: 12/23/24 1823    Specimen: Blood Updated: 12/23/24 1825     Glucose 144 mg/dL      Comment: Serial Number: [id number]:  [id number]       Cryoglobulin [[id number]] Collected: 12/23/24 1629    Specimen: Blood Updated: 12/23/24 1632    POC Glucose Once [[id number]]  (Abnormal) Collected: 12/23/24 1627    Specimen: Blood Updated: 12/23/24 1630     Glucose 148 mg/dL      Comment: Serial Number:  [id number]:  [id number]       Blood Gas, Arterial - [[id number]] Collected: 12/23/24 1532    Specimen: Arterial Blood Updated: 12/23/24 1535     Site Arterial Line     Roman's Test N/A     pH, Arterial 7.404 pH units      pCO2, Arterial 42.7 mm Hg      pO2, Arterial 89.8 mm Hg      HCO3, Arterial 26.7 mmol/L      Base Excess, Arterial 1.7 mmol/L      Comment: Serial Number: [id number]:  [id number]        O2 Saturation, Arterial 96.9 %      CO2 Content 28.0 mmol/L      Barometric Pressure for Blood Gas --     Comment: N/A        Modality Adult Vent     FIO2 30 %      Ventilator Mode AC     Set Tidal Volume 500     PEEP 5     Hemodilution No     Respiratory Rate 26     PO2/FIO2 299    POC Glucose Once [[id number]]  (Abnormal) Collected: 12/23/24 1523    Specimen: Blood Updated: 12/23/24 1526     Glucose 146 mg/dL      Comment: Serial Number: [id number]:  [id number]       aPTT [[id number]]  (Abnormal) Collected: 12/23/24 1427    Specimen: Blood Updated: 12/23/24 1449     PTT 69.0 seconds     POC Glucose Once [[id number]]  (Abnormal) Collected: 12/23/24 1421    Specimen: Blood Updated: 12/23/24 1422     Glucose 127 mg/dL      Comment: Serial Number: [id number]:  [id number]       POC Glucose Once [[id number]]  (Abnormal) Collected: 12/23/24 1316    Specimen: Blood Updated: 12/23/24 1420     Glucose 137 mg/dL      Comment: Serial Number: [id number]:  [id number]       Hemoglobin & Hematocrit, Blood [[id number]]  (Abnormal) Collected: 12/23/24 1355    Specimen: Blood Updated: 12/23/24 1406     Hemoglobin 9.0 g/dL      Comment: Result checked          Hematocrit 27.1 %     POC Glucose Once [[id number]]  (Abnormal) Collected: 12/23/24 1213    Specimen: Blood Updated: 12/23/24 1316     Glucose 119 mg/dL      Comment: Serial Number: [id number]:  [id number]       Factor 2 Activity [[id number]] Collected: 12/20/24 1057    Specimen: Blood Updated: 12/23/24 1208     Factor II Activity 94 %     Narrative:       Performed at:  [address]  [id number]  : Nayana Lund MD, Phone:  [phone]          IMAGING REVIEWED:  No radiology results for the last day    Assessment & Plan   ASSESSMENT:    Persistent thrombocytopenia but improved. The heparin induced platelet antibodies are negative. Her thrombocytopenia is likely the result of the acute illness. No change for now.   Arterial Thrombosis status post thrombectomy: Continue heparin.   Anemia due to blood loss: Hemoglobin up to 9 g per DL  Hypercoagulable state     PLAN  As above.       Dmitry Nielsen MD on 12/24/2024 at 11:29 AM.

## 2024-12-24 NOTE — PROGRESS NOTES
Critical Care Progress Note     Yarelis Jackson : 1970 MRN:[id number] LOS:6  Rm: 2308/1     Principal Problem: DKA (diabetic ketoacidosis)     Reason for follow up: All the medical problems listed below    Summary     Yarelis Jackson is a 54 y.o. female with PMH of COPD, migraine, and obesity, and presented to the hospital for altered mental status, and was admitted with a principal diagnosis of DKA (diabetic ketoacidosis).  Patient presented and per ER documentation, patient was oriented to person and time.  Per patient's mother, patient had been feeling ill since Friday with noted polyuria and polydipsia.  It was managed by giving patient milk and a grape soft drink.  Patient became increasingly agitated, preventing treatment, and was given a dose of Geodon in the ED.     In the ED, labs were obtained with the following abnormalities: Sodium 159, chloride 117, CO2 10.1, anion gap 31.9, BUN 88, creatinine 2.13, glucose 976, alk phos 246, , , hemoglobin A1c 15.8, moderate acetone, osmolality 461, WBC 14.65 without bandemia.  UA with >1000 glucose, 15 mg/dL of ketones, moderate blood and proteinuria noted, this did not reflex to culture.  Patient had an ABG with pH 7.179, pCO2 37.5, pO2 77.7, HCO3 14, with O2 saturation 91.7%.  Patient was determined to be in DKA, and was started with IV fluid boluses per DKA protocol as well as an insulin drip.  Patient did have blood cultures obtained, but per ED provider, patient has no obvious signs or symptoms of active section, therefore no antimicrobials were started.    Patient sodium level remained persistent, and IV fluids were changed to withhold any sodium containing products.  Nephrology was consulted.  Patient also underwent placement of feeding tube for free water flush hourly overnight.  On , nephrology decided that patient would proceed with hemodialysis and requested nontunneled catheter placement.  Patient was becoming increasingly  agitated, with concerned that patient was no longer able to protect her own airway.  After discussion with patient's family, patient was intubated, nontunneled catheter placement was completed, and patient did require initiation of vasopressors.  HD was transitioned to CRRT.  Shortly after initiation of CRRT, patient developed an acute change in patient's skin color of her right lower extremity with subsequent loss of pulse.  Vascular surgery was stat consulted as well as completion of stat arterial duplex and ABIs.  Patient was started on a heparin drip.  Vascular surgery promptly evaluated patient and patient was scheduled to go to the OR for emergent thrombectomy.    Significant Events / Subjective     12/24/24 : POD #6 from initial surgery. POD #4 right leg revascularization. POD#1 completion of right leg fasciotomy. Patient extubated.  CK continuing to decrease.  Platelets continuing to improve.  Patient remains on heparin drip. Vascular planning to remove surgical dressing and wrap tomorrow. Continue betadine daily and cover with dry dressing. Nephrology planning for HD today. 2.5L removed w/ HD 12/23.     Assessment / Plan     Diabetic ketoacidosis without coma, with new onset diabetes mellitus, type 2 / Metabolic acidosis, increased anion gap  Etiology uncertain, though new onset diabetes mellitus.  A1c 15.80 on admission, no prior available.  Anion gap of 31.9 on admission.  On no home medication regimen.  Acetone moderate, beta hydroxybutyrate 10.79  Initially started on DKA protocol.  Insulin gtt now managed by Endocrinology  Nephrology consulted.     Acute Kidney Injury  Remains nonoliguric. Baseline creatinine 0.90.  Creatinine 2.10 on admission.  Likely prerenal. Possible intrinsic component due to ATN from dehydration, nausea, vomiting, and hypotension.   C/w IV fluids as ordered.  Monitor Input/Output very closely.   Avoid NSAIDs, nephrotoxic medications, and hypotension.  Nephrology consulted,  initially treated with CRRT which has been transitioned to HD. Planned HD session again today 12/24  Net IO Since Admission: 19,975 mL [12/24/24 1118]    Septic Shock --> Improving  Etiology pneumonia Staphylococcus aureus & Streptococcus agalactiae (group B)  Sputum culture 12/19 with Staphylococcus aureus & Streptococcus agalactiae (group B)  Blood cultures-no growth  Respiratory viral panel-negative.  Urine antigens for legionella and strep pna negative  MRSA screen positive.  UA did not reflex to culture.  Started on ceftriaxone and vancomycin on 12/19.  Fluid refractory hypotension after receiving sepsis bundle IVF resuscitation  Titrate vasopressors if needed for a target MAP of 65.  Currently on Levophed    Acute occlusive thrombus of the right iliac artery with limb ischemia  Arterial duplex with noted right femoral, deep femoral, and popliteal arteries being patent but with very low amplitude monophasic signals, suggesting severe inflow stenosis or occlusion; no measurable Doppler flow in the anterior or posterior tibial and peroneal arteries.  Bilateral lower extremity ABIs ordered: Right STACEY critically reduced with STACEY of 0 and severe digital insufficiency; left STACEY is normal with moderate digital insufficiency.  Emergent surgery per vascular surgeon 12/19/2024 for right iliac thrombectomy via open groin incision then return to surgery on 12/20/2024 for recurrent right lower extremity ischemia due to arterial thrombosis and underwent right iliofemoral popliteal thrombectomy, right femoral endarterectomy with patch, right lower extremity arteriogram, and closed right calf fasciotomies  Heparin drip was initiated upon diagnosis of acute occlusive thrombus however PTTs have been extremely labile and supratherapeutic at times.    No new active bleeding from mouth or via ET tube.   Hematology/oncology consulted, HIT panel pending  May need to switch to argatroban if bleeding recurs  Return to the OR this  morning (12/23) for emergent 4 compartment fasciotomies due to critical right leg ischemia    Nontraumatic rhabdomyolysis --> Improving  CK has begun to trend down, now 13,931  Continue IV fluids as ordered.  Nephrology following.    Hypertriglyceridemia, concern for pancreatitis  Triglyceride 1700 decreased to 336  Amylase 311, lipase 33  Ultrasound of pancreas 12/19 with no acute findings  Avoid lipid containing substances.    Acute metabolic encephalopathy --> Improving  Metabolic encephalopathy secondary to DKA and septic shock  CT of the head: Without acute abnormalities.  UDS-negative.  HIV-negative.  Currently on continuous sedation, mechanically ventilated     Acute respiratory failure without hypercapnia / On mechanical Ventilation --> Extubated 12/24/24  Intubated for airway protection due to altered mental status and secretions with ineffective airway clearance  Ventilator settings noted and adjusted as needed.   When patient is more awake, will attempt a spontaneous breathing trial. Wean PEEP and FiO2 as tolerated.  Minimize sedation and analgesia to target a RASS of 0 to -1.      Cutaneous candidiasis of groin  Likely secondary to uncontrolled diabetes mellitus  Nystatin ordered    Essential Hypertension  Currently hypotensive, on Levophed  Clonidine patch ordered by nephrology-hold while on Levophed  Titrate medications as needed.     Bilateral leg rash, concern for scabies infection  Permethrin regimen ordered.  Completed first treatment on 12/18, repeat treatment in 2 weeks  Keep in contact precautions x 1 week post initial treatment (12/25)     Transaminitis  US of Liver: Hepatomegaly with steatosis.  Hepatitis panel-negative.  Monitor and trend LFTs.  .     COPD: Not in exacerbation.  Duonebs ordered as needed.  Obesity: Body mass index is 33.49 kg/m².    Disposition: Extubated today but high risk for re intubated as of now. Remains on low-dose levophed. Appropriate to remain in ICU.    Code  status:   Code Status (Patient has no pulse and is not breathing): CPR (Attempt to Resuscitate)  Medical Interventions (Patient has pulse or is breathing): Full Support       Nutrition:   NPO Diet NPO Type: Strict NPO   Tube Feeding: Formula: Novasource Renal (Nepro); Feeding Type: Continuous; Start at: 20 mL/hr; Then Advance By: 10 mL/hr; Every: 4 hours; To Goal Rate of: 35 mL/hr; Water Flush: 30 mL; Every: 1 hour; Water Bolus: None     VTE Prophylaxis:  Pharmacologic VTE prophylaxis orders are present.      Objective / Physical Exam     Vital signs:  Temp: 97.6 °F (36.4 °C)  BP: 117/58  Heart Rate: 74  Resp: 18  SpO2: 92 %  Weight: 100 kg (221 lb 5.5 oz)    Admission Weight: Weight: 92.5 kg (203 lb 14.4 oz)  Current Weight: Weight: 100 kg (221 lb 5.5 oz)    Input/Output in last 24 hours:    Intake/Output Summary (Last 24 hours) at 12/24/2024 1118  Last data filed at 12/24/2024 0638  Gross per 24 hour   Intake 4075 ml   Output 3130 ml   Net 945 ml      Net IO Since Admission: 19,975 mL [12/24/24 1118]     Physical Exam  Vitals and nursing note reviewed.   Constitutional:       Appearance: She is obese. She is ill-appearing and toxic-appearing.      Interventions: She is sedated, intubated and restrained.      Comments: Intubated and sedated   HENT:      Head: Normocephalic and atraumatic.      Nose: Nose normal.      Comments: Right Dobbhoff tube in place.     Mouth/Throat:      Mouth: Mucous membranes are dry.      Comments: ET tube in place  Eyes:      General: No scleral icterus.     Pupils: Pupils are equal, round, and reactive to light.   Cardiovascular:      Heart sounds: Normal heart sounds. No murmur heard.     No gallop.      Comments: Sinus bradycardia to sinus rhythm  Pulmonary:      Effort: She is intubated.      Breath sounds: No wheezing or rales.      Comments: Mechanically ventilated  Coarse but clear throughout  Abdominal:      General: There is no distension.      Palpations: Abdomen is soft.       Comments: Morbid body habitus  Bowel sounds active   Musculoskeletal:      Cervical back: Neck supple. No rigidity.      Comments: Left lower extremity with edema and chronic changes consistent with PVD  Extensive mottling throughout the left lower extremity  Right upper thigh with erythema and warmth as well as mottling  Right lower extremity with compression wrap, toes warm and pink  Finger nailbeds and fingertips dusky   Skin:     General: Skin is warm and dry.      Comments: Intertrigo  Mild mottling of the left lower extremity   Neurological:      Comments: Intubated.     Patient intermittently agitated.  Able to redirect.  Nodding head appropriately.          Radiology and Labs     Results from last 7 days   Lab Units 12/24/24  0344 12/23/24  1355 12/23/24  0429 12/22/24  0452 12/21/24  2351 12/21/24 2011 12/21/24  1307   WBC 10*3/mm3 12.14*  --  11.95* 7.59  --  8.22 8.44   HEMOGLOBIN g/dL 7.7* 9.0* 7.5* 7.2*  --  6.7* 7.5*   HEMOGLOBIN, POC g/dL  --   --   --   --  6.8*  --   --    HEMATOCRIT % 23.6* 27.1* 21.9* 22.1*  --  20.1* 22.6*   HEMATOCRIT POC %  --   --   --   --  20*  --   --    PLATELETS 10*3/mm3 131*  --  115* 63*  --  68* 85*      Results from last 7 days   Lab Units 12/24/24  0344 12/23/24  2124 12/23/24  1427 12/23/24  0800 12/23/24  0208 12/22/24  1803 12/22/24  1136 12/19/24  1952 12/19/24  1428   PROTIME Seconds  --   --   --   --   --   --  13.9  --  13.8   INR   --   --   --   --   --   --  1.07  --  1.05   APTT seconds 91.3* 81.7* 69.0* 60.8* 83.1*   < > 75.6*   < > <20.0*    < > = values in this interval not displayed.      Results from last 7 days   Lab Units 12/24/24  0344 12/23/24  1823 12/23/24  0429 12/22/24  2114 12/22/24  0452 12/21/24  2351 12/21/24  0405 12/20/24  2322   SODIUM mmol/L 137  --  138  --  138  --  138 136   POTASSIUM mmol/L 4.7 4.7 3.6 3.1* 3.5  --  5.0 5.3*   CHLORIDE mmol/L 102  --  104  --  103  --  105 104   CO2 mmol/L 22.7  --  23.8  --  22.3  --  18.8*  20.5*   BUN mg/dL 37*  --  29*  --  39*  --  46* 38*   CREATININE mg/dL 2.15*  --  1.73*  --  1.85* 2.09* 2.43* 2.09*   GLUCOSE mg/dL 258*  --  104*  --  236*  --  181* 154*   MAGNESIUM mg/dL 1.7  --  2.0  --  2.0  --  2.1 2.0   PHOSPHORUS mg/dL 2.8  --  3.9  --  3.6  --  6.8* 7.0*      Results from last 7 days   Lab Units 12/24/24  0344 12/23/24  0429 12/22/24  0452 12/21/24  0405 12/20/24  0436   ALK PHOS U/L 126* 135* 100 91 103   AST (SGOT) U/L 970* 939* 271* 234* 185*   ALT (SGPT) U/L 610* 430* 96* 101* 116*     Results from last 7 days   Lab Units 12/24/24  0324 12/23/24  1532 12/23/24  0355 12/22/24  0424 12/21/24  2351   PH, ARTERIAL pH units 7.425 7.404 7.465* 7.424 7.392   PCO2, ARTERIAL mm Hg 37.8 42.7 35.5 38.0 41.6   PO2 ART mm Hg 113.7* 89.8 91.0 82.2* 111.6*   O2 SATURATION ART % 98.6* 96.9 97.6 96.3 98.3*   FIO2 % 40 30 30 35 35   HCO3 ART mmol/L 24.8 26.7 25.5 24.9 25.3   BASE EXCESS ART mmol/L 0.4 1.7 1.7 0.5 0.3       No radiology results for the last day    Current medications     Scheduled Meds:   albumin human, 25 g, Intravenous, Q8H  calcium gluconate, 2,000 mg, Intravenous, Q8H  chlorhexidine, 15 mL, Mouth/Throat, Q12H  HYDROmorphone, , ,   insulin glargine, 18 Units, Subcutaneous, Nightly  insulin lispro, 2-7 Units, Subcutaneous, Q6H  lansoprazole, 30 mg, Nasogastric, Q AM  nystatin, 1 Application, Topical, Q8H  [START ON 12/25/2024] permethrin, 1 Application, Topical, Once  QUEtiapine, 25 mg, Nasogastric, Q12H  sodium chloride, 10 mL, Intravenous, Q12H        Continuous Infusions:   dexmedetomidine, 0.2-1.5 mcg/kg/hr, Last Rate: Stopped (12/24/24 1117)  fentanyl 10 mcg/mL,  mcg/hr, Last Rate: Stopped (12/24/24 1030)  heparin, 16.9 Units/kg/hr, Last Rate: 19 Units/kg/hr (12/24/24 0513)  norepinephrine, 0.02-0.3 mcg/kg/min, Last Rate: 0.05 mcg/kg/min (12/24/24 0610)      CC time: 38 minutes     Critical care services to this patient including but not limited to: review of labs/  microbiology/imaging/medications, serial monitoring of vital signs, adjusting ventilator settings as needed, review of other consultant's notes, review of events in the last 24 hrs, monitoring input/output, review of treatment plan with bedside nurse, RT and other treatment team, management of life support and nutrition needs.    Time spent in performing separately billable procedures and updating family is not included in the critical care time.       NICHOLAS Hess   Critical Care  12/24/24   11:18 EST

## 2024-12-24 NOTE — CASE MANAGEMENT/SOCIAL WORK
Continued Stay Note   Cesar     Patient Name: Yarelis Jackson  MRN: [id number]  Today's Date: 12/24/2024    Admit Date: 12/18/2024    Plan: Plan to dc home with parents, pending clinical course.   Discharge Plan       Row Name 12/24/24 0948       Plan    Plan Plan to dc home with parents, pending clinical course.    Plan Comments DC Barriers: vent 40/5, A-line, NG TF, NPO, DKA, FC, IV ABxs, HD CRRT, Heparin/Levo/Fent/Dex, HemOnc/Vasc Sx/Nephro/Endocrine following.                 Expected Discharge Date and Time       Expected Discharge Date Expected Discharge Time    Dec 30, 2024               WAYNE Brown RN  ICU/CVU   O: [phone]  C: [phone]  [email or web address]

## 2024-12-24 NOTE — PROGRESS NOTES
"NEPHROLOGY PROGRESS NOTE------[facility]    [facility]  [phone]  Deidra Starr MD      Patient Care Team:  Katie Duran PA as PCP - General (Physician Assistant)  Uli Starr MD as Consulting Physician (Nephrology)      Provider:  Deidra Starr MD  Patient Name: Yarelis Jackson  :  1970    SUBJECTIVE:    F/U ARF/MARGOT/ELECTROLYTE ABNORMALITIES    INTUBATED ON VENT    Medication:  HYDROmorphone, , ,   chlorhexidine, 15 mL, Mouth/Throat, Q12H  cloNIDine, 1 patch, Transdermal, Weekly  insulin glargine, 18 Units, Subcutaneous, Nightly  insulin lispro, 2-7 Units, Subcutaneous, Q6H  lansoprazole, 30 mg, Nasogastric, Q AM  nystatin, 1 Application, Topical, Q8H  [START ON 2024] permethrin, 1 Application, Topical, Once  QUEtiapine, 25 mg, Nasogastric, Q12H  sodium chloride, 10 mL, Intravenous, Q12H      dexmedetomidine, 0.2-1.5 mcg/kg/hr, Last Rate: 1 mcg/kg/hr (24 0638)  fentanyl 10 mcg/mL,  mcg/hr, Last Rate: 100 mcg/hr (24 0550)  heparin, 16.9 Units/kg/hr, Last Rate: 19 Units/kg/hr (24 0513)  norepinephrine, 0.02-0.3 mcg/kg/min, Last Rate: 0.05 mcg/kg/min (24 0610)        OBJECTIVE    Vital Sign Min/Max for last 24 hours  Temp  Min: 97.5 °F (36.4 °C)  Max: 99.3 °F (37.4 °C)   BP  Min: 95/60  Max: 141/79   Pulse  Min: 57  Max: 128   Resp  Min: 16  Max: 27   SpO2  Min: 94 %  Max: 100 %   No data recorded   Weight  Min: 100 kg (221 lb 5.5 oz)  Max: 100 kg (221 lb 5.5 oz)     Flowsheet Rows      Flowsheet Row First Filed Value   Admission Height 162.6 cm (64\") Documented at 2024 0443   Admission Weight 92.5 kg (203 lb 14.4 oz) Documented at 2024 0527            No intake/output data recorded.  I/O last 3 completed shifts:  In: 6530 [I.V.:4396; Blood:300; Other:960; NG/GT:874]  Out: 3505 [Urine:955]    Physical Exam:  ON LEVOPHED  General Appearance:  INTUBATED ON VENT  Head: " "normocephalic, without obvious abnormality and atraumatic +ETT INTACT  Eyes: conjunctivae and sclerae normal and no icterus  Neck: supple and no JVD  Lungs: clear to auscultation and respirations regular  Heart: regular rhythm & normal rate and normal S1, S2 +THOMAS  Chest: Wall no abnormalities observed  Abdomen: normal bowel sounds and soft   Extremities: moves extremities well, +TRACE/1+ BILAT PRETIBIAL EDEMA, no cyanosis and no redness. +DJD  Skin: +BILAT PRETIBIAL FLUID FILLED BULLAE  Neurologic:  INTUBATED    Labs:    WBC WBC   Date Value Ref Range Status   12/24/2024 12.14 (H) 3.40 - 10.80 10*3/mm3 Final   12/23/2024 11.95 (H) 3.40 - 10.80 10*3/mm3 Final   12/22/2024 7.59 3.40 - 10.80 10*3/mm3 Final   12/21/2024 8.22 3.40 - 10.80 10*3/mm3 Final   12/21/2024 8.44 3.40 - 10.80 10*3/mm3 Final      HGB Hemoglobin   Date Value Ref Range Status   12/24/2024 7.7 (L) 12.0 - 15.9 g/dL Final   12/23/2024 9.0 (L) 12.0 - 15.9 g/dL Final     Comment:     Result checked     12/23/2024 7.5 (L) 12.0 - 15.9 g/dL Final   12/22/2024 7.2 (L) 12.0 - 15.9 g/dL Final   12/21/2024 6.8 (C) 12.0 - 17.0 g/dL Final   12/21/2024 6.7 (C) 12.0 - 15.9 g/dL Final   12/21/2024 7.5 (L) 12.0 - 15.9 g/dL Final      HCT Hematocrit   Date Value Ref Range Status   12/24/2024 23.6 (L) 34.0 - 46.6 % Final   12/23/2024 27.1 (L) 34.0 - 46.6 % Final   12/23/2024 21.9 (L) 34.0 - 46.6 % Final   12/22/2024 22.1 (L) 34.0 - 46.6 % Final   12/21/2024 20 (L) 38 - 51 % Final   12/21/2024 20.1 (C) 34.0 - 46.6 % Final   12/21/2024 22.6 (L) 34.0 - 46.6 % Final      Platelets No results found for: \"LABPLAT\"   MCV MCV   Date Value Ref Range Status   12/24/2024 93.7 79.0 - 97.0 fL Final   12/23/2024 94.4 79.0 - 97.0 fL Final   12/22/2024 95.7 79.0 - 97.0 fL Final   12/21/2024 99.5 (H) 79.0 - 97.0 fL Final   12/21/2024 100.0 (H) 79.0 - 97.0 fL Final          Sodium Sodium   Date Value Ref Range Status   12/24/2024 137 136 - 145 mmol/L Final   12/23/2024 138 136 - 145 " "mmol/L Final   12/22/2024 138 136 - 145 mmol/L Final      Potassium Potassium   Date Value Ref Range Status   12/24/2024 4.7 3.5 - 5.2 mmol/L Final   12/23/2024 4.7 3.5 - 5.2 mmol/L Final   12/23/2024 3.6 3.5 - 5.2 mmol/L Final   12/22/2024 3.1 (L) 3.5 - 5.2 mmol/L Final   12/22/2024 3.5 3.5 - 5.2 mmol/L Final      Chloride Chloride   Date Value Ref Range Status   12/24/2024 102 98 - 107 mmol/L Final   12/23/2024 104 98 - 107 mmol/L Final   12/22/2024 103 98 - 107 mmol/L Final      CO2 CO2   Date Value Ref Range Status   12/24/2024 22.7 22.0 - 29.0 mmol/L Final   12/23/2024 23.8 22.0 - 29.0 mmol/L Final   12/22/2024 22.3 22.0 - 29.0 mmol/L Final      BUN BUN   Date Value Ref Range Status   12/24/2024 37 (H) 6 - 20 mg/dL Final   12/23/2024 29 (H) 6 - 20 mg/dL Final   12/22/2024 39 (H) 6 - 20 mg/dL Final      Creatinine Creatinine   Date Value Ref Range Status   12/24/2024 2.15 (H) 0.57 - 1.00 mg/dL Final   12/23/2024 1.73 (H) 0.57 - 1.00 mg/dL Final   12/22/2024 1.85 (H) 0.57 - 1.00 mg/dL Final   12/21/2024 2.09 (H) 0.60 - 1.30 mg/dL Final     Comment:     Serial Number: [id number]:  [id number]      Calcium Calcium   Date Value Ref Range Status   12/24/2024 7.6 (L) 8.6 - 10.5 mg/dL Final   12/23/2024 9.2 8.6 - 10.5 mg/dL Final   12/22/2024 8.2 (L) 8.6 - 10.5 mg/dL Final      PO4 No components found for: \"PO4\"   Albumin Albumin   Date Value Ref Range Status   12/24/2024 2.9 (L) 3.5 - 5.2 g/dL Final   12/23/2024 3.1 (L) 3.5 - 5.2 g/dL Final   12/22/2024 2.8 (L) 3.5 - 5.2 g/dL Final      Magnesium Magnesium   Date Value Ref Range Status   12/24/2024 1.7 1.6 - 2.6 mg/dL Final   12/23/2024 2.0 1.6 - 2.6 mg/dL Final   12/22/2024 2.0 1.6 - 2.6 mg/dL Final      Uric Acid No components found for: \"URIC ACID\"     Imaging Results (Last 72 Hours)       Procedure Component Value Units Date/Time    XR Chest 1 View [[id number]] Collected: 12/22/24 0704     Updated: 12/22/24 0709    Narrative:        XR CHEST 1 VW    Date of Exam: " 12/22/2024 12:35 AM EST    Indication: Intubated Patient    Comparison:  12/21/2024    FINDINGS:  Endotracheal tube tip appears in satisfactory position at the level of the aortic arch. Right IJ catheter appears in stable position. Enteric tube extends into the left upper quadrant. Heart size and mediastinal contour appear within normal limits. No   definite pneumothorax or effusion. Mild bibasilar opacities. Findings of advanced emphysema with suspected apical scarring.      Impression:      1.Tubes and lines appear in satisfactory positions, as above.  2.Mild bibasilar opacities which could represent atelectasis or infiltrate.  3.Advanced emphysema.        Electronically Signed: Sam Ivy    12/22/2024 7:07 AM EST    Workstation ID: [id number]    XR Chest 1 View [[id number]] Collected: 12/21/24 0953     Updated: 12/21/24 0956    Narrative:      XR CHEST 1 VW    Date of Exam: 12/21/2024 4:32 AM EST    Indication: Intubated Patient    Comparison: 12/20/2024    Findings:  There is been no interval tube or line changes. Heart size and pulmonary vessels are within normal limits. There are postoperative changes of the bilateral upper lobes. Lungs appear clear. No pleural effusion or pneumothorax.      Impression:      Impression:    1. No tube or line change  2. No focal airspace consolidation or other acute process.      Electronically Signed: Julio Thorpe MD    12/21/2024 9:54 AM EST    Workstation ID: [id number]            Results for orders placed during the hospital encounter of 12/18/24    XR Chest 1 View    Narrative  XR CHEST 1 VW    Date of Exam: 12/22/2024 12:35 AM EST    Indication: Intubated Patient    Comparison:  12/21/2024    FINDINGS:  Endotracheal tube tip appears in satisfactory position at the level of the aortic arch. Right IJ catheter appears in stable position. Enteric tube extends into the left upper quadrant. Heart size and mediastinal contour appear within normal limits. No  definite  pneumothorax or effusion. Mild bibasilar opacities. Findings of advanced emphysema with suspected apical scarring.    Impression  1.Tubes and lines appear in satisfactory positions, as above.  2.Mild bibasilar opacities which could represent atelectasis or infiltrate.  3.Advanced emphysema.        Electronically Signed: Sam Haynes  12/22/2024 7:07 AM EST  Workstation ID: [id number]      XR Chest 1 View    Narrative  XR CHEST 1 VW    Date of Exam: 12/21/2024 4:32 AM EST    Indication: Intubated Patient    Comparison: 12/20/2024    Findings:  There is been no interval tube or line changes. Heart size and pulmonary vessels are within normal limits. There are postoperative changes of the bilateral upper lobes. Lungs appear clear. No pleural effusion or pneumothorax.    Impression  Impression:    1. No tube or line change  2. No focal airspace consolidation or other acute process.      Electronically Signed: Julio Thorpe MD  12/21/2024 9:54 AM EST  Workstation ID: [id number]      XR Chest 1 View    Narrative  XR CHEST 1 VW    Date of Exam: 12/20/2024 12:10 AM EST    Indication: Intubated Patient    Comparison: 12/19/2024    Findings:  Endotracheal tube approximately 2.5 cm above the carmina. Feeding tube and right IJ dialysis catheter remain in place. No new tubes or lines heart size and pulmonary vasculature are stable. Bullous changes in the right upper lobe. Postoperative changes in  the left upper lobe. Lungs grossly clear. No pneumothorax    Impression  Impression:  Stable chest demonstrating COPD and postoperative changes with no acute cardiopulmonary process demonstrated      Electronically Signed: Shon Ponce  12/20/2024 7:19 AM EST  Workstation ID: [id number]      Results for orders placed during the hospital encounter of 12/18/24    Duplex Lower Extremity Art / Grafts - Right CAR    Interpretation Summary    Limited study due to body habitus and current dressing site.  Patent external iliac and femoral-popliteal  arterial flow with low flow noted below the common femoral.  Common femoral artery poorly visualized.  Cannot rule out occlusion of the common femoral artery.        ASSESSMENT / PLAN      DKA (diabetic ketoacidosis)    Arterial thrombosis    ARF/MARGOT------Oliguric. +ARF/MARGOT with historically normal renal function. +ARF/MARGOT secondary to severe prerenal state and ATN from hypotension and Rhabdomyolysis. Hydrate aggressively. Pressor support. Will do regular HD again today. Off IVFs. No NSAIDs. Renal US without obstructive uropathy.   Dose meds for CrCl less than 10 cc/min     2. HYPERNATREMIA/HYPEROSMOLAR STATE-----Better.       3. ACIDOSIS--Better with HD     4. DVT PROPHYLAXIS-----On Heparin gtt     5. HYPOCALCEMIA------Replace IV and follow ionized levels     6. DM------Insulin gtt per protocol     7. ANEMIA---------H/H stable     8. OA/DJD/HYPERURICEMIA------Allopurinol . No NSAIDs     9. RHABDOMYOLYSIS-------Off IVFs. HD again today     10. GERD/PUD PROPHYLAXIS-----Renal dose adjusted Pepcid     11. DELIRIUM/TME     12. COPD/ACUTE HYPOXIC RESPIRATORY FAILURE------intubated on vent    13. HYPOALBUMINEMIA-----S/P IV Albumin to temporize    14. EDEMA/VOLUME EXCESS-----D/C IVFs. Gentle UF with HD    15. HYPOTENSION-----Back on Levophed. D/C Catapres patch    16. ELEVATED LFTS/TRANSAMINASES------Secondary to shock liver and Rhabdo. Follow    17. FASCITIS------S/P surgery yesterday    Deidra Starr MD  [facility]  [phone]  12/24/24  08:07 EST

## 2024-12-24 NOTE — PLAN OF CARE
"Goal Outcome Evaluation:              Outcome Evaluation: Pt abed presently, eyes closed. Pt extubated at 1045 this shift, has been moderately agitated since, cursing, saying \"I've got to get out of here\", pt is not redirectable. Precedex gtt remains on. Pt also cont on levo gtt, and heparin gtt. FC in place draining cloudy straw colored urine to bsd, UOP sufficient this shift. Pt NGT in place, novasource renal continues. Pt was placed in 4pt restraints this shift d/t kicking, drawing legs up and forcing self downward in the bed. Pt was placed on 9L HFNC for o2 support. Pt receiving hemodialysis this shift, tolerating well. VSS, plan of care on going.                             "

## 2024-12-24 NOTE — PROGRESS NOTES
Nutrition Services  Patient Name: Yarelis Jackson  YOB: 1970  MRN: [id number]  Admission date: 12/18/2024    PROGRESS NOTE      Nutrition Intervention Updates: Continue current TF prescription, at goal         Encounter Information: Checking in on tube feeding. Pt remains intubated. Pt is on precedex, fentanyl, heparin, levo.  S/P right leg revascularization per vascular surgery 12/23.         PO Diet: NPO Diet NPO Type: Strict NPO   PO Supplements: NPO   PO Intake:  NPO       Current nutrition support: Novasource renal at 35 ml/hr + 30 ml/hr FWF    Nutrition support review: Running as ordered per EMR        Labs (reviewed below): Reviewed.        GI Function:  No BM yet (x 6 days ago), pt has stool softeners ordered PRN. MD to add Miralax daily scheduled 12/23       Brief weight review   Wt Readings from Last 10 Encounters:   12/24/24 0431 100 kg (221 lb 5.5 oz)   12/24/24 0333 100 kg (221 lb 5.5 oz)   12/19/24 0600 88.5 kg (195 lb 1.7 oz)   12/18/24 0527 92.5 kg (203 lb 14.4 oz)   07/15/23 1132 98.1 kg (216 lb 4.3 oz)   04/04/23 0719 98.1 kg (216 lb 4.3 oz)   12/12/22 0952 85.5 kg (188 lb 6.4 oz)   01/31/22 1404 89.8 kg (198 lb)   01/24/22 1406 89.8 kg (198 lb)   12/23/21 1133 89.8 kg (198 lb)   11/24/21 1537 90.3 kg (199 lb)   11/16/21 0838 90.3 kg (199 lb)   09/29/21 0320 87.4 kg (192 lb 10.9 oz)        Results from last 7 days   Lab Units 12/24/24  0344 12/23/24  1823 12/23/24  0429 12/22/24  2114 12/22/24  0452   SODIUM mmol/L 137  --  138  --  138   POTASSIUM mmol/L 4.7 4.7 3.6   < > 3.5   CHLORIDE mmol/L 102  --  104  --  103   CO2 mmol/L 22.7  --  23.8  --  22.3   BUN mg/dL 37*  --  29*  --  39*   CREATININE mg/dL 2.15*  --  1.73*  --  1.85*   CALCIUM mg/dL 7.6*  --  9.2  --  8.2*   BILIRUBIN mg/dL 0.5  --  0.7  --  0.2   ALK PHOS U/L 126*  --  135*  --  100   ALT (SGPT) U/L 610*  --  430*  --  96*   AST (SGOT) U/L 970*  --  939*  --  271*   GLUCOSE mg/dL 258*  --  104*  --  236*    < > =  values in this interval not displayed.     Results from last 7 days   Lab Units 12/24/24  0344 12/23/24  1355 12/23/24  0429 12/22/24  0452 12/21/24  1307 12/21/24  0405   MAGNESIUM mg/dL 1.7  --  2.0 2.0  --  2.1   PHOSPHORUS mg/dL 2.8  --  3.9 3.6  --  6.8*   HEMOGLOBIN g/dL 7.7*   < > 7.5* 7.2*   < > 8.7*   HEMOGLOBIN, POC   --   --   --   --    < >  --    HEMATOCRIT % 23.6*   < > 21.9* 22.1*   < > 27.2*   HEMATOCRIT POC   --   --   --   --    < >  --    TRIGLYCERIDES mg/dL  --   --   --   --   --  336*    < > = values in this interval not displayed.         RD to follow up per protocol.    Electronically signed by:  Ashley Hernandez RD  12/24/24 10:10 EST

## 2024-12-24 NOTE — PROGRESS NOTES
ENDOCRINE CONSULT PROGRESS NOTE  DATE OF SERVICE: 24        PATIENT NAME: Yarelis Jackson  PATIENT : 1970 AGE: 54 y.o.  MRN NUMBER: [id number]    ==========================================================================    CHIEF COMPLAINT: Type 1 diabetes management    CARE TEAM:   Patient Care Team:  Katie Duran PA as PCP - General (Physician Assistant)  Uli Starr MD as Consulting Physician (Nephrology)    SUBJECTIVE    Pt seen and examined.  Intubated on mechanical ventilation still requiring pressor therapy.,   Blood sugar reviewed for last 24 hours.    ==========================================================================    CURRENT ACTIVE HOSPITAL MEDICATIONS    Scheduled Medications:  albumin human, 25 g, Intravenous, Q8H  calcium gluconate, 2,000 mg, Intravenous, Q8H  chlorhexidine, 15 mL, Mouth/Throat, Q12H  insulin glargine, 18 Units, Subcutaneous, Nightly  insulin lispro, 2-7 Units, Subcutaneous, Q6H  lansoprazole, 30 mg, Nasogastric, Q AM  nystatin, 1 Application, Topical, Q8H  [START ON 2024] permethrin, 1 Application, Topical, Once  QUEtiapine, 25 mg, Nasogastric, Q12H  sodium chloride, 10 mL, Intravenous, Q12H         PRN Medications:    acetaminophen **OR** acetaminophen    albumin human    aluminum-magnesium hydroxide-simethicone    senna-docusate sodium **AND** polyethylene glycol **AND** bisacodyl **AND** bisacodyl    Calcium Replacement - Follow Nurse / BPA Driven Protocol    dextrose    dextrose    glucagon (human recombinant)    heparin (porcine)    ipratropium-albuterol    Magnesium Standard Dose Replacement - Follow Nurse / BPA Driven Protocol    nitroglycerin    ondansetron ODT **OR** ondansetron    oxyCODONE    Phosphorus Replacement - Follow Nurse / BPA Driven Protocol    Potassium Replacement - Follow Nurse / BPA Driven Protocol    sodium chloride    sodium chloride    sodium chloride      ==========================================================================    OBJECTIVE    Vitals:    12/24/24 1400   BP: 154/87   Pulse: 110   Resp: 24   Temp:    SpO2: 98%      Body mass index is 37.99 kg/m².     General - Intubated on mechanical ventilation    ==========================================================================    LAB EVALUATION    Lab Results   Component Value Date    GLUCOSE 258 (H) 12/24/2024    BUN 37 (H) 12/24/2024    CREATININE 2.15 (H) 12/24/2024    EGFRIFNONA 87 09/29/2021    BCR 17.2 12/24/2024    K 4.7 12/24/2024    CO2 22.7 12/24/2024    CALCIUM 7.6 (L) 12/24/2024    ALBUMIN 2.9 (L) 12/24/2024     (H) 12/24/2024     (H) 12/24/2024       Lab Results   Component Value Date    HGBA1C 15.80 (H) 12/18/2024     Lab Results   Component Value Date    CREATININE 2.15 (H) 12/24/2024     Results from last 7 days   Lab Units 12/24/24  1121 12/24/24  0501 12/24/24  0324 12/24/24  0012 12/23/24  2210 12/23/24 2027   GLUCOSE mg/dL 330* 277* 245* 259* 139* 76     ==========================================================================    ASSESSMENT AND PLAN    # Type 1 diabetes with hyperglycemia  # DKA on admission, resolved  - Reviewed blood sugar for last 24 hours  - For nutrition patient is currently on tube feeding with minimal residuals  - Adjusted insulin therapy as:  Insulin Lantus 20 units nightly  Insulin Lispro 3 units Q6hrs  Continue Insulin lispro sliding scale every 6 hours as well  - Will review blood sugar for next 24 hours and adjust therapy accordingly    Will follow with you.  Rest as per primary team.    Part of this note may be an electronic transcription/translation of spoken language to printed text using the Dragon Dictation System.     Note: Portions of this note may have been copied from previous notes but documentation have been reviewed and edited as necessary to support clinical decision making for today's visit.  The time of this note does not  reflect the time I saw the patient but the time that this note was written.    ==========================================================================  Patel Winkler MD  Department of Endocrine, Diabetes and Metabolism  [facility] [place]  ==========================================================================

## 2024-12-24 NOTE — PROGRESS NOTES
Hematology/Oncology Inpatient Progress Note    PATIENT NAME: Yarelis Jackson  : 1970  MRN: [id number]    CHIEF COMPLAINT: Bleeding    HISTORY OF PRESENT ILLNESS:    Yarelis Jackson is a 54 y.o. female who presented to [facility] on 2024 with complaints of critical ischemia to the leg treated with serial operations with improvement.  Patient had right lower extremity ischemia due to thromboembolism to the right common femoral artery.  She underwent thrombectomy.  She was subsequently found to have a flap in the right femoral artery which was repaired on 2024 without any thrombus associated.  She was placed on IV heparin and developed hypotension with bleeding she has also had precipitous drop in her platelet count.  There is concern for possible HIT.  Heparin drip has since then been discontinued and we have been asked to see her for further evaluation and management of thrombocytopenia possibility of HIT  Review of her labs indicate that she had platelet counts of 99,000 as of 2024 subsequently dropped to 63,000.  She also has decreasing hemoglobin from 8.7-7.2 within the past 24 hours     24  Hematology/Oncology was consulted for thrombocytopenia and possible HIT.  Thrombophilia workup has also been initiated by primary team results are pending     He/She  has a past medical history of COPD (chronic obstructive pulmonary disease), Low back pain, Migraine, and Neck pain.     PCP: Sumit Duran PA  Subjective   She remains intubated on the vent    ROS:  Review of Systems   Genitourinary:  Positive for difficulty urinating.        MEDICATIONS:    Scheduled Meds:  chlorhexidine, 15 mL, Mouth/Throat, Q12H  cloNIDine, 1 patch, Transdermal, Weekly  [START ON 2024] insulin glargine, 18 Units, Subcutaneous, Nightly  [START ON 2024] insulin lispro, 2-7 Units, Subcutaneous, Q6H  lansoprazole, 30 mg, Nasogastric, Q AM  nystatin, 1 Application, Topical,  "Q8H  [START ON 12/25/2024] permethrin, 1 Application, Topical, Once  QUEtiapine, 25 mg, Nasogastric, Q12H  sodium chloride, 10 mL, Intravenous, Q12H  sodium chloride, 10 mL, Intravenous, Q12H       Continuous Infusions:  dexmedetomidine, 0.2-1.5 mcg/kg/hr, Last Rate: 1.5 mcg/kg/hr (12/23/24 1914)  fentanyl 10 mcg/mL,  mcg/hr, Last Rate: 50 mcg/hr (12/23/24 1831)  heparin, 16.9 Units/kg/hr, Last Rate: 19 Units/kg/hr (12/23/24 1509)  insulin, 0-100 Units/hr, Last Rate: 2.1 Units/hr (12/23/24 1524)  norepinephrine, 0.02-0.3 mcg/kg/min, Last Rate: 0.02 mcg/kg/min (12/23/24 1701)       PRN Meds:    acetaminophen **OR** acetaminophen    albumin human    aluminum-magnesium hydroxide-simethicone    senna-docusate sodium **AND** polyethylene glycol **AND** bisacodyl **AND** bisacodyl    Calcium Replacement - Follow Nurse / BPA Driven Protocol    dextrose    dextrose    dextrose    glucagon (human recombinant)    heparin (porcine)    [Transfer Hold] HYDROmorphone    ipratropium-albuterol    [Transfer Hold] LORazepam    Magnesium Standard Dose Replacement - Follow Nurse / BPA Driven Protocol    nitroglycerin    ondansetron ODT **OR** ondansetron    Phosphorus Replacement - Follow Nurse / BPA Driven Protocol    Potassium Replacement - Follow Nurse / BPA Driven Protocol    sodium chloride    sodium chloride    sodium chloride    sodium chloride    sodium chloride     ALLERGIES:    Allergies   Allergen Reactions    Aspirin GI Intolerance    Ibuprofen Itching       Objective    VITALS:   /79   Pulse 70   Temp 97.5 °F (36.4 °C) (Oral)   Resp 27   Ht 162.6 cm (64\")   Wt 88.5 kg (195 lb 1.7 oz)   LMP  (LMP Unknown)   SpO2 100%   BMI 33.49 kg/m²     PHYSICAL EXAM: (performed by MD)  Physical Exam  Constitutional:       General: She is in acute distress.      Appearance: She is ill-appearing.   HENT:      Right Ear: External ear normal.      Left Ear: External ear normal.   Cardiovascular:      Rate and Rhythm: " Normal rate.   Pulmonary:      Breath sounds: No wheezing or rhonchi.   Abdominal:      General: There is no distension.      Palpations: There is no mass.      Tenderness: There is no abdominal tenderness. There is no guarding or rebound.      Hernia: No hernia is present.   Musculoskeletal:      Right lower leg: Edema present.      Left lower leg: Edema present.   [facility]:      Comments: sedated       I have reexamined the patient and the results are consistent with the previously documented exam. Fany Borja MD      RECENT LABS:    Lab Results (last 24 hours)       Procedure Component Value Units Date/Time    HIV-1 / O / 2 Ag / Antibody [[id number]]  (Normal) Collected: 12/23/24 1629    Specimen: Blood Updated: 12/23/24 1913     HIV-1/ HIV-2 Non-Reactive    Narrative:      The HIV antibody/antigen combo assay is a qualitative assay for HIV that includes the p24 antigen as well as antibodies to HIV types 1 and 2. This test is intended to be used as a screening assay in the diagnosis of HIV infection in patients over the age of 2.    Potassium [[id number]]  (Normal) Collected: 12/23/24 1823    Specimen: Blood Updated: 12/23/24 1845     Potassium 4.7 mmol/L     POC Glucose Once [[id number]]  (Abnormal) Collected: 12/23/24 1823    Specimen: Blood Updated: 12/23/24 1825     Glucose 144 mg/dL      Comment: Serial Number: [id number]:  [id number]       Cryoglobulin [[id number]] Collected: 12/23/24 1629    Specimen: Blood Updated: 12/23/24 1632    POC Glucose Once [[id number]]  (Abnormal) Collected: 12/23/24 1627    Specimen: Blood Updated: 12/23/24 1630     Glucose 148 mg/dL      Comment: Serial Number: [id number]:  [id number]       Blood Gas, Arterial - [[id number]] Collected: 12/23/24 1532    Specimen: Arterial Blood Updated: 12/23/24 1535     Site Arterial Line     Roman's Test N/A     pH, Arterial 7.404 pH units      pCO2, Arterial 42.7 mm Hg      pO2, Arterial 89.8 mm Hg      HCO3, Arterial 26.7  mmol/L      Base Excess, Arterial 1.7 mmol/L      Comment: Serial Number: [id number]:  [id number]        O2 Saturation, Arterial 96.9 %      CO2 Content 28.0 mmol/L      Barometric Pressure for Blood Gas --     Comment: N/A        Modality Adult Vent     FIO2 30 %      Ventilator Mode AC     Set Tidal Volume 500     PEEP 5     Hemodilution No     Respiratory Rate 26     PO2/FIO2 299    POC Glucose Once [[id number]]  (Abnormal) Collected: 12/23/24 1523    Specimen: Blood Updated: 12/23/24 1526     Glucose 146 mg/dL      Comment: Serial Number: [id number]:  [id number]       aPTT [[id number]]  (Abnormal) Collected: 12/23/24 1427    Specimen: Blood Updated: 12/23/24 1449     PTT 69.0 seconds     POC Glucose Once [[id number]]  (Abnormal) Collected: 12/23/24 1421    Specimen: Blood Updated: 12/23/24 1422     Glucose 127 mg/dL      Comment: Serial Number: [id number]:  [id number]       POC Glucose Once [[id number]]  (Abnormal) Collected: 12/23/24 1316    Specimen: Blood Updated: 12/23/24 1420     Glucose 137 mg/dL      Comment: Serial Number: [id number]:  [id number]       Hemoglobin & Hematocrit, Blood [[id number]]  (Abnormal) Collected: 12/23/24 1355    Specimen: Blood Updated: 12/23/24 1406     Hemoglobin 9.0 g/dL      Comment: Result checked          Hematocrit 27.1 %     POC Glucose Once [[id number]]  (Abnormal) Collected: 12/23/24 1213    Specimen: Blood Updated: 12/23/24 1316     Glucose 119 mg/dL      Comment: Serial Number: [id number]:  [id number]       Factor 2 Activity [[id number]] Collected: 12/20/24 1057    Specimen: Blood Updated: 12/23/24 1208     Factor II Activity 94 %     Narrative:      Performed at:  [address] [place] [address]  [id number]  : Nayana Lund MD, Phone:  [phone]    Factor 5 Leiden [[id number]]  (Normal) Collected: 12/20/24 1057    Specimen: Blood Updated: 12/23/24 1022     Factor V Leiden Normal    Narrative:      Factor V Leiden is  a specific mutation (R506Q) in the factor V gene that is associated with an increased risk of venous thrombosis.  Factor V Leiden is more resistant to inactivation by activated protein C.  Factor V Leiden refers to the G to A transition at nucleotide position 1691 of the Factor V gene, resulting in the substitution of the amino acid arginine by glutamine in the Factor V protein, causing resistance to cleavage by Activated Protein C (APC).    As a result, factor V persists in the circulation leading to a mild hyper-coagulable state.  The Leiden mutation accounts for 90% - 95% of APC resistance.  Factor V Leiden has been reported in patients with deep vein thrombosis, pulmonary embolus, central retinal vein occlusion, cerebral sinus thrombosis and hepatic vein thrombosis.  Other risk factors to be considered in the workup for venous thrombosis include the [id number] mutation in the factor II (prothrombin) gene, protein S and C deficiency, and antithrombin deficiencies. Anticardiolipin antibody and lupus anticoagulant analysis may be appropriate for certain patients, as well as homocysteine levels.    The expression of Factor V Leiden thrombophilia is impacted by coexisting genetic thrombophilic disorders, acquired thrombophilic disorders (malignancy, hyperhomocysteinemia, high factor VIII levels), and circumstances including: pregnancy, oral contraceptive use, hormone replacement therapy, selective estrogen receptor modulators, travel, central venous catheters, surgery, transplantation and advanced age.    In order to derive the most meaningful benefit from this testing, it may be necessary that the results and subsequent options from these complex genetic tests be discussed with patients by a trained genetic professional.    Tissue Pathology Exam [[id number]] Collected: 12/19/24 1706    Specimen: Tissue from Leg, Right Updated: 12/23/24 1014     Case Report --     Surgical Pathology Report                         Case:  [id number]                                  Authorizing Provider:  Ghassan Celestin MD  Collected:           12/19/2024 05:06 PM          Ordering Location:     [facility]       Received:            12/20/2024 09:05 AM                                 CARDIOVASCULAR OPERATING                                                                            ROOM                                                                         Pathologist:           Ghassan Benjamin MD                                                            Specimen:    Leg, Right, rght femoral embolus                                                            Final Diagnosis --     \"Thrombus\", right femoral artery, thrombectomy:  Partially organized blood, fibrinoid debris and acute inflammation consistent with clinical history    ELVIA       Gross Description --     1. Leg, Right.  Received in formalin designated right femoral embolus is a portion of reddish-tan blood clot measuring 3.8 x 1.5 x 0.8 cm.  Sections reveal homogenous reddish-somewhat yellow cut surfaces.  Representative sections are submitted in 1 cassette.  [facility]        POC Glucose Once [[id number]]  (Abnormal) Collected: 12/23/24 1006    Specimen: Blood Updated: 12/23/24 1011     Glucose 127 mg/dL      Comment: Serial Number: [id number]:  [id number]       aPTT [[id number]]  (Abnormal) Collected: 12/23/24 0800    Specimen: Blood Updated: 12/23/24 0820     PTT 60.8 seconds     Pathology Consultation [[id number]] Collected: 12/20/24 0639    Specimen: Blood, Venous Line Updated: 12/23/24 0808     Final Diagnosis --     Mild leukocytosis with left shifted granulocytes  Anemia  Thrombocytopenia  No blasts or schistocytes identified       Case Report --     Surgical Pathology Report                         Case: [id number]                                  Authorizing Provider:  Shamar Rodriguez DO  Collected:           12/20/2024 06:39 AM          Ordering Location:     " [facility]       Received:            12/23/2024 06:06 AM                                 [facility]                                                          Pathologist:           Ghassan Benjamin MD                                                            Specimen:    Blood, Venous Line                                                                         POC Glucose Once [[id number]]  (Abnormal) Collected: 12/23/24 0756    Specimen: Blood Updated: 12/23/24 0759     Glucose 125 mg/dL      Comment: Serial Number: [id number]:  [id number]       POC Glucose Once [[id number]]  (Abnormal) Collected: 12/23/24 0644    Specimen: Blood Updated: 12/23/24 0647     Glucose 138 mg/dL      Comment: Serial Number: [id number]:  [id number]       CBC & Differential [[id number]]  (Abnormal) Collected: 12/23/24 0429    Specimen: Blood Updated: 12/23/24 0616    Narrative:      The following orders were created for panel order CBC & Differential.  Procedure                               Abnormality         Status                     ---------                               -----------         ------                     CBC Auto Differential[[id number]]        Abnormal            Final result               Scan Slide[[id number]]                                       Final result                 Please view results for these tests on the individual orders.    CBC Auto Differential [[id number]]  (Abnormal) Collected: 12/23/24 0429    Specimen: Blood Updated: 12/23/24 0616     WBC 11.95 10*3/mm3      RBC 2.32 10*6/mm3      Hemoglobin 7.5 g/dL      Hematocrit 21.9 %      MCV 94.4 fL      MCH 32.3 pg      MCHC 34.2 g/dL      RDW 15.5 %      RDW-SD 53.2 fl      MPV 11.1 fL      Platelets 115 10*3/mm3     Scan Slide [[id number]] Collected: 12/23/24 0429    Specimen: Blood Updated: 12/23/24 0616     Scan Slide --     Comment: See Manual Differential Results       Manual Differential [[id number]]  (Abnormal) Collected:  12/23/24 0429    Specimen: Blood Updated: 12/23/24 0616     Neutrophil % 63.0 %      Lymphocyte % 27.0 %      Monocyte % 1.0 %      Bands %  6.0 %      Metamyelocyte % 3.0 %      Neutrophils Absolute 8.25 10*3/mm3      Lymphocytes Absolute 3.23 10*3/mm3      Monocytes Absolute 0.12 10*3/mm3      Poikilocytes Slight/1+     RBC Fragments Slight/1+     WBC Morphology Normal     Platelet Estimate Decreased    Blood Culture - Blood, Arm, Right [[id number]]  (Normal) Collected: 12/18/24 0557    Specimen: Blood from Arm, Right Updated: 12/23/24 0615     Blood Culture No growth at 5 days    Narrative:      Less than seven (7) mL's of blood was collected.  Insufficient quantity may yield false negative results.    POC Glucose Once [[id number]]  (Abnormal) Collected: 12/23/24 0540    Specimen: Blood Updated: 12/23/24 0543     Glucose 124 mg/dL      Comment: Serial Number: [id number]:  [id number]       Magnesium [[id number]]  (Normal) Collected: 12/23/24 0429    Specimen: Blood Updated: 12/23/24 0535     Magnesium 2.0 mg/dL     CK [[id number]]  (Abnormal) Collected: 12/23/24 0429    Specimen: Blood Updated: 12/23/24 0535     Creatine Kinase 13,931 U/L     Comprehensive Metabolic Panel [[id number]]  (Abnormal) Collected: 12/23/24 0429    Specimen: Blood Updated: 12/23/24 0535     Glucose 104 mg/dL      BUN 29 mg/dL      Creatinine 1.73 mg/dL      Sodium 138 mmol/L      Potassium 3.6 mmol/L      Chloride 104 mmol/L      CO2 23.8 mmol/L      Calcium 9.2 mg/dL      Total Protein 5.4 g/dL      Albumin 3.1 g/dL      ALT (SGPT) 430 U/L      AST (SGOT) 939 U/L      Alkaline Phosphatase 135 U/L      Total Bilirubin 0.7 mg/dL      Globulin 2.3 gm/dL      A/G Ratio 1.3 g/dL      BUN/Creatinine Ratio 16.8     Anion Gap 10.2 mmol/L      eGFR 35.0 mL/min/1.73     Narrative:      GFR Categories in Chronic Kidney Disease (CKD)      GFR Category          GFR (mL/min/1.73)    Interpretation  G1                     90 or greater         Normal  or high (1)  G2                      60-89                Mild decrease (1)  G3a                   45-59                Mild to moderate decrease  G3b                   30-44                Moderate to severe decrease  G4                    15-29                Severe decrease  G5                    14 or less           Kidney failure          (1)In the absence of evidence of kidney disease, neither GFR category G1 or G2 fulfill the criteria for CKD.    eGFR calculation 2021 CKD-EPI creatinine equation, which does not include race as a factor    Phosphorus [[id number]]  (Normal) Collected: 12/23/24 0429    Specimen: Blood Updated: 12/23/24 0523     Phosphorus 3.9 mg/dL     Blood Culture - Blood, Arm, Right [[id number]]  (Normal) Collected: 12/18/24 0504    Specimen: Blood from Arm, Right Updated: 12/23/24 0515     Blood Culture No growth at 5 days    Narrative:      Less than seven (7) mL's of blood was collected.  Insufficient quantity may yield false negative results.    Calcium, Ionized [[id number]]  (Normal) Collected: 12/23/24 0429    Specimen: Blood Updated: 12/23/24 0440     Ionized Calcium 1.20 mmol/L     POC Glucose Once [[id number]]  (Abnormal) Collected: 12/23/24 0427    Specimen: Blood Updated: 12/23/24 0428     Glucose 110 mg/dL      Comment: Serial Number: [id number]:  [id number]       Blood Gas, Arterial - [[id number]]  (Abnormal) Collected: 12/23/24 0355    Specimen: Arterial Blood Updated: 12/23/24 0357     Site Arterial Line     Roman's Test N/A     pH, Arterial 7.465 pH units      pCO2, Arterial 35.5 mm Hg      pO2, Arterial 91.0 mm Hg      HCO3, Arterial 25.5 mmol/L      Base Excess, Arterial 1.7 mmol/L      Comment: Serial Number: [id number]:  [id number]        O2 Saturation, Arterial 97.6 %      CO2 Content 26.6 mmol/L      Barometric Pressure for Blood Gas --     Comment: N/A        Modality Adult Vent     FIO2 30 %      Ventilator Mode AC     Set Tidal Volume 500     PEEP 5     Hemodilution  No     Respiratory Rate 26     PO2/FIO2 303    aPTT [[id number]]  (Abnormal) Collected: 12/23/24 0208    Specimen: Blood Updated: 12/23/24 0307     PTT 83.1 seconds     POC Glucose Once [[id number]]  (Abnormal) Collected: 12/23/24 0222    Specimen: Blood Updated: 12/23/24 0224     Glucose 125 mg/dL      Comment: Serial Number: [id number]:  [id number]       POC Glucose Once [[id number]]  (Abnormal) Collected: 12/23/24 0020    Specimen: Blood Updated: 12/23/24 0022     Glucose 136 mg/dL      Comment: Serial Number: [id number]:  [id number]       POC Glucose Once [[id number]]  (Abnormal) Collected: 12/22/24 2327    Specimen: Blood Updated: 12/22/24 2328     Glucose 130 mg/dL      Comment: Serial Number: [id number]:  [id number]       POC Glucose Once [[id number]]  (Abnormal) Collected: 12/22/24 2220    Specimen: Blood Updated: 12/22/24 2222     Glucose 146 mg/dL      Comment: Serial Number: [id number]:  [id number]       Potassium [[id number]]  (Abnormal) Collected: 12/22/24 2114    Specimen: Blood Updated: 12/22/24 2134     Potassium 3.1 mmol/L     POC Glucose Once [[id number]]  (Abnormal) Collected: 12/22/24 2116    Specimen: Blood Updated: 12/22/24 2118     Glucose 156 mg/dL      Comment: Serial Number: [id number]:  [id number]       POC Glucose Once [[id number]]  (Abnormal) Collected: 12/22/24 2020    Specimen: Blood Updated: 12/22/24 2022     Glucose 170 mg/dL      Comment: Serial Number: [id number]:  [id number]               PENDING RESULTS:     IMAGING REVIEWED:  XR Chest 1 View    Result Date: 12/22/2024  1.Tubes and lines appear in satisfactory positions, as above. 2.Mild bibasilar opacities which could represent atelectasis or infiltrate. 3.Advanced emphysema. Electronically Signed: Sam Haynes  12/22/2024 7:07 AM EST  Workstation ID: [id number]     Assessment & Plan   ASSESSMENT:    Progressive thrombocytopenia: Multifactorial including recent surgery, consumption, medications.   Platelets are mildly decreased to about 100,000.  She is no longer has bleeding  Arterial Thrombosis status post thrombectomy: On Heparin drip  Anemia due to blood loss: Hemoglobin up to 9 g per DL  Hypercoagulable state     PLAN  Check heparin antibodies, this is pending  Await the results of thrombophilia panel.  Add anticardiolipin antibodies, lupus anticoagulant  Monitor CBCs closely  Ongoing risks of bleeding with anticoagulation therapy  Will continue to follow      Electronically signed by Fany Borja MD, 12/23/24, 10:58 PM EST.

## 2024-12-24 NOTE — PROGRESS NOTES
Name: Yarelis Jackson ADMIT: 2024   : 1970  PCP: Katie Duran PA    MRN: [id number] LOS: 6 days   AGE/SEX: 54 y.o. female  ROOM: [facility]/[address] [place] [address]    Chief Complaint   Patient presents with    Altered Mental Status     CC: Postop day 5 and postop day 3 status post right leg revascularization    Subjective     Review of Systems intubated and responsive to pain only    Objective     Scheduled Medications:   albumin human, 25 g, Intravenous, Q8H  calcium gluconate, 2,000 mg, Intravenous, Q8H  chlorhexidine, 15 mL, Mouth/Throat, Q12H  cloNIDine, 1 patch, Transdermal, Weekly  HYDROmorphone, , ,   insulin glargine, 18 Units, Subcutaneous, Nightly  insulin lispro, 2-7 Units, Subcutaneous, Q6H  lansoprazole, 30 mg, Nasogastric, Q AM  nystatin, 1 Application, Topical, Q8H  [START ON 2024] permethrin, 1 Application, Topical, Once  QUEtiapine, 25 mg, Nasogastric, Q12H  sodium chloride, 10 mL, Intravenous, Q12H        Active Infusions:  dexmedetomidine, 0.2-1.5 mcg/kg/hr, Last Rate: 1 mcg/kg/hr (24 0638)  fentanyl 10 mcg/mL,  mcg/hr, Last Rate: 100 mcg/hr (24 0550)  heparin, 16.9 Units/kg/hr, Last Rate: 19 Units/kg/hr (24 0513)  norepinephrine, 0.02-0.3 mcg/kg/min, Last Rate: 0.05 mcg/kg/min (24 0610)        As Needed Medications:    acetaminophen **OR** acetaminophen    aluminum-magnesium hydroxide-simethicone    senna-docusate sodium **AND** polyethylene glycol **AND** bisacodyl **AND** bisacodyl    Calcium Replacement - Follow Nurse / BPA Driven Protocol    dextrose    dextrose    glucagon (human recombinant)    heparin (porcine)    HYDROmorphone    ipratropium-albuterol    Magnesium Standard Dose Replacement - Follow Nurse / BPA Driven Protocol    nitroglycerin    ondansetron ODT **OR** ondansetron    Phosphorus Replacement - Follow Nurse / BPA Driven Protocol    Potassium Replacement - Follow Nurse / BPA Driven Protocol    sodium chloride    sodium chloride    sodium  chloride    Vital Signs  Vital Signs Patient Vitals for the past 24 hrs:   BP Temp Temp src Pulse Resp SpO2 Weight   12/24/24 0806 -- -- -- 73 26 98 % --   12/24/24 0600 -- -- -- 74 -- 96 % --   12/24/24 0500 -- -- -- 68 -- 97 % --   12/24/24 0431 -- -- -- 68 -- 99 % 100 kg (221 lb 5.5 oz)   12/24/24 0400 117/58 -- -- 68 26 99 % --   12/24/24 0333 -- 99.3 °F (37.4 °C) Oral 67 -- 99 % 100 kg (221 lb 5.5 oz)   12/24/24 0300 -- -- -- 68 -- 99 % --   12/24/24 0217 -- -- -- 71 26 99 % --   12/24/24 0200 106/56 -- -- 71 26 97 % --   12/24/24 0100 -- -- -- 73 -- 97 % --   12/24/24 0000 109/53 -- -- 75 26 97 % --   12/23/24 2326 -- -- -- (!) 128 -- 100 % --   12/23/24 2300 -- -- -- 69 -- 95 % --   12/23/24 2226 -- -- -- 69 26 94 % --   12/23/24 2200 110/55 -- -- 68 26 96 % --   12/23/24 2100 -- -- -- 68 -- 96 % --   12/23/24 2000 118/58 97.9 °F (36.6 °C) Oral 79 26 95 % --   12/23/24 1900 -- -- -- 71 -- 96 % --   12/23/24 1849 -- -- -- 70 27 100 % --   12/23/24 1700 -- -- -- 73 -- 97 % --   12/23/24 1650 140/79 -- -- 66 25 -- --   12/23/24 1635 117/67 -- -- 65 16 -- --   12/23/24 1630 124/71 -- -- 67 26 -- --   12/23/24 1615 124/73 -- -- 67 22 -- --   12/23/24 1600 108/65 97.5 °F (36.4 °C) Oral 65 19 100 % --   12/23/24 1545 108/63 -- -- 66 26 -- --   12/23/24 1530 112/67 -- -- 66 26 -- --   12/23/24 1515 123/73 -- -- 64 23 -- --   12/23/24 1500 121/71 -- -- 63 26 100 % --   12/23/24 1445 127/75 -- -- 61 27 -- --   12/23/24 1430 111/65 -- -- 58 26 -- --   12/23/24 1421 -- -- -- 59 26 99 % --   12/23/24 1415 112/63 -- -- 57 26 -- --   12/23/24 1400 121/73 -- -- 59 26 99 % --   12/23/24 1345 99/62 -- -- 60 26 -- --   12/23/24 1330 95/60 -- -- 61 26 -- --   12/23/24 1315 102/63 -- -- 60 26 -- --   12/23/24 1300 121/71 99.3 °F (37.4 °C) Oral 62 26 99 % --   12/23/24 1255 124/73 -- -- 62 26 -- --   12/23/24 1200 141/79 99.1 °F (37.3 °C) Oral 61 26 95 % --   12/23/24 1100 -- -- -- 63 -- 96 % --   12/23/24 1058 125/70 99.1 °F  (37.3 °C) Oral 63 26 95 % --   12/23/24 1047 -- -- -- 62 -- 95 % --   12/23/24 1036 96/53 99.3 °F (37.4 °C) Oral 60 26 98 % --   12/23/24 1028 -- -- -- 59 26 96 % --   12/23/24 1000 -- -- -- 63 -- 95 % --     Vital Signs (range)  Temp:  [97.5 °F (36.4 °C)-99.3 °F (37.4 °C)] 99.3 °F (37.4 °C)  Heart Rate:  [] 73  Resp:  [16-27] 26  BP: ()/(53-79) 117/58  FiO2 (%):  [30 %-40 %] 40 %  I/O:  I/O last 3 completed shifts:  In: 6530 [I.V.:4396; Blood:300; Other:960; NG/GT:874]  Out: 3505 [Urine:955]  I/O:   Intake/Output Summary (Last 24 hours) at 12/24/2024 0819  Last data filed at 12/24/2024 0638  Gross per 24 hour   Intake 4375 ml   Output 3130 ml   Net 1245 ml     BMI:  Body mass index is 37.99 kg/m².    Physical Exam:  Physical Exam   Intubated, sedated  Mechanical ventilation  RRR  NGT in place  Right leg fasciotomy sites with healthy appearing muscle  Surrounding skin with large blisters     Right medial wound      Bilateral legs      Right lateral wound        Results Review:     CBC    Results from last 7 days   Lab Units 12/24/24  0344 12/23/24  1355 12/23/24  0429 12/22/24  0452 12/21/24  2351 12/21/24  2011 12/21/24  1307 12/21/24  0405 12/20/24  1210 12/20/24  0639   WBC 10*3/mm3 12.14*  --  11.95* 7.59  --  8.22 8.44 15.20*  --  15.52*   HEMOGLOBIN g/dL 7.7* 9.0* 7.5* 7.2*  --  6.7* 7.5* 8.7*   < > 11.5*   HEMOGLOBIN, POC g/dL  --   --   --   --  6.8*  --   --   --    < >  --    PLATELETS 10*3/mm3 131*  --  115* 63*  --  68* 85* 99*  --  156    < > = values in this interval not displayed.     BMP   Results from last 7 days   Lab Units 12/24/24  0344 12/23/24  1823 12/23/24  0429 12/22/24  2114 12/22/24  0452 12/21/24  2351 12/21/24  0405 12/20/24  2322 12/20/24  2021 12/20/24  1830 12/20/24  1210   SODIUM mmol/L 137  --  138  --  138  --  138 136 135*  --  135*   POTASSIUM mmol/L 4.7 4.7 3.6 3.1* 3.5  --  5.0 5.3* 5.3*  --  5.0   CHLORIDE mmol/L 102  --  104  --  103  --  105 104 104  --  105    CO2 mmol/L 22.7  --  23.8  --  22.3  --  18.8* 20.5* 21.1*  --  19.9*   BUN mg/dL 37*  --  29*  --  39*  --  46* 38* 34*  --  25*   CREATININE mg/dL 2.15*  --  1.73*  --  1.85* 2.09* 2.43* 2.09* 1.98*  --  1.51*   GLUCOSE mg/dL 258*  --  104*  --  236*  --  181* 154* 142*  --  116*   MAGNESIUM mg/dL 1.7  --  2.0  --  2.0  --  2.1 2.0 2.0 1.9 1.9   PHOSPHORUS mg/dL 2.8  --  3.9  --  3.6  --  6.8* 7.0* 7.3*  --  6.7*     Cr Clearance Estimated Creatinine Clearance: 34.8 mL/min (A) (by C-G formula based on SCr of 2.15 mg/dL (H)).  Coag   Results from last 7 days   Lab Units 12/24/24  0344 12/23/24  2124 12/23/24  1427 12/23/24  0800 12/23/24  0208 12/22/24  1803 12/22/24  1136 12/19/24  1952 12/19/24  1428   INR   --   --   --   --   --   --  1.07  --  1.05   APTT seconds 91.3* 81.7* 69.0* 60.8* 83.1* 127.5* 75.6*   < > <20.0*    < > = values in this interval not displayed.     HbA1C   Lab Results   Component Value Date    HGBA1C 15.80 (H) 12/18/2024     Blood Glucose   Glucose   Date/Time Value Ref Range Status   12/24/2024 0501 277 (H) 70 - 105 mg/dL Final     Comment:     Serial Number: [id number]:  [id number]   12/24/2024 0324 245 (H) 74 - 100 mg/dL Final     Comment:     Serial Number: [id number]:  [id number]   12/24/2024 0012 259 (H) 70 - 105 mg/dL Final     Comment:     Serial Number: [id number]:  [id number]   12/23/2024 2210 139 (H) 70 - 105 mg/dL Final     Comment:     Serial Number: [id number]:  [id number]   12/23/2024 2027 76 70 - 105 mg/dL Final     Comment:     Serial Number: [id number]:  [id number]   12/23/2024 2009 51 (L) 70 - 105 mg/dL Final     Comment:     Serial Number: [id number]:  [id number]   12/23/2024 1823 144 (H) 70 - 105 mg/dL Final     Comment:     Serial Number: [id number]:  [id number]   12/23/2024 1627 148 (H) 70 - 105 mg/dL Final     Comment:     Serial Number: [id number]:  [id number]     Infection   Results from last 7 days   Lab Units  12/19/24  1907 12/18/24  0557 12/18/24  0504   BLOODCX   --  No growth at 5 days No growth at 5 days   RESPCX  Moderate growth (3+) Staphylococcus aureus*  Moderate growth (3+) Streptococcus agalactiae (Group B)*  No Normal Respiratory Alina*  --   --      CMP   Results from last 7 days   Lab Units 12/24/24  0344 12/23/24  1823 12/23/24  0429 12/22/24  2114 12/22/24  0452 12/21/24  2351 12/21/24  0405 12/20/24  2322 12/20/24  2021 12/20/24  1210 12/20/24  0745 12/20/24  0436 12/20/24  0010 12/19/24  0830 12/19/24  0406 12/19/24  0009 12/18/24  1444 12/18/24  0646 12/18/24  0510 12/18/24  0504   SODIUM mmol/L 137  --  138  --  138  --  138 136 135* 135* 134* 135* 142   < > 175* 178*   < >  --    < > 158*   POTASSIUM mmol/L 4.7 4.7 3.6 3.1* 3.5  --  5.0 5.3* 5.3* 5.0 5.1 5.1 4.9   < > 4.5 4.7   < >  --    < > 3.6   CHLORIDE mmol/L 102  --  104  --  103  --  105 104 104 105 105 105 111*   < > >140* >140*   < >  --    < > 114*   CO2 mmol/L 22.7  --  23.8  --  22.3  --  18.8* 20.5* 21.1* 19.9* 19.8* 20.8* 22.3   < > 2.7* 2.5*   < >  --    < > 14.7*   BUN mg/dL 37*  --  29*  --  39*  --  46* 38* 34* 25* 22* 20 24*   < > 66* 69*   < >  --    < > 88*   CREATININE mg/dL 2.15*  --  1.73*  --  1.85* 2.09* 2.43* 2.09* 1.98* 1.51* 1.49* 1.20* 1.11*   < > 1.61* 1.52*   < >  --    < > 2.10*   GLUCOSE mg/dL 258*  --  104*  --  236*  --  181* 154* 142* 116* 217* 170* 119*   < > 158* 151*   < >  --    < > 987*   ALBUMIN g/dL 2.9*  --  3.1*  --  2.8*  --  2.3*  --   --   --  2.5* 2.7* 2.9*   < > 3.2*  --   --   --   --  3.8   BILIRUBIN mg/dL 0.5  --  0.7  --  0.2  --  0.2  --   --   --   --  0.3  --   --  0.2  --   --   --   --  0.2   ALK PHOS U/L 126*  --  135*  --  100  --  91  --   --   --   --  103  --   --  145*  --   --   --   --  246*   AST (SGOT) U/L 970*  --  939*  --  271*  --  234*  --   --   --   --  185*  --   --  64*  --   --   --   --  106*   ALT (SGPT) U/L 610*  --  430*  --  96*  --  101*  --   --   --   --  116*   --   --  124*  --   --   --   --  178*   AMYLASE U/L  --   --   --   --   --   --   --   --   --   --   --   --   --   --   --  311*  --   --   --   --    LIPASE U/L  --   --   --   --   --   --   --   --   --   --   --   --   --   --   --  33  --   --   --   --    AMMONIA umol/L  --   --   --   --   --   --   --   --   --   --   --   --   --   --   --   --   --  <10*  --   --     < > = values in this interval not displayed.     ABG    Results from last 7 days   Lab Units 12/24/24  0324 12/23/24  1532 12/23/24  0355 12/22/24  0424 12/21/24  2351 12/21/24  0317 12/20/24  1603   PH, ARTERIAL pH units 7.425 7.404 7.465* 7.424 7.392 7.357 7.180*   PCO2, ARTERIAL mm Hg 37.8 42.7 35.5 38.0 41.6 35.5 59.7*   PO2 ART mm Hg 113.7* 89.8 91.0 82.2* 111.6* 72.7* 78.0*   O2 SATURATION ART % 98.6* 96.9 97.6 96.3 98.3* 93.9* 91.0*   BASE EXCESS ART mmol/L 0.4 1.7 1.7 0.5 0.3 -5.0* <0.0*     Lysis Labs   Results from last 7 days   Lab Units 12/24/24  0344 12/23/24  2124 12/23/24  1427 12/23/24  1355 12/23/24  0800 12/23/24  0429 12/23/24  0208 12/22/24  1803 12/22/24  1136 12/22/24  0452 12/21/24  2351 12/21/24 2011 12/21/24  1307 12/21/24  0739 12/21/24  0405 12/21/24  0000 12/20/24  2322 12/20/24  2021 12/20/24  0745 12/20/24  0639 12/20/24  0010 12/19/24  2232 12/19/24  1653 12/19/24  1428   INR   --   --   --   --   --   --   --   --  1.07  --   --   --   --   --   --   --   --   --   --   --   --   --   --  1.05   APTT seconds 91.3* 81.7* 69.0*  --  60.8*  --  83.1* 127.5* 75.6* 55.7*  --  42.8* 38.8*   < > 57.3*   < >  --   --    < >  --    < > 147.9*   < > <20.0*   FIBRINOGEN mg/dL  --   --   --   --   --   --   --   --  660*  --   --   --   --   --   --   --   --   --   --   --   --  524*  --   --    HEMOGLOBIN g/dL 7.7*  --   --  9.0*  --  7.5*  --   --   --  7.2*  --  6.7* 7.5*  --  8.7*  --   --   --    < > 11.5*   < >  --   --   --    HEMOGLOBIN, POC g/dL  --   --   --   --   --   --   --   --   --   --  6.8*  --   --    --   --   --   --   --    < >  --   --   --    < >  --    PLATELETS 10*3/mm3 131*  --   --   --   --  115*  --   --   --  63*  --  68* 85*  --  99*  --   --   --   --  156   < >  --   --   --    CREATININE mg/dL 2.15*  --   --   --   --  1.73*  --   --   --  1.85* 2.09*  --   --   --  2.43*  --  2.09* 1.98*   < >  --    < >  --    < >  --     < > = values in this interval not displayed.     Radiology(recent) No radiology results for the last day    Assessment & Plan     Assessment & Plan      DKA (diabetic ketoacidosis)    Arterial thrombosis      54 y.o. female with critical ischemia to the leg treated with serial operations with improvement but still patient is critically ill with a guarded prognosis overall.  She has continued mottling to her bilateral legs from the knee down.  Right leg fasciotomy sites appear healthy, skin appears tight with large blister formation to the feet and lower legs.  Blisters were deroofed today.  Her CK remains elevated at 13,931.  Due to appearance of her legs and continued signs of rhabdo we will plan for extension of her fasciotomy sites to decompress the muscle further.  Hold tube feeds now.  Dr. Delgado will contact family for consent.    Alyssa Sneed, APRN  12/24/24  08:19 EST    Please call my office with any question: [phone]    Copied text in this note has been reviewed by me and remains relevant as of 12/23/2024.     Active Hospital Problems    Diagnosis  POA    **DKA (diabetic ketoacidosis) [E11.10]  Yes    Arterial thrombosis [I74.9]  No      Resolved Hospital Problems   No resolved problems to display.

## 2024-12-24 NOTE — PROGRESS NOTES
Name: Yarelis Jackson ADMIT: 2024   : 1970  PCP: Katie Duran PA    MRN: [id number] LOS: 6 days   AGE/SEX: 54 y.o. female  ROOM: [facility]/[address] [place] [address]    Chief Complaint   Patient presents with    Altered Mental Status     CC: Postop day 6 and postop day 4 status post right leg revascularization; post op day 1 completion of right leg fasciotomy    Subjective     Review of Systems intubated and responsive to pain only    Objective     Scheduled Medications:   albumin human, 25 g, Intravenous, Q8H  calcium gluconate, 2,000 mg, Intravenous, Q8H  chlorhexidine, 15 mL, Mouth/Throat, Q12H  cloNIDine, 1 patch, Transdermal, Weekly  HYDROmorphone, , ,   insulin glargine, 18 Units, Subcutaneous, Nightly  insulin lispro, 2-7 Units, Subcutaneous, Q6H  lansoprazole, 30 mg, Nasogastric, Q AM  nystatin, 1 Application, Topical, Q8H  [START ON 2024] permethrin, 1 Application, Topical, Once  QUEtiapine, 25 mg, Nasogastric, Q12H  sodium chloride, 10 mL, Intravenous, Q12H        Active Infusions:  dexmedetomidine, 0.2-1.5 mcg/kg/hr, Last Rate: 1.5 mcg/kg/hr (24 0848)  fentanyl 10 mcg/mL,  mcg/hr, Last Rate: 175 mcg/hr (24 0730)  heparin, 16.9 Units/kg/hr, Last Rate: 19 Units/kg/hr (24 0513)  norepinephrine, 0.02-0.3 mcg/kg/min, Last Rate: 0.05 mcg/kg/min (24 0610)        As Needed Medications:    acetaminophen **OR** acetaminophen    aluminum-magnesium hydroxide-simethicone    senna-docusate sodium **AND** polyethylene glycol **AND** bisacodyl **AND** bisacodyl    Calcium Replacement - Follow Nurse / BPA Driven Protocol    dextrose    dextrose    glucagon (human recombinant)    heparin (porcine)    HYDROmorphone    ipratropium-albuterol    Magnesium Standard Dose Replacement - Follow Nurse / BPA Driven Protocol    nitroglycerin    ondansetron ODT **OR** ondansetron    Phosphorus Replacement - Follow Nurse / BPA Driven Protocol    Potassium Replacement - Follow Nurse / BPA Driven  Protocol    sodium chloride    sodium chloride    sodium chloride    Vital Signs  Vital Signs Patient Vitals for the past 24 hrs:   BP Temp Temp src Pulse Resp SpO2 Weight   12/24/24 0830 -- -- -- 70 -- 98 % --   12/24/24 0815 -- -- -- 72 -- 99 % --   12/24/24 0806 -- -- -- 73 26 98 % --   12/24/24 0800 -- 97.6 °F (36.4 °C) Axillary 74 -- 98 % --   12/24/24 0745 -- -- -- 78 -- 94 % --   12/24/24 0730 -- -- -- 119 -- 94 % --   12/24/24 0715 -- -- -- 70 -- 98 % --   12/24/24 0700 -- -- -- 71 -- 96 % --   12/24/24 0645 -- -- -- 71 -- 96 % --   12/24/24 0630 -- -- -- 72 -- 96 % --   12/24/24 0615 -- -- -- 74 -- 96 % --   12/24/24 0600 -- -- -- 74 -- 96 % --   12/24/24 0500 -- -- -- 68 -- 97 % --   12/24/24 0431 -- -- -- 68 -- 99 % 100 kg (221 lb 5.5 oz)   12/24/24 0400 117/58 -- -- 68 26 99 % --   12/24/24 0333 -- 99.3 °F (37.4 °C) Oral 67 -- 99 % 100 kg (221 lb 5.5 oz)   12/24/24 0300 -- -- -- 68 -- 99 % --   12/24/24 0217 -- -- -- 71 26 99 % --   12/24/24 0200 106/56 -- -- 71 26 97 % --   12/24/24 0100 -- -- -- 73 -- 97 % --   12/24/24 0000 109/53 -- -- 75 26 97 % --   12/23/24 2326 -- -- -- (!) 128 -- 100 % --   12/23/24 2300 -- -- -- 69 -- 95 % --   12/23/24 2226 -- -- -- 69 26 94 % --   12/23/24 2200 110/55 -- -- 68 26 96 % --   12/23/24 2100 -- -- -- 68 -- 96 % --   12/23/24 2000 118/58 97.9 °F (36.6 °C) Oral 79 26 95 % --   12/23/24 1900 -- -- -- 71 -- 96 % --   12/23/24 1849 -- -- -- 70 27 100 % --   12/23/24 1700 -- -- -- 73 -- 97 % --   12/23/24 1650 140/79 -- -- 66 25 -- --   12/23/24 1635 117/67 -- -- 65 16 -- --   12/23/24 1630 124/71 -- -- 67 26 -- --   12/23/24 1615 124/73 -- -- 67 22 -- --   12/23/24 1600 108/65 97.5 °F (36.4 °C) Oral 65 19 100 % --   12/23/24 1545 108/63 -- -- 66 26 -- --   12/23/24 1530 112/67 -- -- 66 26 -- --   12/23/24 1515 123/73 -- -- 64 23 -- --   12/23/24 1500 121/71 -- -- 63 26 100 % --   12/23/24 1445 127/75 -- -- 61 27 -- --   12/23/24 1430 111/65 -- -- 58 26 -- --   12/23/24  1421 -- -- -- 59 26 99 % --   12/23/24 1415 112/63 -- -- 57 26 -- --   12/23/24 1400 121/73 -- -- 59 26 99 % --   12/23/24 1345 99/62 -- -- 60 26 -- --   12/23/24 1330 95/60 -- -- 61 26 -- --   12/23/24 1315 102/63 -- -- 60 26 -- --   12/23/24 1300 121/71 99.3 °F (37.4 °C) Oral 62 26 99 % --   12/23/24 1255 124/73 -- -- 62 26 -- --   12/23/24 1200 141/79 99.1 °F (37.3 °C) Oral 61 26 95 % --   12/23/24 1100 -- -- -- 63 -- 96 % --   12/23/24 1058 125/70 99.1 °F (37.3 °C) Oral 63 26 95 % --   12/23/24 1047 -- -- -- 62 -- 95 % --   12/23/24 1036 96/53 99.3 °F (37.4 °C) Oral 60 26 98 % --   12/23/24 1028 -- -- -- 59 26 96 % --   12/23/24 1000 -- -- -- 63 -- 95 % --     Vital Signs (range)  Temp:  [97.5 °F (36.4 °C)-99.3 °F (37.4 °C)] 97.6 °F (36.4 °C)  Heart Rate:  [] 70  Resp:  [16-27] 26  BP: ()/(53-79) 117/58  FiO2 (%):  [30 %-40 %] 40 %  I/O:  I/O last 3 completed shifts:  In: 6530 [I.V.:4396; Blood:300; Other:960; NG/GT:874]  Out: 3505 [Urine:955]  I/O:   Intake/Output Summary (Last 24 hours) at 12/24/2024 0910  Last data filed at 12/24/2024 0638  Gross per 24 hour   Intake 4175 ml   Output 3130 ml   Net 1045 ml     BMI:  Body mass index is 37.99 kg/m².    Physical Exam:  Physical Exam   Intubated, sedated  Mechanical ventilation  RRR  NGT in place  Doppler signals to the bilateral DP  Right lower leg with surgical dressing/wrap in place  Groin incision with staples, CDI and soft    Results Review:     CBC    Results from last 7 days   Lab Units 12/24/24  0344 12/23/24  1355 12/23/24  0429 12/22/24  0452 12/21/24  2351 12/21/24 2011 12/21/24  1307 12/21/24  0405 12/20/24  1210 12/20/24  0639   WBC 10*3/mm3 12.14*  --  11.95* 7.59  --  8.22 8.44 15.20*  --  15.52*   HEMOGLOBIN g/dL 7.7* 9.0* 7.5* 7.2*  --  6.7* 7.5* 8.7*   < > 11.5*   HEMOGLOBIN, POC g/dL  --   --   --   --  6.8*  --   --   --    < >  --    PLATELETS 10*3/mm3 131*  --  115* 63*  --  68* 85* 99*  --  156    < > = values in this interval  not displayed.     BMP   Results from last 7 days   Lab Units 12/24/24  0344 12/23/24  1823 12/23/24  0429 12/22/24  2114 12/22/24  0452 12/21/24  2351 12/21/24  0405 12/20/24  2322 12/20/24 2021 12/20/24  1830 12/20/24  1210   SODIUM mmol/L 137  --  138  --  138  --  138 136 135*  --  135*   POTASSIUM mmol/L 4.7 4.7 3.6 3.1* 3.5  --  5.0 5.3* 5.3*  --  5.0   CHLORIDE mmol/L 102  --  104  --  103  --  105 104 104  --  105   CO2 mmol/L 22.7  --  23.8  --  22.3  --  18.8* 20.5* 21.1*  --  19.9*   BUN mg/dL 37*  --  29*  --  39*  --  46* 38* 34*  --  25*   CREATININE mg/dL 2.15*  --  1.73*  --  1.85* 2.09* 2.43* 2.09* 1.98*  --  1.51*   GLUCOSE mg/dL 258*  --  104*  --  236*  --  181* 154* 142*  --  116*   MAGNESIUM mg/dL 1.7  --  2.0  --  2.0  --  2.1 2.0 2.0 1.9 1.9   PHOSPHORUS mg/dL 2.8  --  3.9  --  3.6  --  6.8* 7.0* 7.3*  --  6.7*     Cr Clearance Estimated Creatinine Clearance: 34.8 mL/min (A) (by C-G formula based on SCr of 2.15 mg/dL (H)).  Coag   Results from last 7 days   Lab Units 12/24/24 0344 12/23/24 2124 12/23/24  1427 12/23/24  0800 12/23/24  0208 12/22/24  1803 12/22/24  1136 12/19/24  1952 12/19/24  1428   INR   --   --   --   --   --   --  1.07  --  1.05   APTT seconds 91.3* 81.7* 69.0* 60.8* 83.1* 127.5* 75.6*   < > <20.0*    < > = values in this interval not displayed.     HbA1C   Lab Results   Component Value Date    HGBA1C 15.80 (H) 12/18/2024     Blood Glucose   Glucose   Date/Time Value Ref Range Status   12/24/2024 0501 277 (H) 70 - 105 mg/dL Final     Comment:     Serial Number: [id number]:  [id number]   12/24/2024 0324 245 (H) 74 - 100 mg/dL Final     Comment:     Serial Number: [id number]:  [id number]   12/24/2024 0012 259 (H) 70 - 105 mg/dL Final     Comment:     Serial Number: [id number]:  [id number]   12/23/2024 2210 139 (H) 70 - 105 mg/dL Final     Comment:     Serial Number: [id number]:  [id number]   12/23/2024 2027 76 70 - 105 mg/dL Final     Comment:     Serial  Number: [id number]:  [id number]   12/23/2024 2009 51 (L) 70 - 105 mg/dL Final     Comment:     Serial Number: [id number]:  [id number]   12/23/2024 1823 144 (H) 70 - 105 mg/dL Final     Comment:     Serial Number: [id number]:  [id number]   12/23/2024 1627 148 (H) 70 - 105 mg/dL Final     Comment:     Serial Number: [id number]:  [id number]     Infection   Results from last 7 days   Lab Units 12/19/24  1907 12/18/24  0557 12/18/24  0504   BLOODCX   --  No growth at 5 days No growth at 5 days   RESPCX  Moderate growth (3+) Staphylococcus aureus*  Moderate growth (3+) Streptococcus agalactiae (Group B)*  No Normal Respiratory Alina*  --   --      CMP   Results from last 7 days   Lab Units 12/24/24  0344 12/23/24  1823 12/23/24  0429 12/22/24  2114 12/22/24  0452 12/21/24  2351 12/21/24  0405 12/20/24  2322 12/20/24  2021 12/20/24  1210 12/20/24  0745 12/20/24  0436 12/20/24  0010 12/19/24  0830 12/19/24  0406 12/19/24  0009 12/18/24  1444 12/18/24  0646 12/18/24  0510 12/18/24  0504   SODIUM mmol/L 137  --  138  --  138  --  138 136 135* 135* 134* 135* 142   < > 175* 178*   < >  --    < > 158*   POTASSIUM mmol/L 4.7 4.7 3.6 3.1* 3.5  --  5.0 5.3* 5.3* 5.0 5.1 5.1 4.9   < > 4.5 4.7   < >  --    < > 3.6   CHLORIDE mmol/L 102  --  104  --  103  --  105 104 104 105 105 105 111*   < > >140* >140*   < >  --    < > 114*   CO2 mmol/L 22.7  --  23.8  --  22.3  --  18.8* 20.5* 21.1* 19.9* 19.8* 20.8* 22.3   < > 2.7* 2.5*   < >  --    < > 14.7*   BUN mg/dL 37*  --  29*  --  39*  --  46* 38* 34* 25* 22* 20 24*   < > 66* 69*   < >  --    < > 88*   CREATININE mg/dL 2.15*  --  1.73*  --  1.85* 2.09* 2.43* 2.09* 1.98* 1.51* 1.49* 1.20* 1.11*   < > 1.61* 1.52*   < >  --    < > 2.10*   GLUCOSE mg/dL 258*  --  104*  --  236*  --  181* 154* 142* 116* 217* 170* 119*   < > 158* 151*   < >  --    < > 987*   ALBUMIN g/dL 2.9*  --  3.1*  --  2.8*  --  2.3*  --   --   --  2.5* 2.7* 2.9*   < > 3.2*  --   --   --   --  3.8    BILIRUBIN mg/dL 0.5  --  0.7  --  0.2  --  0.2  --   --   --   --  0.3  --   --  0.2  --   --   --   --  0.2   ALK PHOS U/L 126*  --  135*  --  100  --  91  --   --   --   --  103  --   --  145*  --   --   --   --  246*   AST (SGOT) U/L 970*  --  939*  --  271*  --  234*  --   --   --   --  185*  --   --  64*  --   --   --   --  106*   ALT (SGPT) U/L 610*  --  430*  --  96*  --  101*  --   --   --   --  116*  --   --  124*  --   --   --   --  178*   AMYLASE U/L  --   --   --   --   --   --   --   --   --   --   --   --   --   --   --  311*  --   --   --   --    LIPASE U/L  --   --   --   --   --   --   --   --   --   --   --   --   --   --   --  33  --   --   --   --    AMMONIA umol/L  --   --   --   --   --   --   --   --   --   --   --   --   --   --   --   --   --  <10*  --   --     < > = values in this interval not displayed.     ABG    Results from last 7 days   Lab Units 12/24/24  0324 12/23/24  1532 12/23/24  0355 12/22/24  0424 12/21/24  2351 12/21/24  0317 12/20/24  1603   PH, ARTERIAL pH units 7.425 7.404 7.465* 7.424 7.392 7.357 7.180*   PCO2, ARTERIAL mm Hg 37.8 42.7 35.5 38.0 41.6 35.5 59.7*   PO2 ART mm Hg 113.7* 89.8 91.0 82.2* 111.6* 72.7* 78.0*   O2 SATURATION ART % 98.6* 96.9 97.6 96.3 98.3* 93.9* 91.0*   BASE EXCESS ART mmol/L 0.4 1.7 1.7 0.5 0.3 -5.0* <0.0*     Lysis Labs   Results from last 7 days   Lab Units 12/24/24  0344 12/23/24  2124 12/23/24  1427 12/23/24  1355 12/23/24  0800 12/23/24  0429 12/23/24  0208 12/22/24  1803 12/22/24  1136 12/22/24  0452 12/21/24  2351 12/21/24 2011 12/21/24  1307 12/21/24  0739 12/21/24  0405 12/21/24  0000 12/20/24  2322 12/20/24  2021 12/20/24  0745 12/20/24  0639 12/20/24  0010 12/19/24  2232 12/19/24  1653 12/19/24  1428   INR   --   --   --   --   --   --   --   --  1.07  --   --   --   --   --   --   --   --   --   --   --   --   --   --  1.05   APTT seconds 91.3* 81.7* 69.0*  --  60.8*  --  83.1* 127.5* 75.6* 55.7*  --  42.8* 38.8*   < > 57.3*   < >   --   --    < >  --    < > 147.9*   < > <20.0*   FIBRINOGEN mg/dL  --   --   --   --   --   --   --   --  660*  --   --   --   --   --   --   --   --   --   --   --   --  524*  --   --    HEMOGLOBIN g/dL 7.7*  --   --  9.0*  --  7.5*  --   --   --  7.2*  --  6.7* 7.5*  --  8.7*  --   --   --    < > 11.5*   < >  --   --   --    HEMOGLOBIN, POC g/dL  --   --   --   --   --   --   --   --   --   --  6.8*  --   --   --   --   --   --   --    < >  --   --   --    < >  --    PLATELETS 10*3/mm3 131*  --   --   --   --  115*  --   --   --  63*  --  68* 85*  --  99*  --   --   --   --  156   < >  --   --   --    CREATININE mg/dL 2.15*  --   --   --   --  1.73*  --   --   --  1.85* 2.09*  --   --   --  2.43*  --  2.09* 1.98*   < >  --    < >  --    < >  --     < > = values in this interval not displayed.     Radiology(recent) No radiology results for the last day    Assessment & Plan     Assessment & Plan      DKA (diabetic ketoacidosis)    Arterial thrombosis      54 y.o. female with critical ischemia to the leg treated with serial operations with improvement but still patient is critically ill.  Fasciotomy sites were extended yesterday to help decompress the muscle further.  Both legs are a little bit mottled but overall appear improved.  Surgical dressing and wrap in place to the right lower leg, will plan to remove tomorrow.  Remove dressing from right groin, staples intact, no dehiscence or breakdown of incision.  Nursing to paint with Betadine daily and cover with dry dressing.  From patient's on heparin drip, hematology following. Remains critically ill.      Alyssa Sneed, APRN  12/24/24  09:10 EST    Please call my office with any question: [phone]    Copied text in this note has been reviewed by me and remains relevant as of 12/24/24.     Active Hospital Problems    Diagnosis  POA    **DKA (diabetic ketoacidosis) [E11.10]  Yes    Arterial thrombosis [I74.9]  No      Resolved Hospital Problems   No resolved  problems to display.

## 2024-12-25 LAB
ABO GROUP BLD: NORMAL
ALBUMIN SERPL-MCNC: 3.4 G/DL (ref 3.5–5.2)
ALBUMIN/GLOB SERPL: 1.4 G/DL
ALP SERPL-CCNC: 314 U/L (ref 39–117)
ALT SERPL W P-5'-P-CCNC: 437 U/L (ref 1–33)
ANION GAP SERPL CALCULATED.3IONS-SCNC: 11.5 MMOL/L (ref 5–15)
ANISOCYTOSIS BLD QL: ABNORMAL
APTT PPP: 110.1 SECONDS (ref 22.7–35.4)
APTT PPP: 66.7 SECONDS (ref 22.7–35.4)
APTT PPP: 99 SECONDS (ref 22.7–35.4)
AST SERPL-CCNC: 566 U/L (ref 1–32)
BILIRUB SERPL-MCNC: 0.9 MG/DL (ref 0–1.2)
BLD GP AB SCN SERPL QL: NEGATIVE
BUN SERPL-MCNC: 35 MG/DL (ref 6–20)
BUN/CREAT SERPL: 19.2 (ref 7–25)
CA-I SERPL ISE-MCNC: 1.15 MMOL/L (ref 1.15–1.3)
CALCIUM SPEC-SCNC: 8.8 MG/DL (ref 8.6–10.5)
CHLORIDE SERPL-SCNC: 100 MMOL/L (ref 98–107)
CK SERPL-CCNC: 7767 U/L (ref 20–180)
CO2 SERPL-SCNC: 24.5 MMOL/L (ref 22–29)
CREAT SERPL-MCNC: 1.82 MG/DL (ref 0.57–1)
D DIMER PPP FEU-MCNC: 3.75 MCGFEU/ML (ref 0–0.53)
D-LACTATE SERPL-SCNC: 1.2 MMOL/L (ref 0.5–2)
DEPRECATED RDW RBC AUTO: 57.2 FL (ref 37–54)
EGFRCR SERPLBLD CKD-EPI 2021: 32.9 ML/MIN/1.73
ERYTHROCYTE [DISTWIDTH] IN BLOOD BY AUTOMATED COUNT: 16.9 % (ref 12.3–15.4)
GLOBULIN UR ELPH-MCNC: 2.5 GM/DL
GLUCOSE BLDC GLUCOMTR-MCNC: 131 MG/DL (ref 70–105)
GLUCOSE BLDC GLUCOMTR-MCNC: 165 MG/DL (ref 70–105)
GLUCOSE BLDC GLUCOMTR-MCNC: 184 MG/DL (ref 70–105)
GLUCOSE BLDC GLUCOMTR-MCNC: 186 MG/DL (ref 70–105)
GLUCOSE BLDC GLUCOMTR-MCNC: 245 MG/DL (ref 70–105)
GLUCOSE BLDC GLUCOMTR-MCNC: 250 MG/DL (ref 70–105)
GLUCOSE SERPL-MCNC: 245 MG/DL (ref 65–99)
HCT VFR BLD AUTO: 20.6 % (ref 34–46.6)
HCT VFR BLD AUTO: 23.2 % (ref 34–46.6)
HGB BLD-MCNC: 6.6 G/DL (ref 12–15.9)
HGB BLD-MCNC: 7.8 G/DL (ref 12–15.9)
HYPOCHROMIA BLD QL: ABNORMAL
LYMPHOCYTES # BLD MANUAL: 2.42 10*3/MM3 (ref 0.7–3.1)
LYMPHOCYTES NFR BLD MANUAL: 8 % (ref 5–12)
MAGNESIUM SERPL-MCNC: 1.8 MG/DL (ref 1.6–2.6)
MCH RBC QN AUTO: 30.4 PG (ref 26.6–33)
MCHC RBC AUTO-ENTMCNC: 32 G/DL (ref 31.5–35.7)
MCV RBC AUTO: 94.9 FL (ref 79–97)
METAMYELOCYTES NFR BLD MANUAL: 3 % (ref 0–0)
MONOCYTES # BLD: 1.02 10*3/MM3 (ref 0.1–0.9)
NEUTROPHILS # BLD AUTO: 8.9 10*3/MM3 (ref 1.7–7)
NEUTROPHILS NFR BLD MANUAL: 46 % (ref 42.7–76)
NEUTS BAND NFR BLD MANUAL: 24 % (ref 0–5)
NRBC SPEC MANUAL: 1 /100 WBC (ref 0–0.2)
PHOSPHATE SERPL-MCNC: 3 MG/DL (ref 2.5–4.5)
PLAT MORPH BLD: NORMAL
PLATELET # BLD AUTO: 99 10*3/MM3 (ref 140–450)
PMV BLD AUTO: 10.8 FL (ref 6–12)
POIKILOCYTOSIS BLD QL SMEAR: ABNORMAL
POTASSIUM SERPL-SCNC: 3.7 MMOL/L (ref 3.5–5.2)
PROT SERPL-MCNC: 5.9 G/DL (ref 6–8.5)
RBC # BLD AUTO: 2.17 10*6/MM3 (ref 3.77–5.28)
RH BLD: POSITIVE
SCAN SLIDE: NORMAL
SODIUM SERPL-SCNC: 136 MMOL/L (ref 136–145)
T&S EXPIRATION DATE: NORMAL
TOXIC GRANULATION: ABNORMAL
VARIANT LYMPHS NFR BLD MANUAL: 19 % (ref 19.6–45.3)
WBC NRBC COR # BLD AUTO: 12.72 10*3/MM3 (ref 3.4–10.8)

## 2024-12-25 PROCEDURE — 82948 REAGENT STRIP/BLOOD GLUCOSE: CPT

## 2024-12-25 PROCEDURE — 86850 RBC ANTIBODY SCREEN: CPT | Performed by: NURSE PRACTITIONER

## 2024-12-25 PROCEDURE — 25010000002 HEPARIN (PORCINE) 25000-0.45 UT/250ML-% SOLUTION: Performed by: SURGERY

## 2024-12-25 PROCEDURE — 99024 POSTOP FOLLOW-UP VISIT: CPT | Performed by: SURGERY

## 2024-12-25 PROCEDURE — 99232 SBSQ HOSP IP/OBS MODERATE 35: CPT | Performed by: INTERNAL MEDICINE

## 2024-12-25 PROCEDURE — 25010000002 MAGNESIUM SULFATE 2 GM/50ML SOLUTION: Performed by: INTERNAL MEDICINE

## 2024-12-25 PROCEDURE — 84100 ASSAY OF PHOSPHORUS: CPT | Performed by: SURGERY

## 2024-12-25 PROCEDURE — 85014 HEMATOCRIT: CPT | Performed by: EMERGENCY MEDICINE

## 2024-12-25 PROCEDURE — 80053 COMPREHEN METABOLIC PANEL: CPT | Performed by: SURGERY

## 2024-12-25 PROCEDURE — 25010000002 ALBUMIN HUMAN 25% PER 50 ML

## 2024-12-25 PROCEDURE — 25010000002 CALCIUM GLUCONATE-NACL 1-0.675 GM/50ML-% SOLUTION: Performed by: INTERNAL MEDICINE

## 2024-12-25 PROCEDURE — 82550 ASSAY OF CK (CPK): CPT | Performed by: INTERNAL MEDICINE

## 2024-12-25 PROCEDURE — 82948 REAGENT STRIP/BLOOD GLUCOSE: CPT | Performed by: NURSE PRACTITIONER

## 2024-12-25 PROCEDURE — P9047 ALBUMIN (HUMAN), 25%, 50ML: HCPCS

## 2024-12-25 PROCEDURE — 93010 ELECTROCARDIOGRAM REPORT: CPT | Performed by: INTERNAL MEDICINE

## 2024-12-25 PROCEDURE — 86900 BLOOD TYPING SEROLOGIC ABO: CPT

## 2024-12-25 PROCEDURE — 25010000002 ALBUMIN HUMAN 25% PER 50 ML: Performed by: INTERNAL MEDICINE

## 2024-12-25 PROCEDURE — 63710000001 INSULIN LISPRO (HUMAN) PER 5 UNITS: Performed by: INTERNAL MEDICINE

## 2024-12-25 PROCEDURE — 85007 BL SMEAR W/DIFF WBC COUNT: CPT | Performed by: SURGERY

## 2024-12-25 PROCEDURE — 86923 COMPATIBILITY TEST ELECTRIC: CPT

## 2024-12-25 PROCEDURE — 25010000002 MIDAZOLAM PER 1 MG: Performed by: NURSE PRACTITIONER

## 2024-12-25 PROCEDURE — P9047 ALBUMIN (HUMAN), 25%, 50ML: HCPCS | Performed by: INTERNAL MEDICINE

## 2024-12-25 PROCEDURE — 83735 ASSAY OF MAGNESIUM: CPT | Performed by: SURGERY

## 2024-12-25 PROCEDURE — 85379 FIBRIN DEGRADATION QUANT: CPT | Performed by: SURGERY

## 2024-12-25 PROCEDURE — 86901 BLOOD TYPING SEROLOGIC RH(D): CPT | Performed by: NURSE PRACTITIONER

## 2024-12-25 PROCEDURE — 86900 BLOOD TYPING SEROLOGIC ABO: CPT | Performed by: NURSE PRACTITIONER

## 2024-12-25 PROCEDURE — 85018 HEMOGLOBIN: CPT | Performed by: EMERGENCY MEDICINE

## 2024-12-25 PROCEDURE — 25010000002 CALCIUM GLUCONATE 2-0.675 GM/100ML-% SOLUTION: Performed by: INTERNAL MEDICINE

## 2024-12-25 PROCEDURE — 85730 THROMBOPLASTIN TIME PARTIAL: CPT | Performed by: SURGERY

## 2024-12-25 PROCEDURE — P9016 RBC LEUKOCYTES REDUCED: HCPCS

## 2024-12-25 PROCEDURE — 82330 ASSAY OF CALCIUM: CPT | Performed by: INTERNAL MEDICINE

## 2024-12-25 PROCEDURE — 85025 COMPLETE CBC W/AUTO DIFF WBC: CPT | Performed by: SURGERY

## 2024-12-25 PROCEDURE — 85730 THROMBOPLASTIN TIME PARTIAL: CPT | Performed by: EMERGENCY MEDICINE

## 2024-12-25 PROCEDURE — 83605 ASSAY OF LACTIC ACID: CPT | Performed by: INTERNAL MEDICINE

## 2024-12-25 RX ORDER — NOREPINEPHRINE BITARTRATE 0.03 MG/ML
.02-.3 INJECTION, SOLUTION INTRAVENOUS
Status: DISCONTINUED | OUTPATIENT
Start: 2024-12-25 | End: 2024-12-26

## 2024-12-25 RX ORDER — MIDAZOLAM HYDROCHLORIDE 1 MG/ML
1 INJECTION, SOLUTION INTRAMUSCULAR; INTRAVENOUS ONCE
Status: COMPLETED | OUTPATIENT
Start: 2024-12-25 | End: 2024-12-25

## 2024-12-25 RX ORDER — NOREPINEPHRINE BITARTRATE 0.03 MG/ML
INJECTION, SOLUTION INTRAVENOUS
Status: COMPLETED
Start: 2024-12-25 | End: 2024-12-25

## 2024-12-25 RX ORDER — MIDAZOLAM HYDROCHLORIDE 1 MG/ML
1 INJECTION, SOLUTION INTRAMUSCULAR; INTRAVENOUS ONCE
Status: DISCONTINUED | OUTPATIENT
Start: 2024-12-25 | End: 2024-12-26

## 2024-12-25 RX ORDER — INSULIN LISPRO 100 [IU]/ML
5 INJECTION, SOLUTION INTRAVENOUS; SUBCUTANEOUS EVERY 6 HOURS SCHEDULED
Status: DISCONTINUED | OUTPATIENT
Start: 2024-12-25 | End: 2024-12-26

## 2024-12-25 RX ORDER — QUETIAPINE FUMARATE 25 MG/1
75 TABLET, FILM COATED ORAL EVERY 8 HOURS SCHEDULED
Status: DISCONTINUED | OUTPATIENT
Start: 2024-12-25 | End: 2024-12-26

## 2024-12-25 RX ORDER — MIDODRINE HYDROCHLORIDE 5 MG/1
10 TABLET ORAL EVERY 8 HOURS SCHEDULED
Status: DISCONTINUED | OUTPATIENT
Start: 2024-12-25 | End: 2024-12-30

## 2024-12-25 RX ORDER — CALCIUM GLUCONATE 20 MG/ML
1000 INJECTION, SOLUTION INTRAVENOUS ONCE
Status: COMPLETED | OUTPATIENT
Start: 2024-12-25 | End: 2024-12-25

## 2024-12-25 RX ORDER — ALBUMIN (HUMAN) 12.5 G/50ML
SOLUTION INTRAVENOUS
Status: DISCONTINUED
Start: 2024-12-25 | End: 2024-12-25 | Stop reason: WASHOUT

## 2024-12-25 RX ORDER — ALBUMIN (HUMAN) 12.5 G/50ML
25 SOLUTION INTRAVENOUS ONCE
Status: COMPLETED | OUTPATIENT
Start: 2024-12-25 | End: 2024-12-25

## 2024-12-25 RX ORDER — MAGNESIUM SULFATE HEPTAHYDRATE 40 MG/ML
2 INJECTION, SOLUTION INTRAVENOUS ONCE
Status: COMPLETED | OUTPATIENT
Start: 2024-12-25 | End: 2024-12-25

## 2024-12-25 RX ORDER — ALBUMIN (HUMAN) 12.5 G/50ML
SOLUTION INTRAVENOUS
Status: COMPLETED
Start: 2024-12-25 | End: 2024-12-25

## 2024-12-25 RX ADMIN — INSULIN LISPRO 3 UNITS: 100 INJECTION, SOLUTION INTRAVENOUS; SUBCUTANEOUS at 06:12

## 2024-12-25 RX ADMIN — DEXMEDETOMIDINE HYDROCHLORIDE IN SODIUM CHLORIDE 1 MCG/KG/HR: 4 INJECTION INTRAVENOUS at 22:10

## 2024-12-25 RX ADMIN — DEXMEDETOMIDINE HYDROCHLORIDE IN SODIUM CHLORIDE 1.5 MCG/KG/HR: 4 INJECTION INTRAVENOUS at 10:48

## 2024-12-25 RX ADMIN — ALBUMIN (HUMAN) 25 G: 0.25 INJECTION, SOLUTION INTRAVENOUS at 13:55

## 2024-12-25 RX ADMIN — CHLORHEXIDINE GLUCONATE, 0.12% ORAL RINSE 15 ML: 1.2 SOLUTION DENTAL at 08:09

## 2024-12-25 RX ADMIN — INSULIN LISPRO 2 UNITS: 100 INJECTION, SOLUTION INTRAVENOUS; SUBCUTANEOUS at 18:30

## 2024-12-25 RX ADMIN — PERMETHRIN CREAM 5% W/W 1 APPLICATION: 50 CREAM TOPICAL at 01:22

## 2024-12-25 RX ADMIN — INSULIN LISPRO 3 UNITS: 100 INJECTION, SOLUTION INTRAVENOUS; SUBCUTANEOUS at 01:15

## 2024-12-25 RX ADMIN — QUETIAPINE FUMARATE 75 MG: 25 TABLET ORAL at 13:29

## 2024-12-25 RX ADMIN — MAGNESIUM SULFATE IN WATER FOR 2 G: 40 INJECTION INTRAVENOUS at 10:50

## 2024-12-25 RX ADMIN — INSULIN LISPRO 4 UNITS: 100 INJECTION, SOLUTION INTRAVENOUS; SUBCUTANEOUS at 01:20

## 2024-12-25 RX ADMIN — INSULIN LISPRO 5 UNITS: 100 INJECTION, SOLUTION INTRAVENOUS; SUBCUTANEOUS at 18:29

## 2024-12-25 RX ADMIN — DEXMEDETOMIDINE HYDROCHLORIDE IN SODIUM CHLORIDE 1 MCG/KG/HR: 4 INJECTION INTRAVENOUS at 18:45

## 2024-12-25 RX ADMIN — CALCIUM GLUCONATE 2000 MG: 20 INJECTION, SOLUTION INTRAVENOUS at 01:21

## 2024-12-25 RX ADMIN — NYSTATIN 1 APPLICATION: 100000 CREAM TOPICAL at 06:12

## 2024-12-25 RX ADMIN — ALBUMIN (HUMAN) 25 G: 12.5 SOLUTION INTRAVENOUS at 13:55

## 2024-12-25 RX ADMIN — DEXMEDETOMIDINE HYDROCHLORIDE IN SODIUM CHLORIDE 1.5 MCG/KG/HR: 4 INJECTION INTRAVENOUS at 04:33

## 2024-12-25 RX ADMIN — NOREPINEPHRINE BITARTRATE 0.3 MCG/KG/MIN: 0.03 INJECTION, SOLUTION INTRAVENOUS at 14:15

## 2024-12-25 RX ADMIN — Medication 0.3 MCG/KG/MIN: at 14:15

## 2024-12-25 RX ADMIN — LANSOPRAZOLE 30 MG: 30 TABLET, ORALLY DISINTEGRATING ORAL at 06:11

## 2024-12-25 RX ADMIN — DEXMEDETOMIDINE HYDROCHLORIDE IN SODIUM CHLORIDE 1.5 MCG/KG/HR: 4 INJECTION INTRAVENOUS at 13:29

## 2024-12-25 RX ADMIN — QUETIAPINE FUMARATE 75 MG: 25 TABLET ORAL at 21:43

## 2024-12-25 RX ADMIN — OXYCODONE 10 MG: 5 TABLET ORAL at 08:25

## 2024-12-25 RX ADMIN — ALBUMIN (HUMAN) 25 G: 0.25 INJECTION, SOLUTION INTRAVENOUS at 01:21

## 2024-12-25 RX ADMIN — INSULIN LISPRO 3 UNITS: 100 INJECTION, SOLUTION INTRAVENOUS; SUBCUTANEOUS at 06:11

## 2024-12-25 RX ADMIN — Medication 10 ML: at 08:09

## 2024-12-25 RX ADMIN — INSULIN GLARGINE-YFGN 20 UNITS: 100 INJECTION, SOLUTION SUBCUTANEOUS at 21:43

## 2024-12-25 RX ADMIN — DEXMEDETOMIDINE HYDROCHLORIDE IN SODIUM CHLORIDE 1.5 MCG/KG/HR: 4 INJECTION INTRAVENOUS at 07:51

## 2024-12-25 RX ADMIN — NYSTATIN 1 APPLICATION: 100000 CREAM TOPICAL at 13:50

## 2024-12-25 RX ADMIN — CALCIUM GLUCONATE 1000 MG: 20 INJECTION, SOLUTION INTRAVENOUS at 10:51

## 2024-12-25 RX ADMIN — CHLORHEXIDINE GLUCONATE, 0.12% ORAL RINSE 15 ML: 1.2 SOLUTION DENTAL at 21:43

## 2024-12-25 RX ADMIN — QUETIAPINE FUMARATE 25 MG: 25 TABLET ORAL at 08:09

## 2024-12-25 RX ADMIN — MIDODRINE HYDROCHLORIDE 10 MG: 5 TABLET ORAL at 13:50

## 2024-12-25 RX ADMIN — NYSTATIN 1 APPLICATION: 100000 CREAM TOPICAL at 21:44

## 2024-12-25 RX ADMIN — Medication 10 ML: at 21:44

## 2024-12-25 RX ADMIN — DEXMEDETOMIDINE HYDROCHLORIDE IN SODIUM CHLORIDE 1.5 MCG/KG/HR: 4 INJECTION INTRAVENOUS at 01:21

## 2024-12-25 RX ADMIN — MIDAZOLAM 1 MG: 1 INJECTION INTRAMUSCULAR; INTRAVENOUS at 13:29

## 2024-12-25 RX ADMIN — MIDODRINE HYDROCHLORIDE 10 MG: 5 TABLET ORAL at 21:43

## 2024-12-25 RX ADMIN — OXYCODONE 10 MG: 5 TABLET ORAL at 12:28

## 2024-12-25 RX ADMIN — INSULIN LISPRO 5 UNITS: 100 INJECTION, SOLUTION INTRAVENOUS; SUBCUTANEOUS at 11:34

## 2024-12-25 RX ADMIN — HEPARIN SODIUM 17 UNITS/KG/HR: 10000 INJECTION, SOLUTION INTRAVENOUS at 15:43

## 2024-12-25 RX ADMIN — INSULIN LISPRO 2 UNITS: 100 INJECTION, SOLUTION INTRAVENOUS; SUBCUTANEOUS at 11:34

## 2024-12-25 NOTE — PROGRESS NOTES
"NEPHROLOGY PROGRESS NOTE------[facility]    [facility]  [phone]  Deidra Starr MD      Patient Care Team:  Katie Duran PA as PCP - General (Physician Assistant)  Uli Starr MD as Consulting Physician (Nephrology)      Provider:  Deidra Starr MD  Patient Name: Yarelis Jackson  :  1970    SUBJECTIVE:    F/U ARF/MARGOT/ELECTROLYTE ABNORMALITIES    S/P Extubation. Confused and in restraints    Medication:  chlorhexidine, 15 mL, Mouth/Throat, Q12H  insulin glargine, 20 Units, Subcutaneous, Nightly  insulin lispro, 2-7 Units, Subcutaneous, Q6H  insulin lispro, 5 Units, Subcutaneous, Q6H  lansoprazole, 30 mg, Nasogastric, Q AM  nystatin, 1 Application, Topical, Q8H  permethrin, 1 Application, Topical, Once  QUEtiapine, 25 mg, Nasogastric, Q12H  sodium chloride, 10 mL, Intravenous, Q12H      dexmedetomidine, 0.2-1.5 mcg/kg/hr, Last Rate: 1.5 mcg/kg/hr (24 0751)  heparin, 16.9 Units/kg/hr, Last Rate: 17 Units/kg/hr (24 0733)  norepinephrine, 0.02-0.3 mcg/kg/min, Last Rate: Stopped (24 0853)        OBJECTIVE    Vital Sign Min/Max for last 24 hours  Temp  Min: 97 °F (36.1 °C)  Max: 98.9 °F (37.2 °C)   BP  Min: 85/45  Max: 155/89   Pulse  Min: 60  Max: 130   Resp  Min: 10  Max: 26   SpO2  Min: 0 %  Max: 100 %   No data recorded   Weight  Min: 98.5 kg (217 lb 2.5 oz)  Max: 98.5 kg (217 lb 2.5 oz)     Flowsheet Rows      Flowsheet Row First Filed Value   Admission Height 162.6 cm (64\") Documented at 2024 0443   Admission Weight 92.5 kg (203 lb 14.4 oz) Documented at 2024 0527            I/O this shift:  In: 605 [I.V.:274; Other:181; NG/GT:150]  Out: 200 [Urine:200]  I/O last 3 completed shifts:  In: 7294 [P.O.:30; I.V.:4648; Other:1352; NG/GT:1264]  Out: 3925 [Urine:1925]    Physical Exam:     General Appearance:  CONFUSED AND IN RESTRAINTS  Head: normocephalic, without obvious abnormality and " "atraumatic +DHT INTACT  Eyes: conjunctivae and sclerae normal and no icterus  Neck: supple and no JVD  Lungs: +SCATTERED RHONCHI  Heart: regular rhythm & normal rate and normal S1, S2 +THOMAS  Chest: Wall no abnormalities observed  Abdomen: normal bowel sounds and soft +OBESITY  Extremities: moves extremities well, +TRACE  BILAT PRETIBIAL EDEMA, no cyanosis and no redness. +DJD  Skin: +BILAT PRETIBIAL FLUID FILLED BULLAE  Neurologic:  CONFUSED AND IN RESTRAINTS    Labs:    WBC WBC   Date Value Ref Range Status   12/25/2024 12.72 (H) 3.40 - 10.80 10*3/mm3 Final   12/24/2024 12.14 (H) 3.40 - 10.80 10*3/mm3 Final   12/23/2024 11.95 (H) 3.40 - 10.80 10*3/mm3 Final      HGB Hemoglobin   Date Value Ref Range Status   12/25/2024 6.6 (C) 12.0 - 15.9 g/dL Final   12/24/2024 7.7 (L) 12.0 - 15.9 g/dL Final   12/23/2024 9.0 (L) 12.0 - 15.9 g/dL Final     Comment:     Result checked     12/23/2024 7.5 (L) 12.0 - 15.9 g/dL Final      HCT Hematocrit   Date Value Ref Range Status   12/25/2024 20.6 (C) 34.0 - 46.6 % Final   12/24/2024 23.6 (L) 34.0 - 46.6 % Final   12/23/2024 27.1 (L) 34.0 - 46.6 % Final   12/23/2024 21.9 (L) 34.0 - 46.6 % Final      Platelets No results found for: \"LABPLAT\"   MCV MCV   Date Value Ref Range Status   12/25/2024 94.9 79.0 - 97.0 fL Final   12/24/2024 93.7 79.0 - 97.0 fL Final   12/23/2024 94.4 79.0 - 97.0 fL Final          Sodium Sodium   Date Value Ref Range Status   12/25/2024 136 136 - 145 mmol/L Final   12/24/2024 137 136 - 145 mmol/L Final   12/23/2024 138 136 - 145 mmol/L Final      Potassium Potassium   Date Value Ref Range Status   12/25/2024 3.7 3.5 - 5.2 mmol/L Final   12/24/2024 4.7 3.5 - 5.2 mmol/L Final   12/23/2024 4.7 3.5 - 5.2 mmol/L Final   12/23/2024 3.6 3.5 - 5.2 mmol/L Final   12/22/2024 3.1 (L) 3.5 - 5.2 mmol/L Final      Chloride Chloride   Date Value Ref Range Status   12/25/2024 100 98 - 107 mmol/L Final   12/24/2024 102 98 - 107 mmol/L Final   12/23/2024 104 98 - 107 mmol/L Final " "     CO2 CO2   Date Value Ref Range Status   12/25/2024 24.5 22.0 - 29.0 mmol/L Final   12/24/2024 22.7 22.0 - 29.0 mmol/L Final   12/23/2024 23.8 22.0 - 29.0 mmol/L Final      BUN BUN   Date Value Ref Range Status   12/25/2024 35 (H) 6 - 20 mg/dL Final   12/24/2024 37 (H) 6 - 20 mg/dL Final   12/23/2024 29 (H) 6 - 20 mg/dL Final      Creatinine Creatinine   Date Value Ref Range Status   12/25/2024 1.82 (H) 0.57 - 1.00 mg/dL Final   12/24/2024 2.15 (H) 0.57 - 1.00 mg/dL Final   12/23/2024 1.73 (H) 0.57 - 1.00 mg/dL Final      Calcium Calcium   Date Value Ref Range Status   12/25/2024 8.8 8.6 - 10.5 mg/dL Final   12/24/2024 7.6 (L) 8.6 - 10.5 mg/dL Final   12/23/2024 9.2 8.6 - 10.5 mg/dL Final      PO4 No components found for: \"PO4\"   Albumin Albumin   Date Value Ref Range Status   12/25/2024 3.4 (L) 3.5 - 5.2 g/dL Final   12/24/2024 2.9 (L) 3.5 - 5.2 g/dL Final   12/23/2024 3.1 (L) 3.5 - 5.2 g/dL Final      Magnesium Magnesium   Date Value Ref Range Status   12/25/2024 1.8 1.6 - 2.6 mg/dL Final   12/24/2024 1.7 1.6 - 2.6 mg/dL Final   12/23/2024 2.0 1.6 - 2.6 mg/dL Final      Uric Acid No components found for: \"URIC ACID\"     Imaging Results (Last 72 Hours)       ** No results found for the last 72 hours. **            Results for orders placed during the hospital encounter of 12/18/24    XR Chest 1 View    Narrative  XR CHEST 1 VW    Date of Exam: 12/22/2024 12:35 AM EST    Indication: Intubated Patient    Comparison:  12/21/2024    FINDINGS:  Endotracheal tube tip appears in satisfactory position at the level of the aortic arch. Right IJ catheter appears in stable position. Enteric tube extends into the left upper quadrant. Heart size and mediastinal contour appear within normal limits. No  definite pneumothorax or effusion. Mild bibasilar opacities. Findings of advanced emphysema with suspected apical scarring.    Impression  1.Tubes and lines appear in satisfactory positions, as above.  2.Mild bibasilar " opacities which could represent atelectasis or infiltrate.  3.Advanced emphysema.        Electronically Signed: Sam Haynes  12/22/2024 7:07 AM EST  Workstation ID: [id number]      XR Chest 1 View    Narrative  XR CHEST 1 VW    Date of Exam: 12/21/2024 4:32 AM EST    Indication: Intubated Patient    Comparison: 12/20/2024    Findings:  There is been no interval tube or line changes. Heart size and pulmonary vessels are within normal limits. There are postoperative changes of the bilateral upper lobes. Lungs appear clear. No pleural effusion or pneumothorax.    Impression  Impression:    1. No tube or line change  2. No focal airspace consolidation or other acute process.      Electronically Signed: Julio Thorpe MD  12/21/2024 9:54 AM EST  Workstation ID: [id number]      XR Chest 1 View    Narrative  XR CHEST 1 VW    Date of Exam: 12/20/2024 12:10 AM EST    Indication: Intubated Patient    Comparison: 12/19/2024    Findings:  Endotracheal tube approximately 2.5 cm above the carmina. Feeding tube and right IJ dialysis catheter remain in place. No new tubes or lines heart size and pulmonary vasculature are stable. Bullous changes in the right upper lobe. Postoperative changes in  the left upper lobe. Lungs grossly clear. No pneumothorax    Impression  Impression:  Stable chest demonstrating COPD and postoperative changes with no acute cardiopulmonary process demonstrated      Electronically Signed: Shon Ponce  12/20/2024 7:19 AM EST  Workstation ID: [id number]      Results for orders placed during the hospital encounter of 12/18/24    Duplex Lower Extremity Art / Grafts - Right CAR    Interpretation Summary    Limited study due to body habitus and current dressing site.  Patent external iliac and femoral-popliteal arterial flow with low flow noted below the common femoral.  Common femoral artery poorly visualized.  Cannot rule out occlusion of the common femoral artery.        ASSESSMENT / PLAN      DKA (diabetic  ketoacidosis)    Arterial thrombosis    ARF/MARGOT------Oliguric. BUN/Cr post HD yesterday. +ARF/MARGOT with historically normal renal function. +ARF/MARGOT secondary to severe prerenal state and ATN from hypotension and Rhabdomyolysis. Hydrate aggressively. Pressor support. Will do regular HD again today. Off IVFs. No NSAIDs. Renal US without obstructive uropathy.   Dose meds for CrCl less than 10 cc/min     2. HYPERNATREMIA/HYPEROSMOLAR STATE-----Better.       3. ACIDOSIS--Better with HD     4. DVT PROPHYLAXIS-----On Heparin gtt     5. HYPOCALCEMIA------Replace IV and follow ionized levels     6. DM------Insulin gtt per protocol     7. ANEMIA---------H/H stable     8. OA/DJD/HYPERURICEMIA------Allopurinol . No NSAIDs     9. RHABDOMYOLYSIS-------Off IVFs. HD again today     10. GERD/PUD PROPHYLAXIS-----Renal dose adjusted Pepcid     11. DELIRIUM/TME     12. COPD/ACUTE HYPOXIC RESPIRATORY FAILURE-----S/P extubation    13. HYPOALBUMINEMIA-----S/P IV Albumin to temporize    14. EDEMA/VOLUME EXCESS---- Gentle UF with HD    15. HYPOTENSION-----Back on Levophed. D/C Catapres patch    16. ELEVATED LFTS/TRANSAMINASES------Secondary to shock liver and Rhabdo. Follow    17. FASCITIS------S/P surgery yesterday    18. DELIRIUM    Deidra Starr MD  [facility]  [phone]  12/25/24  09:16 EST

## 2024-12-25 NOTE — PLAN OF CARE
Goal Outcome Evaluation:    Patient remains on 5L HFNC. Heparin, precedex gtts. AOx4 - but entirely confused. Both auditory and visual hallucinations observed/reported. Remains restrained on both wrists and ankles in order to protect patient from ripping out tubes/lines. Tolerated TF through NG tube. External cath collected around 300cc. No BM on shift.     Patient given 1mg IV versed push around 1400. Then became obtunded with low BP. Levo restarted and patient woke up to previous status of confusion/agitation around 1530.     Family updated - remains ICU level patient.

## 2024-12-25 NOTE — PROGRESS NOTES
Hematology/Oncology Inpatient Progress Note    PATIENT NAME: Yarelis Jackson  : 1970  MRN: [id number]    CHIEF COMPLAINT: Bleeding    HISTORY OF PRESENT ILLNESS:    Yarelis Jackson is a 54 y.o. female who presented to [facility] on 2024 with complaints of critical ischemia to the leg treated with serial operations with improvement.  Patient had right lower extremity ischemia due to thromboembolism to the right common femoral artery.  She underwent thrombectomy.  She was subsequently found to have a flap in the right femoral artery which was repaired on 2024 without any thrombus associated.  She was placed on IV heparin and developed hypotension with bleeding she has also had precipitous drop in her platelet count.  There is concern for possible HIT.  Heparin drip has since then been discontinued and we have been asked to see her for further evaluation and management of thrombocytopenia possibility of HIT  Review of her labs indicate that she had platelet counts of 99,000 as of 2024 subsequently dropped to 63,000.  She also has decreasing hemoglobin from 8.7-7.2 within the past 24 hours     24  Hematology/Oncology was consulted for thrombocytopenia and possible HIT.  Thrombophilia workup has also been initiated by primary team results are pending     He/She  has a past medical history of COPD (chronic obstructive pulmonary disease), Low back pain, Migraine, and Neck pain.     PCP: Sumit Duran PA  Subjective   2024: Now extubated but unable to provide any information.     ROS:  Review of Systems   Unable to perform ROS: Mental status change      MEDICATIONS:    Scheduled Meds:  chlorhexidine, 15 mL, Mouth/Throat, Q12H  insulin glargine, 20 Units, Subcutaneous, Nightly  insulin lispro, 2-7 Units, Subcutaneous, Q6H  insulin lispro, 5 Units, Subcutaneous, Q6H  lansoprazole, 30 mg, Nasogastric, Q AM  nystatin, 1 Application, Topical, Q8H  permethrin, 1  "Application, Topical, Once  QUEtiapine, 75 mg, Nasogastric, Q8H  sodium chloride, 10 mL, Intravenous, Q12H       Continuous Infusions:  dexmedetomidine, 0.2-1.5 mcg/kg/hr, Last Rate: 1.5 mcg/kg/hr (12/25/24 1048)  heparin, 16.9 Units/kg/hr, Last Rate: 17 Units/kg/hr (12/25/24 3303)       PRN Meds:    acetaminophen **OR** acetaminophen    aluminum-magnesium hydroxide-simethicone    senna-docusate sodium **AND** polyethylene glycol **AND** bisacodyl **AND** bisacodyl    Calcium Replacement - Follow Nurse / BPA Driven Protocol    dextrose    dextrose    glucagon (human recombinant)    heparin (porcine)    ipratropium-albuterol    Magnesium Standard Dose Replacement - Follow Nurse / BPA Driven Protocol    ondansetron ODT **OR** ondansetron    oxyCODONE    Phosphorus Replacement - Follow Nurse / BPA Driven Protocol    Potassium Replacement - Follow Nurse / BPA Driven Protocol    sodium chloride    sodium chloride    sodium chloride     ALLERGIES:    Allergies   Allergen Reactions    Aspirin GI Intolerance    Ibuprofen Itching     Objective    VITALS:   BP (!) 87/47   Pulse 64   Temp 100 °F (37.8 °C) (Oral)   Resp 14   Ht 162.6 cm (64\")   Wt 98.5 kg (217 lb 2.5 oz)   LMP  (LMP Unknown)   SpO2 100%   BMI 37.27 kg/m²     PHYSICAL EXAM: (performed by MD)  Physical Exam  Constitutional:       General: She is not in acute distress.     Appearance: She is ill-appearing.      Comments: Acutely ill appearing woman. Intubated and sedated. Not responsive to verbal stimulation.    HENT:      Right Ear: External ear normal.      Left Ear: External ear normal.      Mouth/Throat:      Pharynx: No oropharyngeal exudate or posterior oropharyngeal erythema.   Eyes:      General:         Right eye: No discharge.         Left eye: No discharge.   Cardiovascular:      Rate and Rhythm: Tachycardia present.   Pulmonary:      Effort: No respiratory distress.      Breath sounds: Rales present. No wheezing or rhonchi.      Comments: " "Symmetrically diminished.   Abdominal:      General: There is no distension.      Palpations: There is no mass.      Tenderness: There is no abdominal tenderness. There is no guarding or rebound.      Hernia: No hernia is present.      Comments: Protuberant and soft. Not apparently tender. No palpable tumors.    Musculoskeletal:      Right lower leg: Edema present.      Left lower leg: Edema present.   Skin:     Coloration: Skin is not jaundiced or pale.      Findings: Bruising present.      I have reexamined the patient and the results are consistent with the previously documented exam. Dmitry Nielsen MD      RECENT LABS:    Lab Results (last 24 hours)       Procedure Component Value Units Date/Time    D-dimer, Quantitative [[id number]]  (Abnormal) Collected: 12/25/24 0605    Specimen: Blood Updated: 12/25/24 0732     D-Dimer, Quantitative 3.75 MCGFEU/mL     Narrative:      According to the assay 's published package insert, a normal (<0.50 MCGFEU/mL) D-dimer result in conjunction with a non-high clinical probability assessment, excludes deep vein thrombosis (DVT) and pulmonary embolism (PE) with high sensitivity.    D-dimer values increase with age and this can make VTE exclusion of an older population difficult. To address this, the [employer], based on best available evidence and recent guidelines, recommends that clinicians use age-adjusted D-dimer thresholds in patients greater than 50 years of age with: a) a low probability of PE who do not meet all Pulmonary Embolism Rule Out Criteria, or b) in those with intermediate probability of PE.   The formula for an age-adjusted D-dimer cut-off is \"age/100\".  For example, a 60 year old patient would have an age-adjusted cut-off of 0.60 MCGFEU/mL and an 80 year old 0.80 MCGFEU/mL.    aPTT [[id number]]  (Abnormal) Collected: 12/25/24 0605    Specimen: Blood Updated: 12/25/24 0731     .1 seconds     CBC & Differential [[id number]]  " (Abnormal) Collected: 12/25/24 0605    Specimen: Blood Updated: 12/25/24 0716    Narrative:      The following orders were created for panel order CBC & Differential.  Procedure                               Abnormality         Status                     ---------                               -----------         ------                     CBC Auto Differential[[id number]]        Abnormal            Final result               Scan Slide[[id number]]                                       Final result                 Please view results for these tests on the individual orders.    CBC Auto Differential [[id number]]  (Abnormal) Collected: 12/25/24 0605    Specimen: Blood Updated: 12/25/24 0716     WBC 12.72 10*3/mm3      RBC 2.17 10*6/mm3      Hemoglobin 6.6 g/dL      Hematocrit 20.6 %      MCV 94.9 fL      MCH 30.4 pg      MCHC 32.0 g/dL      RDW 16.9 %      RDW-SD 57.2 fl      MPV 10.8 fL      Platelets 99 10*3/mm3     Narrative:      The previously reported component NRBC is no longer being reported. Previous result was 0.3 /100 WBC (Reference Range: 0.0-0.2 /100 WBC) on 12/25/2024 at 0628 EST.    Scan Slide [[id number]] Collected: 12/25/24 0605    Specimen: Blood Updated: 12/25/24 0716     Scan Slide --     Comment: See Manual Differential Results       Manual Differential [[id number]]  (Abnormal) Collected: 12/25/24 0605    Specimen: Blood Updated: 12/25/24 0716     Neutrophil % 46.0 %      Lymphocyte % 19.0 %      Monocyte % 8.0 %      Bands %  24.0 %      Metamyelocyte % 3.0 %      Neutrophils Absolute 8.90 10*3/mm3      Lymphocytes Absolute 2.42 10*3/mm3      Monocytes Absolute 1.02 10*3/mm3      nRBC 1.0 /100 WBC      Anisocytosis Slight/1+     Hypochromia Slight/1+     Poikilocytes Slight/1+     Toxic Granulation Slight/1+     Platelet Morphology Normal    CK [[id number]]  (Abnormal) Collected: 12/25/24 0605    Specimen: Blood Updated: 12/25/24 0650     Creatine Kinase 7,767 U/L     Magnesium [[id number]]  (Normal)  Collected: 12/25/24 0605    Specimen: Blood Updated: 12/25/24 0639     Magnesium 1.8 mg/dL     Comprehensive Metabolic Panel [[id number]]  (Abnormal) Collected: 12/25/24 0605    Specimen: Blood Updated: 12/25/24 0639     Glucose 245 mg/dL      BUN 35 mg/dL      Creatinine 1.82 mg/dL      Sodium 136 mmol/L      Potassium 3.7 mmol/L      Chloride 100 mmol/L      CO2 24.5 mmol/L      Calcium 8.8 mg/dL      Total Protein 5.9 g/dL      Albumin 3.4 g/dL      ALT (SGPT) 437 U/L      AST (SGOT) 566 U/L      Alkaline Phosphatase 314 U/L      Total Bilirubin 0.9 mg/dL      Globulin 2.5 gm/dL      A/G Ratio 1.4 g/dL      BUN/Creatinine Ratio 19.2     Anion Gap 11.5 mmol/L      eGFR 32.9 mL/min/1.73     Narrative:      GFR Categories in Chronic Kidney Disease (CKD)      GFR Category          GFR (mL/min/1.73)    Interpretation  G1                     90 or greater         Normal or high (1)  G2                      60-89                Mild decrease (1)  G3a                   45-59                Mild to moderate decrease  G3b                   30-44                Moderate to severe decrease  G4                    15-29                Severe decrease  G5                    14 or less           Kidney failure          (1)In the absence of evidence of kidney disease, neither GFR category G1 or G2 fulfill the criteria for CKD.    eGFR calculation 2021 CKD-EPI creatinine equation, which does not include race as a factor    Phosphorus [[id number]]  (Normal) Collected: 12/25/24 0605    Specimen: Blood Updated: 12/25/24 0637     Phosphorus 3.0 mg/dL     Lactic Acid, Plasma [[id number]]  (Normal) Collected: 12/25/24 0605    Specimen: Blood Updated: 12/25/24 0636     Lactate 1.2 mmol/L     Calcium, Ionized [[id number]]  (Normal) Collected: 12/25/24 0605    Specimen: Blood Updated: 12/25/24 0614     Ionized Calcium 1.15 mmol/L     POC Glucose Q6H [[id number]]  (Abnormal) Collected: 12/25/24 0604    Specimen: Blood Updated: 12/25/24 0605      Glucose 245 mg/dL      Comment: Serial Number: [id number]:  [id number]       POC Glucose Q6H [[id number]]  (Abnormal) Collected: 12/25/24 0100    Specimen: Blood Updated: 12/25/24 0102     Glucose 250 mg/dL      Comment: Serial Number: [id number]:  [id number]       POC Glucose Once [[id number]]  (Abnormal) Collected: 12/24/24 1708    Specimen: Blood Updated: 12/24/24 1710     Glucose 155 mg/dL      Comment: Serial Number: [id number]:  [id number]       Heparin-induced Platelet Antibody [[id number]] Collected: 12/22/24 1347    Specimen: Blood Updated: 12/24/24 1609     Heparin Induced Plt Ab 0.015 OD     Narrative:      Performed at:  [facility]  [id number]  : Nayana Lund MD, Phone:  [phone]    Anticardiolipin Antibody, IgG / M, Qn [[id number]] Collected: 12/22/24 1347    Specimen: Blood Updated: 12/24/24 1508     Anticardiolipin IgG <9 GPL U/mL      Comment:                           Negative:              <15                            Indeterminate:     15 - 20                            Low-Med Positive: >20 - 80                            High Positive:         >80        Anticardiolipin IgM <9 MPL U/mL      Comment:                           Negative:              <13                            Indeterminate:     13 - 20                            Low-Med Positive: >20 - 80                            High Positive:         >80       Narrative:      Performed at:  [facility]  [id number]  : Evelio Fuller PhD, Phone:  [phone]    Blood Gas, Arterial - [[id number]]  (Abnormal) Collected: 12/24/24 1153    Specimen: Arterial Blood Updated: 12/24/24 1156     Site Arterial Line     Roman's Test N/A     pH, Arterial 7.293 pH units      pCO2, Arterial 52.2 mm Hg      pO2, Arterial 98.9 mm Hg      HCO3, Arterial 25.3 mmol/L      Base Excess, Arterial -1.2 mmol/L      Comment: Serial Number:  [id number]:  [id number]        O2 Saturation, Arterial 96.7 %      CO2 Content 26.9 mmol/L      Barometric Pressure for Blood Gas --     Comment: N/A        Modality HFNC     FIO2 52 %      Hemodilution No     PO2/FIO2 190          IMAGING REVIEWED:  No radiology results for the last day    Assessment & Plan   ASSESSMENT:    Persistent thrombocytopenia but improved. No evident bleeding. Continue observation.   Arterial Thrombosis status post thrombectomy: Continue heparin.   Anemia due to blood loss: Hemoglobin up to 9 g per DL  Hypercoagulable state     PLAN  As above.     Dmitry Nielsen MD on 12/25/2024 at 11:30 AM.

## 2024-12-25 NOTE — PLAN OF CARE
Goal Outcome Evaluation:     Pt status and vital signs stable entire shift. Pt on 4-5 L HFNC throughout the shift. Pt remains confused and agitated while awake, dex gtt maxxed throughout the shift. Heparin remains on, levo gtt as well. Pt had good urine output throughout the shift, pillia catheter removed due to being no longer indicated, external applied, pt tolerated well. Tube feeds going, pt tolerating. 4 point restraints remain in place, pt remains without injury for duration of shift, not tolerating removal at this time. Pt distal pulses have remained intact and palpable although weak for duration of shift. Family at bedside towards the beginning of the shift. Pt able to rest throughout the shift.

## 2024-12-25 NOTE — PROGRESS NOTES
Nutrition Services  Patient Name: Yarelis Jackson  YOB: 1970  MRN: [id number]  Admission date: 12/18/2024    PROGRESS NOTE      Nutrition Intervention Updates: Continue current TF prescription, at goal         Encounter Information: Checking in on tube feeding. Extubated.  NG tube remains in place.   HD 12/24.         PO Diet: NPO Diet NPO Type: Strict NPO   PO Supplements: NPO   PO Intake:  NPO       Current nutrition support: Novasource renal at 35 ml/hr + 30 ml/hr FWF    Nutrition support review: Running as ordered per EMR        Labs (reviewed below): Reviewed, management per attending        GI Function:  Last documented BM 12/25 (today)       Brief weight review   Wt Readings from Last 10 Encounters:   12/25/24 0400 98.5 kg (217 lb 2.5 oz)   12/24/24 0431 100 kg (221 lb 5.5 oz)   12/24/24 0333 100 kg (221 lb 5.5 oz)   12/19/24 0600 88.5 kg (195 lb 1.7 oz)   12/18/24 0527 92.5 kg (203 lb 14.4 oz)   07/15/23 1132 98.1 kg (216 lb 4.3 oz)   04/04/23 0719 98.1 kg (216 lb 4.3 oz)   12/12/22 0952 85.5 kg (188 lb 6.4 oz)   01/31/22 1404 89.8 kg (198 lb)   01/24/22 1406 89.8 kg (198 lb)   12/23/21 1133 89.8 kg (198 lb)   11/24/21 1537 90.3 kg (199 lb)   11/16/21 0838 90.3 kg (199 lb)   09/29/21 0320 87.4 kg (192 lb 10.9 oz)        Results from last 7 days   Lab Units 12/25/24  0605 12/24/24  0344 12/23/24  1823 12/23/24  0429   SODIUM mmol/L 136 137  --  138   POTASSIUM mmol/L 3.7 4.7 4.7 3.6   CHLORIDE mmol/L 100 102  --  104   CO2 mmol/L 24.5 22.7  --  23.8   BUN mg/dL 35* 37*  --  29*   CREATININE mg/dL 1.82* 2.15*  --  1.73*   CALCIUM mg/dL 8.8 7.6*  --  9.2   BILIRUBIN mg/dL 0.9 0.5  --  0.7   ALK PHOS U/L 314* 126*  --  135*   ALT (SGPT) U/L 437* 610*  --  430*   AST (SGOT) U/L 566* 970*  --  939*   GLUCOSE mg/dL 245* 258*  --  104*     Results from last 7 days   Lab Units 12/25/24  0605 12/24/24  0344 12/23/24  1355 12/23/24  0429 12/21/24  1307 12/21/24  0405   MAGNESIUM mg/dL 1.8 1.7  --  2.0    < > 2.1   PHOSPHORUS mg/dL 3.0 2.8  --  3.9   < > 6.8*   HEMOGLOBIN g/dL 6.6* 7.7*   < > 7.5*   < > 8.7*   HEMOGLOBIN, POC   --   --   --   --    < >  --    HEMATOCRIT % 20.6* 23.6*   < > 21.9*   < > 27.2*   HEMATOCRIT POC   --   --   --   --    < >  --    TRIGLYCERIDES mg/dL  --   --   --   --   --  336*    < > = values in this interval not displayed.         RD to follow up per protocol.    Electronically signed by:  Ashley Hernandez RD  12/25/24 08:53 EST

## 2024-12-25 NOTE — PROGRESS NOTES
Name: Yarelis Jackson ADMIT: 2024   : 1970  PCP: Katie Duran PA    MRN: [id number] LOS: 7 days   AGE/SEX: 54 y.o. female  ROOM:  [facility] 2308/1     CC: Postop day #6 and 4 from right leg thrombectomies and fasciotomies  Interval History: Stable overnight.  Subjective   Subjective     Review of Systems  Objective   Objective     Vitals:   Temp:  [97 °F (36.1 °C)-100 °F (37.8 °C)] 98 °F (36.7 °C)  Heart Rate:  [] 64  Resp:  [10-26] 15  BP: ()/(40-69) 111/55    I/O this shift:  In: 1794 [I.V.:874; Blood:350; Other:301; NG/GT:269]  Out: 200 [Urine:200]    Scheduled Meds:     chlorhexidine, 15 mL, Mouth/Throat, Q12H  insulin glargine, 20 Units, Subcutaneous, Nightly  insulin lispro, 2-7 Units, Subcutaneous, Q6H  insulin lispro, 5 Units, Subcutaneous, Q6H  lansoprazole, 30 mg, Nasogastric, Q AM  midazolam, 1 mg, Intravenous, Once  midodrine, 10 mg, Nasogastric, Q8H  Norepinephrine-Sodium Chloride, , ,   nystatin, 1 Application, Topical, Q8H  QUEtiapine, 75 mg, Nasogastric, Q8H  sodium chloride, 10 mL, Intravenous, Q12H      IV Meds:   dexmedetomidine, 0.2-1.5 mcg/kg/hr, Last Rate: 1.5 mcg/kg/hr (24 1329)  heparin, 16.9 Units/kg/hr, Last Rate: 17 Units/kg/hr (24 0733)        Physical Exam  Vascular: Mild blistering noted.  Right groin site looks fine without any evidence of skin breakdown or erythema.  No drainage.  Does have pedal signals bilaterally.    Data Reviewed:  CBC    Results from last 7 days   Lab Units 24  1142 24  0605 24  0344 24  1355 24  0429 24  0452 24  2351 24  1307 24  0405   WBC 10*3/mm3  --  12.72* 12.14*  --  11.95* 7.59  --  8.22 8.44 15.20*   HEMOGLOBIN g/dL 7.8* 6.6* 7.7* 9.0* 7.5* 7.2*  --  6.7* 7.5* 8.7*   HEMOGLOBIN, POC g/dL  --   --   --   --   --   --  6.8*  --   --   --    PLATELETS 10*3/mm3  --  99* 131*  --  115* 63*  --  68* 85* 99*     BMP   Results from last 7 days   Lab  Units 12/25/24  0605 12/24/24  0344 12/23/24  1823 12/23/24  0429 12/22/24  2114 12/22/24  0452 12/21/24  2351 12/21/24  0405 12/20/24  2322 12/20/24 2021   SODIUM mmol/L 136 137  --  138  --  138  --  138 136 135*   POTASSIUM mmol/L 3.7 4.7 4.7 3.6 3.1* 3.5  --  5.0 5.3* 5.3*   CHLORIDE mmol/L 100 102  --  104  --  103  --  105 104 104   CO2 mmol/L 24.5 22.7  --  23.8  --  22.3  --  18.8* 20.5* 21.1*   BUN mg/dL 35* 37*  --  29*  --  39*  --  46* 38* 34*   CREATININE mg/dL 1.82* 2.15*  --  1.73*  --  1.85* 2.09* 2.43* 2.09* 1.98*   GLUCOSE mg/dL 245* 258*  --  104*  --  236*  --  181* 154* 142*   MAGNESIUM mg/dL 1.8 1.7  --  2.0  --  2.0  --  2.1 2.0 2.0   PHOSPHORUS mg/dL 3.0 2.8  --  3.9  --  3.6  --  6.8* 7.0* 7.3*     Radiology(recent) No radiology results for the last day    Most notable findings include: Hemoglobin down to 6.6 but now up to 7.8 after transfusion.  Creatinine 1.82 but on dialysis.    Active Hospital Problems:   Active Hospital Problems    Diagnosis  POA    **DKA (diabetic ketoacidosis) [E11.10]  Yes    Arterial thrombosis [I74.9]  No      Resolved Hospital Problems   No resolved problems to display.      Assessment & Plan     Assessment / Plan     Right lower extremity ischemia: Status post thrombectomy and redo thrombectomy as well as fasciotomies.  Patient's limbs are viable.  She has pedal signals that are stable.  She is currently on dialysis and her legs are restrained so I have asked the nurse to take down the fasciotomy dressings after dialysis today.    Continue heparin drip.    Willie Son MD  12/25/24  14:59 EST  Available via Secure Chat during the day for non-urgent issues.  After hours and for urgent issues please call the office at [phone]

## 2024-12-25 NOTE — PROGRESS NOTES
ENDOCRINE CONSULT PROGRESS NOTE  DATE OF SERVICE: 24        PATIENT NAME: Yarelis Jackson  PATIENT : 1970 AGE: 54 y.o.  MRN NUMBER: [id number]    ==========================================================================    CHIEF COMPLAINT: Type 1 diabetes management    CARE TEAM:   Patient Care Team:  Katie Duran PA as PCP - General (Physician Assistant)  Uli Starr MD as Consulting Physician (Nephrology)    SUBJECTIVE    Pt seen and examined.  Blood sugar reviewed for last 24 hours.    ==========================================================================    CURRENT ACTIVE HOSPITAL MEDICATIONS    Scheduled Medications:  calcium gluconate, 1,000 mg, Intravenous, Once  chlorhexidine, 15 mL, Mouth/Throat, Q12H  insulin glargine, 20 Units, Subcutaneous, Nightly  insulin lispro, 2-7 Units, Subcutaneous, Q6H  insulin lispro, 5 Units, Subcutaneous, Q6H  lansoprazole, 30 mg, Nasogastric, Q AM  magnesium sulfate, 2 g, Intravenous, Once  nystatin, 1 Application, Topical, Q8H  permethrin, 1 Application, Topical, Once  QUEtiapine, 25 mg, Nasogastric, Q12H  sodium chloride, 10 mL, Intravenous, Q12H         PRN Medications:    acetaminophen **OR** acetaminophen    aluminum-magnesium hydroxide-simethicone    senna-docusate sodium **AND** polyethylene glycol **AND** bisacodyl **AND** bisacodyl    Calcium Replacement - Follow Nurse / BPA Driven Protocol    dextrose    dextrose    glucagon (human recombinant)    heparin (porcine)    ipratropium-albuterol    Magnesium Standard Dose Replacement - Follow Nurse / BPA Driven Protocol    nitroglycerin    ondansetron ODT **OR** ondansetron    oxyCODONE    Phosphorus Replacement - Follow Nurse / BPA Driven Protocol    Potassium Replacement - Follow Nurse / BPA Driven Protocol    sodium chloride    sodium chloride    sodium chloride     ==========================================================================    OBJECTIVE    Vitals:    24  0817   BP: 122/54   Pulse: 72   Resp: 10   Temp: 98 °F (36.7 °C)   SpO2: 100%      Body mass index is 37.27 kg/m².     General - Confused and in restraints     ==========================================================================    LAB EVALUATION    Lab Results   Component Value Date    GLUCOSE 245 (H) 12/25/2024    BUN 35 (H) 12/25/2024    CREATININE 1.82 (H) 12/25/2024    EGFRIFNONA 87 09/29/2021    BCR 19.2 12/25/2024    K 3.7 12/25/2024    CO2 24.5 12/25/2024    CALCIUM 8.8 12/25/2024    ALBUMIN 3.4 (L) 12/25/2024     (H) 12/25/2024     (H) 12/25/2024       Lab Results   Component Value Date    HGBA1C 15.80 (H) 12/18/2024     Lab Results   Component Value Date    CREATININE 1.82 (H) 12/25/2024     Results from last 7 days   Lab Units 12/25/24  0604 12/25/24  0100 12/24/24  1708 12/24/24  1121 12/24/24  0501 12/24/24  0324   GLUCOSE mg/dL 245* 250* 155* 330* 277* 245*     ==========================================================================    ASSESSMENT AND PLAN    # Type 1 diabetes with hyperglycemia  # DKA on admission, resolved  - Reviewed blood sugar for last 24 hours  - For nutrition patient is currently on tube feeding  - Adjusted insulin therapy as:  Insulin Lantus 20 units nightly  Insulin Lispro 5 units Q6hrs  Continue Insulin lispro sliding scale every 6 hours as well  - Will review blood sugar for next 24 hours and adjust therapy accordingly  - Nursing team will reach out back again in 10 to 12 hours and if blood sugar continues to remain elevated will increase both Lantus and lispro dosing    Will follow with you.  Rest as per primary team.    Part of this note may be an electronic transcription/translation of spoken language to printed text using the Dragon Dictation System.     Note: Portions of this note may have been copied from previous notes but documentation have been reviewed and edited as necessary to support clinical decision making for today's visit.  The time of  this note does not reflect the time I saw the patient but the time that this note was written.    ==========================================================================  Patel Winkler MD  Department of Endocrine, Diabetes and Metabolism  [place], [place]  ==========================================================================

## 2024-12-25 NOTE — PROGRESS NOTES
Critical Care Progress Note     Yarelis Jackson : 1970 MRN:[id number] LOS:7  Rm: 2308/1     Principal Problem: DKA (diabetic ketoacidosis)     Reason for follow up: All the medical problems listed below    Summary     Yarelis Jackson is a 54 y.o. female with PMH of COPD, migraine, and obesity, and presented to the hospital for altered mental status, and was admitted with a principal diagnosis of DKA (diabetic ketoacidosis).  Patient presented and per ER documentation, patient was oriented to person and time.  Per patient's mother, patient had been feeling ill since Friday with noted polyuria and polydipsia.  It was managed by giving patient milk and a grape soft drink.  Patient became increasingly agitated, preventing treatment, and was given a dose of Geodon in the ED.     In the ED, labs were obtained with the following abnormalities: Sodium 159, chloride 117, CO2 10.1, anion gap 31.9, BUN 88, creatinine 2.13, glucose 976, alk phos 246, , , hemoglobin A1c 15.8, moderate acetone, osmolality 461, WBC 14.65 without bandemia.  UA with >1000 glucose, 15 mg/dL of ketones, moderate blood and proteinuria noted, this did not reflex to culture.  Patient had an ABG with pH 7.179, pCO2 37.5, pO2 77.7, HCO3 14, with O2 saturation 91.7%.  Patient was determined to be in DKA, and was started with IV fluid boluses per DKA protocol as well as an insulin drip.  Patient did have blood cultures obtained, but per ED provider, patient has no obvious signs or symptoms of active section, therefore no antimicrobials were started.    Patient sodium level remained persistent, and IV fluids were changed to withhold any sodium containing products.  Nephrology was consulted.  Patient also underwent placement of feeding tube for free water flush hourly overnight.  On , nephrology decided that patient would proceed with hemodialysis and requested nontunneled catheter placement.  Patient was becoming increasingly  agitated, with concerned that patient was no longer able to protect her own airway.  After discussion with patient's family, patient was intubated, nontunneled catheter placement was completed, and patient did require initiation of vasopressors.  HD was transitioned to CRRT.  Shortly after initiation of CRRT, patient developed an acute change in patient's skin color of her right lower extremity with subsequent loss of pulse.  Vascular surgery was stat consulted as well as completion of stat arterial duplex and ABIs.  Patient was started on a heparin drip.  Vascular surgery promptly evaluated patient and patient was scheduled to go to the OR for emergent thrombectomy.    Significant Events / Subjective     12/25/24 : POD #7 from initial surgery. POD #5 right leg revascularization. POD#2 completion of right leg fasciotomy. Patient extubated. 1 uPRBC overnight.  On HFNC, agitated and combative so started on precedex gtt. Versed given to facilitate HD however more somnolent now and intermittently hypotensive. Restarted on levo this PM as she is receiving dialysis.    Assessment / Plan   Acute metabolic encephalopathy  Metabolic encephalopathy secondary to DKA and septic shock  CT of the head 12/18: Without acute abnormalities.  UDS-negative.  HIV-negative.  No focal deficits but very confused and agitated  Likely metabolic/hospital acquired delirium  Delirium precautions  Increased antipsychotic  Would avoid future benzos    Diabetic ketoacidosis without coma, with new onset diabetes mellitus, type 2 / Metabolic acidosis, increased anion gap-Resolved  Etiology uncertain, though new onset diabetes mellitus.  A1c 15.80 on admission, no prior available.  Anion gap of 31.9 on admission.  On no home medication regimen.  Acetone moderate, beta hydroxybutyrate 10.79  Initially started on DKA protocol.  Transitioned off insulin gtt  Nephrology consulted.     Acute Kidney Injury  Remains nonoliguric. Baseline creatinine 0.90.   Creatinine 2.10 on admission.  HD yesterday and again today  1.4 L urine yesterday  Monitor Input/Output very closely.   Avoid NSAIDs, nephrotoxic medications, and hypotension.  Net IO Since Admission: 21,713 mL [12/25/24 1406]       Acute occlusive thrombus of the right iliac artery with limb ischemia improved  Arterial duplex with noted right femoral, deep femoral, and popliteal arteries being patent but with very low amplitude monophasic signals, suggesting severe inflow stenosis or occlusion; no measurable Doppler flow in the anterior or posterior tibial and peroneal arteries.  Bilateral lower extremity ABIs ordered: Right STACEY critically reduced with STACEY of 0 and severe digital insufficiency; left STACEY is normal with moderate digital insufficiency.  Emergent surgery per vascular surgeon 12/19/2024 for right iliac thrombectomy via open groin incision then return to surgery on 12/20/2024 for recurrent right lower extremity ischemia due to arterial thrombosis and underwent right iliofemoral popliteal thrombectomy, right femoral endarterectomy with patch, right lower extremity arteriogram, and closed right calf fasciotomies  Heparin drip was initiated upon diagnosis of acute occlusive thrombus however PTTs have been extremely labile and supratherapeutic at times.    Hematology/oncology consulted  Return to the OR morning (12/23) for emergent 4 compartment fasciotomies due to critical right leg ischemia    Nontraumatic rhabdomyolysis --> Improving  CK has begun to trend down, now 7767  Continue IV fluids as ordered.  Nephrology following.    Cutaneous candidiasis of groin  Likely secondary to uncontrolled diabetes mellitus  Nystatin ordered    Essential Hypertension  Currently hypotensive, on Levophed  Clonidine patch ordered by nephrology-hold while on Levophed  Titrate medications as needed.     Bilateral leg rash, concern for scabies infection  Permethrin regimen ordered.  Completed first treatment on 12/18, repeat  treatment in 2 weeks  Keep in contact precautions x 1 week post initial treatment (12/25)     Transaminitis  US of Liver: Hepatomegaly with steatosis.  Hepatitis panel-negative.  Monitor and trend LFTs.       COPD: Not in exacerbation.  Duonebs ordered as needed.  Obesity: Body mass index is 33.49 kg/m².    Disposition: Extubated but remains on levo. Appropriate to remain in ICU.    Code status:   Code Status (Patient has no pulse and is not breathing): CPR (Attempt to Resuscitate)  Medical Interventions (Patient has pulse or is breathing): Full Support       Nutrition:   NPO Diet NPO Type: Strict NPO   Tube Feeding: Formula: Novasource Renal (Nepro); Feeding Type: Continuous; Start at: 20 mL/hr; Then Advance By: 10 mL/hr; Every: 4 hours; To Goal Rate of: 35 mL/hr; Water Flush: 30 mL; Every: 1 hour; Water Bolus: None     VTE Prophylaxis:  Pharmacologic VTE prophylaxis orders are present.      Objective / Physical Exam     Vital signs:  Temp: 98 °F (36.7 °C)  BP: (!) 88/45  Heart Rate: 64  Resp: 16  SpO2: 92 %  Weight: 98.5 kg (217 lb 2.5 oz)    Admission Weight: Weight: 92.5 kg (203 lb 14.4 oz)  Current Weight: Weight: 98.5 kg (217 lb 2.5 oz)    Input/Output in last 24 hours:    Intake/Output Summary (Last 24 hours) at 12/25/2024 1406  Last data filed at 12/25/2024 1130  Gross per 24 hour   Intake 5363 ml   Output 3625 ml   Net 1738 ml      Net IO Since Admission: 21,713 mL [12/25/24 1406]      Physical Exam  Vitals and nursing note reviewed.   Constitutional:       Comments: Laying in bed restrained in no acute distress   HENT:      Head: Normocephalic.      Nose: Nose normal.   Eyes:      Conjunctiva/sclera: Conjunctivae normal.   Cardiovascular:      Rate and Rhythm: Normal rate and regular rhythm.   Pulmonary:      Effort: Pulmonary effort is normal. No respiratory distress.   Abdominal:      General: There is distension.      Palpations: There is no mass.      Tenderness: There is no abdominal tenderness.    Musculoskeletal:      Comments: Right fasciotomy site with dressings in place.  Palpable pulses in bilateral DP   Skin:     General: Skin is warm.      Capillary Refill: Capillary refill takes less than 2 seconds.   Neurological:      General: No focal deficit present.      Mental Status: She is disoriented.           Radiology and Labs     Results from last 7 days   Lab Units 12/25/24  1142 12/25/24  0605 12/24/24  0344 12/23/24  1355 12/23/24  0429 12/22/24  0452 12/21/24 2351 12/21/24 2011   WBC 10*3/mm3  --  12.72* 12.14*  --  11.95* 7.59  --  8.22   HEMOGLOBIN g/dL 7.8* 6.6* 7.7* 9.0* 7.5* 7.2*  --  6.7*   HEMOGLOBIN, POC   --   --   --   --   --   --    < >  --    HEMATOCRIT % 23.2* 20.6* 23.6* 27.1* 21.9* 22.1*  --  20.1*   HEMATOCRIT POC   --   --   --   --   --   --    < >  --    PLATELETS 10*3/mm3  --  99* 131*  --  115* 63*  --  68*    < > = values in this interval not displayed.      Results from last 7 days   Lab Units 12/25/24  1340 12/25/24  0605 12/24/24  0344 12/23/24  2124 12/23/24  1427 12/22/24  1803 12/22/24  1136 12/19/24  1952 12/19/24  1428   PROTIME Seconds  --   --   --   --   --   --  13.9  --  13.8   INR   --   --   --   --   --   --  1.07  --  1.05   APTT seconds 66.7* 110.1* 91.3* 81.7* 69.0*   < > 75.6*   < > <20.0*    < > = values in this interval not displayed.      Results from last 7 days   Lab Units 12/25/24  0605 12/24/24  0344 12/23/24  1823 12/23/24  0429 12/22/24  2114 12/22/24  0452 12/21/24 2351 12/21/24  0405   SODIUM mmol/L 136 137  --  138  --  138  --  138   POTASSIUM mmol/L 3.7 4.7 4.7 3.6 3.1* 3.5  --  5.0   CHLORIDE mmol/L 100 102  --  104  --  103  --  105   CO2 mmol/L 24.5 22.7  --  23.8  --  22.3  --  18.8*   BUN mg/dL 35* 37*  --  29*  --  39*  --  46*   CREATININE mg/dL 1.82* 2.15*  --  1.73*  --  1.85* 2.09* 2.43*   GLUCOSE mg/dL 245* 258*  --  104*  --  236*  --  181*   MAGNESIUM mg/dL 1.8 1.7  --  2.0  --  2.0  --  2.1   PHOSPHORUS mg/dL 3.0 2.8  --  3.9   --  3.6  --  6.8*      Results from last 7 days   Lab Units 12/25/24  0605 12/24/24  0344 12/23/24  0429 12/22/24  0452 12/21/24  0405   ALK PHOS U/L 314* 126* 135* 100 91   AST (SGOT) U/L 566* 970* 939* 271* 234*   ALT (SGPT) U/L 437* 610* 430* 96* 101*     Results from last 7 days   Lab Units 12/24/24  1153 12/24/24  0324 12/23/24  1532 12/23/24  0355 12/22/24  0424   PH, ARTERIAL pH units 7.293* 7.425 7.404 7.465* 7.424   PCO2, ARTERIAL mm Hg 52.2* 37.8 42.7 35.5 38.0   PO2 ART mm Hg 98.9 113.7* 89.8 91.0 82.2*   O2 SATURATION ART % 96.7 98.6* 96.9 97.6 96.3   FIO2 % 52 40 30 30 35   HCO3 ART mmol/L 25.3 24.8 26.7 25.5 24.9   BASE EXCESS ART mmol/L -1.2* 0.4 1.7 1.7 0.5       No radiology results for the last day    Current medications     Scheduled Meds:   albumin human, , ,   chlorhexidine, 15 mL, Mouth/Throat, Q12H  insulin glargine, 20 Units, Subcutaneous, Nightly  insulin lispro, 2-7 Units, Subcutaneous, Q6H  insulin lispro, 5 Units, Subcutaneous, Q6H  lansoprazole, 30 mg, Nasogastric, Q AM  midazolam, 1 mg, Intravenous, Once  midodrine, 10 mg, Nasogastric, Q8H  Norepinephrine-Sodium Chloride, , ,   nystatin, 1 Application, Topical, Q8H  QUEtiapine, 75 mg, Nasogastric, Q8H  sodium chloride, 10 mL, Intravenous, Q12H        Continuous Infusions:   dexmedetomidine, 0.2-1.5 mcg/kg/hr, Last Rate: 1.5 mcg/kg/hr (12/25/24 1329)  heparin, 16.9 Units/kg/hr, Last Rate: 17 Units/kg/hr (12/25/24 0733)      CC time: 35 minutes     Critical care services to this patient including but not limited to: review of labs/ microbiology/imaging/medications, serial monitoring of vital signs, adjusting ventilator settings as needed, review of other consultant's notes, review of events in the last 24 hrs, monitoring input/output, review of treatment plan with bedside nurse, RT and other treatment team, management of life support and nutrition needs.    Time spent in performing separately billable procedures and updating family is not  included in the critical care time.       Kiran Short MD   Critical Care  12/25/24   14:06 EST

## 2024-12-26 LAB
ALBUMIN SERPL-MCNC: 3.5 G/DL (ref 3.5–5.2)
ALBUMIN/GLOB SERPL: 1.4 G/DL
ALP SERPL-CCNC: 305 U/L (ref 39–117)
ALT SERPL W P-5'-P-CCNC: 479 U/L (ref 1–33)
AMMONIA BLD-SCNC: 56 UMOL/L (ref 11–51)
ANION GAP SERPL CALCULATED.3IONS-SCNC: 11.7 MMOL/L (ref 5–15)
ANISOCYTOSIS BLD QL: ABNORMAL
APTT PPP: 103.8 SECONDS (ref 22.7–35.4)
APTT PPP: 66.4 SECONDS (ref 22.7–35.4)
APTT PPP: 81.2 SECONDS (ref 22.7–35.4)
APTT PPP: >200 SECONDS (ref 22.7–35.4)
ARTERIAL PATENCY WRIST A: ABNORMAL
AST SERPL-CCNC: 502 U/L (ref 1–32)
ATMOSPHERIC PRESS: ABNORMAL MM[HG]
BASE EXCESS BLDA CALC-SCNC: -1 MMOL/L (ref 0–3)
BDY SITE: ABNORMAL
BH BB BLOOD EXPIRATION DATE: NORMAL
BH BB BLOOD TYPE BARCODE: 5100
BH BB DISPENSE STATUS: NORMAL
BH BB PRODUCT CODE: NORMAL
BH BB UNIT NUMBER: NORMAL
BILIRUB SERPL-MCNC: 0.6 MG/DL (ref 0–1.2)
BUN SERPL-MCNC: 36 MG/DL (ref 6–20)
BUN/CREAT SERPL: 22.2 (ref 7–25)
CA-I BLDA-SCNC: 1.24 MMOL/L (ref 1.15–1.33)
CA-I SERPL ISE-MCNC: 1.1 MMOL/L (ref 1.15–1.3)
CALCIUM SPEC-SCNC: 8.3 MG/DL (ref 8.6–10.5)
CHLORIDE SERPL-SCNC: 104 MMOL/L (ref 98–107)
CK SERPL-CCNC: 4606 U/L (ref 20–180)
CO2 SERPL-SCNC: 23.3 MMOL/L (ref 22–29)
CREAT BLDA-MCNC: 1.73 MG/DL (ref 0.6–1.3)
CREAT SERPL-MCNC: 1.62 MG/DL (ref 0.57–1)
CROSSMATCH INTERPRETATION: NORMAL
D DIMER PPP FEU-MCNC: 9.12 MCGFEU/ML (ref 0–0.53)
D-LACTATE SERPL-SCNC: 0.7 MMOL/L (ref 0.2–2)
D-LACTATE SERPL-SCNC: 0.8 MMOL/L (ref 0.5–2)
DACRYOCYTES BLD QL SMEAR: ABNORMAL
DEPRECATED RDW RBC AUTO: 56 FL (ref 37–54)
EGFRCR SERPLBLD CKD-EPI 2021: 35 ML/MIN/1.73
EGFRCR SERPLBLD CKD-EPI 2021: 37.8 ML/MIN/1.73
EOSINOPHIL # BLD MANUAL: 0.33 10*3/MM3 (ref 0–0.4)
EOSINOPHIL NFR BLD MANUAL: 2 % (ref 0.3–6.2)
ERYTHROCYTE [DISTWIDTH] IN BLOOD BY AUTOMATED COUNT: 16.4 % (ref 12.3–15.4)
GLOBULIN UR ELPH-MCNC: 2.5 GM/DL
GLUCOSE BLDC GLUCOMTR-MCNC: 141 MG/DL (ref 70–105)
GLUCOSE BLDC GLUCOMTR-MCNC: 174 MG/DL (ref 70–105)
GLUCOSE BLDC GLUCOMTR-MCNC: 182 MG/DL (ref 70–105)
GLUCOSE BLDC GLUCOMTR-MCNC: 190 MG/DL (ref 74–100)
GLUCOSE BLDC GLUCOMTR-MCNC: 190 MG/DL (ref 74–100)
GLUCOSE SERPL-MCNC: 175 MG/DL (ref 65–99)
HCO3 BLDA-SCNC: 26 MMOL/L (ref 21–28)
HCT VFR BLD AUTO: 19.7 % (ref 34–46.6)
HCT VFR BLD AUTO: 24.4 % (ref 34–46.6)
HCT VFR BLDA CALC: 23 % (ref 38–51)
HEMODILUTION: NO
HGB BLD-MCNC: 6.6 G/DL (ref 12–15.9)
HGB BLD-MCNC: 8.2 G/DL (ref 12–15.9)
HGB BLDA-MCNC: 7.7 G/DL (ref 12–17)
IMP & REVIEW OF LAB RESULTS: NORMAL
INHALED O2 CONCENTRATION: 32 %
LARGE PLATELETS: ABNORMAL
LYMPHOCYTES # BLD MANUAL: 1.67 10*3/MM3 (ref 0.7–3.1)
LYMPHOCYTES NFR BLD MANUAL: 1 % (ref 5–12)
Lab: ABNORMAL
MACROCYTES BLD QL SMEAR: ABNORMAL
MAGNESIUM SERPL-MCNC: 2 MG/DL (ref 1.6–2.6)
MCH RBC QN AUTO: 31.7 PG (ref 26.6–33)
MCHC RBC AUTO-ENTMCNC: 33.5 G/DL (ref 31.5–35.7)
MCV RBC AUTO: 94.7 FL (ref 79–97)
METAMYELOCYTES NFR BLD MANUAL: 1 % (ref 0–0)
MODALITY: ABNORMAL
MONOCYTES # BLD: 0.17 10*3/MM3 (ref 0.1–0.9)
MTHFR GENE MUT ANL BLD/T: NORMAL
NEUTROPHILS # BLD AUTO: 14.38 10*3/MM3 (ref 1.7–7)
NEUTROPHILS NFR BLD MANUAL: 69 % (ref 42.7–76)
NEUTS BAND NFR BLD MANUAL: 17 % (ref 0–5)
NOTIFIED WHO: ABNORMAL
NRBC SPEC MANUAL: 2 /100 WBC (ref 0–0.2)
PCO2 BLDA: 55.7 MM HG (ref 35–48)
PH BLDA: 7.28 PH UNITS (ref 7.35–7.45)
PHOSPHATE SERPL-MCNC: 2.8 MG/DL (ref 2.5–4.5)
PLATELET # BLD AUTO: 121 10*3/MM3 (ref 140–450)
PMV BLD AUTO: 10.9 FL (ref 6–12)
PO2 BLD: 263 MM[HG] (ref 0–500)
PO2 BLDA: 84 MM HG (ref 83–108)
POIKILOCYTOSIS BLD QL SMEAR: ABNORMAL
POTASSIUM BLDA-SCNC: 3.3 MMOL/L (ref 3.5–4.5)
POTASSIUM SERPL-SCNC: 3.5 MMOL/L (ref 3.5–5.2)
POTASSIUM SERPL-SCNC: 3.5 MMOL/L (ref 3.5–5.2)
PROT SERPL-MCNC: 6 G/DL (ref 6–8.5)
QT INTERVAL: 372 MS
QT INTERVAL: 387 MS
QTC INTERVAL: 406 MS
QTC INTERVAL: 414 MS
RBC # BLD AUTO: 2.08 10*6/MM3 (ref 3.77–5.28)
READ BACK: ABNORMAL
SAO2 % BLDCOA: 94.5 % (ref 94–98)
SCAN SLIDE: NORMAL
SMALL PLATELETS BLD QL SMEAR: ABNORMAL
SODIUM BLD-SCNC: 138 MMOL/L (ref 138–146)
SODIUM SERPL-SCNC: 139 MMOL/L (ref 136–145)
UNIT  ABO: NORMAL
UNIT  RH: NORMAL
VARIANT LYMPHS NFR BLD MANUAL: 10 % (ref 19.6–45.3)
WBC MORPH BLD: NORMAL
WBC NRBC COR # BLD AUTO: 16.72 10*3/MM3 (ref 3.4–10.8)

## 2024-12-26 PROCEDURE — 80053 COMPREHEN METABOLIC PANEL: CPT | Performed by: SURGERY

## 2024-12-26 PROCEDURE — 84100 ASSAY OF PHOSPHORUS: CPT | Performed by: SURGERY

## 2024-12-26 PROCEDURE — 93010 ELECTROCARDIOGRAM REPORT: CPT | Performed by: INTERNAL MEDICINE

## 2024-12-26 PROCEDURE — 85014 HEMATOCRIT: CPT | Performed by: EMERGENCY MEDICINE

## 2024-12-26 PROCEDURE — 85018 HEMOGLOBIN: CPT | Performed by: EMERGENCY MEDICINE

## 2024-12-26 PROCEDURE — 82565 ASSAY OF CREATININE: CPT

## 2024-12-26 PROCEDURE — 85730 THROMBOPLASTIN TIME PARTIAL: CPT | Performed by: NURSE PRACTITIONER

## 2024-12-26 PROCEDURE — 80051 ELECTROLYTE PANEL: CPT

## 2024-12-26 PROCEDURE — 93005 ELECTROCARDIOGRAM TRACING: CPT | Performed by: EMERGENCY MEDICINE

## 2024-12-26 PROCEDURE — 99232 SBSQ HOSP IP/OBS MODERATE 35: CPT | Performed by: STUDENT IN AN ORGANIZED HEALTH CARE EDUCATION/TRAINING PROGRAM

## 2024-12-26 PROCEDURE — 99024 POSTOP FOLLOW-UP VISIT: CPT | Performed by: STUDENT IN AN ORGANIZED HEALTH CARE EDUCATION/TRAINING PROGRAM

## 2024-12-26 PROCEDURE — 94761 N-INVAS EAR/PLS OXIMETRY MLT: CPT

## 2024-12-26 PROCEDURE — P9047 ALBUMIN (HUMAN), 25%, 50ML: HCPCS | Performed by: INTERNAL MEDICINE

## 2024-12-26 PROCEDURE — 94799 UNLISTED PULMONARY SVC/PX: CPT

## 2024-12-26 PROCEDURE — 99232 SBSQ HOSP IP/OBS MODERATE 35: CPT | Performed by: INTERNAL MEDICINE

## 2024-12-26 PROCEDURE — 83605 ASSAY OF LACTIC ACID: CPT

## 2024-12-26 PROCEDURE — 82948 REAGENT STRIP/BLOOD GLUCOSE: CPT

## 2024-12-26 PROCEDURE — 82140 ASSAY OF AMMONIA: CPT | Performed by: EMERGENCY MEDICINE

## 2024-12-26 PROCEDURE — 82550 ASSAY OF CK (CPK): CPT | Performed by: INTERNAL MEDICINE

## 2024-12-26 PROCEDURE — 25010000002 HEPARIN (PORCINE) PER 1000 UNITS: Performed by: INTERNAL MEDICINE

## 2024-12-26 PROCEDURE — 25010000002 HEPARIN (PORCINE) 25000-0.45 UT/250ML-% SOLUTION: Performed by: SURGERY

## 2024-12-26 PROCEDURE — 94640 AIRWAY INHALATION TREATMENT: CPT

## 2024-12-26 PROCEDURE — 25010000002 CALCIUM GLUCONATE-NACL 1-0.675 GM/50ML-% SOLUTION: Performed by: INTERNAL MEDICINE

## 2024-12-26 PROCEDURE — 83605 ASSAY OF LACTIC ACID: CPT | Performed by: INTERNAL MEDICINE

## 2024-12-26 PROCEDURE — 94664 DEMO&/EVAL PT USE INHALER: CPT

## 2024-12-26 PROCEDURE — 86900 BLOOD TYPING SEROLOGIC ABO: CPT

## 2024-12-26 PROCEDURE — 84132 ASSAY OF SERUM POTASSIUM: CPT | Performed by: NURSE PRACTITIONER

## 2024-12-26 PROCEDURE — 85007 BL SMEAR W/DIFF WBC COUNT: CPT | Performed by: SURGERY

## 2024-12-26 PROCEDURE — 83735 ASSAY OF MAGNESIUM: CPT | Performed by: SURGERY

## 2024-12-26 PROCEDURE — 82330 ASSAY OF CALCIUM: CPT

## 2024-12-26 PROCEDURE — 63710000001 INSULIN LISPRO (HUMAN) PER 5 UNITS: Performed by: INTERNAL MEDICINE

## 2024-12-26 PROCEDURE — 82803 BLOOD GASES ANY COMBINATION: CPT

## 2024-12-26 PROCEDURE — 85730 THROMBOPLASTIN TIME PARTIAL: CPT | Performed by: EMERGENCY MEDICINE

## 2024-12-26 PROCEDURE — 82330 ASSAY OF CALCIUM: CPT | Performed by: INTERNAL MEDICINE

## 2024-12-26 PROCEDURE — 85379 FIBRIN DEGRADATION QUANT: CPT | Performed by: SURGERY

## 2024-12-26 PROCEDURE — 82948 REAGENT STRIP/BLOOD GLUCOSE: CPT | Performed by: NURSE PRACTITIONER

## 2024-12-26 PROCEDURE — 85025 COMPLETE CBC W/AUTO DIFF WBC: CPT | Performed by: SURGERY

## 2024-12-26 PROCEDURE — 85018 HEMOGLOBIN: CPT

## 2024-12-26 PROCEDURE — 25010000002 ALBUMIN HUMAN 25% PER 50 ML: Performed by: INTERNAL MEDICINE

## 2024-12-26 PROCEDURE — P9016 RBC LEUKOCYTES REDUCED: HCPCS

## 2024-12-26 RX ORDER — INSULIN LISPRO 100 [IU]/ML
6 INJECTION, SOLUTION INTRAVENOUS; SUBCUTANEOUS EVERY 6 HOURS SCHEDULED
Status: DISCONTINUED | OUTPATIENT
Start: 2024-12-26 | End: 2024-12-31

## 2024-12-26 RX ORDER — HEPARIN SODIUM 1000 [USP'U]/ML
5000 INJECTION, SOLUTION INTRAVENOUS; SUBCUTANEOUS AS NEEDED
Status: DISCONTINUED | OUTPATIENT
Start: 2024-12-26 | End: 2025-01-10 | Stop reason: HOSPADM

## 2024-12-26 RX ORDER — POTASSIUM CHLORIDE 1.5 G/1.58G
40 POWDER, FOR SOLUTION ORAL EVERY 4 HOURS
Status: COMPLETED | OUTPATIENT
Start: 2024-12-26 | End: 2024-12-27

## 2024-12-26 RX ORDER — POTASSIUM CHLORIDE 1.5 G/1.58G
40 POWDER, FOR SOLUTION ORAL EVERY 4 HOURS
Status: COMPLETED | OUTPATIENT
Start: 2024-12-26 | End: 2024-12-26

## 2024-12-26 RX ORDER — QUETIAPINE FUMARATE 100 MG/1
100 TABLET, FILM COATED ORAL EVERY 8 HOURS SCHEDULED
Status: DISCONTINUED | OUTPATIENT
Start: 2024-12-26 | End: 2024-12-27

## 2024-12-26 RX ORDER — CALCIUM GLUCONATE 20 MG/ML
1000 INJECTION, SOLUTION INTRAVENOUS EVERY 12 HOURS
Status: COMPLETED | OUTPATIENT
Start: 2024-12-26 | End: 2024-12-26

## 2024-12-26 RX ORDER — IPRATROPIUM BROMIDE AND ALBUTEROL SULFATE 2.5; .5 MG/3ML; MG/3ML
3 SOLUTION RESPIRATORY (INHALATION)
Status: DISCONTINUED | OUTPATIENT
Start: 2024-12-26 | End: 2025-01-10 | Stop reason: HOSPADM

## 2024-12-26 RX ORDER — DEXMEDETOMIDINE HYDROCHLORIDE 4 UG/ML
.2-.6 INJECTION, SOLUTION INTRAVENOUS
Status: DISCONTINUED | OUTPATIENT
Start: 2024-12-26 | End: 2024-12-27

## 2024-12-26 RX ORDER — ALBUMIN (HUMAN) 12.5 G/50ML
12.5 SOLUTION INTRAVENOUS AS NEEDED
Status: DISPENSED | OUTPATIENT
Start: 2024-12-26 | End: 2024-12-27

## 2024-12-26 RX ADMIN — QUETIAPINE FUMARATE 75 MG: 25 TABLET ORAL at 05:17

## 2024-12-26 RX ADMIN — ACETAMINOPHEN 650 MG: 325 TABLET, FILM COATED ORAL at 16:20

## 2024-12-26 RX ADMIN — Medication 10 ML: at 08:12

## 2024-12-26 RX ADMIN — OXYCODONE 10 MG: 5 TABLET ORAL at 05:16

## 2024-12-26 RX ADMIN — INSULIN LISPRO 2 UNITS: 100 INJECTION, SOLUTION INTRAVENOUS; SUBCUTANEOUS at 05:17

## 2024-12-26 RX ADMIN — DEXMEDETOMIDINE HYDROCHLORIDE IN SODIUM CHLORIDE 1.2 MCG/KG/HR: 4 INJECTION INTRAVENOUS at 05:17

## 2024-12-26 RX ADMIN — CALCIUM GLUCONATE 1000 MG: 20 INJECTION, SOLUTION INTRAVENOUS at 22:59

## 2024-12-26 RX ADMIN — Medication 10 ML: at 20:16

## 2024-12-26 RX ADMIN — ACETAMINOPHEN 650 MG: 325 TABLET, FILM COATED ORAL at 08:10

## 2024-12-26 RX ADMIN — CALCIUM GLUCONATE 1000 MG: 20 INJECTION, SOLUTION INTRAVENOUS at 10:28

## 2024-12-26 RX ADMIN — INSULIN LISPRO 2 UNITS: 100 INJECTION, SOLUTION INTRAVENOUS; SUBCUTANEOUS at 23:25

## 2024-12-26 RX ADMIN — CHLORHEXIDINE GLUCONATE, 0.12% ORAL RINSE 15 ML: 1.2 SOLUTION DENTAL at 08:12

## 2024-12-26 RX ADMIN — POLYETHYLENE GLYCOL 3350 17 G: 17 POWDER, FOR SOLUTION ORAL at 08:11

## 2024-12-26 RX ADMIN — INSULIN LISPRO 2 UNITS: 100 INJECTION, SOLUTION INTRAVENOUS; SUBCUTANEOUS at 00:16

## 2024-12-26 RX ADMIN — IPRATROPIUM BROMIDE AND ALBUTEROL SULFATE 3 ML: .5; 3 SOLUTION RESPIRATORY (INHALATION) at 06:30

## 2024-12-26 RX ADMIN — INSULIN LISPRO 5 UNITS: 100 INJECTION, SOLUTION INTRAVENOUS; SUBCUTANEOUS at 16:18

## 2024-12-26 RX ADMIN — CHLORHEXIDINE GLUCONATE, 0.12% ORAL RINSE 15 ML: 1.2 SOLUTION DENTAL at 20:15

## 2024-12-26 RX ADMIN — INSULIN LISPRO 6 UNITS: 100 INJECTION, SOLUTION INTRAVENOUS; SUBCUTANEOUS at 23:25

## 2024-12-26 RX ADMIN — INSULIN LISPRO 5 UNITS: 100 INJECTION, SOLUTION INTRAVENOUS; SUBCUTANEOUS at 05:17

## 2024-12-26 RX ADMIN — INSULIN LISPRO 2 UNITS: 100 INJECTION, SOLUTION INTRAVENOUS; SUBCUTANEOUS at 16:18

## 2024-12-26 RX ADMIN — POTASSIUM CHLORIDE 40 MEQ: 1.5 POWDER, FOR SOLUTION ORAL at 20:40

## 2024-12-26 RX ADMIN — INSULIN LISPRO 5 UNITS: 100 INJECTION, SOLUTION INTRAVENOUS; SUBCUTANEOUS at 00:16

## 2024-12-26 RX ADMIN — HEPARIN SODIUM 19 UNITS/KG/HR: 10000 INJECTION, SOLUTION INTRAVENOUS at 09:10

## 2024-12-26 RX ADMIN — MIDODRINE HYDROCHLORIDE 10 MG: 5 TABLET ORAL at 10:50

## 2024-12-26 RX ADMIN — NYSTATIN 1 APPLICATION: 100000 CREAM TOPICAL at 18:11

## 2024-12-26 RX ADMIN — NYSTATIN 1 APPLICATION: 100000 CREAM TOPICAL at 23:00

## 2024-12-26 RX ADMIN — SENNOSIDES AND DOCUSATE SODIUM 2 TABLET: 50; 8.6 TABLET ORAL at 08:11

## 2024-12-26 RX ADMIN — HEPARIN SODIUM 2600 UNITS: 1000 INJECTION INTRAVENOUS; SUBCUTANEOUS at 11:50

## 2024-12-26 RX ADMIN — ALBUMIN HUMAN 12.5 G: 0.25 SOLUTION INTRAVENOUS at 11:10

## 2024-12-26 RX ADMIN — ALBUMIN HUMAN 12.5 G: 0.25 SOLUTION INTRAVENOUS at 11:47

## 2024-12-26 RX ADMIN — MIDODRINE HYDROCHLORIDE 10 MG: 5 TABLET ORAL at 18:35

## 2024-12-26 RX ADMIN — NYSTATIN 1 APPLICATION: 100000 CREAM TOPICAL at 05:17

## 2024-12-26 RX ADMIN — OXYCODONE 10 MG: 5 TABLET ORAL at 00:18

## 2024-12-26 RX ADMIN — DEXMEDETOMIDINE HYDROCHLORIDE 0.2 MCG/KG/HR: 4 INJECTION, SOLUTION INTRAVENOUS at 18:11

## 2024-12-26 RX ADMIN — POTASSIUM CHLORIDE 40 MEQ: 1.5 POWDER, FOR SOLUTION ORAL at 10:28

## 2024-12-26 RX ADMIN — OXYCODONE 10 MG: 5 TABLET ORAL at 23:25

## 2024-12-26 RX ADMIN — DEXMEDETOMIDINE HYDROCHLORIDE IN SODIUM CHLORIDE 0.9 MCG/KG/HR: 4 INJECTION INTRAVENOUS at 09:09

## 2024-12-26 RX ADMIN — DEXMEDETOMIDINE HYDROCHLORIDE IN SODIUM CHLORIDE 1.2 MCG/KG/HR: 4 INJECTION INTRAVENOUS at 01:55

## 2024-12-26 RX ADMIN — INSULIN GLARGINE-YFGN 22 UNITS: 100 INJECTION, SOLUTION SUBCUTANEOUS at 20:15

## 2024-12-26 RX ADMIN — MIDODRINE HYDROCHLORIDE 10 MG: 5 TABLET ORAL at 22:59

## 2024-12-26 RX ADMIN — QUETIAPINE FUMARATE 100 MG: 100 TABLET ORAL at 16:20

## 2024-12-26 RX ADMIN — LANSOPRAZOLE 30 MG: 30 TABLET, ORALLY DISINTEGRATING ORAL at 05:16

## 2024-12-26 RX ADMIN — QUETIAPINE FUMARATE 100 MG: 100 TABLET ORAL at 22:59

## 2024-12-26 RX ADMIN — POTASSIUM CHLORIDE 40 MEQ: 1.5 POWDER, FOR SOLUTION ORAL at 06:23

## 2024-12-26 NOTE — PLAN OF CARE
Goal Outcome Evaluation:      Pt resting on and off throughout the night. Pt cooperative with agitation. Levo on but turned off at 0400 d/t increasing agitation /90s. Hgb this AM 6.6, x1 PRBC ordered and infusing currently. X4 soft restraints maintained for ETT management. VSS.

## 2024-12-26 NOTE — NURSING NOTE
1100 In patients room with Sukh PETERS primary nurse.  B/P 82/58 with dialysis orders.  Talked with Dr. Ro copeland to start tx give albumin as ordered and run treatment even today.    1125 treatment started lines visible and secure  .    1130 albumin started as ordered    1200  another dose of albumin given 87/55    1330 patient awake and moving around in bed    1455 treatment completed Report given to Sukh PETERS  UF-0  [id number]

## 2024-12-26 NOTE — PROGRESS NOTES
Nutrition Services  Patient Name: Yarelis Jackson  YOB: 1970  MRN: [id number]  Admission date: 12/18/2024    PROGRESS NOTE      Nutrition Intervention Updates: Continue current TF prescription, at goal         Encounter Information: Checking in on tube feeding. Remains extubated.  On precedex.  NG tube remains in place.  Patient discussed in AM rounds.  Plans for wound vac.         PO Diet: NPO Diet NPO Type: Strict NPO   PO Supplements: NPO   PO Intake:  NPO       Current nutrition support: Novasource renal at 35 ml/hr + 30 ml/hr FWF    Nutrition support review: Running as ordered per EMR        Labs (reviewed below): Reviewed, management per attending        GI Function:  Last documented BM 12/25 (yesterday)       Brief weight review   Wt Readings from Last 10 Encounters:   12/25/24 0400 98.5 kg (217 lb 2.5 oz)   12/24/24 0431 100 kg (221 lb 5.5 oz)   12/24/24 0333 100 kg (221 lb 5.5 oz)   12/19/24 0600 88.5 kg (195 lb 1.7 oz)   12/18/24 0527 92.5 kg (203 lb 14.4 oz)   07/15/23 1132 98.1 kg (216 lb 4.3 oz)   04/04/23 0719 98.1 kg (216 lb 4.3 oz)   12/12/22 0952 85.5 kg (188 lb 6.4 oz)   01/31/22 1404 89.8 kg (198 lb)   01/24/22 1406 89.8 kg (198 lb)   12/23/21 1133 89.8 kg (198 lb)   11/24/21 1537 90.3 kg (199 lb)   11/16/21 0838 90.3 kg (199 lb)   09/29/21 0320 87.4 kg (192 lb 10.9 oz)        Results from last 7 days   Lab Units 12/26/24  0428 12/26/24  0425 12/25/24  0605 12/24/24  0344   SODIUM mmol/L 139  --  136 137   POTASSIUM mmol/L 3.5  --  3.7 4.7   CHLORIDE mmol/L 104  --  100 102   CO2 mmol/L 23.3  --  24.5 22.7   BUN mg/dL 36*  --  35* 37*   CREATININE mg/dL 1.62* 1.73* 1.82* 2.15*   CALCIUM mg/dL 8.3*  --  8.8 7.6*   BILIRUBIN mg/dL 0.6  --  0.9 0.5   ALK PHOS U/L 305*  --  314* 126*   ALT (SGPT) U/L 479*  --  437* 610*   AST (SGOT) U/L 502*  --  566* 970*   GLUCOSE mg/dL 175*  --  245* 258*     Results from last 7 days   Lab Units 12/26/24  0428 12/25/24  1142 12/25/24  0605  12/24/24  0344 12/21/24  1307 12/21/24  0405   MAGNESIUM mg/dL 2.0  --  1.8 1.7   < > 2.1   PHOSPHORUS mg/dL 2.8  --  3.0 2.8   < > 6.8*   HEMOGLOBIN g/dL 6.6*   < > 6.6* 7.7*   < > 8.7*   HEMOGLOBIN, POC   --    < >  --   --    < >  --    HEMATOCRIT % 19.7*   < > 20.6* 23.6*   < > 27.2*   HEMATOCRIT POC   --    < >  --   --    < >  --    TRIGLYCERIDES mg/dL  --   --   --   --   --  336*    < > = values in this interval not displayed.         RD to follow up per protocol.    Electronically signed by:  Ashley Hernandez RD  12/26/24 07:27 EST

## 2024-12-26 NOTE — PROGRESS NOTES
Name: Yarelis Jackson ADMIT: 2024   : 1970  PCP: Katie Duran PA    MRN: [id number] LOS: 8 days   AGE/SEX: 54 y.o. female  ROOM:  [facility] 2308/1     CC: Status post right leg thrombectomies and fasciotomies  Interval History: Extubated.  Intermittently requiring Levophed.  Intermittently following commands.     Subjective   Subjective     Review of Systems  Objective   Objective     Vitals:   Temp:  [97 °F (36.1 °C)-100 °F (37.8 °C)] 98.2 °F (36.8 °C)  Heart Rate:  [] 100  Resp:  [11-22] 16  BP: ()/(40-60) 153/60    I/O this shift:  In: 1060 [I.V.:545; Blood:330; Other:90; NG/GT:95]  Out: 400 [Urine:400]    Scheduled Meds:     calcium gluconate, 1,000 mg, Intravenous, Q12H  chlorhexidine, 15 mL, Mouth/Throat, Q12H  insulin glargine, 20 Units, Subcutaneous, Nightly  insulin lispro, 2-7 Units, Subcutaneous, Q6H  insulin lispro, 5 Units, Subcutaneous, Q6H  lansoprazole, 30 mg, Nasogastric, Q AM  midazolam, 1 mg, Intravenous, Once  midodrine, 10 mg, Nasogastric, Q8H  nystatin, 1 Application, Topical, Q8H  potassium chloride, 40 mEq, Oral, Q4H  QUEtiapine, 75 mg, Nasogastric, Q8H  sodium chloride, 10 mL, Intravenous, Q12H      IV Meds:   dexmedetomidine, 0.2-1.5 mcg/kg/hr, Last Rate: 0.9 mcg/kg/hr (24 0909)  heparin, 16.9 Units/kg/hr, Last Rate: 19 Units/kg/hr (24 0910)  norepinephrine, 0.02-0.3 mcg/kg/min, Last Rate: Stopped (24 0447)        Physical Exam    NAD  Coarse respirations  Tachycardic  Superficial mottling of skin in both lower legs noted  Triphasic left PT signal, brisk monophasic left DP signal  Strong biphasic right DP and PT signals  Right lower leg fasciotomy sites with healthy appearing muscle underneath.  Blistering and some epidermolysis of the surrounding skin in the lower leg noted.  Very weak plantarflexion/dorsiflexion of right foot although exam somewhat limited by patient's mental status.                Data Reviewed:  CBC    Results from last 7  days   Lab Units 12/26/24  0428 12/26/24  0425 12/25/24  1142 12/25/24  0605 12/24/24  0344 12/23/24  1355 12/23/24  0429 12/22/24 0452 12/21/24 2351 12/21/24 2011 12/21/24  1307   WBC 10*3/mm3 16.72*  --   --  12.72* 12.14*  --  11.95* 7.59  --  8.22 8.44   HEMOGLOBIN g/dL 6.6*  --  7.8* 6.6* 7.7* 9.0* 7.5* 7.2*  --  6.7* 7.5*   HEMOGLOBIN, POC g/dL  --  7.7*  --   --   --   --   --   --    < >  --   --    PLATELETS 10*3/mm3 121*  --   --  99* 131*  --  115* 63*  --  68* 85*    < > = values in this interval not displayed.     BMP   Results from last 7 days   Lab Units 12/26/24 0428 12/26/24  0425 12/25/24  0605 12/24/24  0344 12/23/24  1823 12/23/24  0429 12/22/24 2114 12/22/24 0452 12/21/24 2351 12/21/24 0405 12/20/24 2322   SODIUM mmol/L 139  --  136 137  --  138  --  138  --  138 136   POTASSIUM mmol/L 3.5  --  3.7 4.7 4.7 3.6 3.1* 3.5  --  5.0 5.3*   CHLORIDE mmol/L 104  --  100 102  --  104  --  103  --  105 104   CO2 mmol/L 23.3  --  24.5 22.7  --  23.8  --  22.3  --  18.8* 20.5*   BUN mg/dL 36*  --  35* 37*  --  29*  --  39*  --  46* 38*   CREATININE mg/dL 1.62* 1.73* 1.82* 2.15*  --  1.73*  --  1.85* 2.09* 2.43* 2.09*   GLUCOSE mg/dL 175*  --  245* 258*  --  104*  --  236*  --  181* 154*   MAGNESIUM mg/dL 2.0  --  1.8 1.7  --  2.0  --  2.0  --  2.1 2.0   PHOSPHORUS mg/dL 2.8  --  3.0 2.8  --  3.9  --  3.6  --  6.8* 7.0*     Radiology(recent) No radiology results for the last day    Most notable findings include: Hemoglobin down to 6.6 again, getting transfusion. Cr stable. Leukocytosis noted.     Active Hospital Problems:   Active Hospital Problems    Diagnosis  POA    **DKA (diabetic ketoacidosis) [E11.10]  Yes    Arterial thrombosis [I74.9]  No      Resolved Hospital Problems   No resolved problems to display.      Assessment & Plan     Assessment / Plan     Right lower extremity ischemia: Status post thrombectomy and redo thrombectomy as well as fasciotomies.     Both lower extremities are  well-perfused and fasciotomy sites appear clean and a normal state of healing  so far, however I am unsure about the long-term functional status of the right leg.    Dressing change performed at bedside with normal saline wet-to-dry gauze, ABDs, Kerlix and Ace.  Will ask wound care to evaluate for possible wound VAC tomorrow.  There is not enough laxity in the skin that I believe we will ever be able to close these fasciotomy incisions. Likely will require long term wound care +/- skin graft.   Continue heparin gtt.           Santino Soares II, MD  12/26/24  09:40 EST  Available via Secure Chat during the day for non-urgent issues.  After hours and for urgent issues please call the office at [phone]

## 2024-12-26 NOTE — PROGRESS NOTES
ENDOCRINE CONSULT PROGRESS NOTE  DATE OF SERVICE: 24        PATIENT NAME: Yarelis Jackson  PATIENT : 1970 AGE: 54 y.o.  MRN NUMBER: [id number]    ==========================================================================    CHIEF COMPLAINT: Type 1 diabetes management    CARE TEAM:   Patient Care Team:  Katie Duran PA as PCP - General (Physician Assistant)  Uli Starr MD as Consulting Physician (Nephrology)    SUBJECTIVE    Pt seen and examined.  Family at bedside.  Care discussed with nursing team as well.  Blood sugar reviewed for last 24 hours.  Continues to be on tube feeding.    ==========================================================================    CURRENT ACTIVE HOSPITAL MEDICATIONS    Scheduled Medications:  calcium gluconate, 1,000 mg, Intravenous, Q12H  chlorhexidine, 15 mL, Mouth/Throat, Q12H  insulin glargine, 24 Units, Subcutaneous, Nightly  insulin lispro, 2-7 Units, Subcutaneous, Q6H  insulin lispro, 6 Units, Subcutaneous, Q6H  lansoprazole, 30 mg, Nasogastric, Q AM  midodrine, 10 mg, Nasogastric, Q8H  nystatin, 1 Application, Topical, Q8H  QUEtiapine, 100 mg, Nasogastric, Q8H  sodium chloride, 10 mL, Intravenous, Q12H         PRN Medications:    acetaminophen **OR** acetaminophen    albumin human    aluminum-magnesium hydroxide-simethicone    senna-docusate sodium **AND** polyethylene glycol **AND** bisacodyl **AND** bisacodyl    Calcium Replacement - Follow Nurse / BPA Driven Protocol    dextrose    dextrose    glucagon (human recombinant)    heparin (porcine)    heparin (porcine)    ipratropium-albuterol    Magnesium Standard Dose Replacement - Follow Nurse / BPA Driven Protocol    ondansetron ODT **OR** ondansetron    oxyCODONE    Phosphorus Replacement - Follow Nurse / BPA Driven Protocol    Potassium Replacement - Follow Nurse / BPA Driven Protocol    sodium chloride    sodium chloride    sodium chloride      ==========================================================================    OBJECTIVE    Vitals:    12/26/24 1725   BP:    Pulse: 112   Resp: 20   Temp:    SpO2: 100%      Body mass index is 37.27 kg/m².     General - Continues to be in restraints, tube feeding    ==========================================================================    LAB EVALUATION    Lab Results   Component Value Date    GLUCOSE 175 (H) 12/26/2024    BUN 36 (H) 12/26/2024    CREATININE 1.62 (H) 12/26/2024    EGFRIFNONA 87 09/29/2021    BCR 22.2 12/26/2024    K 3.5 12/26/2024    CO2 23.3 12/26/2024    CALCIUM 8.3 (L) 12/26/2024    ALBUMIN 3.5 12/26/2024     (H) 12/26/2024     (H) 12/26/2024       Lab Results   Component Value Date    HGBA1C 15.80 (H) 12/18/2024     Lab Results   Component Value Date    CREATININE 1.62 (H) 12/26/2024     Results from last 7 days   Lab Units 12/26/24  1221 12/26/24  0425 12/25/24  2332 12/25/24  2129 12/25/24  1823 12/25/24  1131   GLUCOSE mg/dL 182* 190*  190* 186* 131* 165* 184*     ==========================================================================    ASSESSMENT AND PLAN    # Type 1 diabetes with hyperglycemia  # DKA on admission, resolved  - Reviewed blood sugar for last 24 hours  - For nutrition patient is currently on tube feeding  - Adjusted insulin therapy as:  Insulin Lantus 22 units nightly (initially plan for 24 units but adjusted therapy to avoid hypoglycemia)  Insulin Lispro 6 units Q6hrs  Continue Insulin lispro sliding scale every 6 hours as well  - Will review blood sugar for next 24 hours and adjust therapy accordingly    Will follow with you.  Rest as per primary team.    Part of this note may be an electronic transcription/translation of spoken language to printed text using the Dragon Dictation System.     Note: Portions of this note may have been copied from previous notes but documentation have been reviewed and edited as necessary to support clinical decision  making for today's visit.  The time of this note does not reflect the time I saw the patient but the time that this note was written.    ==========================================================================  Patel Winkler MD  Department of Endocrine, Diabetes and Metabolism  [place], [place]  ==========================================================================

## 2024-12-26 NOTE — PROGRESS NOTES
Critical Care Progress Note     Yarelis Jackson : 1970 MRN:[id number] LOS:8  Rm: 2308/1     Principal Problem: DKA (diabetic ketoacidosis)     Reason for follow up: All the medical problems listed below    Summary     Yarelis Jackson is a 54 y.o. female with PMH of COPD, migraine, and obesity, and presented to the hospital for altered mental status, and was admitted with a principal diagnosis of DKA (diabetic ketoacidosis).  Patient presented and per ER documentation, patient was oriented to person and time.  Per patient's mother, patient had been feeling ill since Friday with noted polyuria and polydipsia.  It was managed by giving patient milk and a grape soft drink.  Patient became increasingly agitated, preventing treatment, and was given a dose of Geodon in the ED.     In the ED, labs were obtained with the following abnormalities: Sodium 159, chloride 117, CO2 10.1, anion gap 31.9, BUN 88, creatinine 2.13, glucose 976, alk phos 246, , , hemoglobin A1c 15.8, moderate acetone, osmolality 461, WBC 14.65 without bandemia.  UA with >1000 glucose, 15 mg/dL of ketones, moderate blood and proteinuria noted, this did not reflex to culture.  Patient had an ABG with pH 7.179, pCO2 37.5, pO2 77.7, HCO3 14, with O2 saturation 91.7%.  Patient was determined to be in DKA, and was started with IV fluid boluses per DKA protocol as well as an insulin drip.  Patient did have blood cultures obtained, but per ED provider, patient has no obvious signs or symptoms of active section, therefore no antimicrobials were started.    Patient sodium level remained persistent, and IV fluids were changed to withhold any sodium containing products.  Nephrology was consulted.  Patient also underwent placement of feeding tube for free water flush hourly overnight.  On , nephrology decided that patient would proceed with hemodialysis and requested nontunneled catheter placement.  Patient was becoming increasingly  agitated, with concerned that patient was no longer able to protect her own airway.  After discussion with patient's family, patient was intubated, nontunneled catheter placement was completed, and patient did require initiation of vasopressors.  HD was transitioned to CRRT.  Shortly after initiation of CRRT, patient developed an acute change in patient's skin color of her right lower extremity with subsequent loss of pulse.  Vascular surgery was stat consulted as well as completion of stat arterial duplex and ABIs.  Patient was started on a heparin drip.  Vascular surgery promptly evaluated patient and patient was scheduled to go to the OR for emergent thrombectomy.    Significant Events / Subjective     12/26/24 : POD #8 from initial surgery. POD #6 right leg revascularization. POD#3 completion of right leg fasciotomy.  Patient remains on 9 L of high flow nasal cannula.  She is off vasopressors.  Patient received hemodialysis today and tolerated without difficulty.  She is much more awake and alert.  Weaning Precedex as able.  Assessment / Plan   Acute metabolic encephalopathy  Metabolic encephalopathy secondary to DKA and septic shock  CT of the head 12/18: Without acute abnormalities.  UDS-negative.  HIV-negative.  No focal deficits but very confused and agitated, much improved 12/26  Likely metabolic/hospital acquired delirium  Delirium precautions  Increased antipsychotic  Stop seroquel  Would avoid future benzos    Diabetic ketoacidosis without coma, with new onset diabetes mellitus, type 2 / Metabolic acidosis, increased anion gap-Resolved  Etiology uncertain, though new onset diabetes mellitus.  A1c 15.80 on admission, no prior available.  Anion gap of 31.9 on admission.  On no home medication regimen.  Acetone moderate, beta hydroxybutyrate 10.79  Initially started on DKA protocol.  Transitioned off insulin gtt  Off insulin gtt. Endocrinology managing DM.     Acute Kidney Injury  Remains nonoliguric.  Baseline creatinine 0.90.  Creatinine 2.10 on admission.  HD today  1.2 L urine yesterday  Monitor Input/Output very closely.   Avoid NSAIDs, nephrotoxic medications, and hypotension.  Net IO Since Admission: 23,355.2 mL [12/26/24 1714]       Acute occlusive thrombus of the right iliac artery with limb ischemia improved  Arterial duplex with noted right femoral, deep femoral, and popliteal arteries being patent but with very low amplitude monophasic signals, suggesting severe inflow stenosis or occlusion; no measurable Doppler flow in the anterior or posterior tibial and peroneal arteries.  Bilateral lower extremity ABIs ordered: Right STACEY critically reduced with STACEY of 0 and severe digital insufficiency; left STACEY is normal with moderate digital insufficiency.  Emergent surgery per vascular surgeon 12/19/2024 for right iliac thrombectomy via open groin incision then return to surgery on 12/20/2024 for recurrent right lower extremity ischemia due to arterial thrombosis and underwent right iliofemoral popliteal thrombectomy, right femoral endarterectomy with patch, right lower extremity arteriogram, and closed right calf fasciotomies  Heparin drip was initiated upon diagnosis of acute occlusive thrombus however PTTs have been extremely labile and supratherapeutic at times.    Hematology/oncology consulted  Return to the OR morning (12/23) for emergent 4 compartment fasciotomies due to critical right leg ischemia  Plan to wound vac the leg tomorrow.    Nontraumatic rhabdomyolysis --> Improving  CK has begun to trend down, now 4606  Continue IV fluids as ordered.  Nephrology following.    Cutaneous candidiasis of groin  Likely secondary to uncontrolled diabetes mellitus  Nystatin ordered    Essential Hypertension  Clonidine patch ordered by nephrology-hold   Titrate medications as needed.     Bilateral leg rash, concern for scabies infection  Permethrin regimen ordered.  Completed first treatment on 12/18, repeat  treatment in 2 weeks  Keep in contact precautions x 1 week post initial treatment (12/25)     Transaminitis  US of Liver: Hepatomegaly with steatosis.  Hepatitis panel-negative.  Monitor and trend LFTs.       COPD: Not in exacerbation.  Duonebs ordered as needed.  Obesity: Body mass index is 33.49 kg/m².    Disposition: Extubated but remains on levo. Appropriate to remain in ICU.    Code status:   Code Status (Patient has no pulse and is not breathing): CPR (Attempt to Resuscitate)  Medical Interventions (Patient has pulse or is breathing): Full Support       Nutrition:   NPO Diet NPO Type: Strict NPO   Tube Feeding: Formula: Novasource Renal (Nepro); Feeding Type: Continuous; Start at: 20 mL/hr; Then Advance By: 10 mL/hr; Every: 4 hours; To Goal Rate of: 35 mL/hr; Water Flush: 30 mL; Every: 1 hour; Water Bolus: None     VTE Prophylaxis:  Pharmacologic VTE prophylaxis orders are present.      Objective / Physical Exam     Vital signs:  Temp: 98.5 °F (36.9 °C)  BP: 131/70  Heart Rate: 110  Resp: 20  SpO2: 100 %  Weight: 98.5 kg (217 lb 2.5 oz)    Admission Weight: Weight: 92.5 kg (203 lb 14.4 oz)  Current Weight: Weight: 98.5 kg (217 lb 2.5 oz)    Input/Output in last 24 hours:    Intake/Output Summary (Last 24 hours) at 12/26/2024 1714  Last data filed at 12/26/2024 1615  Gross per 24 hour   Intake 3100 ml   Output 1900 ml   Net 1200 ml      Net IO Since Admission: 23,355.2 mL [12/26/24 1714]      Physical Exam  Vitals and nursing note reviewed.   Constitutional:       Comments: Sitting up in bed in NAD   HENT:      Head: Normocephalic.      Nose: Nose normal.   Eyes:      Conjunctiva/sclera: Conjunctivae normal.   Cardiovascular:      Rate and Rhythm: Normal rate and regular rhythm.   Pulmonary:      Effort: Pulmonary effort is normal. No respiratory distress.   Abdominal:      General: There is distension.      Palpations: There is no mass.      Tenderness: There is no abdominal tenderness.   Musculoskeletal:       Comments: Right fasciotomy site with dressings in place.  Palpable pulses in bilateral DP   Skin:     General: Skin is warm.      Capillary Refill: Capillary refill takes less than 2 seconds.   Neurological:      General: No focal deficit present.      Mental Status: She is disoriented.      Comments: Conversant and appropriate at times           Radiology and Labs     Results from last 7 days   Lab Units 12/26/24  1035 12/26/24  0428 12/26/24  0425 12/25/24  1142 12/25/24  0605 12/24/24  0344 12/23/24  1355 12/23/24  0429 12/22/24  0452   WBC 10*3/mm3  --  16.72*  --   --  12.72* 12.14*  --  11.95* 7.59   HEMOGLOBIN g/dL 8.2* 6.6*  --  7.8* 6.6* 7.7*   < > 7.5* 7.2*   HEMOGLOBIN, POC g/dL  --   --  7.7*  --   --   --   --   --   --    HEMATOCRIT % 24.4* 19.7*  --  23.2* 20.6* 23.6*   < > 21.9* 22.1*   HEMATOCRIT POC %  --   --  23*  --   --   --   --   --   --    PLATELETS 10*3/mm3  --  121*  --   --  99* 131*  --  115* 63*    < > = values in this interval not displayed.      Results from last 7 days   Lab Units 12/26/24  1406 12/26/24  0428 12/25/24  2130 12/25/24  1340 12/25/24  0605 12/22/24  1803 12/22/24  1136   PROTIME Seconds  --   --   --   --   --   --  13.9   INR   --   --   --   --   --   --  1.07   APTT seconds 81.2* 66.4* 99.0* 66.7* 110.1*   < > 75.6*    < > = values in this interval not displayed.      Results from last 7 days   Lab Units 12/26/24  1406 12/26/24  0428 12/26/24  0425 12/25/24  0605 12/24/24  0344 12/23/24  1823 12/23/24  0429 12/22/24  2114 12/22/24  0452   SODIUM mmol/L  --  139  --  136 137  --  138  --  138   POTASSIUM mmol/L 3.5 3.5  --  3.7 4.7 4.7 3.6   < > 3.5   CHLORIDE mmol/L  --  104  --  100 102  --  104  --  103   CO2 mmol/L  --  23.3  --  24.5 22.7  --  23.8  --  22.3   BUN mg/dL  --  36*  --  35* 37*  --  29*  --  39*   CREATININE mg/dL  --  1.62* 1.73* 1.82* 2.15*  --  1.73*  --  1.85*   GLUCOSE mg/dL  --  175*  --  245* 258*  --  104*  --  236*   MAGNESIUM mg/dL  --   2.0  --  1.8 1.7  --  2.0  --  2.0   PHOSPHORUS mg/dL  --  2.8  --  3.0 2.8  --  3.9  --  3.6    < > = values in this interval not displayed.      Results from last 7 days   Lab Units 12/26/24  0428 12/25/24  0605 12/24/24  0344 12/23/24  0429 12/22/24  0452   ALK PHOS U/L 305* 314* 126* 135* 100   AST (SGOT) U/L 502* 566* 970* 939* 271*   ALT (SGPT) U/L 479* 437* 610* 430* 96*     Results from last 7 days   Lab Units 12/26/24  0425 12/24/24  1153 12/24/24  0324 12/23/24  1532 12/23/24  0355   PH, ARTERIAL pH units 7.277* 7.293* 7.425 7.404 7.465*   PCO2, ARTERIAL mm Hg 55.7* 52.2* 37.8 42.7 35.5   PO2 ART mm Hg 84.0 98.9 113.7* 89.8 91.0   O2 SATURATION ART % 94.5 96.7 98.6* 96.9 97.6   FIO2 % 32 52 40 30 30   HCO3 ART mmol/L 26.0 25.3 24.8 26.7 25.5   BASE EXCESS ART mmol/L -1.0* -1.2* 0.4 1.7 1.7       No radiology results for the last day    Current medications     Scheduled Meds:   calcium gluconate, 1,000 mg, Intravenous, Q12H  chlorhexidine, 15 mL, Mouth/Throat, Q12H  insulin glargine, 24 Units, Subcutaneous, Nightly  insulin lispro, 2-7 Units, Subcutaneous, Q6H  insulin lispro, 6 Units, Subcutaneous, Q6H  lansoprazole, 30 mg, Nasogastric, Q AM  midodrine, 10 mg, Nasogastric, Q8H  nystatin, 1 Application, Topical, Q8H  QUEtiapine, 100 mg, Nasogastric, Q8H  sodium chloride, 10 mL, Intravenous, Q12H        Continuous Infusions:   dexmedetomidine, 0.2-0.6 mcg/kg/hr  heparin, 16.9 Units/kg/hr, Last Rate: 19 Units/kg/hr (12/26/24 0910)        Critical care services to this patient including but not limited to: review of labs/ microbiology/imaging/medications, serial monitoring of vital signs, adjusting ventilator settings as needed, review of other consultant's notes, review of events in the last 24 hrs, monitoring input/output, review of treatment plan with bedside nurse, RT and other treatment team, management of life support and nutrition needs.    Time spent in performing separately billable procedures and  updating family is not included in the critical care time.       Kiran Short MD   Critical Care  12/26/24   17:14 EST

## 2024-12-26 NOTE — PROGRESS NOTES
"NEPHROLOGY PROGRESS NOTE------[facility]    [facility]  [phone]  Deidra Starr MD      Patient Care Team:  Katie Duran PA as PCP - General (Physician Assistant)  Uli Starr MD as Consulting Physician (Nephrology)      Provider:  Deidra Starr MD  Patient Name: Yarelis Jackson  :  1970    SUBJECTIVE:    F/U ARF/MARGOT/ELECTROLYTE ABNORMALITIES    Confused and in restraints. S/P partial HD yesterday    Medication:  chlorhexidine, 15 mL, Mouth/Throat, Q12H  insulin glargine, 20 Units, Subcutaneous, Nightly  insulin lispro, 2-7 Units, Subcutaneous, Q6H  insulin lispro, 5 Units, Subcutaneous, Q6H  lansoprazole, 30 mg, Nasogastric, Q AM  midazolam, 1 mg, Intravenous, Once  midodrine, 10 mg, Nasogastric, Q8H  nystatin, 1 Application, Topical, Q8H  potassium chloride, 40 mEq, Oral, Q4H  QUEtiapine, 75 mg, Nasogastric, Q8H  sodium chloride, 10 mL, Intravenous, Q12H      dexmedetomidine, 0.2-1.5 mcg/kg/hr, Last Rate: 1.2 mcg/kg/hr (24 0517)  heparin, 16.9 Units/kg/hr, Last Rate: 19 Units/kg/hr (24 0600)  norepinephrine, 0.02-0.3 mcg/kg/min, Last Rate: Stopped (24 0447)        OBJECTIVE    Vital Sign Min/Max for last 24 hours  Temp  Min: 97 °F (36.1 °C)  Max: 100 °F (37.8 °C)   BP  Min: 87/40  Max: 153/60   Pulse  Min: 52  Max: 118   Resp  Min: 11  Max: 22   SpO2  Min: 86 %  Max: 100 %   No data recorded   No data recorded     Flowsheet Rows      Flowsheet Row First Filed Value   Admission Height 162.6 cm (64\") Documented at 2024 0443   Admission Weight 92.5 kg (203 lb 14.4 oz) Documented at 2024 0527            I/O this shift:  In: 1060 [I.V.:545; Blood:330; Other:90; NG/GT:95]  Out: 400 [Urine:400]  I/O last 3 completed shifts:  In: 5761 [P.O.:30; I.V.:2829; Blood:350; Other:1288; NG/GT:1264]  Out: 1975.8 [Urine:1975]    Physical Exam:     General Appearance:  CONFUSED AND IN " "RESTRAINTS  Head: normocephalic, without obvious abnormality and atraumatic +DHT INTACT  Eyes: conjunctivae and sclerae normal and no icterus  Neck: supple and no JVD  Lungs: +SCATTERED RHONCHI  Heart: regular rhythm & normal rate and normal S1, S2 +THOMAS  Chest: Wall no abnormalities observed  Abdomen: normal bowel sounds and soft +OBESITY  Extremities: moves extremities well, +TRACE  BILAT PRETIBIAL EDEMA, no cyanosis and no redness. +DJD  Skin: +BILAT PRETIBIAL FLUID FILLED BULLAE  Neurologic:  CONFUSED AND IN RESTRAINTS. WITHDRAWS TO PAIN    Labs:    WBC WBC   Date Value Ref Range Status   12/26/2024 16.72 (H) 3.40 - 10.80 10*3/mm3 Final   12/25/2024 12.72 (H) 3.40 - 10.80 10*3/mm3 Final   12/24/2024 12.14 (H) 3.40 - 10.80 10*3/mm3 Final      HGB Hemoglobin   Date Value Ref Range Status   12/26/2024 6.6 (C) 12.0 - 15.9 g/dL Final   12/26/2024 7.7 (C) 12.0 - 17.0 g/dL Final   12/25/2024 7.8 (L) 12.0 - 15.9 g/dL Final   12/25/2024 6.6 (C) 12.0 - 15.9 g/dL Final   12/24/2024 7.7 (L) 12.0 - 15.9 g/dL Final   12/23/2024 9.0 (L) 12.0 - 15.9 g/dL Final     Comment:     Result checked        HCT Hematocrit   Date Value Ref Range Status   12/26/2024 19.7 (C) 34.0 - 46.6 % Final   12/26/2024 23 (L) 38 - 51 % Final   12/25/2024 23.2 (L) 34.0 - 46.6 % Final   12/25/2024 20.6 (C) 34.0 - 46.6 % Final   12/24/2024 23.6 (L) 34.0 - 46.6 % Final   12/23/2024 27.1 (L) 34.0 - 46.6 % Final      Platelets No results found for: \"LABPLAT\"   MCV MCV   Date Value Ref Range Status   12/26/2024 94.7 79.0 - 97.0 fL Final   12/25/2024 94.9 79.0 - 97.0 fL Final   12/24/2024 93.7 79.0 - 97.0 fL Final          Sodium Sodium   Date Value Ref Range Status   12/26/2024 139 136 - 145 mmol/L Final   12/25/2024 136 136 - 145 mmol/L Final   12/24/2024 137 136 - 145 mmol/L Final      Potassium Potassium   Date Value Ref Range Status   12/26/2024 3.5 3.5 - 5.2 mmol/L Final   12/25/2024 3.7 3.5 - 5.2 mmol/L Final   12/24/2024 4.7 3.5 - 5.2 mmol/L Final " "  12/23/2024 4.7 3.5 - 5.2 mmol/L Final      Chloride Chloride   Date Value Ref Range Status   12/26/2024 104 98 - 107 mmol/L Final   12/25/2024 100 98 - 107 mmol/L Final   12/24/2024 102 98 - 107 mmol/L Final      CO2 CO2   Date Value Ref Range Status   12/26/2024 23.3 22.0 - 29.0 mmol/L Final   12/25/2024 24.5 22.0 - 29.0 mmol/L Final   12/24/2024 22.7 22.0 - 29.0 mmol/L Final      BUN BUN   Date Value Ref Range Status   12/26/2024 36 (H) 6 - 20 mg/dL Final   12/25/2024 35 (H) 6 - 20 mg/dL Final   12/24/2024 37 (H) 6 - 20 mg/dL Final      Creatinine Creatinine   Date Value Ref Range Status   12/26/2024 1.62 (H) 0.57 - 1.00 mg/dL Final   12/26/2024 1.73 (H) 0.60 - 1.30 mg/dL Final     Comment:     Serial Number: [id number]:  [id number]   12/25/2024 1.82 (H) 0.57 - 1.00 mg/dL Final   12/24/2024 2.15 (H) 0.57 - 1.00 mg/dL Final      Calcium Calcium   Date Value Ref Range Status   12/26/2024 8.3 (L) 8.6 - 10.5 mg/dL Final   12/25/2024 8.8 8.6 - 10.5 mg/dL Final   12/24/2024 7.6 (L) 8.6 - 10.5 mg/dL Final      PO4 No components found for: \"PO4\"   Albumin Albumin   Date Value Ref Range Status   12/26/2024 3.5 3.5 - 5.2 g/dL Final   12/25/2024 3.4 (L) 3.5 - 5.2 g/dL Final   12/24/2024 2.9 (L) 3.5 - 5.2 g/dL Final      Magnesium Magnesium   Date Value Ref Range Status   12/26/2024 2.0 1.6 - 2.6 mg/dL Final   12/25/2024 1.8 1.6 - 2.6 mg/dL Final   12/24/2024 1.7 1.6 - 2.6 mg/dL Final      Uric Acid No components found for: \"URIC ACID\"     Imaging Results (Last 72 Hours)       ** No results found for the last 72 hours. **            Results for orders placed during the hospital encounter of 12/18/24    XR Chest 1 View    Narrative  XR CHEST 1 VW    Date of Exam: 12/22/2024 12:35 AM EST    Indication: Intubated Patient    Comparison:  12/21/2024    FINDINGS:  Endotracheal tube tip appears in satisfactory position at the level of the aortic arch. Right IJ catheter appears in stable position. Enteric tube extends into the left " upper quadrant. Heart size and mediastinal contour appear within normal limits. No  definite pneumothorax or effusion. Mild bibasilar opacities. Findings of advanced emphysema with suspected apical scarring.    Impression  1.Tubes and lines appear in satisfactory positions, as above.  2.Mild bibasilar opacities which could represent atelectasis or infiltrate.  3.Advanced emphysema.        Electronically Signed: Sam Haynes  12/22/2024 7:07 AM EST  Workstation ID: [id number]      XR Chest 1 View    Narrative  XR CHEST 1 VW    Date of Exam: 12/21/2024 4:32 AM EST    Indication: Intubated Patient    Comparison: 12/20/2024    Findings:  There is been no interval tube or line changes. Heart size and pulmonary vessels are within normal limits. There are postoperative changes of the bilateral upper lobes. Lungs appear clear. No pleural effusion or pneumothorax.    Impression  Impression:    1. No tube or line change  2. No focal airspace consolidation or other acute process.      Electronically Signed: Julio Thorpe MD  12/21/2024 9:54 AM EST  Workstation ID: [id number]      XR Chest 1 View    Narrative  XR CHEST 1 VW    Date of Exam: 12/20/2024 12:10 AM EST    Indication: Intubated Patient    Comparison: 12/19/2024    Findings:  Endotracheal tube approximately 2.5 cm above the carmina. Feeding tube and right IJ dialysis catheter remain in place. No new tubes or lines heart size and pulmonary vasculature are stable. Bullous changes in the right upper lobe. Postoperative changes in  the left upper lobe. Lungs grossly clear. No pneumothorax    Impression  Impression:  Stable chest demonstrating COPD and postoperative changes with no acute cardiopulmonary process demonstrated      Electronically Signed: Shon Ponce  12/20/2024 7:19 AM EST  Workstation ID: [id number]      Results for orders placed during the hospital encounter of 12/18/24    Duplex Lower Extremity Art / Grafts - Right CAR    Interpretation Summary    Limited  study due to body habitus and current dressing site.  Patent external iliac and femoral-popliteal arterial flow with low flow noted below the common femoral.  Common femoral artery poorly visualized.  Cannot rule out occlusion of the common femoral artery.        ASSESSMENT / PLAN      DKA (diabetic ketoacidosis)    Arterial thrombosis    ARF/MARGOT------Oliguric. BUN/Cr stable post HD yesterday. +ARF/MARGOT with historically normal renal function. +ARF/MARGOT secondary to severe prerenal state and ATN from hypotension and Rhabdomyolysis. Hydrate aggressively. Pressor support. Will do regular HD again today. Off IVFs. No NSAIDs. Renal US without obstructive uropathy.   Dose meds for CrCl less than 10 cc/min     2. HYPERNATREMIA/HYPEROSMOLAR STATE-----Better.       3. ACIDOSIS--Better with HD     4. DVT PROPHYLAXIS-----On Heparin gtt     5. HYPOCALCEMIA------Replace IV and follow ionized levels     6. DM------BS ok     7. ANEMIA---------H/H stable     8. OA/DJD/HYPERURICEMIA------Allopurinol . No NSAIDs     9. RHABDOMYOLYSIS-------Off IVFs. HD again today     10. GERD/PUD PROPHYLAXIS-----Renal dose adjusted Pepcid     11. DELIRIUM/TME     12. COPD/ACUTE HYPOXIC RESPIRATORY FAILURE-----S/P extubation    13. HYPOALBUMINEMIA-----S/P IV Albumin to temporize    14. EDEMA/VOLUME EXCESS---- Gentle UF with HD    15. HYPOTENSION-----BP ok    16. ELEVATED LFTS/TRANSAMINASES------Secondary to shock liver and Rhabdo. Follow    17. FASCITIS------S/P surgery      18. DELIRIUM    Deidra Starr MD  [facility]  [phone]  12/26/24  09:07 EST

## 2024-12-26 NOTE — CONSULTS
Diabetes Education    Patient Name:  Yarelis Jackson  YOB: 1970  MRN: [id number]  Admit Date:  12/18/2024    Follow up note: Per nurse documentation today, pt with confusion. Will attempt follow up when condition appropriate.       Electronically signed by:  Dolly Montejo RN  12/26/24 16:33 EST

## 2024-12-26 NOTE — CASE MANAGEMENT/SOCIAL WORK
Continued Stay Note  JOSEFA Guerrero     Patient Name: Yarelis Jackson  MRN: [id number]  Today's Date: 12/26/2024    Admit Date: 12/18/2024    Plan: From home with parents. Clinical course and PT/OT pending.   Discharge Plan       Row Name 12/26/24 1518       Plan    Plan From home with parents. Clinical course and PT/OT pending.    Patient/Family in Agreement with Plan yes    Provided Post Acute Provider List? N/A    Provided Post Acute Provider Quality & Resource List? N/A    Plan Comments Will likely apply wound vac to leg wound. D/C Barriers: Hem/Onc/Nutrition/Neph/Endocrine/Vasc Sx/Diabetes Educator following, Clementine, Precedex/Levo/Heparin gtt's, Increased O2 demands - 9L HFNC, albumin, Nebs continue, elevated BUN/Cr, elevated ALT/AST               Expected Discharge Date and Time       Expected Discharge Date Expected Discharge Time    Dec 30, 2024        Jackie Billy RN     [phone]  [email or web address] Hospital

## 2024-12-27 LAB
ALBUMIN SERPL-MCNC: 3.2 G/DL (ref 3.5–5.2)
ALBUMIN/GLOB SERPL: 1.2 G/DL
ALP SERPL-CCNC: 265 U/L (ref 39–117)
ALT SERPL W P-5'-P-CCNC: 384 U/L (ref 1–33)
ANION GAP SERPL CALCULATED.3IONS-SCNC: 8.4 MMOL/L (ref 5–15)
ANISOCYTOSIS BLD QL: ABNORMAL
APTT PPP: 58.7 SECONDS (ref 22.7–35.4)
APTT PPP: 74.8 SECONDS (ref 22.7–35.4)
APTT PPP: 77.9 SECONDS (ref 22.7–35.4)
APTT SCREEN TO CONFIRM RATIO: 1.14 RATIO (ref 0–1.34)
AST SERPL-CCNC: 231 U/L (ref 1–32)
BH BB BLOOD EXPIRATION DATE: NORMAL
BH BB BLOOD TYPE BARCODE: 5100
BH BB DISPENSE STATUS: NORMAL
BH BB PRODUCT CODE: NORMAL
BH BB UNIT NUMBER: NORMAL
BILIRUB SERPL-MCNC: 0.7 MG/DL (ref 0–1.2)
BUN SERPL-MCNC: 21 MG/DL (ref 6–20)
BUN/CREAT SERPL: 21 (ref 7–25)
CA-I SERPL ISE-MCNC: 1.15 MMOL/L (ref 1.15–1.3)
CALCIUM SPEC-SCNC: 8.7 MG/DL (ref 8.6–10.5)
CHLORIDE SERPL-SCNC: 108 MMOL/L (ref 98–107)
CK SERPL-CCNC: 1557 U/L (ref 20–180)
CO2 SERPL-SCNC: 26.6 MMOL/L (ref 22–29)
CONFIRM APTT/NORMAL: 35.7 SEC (ref 0–47.6)
CREAT SERPL-MCNC: 1 MG/DL (ref 0.57–1)
CROSSMATCH INTERPRETATION: NORMAL
CRYOGLOB SER QL 1D COLD INC: NORMAL
D-LACTATE SERPL-SCNC: 0.8 MMOL/L (ref 0.5–2)
DEPRECATED RDW RBC AUTO: 56.4 FL (ref 37–54)
DEPRECATED RDW RBC AUTO: 57.1 FL (ref 37–54)
EGFRCR SERPLBLD CKD-EPI 2021: 67.5 ML/MIN/1.73
EOSINOPHIL # BLD MANUAL: 0.31 10*3/MM3 (ref 0–0.4)
EOSINOPHIL NFR BLD MANUAL: 2 % (ref 0.3–6.2)
ERYTHROCYTE [DISTWIDTH] IN BLOOD BY AUTOMATED COUNT: 16.8 % (ref 12.3–15.4)
ERYTHROCYTE [DISTWIDTH] IN BLOOD BY AUTOMATED COUNT: 16.9 % (ref 12.3–15.4)
FACTOR II, DNA ANALYSIS: NORMAL
FOLATE SERPL-MCNC: 6.15 NG/ML (ref 4.78–24.2)
GLOBULIN UR ELPH-MCNC: 2.7 GM/DL
GLUCOSE BLDC GLUCOMTR-MCNC: 139 MG/DL (ref 70–105)
GLUCOSE BLDC GLUCOMTR-MCNC: 148 MG/DL (ref 70–105)
GLUCOSE BLDC GLUCOMTR-MCNC: 149 MG/DL (ref 70–105)
GLUCOSE BLDC GLUCOMTR-MCNC: 171 MG/DL (ref 70–105)
GLUCOSE SERPL-MCNC: 155 MG/DL (ref 65–99)
HCT VFR BLD AUTO: 24.5 % (ref 34–46.6)
HCT VFR BLD AUTO: 25 % (ref 34–46.6)
HGB BLD-MCNC: 8.1 G/DL (ref 12–15.9)
HGB BLD-MCNC: 8.1 G/DL (ref 12–15.9)
LA 2 SCREEN W REFLEX-IMP: ABNORMAL
LYMPHOCYTES # BLD MANUAL: 2.94 10*3/MM3 (ref 0.7–3.1)
LYMPHOCYTES NFR BLD MANUAL: 6 % (ref 5–12)
MAGNESIUM SERPL-MCNC: 1.5 MG/DL (ref 1.6–2.6)
MCH RBC QN AUTO: 30.8 PG (ref 26.6–33)
MCH RBC QN AUTO: 31.3 PG (ref 26.6–33)
MCHC RBC AUTO-ENTMCNC: 32.4 G/DL (ref 31.5–35.7)
MCHC RBC AUTO-ENTMCNC: 33.1 G/DL (ref 31.5–35.7)
MCV RBC AUTO: 94.6 FL (ref 79–97)
MCV RBC AUTO: 95.1 FL (ref 79–97)
METAMYELOCYTES NFR BLD MANUAL: 2 % (ref 0–0)
MONOCYTES # BLD: 0.93 10*3/MM3 (ref 0.1–0.9)
MYELOCYTES NFR BLD MANUAL: 3 % (ref 0–0)
NEUTROPHILS # BLD AUTO: 10.38 10*3/MM3 (ref 1.7–7)
NEUTROPHILS NFR BLD MANUAL: 60 % (ref 42.7–76)
NEUTS BAND NFR BLD MANUAL: 7 % (ref 0–5)
NRBC SPEC MANUAL: 1 /100 WBC (ref 0–0.2)
PHOSPHATE SERPL-MCNC: 2.1 MG/DL (ref 2.5–4.5)
PLASMA CELL PREC NFR BLD MANUAL: 1 % (ref 0–0)
PLAT MORPH BLD: NORMAL
PLATELET # BLD AUTO: 147 10*3/MM3 (ref 140–450)
PLATELET # BLD AUTO: 148 10*3/MM3 (ref 140–450)
PMV BLD AUTO: 10.6 FL (ref 6–12)
PMV BLD AUTO: 10.7 FL (ref 6–12)
POIKILOCYTOSIS BLD QL SMEAR: ABNORMAL
POLYCHROMASIA BLD QL SMEAR: ABNORMAL
POTASSIUM SERPL-SCNC: 4 MMOL/L (ref 3.5–5.2)
PROT SERPL-MCNC: 5.9 G/DL (ref 6–8.5)
QT INTERVAL: 310 MS
QT INTERVAL: 354 MS
QTC INTERVAL: 422 MS
QTC INTERVAL: 429 MS
RBC # BLD AUTO: 2.59 10*6/MM3 (ref 3.77–5.28)
RBC # BLD AUTO: 2.63 10*6/MM3 (ref 3.77–5.28)
SCAN SLIDE: NORMAL
SCREEN APTT: 37.8 SEC (ref 0–43.5)
SCREEN DRVVT: 43.5 SEC (ref 0–47)
SODIUM SERPL-SCNC: 143 MMOL/L (ref 136–145)
THROMBIN TIME: 42.4 SEC (ref 0–23)
TT IMM NP PPP: 40.3 SEC (ref 0–23)
TT P HPASE PPP: 15.6 SEC (ref 0–23)
UNIT  ABO: NORMAL
UNIT  RH: NORMAL
VARIANT LYMPHS NFR BLD MANUAL: 19 % (ref 19.6–45.3)
VIT B12 BLD-MCNC: >2000 PG/ML (ref 211–946)
WBC MORPH BLD: NORMAL
WBC NRBC COR # BLD AUTO: 15.02 10*3/MM3 (ref 3.4–10.8)
WBC NRBC COR # BLD AUTO: 15.49 10*3/MM3 (ref 3.4–10.8)

## 2024-12-27 PROCEDURE — 85025 COMPLETE CBC W/AUTO DIFF WBC: CPT | Performed by: SURGERY

## 2024-12-27 PROCEDURE — 63710000001 INSULIN GLARGINE PER 5 UNITS: Performed by: INTERNAL MEDICINE

## 2024-12-27 PROCEDURE — 82948 REAGENT STRIP/BLOOD GLUCOSE: CPT

## 2024-12-27 PROCEDURE — 85027 COMPLETE CBC AUTOMATED: CPT | Performed by: INTERNAL MEDICINE

## 2024-12-27 PROCEDURE — 93005 ELECTROCARDIOGRAM TRACING: CPT | Performed by: EMERGENCY MEDICINE

## 2024-12-27 PROCEDURE — 25010000002 MAGNESIUM SULFATE 2 GM/50ML SOLUTION: Performed by: INTERNAL MEDICINE

## 2024-12-27 PROCEDURE — 25010000002 HEPARIN (PORCINE) 25000-0.45 UT/250ML-% SOLUTION: Performed by: SURGERY

## 2024-12-27 PROCEDURE — 92610 EVALUATE SWALLOWING FUNCTION: CPT

## 2024-12-27 PROCEDURE — 82607 VITAMIN B-12: CPT | Performed by: STUDENT IN AN ORGANIZED HEALTH CARE EDUCATION/TRAINING PROGRAM

## 2024-12-27 PROCEDURE — 25810000003 SODIUM CHLORIDE 0.9 % SOLUTION: Performed by: INTERNAL MEDICINE

## 2024-12-27 PROCEDURE — 83735 ASSAY OF MAGNESIUM: CPT | Performed by: SURGERY

## 2024-12-27 PROCEDURE — 82330 ASSAY OF CALCIUM: CPT | Performed by: INTERNAL MEDICINE

## 2024-12-27 PROCEDURE — 85007 BL SMEAR W/DIFF WBC COUNT: CPT | Performed by: SURGERY

## 2024-12-27 PROCEDURE — 80053 COMPREHEN METABOLIC PANEL: CPT | Performed by: SURGERY

## 2024-12-27 PROCEDURE — 93010 ELECTROCARDIOGRAM REPORT: CPT | Performed by: INTERNAL MEDICINE

## 2024-12-27 PROCEDURE — 25010000002 CALCIUM GLUCONATE-NACL 1-0.675 GM/50ML-% SOLUTION: Performed by: INTERNAL MEDICINE

## 2024-12-27 PROCEDURE — 99232 SBSQ HOSP IP/OBS MODERATE 35: CPT | Performed by: INTERNAL MEDICINE

## 2024-12-27 PROCEDURE — 81240 F2 GENE: CPT | Performed by: STUDENT IN AN ORGANIZED HEALTH CARE EDUCATION/TRAINING PROGRAM

## 2024-12-27 PROCEDURE — 82550 ASSAY OF CK (CPK): CPT | Performed by: INTERNAL MEDICINE

## 2024-12-27 PROCEDURE — 84100 ASSAY OF PHOSPHORUS: CPT | Performed by: SURGERY

## 2024-12-27 PROCEDURE — 85730 THROMBOPLASTIN TIME PARTIAL: CPT

## 2024-12-27 PROCEDURE — 99024 POSTOP FOLLOW-UP VISIT: CPT | Performed by: NURSE PRACTITIONER

## 2024-12-27 PROCEDURE — 83605 ASSAY OF LACTIC ACID: CPT | Performed by: INTERNAL MEDICINE

## 2024-12-27 PROCEDURE — 82746 ASSAY OF FOLIC ACID SERUM: CPT | Performed by: STUDENT IN AN ORGANIZED HEALTH CARE EDUCATION/TRAINING PROGRAM

## 2024-12-27 PROCEDURE — 63710000001 INSULIN LISPRO (HUMAN) PER 5 UNITS: Performed by: INTERNAL MEDICINE

## 2024-12-27 PROCEDURE — 85730 THROMBOPLASTIN TIME PARTIAL: CPT | Performed by: EMERGENCY MEDICINE

## 2024-12-27 RX ORDER — QUETIAPINE FUMARATE 25 MG/1
75 TABLET, FILM COATED ORAL EVERY 12 HOURS SCHEDULED
Status: DISCONTINUED | OUTPATIENT
Start: 2024-12-27 | End: 2024-12-30

## 2024-12-27 RX ORDER — MAGNESIUM SULFATE HEPTAHYDRATE 40 MG/ML
2 INJECTION, SOLUTION INTRAVENOUS EVERY 12 HOURS
Status: COMPLETED | OUTPATIENT
Start: 2024-12-27 | End: 2024-12-27

## 2024-12-27 RX ORDER — ALBUMIN (HUMAN) 12.5 G/50ML
12.5 SOLUTION INTRAVENOUS AS NEEDED
Status: ACTIVE | OUTPATIENT
Start: 2024-12-27 | End: 2024-12-28

## 2024-12-27 RX ORDER — CALCIUM GLUCONATE 20 MG/ML
1000 INJECTION, SOLUTION INTRAVENOUS ONCE
Status: COMPLETED | OUTPATIENT
Start: 2024-12-27 | End: 2024-12-27

## 2024-12-27 RX ORDER — FENTANYL/ROPIVACAINE/NS/PF 2-625MCG/1
15 PLASTIC BAG, INJECTION (ML) EPIDURAL
Status: COMPLETED | OUTPATIENT
Start: 2024-12-27 | End: 2024-12-27

## 2024-12-27 RX ADMIN — INSULIN LISPRO 2 UNITS: 100 INJECTION, SOLUTION INTRAVENOUS; SUBCUTANEOUS at 17:53

## 2024-12-27 RX ADMIN — MIDODRINE HYDROCHLORIDE 10 MG: 5 TABLET ORAL at 05:43

## 2024-12-27 RX ADMIN — HEPARIN SODIUM 21 UNITS/KG/HR: 10000 INJECTION, SOLUTION INTRAVENOUS at 14:42

## 2024-12-27 RX ADMIN — CALCIUM GLUCONATE 1000 MG: 20 INJECTION, SOLUTION INTRAVENOUS at 08:02

## 2024-12-27 RX ADMIN — MIDODRINE HYDROCHLORIDE 10 MG: 5 TABLET ORAL at 21:38

## 2024-12-27 RX ADMIN — SODIUM BICARBONATE 50 MEQ: 84 INJECTION INTRAVENOUS at 08:01

## 2024-12-27 RX ADMIN — INSULIN LISPRO 6 UNITS: 100 INJECTION, SOLUTION INTRAVENOUS; SUBCUTANEOUS at 23:57

## 2024-12-27 RX ADMIN — SODIUM BICARBONATE 50 MEQ: 84 INJECTION INTRAVENOUS at 15:32

## 2024-12-27 RX ADMIN — INSULIN LISPRO 6 UNITS: 100 INJECTION, SOLUTION INTRAVENOUS; SUBCUTANEOUS at 05:43

## 2024-12-27 RX ADMIN — NYSTATIN 1 APPLICATION: 100000 CREAM TOPICAL at 14:21

## 2024-12-27 RX ADMIN — LANSOPRAZOLE 30 MG: 30 TABLET, ORALLY DISINTEGRATING ORAL at 05:43

## 2024-12-27 RX ADMIN — INSULIN LISPRO 6 UNITS: 100 INJECTION, SOLUTION INTRAVENOUS; SUBCUTANEOUS at 11:20

## 2024-12-27 RX ADMIN — INSULIN LISPRO 6 UNITS: 100 INJECTION, SOLUTION INTRAVENOUS; SUBCUTANEOUS at 17:53

## 2024-12-27 RX ADMIN — Medication 10 ML: at 21:43

## 2024-12-27 RX ADMIN — Medication 10 ML: at 08:02

## 2024-12-27 RX ADMIN — HEPARIN SODIUM 17 UNITS/KG/HR: 10000 INJECTION, SOLUTION INTRAVENOUS at 02:51

## 2024-12-27 RX ADMIN — MAGNESIUM SULFATE IN WATER FOR 2 G: 40 INJECTION INTRAVENOUS at 23:57

## 2024-12-27 RX ADMIN — SODIUM CHLORIDE 15 MMOL: 9 INJECTION, SOLUTION INTRAVENOUS at 09:25

## 2024-12-27 RX ADMIN — QUETIAPINE FUMARATE 75 MG: 25 TABLET ORAL at 21:38

## 2024-12-27 RX ADMIN — SODIUM CHLORIDE 15 MMOL: 9 INJECTION, SOLUTION INTRAVENOUS at 12:08

## 2024-12-27 RX ADMIN — POTASSIUM CHLORIDE 40 MEQ: 1.5 POWDER, FOR SOLUTION ORAL at 01:16

## 2024-12-27 RX ADMIN — INSULIN GLARGINE-YFGN 22 UNITS: 100 INJECTION, SOLUTION SUBCUTANEOUS at 21:37

## 2024-12-27 RX ADMIN — MAGNESIUM SULFATE IN WATER FOR 2 G: 40 INJECTION INTRAVENOUS at 08:02

## 2024-12-27 RX ADMIN — NYSTATIN 1 APPLICATION: 100000 CREAM TOPICAL at 05:44

## 2024-12-27 RX ADMIN — QUETIAPINE FUMARATE 100 MG: 100 TABLET ORAL at 05:43

## 2024-12-27 NOTE — PLAN OF CARE
Problem: Adult Inpatient Plan of Care  Goal: Plan of Care Review  Outcome: Progressing  Goal: Patient-Specific Goal (Individualized)  Outcome: Progressing  Goal: Absence of Hospital-Acquired Illness or Injury  Outcome: Progressing  Intervention: Identify and Manage Fall Risk  Recent Flowsheet Documentation  Taken 12/27/2024 0400 by Kecia Gonzáles RN  Safety Promotion/Fall Prevention:   safety round/check completed   room organization consistent   lighting adjusted   fall prevention program maintained   clutter free environment maintained   assistive device/personal items within reach  Taken 12/27/2024 0300 by Kecia Gonzáles RN  Safety Promotion/Fall Prevention: safety round/check completed  Taken 12/27/2024 0200 by Kecia Gonzáles RN  Safety Promotion/Fall Prevention: safety round/check completed  Taken 12/27/2024 0100 by Kecia Gonzáles RN  Safety Promotion/Fall Prevention: safety round/check completed  Taken 12/26/2024 2354 by Kecia Gonzáles RN  Safety Promotion/Fall Prevention:   safety round/check completed   room organization consistent   lighting adjusted   fall prevention program maintained   clutter free environment maintained   assistive device/personal items within reach  Taken 12/26/2024 2300 by Kecia Gonzáles RN  Safety Promotion/Fall Prevention: safety round/check completed  Taken 12/26/2024 2200 by Kecia Gonzáles RN  Safety Promotion/Fall Prevention: safety round/check completed  Taken 12/26/2024 2100 by Kecia Gonzáles RN  Safety Promotion/Fall Prevention: safety round/check completed  Taken 12/26/2024 1940 by Kecia Gonzáles RN  Safety Promotion/Fall Prevention:   safety round/check completed   room organization consistent   lighting adjusted   fall prevention program maintained   clutter free environment maintained   assistive device/personal items within reach  Taken 12/26/2024 1900 by Kecia Gonzáles RN  Safety Promotion/Fall  Prevention: safety round/check completed  Intervention: Prevent Skin Injury  Recent Flowsheet Documentation  Taken 12/27/2024 0300 by Kecia Gonzáles RN  Body Position:   turned   left  Taken 12/27/2024 0100 by Kecia Gonzáles RN  Body Position:   turned   right  Taken 12/26/2024 2354 by Kecia Gonzáles RN  Skin Protection:   incontinence pads utilized   silicone foam dressing in place  Taken 12/26/2024 2300 by Kecia Gonzáles RN  Body Position:   turned   left  Taken 12/26/2024 2100 by Kecia Gonzáles RN  Body Position:   turned   right  Taken 12/26/2024 1940 by Kecia Gonzáles RN  Skin Protection:   incontinence pads utilized   silicone foam dressing in place  Taken 12/26/2024 1900 by Kecia Gonzáles RN  Body Position:   turned   left  Intervention: Prevent and Manage VTE (Venous Thromboembolism) Risk  Recent Flowsheet Documentation  Taken 12/27/2024 0400 by Kecia Gonzáles RN  VTE Prevention/Management: (heparin gtt, leg wounds)   bilateral   SCDs (sequential compression devices) off  Taken 12/26/2024 2354 by Kecia Gonzáles RN  VTE Prevention/Management: (heparin gtt, leg wounds)   bilateral   SCDs (sequential compression devices) off  Taken 12/26/2024 1940 by Kecia Gonzáles RN  VTE Prevention/Management: (heparin gtt)   bilateral   SCDs (sequential compression devices) off  Intervention: Prevent Infection  Recent Flowsheet Documentation  Taken 12/27/2024 0400 by Kecia Gonzáles RN  Infection Prevention:   environmental surveillance performed   equipment surfaces disinfected   hand hygiene promoted   personal protective equipment utilized   rest/sleep promoted   single patient room provided  Taken 12/26/2024 2354 by Kecia Gonzáles RN  Infection Prevention:   environmental surveillance performed   equipment surfaces disinfected   hand hygiene promoted   personal protective equipment utilized   rest/sleep promoted   single patient room  provided  Taken 12/26/2024 1940 by Kecia Gonzáles RN  Infection Prevention:   environmental surveillance performed   equipment surfaces disinfected   hand hygiene promoted   personal protective equipment utilized   rest/sleep promoted   single patient room provided  Goal: Optimal Comfort and Wellbeing  Outcome: Progressing  Intervention: Monitor Pain and Promote Comfort  Recent Flowsheet Documentation  Taken 12/27/2024 0400 by Kecia Gonzáles RN  Pain Management Interventions:   care clustered   pillow support provided   position adjusted   quiet environment facilitated   relaxation techniques promoted   unnecessary movement minimized  Taken 12/26/2024 2325 by Kecia Gonzáles RN  Pain Management Interventions:   pain medication given   care clustered   pillow support provided   position adjusted   quiet environment facilitated   unnecessary movement minimized   relaxation techniques promoted  Taken 12/26/2024 1940 by Kecia Gonzáles RN  Pain Management Interventions:   care clustered   pillow support provided   position adjusted   quiet environment facilitated   relaxation techniques promoted   unnecessary movement minimized  Intervention: Provide Person-Centered Care  Recent Flowsheet Documentation  Taken 12/27/2024 0400 by Kecia Gonzáles RN  Trust Relationship/Rapport:   care explained   choices provided   emotional support provided   empathic listening provided   questions answered   questions encouraged   reassurance provided   thoughts/feelings acknowledged  Taken 12/26/2024 2354 by Kecia Gonzáles RN  Trust Relationship/Rapport:   care explained   choices provided   emotional support provided   empathic listening provided   questions answered   questions encouraged   reassurance provided   thoughts/feelings acknowledged  Taken 12/26/2024 1940 by Kecia Gonzáles RN  Trust Relationship/Rapport:   care explained   choices provided   emotional support provided   empathic  listening provided   questions answered   questions encouraged   reassurance provided   thoughts/feelings acknowledged  Goal: Readiness for Transition of Care  Outcome: Progressing     Problem: Restraint, Nonviolent  Goal: Absence of Harm or Injury  Outcome: Progressing  Intervention: Implement Least Restrictive Safety Strategies  Recent Flowsheet Documentation  Taken 12/27/2024 0600 by Kecia Gonzáles RN  Medical Device Protection:   IV pole/bag removed from visual field   torso covered   tubing secured  Diversional Activities: television  Taken 12/27/2024 0400 by Kecia Gonzáles RN  Medical Device Protection:   IV pole/bag removed from visual field   torso covered   tubing secured  Diversional Activities: television  Taken 12/27/2024 0200 by Kecia Gonzáles RN  Medical Device Protection:   IV pole/bag removed from visual field   torso covered   tubing secured  Diversional Activities: television  Taken 12/27/2024 0000 by Kecia Gonzáles RN  Medical Device Protection:   IV pole/bag removed from visual field   torso covered   tubing secured  Diversional Activities: television  Taken 12/26/2024 2354 by Kecia Gonzáles RN  Medical Device Protection:   IV pole/bag removed from visual field   torso covered   tubing secured  Diversional Activities: television  Taken 12/26/2024 2200 by Kecia Gonzáles RN  Medical Device Protection:   IV pole/bag removed from visual field   torso covered   tubing secured  Diversional Activities: television  Taken 12/26/2024 2000 by Kecia Gonzáles RN  Medical Device Protection:   IV pole/bag removed from visual field   torso covered   tubing secured  Diversional Activities: television  Taken 12/26/2024 1940 by Kecia Gonzáles RN  Medical Device Protection:   IV pole/bag removed from visual field   torso covered   tubing secured  Diversional Activities: television  Intervention: Protect Dignity, Rights and Personal Wellbeing  Recent Flowsheet  Documentation  Taken 12/27/2024 0400 by Kecia Gonzáles RN  Trust Relationship/Rapport:   care explained   choices provided   emotional support provided   empathic listening provided   questions answered   questions encouraged   reassurance provided   thoughts/feelings acknowledged  Taken 12/26/2024 2354 by Kecia Gonzáles RN  Trust Relationship/Rapport:   care explained   choices provided   emotional support provided   empathic listening provided   questions answered   questions encouraged   reassurance provided   thoughts/feelings acknowledged  Taken 12/26/2024 1940 by Kecia Gonzáles RN  Trust Relationship/Rapport:   care explained   choices provided   emotional support provided   empathic listening provided   questions answered   questions encouraged   reassurance provided   thoughts/feelings acknowledged  Intervention: Protect Skin and Joint Integrity  Recent Flowsheet Documentation  Taken 12/27/2024 0400 by Kecia Gonzáles RN  Range of Motion: ROM (range of motion) performed  Taken 12/27/2024 0300 by Kecia Gonzáles RN  Body Position:   turned   left  Taken 12/27/2024 0100 by Kecia Gonzáles RN  Body Position:   turned   right  Taken 12/26/2024 2354 by Kecia Gonzáles RN  Skin Protection:   incontinence pads utilized   silicone foam dressing in place  Range of Motion: ROM (range of motion) performed  Taken 12/26/2024 2300 by Kecia Gonzáles RN  Body Position:   turned   left  Taken 12/26/2024 2100 by Kecia Gonzáles RN  Body Position:   turned   right  Taken 12/26/2024 1940 by Kecia Gonzáles RN  Skin Protection:   incontinence pads utilized   silicone foam dressing in place  Range of Motion: ROM (range of motion) performed  Taken 12/26/2024 1900 by Kecia Gonzáles RN  Body Position:   turned   left     Problem: Fall Injury Risk  Goal: Absence of Fall and Fall-Related Injury  Outcome: Progressing  Intervention: Identify and Manage  Contributors  Recent Flowsheet Documentation  Taken 12/27/2024 0400 by Kecia Gonzáles RN  Medication Review/Management: medications reviewed  Taken 12/26/2024 2354 by Kecia Gonzáles RN  Medication Review/Management: medications reviewed  Taken 12/26/2024 1940 by Kecia Gonzáles RN  Medication Review/Management: medications reviewed  Intervention: Promote Injury-Free Environment  Recent Flowsheet Documentation  Taken 12/27/2024 0400 by Kecia Gonzáles RN  Safety Promotion/Fall Prevention:   safety round/check completed   room organization consistent   lighting adjusted   fall prevention program maintained   clutter free environment maintained   assistive device/personal items within reach  Taken 12/27/2024 0300 by Kecia Gonzáles RN  Safety Promotion/Fall Prevention: safety round/check completed  Taken 12/27/2024 0200 by Kecia Gonzáles RN  Safety Promotion/Fall Prevention: safety round/check completed  Taken 12/27/2024 0100 by Kecia Gonzáles RN  Safety Promotion/Fall Prevention: safety round/check completed  Taken 12/26/2024 2354 by Kecia Gonzáles RN  Safety Promotion/Fall Prevention:   safety round/check completed   room organization consistent   lighting adjusted   fall prevention program maintained   clutter free environment maintained   assistive device/personal items within reach  Taken 12/26/2024 2300 by Kecia Gonzáles RN  Safety Promotion/Fall Prevention: safety round/check completed  Taken 12/26/2024 2200 by Kecia Gonzáles RN  Safety Promotion/Fall Prevention: safety round/check completed  Taken 12/26/2024 2100 by Kecia Gonzáles RN  Safety Promotion/Fall Prevention: safety round/check completed  Taken 12/26/2024 1940 by Kecia Gonzáles RN  Safety Promotion/Fall Prevention:   safety round/check completed   room organization consistent   lighting adjusted   fall prevention program maintained   clutter free environment maintained    assistive device/personal items within reach  Taken 12/26/2024 1900 by Kecia Gonzáles RN  Safety Promotion/Fall Prevention: safety round/check completed     Problem: Skin Injury Risk Increased  Goal: Skin Health and Integrity  Outcome: Progressing  Intervention: Optimize Skin Protection  Recent Flowsheet Documentation  Taken 12/27/2024 0400 by Kecia Gonzáles RN  Activity Management: bedrest  Pressure Reduction Techniques:   heels elevated off bed   positioned off wounds   pressure points protected   weight shift assistance provided  Pressure Reduction Devices:   heel offloading device utilized   positioning supports utilized   pressure-redistributing mattress utilized   specialty bed utilized  Taken 12/27/2024 0300 by Kecia Gonzáles RN  Head of Bed (HOB) Positioning: HOB at 30-45 degrees  Taken 12/27/2024 0100 by Kecia Gonzáles RN  Head of Bed (HOB) Positioning: HOB at 30-45 degrees  Taken 12/26/2024 2354 by Kecia Gonzáles RN  Activity Management: bedrest  Pressure Reduction Techniques:   heels elevated off bed   positioned off wounds   pressure points protected   weight shift assistance provided  Pressure Reduction Devices:   heel offloading device utilized   positioning supports utilized   pressure-redistributing mattress utilized   specialty bed utilized  Skin Protection:   incontinence pads utilized   silicone foam dressing in place  Taken 12/26/2024 2300 by Kecia Gonzáles RN  Head of Bed (HOB) Positioning: HOB at 30-45 degrees  Taken 12/26/2024 2100 by Kecia Gonzáles RN  Head of Bed (HOB) Positioning: HOB at 30-45 degrees  Taken 12/26/2024 1940 by Kecia Gonzáles RN  Activity Management: bedrest  Pressure Reduction Techniques:   heels elevated off bed   positioned off wounds   pressure points protected   weight shift assistance provided  Pressure Reduction Devices:   heel offloading device utilized   positioning supports utilized   pressure-redistributing  mattress utilized   specialty bed utilized  Skin Protection:   incontinence pads utilized   silicone foam dressing in place  Taken 12/26/2024 1900 by Kecia Gonzáles RN  Head of Bed (HOB) Positioning: HOB at 30-45 degrees     Problem: Comorbidity Management  Goal: Maintenance of COPD Symptom Control  Outcome: Progressing  Intervention: Maintain COPD (Chronic Obstructive Pulmonary Disease) Symptom Control  Recent Flowsheet Documentation  Taken 12/27/2024 0400 by Kecia Gonzáles RN  Medication Review/Management: medications reviewed  Taken 12/26/2024 2354 by Kecia Gonzáles RN  Medication Review/Management: medications reviewed  Taken 12/26/2024 1940 by Kecia Gonzáles RN  Breathing Techniques/Airway Clearance: deep/controlled cough encouraged  Medication Review/Management: medications reviewed     Problem: Violence Risk or Actual  Goal: Anger and Impulse Control  Outcome: Progressing  Intervention: Minimize Safety Risk  Recent Flowsheet Documentation  Taken 12/27/2024 0600 by Kecia Gonzáles RN  De-Escalation Techniques:   increased round frequency   quiet time facilitated   stimulation decreased  Taken 12/27/2024 0400 by Kecia Gonzáles RN  De-Escalation Techniques:   increased round frequency   quiet time facilitated   stimulation decreased  Enhanced Safety Measures:   bed alarm set   room near unit station  Taken 12/27/2024 0200 by Kecia Gonzáles RN  De-Escalation Techniques:   increased round frequency   quiet time facilitated   stimulation decreased  Taken 12/27/2024 0000 by Kecia Gonzáles RN  De-Escalation Techniques:   increased round frequency   quiet time facilitated   stimulation decreased  Taken 12/26/2024 2354 by Kecia Gonzáles RN  Enhanced Safety Measures:   bed alarm set   room near unit station  Taken 12/26/2024 2200 by Kecia Gonzáles RN  De-Escalation Techniques:   increased round frequency   quiet time facilitated   stimulation  decreased  Taken 12/26/2024 2000 by Kecia Gonzáles RN  De-Escalation Techniques:   increased round frequency   quiet time facilitated   stimulation decreased  Taken 12/26/2024 1940 by Kecia Gonzáles RN  Enhanced Safety Measures:   bed alarm set   room near unit station     Problem: Mechanical Ventilation Invasive  Goal: Effective Communication  Outcome: Progressing  Intervention: Ensure Effective Communication  Recent Flowsheet Documentation  Taken 12/27/2024 0600 by Kecia Gonzáles RN  Diversional Activities: television  Taken 12/27/2024 0400 by Kecia Gonzáles RN  Trust Relationship/Rapport:   care explained   choices provided   emotional support provided   empathic listening provided   questions answered   questions encouraged   reassurance provided   thoughts/feelings acknowledged  Diversional Activities: television  Family/Support System Care: support provided  Taken 12/27/2024 0200 by Kecia Gonzáles RN  Diversional Activities: television  Taken 12/27/2024 0000 by Kecia Gonzáles RN  Diversional Activities: television  Taken 12/26/2024 2354 by Kecia Gonzáles RN  Trust Relationship/Rapport:   care explained   choices provided   emotional support provided   empathic listening provided   questions answered   questions encouraged   reassurance provided   thoughts/feelings acknowledged  Diversional Activities: television  Taken 12/26/2024 2200 by Kecia Gonzáles RN  Diversional Activities: television  Taken 12/26/2024 2000 by Kecia Gonzáles RN  Diversional Activities: television  Taken 12/26/2024 1940 by Kecia Gonzáles RN  Trust Relationship/Rapport:   care explained   choices provided   emotional support provided   empathic listening provided   questions answered   questions encouraged   reassurance provided   thoughts/feelings acknowledged  Diversional Activities: television  Family/Support System Care:   self-care encouraged   support  provided  Goal: Optimal Device Function  Outcome: Progressing  Intervention: Optimize Device Care and Function  Recent Flowsheet Documentation  Taken 12/27/2024 0400 by Kecia Gonzáles RN  Oral Care:   teeth brushed - suction toothbrush   tongue brushed   swabbed with antiseptic solution   lip/mouth moisturizer applied   suction provided  Airway Safety Measures:   mask valve resuscitator at bedside   manual resuscitator/mask at bedside   oxygen flowmeter at bedside   suction at bedside  Taken 12/26/2024 2354 by Kecia Gonzáles RN  Oral Care:   teeth brushed - suction toothbrush   tongue brushed   swabbed with antiseptic solution   lip/mouth moisturizer applied   suction provided  Airway Safety Measures:   mask valve resuscitator at bedside   manual resuscitator/mask at bedside   oxygen flowmeter at bedside   suction at bedside  Taken 12/26/2024 1940 by Kecia Gonzáles RN  Oral Care:   teeth brushed - suction toothbrush   tongue brushed   swabbed with antiseptic solution   lip/mouth moisturizer applied   suction provided  Airway Safety Measures:   mask valve resuscitator at bedside   manual resuscitator/mask at bedside   oxygen flowmeter at bedside   suction at bedside  Goal: Mechanical Ventilation Liberation  Outcome: Progressing  Intervention: Promote Extubation and Mechanical Ventilation Liberation  Recent Flowsheet Documentation  Taken 12/27/2024 0400 by Kecia Gonzáles RN  Medication Review/Management: medications reviewed  Taken 12/26/2024 2354 by Kecia Gonzáles RN  Medication Review/Management: medications reviewed  Taken 12/26/2024 1940 by Kecia Gonzáles RN  Medication Review/Management: medications reviewed  Goal: Optimal Nutrition Delivery  Outcome: Progressing  Intervention: Optimize Nutrition Delivery  Recent Flowsheet Documentation  Taken 12/27/2024 0400 by Kecia Gonzáles RN  Nutrition Support Management: weight trending reviewed  Taken 12/26/2024 2354 by  Kecia Gonzáles RN  Nutrition Support Management: weight trending reviewed  Taken 12/26/2024 1940 by Kecia Gonzáles RN  Nutrition Support Management: weight trending reviewed  Goal: Absence of Device-Related Skin and Tissue Injury  Outcome: Progressing  Intervention: Maintain Skin and Tissue Health  Recent Flowsheet Documentation  Taken 12/27/2024 0400 by Kecia Gonzáles RN  Device Skin Pressure Protection:   absorbent pad utilized/changed   adhesive use limited   positioning supports utilized   pressure points protected   skin-to-device areas padded   skin-to-skin areas padded   tubing/devices free from skin contact  Taken 12/26/2024 2354 by Kecia Gonzáles RN  Device Skin Pressure Protection:   absorbent pad utilized/changed   adhesive use limited   positioning supports utilized   pressure points protected   skin-to-device areas padded   skin-to-skin areas padded   tubing/devices free from skin contact  Taken 12/26/2024 1940 by Kecia Gonzáles RN  Device Skin Pressure Protection:   absorbent pad utilized/changed   adhesive use limited   positioning supports utilized   pressure points protected   skin-to-device areas padded   skin-to-skin areas padded   tubing/devices free from skin contact  Goal: Absence of Ventilator-Induced Lung Injury  Outcome: Progressing  Intervention: Prevent Ventilator-Associated Pneumonia  Recent Flowsheet Documentation  Taken 12/27/2024 0400 by Kecia Gonzáles RN  Oral Care:   teeth brushed - suction toothbrush   tongue brushed   swabbed with antiseptic solution   lip/mouth moisturizer applied   suction provided  Taken 12/27/2024 0300 by Kecia Gonzáles RN  Head of Bed (HOB) Positioning: HOB at 30-45 degrees  Taken 12/27/2024 0100 by Kecia Gonzáles RN  Head of Bed (HOB) Positioning: HOB at 30-45 degrees  Taken 12/26/2024 2354 by Kecia Gonzáles RN  Oral Care:   teeth brushed - suction toothbrush   tongue brushed   swabbed with  antiseptic solution   lip/mouth moisturizer applied   suction provided  Taken 12/26/2024 2300 by Kecia Gonzáles RN  Head of Bed ([facility]) Positioning: HOB at 30-45 degrees  Taken 12/26/2024 2100 by Kecia Gonzáles RN  Head of Bed ([facility]) Positioning: HOB at 30-45 degrees  Taken 12/26/2024 1940 by Kecia Gonzáles RN  Oral Care:   teeth brushed - suction toothbrush   tongue brushed   swabbed with antiseptic solution   lip/mouth moisturizer applied   suction provided  Taken 12/26/2024 1900 by Kecia Gonzáles RN  Head of Bed ([facility]) Positioning: HOB at 30-45 degrees   Goal Outcome Evaluation:

## 2024-12-27 NOTE — CASE MANAGEMENT/SOCIAL WORK
Continued Stay Note  JOSEFA Guerrero     Patient Name: Yarelis Jackson  MRN: [id number]  Today's Date: 12/27/2024    Admit Date: 12/18/2024    Plan: From home with parents. Watch for wound vac @ d/c.   Discharge Plan       Row Name 12/27/24 1300       Plan    Plan From home with parents. Watch for wound vac @ d/c.    Patient/Family in Agreement with Plan yes    Plan Comments Wound vac applied to leg wound this morning. SLP re-evaled today and pt failed swallow eval. D/C Barriers: Hem/Onc/Nutrition/Endocrine/Vasc Sx/Neph/Diabetes Educator/wound care following, electrolyte replacement, Precedex/Heparin gtt's, Increased O2 demands @ 3L HF NC             Expected Discharge Date and Time       Expected Discharge Date Expected Discharge Time    Dec 30, 2024        Jackie Billy RN      [phone]  [email or web address]

## 2024-12-27 NOTE — PLAN OF CARE
Goal Outcome Evaluation:    Patient remains on 7L HFNC. Heparin and precedex gtts. AOx4, but still episodes of confusion/hallucinations. TF tolerated well. External cath collected around 1100cc. One massive BM on shift - hard, firm, soft consistency. HD performed - no fluid removal.     Family updated.

## 2024-12-27 NOTE — CONSULTS
Nutrition Services    Patient Name: Yarelis Jackson  YOB: 1970  MRN: [id number]  Admission date: 12/18/2024    Comment:    --Novasource renal at 35 ml/hr + 30 ml/hr FWF     CLINICAL NUTRITION ASSESSMENT      Reason for Assessment 12/27: Follow-up protocol   12/21: Tube feeding      H&P      Past Medical History:   Diagnosis Date    COPD (chronic obstructive pulmonary disease)     Low back pain     Migraine     Neck pain        Past Surgical History:   Procedure Laterality Date    FASCIOTOMY Right 12/23/2024    Procedure: FASCIOTOMY LEG;  Surgeon: Chris Delgado MD;  Location: [facility] MAIN OR;  Service: Vascular;  Laterality: Right;    FEMORAL THROMBECTOMY/EMBOLECTOMY Right 12/19/2024    Procedure: FEMORAL right iliofemoral thrombectomy, arteriogram;  Surgeon: Ghassan Celestin MD;  Location: [facility] HYBRID OR;  Service: Vascular;  Laterality: Right;    FEMORAL THROMBECTOMY/EMBOLECTOMY Right 12/20/2024    Procedure: Right femoral thrombectomy, right lower extremity arteriogram and right lower leg facitomies;  Surgeon: Ghassan Celestin MD;  Location: [facility] HYBRID OR;  Service: Vascular;  Laterality: Right;    LUNG SURGERY          Current Problems   Diabetic ketoacidosis without coma, with new onset diabetes mellitus, type 2 / Metabolic acidosis, increased anion gap   -Endocrinology is following   Acute kidney injury  -Nephrology is following   Acute Hypernatremia  Septic shock  Acute occlusive thrombus of the right iliac artery with limb ischemia   -Vascular surgery is following   -underwent iliofemoral thrombectomy and arteriogram, demonstrating arterial patency 12/19  -OR for redo of right iliofemoral popliteal thrombectomy, right femoral endarterectomy with patch, right lower extremity arteriogram, and closed right calf fasciotomies. 12/20  Nontraumatic rhabdomyolysis   Hypertriglyceridemia, concern for pancreatitis   Acute metabolic encephalopathy   Acute respiratory failure without  "hypercapnia / On mechanical Ventilation   -Extubated   Cutaneous candidiasis of groin   Bilateral leg rash, concern for scabies infection   Transaminitis   COPD  Obesity      Encounter Information        Trending Narrative     12/27: Checking in to monitor TF tolerance. Since last RD review, pt extubated on 12/23. Continues to receive EN via DHT at goal rate. WOCN evaluating for possible wound vac placement today. HD yesterday with 800 mL removed. Precedex/heparin. Continues HFNC supplemental O2. Tolerating TF without noted issues.     12/21: Pt was admitted with DKA, endocrinology is following. Nephrology is following for MARGOT and hypernatremia. Vascular surgery is following. Underwent iliofemoral thrombectomy and arteriogram, demonstrating arterial patency 12/19, OR for redo of right iliofemoral popliteal thrombectomy, right femoral endarterectomy with patch, right lower extremity arteriogram, and closed right calf fasciotomies 12/20. Pt is currently intubated. On norepi. CRRT stopped, plan for HD. No family was in the room at my visit. NFPE completed and not consistent with nutrition diagnosis of malnutrition at this time using AND/ASPEN criteria       Anthropometrics        Current Height, Weight Height: 162.6 cm (64\")  Weight: 96.3 kg (212 lb 4.9 oz) (12/27/24 0500)       Usual Body Weight (UBW) Unknown        Trending Weight Hx     This admission: 12/27: 212# (8% weight gain in the last 7 days) - expect some fluctuations r/t HD pt.   12/21: 195# (obtained 12/19)              PTA: 12/21: No recent weight's documented     Wt Readings from Last 30 Encounters:   12/27/24 0500 96.3 kg (212 lb 4.9 oz)   12/25/24 0400 98.5 kg (217 lb 2.5 oz)   12/24/24 0431 100 kg (221 lb 5.5 oz)   12/24/24 0333 100 kg (221 lb 5.5 oz)   12/19/24 0600 88.5 kg (195 lb 1.7 oz)   12/18/24 0527 92.5 kg (203 lb 14.4 oz)   07/15/23 1132 98.1 kg (216 lb 4.3 oz)   04/04/23 0719 98.1 kg (216 lb 4.3 oz)   12/12/22 0952 85.5 kg (188 lb 6.4 oz) "   01/31/22 1404 89.8 kg (198 lb)   01/24/22 1406 89.8 kg (198 lb)   12/23/21 1133 89.8 kg (198 lb)   11/24/21 1537 90.3 kg (199 lb)   11/16/21 0838 90.3 kg (199 lb)   09/29/21 0320 87.4 kg (192 lb 10.9 oz)   06/13/21 1652 81.4 kg (179 lb 6.4 oz)   05/14/21 1230 80.1 kg (176 lb 9.4 oz)   04/03/21 2304 80.7 kg (177 lb 14.6 oz)   04/09/20 0025 71 kg (156 lb 8.4 oz)   01/09/20 0532 63.5 kg (140 lb)   09/29/19 0216 70.1 kg (154 lb 8.7 oz)   08/31/19 2205 73.6 kg (162 lb 4.1 oz)   02/05/18 0902 72.6 kg (160 lb)   06/06/16 1303 69.8 kg (153 lb 14.4 oz)   05/04/16 0833 75.3 kg (165 lb 14.4 oz)   03/25/16 1313 78.2 kg (172 lb 6.4 oz)   03/03/16 1115 76 kg (167 lb 9.6 oz)   02/08/16 0952 77.1 kg (170 lb)      BMI kg/m2 Body mass index is 36.44 kg/m².       Labs        Pertinent Labs Hypophosphatemia/Hypomagnesemia - replaced today   Results from last 7 days   Lab Units 12/27/24  0535 12/26/24  1406 12/26/24  0428 12/26/24  0425 12/25/24  0605   SODIUM mmol/L 143  --  139  --  136   POTASSIUM mmol/L 4.0 3.5 3.5  --  3.7   CHLORIDE mmol/L 108*  --  104  --  100   CO2 mmol/L 26.6  --  23.3  --  24.5   BUN mg/dL 21*  --  36*  --  35*   CREATININE mg/dL 1.00  --  1.62* 1.73* 1.82*   CALCIUM mg/dL 8.7  --  8.3*  --  8.8   BILIRUBIN mg/dL 0.7  --  0.6  --  0.9   ALK PHOS U/L 265*  --  305*  --  314*   ALT (SGPT) U/L 384*  --  479*  --  437*   AST (SGOT) U/L 231*  --  502*  --  566*   GLUCOSE mg/dL 155*  --  175*  --  245*     Results from last 7 days   Lab Units 12/27/24  0535 12/26/24  1035 12/26/24  0428 12/25/24  1142 12/25/24  0605 12/21/24  1307 12/21/24  0405   MAGNESIUM mg/dL 1.5*  --  2.0  --  1.8   < > 2.1   PHOSPHORUS mg/dL 2.1*  --  2.8  --  3.0   < > 6.8*   HEMOGLOBIN g/dL 8.1*   < > 6.6*   < > 6.6*   < > 8.7*   HEMOGLOBIN, POC   --   --   --    < >  --    < >  --    HEMATOCRIT % 25.0*   < > 19.7*   < > 20.6*   < > 27.2*   HEMATOCRIT POC   --   --   --    < >  --    < >  --    TRIGLYCERIDES mg/dL  --   --   --   --   --    --  336*    < > = values in this interval not displayed.     Lab Results   Component Value Date    HGBA1C 15.80 (H) 12/18/2024        Medications    Scheduled Medications chlorhexidine, 15 mL, Mouth/Throat, Q12H  insulin glargine, 22 Units, Subcutaneous, Nightly  insulin lispro, 2-7 Units, Subcutaneous, Q6H  insulin lispro, 6 Units, Subcutaneous, Q6H  lansoprazole, 30 mg, Nasogastric, Q AM  magnesium sulfate, 2 g, Intravenous, Q12H  midodrine, 10 mg, Nasogastric, Q8H  nystatin, 1 Application, Topical, Q8H  potassium phosphate, 15 mmol, Intravenous, Q3H  QUEtiapine, 75 mg, Nasogastric, Q12H  sodium bicarbonate, 50 mEq, Intravenous, Q8H  sodium chloride, 10 mL, Intravenous, Q12H        Infusions dexmedetomidine, 0.2-0.6 mcg/kg/hr, Last Rate: Stopped (12/27/24 0440)  heparin, 16.9 Units/kg/hr, Last Rate: 21 Units/kg/hr (12/27/24 1058)        PRN Medications   acetaminophen **OR** acetaminophen    albumin human    albumin human    aluminum-magnesium hydroxide-simethicone    senna-docusate sodium **AND** polyethylene glycol **AND** bisacodyl **AND** bisacodyl    Calcium Replacement - Follow Nurse / BPA Driven Protocol    dextrose    dextrose    glucagon (human recombinant)    heparin (porcine)    heparin (porcine)    ipratropium-albuterol    Magnesium Standard Dose Replacement - Follow Nurse / BPA Driven Protocol    ondansetron ODT **OR** ondansetron    oxyCODONE    Phosphorus Replacement - Follow Nurse / BPA Driven Protocol    Potassium Replacement - Follow Nurse / BPA Driven Protocol    sodium chloride    sodium chloride    sodium chloride     Physical Findings        Trending Physical   Appearance, NFPE 12/27: NFPE completed and not consistent with nutrition diagnosis of malnutrition at this time using AND/ASPEN criteria     12/21: NFPE completed and not consistent with nutrition diagnosis of malnutrition at this time using AND/ASPEN criteria     --  Edema  3+: L/R ankles  2+ L/R arms, legs, knees, feet   1+: L/R  hands     Bowel Function Last documented BM 12/26     Tubes DHT in place for EN     Chewing/Swallowing Extubated but remains NPO     Skin   Likely surgical incisions      Estimated/Assessed Needs    Estimated 12/21/24 - remains appropriate   Energy Requirements    EST Needs, Method, Wt used 1787 kcal/day (PSU for critically ill) (~32 kcals/kg IBW)       Protein Requirements    EST Needs, Method, Wt used 66 g/day (1.2 g/kg using IBW)        Fluid Requirements     Estimated Needs (mL/day) 1 ml/kcal      Fluid Deficit    Current Na Level (mEq/L)    Desired Na Level (mEq/L)    Estimated Fluid Deficit (L)       Current Nutrition Orders & Evaluation of Intake       Oral Nutrition     Food Allergies NKFA   Current PO Diet NPO Diet NPO Type: Strict NPO   Supplement NPO   PO Evaluation     Trending % PO Intake 12/27: NPO  12/21: NPO      Enteral Nutrition    Enteral Route DHT   Order, Modulars, Flushes Novasource renal at 35 ml/hr + 30 ml/hr FWF    Tolerance Tolerating without noted issues   TF Observation  Documented as ordered per EMR       Parenteral Nutrition     TPN Route    Total # Days on TPN    TPN Order, Lipid Details    MVI & Trace Element Freq    TPN Observation       Nutritional Risk Screening        NRS-2002 Score          Nutrition Diagnosis         Nutrition Dx Problem 1 Chewing/swallowing difficulty related to clinical status and continued supplemental O2 demands as evidenced by need for enteral nutrition     Nutrition Dx Problem 2        Intervention Goal         Intervention Goal(s) To tolerate enteral nutrition   Maintain weight      Nutrition Intervention        RD Action NFPE complete    Continue Novasource renal at 35 ml/hr + 30 ml/hr FWF       Nutrition Prescription          Diet Prescription NPO   Supplement Prescription NPO     Enteral Prescription    End Goal:    Novasource Renal at 35 mL/hr + 30 mL/hr water flush      Calories  1540 kcals (86%) - slightly lower ok given obesity    Protein  70 g  (106%)    Free water  547 mL   Flushes  660 ml      The above end goal rate is for 22 hrs/day to assume interruptions for ADLs. Water flushes adjusted based on clinical picture + Rx flushes/IV fluids     Specialized formula chosen r/t Elevated phos         TPN Prescription      Monitor/Evaluation        Monitor Pertinent labs, EN delivery/tolerance, POC/GOC         Electronically signed by:  Kathy Plata RD  12/27/24 11:39 EST

## 2024-12-27 NOTE — PROGRESS NOTES
ENDOCRINE CONSULT PROGRESS NOTE  DATE OF SERVICE: 24        PATIENT NAME: Yarelis Jackson  PATIENT : 1970 AGE: 54 y.o.  MRN NUMBER: [id number]    ==========================================================================    CHIEF COMPLAINT: Type 1 diabetes management    CARE TEAM:   Patient Care Team:  Katie Duran PA as PCP - General (Physician Assistant)  Uli Starr MD as Consulting Physician (Nephrology)    SUBJECTIVE    Pt seen and examined.  Care discussed with nursing team as well.  Blood sugar reviewed for last 24 hours.  Continues to be on tube feeding.    ==========================================================================    CURRENT ACTIVE HOSPITAL MEDICATIONS    Scheduled Medications:  insulin glargine, 22 Units, Subcutaneous, Nightly  insulin lispro, 2-7 Units, Subcutaneous, Q6H  insulin lispro, 6 Units, Subcutaneous, Q6H  lansoprazole, 30 mg, Nasogastric, Q AM  magnesium sulfate, 2 g, Intravenous, Q12H  midodrine, 10 mg, Nasogastric, Q8H  nystatin, 1 Application, Topical, Q8H  QUEtiapine, 75 mg, Nasogastric, Q12H  sodium chloride, 10 mL, Intravenous, Q12H         PRN Medications:    acetaminophen **OR** acetaminophen    albumin human    albumin human    aluminum-magnesium hydroxide-simethicone    senna-docusate sodium **AND** polyethylene glycol **AND** bisacodyl **AND** bisacodyl    Calcium Replacement - Follow Nurse / BPA Driven Protocol    dextrose    dextrose    glucagon (human recombinant)    heparin (porcine)    heparin (porcine)    ipratropium-albuterol    Magnesium Standard Dose Replacement - Follow Nurse / BPA Driven Protocol    ondansetron ODT **OR** ondansetron    oxyCODONE    Phosphorus Replacement - Follow Nurse / BPA Driven Protocol    Potassium Replacement - Follow Nurse / BPA Driven Protocol    sodium chloride    sodium chloride    sodium chloride      ==========================================================================    OBJECTIVE    Vitals:    12/27/24 1600   BP: 165/81   Pulse: (!) 122   Resp:    Temp:    SpO2: 95%      Body mass index is 36.44 kg/m².     General - Continues to be in restraints, tube feeding    ==========================================================================    LAB EVALUATION    Lab Results   Component Value Date    GLUCOSE 155 (H) 12/27/2024    BUN 21 (H) 12/27/2024    CREATININE 1.00 12/27/2024    EGFRIFNONA 87 09/29/2021    BCR 21.0 12/27/2024    K 4.0 12/27/2024    CO2 26.6 12/27/2024    CALCIUM 8.7 12/27/2024    ALBUMIN 3.2 (L) 12/27/2024     (H) 12/27/2024     (H) 12/27/2024       Lab Results   Component Value Date    HGBA1C 15.80 (H) 12/18/2024     Lab Results   Component Value Date    CREATININE 1.00 12/27/2024     Results from last 7 days   Lab Units 12/27/24  1118 12/27/24  0533 12/26/24  2318 12/26/24  1809 12/26/24  1221 12/26/24  0425   GLUCOSE mg/dL 149* 148* 174* 141* 182* 190*  190*     ==========================================================================    ASSESSMENT AND PLAN    # Type 1 diabetes with hyperglycemia  # DKA on admission, resolved  - Reviewed blood sugar for last 24 hours  - For nutrition patient is currently on tube feeding  - Continue insulin therapy as:  Insulin Lantus 22 units nightly  Insulin Lispro 6 units Q6hrs  Continue Insulin lispro sliding scale every 6 hours as well  - Will review blood sugar for next 24 hours and adjust therapy accordingly    Will follow with you.  Rest as per primary team.    Part of this note may be an electronic transcription/translation of spoken language to printed text using the Dragon Dictation System.     Note: Portions of this note may have been copied from previous notes but documentation have been reviewed and edited as necessary to support clinical decision making for today's visit.  The time of this note does not reflect the time  I saw the patient but the time that this note was written.    ==========================================================================  Patel Winkler MD  Department of Endocrine, Diabetes and Metabolism  [facility], [place]  ==========================================================================

## 2024-12-27 NOTE — THERAPY EVALUATION
Acute Care - Speech Language Pathology   Swallow Initial Evaluation [facility]     Patient Name: Yarelis Jackson  : 1970  MRN: [id number]  Today's Date: 2024               Admit Date: 2024    Visit Dx:     ICD-10-CM ICD-9-CM   1. Diabetic ketoacidosis without coma associated with other specified diabetes mellitus  E13.10 250.12   2. MARGOT (acute kidney injury)  N17.9 584.9   3. Hypernatremia  E87.0 276.0   4. Acute encephalopathy  G93.40 348.30   5. Shock  R57.9 785.50   6. Arterial thrombosis  I74.9 444.9     Patient Active Problem List   Diagnosis    Benign essential hypertension    Cervical radiculopathy    Chronic obstructive pulmonary disease    Cigarette smoker    Hyperlipidemia    Menopause    Migraine headache    DKA (diabetic ketoacidosis)    Arterial thrombosis     Past Medical History:   Diagnosis Date    COPD (chronic obstructive pulmonary disease)     Low back pain     Migraine     Neck pain      Past Surgical History:   Procedure Laterality Date    FASCIOTOMY Right 2024    Procedure: FASCIOTOMY LEG;  Surgeon: Chris Delgado MD;  Location: [facility] MAIN OR;  Service: Vascular;  Laterality: Right;    FEMORAL THROMBECTOMY/EMBOLECTOMY Right 2024    Procedure: FEMORAL right iliofemoral thrombectomy, arteriogram;  Surgeon: Ghassan Celestin MD;  Location: [facility] HYBRID OR;  Service: Vascular;  Laterality: Right;    FEMORAL THROMBECTOMY/EMBOLECTOMY Right 2024    Procedure: Right femoral thrombectomy, right lower extremity arteriogram and right lower leg facitomies;  Surgeon: Ghassan Celestin MD;  Location: [facility] OR;  Service: Vascular;  Laterality: Right;    LUNG SURGERY         SLP Recommendation and Plan  SLP Swallowing Diagnosis: mod-severe, suspected pharyngeal dysphagia, oral dysphagia (24 1200)  SLP Diet Recommendation: NPO, ice chips between meals after oral care, with supervision, water between meals after oral care, with supervision,  temporary alternate methods of nutrition/hydration (12/27/24 1200)  Recommended Precautions and Strategies: general aspiration precautions (12/27/24 1200)  SLP Rec. for Method of Medication Administration: meds via alternate route (12/27/24 1200)     Monitor for Signs of Aspiration: yes, notify SLP if any concerns (12/27/24 1200)  Recommended Diagnostics: VFSS ([facility] [place]), reassess via clinical swallow evaluation (12/27/24 1200)  Swallow Criteria for Skilled Therapeutic Interventions Met: demonstrates skilled criteria (12/27/24 1200)  Anticipated Discharge Disposition (SLP): unknown (12/27/24 1200)  Rehab Potential/Prognosis, Swallowing: good, to achieve stated therapy goals (12/27/24 1200)  Therapy Frequency (Swallow): PRN (12/27/24 1200)  Predicted Duration Therapy Intervention (Days): until discharge (12/27/24 1200)  Oral Care Recommendations: Oral Care before breakfast, after meals and PRN (12/27/24 1200)                              Plan for Continued Treatment (SLP): continue treatment per plan of care (12/27/24 1200)                SWALLOW EVALUATION (Last 72 Hours)       SLP Adult Swallow Evaluation       Row Name 12/27/24 1200       Rehab Evaluation    Document Type evaluation  -MS    Subjective Information no complaints  -MS    Patient Observations lethargic;cooperative;agree to therapy  -MS    Patient/Family/Caregiver Comments/Observations n/a  -MS    Patient Effort adequate  -MS    Symptoms Noted During/After Treatment none  -MS       General Information    Patient Profile Reviewed yes  -MS    Pertinent History Of Current Problem Yarelis Jackson is a 54 y.o. female with PMH of COPD, migraine, and obesity, and presented to the hospital for altered mental status, and was admitted with a principal diagnosis of DKA (diabetic ketoacidosis).  Patient presented and per ER documentation, patient was oriented to person and time.  Per patient's mother, patient had been feeling ill since Friday with noted polyuria and  polydipsia.  It was managed by giving patient milk and a grape soft drink.  Patient became increasingly agitated, preventing treatment, and was given a dose of Geodon in the ED.     In the ED, labs were obtained with the following abnormalities: Sodium 159, chloride 117, CO2 10.1, anion gap 31.9, BUN 88, creatinine 2.13, glucose 976, alk phos 246, , , hemoglobin A1c 15.8, moderate acetone, osmolality 461, WBC 14.65 without bandemia.  UA with >1000 glucose, 15 mg/dL of ketones, moderate blood and proteinuria noted, this did not reflex to culture.  Patient had an ABG with pH 7.179, pCO2 37.5, pO2 77.7, HCO3 14, with O2 saturation 91.7%.  Patient was determined to be in DKA, and was started with IV fluid boluses per DKA protocol as well as an insulin drip.  Patient did have blood cultures obtained, but per ED provider, patient has no obvious signs or symptoms of active section, therefore no antimicrobials were started.     Patient sodium level remained persistent, and IV fluids were changed to withhold any sodium containing products.  Nephrology was consulted.  Patient also underwent placement of feeding tube for free water flush hourly overnight.  On 12/19, nephrology decided that patient would proceed with hemodialysis and requested nontunneled catheter placement.  Patient was becoming increasingly agitated, with concerned that patient was no longer able to protect her own airway.  After discussion with patient's family, patient was intubated, nontunneled catheter placement was completed, and patient did require initiation of vasopressors.  HD was transitioned to CRRT.  Shortly after initiation of CRRT, patient developed an acute change in patient's skin color of her right lower extremity with subsequent loss of pulse.  Vascular surgery was stat consulted as well as completion of stat arterial duplex and ABIs.  Patient was started on a heparin drip.  Vascular surgery promptly evaluated patient and  patient was scheduled to go to the OR for emergent thrombectomy. ST consulted for dysphagia evaluation.                    -MS    Current Method of Nutrition NPO;nasogastric feedings  -MS    Precautions/Limitations, Vision WFL  -MS    Precautions/Limitations, Hearing WFL  -MS    Prior Level of Function-Communication WFL  -MS    Prior Level of Function-Swallowing no diet consistency restrictions;safe, efficient swallowing in all situations  -MS    Plans/Goals Discussed with patient  -MS       Pain    Pretreatment Pain Rating 0/10 - no pain  -MS    Additional Documentation Pain Scale: FACES Pre/Post-Treatment (Group)  -MS       Pain Scale: FACES Pre/Post-Treatment    Pain: FACES Scale, Pretreatment 0-->no hurt  -MS    Posttreatment Pain Rating 0-->no hurt  -MS       Oral Motor Structure and Function    Dentition Assessment teeth are in poor condition  -MS    Secretion Management requires suctioning to control secretions  -MS    Mucosal Quality dry  -MS    Volitional Swallow weak  -MS    Volitional Cough non-productive;weak  -MS       Oral Musculature and Cranial Nerve Assessment    Oral Motor General Assessment generalized oral motor weakness  -MS       General Eating/Swallowing Observations    Respiratory Support Currently in Use nasal cannula  -MS    O2 Liters 4L  -MS    Eating/Swallowing Skills fed by SLP  -MS    Positioning During Eating upright 90 degree;upright in bed  -MS    Utensils Used spoon;cup;straw  -MS    Consistencies Trialed thin liquids;ice chips  -MS       Respiratory    Respiratory Status increase in respiratory rate  -MS       Clinical Swallow Eval    Oral Prep Phase impaired  -MS    Oral Transit WFL  -MS    Oral Residue --  not assessed  -MS    Pharyngeal Phase suspected pharyngeal impairment  -MS    Esophageal Phase unremarkable  -MS    Clinical Swallow Evaluation Summary Clinical swallow evaluation completed. Pt was awake and lethargic, able to follow commands, confused. Poor vocal quality. Upon  room entry, pt was lying in bed with eyes closed. Pt on 4L O2NC. SLP adjusted pt to sitting at 90 degrees for swallow evaluation. Pt was NPO at the time of evaluation. Poor natural dentition. Pt is poor historian, therefore SLP not aware of pt's baseline diet. No prior ST notes in chart from past. Oral motor exam revealed overall generalized oral motor weakness. Pt intubated/extubated. NG tube in place. Trials assessed: ice chips x1, thin by cup x2, thin by straw x2. Pt had adequate bolus control of ice chip, all trials given via SLP. Adequate labial seal w/straw. No anterior loss. Oral transit appeared timely. Pt demonstrated very weak-non productive cough during ice chips and thin liquid trials. Wet vocal quality. Pt exporated green secretions and required suctioning. No further trials given. SLP provided recommendations to pt on remaining NPO at this time ans small bites of ice chips when sitting upright. Nursing staff made aware. Per above, pt presents w/ MOD oral stage dysphagia and suspecting pharyngeal phase at this time. It is recommended pt remain NPO w/ FWP. Meds via alternate route. ST will continue to follow for swallow re-evaluation.     SLP Plan & Recommendation:      - NPO w/ exception of FWP- see below   - Water/ice only when pt is fully awake and alert. Small sips after oral care  - Meds: via alternate route  - Safe swallow strategies: HOB at a 90 degree angle, slow rate of intake, small sips  -Oral care at least x2 daily     The rationale to recommend water/ice chips when a PO diet cannot appropriately/functionally sustain nutrition is because water is low risk for aspiration pna when compared to aspiration of food or other liquids.       Benefits of a water protocol include but are not limited to:      Oral gratification   Engagement of oropharyngeal swallow musculature   Decrease likelihood of dehydration       Guidelines for proper implementation include:  Thorough oral care prior to consuming  water  Upright at 90 degree hip flexion  Small sips at slow rate     Monitor for any changes in respiratory status and discontinue if distress noted           -MS       SLP Evaluation Clinical Impression    SLP Swallowing Diagnosis mod-severe;suspected pharyngeal dysphagia;oral dysphagia  -MS    Functional Impact risk of aspiration/pneumonia;risk of malnutrition;risk of dehydration  -MS    Rehab Potential/Prognosis, Swallowing good, to achieve stated therapy goals  -MS    Swallow Criteria for Skilled Therapeutic Interventions Met demonstrates skilled criteria  -MS       SLP Treatment Clinical Impressions    Barriers to Overall Progress (SLP) Lethargy  -MS    Plan for Continued Treatment (SLP) continue treatment per plan of care  -MS    Care Plan Review evaluation/treatment results reviewed  -MS       Recommendations    Therapy Frequency (Swallow) PRN  -MS    Predicted Duration Therapy Intervention (Days) until discharge  -MS    SLP Diet Recommendation NPO;ice chips between meals after oral care, with supervision;water between meals after oral care, with supervision;temporary alternate methods of nutrition/hydration  -MS    Recommended Diagnostics VFSS ([facility]);reassess via clinical swallow evaluation  -MS    Recommended Precautions and Strategies general aspiration precautions  -MS    Oral Care Recommendations Oral Care before breakfast, after meals and PRN  -MS    SLP Rec. for Method of Medication Administration meds via alternate route  -MS    Monitor for Signs of Aspiration yes;notify SLP if any concerns  -MS    Anticipated Discharge Disposition (SLP) unknown  -MS       Swallow Goals (SLP)    Swallow LTGs Patient will demonstrate functional swallow for  -MS    Swallow STGs diet tolerance goal selection (SLP)  -MS    Diet Tolerance Goal Selection (SLP) Patient will tolerate trials of  -MS       (LTG) Patient will demonstrate functional swallow for    Diet Texture (Demonstrate functional swallow) pureed textures  -MS     Liquid viscosity (Demonstrate functional swallow) thin liquids  -MS    [place] (Demonstrate functional swallow) independently (over 90% accuracy)  -MS    Time Frame (Demonstrate functional swallow) by discharge  -MS    Progress/Outcomes (Demonstrate functional swallow) new goal  -MS       (STG) Patient will tolerate trials of    Consistencies Trialed (Tolerate trials) pureed textures;thin liquids  -MS    Desired Outcome (Tolerate trials) without signs/symptoms of aspiration;without signs of distress;with adequate oral prep/transit/clearance  -MS    [place] (Tolerate trials) independently (over 90% accuracy)  -MS    Time Frame (Tolerate trials) by discharge  -MS    Progress/Outcomes (Tolerate trials) new goal  -MS              User Key  (r) = Recorded By, (t) = Taken By, (c) = Cosigned By      Initials Name Effective Dates    MS Lazaro Dumont, SLP 04/22/24 -                     EDUCATION  The patient has been educated in the following areas:   Dysphagia (Swallowing Impairment) Oral Care/Hydration NPO rationale.        SLP GOALS       Row Name 12/27/24 1200       (LTG) Patient will demonstrate functional swallow for    Diet Texture (Demonstrate functional swallow) pureed textures  -MS    Liquid viscosity (Demonstrate functional swallow) thin liquids  -MS    [place] (Demonstrate functional swallow) independently (over 90% accuracy)  -MS    Time Frame (Demonstrate functional swallow) by discharge  -MS    Progress/Outcomes (Demonstrate functional swallow) new goal  -MS       (STG) Patient will tolerate trials of    Consistencies Trialed (Tolerate trials) pureed textures;thin liquids  -MS    Desired Outcome (Tolerate trials) without signs/symptoms of aspiration;without signs of distress;with adequate oral prep/transit/clearance  -MS    [place] (Tolerate trials) independently (over 90% accuracy)  -MS    Time Frame (Tolerate trials) by discharge  -MS    Progress/Outcomes (Tolerate trials) new goal   -MS              User Key  (r) = Recorded By, (t) = Taken By, (c) = Cosigned By      Initials Name Provider Type    Lazaro Avila, SLP Speech and Language Pathologist                    JUN Hilario  12/27/2024

## 2024-12-27 NOTE — PLAN OF CARE
Goal Outcome Evaluation:    Patient was very drowsy this morning. She has become more awake as the day has went on. She's oriented to herself and place only. She currently has PCU orders with a hospitalist. Electrolytes have been replaced. A wound vac was placed by wound care this morning. No HD needed per renal today. I've been able to titrate her oxygen down. Speech therapy evaluated patient and she failed. She is only allowed ice chips. Vitals have been WNL.

## 2024-12-27 NOTE — PLAN OF CARE
Goal Outcome Evaluation:   Clinical swallow evaluation completed. Pt was awake and lethargic, able to follow commands, confused. Poor vocal quality. Upon room entry, pt was lying in bed with eyes closed. Pt on 4L O2NC. SLP adjusted pt to sitting at 90 degrees for swallow evaluation. Pt was NPO at the time of evaluation. Poor natural dentition. Pt is poor historian, therefore SLP not aware of pt's baseline diet. No prior ST notes in chart from past. Oral motor exam revealed overall generalized oral motor weakness. Pt intubated/extubated. NG tube in place. Trials assessed: ice chips x1, thin by cup x2, thin by straw x2. Pt had adequate bolus control of ice chip, all trials given via SLP. Adequate labial seal w/straw. No anterior loss. Oral transit appeared timely. Pt demonstrated very weak-non productive cough during ice chips and thin liquid trials. Wet vocal quality. Pt exporated green secretions and required suctioning. No further trials given. SLP provided recommendations to pt on remaining NPO at this time ans small bites of ice chips when sitting upright. Nursing staff made aware. Per above, pt presents w/ MOD oral stage dysphagia and suspecting pharyngeal phase at this time. It is recommended pt remain NPO w/ FWP. Meds via alternate route. ST will continue to follow for swallow re-evaluation.     SLP Plan & Recommendation:      - NPO w/ exception of FWP- see below   - Water/ice only when pt is fully awake and alert. Small sips after oral care  - Meds: via alternate route  - Safe swallow strategies: HOB at a 90 degree angle, slow rate of intake, small sips  -Oral care at least x2 daily     The rationale to recommend water/ice chips when a PO diet cannot appropriately/functionally sustain nutrition is because water is low risk for aspiration pna when compared to aspiration of food or other liquids.       Benefits of a water protocol include but are not limited to:      Oral gratification   Engagement of  oropharyngeal swallow musculature   Decrease likelihood of dehydration       Guidelines for proper implementation include:  Thorough oral care prior to consuming water  Upright at 90 degree hip flexion  Small sips at slow rate     Monitor for any changes in respiratory status and discontinue if distress noted      Anticipated Discharge Disposition (SLP): unknown          SLP Swallowing Diagnosis: mod-severe, suspected pharyngeal dysphagia, oral dysphagia (12/27/24 1200)        Plan for Continued Treatment (SLP): continue treatment per plan of care (12/27/24 1200)

## 2024-12-27 NOTE — PROGRESS NOTES
Name: Yarelis Jackson ADMIT: 2024   : 1970  PCP: Katie Duran PA    MRN: [id number] LOS: 9 days   AGE/SEX: 54 y.o. female  ROOM:  [facility] 2308/1     CC: Status post right leg thrombectomies and fasciotomies  Interval History: easily awakened to verbal stimuli     Subjective   Subjective     Review of Systems  Objective   Objective     Vitals:   Temp:  [97.8 °F (36.6 °C)-98.7 °F (37.1 °C)] 98.7 °F (37.1 °C)  Heart Rate:  [] 106  Resp:  [9-23] 20  BP: ()/(50-86) 120/61    No intake/output data recorded.    Scheduled Meds:     chlorhexidine, 15 mL, Mouth/Throat, Q12H  insulin glargine, 22 Units, Subcutaneous, Nightly  insulin lispro, 2-7 Units, Subcutaneous, Q6H  insulin lispro, 6 Units, Subcutaneous, Q6H  lansoprazole, 30 mg, Nasogastric, Q AM  magnesium sulfate, 2 g, Intravenous, Q12H  midodrine, 10 mg, Nasogastric, Q8H  nystatin, 1 Application, Topical, Q8H  potassium phosphate, 15 mmol, Intravenous, Q3H  QUEtiapine, 100 mg, Nasogastric, Q8H  sodium bicarbonate, 50 mEq, Intravenous, Q8H  sodium chloride, 10 mL, Intravenous, Q12H      IV Meds:   dexmedetomidine, 0.2-0.6 mcg/kg/hr, Last Rate: Stopped (24 0440)  heparin, 16.9 Units/kg/hr, Last Rate: 17 Units/kg/hr (24 0251)        Physical Exam    NAD  Tachycardic  Superficial mottling of skin in both lower legs noted  Palpable bilateral DP pulses  Signals to bilateral PT  Right lower leg fasciotomy sites with healthy appearing muscle underneath.  Blistering and some epidermolysis of the surrounding skin in the lower leg noted.  Very weak plantarflexion/dorsiflexion of right foot although exam somewhat limited by patient's mental status.    Medial wound      Lateral wound            Data Reviewed:  CBC    Results from last 7 days   Lab Units 24  0535 24  0415 24  1035 24  0428 24  0425 24  1142 24  0605 24  0344 24  1355 24  0429 24  0452   WBC 10*3/mm3 15.02*  15.49*  --  16.72*  --   --  12.72* 12.14*  --  11.95* 7.59   HEMOGLOBIN g/dL 8.1* 8.1* 8.2* 6.6*  --  7.8* 6.6* 7.7*   < > 7.5* 7.2*   HEMOGLOBIN, POC g/dL  --   --   --   --  7.7*  --   --   --   --   --   --    PLATELETS 10*3/mm3 148 147  --  121*  --   --  99* 131*  --  115* 63*    < > = values in this interval not displayed.     BMP   Results from last 7 days   Lab Units 12/27/24  0535 12/26/24  1406 12/26/24  0428 12/26/24  0425 12/25/24  0605 12/24/24  0344 12/23/24  1823 12/23/24  0429 12/22/24  2114 12/22/24  0452 12/21/24  2351 12/21/24  0405   SODIUM mmol/L 143  --  139  --  136 137  --  138  --  138  --  138   POTASSIUM mmol/L 4.0 3.5 3.5  --  3.7 4.7 4.7 3.6   < > 3.5  --  5.0   CHLORIDE mmol/L 108*  --  104  --  100 102  --  104  --  103  --  105   CO2 mmol/L 26.6  --  23.3  --  24.5 22.7  --  23.8  --  22.3  --  18.8*   BUN mg/dL 21*  --  36*  --  35* 37*  --  29*  --  39*  --  46*   CREATININE mg/dL 1.00  --  1.62* 1.73* 1.82* 2.15*  --  1.73*  --  1.85*   < > 2.43*   GLUCOSE mg/dL 155*  --  175*  --  245* 258*  --  104*  --  236*  --  181*   MAGNESIUM mg/dL 1.5*  --  2.0  --  1.8 1.7  --  2.0  --  2.0  --  2.1   PHOSPHORUS mg/dL 2.1*  --  2.8  --  3.0 2.8  --  3.9  --  3.6  --  6.8*    < > = values in this interval not displayed.     Radiology(recent) No radiology results for the last day    Most notable findings include: Hemoglobin down to 6.6 again, getting transfusion. Cr stable. Leukocytosis noted.     Active Hospital Problems:   Active Hospital Problems    Diagnosis  POA    **DKA (diabetic ketoacidosis) [E11.10]  Yes    Arterial thrombosis [I74.9]  No      Resolved Hospital Problems   No resolved problems to display.      Assessment & Plan     Assessment / Plan     Right lower extremity ischemia: Status post thrombectomy and redo thrombectomy as well as fasciotomies.     Both lower extremities are well-perfused and fasciotomy sites continue to appear healthy  VAC dressing applied at bedside  this morning with WOCN   Plan next vac change on Monday  Continue heparin gtt.     Alyssa Sneed, APRN  12/27/24  08:43 EST  Available via Secure Chat during the day for non-urgent issues.  After hours and for urgent issues please call the office at [phone]    Copied text in this note has been reviewed by me and remains relevant as of 12/27/24.

## 2024-12-27 NOTE — PROGRESS NOTES
"NEPHROLOGY PROGRESS NOTE------[facility]    [facility]  [phone]  Deidra Starr MD      Patient Care Team:  Katie Duran PA as PCP - General (Physician Assistant)  Uli Starr MD as Consulting Physician (Nephrology)      Provider:  Deidra Starr MD  Patient Name: Yarelis Jackson  :  1970    SUBJECTIVE:    F/U ARF/MARGOT/ELECTROLYTE ABNORMALITIES    S/P HD yesterday. Answering questions today but still very weak and not completely oriented. Family in room.     Medication:  chlorhexidine, 15 mL, Mouth/Throat, Q12H  insulin glargine, 22 Units, Subcutaneous, Nightly  insulin lispro, 2-7 Units, Subcutaneous, Q6H  insulin lispro, 6 Units, Subcutaneous, Q6H  lansoprazole, 30 mg, Nasogastric, Q AM  midodrine, 10 mg, Nasogastric, Q8H  nystatin, 1 Application, Topical, Q8H  QUEtiapine, 100 mg, Nasogastric, Q8H  sodium chloride, 10 mL, Intravenous, Q12H      dexmedetomidine, 0.2-0.6 mcg/kg/hr, Last Rate: Stopped (24 0440)  heparin, 16.9 Units/kg/hr, Last Rate: 17 Units/kg/hr (24 0251)        OBJECTIVE    Vital Sign Min/Max for last 24 hours  Temp  Min: 97.8 °F (36.6 °C)  Max: 98.7 °F (37.1 °C)   BP  Min: 82/58  Max: 165/86   Pulse  Min: 63  Max: 127   Resp  Min: 9  Max: 23   SpO2  Min: 88 %  Max: 100 %   No data recorded   Weight  Min: 96.3 kg (212 lb 4.9 oz)  Max: 96.3 kg (212 lb 4.9 oz)     Flowsheet Rows      Flowsheet Row First Filed Value   Admission Height 162.6 cm (64\") Documented at 2024 0443   Admission Weight 92.5 kg (203 lb 14.4 oz) Documented at 2024 0527            No intake/output data recorded.  I/O last 3 completed shifts:  In: 4121 [I.V.:1603; Blood:330; Other:1078; NG/GT:1110]  Out: 3750 [Urine:3750]    Physical Exam:     General Appearance:  NAD  Head: normocephalic, without obvious abnormality and atraumatic +DHT INTACT  Eyes: conjunctivae and sclerae normal and no icterus  Neck: " "supple and no JVD  Lungs: +SCATTERED RHONCHI  Heart: regular rhythm & normal rate and normal S1, S2 +THOMAS  Chest: Wall no abnormalities observed  Abdomen: normal bowel sounds and soft +OBESITY  Extremities: moves extremities well, +TRACE  BILAT PRETIBIAL EDEMA, no cyanosis and no redness. +DJD  Skin: +BILAT PRETIBIAL FLUID FILLED BULLAE  Neurologic:  A AND O X 2    Labs:    WBC WBC   Date Value Ref Range Status   12/27/2024 15.02 (H) 3.40 - 10.80 10*3/mm3 Final   12/27/2024 15.49 (H) 3.40 - 10.80 10*3/mm3 Final   12/26/2024 16.72 (H) 3.40 - 10.80 10*3/mm3 Final   12/25/2024 12.72 (H) 3.40 - 10.80 10*3/mm3 Final      HGB Hemoglobin   Date Value Ref Range Status   12/27/2024 8.1 (L) 12.0 - 15.9 g/dL Final   12/27/2024 8.1 (L) 12.0 - 15.9 g/dL Final   12/26/2024 8.2 (L) 12.0 - 15.9 g/dL Final   12/26/2024 6.6 (C) 12.0 - 15.9 g/dL Final   12/26/2024 7.7 (C) 12.0 - 17.0 g/dL Final   12/25/2024 7.8 (L) 12.0 - 15.9 g/dL Final   12/25/2024 6.6 (C) 12.0 - 15.9 g/dL Final      HCT Hematocrit   Date Value Ref Range Status   12/27/2024 25.0 (L) 34.0 - 46.6 % Final   12/27/2024 24.5 (L) 34.0 - 46.6 % Final   12/26/2024 24.4 (L) 34.0 - 46.6 % Final   12/26/2024 19.7 (C) 34.0 - 46.6 % Final   12/26/2024 23 (L) 38 - 51 % Final   12/25/2024 23.2 (L) 34.0 - 46.6 % Final   12/25/2024 20.6 (C) 34.0 - 46.6 % Final      Platelets No results found for: \"LABPLAT\"   MCV MCV   Date Value Ref Range Status   12/27/2024 95.1 79.0 - 97.0 fL Final   12/27/2024 94.6 79.0 - 97.0 fL Final   12/26/2024 94.7 79.0 - 97.0 fL Final   12/25/2024 94.9 79.0 - 97.0 fL Final          Sodium Sodium   Date Value Ref Range Status   12/27/2024 143 136 - 145 mmol/L Final   12/26/2024 139 136 - 145 mmol/L Final   12/25/2024 136 136 - 145 mmol/L Final      Potassium Potassium   Date Value Ref Range Status   12/27/2024 4.0 3.5 - 5.2 mmol/L Final   12/26/2024 3.5 3.5 - 5.2 mmol/L Final   12/26/2024 3.5 3.5 - 5.2 mmol/L Final   12/25/2024 3.7 3.5 - 5.2 mmol/L Final    " "  Chloride Chloride   Date Value Ref Range Status   12/27/2024 108 (H) 98 - 107 mmol/L Final   12/26/2024 104 98 - 107 mmol/L Final   12/25/2024 100 98 - 107 mmol/L Final      CO2 CO2   Date Value Ref Range Status   12/27/2024 26.6 22.0 - 29.0 mmol/L Final   12/26/2024 23.3 22.0 - 29.0 mmol/L Final   12/25/2024 24.5 22.0 - 29.0 mmol/L Final      BUN BUN   Date Value Ref Range Status   12/27/2024 21 (H) 6 - 20 mg/dL Final   12/26/2024 36 (H) 6 - 20 mg/dL Final   12/25/2024 35 (H) 6 - 20 mg/dL Final      Creatinine Creatinine   Date Value Ref Range Status   12/27/2024 1.00 0.57 - 1.00 mg/dL Final   12/26/2024 1.62 (H) 0.57 - 1.00 mg/dL Final   12/26/2024 1.73 (H) 0.60 - 1.30 mg/dL Final     Comment:     Serial Number: [id number]:  [id number]   12/25/2024 1.82 (H) 0.57 - 1.00 mg/dL Final      Calcium Calcium   Date Value Ref Range Status   12/27/2024 8.7 8.6 - 10.5 mg/dL Final   12/26/2024 8.3 (L) 8.6 - 10.5 mg/dL Final   12/25/2024 8.8 8.6 - 10.5 mg/dL Final      PO4 No components found for: \"PO4\"   Albumin Albumin   Date Value Ref Range Status   12/27/2024 3.2 (L) 3.5 - 5.2 g/dL Final   12/26/2024 3.5 3.5 - 5.2 g/dL Final   12/25/2024 3.4 (L) 3.5 - 5.2 g/dL Final      Magnesium Magnesium   Date Value Ref Range Status   12/27/2024 1.5 (L) 1.6 - 2.6 mg/dL Final   12/26/2024 2.0 1.6 - 2.6 mg/dL Final   12/25/2024 1.8 1.6 - 2.6 mg/dL Final      Uric Acid No components found for: \"URIC ACID\"     Imaging Results (Last 72 Hours)       ** No results found for the last 72 hours. **            Results for orders placed during the hospital encounter of 12/18/24    XR Chest 1 View    Narrative  XR CHEST 1 VW    Date of Exam: 12/22/2024 12:35 AM EST    Indication: Intubated Patient    Comparison:  12/21/2024    FINDINGS:  Endotracheal tube tip appears in satisfactory position at the level of the aortic arch. Right IJ catheter appears in stable position. Enteric tube extends into the left upper quadrant. Heart size and mediastinal " contour appear within normal limits. No  definite pneumothorax or effusion. Mild bibasilar opacities. Findings of advanced emphysema with suspected apical scarring.    Impression  1.Tubes and lines appear in satisfactory positions, as above.  2.Mild bibasilar opacities which could represent atelectasis or infiltrate.  3.Advanced emphysema.        Electronically Signed: Sam Haynes  12/22/2024 7:07 AM EST  Workstation ID: [id number]      XR Chest 1 View    Narrative  XR CHEST 1 VW    Date of Exam: 12/21/2024 4:32 AM EST    Indication: Intubated Patient    Comparison: 12/20/2024    Findings:  There is been no interval tube or line changes. Heart size and pulmonary vessels are within normal limits. There are postoperative changes of the bilateral upper lobes. Lungs appear clear. No pleural effusion or pneumothorax.    Impression  Impression:    1. No tube or line change  2. No focal airspace consolidation or other acute process.      Electronically Signed: Julio Thorpe MD  12/21/2024 9:54 AM EST  Workstation ID: [id number]      XR Chest 1 View    Narrative  XR CHEST 1 VW    Date of Exam: 12/20/2024 12:10 AM EST    Indication: Intubated Patient    Comparison: 12/19/2024    Findings:  Endotracheal tube approximately 2.5 cm above the carmina. Feeding tube and right IJ dialysis catheter remain in place. No new tubes or lines heart size and pulmonary vasculature are stable. Bullous changes in the right upper lobe. Postoperative changes in  the left upper lobe. Lungs grossly clear. No pneumothorax    Impression  Impression:  Stable chest demonstrating COPD and postoperative changes with no acute cardiopulmonary process demonstrated      Electronically Signed: Shon Ponce  12/20/2024 7:19 AM EST  Workstation ID: [id number]      Results for orders placed during the hospital encounter of 12/18/24    Duplex Lower Extremity Art / Grafts - Right CAR    Interpretation Summary    Limited study due to body habitus and current dressing  site.  Patent external iliac and femoral-popliteal arterial flow with low flow noted below the common femoral.  Common femoral artery poorly visualized.  Cannot rule out occlusion of the common femoral artery.        ASSESSMENT / PLAN      DKA (diabetic ketoacidosis)    Arterial thrombosis    ARF/MARGOT------Oliguric. BUN/Cr stable post HD yesterday. No HD today and follow for further HD need. +ARF/MARGOT with historically normal renal function. +ARF/MARGOT secondary to severe prerenal state and ATN from hypotension and Rhabdomyolysis.  Pressor support. Will do regular HD again today. Off IVFs. No NSAIDs. Renal US without obstructive uropathy.   Dose meds for CrCl less than 10 cc/min     2. HYPERNATREMIA/HYPEROSMOLAR STATE-----Better.       3. ACIDOSIS--Better with HD. Give IV NaHCO3 today given ongoing Rhabdomyolysis     4. DVT PROPHYLAXIS-----On Heparin gtt     5. HYPOCALCEMIA------Replace IV and follow ionized levels     6. DM------BS ok     7. ANEMIA---------H/H stable     8. OA/DJD/HYPERURICEMIA------Allopurinol . No NSAIDs     9. RHABDOMYOLYSIS-------Off IVFs. Alkalinize and follow. CK down post multiple days of HD     10. GERD/PUD PROPHYLAXIS-----Renal dose adjusted Pepcid     11. DELIRIUM/TME     12. COPD/ACUTE HYPOXIC RESPIRATORY FAILURE-----S/P extubation    13. HYPOALBUMINEMIA-----S/P IV Albumin to temporize    14. EDEMA/VOLUME EXCESS---- Better post gentle UF with HD    15. HYPOTENSION-----BP ok    16. ELEVATED LFTS/TRANSAMINASES------Secondary to shock liver and Rhabdo. Follow    17. FASCITIS------S/P surgery      18. DELIRIUM    19. HYPOPHOSPHATEMIA------Replace IV    20. HYPOMAGNESEMIA------Replace IV    Deidra Starr MD  [facility]  [phone]  12/27/24  07:16 EST

## 2024-12-27 NOTE — PROGRESS NOTES
Hematology/Oncology Inpatient Progress Note    PATIENT NAME: Yarelis Jackson  : 1970  MRN: [id number]    CHIEF COMPLAINT: Bleeding    HISTORY OF PRESENT ILLNESS:    Yarelis Jackson is a 54 y.o. female who presented to [facility] on 2024 with complaints of critical ischemia to the leg treated with serial operations with improvement.  Patient had right lower extremity ischemia due to thromboembolism to the right common femoral artery.  She underwent thrombectomy.  She was subsequently found to have a flap in the right femoral artery which was repaired on 2024 without any thrombus associated.  She was placed on IV heparin and developed hypotension with bleeding she has also had precipitous drop in her platelet count.  There is concern for possible HIT.  Heparin drip has since then been discontinued and we have been asked to see her for further evaluation and management of thrombocytopenia possibility of HIT  Review of her labs indicate that she had platelet counts of 99,000 as of 2024 subsequently dropped to 63,000.  She also has decreasing hemoglobin from 8.7-7.2 within the past 24 hours     24  Hematology/Oncology was consulted for thrombocytopenia and possible HIT.  Thrombophilia workup has also been initiated by primary team results are pending     He/She  has a past medical history of COPD (chronic obstructive pulmonary disease), Low back pain, Migraine, and Neck pain.     PCP: Katie Duran PA  Subjective   24: pt seen today. Continues to be drowsy and non communicative, on sedation.    ROS:  Review of Systems   Unable to perform ROS: Mental status change      MEDICATIONS:    Scheduled Meds:  chlorhexidine, 15 mL, Mouth/Throat, Q12H  insulin glargine, 22 Units, Subcutaneous, Nightly  insulin lispro, 2-7 Units, Subcutaneous, Q6H  insulin lispro, 6 Units, Subcutaneous, Q6H  lansoprazole, 30 mg, Nasogastric, Q AM  midodrine, 10 mg, Nasogastric, Q8H  nystatin,  "1 Application, Topical, Q8H  QUEtiapine, 100 mg, Nasogastric, Q8H  sodium chloride, 10 mL, Intravenous, Q12H       Continuous Infusions:  dexmedetomidine, 0.2-0.6 mcg/kg/hr, Last Rate: Stopped (12/27/24 0440)  heparin, 16.9 Units/kg/hr, Last Rate: 17 Units/kg/hr (12/27/24 0251)       PRN Meds:    acetaminophen **OR** acetaminophen    albumin human    aluminum-magnesium hydroxide-simethicone    senna-docusate sodium **AND** polyethylene glycol **AND** bisacodyl **AND** bisacodyl    Calcium Replacement - Follow Nurse / BPA Driven Protocol    dextrose    dextrose    glucagon (human recombinant)    heparin (porcine)    heparin (porcine)    ipratropium-albuterol    Magnesium Standard Dose Replacement - Follow Nurse / BPA Driven Protocol    ondansetron ODT **OR** ondansetron    oxyCODONE    Phosphorus Replacement - Follow Nurse / BPA Driven Protocol    Potassium Replacement - Follow Nurse / BPA Driven Protocol    sodium chloride    sodium chloride    sodium chloride     ALLERGIES:    Allergies   Allergen Reactions    Aspirin GI Intolerance    Ibuprofen Itching     Objective    VITALS:   /63   Pulse 102   Temp 98.7 °F (37.1 °C) (Axillary)   Resp 21   Ht 162.6 cm (64\")   Wt 98.5 kg (217 lb 2.5 oz)   LMP  (LMP Unknown)   SpO2 99%   BMI 37.27 kg/m²     PHYSICAL EXAM: (performed by MD)  Physical Exam  Constitutional:       General: She is not in acute distress.     Appearance: She is ill-appearing.      Comments: Acutely ill appearing woman. Intubated and sedated. Not responsive to verbal stimulation.    HENT:      Right Ear: External ear normal.      Left Ear: External ear normal.      Mouth/Throat:      Pharynx: No oropharyngeal exudate or posterior oropharyngeal erythema.   Eyes:      General:         Right eye: No discharge.         Left eye: No discharge.   Cardiovascular:      Rate and Rhythm: Tachycardia present.   Pulmonary:      Effort: No respiratory distress.      Breath sounds: Rales present. No " wheezing or rhonchi.      Comments: Symmetrically diminished.   Abdominal:      General: There is no distension.      Palpations: There is no mass.      Tenderness: There is no abdominal tenderness. There is no guarding or rebound.      Hernia: No hernia is present.      Comments: Protuberant and soft. Not apparently tender. No palpable tumors.    Musculoskeletal:      Right lower leg: Edema present.      Left lower leg: Edema present.   Skin:     Coloration: Skin is not jaundiced or pale.      Findings: Bruising present.      I have reexamined the patient and the results are consistent with the previously documented exam. Allen Tidwell MD      RECENT LABS:    Lab Results (last 24 hours)       Procedure Component Value Units Date/Time    aPTT [[id number]]  (Abnormal) Collected: 12/27/24 0415    Specimen: Blood, Central Line Updated: 12/27/24 0434     PTT 77.9 seconds     CBC & Differential [[id number]] Collected: 12/27/24 0415    Specimen: Blood, Central Line Updated: 12/27/24 0422    Narrative:      The following orders were created for panel order CBC & Differential.  Procedure                               Abnormality         Status                     ---------                               -----------         ------                     CBC Auto Differential[[id number]]                            In process                 Scan Slide[[id number]]                                       In process                   Please view results for these tests on the individual orders.    Scan Slide [[id number]] Collected: 12/27/24 0415    Specimen: Blood, Central Line Updated: 12/27/24 0422    CBC Auto Differential [[id number]] Collected: 12/27/24 0415    Specimen: Blood, Central Line Updated: 12/27/24 0418    POC Glucose Once [[id number]]  (Abnormal) Collected: 12/26/24 2318    Specimen: Blood Updated: 12/26/24 2319     Glucose 174 mg/dL      Comment: Serial Number: [id number]:  [id number]       aPTT [[id number]]   (Abnormal) Collected: 12/26/24 2132    Specimen: Blood, Central Line Updated: 12/26/24 2158     .8 seconds     aPTT [[id number]]  (Abnormal) Collected: 12/26/24 2017    Specimen: Blood, Central Line Updated: 12/26/24 2051     PTT >200.0 seconds     POC Glucose Q6H [[id number]]  (Abnormal) Collected: 12/26/24 1809    Specimen: Blood Updated: 12/26/24 1811     Glucose 141 mg/dL      Comment: Serial Number: [id number]:  [id number]       MTHFR Mutation [[id number]] Collected: 12/20/24 1101     Updated: 12/26/24 1609     MTHFR Comment     Comment: Result:  c.665C>T (p. Zuw909Pav), legacy name: C677T - Detected, heterozygous  c.1286A>C (p. Flt546Pbg), legacy name: [id number] - Detected,  heterozygous  Interpretation:  This result is not associated with an increased risk for  hyperhomocysteinemia. See Additional Clinical Information and  Comments.  Additional Clinical Information:  Hyperhomocysteinemia is multifactorial involving genetic, clinical,  and environmental risk factors. Reduced enzyme activity of  methylenetetrahydrofolate reductase (MTHFR) is a genetic risk  factor for hyperhomocysteinemia, particularly when serum folate  levels are low. There are two common variants in the MTHFR gene  that can decrease enzyme activity; c.665C>T (p. [id number]), legacy  name C677T, and c.1286A>C (p. Ikn557Xpi), legacy name [id number]. These  variants do not independently increase risk of conditions related  to hyperhomocysteinemia in the absence of elevated homocysteine  levels. Measurement of total plasma homocysteine is recommended.  Patients should share their MTHFR genotype with physicians who are  making decisions regarding chemotherapy treatments that depend on  folate, such as methotrexate.  Guidelines do not recommend genotyping of these two MTHFR variants  in the evaluation of venous thrombosis or obstetric risk due to  limited evidence of clinical utility (PMID: 20903536).  Comments:  Genetic Coordinators are  available for health care providers to  discuss results at [phone] ([phone]  Test Details:  Variants Analyzed: c.665C>T (p. Fuf901Rru), legacy name: C677T and  c.1286A>C (p. Irl580Ahx), legacy name: [id number]  Methods/Limitations:  DNA analysis of the MTHFR gene was performed by PCR amplification  followed by restriction enzyme analysis. The diagnostic sensitivity  is >99%. Results must be combined with clinical information for the  most accurate interpretation. Molecular-based testing is highly  accurate, but as in any laboratory test, diagnostic errors may  occur. False positive or false negative results may occur for  reasons that include genetic variants, blood transfusions, bone  marrow transplantation, somatic or tissue-specific mosaicism,  mislabeled samples, or erroneous representation of family  relationships.  This test was developed and its performance characteristics  determined by [employer]. It has not been cleared or approved by the  Food and Drug Administration.  References:  Ta SE, Yuniel CJ, Jessica HV. ACMG Practice Guideline: lack of  evidence for MTHFR polymorphism testing. [employer] Med. 2013 Feb;15(2):153-6. doi: 10.1038/gim.2012.165. Epub 2013 Dieter 3. PMID:  95306068.  American College of Obstetricians and Gynecologists' Committee on  Practice Bulletins-Obstetrics. ACOG Practice Bulletin No. 197:  Inherited Thrombophilias in Pregnancy. Obstet Gynecol. 2018  Jul;132(1):e18-e34. doi: 10.1097/AOG.9161539803556854. Erratum in:  Obstet Gynecol. 2018 Oct;132(4):1069. PMID: 50771542.        REVIEWED BY Comment     Comment: Technical Component performed at [facility]  Professional Component performed by:  [employer]  YUSUF Ackerman, Ph.D., [facility]  Director, Molecular Genetics  [address] [facility] [address]       Narrative:      Performed at:  01 - [facility]  [address] [facility], [place]  [id number]  : Kamla Fern[facility]dez [facility], Phone:   [phone]    aPTT [[id number]]  (Abnormal) Collected: 12/26/24 1406    Specimen: Blood from Arm, Right Updated: 12/26/24 1431     PTT 81.2 seconds     Potassium [[id number]]  (Normal) Collected: 12/26/24 1406    Specimen: Blood from Arm, Right Updated: 12/26/24 1426     Potassium 3.5 mmol/L     POC Glucose Q6H [[id number]]  (Abnormal) Collected: 12/26/24 1221    Specimen: Blood Updated: 12/26/24 1223     Glucose 182 mg/dL      Comment: Serial Number: [id number]:  [id number]       Ammonia [[id number]]  (Abnormal) Collected: 12/26/24 1035    Specimen: Blood Updated: 12/26/24 1101     Ammonia 56 umol/L     Hemoglobin & Hematocrit, Blood [[id number]]  (Abnormal) Collected: 12/26/24 1035    Specimen: Blood Updated: 12/26/24 1046     Hemoglobin 8.2 g/dL      Hematocrit 24.4 %     CBC & Differential [[id number]]  (Abnormal) Collected: 12/26/24 0428    Specimen: Blood Updated: 12/26/24 0543    Narrative:      The following orders were created for panel order CBC & Differential.  Procedure                               Abnormality         Status                     ---------                               -----------         ------                     CBC Auto Differential[[id number]]        Abnormal            Final result               Scan Slide[[id number]]                                       Final result                 Please view results for these tests on the individual orders.    CBC Auto Differential [[id number]]  (Abnormal) Collected: 12/26/24 0428    Specimen: Blood Updated: 12/26/24 0543     WBC 16.72 10*3/mm3      RBC 2.08 10*6/mm3      Hemoglobin 6.6 g/dL      Hematocrit 19.7 %      MCV 94.7 fL      MCH 31.7 pg      MCHC 33.5 g/dL      RDW 16.4 %      RDW-SD 56.0 fl      MPV 10.9 fL      Platelets 121 10*3/mm3     Scan Slide [[id number]] Collected: 12/26/24 0428    Specimen: Blood Updated: 12/26/24 0543     Scan Slide --     Comment: See Manual Differential Results       Manual Differential [[id number]]   "(Abnormal) Collected: 12/26/24 0428    Specimen: Blood Updated: 12/26/24 0543     Neutrophil % 69.0 %      Lymphocyte % 10.0 %      Monocyte % 1.0 %      Eosinophil % 2.0 %      Bands %  17.0 %      Metamyelocyte % 1.0 %      Neutrophils Absolute 14.38 10*3/mm3      Lymphocytes Absolute 1.67 10*3/mm3      Monocytes Absolute 0.17 10*3/mm3      Eosinophils Absolute 0.33 10*3/mm3      nRBC 2.0 /100 WBC      Anisocytosis Slight/1+     Dacrocytes Slight/1+     Macrocytes Slight/1+     Poikilocytes Slight/1+     WBC Morphology Normal     Platelet Estimate Decreased     Large Platelets Slight/1+    D-dimer, Quantitative [[id number]]  (Abnormal) Collected: 12/26/24 0428    Specimen: Blood Updated: 12/26/24 0536     D-Dimer, Quantitative 9.12 MCGFEU/mL     Narrative:      According to the assay 's published package insert, a normal (<0.50 MCGFEU/mL) D-dimer result in conjunction with a non-high clinical probability assessment, excludes deep vein thrombosis (DVT) and pulmonary embolism (PE) with high sensitivity.    D-dimer values increase with age and this can make VTE exclusion of an older population difficult. To address this, the American College of Physicians, based on best available evidence and recent guidelines, recommends that clinicians use age-adjusted D-dimer thresholds in patients greater than 50 years of age with: a) a low probability of PE who do not meet all Pulmonary Embolism Rule Out Criteria, or b) in those with intermediate probability of PE.   The formula for an age-adjusted D-dimer cut-off is \"age/100\".  For example, a 60 year old patient would have an age-adjusted cut-off of 0.60 MCGFEU/mL and an 80 year old 0.80 MCGFEU/mL.    CK [[id number]]  (Abnormal) Collected: 12/26/24 0428    Specimen: Blood Updated: 12/26/24 0513     Creatine Kinase 4,606 U/L     POCT Electrolytes +HGB +HCT [[id number]]  (Abnormal) Collected: 12/26/24 0425    Specimen: Arterial Blood Updated: 12/26/24 0504     Sodium 138 " mmol/L      POC Potassium 3.3 mmol/L      Ionized Calcium 1.24 mmol/L      Comment: Serial Number: [id number]:  [id number]        Glucose 190 mg/dL      Hematocrit 23 %      Hemoglobin 7.7 g/dL      Notified Time --     Notified Who --     Read Back --          IMAGING REVIEWED:  No radiology results for the last day    Assessment & Plan       Persistent thrombocytopenia but improved. No evident bleeding. Plt count 126K today. Continue to monitor.this is likely multifactorial due to acute illness,consumption. Will check B12, folate levels.  Arterial Thrombosis status post thrombectomy: Continue heparin.   Anemia due to blood loss: Hb trended down to 6.6 this AM. Will recommend 1 unit PRBC transfusion  Hypercoagulable state: thrombophilia workup negative so far.       Allen Tidwell MD 12/26/24

## 2024-12-27 NOTE — CONSULTS
"    [facility] MEDICINE SERVICE  TRANSFER OF CARE/ACCEPTANCE NOTE    PATIENT NAME: Yarelis Jackson  : 1970  MRN: [id number]     Active Hospital Problems    Diagnosis  POA    **DKA (diabetic ketoacidosis) [E11.10]  Yes    Arterial thrombosis [I74.9]  No      Resolved Hospital Problems   No resolved problems to display.       Interim History:  Per HPI on 24:  \" Yarelis Jackson is a 54 y.o. female with PMH of COPD, migraine, and obesity, and presented to the hospital for altered mental status, and was admitted with a principal diagnosis of DKA (diabetic ketoacidosis).  Patient presented and per ER documentation, patient was oriented to person and time.  Per patient's mother, patient had been feeling ill since Friday with noted polyuria and polydipsia.  It was managed by giving patient milk and a grape soft drink.  Patient became increasingly agitated, preventing treatment, and was given a dose of Geodon in the ED.     In the ED, labs were obtained with the following abnormalities: Sodium 159, chloride 117, CO2 10.1, anion gap 31.9, BUN 88, creatinine 2.13, glucose 976, alk phos 246, , , hemoglobin A1c 15.8, moderate acetone, osmolality 461, WBC 14.65 without bandemia.  UA with >1000 glucose, 15 mg/dL of ketones, moderate blood and proteinuria noted, this did not reflex to culture.  Patient had an ABG with pH 7.179, pCO2 37.5, pO2 77.7, HCO3 14, with O2 saturation 91.7%.  Patient was determined to be in DKA, and was started with IV fluid boluses per DKA protocol as well as an insulin drip.  Patient did have blood cultures obtained, but per ED provider, patient has no obvious signs or symptoms of active section, therefore no antimicrobials were started.     Patient sodium level remained persistent, and IV fluids were changed to withhold any sodium containing products.  Nephrology was consulted.  Patient also underwent placement of feeding tube for free water flush hourly overnight.  On " "12/19, nephrology decided that patient would proceed with hemodialysis and requested nontunneled catheter placement.  Patient was becoming increasingly agitated, with concerned that patient was no longer able to protect her own airway.  After discussion with patient's family, patient was intubated, nontunneled catheter placement was completed, and patient did require initiation of vasopressors.  HD was transitioned to CRRT.  Shortly after initiation of CRRT, patient developed an acute change in patient's skin color of her right lower extremity with subsequent loss of pulse.  Vascular surgery was stat consulted as well as completion of stat arterial duplex and ABIs.  Patient was started on a heparin drip.  Vascular surgery promptly evaluated patient and patient was scheduled to go to the OR for emergent thrombectomy.\"    I have noted the following changes since admission: Endocrinology following and adjusting insulin as needed.  Electrolytes replaced this morning per protocol.  Wound care was consulted and wound vacs were placed to fasciotomy sites.  Renal function stable status post hemodialysis yesterday.  Patient remains n.p.o., on tube feeds.  Patient on high flow nasal cannula at 3 L with oxygen saturation 98%.  Speech therapy consulted for swallow evaluation.  Due to improvement in patient's status, decision was made to downgrade patient from ICU to PCU.    I have reviewed the H&P, diagnostic data and plan of care for Yarelis Jackson. Please refer to the progress note for full assessment and plan. The hospitalist service will be taking over care of this patient during the current hospitalization.        Signature: Electronically signed by NICHOLAS Flores, 12/27/24, 11:12 EST.  [facility] Hospitalist Team          "

## 2024-12-27 NOTE — NURSING NOTE
WOCN note:    54 yr old female admitted 12/18/24 with DKA. Patient underwent thrombectomies and fasciotomies to the E. WOCN consult received for application of wound VAC to fasciotomy sites.     The lateral wound measures approximately 29 x 5 cm and 28 x 6 cm for the medial wound. There are de-roofed blisters and skin necrosis to the pre-tibial area that were covered with Maxorb dressings to facilitate dressing seal. There are fat and muscle layers exposed with beefy red tissue note.     The wounds were irrigated with NS and skin prep was applied to the bambi-wound skin. Both wound bases were lined with fenestrated silicone to decrease tissue trauma with dressing changes. Each wound was packed with one piece of black foam and secured with drape. A third piece of foam was used to bridge the wounds and a fourth piece used to support the trac pad. The dressing was attached to 125mmHg continuous negative pressure with good seal obtained. The dressing was secured with kerlix and ACE wrap from the base of the toes to the bend of the knee. Pillows were used to float the heels off the bed surface. Patient appeared to tolerate well with no indications of pain. Will continue to follow.         Right lateral       Right medial

## 2024-12-28 LAB
ALBUMIN SERPL-MCNC: 3 G/DL (ref 3.5–5.2)
ALBUMIN/GLOB SERPL: 1.1 G/DL
ALP SERPL-CCNC: 222 U/L (ref 39–117)
ALT SERPL W P-5'-P-CCNC: 284 U/L (ref 1–33)
ANION GAP SERPL CALCULATED.3IONS-SCNC: 7.5 MMOL/L (ref 5–15)
ANISOCYTOSIS BLD QL: ABNORMAL
APTT PPP: 190.7 SECONDS (ref 22.7–35.4)
APTT PPP: 68.5 SECONDS (ref 22.7–35.4)
APTT PPP: 96.7 SECONDS (ref 22.7–35.4)
AST SERPL-CCNC: 92 U/L (ref 1–32)
BILIRUB SERPL-MCNC: 0.7 MG/DL (ref 0–1.2)
BUN SERPL-MCNC: 21 MG/DL (ref 6–20)
BUN/CREAT SERPL: 21.6 (ref 7–25)
CA-I SERPL ISE-MCNC: 1.14 MMOL/L (ref 1.15–1.3)
CALCIUM SPEC-SCNC: 8.6 MG/DL (ref 8.6–10.5)
CHLORIDE SERPL-SCNC: 111 MMOL/L (ref 98–107)
CK SERPL-CCNC: 997 U/L (ref 20–180)
CO2 SERPL-SCNC: 30.5 MMOL/L (ref 22–29)
CREAT SERPL-MCNC: 0.97 MG/DL (ref 0.57–1)
D-LACTATE SERPL-SCNC: 0.8 MMOL/L (ref 0.5–2)
DEPRECATED RDW RBC AUTO: 55.1 FL (ref 37–54)
EGFRCR SERPLBLD CKD-EPI 2021: 70 ML/MIN/1.73
ERYTHROCYTE [DISTWIDTH] IN BLOOD BY AUTOMATED COUNT: 16.5 % (ref 12.3–15.4)
GLOBULIN UR ELPH-MCNC: 2.7 GM/DL
GLUCOSE BLDC GLUCOMTR-MCNC: 139 MG/DL (ref 70–105)
GLUCOSE BLDC GLUCOMTR-MCNC: 174 MG/DL (ref 70–105)
GLUCOSE BLDC GLUCOMTR-MCNC: 182 MG/DL (ref 70–105)
GLUCOSE SERPL-MCNC: 128 MG/DL (ref 65–99)
HCT VFR BLD AUTO: 23.8 % (ref 34–46.6)
HGB BLD-MCNC: 7.9 G/DL (ref 12–15.9)
LYMPHOCYTES # BLD MANUAL: 2.13 10*3/MM3 (ref 0.7–3.1)
LYMPHOCYTES NFR BLD MANUAL: 3 % (ref 5–12)
MAGNESIUM SERPL-MCNC: 4 MG/DL (ref 1.6–2.6)
MCH RBC QN AUTO: 31.2 PG (ref 26.6–33)
MCHC RBC AUTO-ENTMCNC: 33.2 G/DL (ref 31.5–35.7)
MCV RBC AUTO: 94.1 FL (ref 79–97)
METAMYELOCYTES NFR BLD MANUAL: 5 % (ref 0–0)
MONOCYTES # BLD: 0.49 10*3/MM3 (ref 0.1–0.9)
MYELOCYTES NFR BLD MANUAL: 7 % (ref 0–0)
NEUTROPHILS # BLD AUTO: 11.45 10*3/MM3 (ref 1.7–7)
NEUTROPHILS NFR BLD MANUAL: 64 % (ref 42.7–76)
NEUTS BAND NFR BLD MANUAL: 6 % (ref 0–5)
PATHOLOGY REVIEW: YES
PHOSPHATE SERPL-MCNC: 3 MG/DL (ref 2.5–4.5)
PLAT MORPH BLD: NORMAL
PLATELET # BLD AUTO: 166 10*3/MM3 (ref 140–450)
PMV BLD AUTO: 10.1 FL (ref 6–12)
POIKILOCYTOSIS BLD QL SMEAR: ABNORMAL
POLYCHROMASIA BLD QL SMEAR: ABNORMAL
POTASSIUM SERPL-SCNC: 3.3 MMOL/L (ref 3.5–5.2)
POTASSIUM SERPL-SCNC: 4 MMOL/L (ref 3.5–5.2)
PROMYELOCYTES NFR BLD MANUAL: 2 % (ref 0–0)
PROT SERPL-MCNC: 5.7 G/DL (ref 6–8.5)
RBC # BLD AUTO: 2.53 10*6/MM3 (ref 3.77–5.28)
SCAN SLIDE: NORMAL
SODIUM SERPL-SCNC: 149 MMOL/L (ref 136–145)
TOXIC GRANULATION: ABNORMAL
VARIANT LYMPHS NFR BLD MANUAL: 13 % (ref 19.6–45.3)
WBC NRBC COR # BLD AUTO: 16.35 10*3/MM3 (ref 3.4–10.8)

## 2024-12-28 PROCEDURE — 84132 ASSAY OF SERUM POTASSIUM: CPT | Performed by: INTERNAL MEDICINE

## 2024-12-28 PROCEDURE — 63710000001 INSULIN LISPRO (HUMAN) PER 5 UNITS: Performed by: INTERNAL MEDICINE

## 2024-12-28 PROCEDURE — 85025 COMPLETE CBC W/AUTO DIFF WBC: CPT | Performed by: SURGERY

## 2024-12-28 PROCEDURE — 82550 ASSAY OF CK (CPK): CPT | Performed by: INTERNAL MEDICINE

## 2024-12-28 PROCEDURE — 85730 THROMBOPLASTIN TIME PARTIAL: CPT

## 2024-12-28 PROCEDURE — 85730 THROMBOPLASTIN TIME PARTIAL: CPT | Performed by: INTERNAL MEDICINE

## 2024-12-28 PROCEDURE — 63710000001 INSULIN GLARGINE PER 5 UNITS: Performed by: INTERNAL MEDICINE

## 2024-12-28 PROCEDURE — 83735 ASSAY OF MAGNESIUM: CPT | Performed by: SURGERY

## 2024-12-28 PROCEDURE — 99024 POSTOP FOLLOW-UP VISIT: CPT | Performed by: STUDENT IN AN ORGANIZED HEALTH CARE EDUCATION/TRAINING PROGRAM

## 2024-12-28 PROCEDURE — 82330 ASSAY OF CALCIUM: CPT | Performed by: INTERNAL MEDICINE

## 2024-12-28 PROCEDURE — 25010000002 CALCIUM GLUCONATE-NACL 1-0.675 GM/50ML-% SOLUTION: Performed by: INTERNAL MEDICINE

## 2024-12-28 PROCEDURE — 82948 REAGENT STRIP/BLOOD GLUCOSE: CPT

## 2024-12-28 PROCEDURE — 80053 COMPREHEN METABOLIC PANEL: CPT | Performed by: SURGERY

## 2024-12-28 PROCEDURE — 25010000002 HEPARIN (PORCINE) 25000-0.45 UT/250ML-% SOLUTION: Performed by: SURGERY

## 2024-12-28 PROCEDURE — 99232 SBSQ HOSP IP/OBS MODERATE 35: CPT | Performed by: INTERNAL MEDICINE

## 2024-12-28 PROCEDURE — 85007 BL SMEAR W/DIFF WBC COUNT: CPT | Performed by: SURGERY

## 2024-12-28 PROCEDURE — 25810000003 DEXTROSE 5 % WITH KCL 20 MEQ 20 MEQ/L SOLUTION: Performed by: INTERNAL MEDICINE

## 2024-12-28 PROCEDURE — 84100 ASSAY OF PHOSPHORUS: CPT | Performed by: SURGERY

## 2024-12-28 PROCEDURE — 83605 ASSAY OF LACTIC ACID: CPT | Performed by: INTERNAL MEDICINE

## 2024-12-28 RX ORDER — POTASSIUM CHLORIDE 1.5 G/1.58G
40 POWDER, FOR SOLUTION ORAL EVERY 4 HOURS
Status: COMPLETED | OUTPATIENT
Start: 2024-12-28 | End: 2024-12-28

## 2024-12-28 RX ORDER — CALCIUM GLUCONATE 20 MG/ML
1000 INJECTION, SOLUTION INTRAVENOUS EVERY 12 HOURS
Status: COMPLETED | OUTPATIENT
Start: 2024-12-28 | End: 2024-12-28

## 2024-12-28 RX ORDER — POTASSIUM CHLORIDE, DEXTROSE MONOHYDRATE 150; 5 MG/100ML; G/100ML
75 INJECTION, SOLUTION INTRAVENOUS CONTINUOUS
Status: DISCONTINUED | OUTPATIENT
Start: 2024-12-28 | End: 2024-12-29

## 2024-12-28 RX ADMIN — OXYCODONE 10 MG: 5 TABLET ORAL at 19:58

## 2024-12-28 RX ADMIN — INSULIN LISPRO 2 UNITS: 100 INJECTION, SOLUTION INTRAVENOUS; SUBCUTANEOUS at 12:11

## 2024-12-28 RX ADMIN — POTASSIUM CHLORIDE 40 MEQ: 1.5 POWDER, FOR SOLUTION ORAL at 08:53

## 2024-12-28 RX ADMIN — NYSTATIN 1 APPLICATION: 100000 CREAM TOPICAL at 16:00

## 2024-12-28 RX ADMIN — NYSTATIN 1 APPLICATION: 100000 CREAM TOPICAL at 06:01

## 2024-12-28 RX ADMIN — Medication 10 ML: at 08:53

## 2024-12-28 RX ADMIN — INSULIN LISPRO 6 UNITS: 100 INJECTION, SOLUTION INTRAVENOUS; SUBCUTANEOUS at 05:51

## 2024-12-28 RX ADMIN — HEPARIN SODIUM 18 UNITS/KG/HR: 10000 INJECTION, SOLUTION INTRAVENOUS at 06:59

## 2024-12-28 RX ADMIN — MIDODRINE HYDROCHLORIDE 10 MG: 5 TABLET ORAL at 21:26

## 2024-12-28 RX ADMIN — INSULIN LISPRO 6 UNITS: 100 INJECTION, SOLUTION INTRAVENOUS; SUBCUTANEOUS at 12:11

## 2024-12-28 RX ADMIN — POLYETHYLENE GLYCOL 3350 17 G: 17 POWDER, FOR SOLUTION ORAL at 08:53

## 2024-12-28 RX ADMIN — HEPARIN SODIUM 18.9 UNITS/KG/HR: 10000 INJECTION, SOLUTION INTRAVENOUS at 21:33

## 2024-12-28 RX ADMIN — Medication 10 ML: at 20:23

## 2024-12-28 RX ADMIN — POTASSIUM CHLORIDE AND DEXTROSE MONOHYDRATE 75 ML/HR: 150; 5 INJECTION, SOLUTION INTRAVENOUS at 12:19

## 2024-12-28 RX ADMIN — CALCIUM GLUCONATE 1000 MG: 20 INJECTION, SOLUTION INTRAVENOUS at 21:25

## 2024-12-28 RX ADMIN — INSULIN LISPRO 6 UNITS: 100 INJECTION, SOLUTION INTRAVENOUS; SUBCUTANEOUS at 17:37

## 2024-12-28 RX ADMIN — MIDODRINE HYDROCHLORIDE 10 MG: 5 TABLET ORAL at 05:51

## 2024-12-28 RX ADMIN — SENNOSIDES AND DOCUSATE SODIUM 2 TABLET: 50; 8.6 TABLET ORAL at 08:53

## 2024-12-28 RX ADMIN — CALCIUM GLUCONATE 1000 MG: 20 INJECTION, SOLUTION INTRAVENOUS at 12:11

## 2024-12-28 RX ADMIN — QUETIAPINE FUMARATE 75 MG: 25 TABLET ORAL at 08:53

## 2024-12-28 RX ADMIN — INSULIN LISPRO 2 UNITS: 100 INJECTION, SOLUTION INTRAVENOUS; SUBCUTANEOUS at 17:37

## 2024-12-28 RX ADMIN — POTASSIUM CHLORIDE 40 MEQ: 1.5 POWDER, FOR SOLUTION ORAL at 12:11

## 2024-12-28 RX ADMIN — INSULIN GLARGINE-YFGN 22 UNITS: 100 INJECTION, SOLUTION SUBCUTANEOUS at 20:24

## 2024-12-28 RX ADMIN — NYSTATIN 1 APPLICATION: 100000 CREAM TOPICAL at 21:26

## 2024-12-28 RX ADMIN — QUETIAPINE FUMARATE 75 MG: 25 TABLET ORAL at 21:25

## 2024-12-28 RX ADMIN — LANSOPRAZOLE 30 MG: 30 TABLET, ORALLY DISINTEGRATING ORAL at 05:50

## 2024-12-28 NOTE — PROGRESS NOTES
[facility] MEDICINE SERVICE  DAILY PROGRESS NOTE    NAME: Yarelis Jackson  : 1970  MRN: [id number]      LOS: 10 days     PROVIDER OF SERVICE: Darien Gomez MD    Chief Complaint: DKA (diabetic ketoacidosis)    Subjective:     Interval History:  History taken from: patient    Denies any acute complaints.  Denies nausea vomiting or diarrhea.        Review of Systems:   Review of Systems  As above  Objective:     Vital Signs  Temp:  [97.5 °F (36.4 °C)-97.9 °F (36.6 °C)] 97.8 °F (36.6 °C)  Heart Rate:  [] 102  Resp:  [14-21] 17  BP: (128-156)/(60-75) 141/71  Flow (L/min) (Oxygen Therapy):  [3] 3   Body mass index is 41.44 kg/m².    Physical Exam  Physical Exam  Constitutional:       Appearance: Normal appearance.   HENT:      Nose:      Comments: NG tube  Cardiovascular:      Rate and Rhythm: Normal rate and regular rhythm.      Pulses: Normal pulses.      Heart sounds: Normal heart sounds. No murmur heard.  Pulmonary:      Effort: Pulmonary effort is normal. No respiratory distress.      Breath sounds: Normal breath sounds. No wheezing or rales.   Abdominal:      General: Bowel sounds are normal. There is no distension.      Tenderness: There is no abdominal tenderness.   Musculoskeletal:         General: No swelling, tenderness, deformity or signs of injury.      Cervical back: Normal range of motion.      Right lower leg: Edema (Right lower extremity dressing) present.   Neurological:      General: No focal deficit present.      Mental Status: She is alert and oriented to person, place, and time. Mental status is at baseline.      Cranial Nerves: No cranial nerve deficit.      Sensory: No sensory deficit.      Motor: No weakness.      Coordination: Coordination normal.            Diagnostic Data    Results from last 7 days   Lab Units 24  1224 24  0004   WBC 10*3/mm3  --  16.35*   HEMOGLOBIN g/dL  --  7.9*   HEMATOCRIT %  --  23.8*   PLATELETS 10*3/mm3  --  166   GLUCOSE mg/dL   --  128*   CREATININE mg/dL  --  0.97   BUN mg/dL  --  21*   SODIUM mmol/L  --  149*   POTASSIUM mmol/L 4.0 3.3*   AST (SGOT) U/L  --  92*   ALT (SGPT) U/L  --  284*   ALK PHOS U/L  --  222*   BILIRUBIN mg/dL  --  0.7   ANION GAP mmol/L  --  7.5       No radiology results for the last day      I reviewed the patient's new clinical results.    Assessment/Plan:     Active and Resolved Problems  Active Hospital Problems    Diagnosis  POA    **DKA (diabetic ketoacidosis) [E11.10]  Yes    Arterial thrombosis [I74.9]  No      Resolved Hospital Problems   No resolved problems to display.       Diabetic ketoacidosis without coma, with new onset diabetes mellitus, type 2 / Metabolic acidosis, increased anion gap-Resolved  -Etiology uncertain, though new onset diabetes mellitus.  A1c 15.80 on admission, no prior available.  -Lantus 22 units at night, insulin lispro 6 units every 6 hours.  Continue insulin sliding scale every 6 hours.  -Endocrinology Dr. Winkler following  -Nutrition following patient due to enteric feeds     Acute Kidney Injury requiring emergent hemodialysis  -Remains nonoliguric. Baseline creatinine 0.90.  Creatinine 2.10 on admission.  -Monitor Input/Output very closely.   -Avoid NSAIDs, nephrotoxic medications, and hypotension.  -Nephrology following.Initially on HD due to acute renal failure.  No history for the past 2 days.  Will evaluate need for HD on a daily basis per nephrology.      Acute occlusive thrombus of the right iliac artery with limb ischemia improved  -Arterial duplex with noted right femoral, deep femoral, and popliteal arteries being patent but with very low amplitude monophasic signals, suggesting severe inflow stenosis or occlusion; no measurable Doppler flow in the anterior or posterior tibial and peroneal arteries.  -Bilateral lower extremity ABIs ordered: Right STACEY critically reduced with STACEY of 0 and severe digital insufficiency; left STACEY is normal with moderate digital  insufficiency.  -Emergent surgery per vascular surgeon 12/19/2024 for right iliac thrombectomy via open groin incision then return to surgery on 12/20/2024 for recurrent right lower extremity ischemia due to arterial thrombosis and underwent right iliofemoral popliteal thrombectomy, right femoral endarterectomy with patch, right lower extremity arteriogram, and closed right calf fasciotomies  -Continue on heparin drip per vascular surgery   -Hematology/oncology consulted  -Return to the OR morning (12/23) for emergent 4 compartment fasciotomies due to critical right leg ischemia  -Defer to vascular surgery for management of wound VAC.  Next wound VAC change on Monday     Nontraumatic rhabdomyolysis --> Improving  -CK downtrending  -Encourage enteric hydration      Leukocytosis likely reactive  -Continue to monitor clinically    Electrolyte derangements likely due to renal failure  -Monitor and replete as needed per protocol      Cutaneous candidiasis of groin  -Likely secondary to uncontrolled diabetes mellitus  -Nystatin ordered     Essential Hypertension  -Clonidine patch ordered by nephrology-hold   -Titrate medications as needed.     Bilateral leg rash, concern for scabies infection  -Permethrin regimen ordered.  Completed first treatment on 12/18, repeat treatment in 2 weeks  -Keep in contact precautions x 1 week post initial treatment (12/25)     Transaminitis  -US of Liver: Hepatomegaly with steatosis.  -Hepatitis panel-negative.  -Monitor and trend LFTs.       COPD: Not in exacerbation.  Duonebs ordered as needed.  Obesity: Body mass index is 33.49 kg/m².       VTE Prophylaxis:  Pharmacologic VTE prophylaxis orders are present.             Disposition Planning:     Barriers to Discharge: medical optimization  Anticipated Date of Discharge: pending  Place of Discharge: home      Time: 35 minutes     Code Status and Medical Interventions: CPR (Attempt to Resuscitate); Full Support   Ordered at: 12/18/24 0830      Code Status (Patient has no pulse and is not breathing):    CPR (Attempt to Resuscitate)     Medical Interventions (Patient has pulse or is breathing):    Full Support       Signature: Electronically signed by Darien Gomez MD, 12/28/24, 16:28 EST.  [facility], [place], operated by [facility] Hospitalist Team

## 2024-12-28 NOTE — PROGRESS NOTES
Nutrition Services  Patient Name: Yarelis Jackson  YOB: 1970  MRN: [id number]  Admission date: 12/18/2024    PROGRESS NOTE      Nutrition Intervention Updates: Continue current EN at goal rate.          Encounter Information: Checking in to monitor TF tolerance. TF infusing at goal rate. Pt downgraded from ICU this am. SLP evaluated pt yesterday and recommends that pt remain NPO with alternate means of nutrition. Wound vac in place. FWF increased to 100 mL/hr r/t hypernatremia.        PO Diet: NPO Diet NPO Type: Strict NPO   PO Supplements: None    PO Intake:  NPO       Current nutrition support: Novasource renal at 35 ml/hr + 100 ml/hr FWF    Nutrition support review: Documented as ordered        Labs (reviewed below): Hypernatremia - will increase FWF further (already increased per MD)  Hypokalemia - replaced today  Hypermagnesemia - Management per nephrology       GI Function:  Last documented BM 12/26       Brief weight review   Wt Readings from Last 10 Encounters:   12/28/24 0409 110 kg (241 lb 6.5 oz)   12/27/24 0500 96.3 kg (212 lb 4.9 oz)   12/25/24 0400 98.5 kg (217 lb 2.5 oz)   12/24/24 0431 100 kg (221 lb 5.5 oz)   12/24/24 0333 100 kg (221 lb 5.5 oz)   12/19/24 0600 88.5 kg (195 lb 1.7 oz)   12/18/24 0527 92.5 kg (203 lb 14.4 oz)   07/15/23 1132 98.1 kg (216 lb 4.3 oz)   04/04/23 0719 98.1 kg (216 lb 4.3 oz)   12/12/22 0952 85.5 kg (188 lb 6.4 oz)   01/31/22 1404 89.8 kg (198 lb)   01/24/22 1406 89.8 kg (198 lb)   12/23/21 1133 89.8 kg (198 lb)   11/24/21 1537 90.3 kg (199 lb)   11/16/21 0838 90.3 kg (199 lb)   09/29/21 0320 87.4 kg (192 lb 10.9 oz)        Results from last 7 days   Lab Units 12/28/24  0004 12/27/24  0535 12/26/24  1406 12/26/24  0428   SODIUM mmol/L 149* 143  --  139   POTASSIUM mmol/L 3.3* 4.0 3.5 3.5   CHLORIDE mmol/L 111* 108*  --  104   CO2 mmol/L 30.5* 26.6  --  23.3   BUN mg/dL 21* 21*  --  36*   CREATININE mg/dL 0.97 1.00  --  1.62*   CALCIUM mg/dL 8.6 8.7  --   8.3*   BILIRUBIN mg/dL 0.7 0.7  --  0.6   ALK PHOS U/L 222* 265*  --  305*   ALT (SGPT) U/L 284* 384*  --  479*   AST (SGOT) U/L 92* 231*  --  502*   GLUCOSE mg/dL 128* 155*  --  175*     Results from last 7 days   Lab Units 12/28/24  0004 12/27/24  0535 12/26/24  1035 12/26/24  0428   MAGNESIUM mg/dL 4.0* 1.5*  --  2.0   PHOSPHORUS mg/dL 3.0 2.1*  --  2.8   HEMOGLOBIN g/dL 7.9* 8.1*   < > 6.6*   HEMATOCRIT % 23.8* 25.0*   < > 19.7*    < > = values in this interval not displayed.         RD to follow up per protocol.    Electronically signed by:  Kathy Plata RD  12/28/24 09:42 EST

## 2024-12-28 NOTE — PROGRESS NOTES
ENDOCRINE CONSULT PROGRESS NOTE  DATE OF SERVICE: 24        PATIENT NAME: Yarelis Jackson  PATIENT : 1970 AGE: 54 y.o.  MRN NUMBER: [id number]    ==========================================================================    CHIEF COMPLAINT: Type 1 diabetes management    CARE TEAM:   Patient Care Team:  Katie Duran PA as PCP - General (Physician Assistant)  Uli Starr MD as Consulting Physician (Nephrology)    SUBJECTIVE    Pt seen and examined.  Care discussed with nursing team as well.  Blood sugar reviewed for last 24 hours.  Continues to be on tube feeding.  No plan for oral diet during the weekend.    ==========================================================================    CURRENT ACTIVE HOSPITAL MEDICATIONS    Scheduled Medications:  calcium gluconate, 1,000 mg, Intravenous, Q12H  insulin glargine, 22 Units, Subcutaneous, Nightly  insulin lispro, 2-7 Units, Subcutaneous, Q6H  insulin lispro, 6 Units, Subcutaneous, Q6H  lansoprazole, 30 mg, Nasogastric, Q AM  midodrine, 10 mg, Nasogastric, Q8H  nystatin, 1 Application, Topical, Q8H  QUEtiapine, 75 mg, Nasogastric, Q12H  sodium chloride, 10 mL, Intravenous, Q12H         PRN Medications:    acetaminophen **OR** acetaminophen    albumin human    aluminum-magnesium hydroxide-simethicone    senna-docusate sodium **AND** polyethylene glycol **AND** bisacodyl **AND** bisacodyl    Calcium Replacement - Follow Nurse / BPA Driven Protocol    dextrose    dextrose    glucagon (human recombinant)    heparin (porcine)    heparin (porcine)    ipratropium-albuterol    Magnesium Standard Dose Replacement - Follow Nurse / BPA Driven Protocol    ondansetron ODT **OR** ondansetron    oxyCODONE    Phosphorus Replacement - Follow Nurse / BPA Driven Protocol    Potassium Replacement - Follow Nurse / BPA Driven Protocol    sodium chloride    sodium chloride    sodium chloride      ==========================================================================    OBJECTIVE    Vitals:    12/28/24 1121   BP: 141/71   Pulse:    Resp: 20   Temp: 97.8 °F (36.6 °C)   SpO2:       Body mass index is 41.44 kg/m².     General - Patient continues to be on tube feeding, awake and alert    ==========================================================================    LAB EVALUATION    Lab Results   Component Value Date    GLUCOSE 128 (H) 12/28/2024    BUN 21 (H) 12/28/2024    CREATININE 0.97 12/28/2024    EGFRIFNONA 87 09/29/2021    BCR 21.6 12/28/2024    K 4.0 12/28/2024    CO2 30.5 (H) 12/28/2024    CALCIUM 8.6 12/28/2024    ALBUMIN 3.0 (L) 12/28/2024    AST 92 (H) 12/28/2024     (H) 12/28/2024       Lab Results   Component Value Date    HGBA1C 15.80 (H) 12/18/2024     Lab Results   Component Value Date    CREATININE 0.97 12/28/2024     Results from last 7 days   Lab Units 12/28/24  1144 12/28/24  0552 12/27/24  2109 12/27/24  1749 12/27/24  1118 12/27/24  0533   GLUCOSE mg/dL 174* 139* 139* 171* 149* 148*     ==========================================================================    ASSESSMENT AND PLAN    # Type 1 diabetes with hyperglycemia  # DKA on admission, resolved  - Reviewed blood sugar for last 24 hours  - For nutrition patient is currently on tube feeding  - Continue insulin therapy as:  Insulin Lantus 22 units nightly  Insulin Lispro 6 units Q6hrs  Continue Insulin lispro sliding scale every 6 hours as well  - Will review blood sugar for next 24 hours and adjust therapy accordingly    Will follow with you.  Rest as per primary team.    Part of this note may be an electronic transcription/translation of spoken language to printed text using the Dragon Dictation System.     Note: Portions of this note may have been copied from previous notes but documentation have been reviewed and edited as necessary to support clinical decision making for today's visit.  The time of this note does not  reflect the time I saw the patient but the time that this note was written.    ==========================================================================  Patel Winkler MD  Department of Endocrine, Diabetes and Metabolism  [facility] [place]  ==========================================================================

## 2024-12-28 NOTE — PROGRESS NOTES
"NEPHROLOGY PROGRESS NOTE------[facility]    [facility]  [phone]  Deidra Starr MD      Patient Care Team:  Katie Duran PA as PCP - General (Physician Assistant)  Uli Starr MD as Consulting Physician (Nephrology)      Provider:  Deidra Starr MD  Patient Name: Yarelis Jackson  :  1970    SUBJECTIVE:    F/U ARF/MARGOT/ELECTROLYTE ABNORMALITIES    Restless. No SOB, CP. Weak. No dysuria.     Medication:  insulin glargine, 22 Units, Subcutaneous, Nightly  insulin lispro, 2-7 Units, Subcutaneous, Q6H  insulin lispro, 6 Units, Subcutaneous, Q6H  lansoprazole, 30 mg, Nasogastric, Q AM  midodrine, 10 mg, Nasogastric, Q8H  nystatin, 1 Application, Topical, Q8H  potassium chloride, 40 mEq, Oral, Q4H  QUEtiapine, 75 mg, Nasogastric, Q12H  sodium chloride, 10 mL, Intravenous, Q12H      heparin, 16.9 Units/kg/hr, Last Rate: 18 Units/kg/hr (24 0659)        OBJECTIVE    Vital Sign Min/Max for last 24 hours  Temp  Min: 97.5 °F (36.4 °C)  Max: 97.9 °F (36.6 °C)   BP  Min: 128/60  Max: 170/83   Pulse  Min: 97  Max: 126   Resp  Min: 14  Max: 21   SpO2  Min: 92 %  Max: 98 %   No data recorded   Weight  Min: 110 kg (241 lb 6.5 oz)  Max: 110 kg (241 lb 6.5 oz)     Flowsheet Rows      Flowsheet Row First Filed Value   Admission Height 162.6 cm (64\") Documented at 2024 0443   Admission Weight 92.5 kg (203 lb 14.4 oz) Documented at 2024 0527            No intake/output data recorded.  I/O last 3 completed shifts:  In: 2720 [I.V.:1046; Other:931; NG/GT:743]  Out: 4550 [Urine:4550]    Physical Exam:     General Appearance:  NAD  Head: normocephalic, without obvious abnormality and atraumatic +DHT INTACT +DRY OP  Eyes: conjunctivae and sclerae normal and no icterus  Neck: supple and no JVD  Lungs: +SCATTERED RHONCHI  Heart: regular rhythm & normal rate and normal S1, S2 +THOMAS  Chest: Wall no abnormalities observed  Abdomen: " "normal bowel sounds and soft +OBESITY  Extremities: moves extremities well, +TRACE  BILAT PRETIBIAL EDEMA, no cyanosis and no redness. +DJD  Skin: +BILAT PRETIBIAL FLUID FILLED BULLAE  Neurologic:  A AND O X 2    Labs:    WBC WBC   Date Value Ref Range Status   12/28/2024 16.35 (H) 3.40 - 10.80 10*3/mm3 Final   12/27/2024 15.02 (H) 3.40 - 10.80 10*3/mm3 Final   12/27/2024 15.49 (H) 3.40 - 10.80 10*3/mm3 Final   12/26/2024 16.72 (H) 3.40 - 10.80 10*3/mm3 Final      HGB Hemoglobin   Date Value Ref Range Status   12/28/2024 7.9 (L) 12.0 - 15.9 g/dL Final   12/27/2024 8.1 (L) 12.0 - 15.9 g/dL Final   12/27/2024 8.1 (L) 12.0 - 15.9 g/dL Final   12/26/2024 8.2 (L) 12.0 - 15.9 g/dL Final   12/26/2024 6.6 (C) 12.0 - 15.9 g/dL Final   12/26/2024 7.7 (C) 12.0 - 17.0 g/dL Final   12/25/2024 7.8 (L) 12.0 - 15.9 g/dL Final      HCT Hematocrit   Date Value Ref Range Status   12/28/2024 23.8 (L) 34.0 - 46.6 % Final   12/27/2024 25.0 (L) 34.0 - 46.6 % Final   12/27/2024 24.5 (L) 34.0 - 46.6 % Final   12/26/2024 24.4 (L) 34.0 - 46.6 % Final   12/26/2024 19.7 (C) 34.0 - 46.6 % Final   12/26/2024 23 (L) 38 - 51 % Final   12/25/2024 23.2 (L) 34.0 - 46.6 % Final      Platelets No results found for: \"LABPLAT\"   MCV MCV   Date Value Ref Range Status   12/28/2024 94.1 79.0 - 97.0 fL Final   12/27/2024 95.1 79.0 - 97.0 fL Final   12/27/2024 94.6 79.0 - 97.0 fL Final   12/26/2024 94.7 79.0 - 97.0 fL Final          Sodium Sodium   Date Value Ref Range Status   12/28/2024 149 (H) 136 - 145 mmol/L Final   12/27/2024 143 136 - 145 mmol/L Final   12/26/2024 139 136 - 145 mmol/L Final      Potassium Potassium   Date Value Ref Range Status   12/28/2024 3.3 (L) 3.5 - 5.2 mmol/L Final   12/27/2024 4.0 3.5 - 5.2 mmol/L Final   12/26/2024 3.5 3.5 - 5.2 mmol/L Final   12/26/2024 3.5 3.5 - 5.2 mmol/L Final      Chloride Chloride   Date Value Ref Range Status   12/28/2024 111 (H) 98 - 107 mmol/L Final   12/27/2024 108 (H) 98 - 107 mmol/L Final " "  12/26/2024 104 98 - 107 mmol/L Final      CO2 CO2   Date Value Ref Range Status   12/28/2024 30.5 (H) 22.0 - 29.0 mmol/L Final   12/27/2024 26.6 22.0 - 29.0 mmol/L Final   12/26/2024 23.3 22.0 - 29.0 mmol/L Final      BUN BUN   Date Value Ref Range Status   12/28/2024 21 (H) 6 - 20 mg/dL Final   12/27/2024 21 (H) 6 - 20 mg/dL Final   12/26/2024 36 (H) 6 - 20 mg/dL Final      Creatinine Creatinine   Date Value Ref Range Status   12/28/2024 0.97 0.57 - 1.00 mg/dL Final   12/27/2024 1.00 0.57 - 1.00 mg/dL Final   12/26/2024 1.62 (H) 0.57 - 1.00 mg/dL Final   12/26/2024 1.73 (H) 0.60 - 1.30 mg/dL Final     Comment:     Serial Number: [id number]:  [id number]      Calcium Calcium   Date Value Ref Range Status   12/28/2024 8.6 8.6 - 10.5 mg/dL Final   12/27/2024 8.7 8.6 - 10.5 mg/dL Final   12/26/2024 8.3 (L) 8.6 - 10.5 mg/dL Final      PO4 No components found for: \"PO4\"   Albumin Albumin   Date Value Ref Range Status   12/28/2024 3.0 (L) 3.5 - 5.2 g/dL Final   12/27/2024 3.2 (L) 3.5 - 5.2 g/dL Final   12/26/2024 3.5 3.5 - 5.2 g/dL Final      Magnesium Magnesium   Date Value Ref Range Status   12/28/2024 4.0 (H) 1.6 - 2.6 mg/dL Final   12/27/2024 1.5 (L) 1.6 - 2.6 mg/dL Final   12/26/2024 2.0 1.6 - 2.6 mg/dL Final      Uric Acid No components found for: \"URIC ACID\"     Imaging Results (Last 72 Hours)       ** No results found for the last 72 hours. **            Results for orders placed during the hospital encounter of 12/18/24    XR Chest 1 View    Narrative  XR CHEST 1 VW    Date of Exam: 12/22/2024 12:35 AM EST    Indication: Intubated Patient    Comparison:  12/21/2024    FINDINGS:  Endotracheal tube tip appears in satisfactory position at the level of the aortic arch. Right IJ catheter appears in stable position. Enteric tube extends into the left upper quadrant. Heart size and mediastinal contour appear within normal limits. No  definite pneumothorax or effusion. Mild bibasilar opacities. Findings of advanced " emphysema with suspected apical scarring.    Impression  1.Tubes and lines appear in satisfactory positions, as above.  2.Mild bibasilar opacities which could represent atelectasis or infiltrate.  3.Advanced emphysema.        Electronically Signed: Sam Haynes  12/22/2024 7:07 AM EST  Workstation ID: [id number]      XR Chest 1 View    Narrative  XR CHEST 1 VW    Date of Exam: 12/21/2024 4:32 AM EST    Indication: Intubated Patient    Comparison: 12/20/2024    Findings:  There is been no interval tube or line changes. Heart size and pulmonary vessels are within normal limits. There are postoperative changes of the bilateral upper lobes. Lungs appear clear. No pleural effusion or pneumothorax.    Impression  Impression:    1. No tube or line change  2. No focal airspace consolidation or other acute process.      Electronically Signed: Julio Thorpe MD  12/21/2024 9:54 AM EST  Workstation ID: [id number]      XR Chest 1 View    Narrative  XR CHEST 1 VW    Date of Exam: 12/20/2024 12:10 AM EST    Indication: Intubated Patient    Comparison: 12/19/2024    Findings:  Endotracheal tube approximately 2.5 cm above the carmina. Feeding tube and right IJ dialysis catheter remain in place. No new tubes or lines heart size and pulmonary vasculature are stable. Bullous changes in the right upper lobe. Postoperative changes in  the left upper lobe. Lungs grossly clear. No pneumothorax    Impression  Impression:  Stable chest demonstrating COPD and postoperative changes with no acute cardiopulmonary process demonstrated      Electronically Signed: Shon Ponce  12/20/2024 7:19 AM EST  Workstation ID: [id number]      Results for orders placed during the hospital encounter of 12/18/24    Duplex Lower Extremity Art / Grafts - Right CAR    Interpretation Summary    Limited study due to body habitus and current dressing site.  Patent external iliac and femoral-popliteal arterial flow with low flow noted below the common femoral.  Common  femoral artery poorly visualized.  Cannot rule out occlusion of the common femoral artery.        ASSESSMENT / PLAN      DKA (diabetic ketoacidosis)    Arterial thrombosis    ARF/MARGOT------Oliguric. BUN/Cr stable post HD yesterday. No HD today and follow for further HD need. +ARF/MARGOT with historically normal renal function. +ARF/MARGOT secondary to severe prerenal state and ATN from hypotension and Rhabdomyolysis.  Pressor support. Will do regular HD again today. Off IVFs. No NSAIDs. Renal US without obstructive uropathy.   Dose meds for CrCl less than 10 cc/min     2. HYPERNATREMIA/HYPEROSMOLAR STATE-----Increase water flushes with Tfs. Restart little IVFs     3. ACIDOSIS--Resolved     4. DVT PROPHYLAXIS-----On Heparin gtt     5. HYPOCALCEMIA------Replace IV and follow ionized levels     6. DM------BS ok     7. ANEMIA---------H/H stable     8. OA/DJD/HYPERURICEMIA------Allopurinol . No NSAIDs   Restart IVFs. CK down post multiple days of HD     10. GERD/PUD PROPHYLAXIS-----Renal dose adjusted Pepcid     11. DELIRIUM/TME     12. COPD/ACUTE HYPOXIC RESPIRATORY FAILURE-----S/P extubation    13. HYPOALBUMINEMIA-----S/P IV Albumin to temporize    14. EDEMA/VOLUME EXCESS---- Better post gentle UF with HD    15. HYPOTENSION-----BP ok    16. ELEVATED LFTS/TRANSAMINASES------Secondary to shock liver and Rhabdo. Follow    17. FASCITIS------S/P surgery      18. DELIRIUM---Better    19. HYPOPHOSPHATEMIA------Replaced    20. HYPOMAGNESEMIA------Replaced    Deidra Starr MD  [facility]  [phone]  12/28/24  08:54 EST

## 2024-12-28 NOTE — PLAN OF CARE
Goal Outcome Evaluation:      Transferred from ICU. Alert and oriented. Able to make needs known. Heparin drip decreased to 18 units after last PTT. Next PTT scheduled for 0725. NG feeding continues as ordered. Call light in reach. Dressing to right leg CDI.

## 2024-12-29 LAB
ALBUMIN SERPL-MCNC: 3.2 G/DL (ref 3.5–5.2)
ALBUMIN/GLOB SERPL: 1.2 G/DL
ALP SERPL-CCNC: 188 U/L (ref 39–117)
ALT SERPL W P-5'-P-CCNC: 195 U/L (ref 1–33)
ANION GAP SERPL CALCULATED.3IONS-SCNC: 6.7 MMOL/L (ref 5–15)
ANISOCYTOSIS BLD QL: ABNORMAL
APTT PPP: 121 SECONDS (ref 22.7–35.4)
APTT PPP: 56 SECONDS (ref 22.7–35.4)
APTT PPP: 68.1 SECONDS (ref 22.7–35.4)
AST SERPL-CCNC: 43 U/L (ref 1–32)
BILIRUB SERPL-MCNC: 0.5 MG/DL (ref 0–1.2)
BUN SERPL-MCNC: 19 MG/DL (ref 6–20)
BUN/CREAT SERPL: 22.9 (ref 7–25)
CA-I SERPL ISE-MCNC: 1.17 MMOL/L (ref 1.15–1.3)
CALCIUM SPEC-SCNC: 9 MG/DL (ref 8.6–10.5)
CHLORIDE SERPL-SCNC: 108 MMOL/L (ref 98–107)
CK SERPL-CCNC: 744 U/L (ref 20–180)
CO2 SERPL-SCNC: 31.3 MMOL/L (ref 22–29)
CREAT SERPL-MCNC: 0.83 MG/DL (ref 0.57–1)
DEPRECATED RDW RBC AUTO: 56.6 FL (ref 37–54)
EGFRCR SERPLBLD CKD-EPI 2021: 84.4 ML/MIN/1.73
ERYTHROCYTE [DISTWIDTH] IN BLOOD BY AUTOMATED COUNT: 16.7 % (ref 12.3–15.4)
GLOBULIN UR ELPH-MCNC: 2.7 GM/DL
GLUCOSE BLDC GLUCOMTR-MCNC: 144 MG/DL (ref 70–105)
GLUCOSE BLDC GLUCOMTR-MCNC: 151 MG/DL (ref 70–105)
GLUCOSE BLDC GLUCOMTR-MCNC: 155 MG/DL (ref 70–105)
GLUCOSE BLDC GLUCOMTR-MCNC: 165 MG/DL (ref 70–105)
GLUCOSE SERPL-MCNC: 140 MG/DL (ref 65–99)
HCT VFR BLD AUTO: 24.9 % (ref 34–46.6)
HGB BLD-MCNC: 8 G/DL (ref 12–15.9)
LARGE PLATELETS: ABNORMAL
LYMPHOCYTES # BLD MANUAL: 2.8 10*3/MM3 (ref 0.7–3.1)
LYMPHOCYTES NFR BLD MANUAL: 6 % (ref 5–12)
MAGNESIUM SERPL-MCNC: 1.7 MG/DL (ref 1.6–2.6)
MCH RBC QN AUTO: 30.8 PG (ref 26.6–33)
MCHC RBC AUTO-ENTMCNC: 32.1 G/DL (ref 31.5–35.7)
MCV RBC AUTO: 95.8 FL (ref 79–97)
METAMYELOCYTES NFR BLD MANUAL: 1 % (ref 0–0)
MONOCYTES # BLD: 0.89 10*3/MM3 (ref 0.1–0.9)
MYELOCYTES NFR BLD MANUAL: 6 % (ref 0–0)
NEUTROPHILS # BLD AUTO: 9.74 10*3/MM3 (ref 1.7–7)
NEUTROPHILS NFR BLD MANUAL: 63 % (ref 42.7–76)
NEUTS BAND NFR BLD MANUAL: 3 % (ref 0–5)
NRBC SPEC MANUAL: 1 /100 WBC (ref 0–0.2)
PHOSPHATE SERPL-MCNC: 3 MG/DL (ref 2.5–4.5)
PLASMA CELL PREC NFR BLD MANUAL: 2 % (ref 0–0)
PLATELET # BLD AUTO: 216 10*3/MM3 (ref 140–450)
PMV BLD AUTO: 9.7 FL (ref 6–12)
POIKILOCYTOSIS BLD QL SMEAR: ABNORMAL
POLYCHROMASIA BLD QL SMEAR: ABNORMAL
POTASSIUM SERPL-SCNC: 3.7 MMOL/L (ref 3.5–5.2)
PROT SERPL-MCNC: 5.9 G/DL (ref 6–8.5)
RBC # BLD AUTO: 2.6 10*6/MM3 (ref 3.77–5.28)
SCAN SLIDE: NORMAL
SODIUM SERPL-SCNC: 146 MMOL/L (ref 136–145)
TOXIC GRANULATION: ABNORMAL
VARIANT LYMPHS NFR BLD MANUAL: 17 % (ref 19.6–45.3)
VARIANT LYMPHS NFR BLD MANUAL: 2 % (ref 0–5)
WBC NRBC COR # BLD AUTO: 14.76 10*3/MM3 (ref 3.4–10.8)

## 2024-12-29 PROCEDURE — 82948 REAGENT STRIP/BLOOD GLUCOSE: CPT | Performed by: NURSE PRACTITIONER

## 2024-12-29 PROCEDURE — 99232 SBSQ HOSP IP/OBS MODERATE 35: CPT | Performed by: INTERNAL MEDICINE

## 2024-12-29 PROCEDURE — 85730 THROMBOPLASTIN TIME PARTIAL: CPT

## 2024-12-29 PROCEDURE — 82550 ASSAY OF CK (CPK): CPT | Performed by: INTERNAL MEDICINE

## 2024-12-29 PROCEDURE — 25010000002 HEPARIN (PORCINE) 25000-0.45 UT/250ML-% SOLUTION: Performed by: SURGERY

## 2024-12-29 PROCEDURE — 63710000001 INSULIN LISPRO (HUMAN) PER 5 UNITS: Performed by: INTERNAL MEDICINE

## 2024-12-29 PROCEDURE — 85025 COMPLETE CBC W/AUTO DIFF WBC: CPT | Performed by: SURGERY

## 2024-12-29 PROCEDURE — 92526 ORAL FUNCTION THERAPY: CPT

## 2024-12-29 PROCEDURE — 84100 ASSAY OF PHOSPHORUS: CPT | Performed by: SURGERY

## 2024-12-29 PROCEDURE — 82948 REAGENT STRIP/BLOOD GLUCOSE: CPT

## 2024-12-29 PROCEDURE — 85007 BL SMEAR W/DIFF WBC COUNT: CPT | Performed by: SURGERY

## 2024-12-29 PROCEDURE — 82330 ASSAY OF CALCIUM: CPT | Performed by: INTERNAL MEDICINE

## 2024-12-29 PROCEDURE — 99232 SBSQ HOSP IP/OBS MODERATE 35: CPT | Performed by: STUDENT IN AN ORGANIZED HEALTH CARE EDUCATION/TRAINING PROGRAM

## 2024-12-29 PROCEDURE — 63710000001 INSULIN GLARGINE PER 5 UNITS: Performed by: INTERNAL MEDICINE

## 2024-12-29 PROCEDURE — 85730 THROMBOPLASTIN TIME PARTIAL: CPT | Performed by: INTERNAL MEDICINE

## 2024-12-29 PROCEDURE — 80053 COMPREHEN METABOLIC PANEL: CPT | Performed by: SURGERY

## 2024-12-29 PROCEDURE — 25010000002 FUROSEMIDE PER 20 MG: Performed by: INTERNAL MEDICINE

## 2024-12-29 PROCEDURE — 83735 ASSAY OF MAGNESIUM: CPT | Performed by: SURGERY

## 2024-12-29 RX ORDER — FUROSEMIDE 10 MG/ML
40 INJECTION INTRAMUSCULAR; INTRAVENOUS ONCE
Status: COMPLETED | OUTPATIENT
Start: 2024-12-29 | End: 2024-12-29

## 2024-12-29 RX ADMIN — HEPARIN SODIUM 15.9 UNITS/KG/HR: 10000 INJECTION, SOLUTION INTRAVENOUS at 05:30

## 2024-12-29 RX ADMIN — NYSTATIN 1 APPLICATION: 100000 CREAM TOPICAL at 13:27

## 2024-12-29 RX ADMIN — LANSOPRAZOLE 30 MG: 30 TABLET, ORALLY DISINTEGRATING ORAL at 05:05

## 2024-12-29 RX ADMIN — INSULIN LISPRO 2 UNITS: 100 INJECTION, SOLUTION INTRAVENOUS; SUBCUTANEOUS at 23:53

## 2024-12-29 RX ADMIN — INSULIN LISPRO 2 UNITS: 100 INJECTION, SOLUTION INTRAVENOUS; SUBCUTANEOUS at 12:12

## 2024-12-29 RX ADMIN — INSULIN GLARGINE-YFGN 22 UNITS: 100 INJECTION, SOLUTION SUBCUTANEOUS at 20:08

## 2024-12-29 RX ADMIN — HEPARIN SODIUM 19.9 UNITS/KG/HR: 10000 INJECTION, SOLUTION INTRAVENOUS at 13:20

## 2024-12-29 RX ADMIN — INSULIN LISPRO 6 UNITS: 100 INJECTION, SOLUTION INTRAVENOUS; SUBCUTANEOUS at 05:04

## 2024-12-29 RX ADMIN — QUETIAPINE FUMARATE 75 MG: 25 TABLET ORAL at 20:08

## 2024-12-29 RX ADMIN — MIDODRINE HYDROCHLORIDE 10 MG: 5 TABLET ORAL at 12:43

## 2024-12-29 RX ADMIN — QUETIAPINE FUMARATE 75 MG: 25 TABLET ORAL at 10:17

## 2024-12-29 RX ADMIN — INSULIN LISPRO 6 UNITS: 100 INJECTION, SOLUTION INTRAVENOUS; SUBCUTANEOUS at 18:14

## 2024-12-29 RX ADMIN — INSULIN LISPRO 6 UNITS: 100 INJECTION, SOLUTION INTRAVENOUS; SUBCUTANEOUS at 23:53

## 2024-12-29 RX ADMIN — NYSTATIN 1 APPLICATION: 100000 CREAM TOPICAL at 05:07

## 2024-12-29 RX ADMIN — NYSTATIN 1 APPLICATION: 100000 CREAM TOPICAL at 20:08

## 2024-12-29 RX ADMIN — MIDODRINE HYDROCHLORIDE 10 MG: 5 TABLET ORAL at 05:05

## 2024-12-29 RX ADMIN — INSULIN LISPRO 6 UNITS: 100 INJECTION, SOLUTION INTRAVENOUS; SUBCUTANEOUS at 12:13

## 2024-12-29 RX ADMIN — ACETAMINOPHEN 650 MG: 325 TABLET, FILM COATED ORAL at 23:58

## 2024-12-29 RX ADMIN — Medication 10 ML: at 10:18

## 2024-12-29 RX ADMIN — INSULIN LISPRO 6 UNITS: 100 INJECTION, SOLUTION INTRAVENOUS; SUBCUTANEOUS at 00:29

## 2024-12-29 RX ADMIN — FUROSEMIDE 40 MG: 10 INJECTION, SOLUTION INTRAMUSCULAR; INTRAVENOUS at 12:44

## 2024-12-29 RX ADMIN — INSULIN LISPRO 2 UNITS: 100 INJECTION, SOLUTION INTRAVENOUS; SUBCUTANEOUS at 18:14

## 2024-12-29 NOTE — PROGRESS NOTES
ENDOCRINE CONSULT PROGRESS NOTE  DATE OF SERVICE: 24        PATIENT NAME: Yarelis Jackson  PATIENT : 1970 AGE: 54 y.o.  MRN NUMBER: [id number]    ==========================================================================    CHIEF COMPLAINT: Type 1 diabetes management    CARE TEAM:   Patient Care Team:  Katie Duran PA as PCP - General (Physician Assistant)  Uli Starr MD as Consulting Physician (Nephrology)    SUBJECTIVE    Pt seen and examined.  Patient continues to be on tube feeding.  Blood sugar reviewed for last 24 hours.    ==========================================================================    CURRENT ACTIVE HOSPITAL MEDICATIONS    Scheduled Medications:  insulin glargine, 22 Units, Subcutaneous, Nightly  insulin lispro, 2-7 Units, Subcutaneous, Q6H  insulin lispro, 6 Units, Subcutaneous, Q6H  lansoprazole, 30 mg, Nasogastric, Q AM  midodrine, 10 mg, Nasogastric, Q8H  nystatin, 1 Application, Topical, Q8H  QUEtiapine, 75 mg, Nasogastric, Q12H  sodium chloride, 10 mL, Intravenous, Q12H         PRN Medications:    acetaminophen **OR** acetaminophen    aluminum-magnesium hydroxide-simethicone    senna-docusate sodium **AND** polyethylene glycol **AND** bisacodyl **AND** bisacodyl    Calcium Replacement - Follow Nurse / BPA Driven Protocol    dextrose    dextrose    glucagon (human recombinant)    heparin (porcine)    heparin (porcine)    ipratropium-albuterol    Magnesium Standard Dose Replacement - Follow Nurse / BPA Driven Protocol    ondansetron ODT **OR** ondansetron    Phosphorus Replacement - Follow Nurse / BPA Driven Protocol    Potassium Replacement - Follow Nurse / BPA Driven Protocol    sodium chloride    sodium chloride    sodium chloride     ==========================================================================    OBJECTIVE    Vitals:    24 1200   BP: 180/83   Pulse:    Resp: 15   Temp: 98 °F (36.7 °C)   SpO2:       Body mass index is 39.2 kg/m².      General - Patient continues to be on tube feeding, awake and alert    ==========================================================================    LAB EVALUATION    Lab Results   Component Value Date    GLUCOSE 140 (H) 12/29/2024    BUN 19 12/29/2024    CREATININE 0.83 12/29/2024    EGFRIFNONA 87 09/29/2021    BCR 22.9 12/29/2024    K 3.7 12/29/2024    CO2 31.3 (H) 12/29/2024    CALCIUM 9.0 12/29/2024    ALBUMIN 3.2 (L) 12/29/2024    AST 43 (H) 12/29/2024     (H) 12/29/2024       Lab Results   Component Value Date    HGBA1C 15.80 (H) 12/18/2024     Lab Results   Component Value Date    CREATININE 0.83 12/29/2024     Results from last 7 days   Lab Units 12/29/24  1144 12/29/24  0007 12/28/24  1646 12/28/24  1144 12/28/24  0552 12/27/24  2109   GLUCOSE mg/dL 151* 144* 182* 174* 139* 139*     ==========================================================================    ASSESSMENT AND PLAN    # Type 1 diabetes with hyperglycemia  # DKA on admission, resolved  - Blood sugar reviewed for last 24 hours  - For nutritional purposes patient continues to be on tube feeding  - Will continue insulin therapy without any changes  Insulin Lantus 22 units nightly  Insulin Lispro 6 units Q6hrs  Continue Insulin lispro sliding scale every 6 hours as well  - Will review blood sugar for next 24 hours and adjust therapy accordingly    Will follow with you.  Rest as per primary team.    Part of this note may be an electronic transcription/translation of spoken language to printed text using the Dragon Dictation System.     Note: Portions of this note may have been copied from previous notes but documentation have been reviewed and edited as necessary to support clinical decision making for today's visit.  The time of this note does not reflect the time I saw the patient but the time that this note was written.    ==========================================================================  Patel Winkler MD  Department of  Endocrine, Diabetes and Metabolism  [facility]  [place], [place]  ==========================================================================

## 2024-12-29 NOTE — PLAN OF CARE
Goal Outcome Evaluation:   Pt was seen for re-eval of swallow. pt was initially sleeping but was easily awakened and remained alert throughout session. Pt was brought upright in bed and given trials of ice chips and water by spoon. Pt had good labial closure over spoon and functional mastication and bolus control of ice chips. Digital palpation of swallow suggests timely initiation.  After the swallow pt had clear vocal quality on all trials. Pt given 15 trials of water by spoon, and multiple trials of ice chips and had cough x1. No other overt s/s of aspiration occurred.     Pt appears ready for VFSS in the am. It is rec that she remain NPO until VFSS. Pt may have small sips of water by spoon and ice chips via the [employer] Water Protocol. See guidelines below. Education providedto pt on rationale for continued NPO and VFSS prior to PO. She verbalized understanding. ST will follow up with recs from VFSS.       The rationale to recommend water when a PO diet cannot appropriately/functionally sustain nutrition (or if thickened liquids are prescribed due to aspiration of thin liquids) is because water is low risk for aspiration pna when compared to aspiration of food or other liquids.    Benefits of a water protocol include but are not limited to:      Oral gratification   Engagement of oropharyngeal swallow musculature   Decrease likelihood of dehydration       Guidelines for proper implementation include:  Waiting a minimum of 30 minutes after consuming any other PO   Thorough oral care prior to consuming water  Upright at 90 degree hip flexion  Small sips at slow rate  Monitor for any changes in respiratory status and discontinue if distress noted     The [employer] Free Water Protocol is a research based protocol established in 1984.                           SLP Swallowing Diagnosis: suspected pharyngeal dysphagia (12/29/24 1700)

## 2024-12-29 NOTE — PLAN OF CARE
Goal Outcome Evaluation:    Patient remains on 3L HFNC. AOx4, with occasional confusion. Follows commands. Tolerated TF through NG tube. Choked on swabs. Two small BMs, over 1L collected via external cath. Heparin gtt running.     Remains PCU overflow.

## 2024-12-29 NOTE — PROGRESS NOTES
"NEPHROLOGY PROGRESS NOTE------[facility]    [facility]  [phone]  Raymundo Harris MD      Patient Care Team:  Katie Duran PA as PCP - General (Physician Assistant)  Uli Starr MD as Consulting Physician (Nephrology)      Provider:  Raymundo Harris MD  Patient Name: Yarelis Jacksno  :  1970    SUBJECTIVE:    F/U ARF/MARGOT/ELECTROLYTE ABNORMALITIES    No SOB, CP. Weak. No dysuria.     Medication:  insulin glargine, 22 Units, Subcutaneous, Nightly  insulin lispro, 2-7 Units, Subcutaneous, Q6H  insulin lispro, 6 Units, Subcutaneous, Q6H  lansoprazole, 30 mg, Nasogastric, Q AM  midodrine, 10 mg, Nasogastric, Q8H  nystatin, 1 Application, Topical, Q8H  QUEtiapine, 75 mg, Nasogastric, Q12H  sodium chloride, 10 mL, Intravenous, Q12H      dextrose 5 % with KCl 20 mEq, 75 mL/hr, Last Rate: 75 mL/hr (24 1219)  heparin, 16.9 Units/kg/hr, Last Rate: 15.9 Units/kg/hr (24 0530)        OBJECTIVE    Vital Sign Min/Max for last 24 hours  Temp  Min: 97.3 °F (36.3 °C)  Max: 97.8 °F (36.6 °C)   BP  Min: 132/73  Max: 183/84   Pulse  Min: 92  Max: 94   Resp  Min: 13  Max: 22   SpO2  Min: 100 %  Max: 100 %   No data recorded   Weight  Min: 104 kg (228 lb 6.3 oz)  Max: 104 kg (228 lb 6.3 oz)     Flowsheet Rows      Flowsheet Row First Filed Value   Admission Height 162.6 cm (64\") Documented at 2024 0443   Admission Weight 92.5 kg (203 lb 14.4 oz) Documented at 2024 0527            No intake/output data recorded.  I/O last 3 completed shifts:  In: 3290 [I.V.:635; Other:1725; NG/GT:930]  Out: 2490 [Urine:2300]    Physical Exam:     General Appearance:  NAD  Head: normocephalic, without obvious abnormality and atraumatic +DHT INTACT +DRY OP  Eyes: conjunctivae and sclerae normal and no icterus  Neck: supple and no JVD  Lungs: +SCATTERED RHONCHI  Heart: regular rhythm & normal rate and normal S1, S2 +THOMAS  Chest: Wall no abnormalities " "observed  Abdomen: normal bowel sounds and soft +OBESITY  Extremities: moves extremities well, +TRACE  BILAT PRETIBIAL EDEMA, no cyanosis and no redness. +DJD  Skin: +BILAT PRETIBIAL FLUID FILLED BULLAE  Neurologic:  A AND O X 2    Labs:    WBC WBC   Date Value Ref Range Status   12/29/2024 14.76 (H) 3.40 - 10.80 10*3/mm3 Final   12/28/2024 16.35 (H) 3.40 - 10.80 10*3/mm3 Final   12/27/2024 15.02 (H) 3.40 - 10.80 10*3/mm3 Final   12/27/2024 15.49 (H) 3.40 - 10.80 10*3/mm3 Final      HGB Hemoglobin   Date Value Ref Range Status   12/29/2024 8.0 (L) 12.0 - 15.9 g/dL Final   12/28/2024 7.9 (L) 12.0 - 15.9 g/dL Final   12/27/2024 8.1 (L) 12.0 - 15.9 g/dL Final   12/27/2024 8.1 (L) 12.0 - 15.9 g/dL Final   12/26/2024 8.2 (L) 12.0 - 15.9 g/dL Final      HCT Hematocrit   Date Value Ref Range Status   12/29/2024 24.9 (L) 34.0 - 46.6 % Final   12/28/2024 23.8 (L) 34.0 - 46.6 % Final   12/27/2024 25.0 (L) 34.0 - 46.6 % Final   12/27/2024 24.5 (L) 34.0 - 46.6 % Final   12/26/2024 24.4 (L) 34.0 - 46.6 % Final      Platelets No results found for: \"LABPLAT\"   MCV MCV   Date Value Ref Range Status   12/29/2024 95.8 79.0 - 97.0 fL Final   12/28/2024 94.1 79.0 - 97.0 fL Final   12/27/2024 95.1 79.0 - 97.0 fL Final   12/27/2024 94.6 79.0 - 97.0 fL Final          Sodium Sodium   Date Value Ref Range Status   12/29/2024 146 (H) 136 - 145 mmol/L Final   12/28/2024 149 (H) 136 - 145 mmol/L Final   12/27/2024 143 136 - 145 mmol/L Final      Potassium Potassium   Date Value Ref Range Status   12/29/2024 3.7 3.5 - 5.2 mmol/L Final   12/28/2024 4.0 3.5 - 5.2 mmol/L Final   12/28/2024 3.3 (L) 3.5 - 5.2 mmol/L Final   12/27/2024 4.0 3.5 - 5.2 mmol/L Final   12/26/2024 3.5 3.5 - 5.2 mmol/L Final      Chloride Chloride   Date Value Ref Range Status   12/29/2024 108 (H) 98 - 107 mmol/L Final   12/28/2024 111 (H) 98 - 107 mmol/L Final   12/27/2024 108 (H) 98 - 107 mmol/L Final      CO2 CO2   Date Value Ref Range Status   12/29/2024 31.3 (H) 22.0 - " "29.0 mmol/L Final   12/28/2024 30.5 (H) 22.0 - 29.0 mmol/L Final   12/27/2024 26.6 22.0 - 29.0 mmol/L Final      BUN BUN   Date Value Ref Range Status   12/29/2024 19 6 - 20 mg/dL Final   12/28/2024 21 (H) 6 - 20 mg/dL Final   12/27/2024 21 (H) 6 - 20 mg/dL Final      Creatinine Creatinine   Date Value Ref Range Status   12/29/2024 0.83 0.57 - 1.00 mg/dL Final   12/28/2024 0.97 0.57 - 1.00 mg/dL Final   12/27/2024 1.00 0.57 - 1.00 mg/dL Final      Calcium Calcium   Date Value Ref Range Status   12/29/2024 9.0 8.6 - 10.5 mg/dL Final   12/28/2024 8.6 8.6 - 10.5 mg/dL Final   12/27/2024 8.7 8.6 - 10.5 mg/dL Final      PO4 No components found for: \"PO4\"   Albumin Albumin   Date Value Ref Range Status   12/29/2024 3.2 (L) 3.5 - 5.2 g/dL Final   12/28/2024 3.0 (L) 3.5 - 5.2 g/dL Final   12/27/2024 3.2 (L) 3.5 - 5.2 g/dL Final      Magnesium Magnesium   Date Value Ref Range Status   12/29/2024 1.7 1.6 - 2.6 mg/dL Final   12/28/2024 4.0 (H) 1.6 - 2.6 mg/dL Final   12/27/2024 1.5 (L) 1.6 - 2.6 mg/dL Final      Uric Acid No components found for: \"URIC ACID\"     Imaging Results (Last 72 Hours)       ** No results found for the last 72 hours. **            Results for orders placed during the hospital encounter of 12/18/24    XR Chest 1 View    Narrative  XR CHEST 1 VW    Date of Exam: 12/22/2024 12:35 AM EST    Indication: Intubated Patient    Comparison:  12/21/2024    FINDINGS:  Endotracheal tube tip appears in satisfactory position at the level of the aortic arch. Right IJ catheter appears in stable position. Enteric tube extends into the left upper quadrant. Heart size and mediastinal contour appear within normal limits. No  definite pneumothorax or effusion. Mild bibasilar opacities. Findings of advanced emphysema with suspected apical scarring.    Impression  1.Tubes and lines appear in satisfactory positions, as above.  2.Mild bibasilar opacities which could represent atelectasis or infiltrate.  3.Advanced " emphysema.        Electronically Signed: Sam Haynes  12/22/2024 7:07 AM EST  Workstation ID: [id number]      XR Chest 1 View    Narrative  XR CHEST 1 VW    Date of Exam: 12/21/2024 4:32 AM EST    Indication: Intubated Patient    Comparison: 12/20/2024    Findings:  There is been no interval tube or line changes. Heart size and pulmonary vessels are within normal limits. There are postoperative changes of the bilateral upper lobes. Lungs appear clear. No pleural effusion or pneumothorax.    Impression  Impression:    1. No tube or line change  2. No focal airspace consolidation or other acute process.      Electronically Signed: Julio Thorpe MD  12/21/2024 9:54 AM EST  Workstation ID: [id number]      XR Chest 1 View    Narrative  XR CHEST 1 VW    Date of Exam: 12/20/2024 12:10 AM EST    Indication: Intubated Patient    Comparison: 12/19/2024    Findings:  Endotracheal tube approximately 2.5 cm above the carmina. Feeding tube and right IJ dialysis catheter remain in place. No new tubes or lines heart size and pulmonary vasculature are stable. Bullous changes in the right upper lobe. Postoperative changes in  the left upper lobe. Lungs grossly clear. No pneumothorax    Impression  Impression:  Stable chest demonstrating COPD and postoperative changes with no acute cardiopulmonary process demonstrated      Electronically Signed: Shon Ponce  12/20/2024 7:19 AM EST  Workstation ID: [id number]      Results for orders placed during the hospital encounter of 12/18/24    Duplex Lower Extremity Art / Grafts - Right CAR    Interpretation Summary    Limited study due to body habitus and current dressing site.  Patent external iliac and femoral-popliteal arterial flow with low flow noted below the common femoral.  Common femoral artery poorly visualized.  Cannot rule out occlusion of the common femoral artery.        ASSESSMENT / PLAN      DKA (diabetic ketoacidosis)    Arterial thrombosis    ARF/MARGOT------ nonoliguric.  Follow  for further dialysis need... +ARF/MARGOT with historically normal renal function. +ARF/MARGOT secondary to severe prerenal state and ATN from hypotension and Rhabdomyolysis.  Pressor support. Off IVFs. No NSAIDs. Renal US without obstructive uropathy.   Dose meds for CrCl less than 10 cc/min     2. HYPERNATREMIA/HYPEROSMOLAR STATE-----encourage fluid intake     3. ACIDOSIS--Resolved     4. DVT PROPHYLAXIS-----On Heparin gtt     5. HYPOCALCEMIA------Replace IV and follow ionized levels     6. DM------BS ok     7. ANEMIA---------H/H stable     8. OA/DJD/HYPERURICEMIA------Allopurinol . No NSAIDs   Restart IVFs. CK down post multiple days of HD     10. GERD/PUD PROPHYLAXIS-----Renal dose adjusted Pepcid     11. DELIRIUM/TME     12. COPD/ACUTE HYPOXIC RESPIRATORY FAILURE-----S/P extubation    13. HYPOALBUMINEMIA-----S/P IV Albumin to temporize    14. EDEMA/VOLUME EXCESS---- Better post gentle UF with HD    15. HYPOTENSION-----BP ok    16. ELEVATED LFTS/TRANSAMINASES------Secondary to shock liver and Rhabdo. Follow    17. FASCITIS------S/P surgery      18. DELIRIUM---Better    19. HYPOPHOSPHATEMIA------Replaced    20. HYPOMAGNESEMIA------Replaced    Creatinine stable  Has some excess fluid  Lasix IV x 1  Increase free water per feeding tube  Monitor renal function, fluid status and electrolytes    Raymundo Harris MD  [facility]  [phone]  12/29/24  08:45 EST

## 2024-12-29 NOTE — THERAPY RE-EVALUATION
Acute Care - Speech Language Pathology   [facility]     Patient Name: Yarelis Jackson  : 1970  MRN: [id number]  Today's Date: 2024               Admit Date: 2024    Visit Dx:     ICD-10-CM ICD-9-CM   1. Diabetic ketoacidosis without coma associated with other specified diabetes mellitus  E13.10 250.12   2. MARGOT (acute kidney injury)  N17.9 584.9   3. Hypernatremia  E87.0 276.0   4. Acute encephalopathy  G93.40 348.30   5. Shock  R57.9 785.50   6. Arterial thrombosis  I74.9 444.9     Patient Active Problem List   Diagnosis    Benign essential hypertension    Cervical radiculopathy    Chronic obstructive pulmonary disease    Cigarette smoker    Hyperlipidemia    Menopause    Migraine headache    DKA (diabetic ketoacidosis)    Arterial thrombosis     Past Medical History:   Diagnosis Date    COPD (chronic obstructive pulmonary disease)     Low back pain     Migraine     Neck pain      Past Surgical History:   Procedure Laterality Date    FASCIOTOMY Right 2024    Procedure: FASCIOTOMY LEG;  Surgeon: Chris Delgado MD;  Location: [facility] MAIN OR;  Service: Vascular;  Laterality: Right;    FEMORAL THROMBECTOMY/EMBOLECTOMY Right 2024    Procedure: FEMORAL right iliofemoral thrombectomy, arteriogram;  Surgeon: Ghassan Celestin MD;  Location: [facility] OR;  Service: Vascular;  Laterality: Right;    FEMORAL THROMBECTOMY/EMBOLECTOMY Right 2024    Procedure: Right femoral thrombectomy, right lower extremity arteriogram and right lower leg facitomies;  Surgeon: Ghassan Celestin MD;  Location: [facility] OR;  Service: Vascular;  Laterality: Right;    LUNG SURGERY         SLP Recommendation and Plan  SLP Swallowing Diagnosis: suspected pharyngeal dysphagia (24)  SLP Diet Recommendation: NPO (24)  Recommended Precautions and Strategies: general aspiration precautions (24)  SLP Rec. for Method of Medication Administration:  meds via alternate route (12/29/24 1700)        Recommended Diagnostics: reassess via VFSS ([facility]) (12/29/24 1700)  Swallow Criteria for Skilled Therapeutic Interventions Met: demonstrates skilled criteria (12/29/24 1700)     Rehab Potential/Prognosis, Swallowing: good, to achieve stated therapy goals (12/29/24 1700)  Therapy Frequency (Swallow): PRN (12/29/24 1700)  Predicted Duration Therapy Intervention (Days): until discharge (12/29/24 1700)  Oral Care Recommendations: Oral Care BID/PRN, Before ice/water (12/29/24 1700)                                               SWALLOW EVALUATION (Last 72 Hours)       SLP Adult Swallow Evaluation       Row Name 12/29/24 1700       Rehab Evaluation    Document Type re-evaluation  -CP    Subjective Information no complaints  -CP    Patient Observations alert;cooperative  -CP    Patient/Family/Caregiver Comments/Observations --    Patient Effort good  -CP    Comment Pt was seen for re-eval of swallow. pt was initially sleeping but was easily awakened and remained alert throughout session. Pt was brought upright in bed and given trials of ice chips and water by spoon. Pt had good labial closure over spoon and functional mastication and bolus control of ice chips. Digital palpation of swallow suggests timely initiation.  After the swallow pt had clear vocal quality on all trials. Pt given 15 trials of water by spoon, and multiple trials of ice chips and had cough x1. No other overt s/s of aspiration occurred. Pt appears ready for VFSS in the am. It is rec that she remain NPO until VFSS. Pt may have small sips of water by spoon and ice chips via the [place] Water Protocol. See guidelines below. Education providedto pt on rationale for continued NPO and VFSS prior to PO. She verbalized understanding. ST will follow up with recs from VFSS.      The rationale to recommend water when a PO diet cannot appropriately/functionally sustain nutrition (or if thickened liquids are prescribed  due to aspiration of thin liquids) is because water is low risk for aspiration pna when compared to aspiration of food or other liquids.    Benefits of a water protocol include but are not limited to:     Oral gratification   Engagement of oropharyngeal swallow musculature   Decrease likelihood of dehydration      Guidelines for proper implementation include:  Waiting a minimum of 30 minutes after consuming any other PO   Thorough oral care prior to consuming water  Upright at 90 degree hip flexion  Small sips at slow rate  Monitor for any changes in respiratory status and discontinue if distress noted    The [place] Water Protocol is a research based protocol established in 1984.   -CP       SLP Evaluation Clinical Impression    SLP Swallowing Diagnosis suspected pharyngeal dysphagia  -CP    Functional Impact risk of aspiration/pneumonia;risk of malnutrition;risk of dehydration  -CP    Rehab Potential/Prognosis, Swallowing good, to achieve stated therapy goals  -CP    Swallow Criteria for Skilled Therapeutic Interventions Met demonstrates skilled criteria  -CP       SLP Treatment Clinical Impressions    Barriers to Overall Progress (SLP) --    Plan for Continued Treatment (SLP) --    Care Plan Review evaluation/treatment results reviewed  -CP       Recommendations    Therapy Frequency (Swallow) PRN  -CP    Predicted Duration Therapy Intervention (Days) until discharge  -CP    SLP Diet Recommendation NPO  -CP    Recommended Diagnostics reassess via VFSS ([facility])  -CP    Recommended Precautions and Strategies general aspiration precautions  -CP    Oral Care Recommendations Oral Care BID/PRN;Before ice/water  -CP    SLP Rec. for Method of Medication Administration meds via alternate route  -CP    Monitor for Signs of Aspiration --    Anticipated Discharge Disposition (SLP) --       Swallow Goals (SLP)    Swallow LTGs Patient will demonstrate functional swallow for;Swallow Long Term Goal (free text)  -CP    Swallow  STGs diet tolerance goal selection (SLP)  -CP    Diet Tolerance Goal Selection (SLP) Patient will tolerate trials of;Swallow Short Term Goal 1  -CP       (LTG) Patient will demonstrate functional swallow for    Diet Texture (Demonstrate functional swallow) pureed textures  -CP    Liquid viscosity (Demonstrate functional swallow) thin liquids  -CP    [place] (Demonstrate functional swallow) independently (over 90% accuracy)  -CP    Time Frame (Demonstrate functional swallow) by discharge  -CP    Progress/Outcomes (Demonstrate functional swallow) goal ongoing  -CP    Comment (Demonstrate functional swallow) See above  -CP       (LTG) Swallow    (LTG) Swallow Pt will maximize swallow function for least restrictive PO diet, exhibiting no complication associated with dysphagia, adequate PO intake, and demonstrating independent use of swallow compensations  -CP    Time Frame (Swallow Long Term Goal) by discharge  -CP    Progress/Outcomes (Swallow Long Term Goal) goal ongoing  -CP    Comment (Swallow Long Term Goal) See above  -CP       (STG) Patient will tolerate trials of    Consistencies Trialed (Tolerate trials) pureed textures;thin liquids  -CP    Desired Outcome (Tolerate trials) without signs/symptoms of aspiration;without signs of distress;with adequate oral prep/transit/clearance  -CP    [place] (Tolerate trials) independently (over 90% accuracy)  -CP    Time Frame (Tolerate trials) by discharge  -CP    Progress/Outcomes (Tolerate trials) goal ongoing  -CP       (STG) Swallow 1    (STG) Swallow 1 The patient will participate in ongoing assessment of swallow, including re-evaluation clinically and/or including instrumental assessment of swallow if indicated, to further assess swallow function in anticipation to initiate a PO diet  -CP    Time Frame (Swallow Short Term Goal 1) 1 week  -CP    Progress/Outcomes (Swallow Short Term Goal 1) goal ongoing  -CP              User Key  (r) = Recorded By, (t) =  Taken By, (c) = Cosigned By      Initials Name Effective Dates    CP Mirtha Valdez, SLP 06/16/21 -     MS Lazaro Dumont, SLP 04/22/24 -                     EDUCATION  The patient has been educated in the following areas:   Dysphagia (Swallowing Impairment) Oral Care/Hydration NPO rationale.        SLP GOALS       Row Name 12/29/24 1700 12/27/24 1200          (LTG) Patient will demonstrate functional swallow for    Diet Texture (Demonstrate functional swallow) pureed textures  -CP pureed textures  -MS     Liquid viscosity (Demonstrate functional swallow) thin liquids  -CP thin liquids  -MS     [place] (Demonstrate functional swallow) independently (over 90% accuracy)  -CP independently (over 90% accuracy)  -MS     Time Frame (Demonstrate functional swallow) by discharge  -CP by discharge  -MS     Progress/Outcomes (Demonstrate functional swallow) goal ongoing  -CP new goal  -MS     Comment (Demonstrate functional swallow) See above  -CP --        (LTG) Swallow    (LTG) Swallow Pt will maximize swallow function for least restrictive PO diet, exhibiting no complication associated with dysphagia, adequate PO intake, and demonstrating independent use of swallow compensations  -CP --     Time Frame (Swallow Long Term Goal) by discharge  -CP --     Progress/Outcomes (Swallow Long Term Goal) goal ongoing  -CP --     Comment (Swallow Long Term Goal) See above  -CP --        (STG) Patient will tolerate trials of    Consistencies Trialed (Tolerate trials) pureed textures;thin liquids  -CP pureed textures;thin liquids  -MS     Desired Outcome (Tolerate trials) without signs/symptoms of aspiration;without signs of distress;with adequate oral prep/transit/clearance  -CP without signs/symptoms of aspiration;without signs of distress;with adequate oral prep/transit/clearance  -MS     [place] (Tolerate trials) independently (over 90% accuracy)  -CP independently (over 90% accuracy)  -MS     Time Frame (Tolerate trials)  by discharge  -CP by discharge  -MS     Progress/Outcomes (Tolerate trials) goal ongoing  -CP new goal  -MS        (STG) Swallow 1    (STG) Swallow 1 The patient will participate in ongoing assessment of swallow, including re-evaluation clinically and/or including instrumental assessment of swallow if indicated, to further assess swallow function in anticipation to initiate a PO diet  -CP --     Time Frame (Swallow Short Term Goal 1) 1 week  -CP --     Progress/Outcomes (Swallow Short Term Goal 1) goal ongoing  -CP --               User Key  (r) = Recorded By, (t) = Taken By, (c) = Cosigned By      Initials Name Provider Type    CP Mirtha Valdez, SLP Speech and Language Pathologist    Lazaro Avila, SLP Speech and Language Pathologist                         Time Calculation:                JUN Camilo  12/29/2024

## 2024-12-29 NOTE — PROGRESS NOTES
Hematology/Oncology Inpatient Progress Note    PATIENT NAME: Yarelis Jackson  : 1970  MRN: [id number]    CHIEF COMPLAINT: Bleeding    HISTORY OF PRESENT ILLNESS:    Yarelis Jcakson is a 54 y.o. female who presented to [facility] on 2024 with complaints of critical ischemia to the leg treated with serial operations with improvement.  Patient had right lower extremity ischemia due to thromboembolism to the right common femoral artery.  She underwent thrombectomy.  She was subsequently found to have a flap in the right femoral artery which was repaired on 2024 without any thrombus associated.  She was placed on IV heparin and developed hypotension with bleeding she has also had precipitous drop in her platelet count.  There is concern for possible HIT.  Heparin drip has since then been discontinued and we have been asked to see her for further evaluation and management of thrombocytopenia possibility of HIT  Review of her labs indicate that she had platelet counts of 99,000 as of 2024 subsequently dropped to 63,000.  She also has decreasing hemoglobin from 8.7-7.2 within the past 24 hours     24  Hematology/Oncology was consulted for thrombocytopenia and possible HIT.  Thrombophilia workup has also been initiated by primary team results are pending     He/She  has a past medical history of COPD (chronic obstructive pulmonary disease), Low back pain, Migraine, and Neck pain.    24: pt seen today. Continues to be drowsy and non communicative, on sedation.     PCP: Katie Duran PA  Subjective   24: Pt seen today. Awake, Alert, oriented X 3. Has indwelling NG tube. Communicative. Has some leg pain but denied any other symptoms.    ROS:  Review of Systems   Constitutional:  Positive for fatigue.   HENT: Negative.     Eyes: Negative.    Respiratory: Negative.     Cardiovascular: Negative.    Gastrointestinal: Negative.    Endocrine: Negative.    Genitourinary:  "Negative.    Musculoskeletal:  Positive for gait problem and myalgias.   Skin: Negative.    Allergic/Immunologic: Negative.    Hematological: Negative.    Psychiatric/Behavioral: Negative.        MEDICATIONS:    Scheduled Meds:  insulin glargine, 22 Units, Subcutaneous, Nightly  insulin lispro, 2-7 Units, Subcutaneous, Q6H  insulin lispro, 6 Units, Subcutaneous, Q6H  lansoprazole, 30 mg, Nasogastric, Q AM  midodrine, 10 mg, Nasogastric, Q8H  nystatin, 1 Application, Topical, Q8H  QUEtiapine, 75 mg, Nasogastric, Q12H  sodium chloride, 10 mL, Intravenous, Q12H       Continuous Infusions:  heparin, 16.9 Units/kg/hr, Last Rate: 19.9 Units/kg/hr (12/29/24 1320)       PRN Meds:    acetaminophen **OR** acetaminophen    aluminum-magnesium hydroxide-simethicone    senna-docusate sodium **AND** polyethylene glycol **AND** bisacodyl **AND** bisacodyl    Calcium Replacement - Follow Nurse / BPA Driven Protocol    dextrose    dextrose    glucagon (human recombinant)    heparin (porcine)    heparin (porcine)    ipratropium-albuterol    Magnesium Standard Dose Replacement - Follow Nurse / BPA Driven Protocol    ondansetron ODT **OR** ondansetron    Phosphorus Replacement - Follow Nurse / BPA Driven Protocol    Potassium Replacement - Follow Nurse / BPA Driven Protocol    sodium chloride    sodium chloride    sodium chloride     ALLERGIES:    Allergies   Allergen Reactions    Aspirin GI Intolerance    Ibuprofen Itching     Objective    VITALS:   /83 (BP Location: Right arm, Patient Position: Lying)   Pulse 93   Temp 98 °F (36.7 °C) (Oral)   Resp 15   Ht 162.6 cm (64\")   Wt 104 kg (228 lb 6.3 oz)   LMP  (LMP Unknown)   SpO2 100%   BMI 39.20 kg/m²     PHYSICAL EXAM: (performed by MD)  Physical Exam  Constitutional:       General: She is not in acute distress.     Appearance: She is normal weight. She is ill-appearing.      Comments: Acutely ill appearing woman. Intubated and sedated. Not responsive to verbal " stimulation.    HENT:      Head: Normocephalic and atraumatic.      Right Ear: External ear normal.      Left Ear: External ear normal.      Nose: Nose normal.      Mouth/Throat:      Mouth: Mucous membranes are moist.      Pharynx: Oropharynx is clear. No oropharyngeal exudate or posterior oropharyngeal erythema.   Eyes:      General:         Right eye: No discharge.         Left eye: No discharge.      Extraocular Movements: Extraocular movements intact.      Conjunctiva/sclera: Conjunctivae normal.      Pupils: Pupils are equal, round, and reactive to light.   Cardiovascular:      Rate and Rhythm: Tachycardia present.      Pulses: Normal pulses.   Pulmonary:      Effort: Pulmonary effort is normal. No respiratory distress.      Breath sounds: Rales present. No wheezing or rhonchi.      Comments: Symmetrically diminished.   Abdominal:      General: Abdomen is flat. There is no distension.      Palpations: Abdomen is soft. There is no mass.      Tenderness: There is no abdominal tenderness. There is no guarding or rebound.      Hernia: No hernia is present.      Comments: Protuberant and soft. Not apparently tender. No palpable tumors.   Has indwelling NG tube   Musculoskeletal:         General: Normal range of motion.      Cervical back: Normal range of motion and neck supple.      Right lower leg: Edema present.      Left lower leg: Edema present.   Skin:     General: Skin is warm.      Coloration: Skin is not jaundiced or pale.      Findings: Bruising present.   Neurological:      Mental Status: She is alert.   Psychiatric:         Mood and Affect: Mood normal.         Behavior: Behavior normal.         Thought Content: Thought content normal.         Judgment: Judgment normal.      I have reexamined the patient and the results are consistent with the previously documented exam. Allen Tidwell MD      RECENT LABS:    Lab Results (last 24 hours)       Procedure Component Value Units Date/Time    aPTT  [[id number]]  (Abnormal) Collected: 12/29/24 1220    Specimen: Blood Updated: 12/29/24 1246     PTT 56.0 seconds     POC Glucose Once [[id number]]  (Abnormal) Collected: 12/29/24 1144    Specimen: Blood Updated: 12/29/24 1146     Glucose 151 mg/dL      Comment: Serial Number: [id number]:  [id number]       CBC & Differential [[id number]]  (Abnormal) Collected: 12/29/24 0337    Specimen: Blood Updated: 12/29/24 0628    Narrative:      The following orders were created for panel order CBC & Differential.  Procedure                               Abnormality         Status                     ---------                               -----------         ------                     CBC Auto Differential[[id number]]        Abnormal            Final result               Scan Slide[[id number]]                                       Final result                 Please view results for these tests on the individual orders.    CBC Auto Differential [[id number]]  (Abnormal) Collected: 12/29/24 0337    Specimen: Blood Updated: 12/29/24 0628     WBC 14.76 10*3/mm3      RBC 2.60 10*6/mm3      Hemoglobin 8.0 g/dL      Hematocrit 24.9 %      MCV 95.8 fL      MCH 30.8 pg      MCHC 32.1 g/dL      RDW 16.7 %      RDW-SD 56.6 fl      MPV 9.7 fL      Platelets 216 10*3/mm3     Narrative:      The previously reported component NRBC is no longer being reported. Previous result was 0.2 /100 WBC (Reference Range: 0.0-0.2 /100 WBC) on 12/29/2024 at 0349 EST.    Scan Slide [[id number]] Collected: 12/29/24 0337    Specimen: Blood Updated: 12/29/24 0628     Scan Slide --     Comment: See Manual Differential Results       Manual Differential [[id number]]  (Abnormal) Collected: 12/29/24 0337    Specimen: Blood Updated: 12/29/24 0628     Neutrophil % 63.0 %      Lymphocyte % 17.0 %      Monocyte % 6.0 %      Bands %  3.0 %      Metamyelocyte % 1.0 %      Myelocyte % 6.0 %      Atypical Lymphocyte % 2.0 %      Plasma Cells % 2.0 %      Neutrophils Absolute  9.74 10*3/mm3      Lymphocytes Absolute 2.80 10*3/mm3      Monocytes Absolute 0.89 10*3/mm3      nRBC 1.0 /100 WBC      Anisocytosis Slight/1+     Poikilocytes Slight/1+     Polychromasia Slight/1+     Toxic Granulation Slight/1+     Large Platelets Slight/1+    Calcium, Ionized [[id number]]  (Normal) Collected: 12/29/24 0337    Specimen: Blood Updated: 12/29/24 0428     Ionized Calcium 1.17 mmol/L     Magnesium [[id number]]  (Normal) Collected: 12/29/24 0337    Specimen: Blood Updated: 12/29/24 0420     Magnesium 1.7 mg/dL     Phosphorus [[id number]]  (Normal) Collected: 12/29/24 0337    Specimen: Blood Updated: 12/29/24 0413     Phosphorus 3.0 mg/dL     Comprehensive Metabolic Panel [[id number]]  (Abnormal) Collected: 12/29/24 0337    Specimen: Blood Updated: 12/29/24 0413     Glucose 140 mg/dL      BUN 19 mg/dL      Creatinine 0.83 mg/dL      Sodium 146 mmol/L      Potassium 3.7 mmol/L      Chloride 108 mmol/L      CO2 31.3 mmol/L      Calcium 9.0 mg/dL      Total Protein 5.9 g/dL      Albumin 3.2 g/dL      ALT (SGPT) 195 U/L      AST (SGOT) 43 U/L      Alkaline Phosphatase 188 U/L      Total Bilirubin 0.5 mg/dL      Globulin 2.7 gm/dL      A/G Ratio 1.2 g/dL      BUN/Creatinine Ratio 22.9     Anion Gap 6.7 mmol/L      eGFR 84.4 mL/min/1.73     Narrative:      GFR Categories in Chronic Kidney Disease (CKD)      GFR Category          GFR (mL/min/1.73)    Interpretation  G1                     90 or greater         Normal or high (1)  G2                      60-89                Mild decrease (1)  G3a                   45-59                Mild to moderate decrease  G3b                   30-44                Moderate to severe decrease  G4                    15-29                Severe decrease  G5                    14 or less           Kidney failure          (1)In the absence of evidence of kidney disease, neither GFR category G1 or G2 fulfill the criteria for CKD.    eGFR calculation 2021 CKD-EPI creatinine  equation, which does not include race as a factor    CK [[id number]]  (Abnormal) Collected: 12/29/24 0337    Specimen: Blood Updated: 12/29/24 0413     Creatine Kinase 744 U/L     aPTT [[id number]]  (Abnormal) Collected: 12/29/24 0337    Specimen: Blood Updated: 12/29/24 0409     .0 seconds     POC Glucose Q6H [[id number]]  (Abnormal) Collected: 12/29/24 0007    Specimen: Blood Updated: 12/29/24 0009     Glucose 144 mg/dL      Comment: Serial Number: [id number]:  [id number]       aPTT [[id number]]  (Abnormal) Collected: 12/28/24 2023    Specimen: Blood Updated: 12/28/24 2048     PTT 68.5 seconds     POC Glucose Once [[id number]]  (Abnormal) Collected: 12/28/24 1646    Specimen: Blood Updated: 12/28/24 1648     Glucose 182 mg/dL      Comment: Serial Number: [id number]:  [id number]             IMAGING REVIEWED:  No radiology results for the last day    Assessment & Plan       Persistent thrombocytopenia: this has improved. No evident bleeding. Plt count >200K presently. this is likely multifactorial due to acute illness,consumption.    Arterial Thrombosis status post thrombectomy: Continue heparin. Patient will likely need to stay on Antiplatelet therapy and statins. Will defer to Vascular Surgery regarding need for long term oral anticoagulation, there may be some benefit to this approach given critical limb ischemia but has increased bleeding risk with concurrent antiplatelet therapy.    Anemia due to blood loss: s/p 1 unit PRBC transfusion on 12/26/24. Monitor daily and transfuse PRN for Hb <7.    4. DKA, Metabolic encephalopathy: improved.       Thanks for this consult. Please call us for any further clinical questions/concerns. Hem onc team will follow as needed       Allen Tidwell MD 12/29/24

## 2024-12-29 NOTE — PROGRESS NOTES
Nutrition Services  Patient Name: Yarelis Jackson  YOB: 1970  MRN: [id number]  Admission date: 12/18/2024    PROGRESS NOTE      Nutrition Intervention Updates: Continue current EN at goal rate.          Encounter Information: Checking in to monitor TF tolerance. TF infusing at goal rate. SLP evaluated pt 12/27 and recommends that pt remain NPO with alternate means of nutrition. Wound vac in place. FWF running at 100 mL/hr r/t hypernatremia. Swallow eval to be repeated today and plan for DHT removal if pt passes.        PO Diet: NPO Diet NPO Type: Strict NPO   PO Supplements: None    PO Intake:  NPO       Current nutrition support: Novasource renal at 35 ml/hr + 100 ml/hr FWF    Nutrition support review: Documented as ordered        Labs (reviewed below): Hypernatremia - will continue elevated FWF       GI Function:  Last documented BM 12/28       Brief weight review   Wt Readings from Last 10 Encounters:   12/29/24 0437 104 kg (228 lb 6.3 oz)   12/28/24 0409 110 kg (241 lb 6.5 oz)   12/27/24 0500 96.3 kg (212 lb 4.9 oz)   12/25/24 0400 98.5 kg (217 lb 2.5 oz)   12/24/24 0431 100 kg (221 lb 5.5 oz)   12/24/24 0333 100 kg (221 lb 5.5 oz)   12/19/24 0600 88.5 kg (195 lb 1.7 oz)   12/18/24 0527 92.5 kg (203 lb 14.4 oz)   07/15/23 1132 98.1 kg (216 lb 4.3 oz)   04/04/23 0719 98.1 kg (216 lb 4.3 oz)   12/12/22 0952 85.5 kg (188 lb 6.4 oz)   01/31/22 1404 89.8 kg (198 lb)   01/24/22 1406 89.8 kg (198 lb)   12/23/21 1133 89.8 kg (198 lb)   11/24/21 1537 90.3 kg (199 lb)   11/16/21 0838 90.3 kg (199 lb)   09/29/21 0320 87.4 kg (192 lb 10.9 oz)        Results from last 7 days   Lab Units 12/29/24  0337 12/28/24  1224 12/28/24  0004 12/27/24  0535   SODIUM mmol/L 146*  --  149* 143   POTASSIUM mmol/L 3.7 4.0 3.3* 4.0   CHLORIDE mmol/L 108*  --  111* 108*   CO2 mmol/L 31.3*  --  30.5* 26.6   BUN mg/dL 19  --  21* 21*   CREATININE mg/dL 0.83  --  0.97 1.00   CALCIUM mg/dL 9.0  --  8.6 8.7   BILIRUBIN mg/dL 0.5   --  0.7 0.7   ALK PHOS U/L 188*  --  222* 265*   ALT (SGPT) U/L 195*  --  284* 384*   AST (SGOT) U/L 43*  --  92* 231*   GLUCOSE mg/dL 140*  --  128* 155*     Results from last 7 days   Lab Units 12/29/24  0337 12/28/24  0004 12/27/24  0535   MAGNESIUM mg/dL 1.7 4.0* 1.5*   PHOSPHORUS mg/dL 3.0 3.0 2.1*   HEMOGLOBIN g/dL 8.0* 7.9* 8.1*   HEMATOCRIT % 24.9* 23.8* 25.0*         RD to follow up per protocol.    Electronically signed by:  Kathy Plata RD  12/29/24 11:45 EST

## 2024-12-29 NOTE — PROGRESS NOTES
[facility]   Vascular Surgery Progress Note    Patient Name: Yarelis Jackson  : 1970  MRN: [id number]  Date of admission: 2024  Surgical Procedures Since Admission:  Procedure(s):  FEMORAL right iliofemoral thrombectomy, arteriogram  Surgeon:  Ghassan Celestin MD  Status:  10 Days Post-Op  -------------------    Procedure(s):  Right femoral thrombectomy, right lower extremity arteriogram and right lower leg facitomies  Surgeon:  Ghassan Celestin MD  Status:  9 Days Post-Op  -------------------    Procedure(s):  FASCIOTOMY LEG  Surgeon:  Chris Delgado MD  Status:  6 Days Post-Op  -------------------    Subjective   Subjective   Late entry for date of service 24  Chief Complaint: follow up right lower extremity acute limb icshemia    History of Present Illness   No new complaints.  Having soreness in the right leg, wound vac in place.      Review of Systems   All other systems reviewed and are negative.      Objective   Objective     Vitals:   Temp:  [97.3 °F (36.3 °C)-97.8 °F (36.6 °C)] 97.8 °F (36.6 °C)  Heart Rate:  [92-94] 93  Resp:  [13-22] 21  BP: (132-183)/(71-84) 183/84  Flow (L/min) (Oxygen Therapy):  [2-3] 3  Output by Drain (mL) 24 0701 - 24 1900 24 1901 - 24 0700 24 0701 - 24 1117 Range Total   NG/OG Tube Nasogastric 12 Fr Right nostril       External Urinary Catheter 1100 700  1800       Physical Exam   Right leg with wound vac in place, unable to dorsi or plantar flex right foot, normal ROM of left foot  Palpable pedal pulses bilaterally           Assessment & Plan   Assessment / Plan     Brief Patient Summary:  Yarelis Jackson is a 54 y.o. female who has undergone revascularization and fasciotomy of the right lower extremity, now with wound vac in place to fasciotomy sites    Active Hospital Problems:   Active Hospital Problems    Diagnosis     **DKA (diabetic ketoacidosis)     Arterial thrombosis      Plan:   -discussed  course and need for secondary healing for fasciotomies, as her leg remains very swollen.  Will need long term wound vac for home usage.  Continue PT/OT.  Prognosis for walking with right leg guarded given lack of dorsiflexion and plantar flexion, will need more PT/OT and likely brace for walking    VTE Prophylaxis:  Pharmacologic VTE prophylaxis orders are present.        Milady Granda MD

## 2024-12-29 NOTE — PROGRESS NOTES
[facility] MEDICINE SERVICE  DAILY PROGRESS NOTE    NAME: Yarelis Jackson  : 1970  MRN: [id number]      LOS: 11 days     PROVIDER OF SERVICE: Ella Dow MD    Chief Complaint: DKA (diabetic ketoacidosis)    Subjective:     Interval History:  History taken from: patient    Denies any acute complaints.  Denies nausea vomiting or diarrhea.      Patient seen with RN  Patient still with Dobbhoff tube in place as she failed swallow study  Awaiting repeat swallow eval  Follow-up per endocrine  Patient with DKA that has resolved  She is awake alert oriented x 3 feeling tired hemodynamically stable otherwise  Dissipate removal of Dobbhoff tube later today    Review of Systems:   Review of Systems  As above  Objective:     Vital Signs  Temp:  [97.3 °F (36.3 °C)-97.8 °F (36.6 °C)] 97.8 °F (36.6 °C)  Heart Rate:  [92-94] 93  Resp:  [13-22] 21  BP: (132-183)/(71-84) 183/84  Flow (L/min) (Oxygen Therapy):  [2-3] 3   Body mass index is 39.2 kg/m².    Physical Exam  Physical Exam  Constitutional:       Appearance: Normal appearance.   HENT:      Nose:      Comments: NG tube  Cardiovascular:      Rate and Rhythm: Normal rate and regular rhythm.      Pulses: Normal pulses.      Heart sounds: Normal heart sounds. No murmur heard.  Pulmonary:      Effort: Pulmonary effort is normal. No respiratory distress.      Breath sounds: Normal breath sounds. No wheezing or rales.   Abdominal:      General: Bowel sounds are normal. There is no distension.      Tenderness: There is no abdominal tenderness.   Musculoskeletal:         General: No swelling, tenderness, deformity or signs of injury.      Cervical back: Normal range of motion.      Right lower leg: Edema (Right lower extremity dressing) present.   Neurological:      General: No focal deficit present.      Mental Status: She is alert and oriented to person, place, and time. Mental status is at baseline.      Cranial Nerves: No cranial nerve deficit.       Sensory: No sensory deficit.      Motor: No weakness.      Coordination: Coordination normal.            Diagnostic Data    Results from last 7 days   Lab Units 12/29/24  0337   WBC 10*3/mm3 14.76*   HEMOGLOBIN g/dL 8.0*   HEMATOCRIT % 24.9*   PLATELETS 10*3/mm3 216   GLUCOSE mg/dL 140*   CREATININE mg/dL 0.83   BUN mg/dL 19   SODIUM mmol/L 146*   POTASSIUM mmol/L 3.7   AST (SGOT) U/L 43*   ALT (SGPT) U/L 195*   ALK PHOS U/L 188*   BILIRUBIN mg/dL 0.5   ANION GAP mmol/L 6.7       No radiology results for the last day      I reviewed the patient's new clinical results.    Assessment/Plan:     Active and Resolved Problems  Active Hospital Problems    Diagnosis  POA    **DKA (diabetic ketoacidosis) [E11.10]  Yes    Arterial thrombosis [I74.9]  No      Resolved Hospital Problems   No resolved problems to display.       Diabetic ketoacidosis without coma, with new onset diabetes mellitus, type 2 / Metabolic acidosis, increased anion gap-Resolved  -Etiology uncertain, though new onset diabetes mellitus.  A1c 15.80 on admission, no prior available.  -Lantus 22 units at night, insulin lispro 6 units every 6 hours.  Continue insulin sliding scale every 6 hours.  -Endocrinology Dr. Winkler following  -Nutrition following patient due to enteric feeds     Acute Kidney Injury requiring emergent hemodialysis  -Remains nonoliguric. Baseline creatinine 0.90.  Creatinine 2.10 on admission.  -Monitor Input/Output very closely.   -Avoid NSAIDs, nephrotoxic medications, and hypotension.  -Nephrology following.Initially on HD due to acute renal failure.  No history for the past 2 days.  Will evaluate need for HD on a daily basis per nephrology.      Acute occlusive thrombus of the right iliac artery with limb ischemia improved  -Arterial duplex with noted right femoral, deep femoral, and popliteal arteries being patent but with very low amplitude monophasic signals, suggesting severe inflow stenosis or occlusion; no measurable Doppler  flow in the anterior or posterior tibial and peroneal arteries.  -Bilateral lower extremity ABIs ordered: Right STACEY critically reduced with STACEY of 0 and severe digital insufficiency; left STACEY is normal with moderate digital insufficiency.  -Emergent surgery per vascular surgeon 12/19/2024 for right iliac thrombectomy via open groin incision then return to surgery on 12/20/2024 for recurrent right lower extremity ischemia due to arterial thrombosis and underwent right iliofemoral popliteal thrombectomy, right femoral endarterectomy with patch, right lower extremity arteriogram, and closed right calf fasciotomies  -Continue on heparin drip per vascular surgery   -Hematology/oncology consulted  -Return to the OR morning (12/23) for emergent 4 compartment fasciotomies due to critical right leg ischemia  -Defer to vascular surgery for management of wound VAC.  Next wound VAC change on Monday     Nontraumatic rhabdomyolysis --> Improving  -CK downtrending  -Encourage enteric hydration      Leukocytosis likely reactive  -Continue to monitor clinically    Electrolyte derangements likely due to renal failure  -Monitor and replete as needed per protocol      Cutaneous candidiasis of groin  -Likely secondary to uncontrolled diabetes mellitus  -Nystatin ordered     Essential Hypertension  -Clonidine patch ordered by nephrology-hold   -Titrate medications as needed.     Bilateral leg rash, concern for scabies infection  -Permethrin regimen ordered.  Completed first treatment on 12/18, repeat treatment in 2 weeks  -Keep in contact precautions x 1 week post initial treatment (12/25)     Transaminitis  -US of Liver: Hepatomegaly with steatosis.  -Hepatitis panel-negative.  -Monitor and trend LFTs.       COPD: Not in exacerbation.  Duonebs ordered as needed.  Obesity: Body mass index is 33.49 kg/m².       VTE Prophylaxis:  Pharmacologic VTE prophylaxis orders are present.             Disposition Planning:     Barriers to Discharge:  medical optimization  Anticipated Date of Discharge: pending  Place of Discharge: home      Time: 35 minutes     Code Status and Medical Interventions: CPR (Attempt to Resuscitate); Full Support   Ordered at: 12/18/24 0830     Code Status (Patient has no pulse and is not breathing):    CPR (Attempt to Resuscitate)     Medical Interventions (Patient has pulse or is breathing):    Full Support       Signature: Electronically signed by Ella Dow MD, 12/29/24, 10:33 EST.  [facility] Hospitalist Team

## 2024-12-29 NOTE — PLAN OF CARE
Goal Outcome Evaluation:      No new concerns overnight. IVF continued. NG tube feeding continues. Able to weight shift, turned as needed. O 2 demand continues. Heparin adjusted as ordered. Call light in reach.

## 2024-12-30 ENCOUNTER — APPOINTMENT (OUTPATIENT)
Dept: GENERAL RADIOLOGY | Facility: HOSPITAL | Age: 54
End: 2024-12-30
Payer: MEDICAID

## 2024-12-30 LAB
ALBUMIN SERPL-MCNC: 2.8 G/DL (ref 3.5–5.2)
ALBUMIN/GLOB SERPL: 0.9 G/DL
ALP SERPL-CCNC: 153 U/L (ref 39–117)
ALT SERPL W P-5'-P-CCNC: 125 U/L (ref 1–33)
ANION GAP SERPL CALCULATED.3IONS-SCNC: 7.8 MMOL/L (ref 5–15)
APTT PPP: 130.4 SECONDS (ref 22.7–35.4)
APTT PPP: 92.5 SECONDS (ref 22.7–35.4)
APTT PPP: 95.9 SECONDS (ref 22.7–35.4)
AST SERPL-CCNC: 31 U/L (ref 1–32)
BASOPHILS # BLD AUTO: 0.06 10*3/MM3 (ref 0–0.2)
BASOPHILS NFR BLD AUTO: 0.4 % (ref 0–1.5)
BILIRUB SERPL-MCNC: 0.6 MG/DL (ref 0–1.2)
BUN SERPL-MCNC: 18 MG/DL (ref 6–20)
BUN/CREAT SERPL: 22.2 (ref 7–25)
CALCIUM SPEC-SCNC: 8.6 MG/DL (ref 8.6–10.5)
CHLORIDE SERPL-SCNC: 100 MMOL/L (ref 98–107)
CO2 SERPL-SCNC: 33.2 MMOL/L (ref 22–29)
CREAT SERPL-MCNC: 0.81 MG/DL (ref 0.57–1)
DEPRECATED RDW RBC AUTO: 54.2 FL (ref 37–54)
EGFRCR SERPLBLD CKD-EPI 2021: 86.9 ML/MIN/1.73
EOSINOPHIL # BLD AUTO: 0.13 10*3/MM3 (ref 0–0.4)
EOSINOPHIL NFR BLD AUTO: 1 % (ref 0.3–6.2)
ERYTHROCYTE [DISTWIDTH] IN BLOOD BY AUTOMATED COUNT: 16 % (ref 12.3–15.4)
GLOBULIN UR ELPH-MCNC: 3.1 GM/DL
GLUCOSE BLDC GLUCOMTR-MCNC: 124 MG/DL (ref 70–105)
GLUCOSE BLDC GLUCOMTR-MCNC: 159 MG/DL (ref 70–105)
GLUCOSE BLDC GLUCOMTR-MCNC: 174 MG/DL (ref 70–105)
GLUCOSE BLDC GLUCOMTR-MCNC: 220 MG/DL (ref 70–105)
GLUCOSE SERPL-MCNC: 100 MG/DL (ref 65–99)
HCT VFR BLD AUTO: 24.9 % (ref 34–46.6)
HGB BLD-MCNC: 8.1 G/DL (ref 12–15.9)
IMM GRANULOCYTES # BLD AUTO: 1.07 10*3/MM3 (ref 0–0.05)
IMM GRANULOCYTES NFR BLD AUTO: 7.9 % (ref 0–0.5)
LAB AP CASE REPORT: NORMAL
LYMPHOCYTES # BLD AUTO: 3.01 10*3/MM3 (ref 0.7–3.1)
LYMPHOCYTES NFR BLD AUTO: 22.3 % (ref 19.6–45.3)
MAGNESIUM SERPL-MCNC: 1.3 MG/DL (ref 1.6–2.6)
MCH RBC QN AUTO: 30.8 PG (ref 26.6–33)
MCHC RBC AUTO-ENTMCNC: 32.5 G/DL (ref 31.5–35.7)
MCV RBC AUTO: 94.7 FL (ref 79–97)
MONOCYTES # BLD AUTO: 0.72 10*3/MM3 (ref 0.1–0.9)
MONOCYTES NFR BLD AUTO: 5.3 % (ref 5–12)
NEUTROPHILS NFR BLD AUTO: 63.1 % (ref 42.7–76)
NEUTROPHILS NFR BLD AUTO: 8.51 10*3/MM3 (ref 1.7–7)
NRBC BLD AUTO-RTO: 0.2 /100 WBC (ref 0–0.2)
PATH REPORT.FINAL DX SPEC: NORMAL
PHOSPHATE SERPL-MCNC: 3.8 MG/DL (ref 2.5–4.5)
PLATELET # BLD AUTO: 227 10*3/MM3 (ref 140–450)
PMV BLD AUTO: 9.6 FL (ref 6–12)
POTASSIUM SERPL-SCNC: 3.2 MMOL/L (ref 3.5–5.2)
POTASSIUM SERPL-SCNC: 4 MMOL/L (ref 3.5–5.2)
PROT SERPL-MCNC: 5.9 G/DL (ref 6–8.5)
RBC # BLD AUTO: 2.63 10*6/MM3 (ref 3.77–5.28)
SODIUM SERPL-SCNC: 141 MMOL/L (ref 136–145)
WBC NRBC COR # BLD AUTO: 13.5 10*3/MM3 (ref 3.4–10.8)

## 2024-12-30 PROCEDURE — 85730 THROMBOPLASTIN TIME PARTIAL: CPT | Performed by: INTERNAL MEDICINE

## 2024-12-30 PROCEDURE — 83735 ASSAY OF MAGNESIUM: CPT | Performed by: SURGERY

## 2024-12-30 PROCEDURE — 85025 COMPLETE CBC W/AUTO DIFF WBC: CPT | Performed by: SURGERY

## 2024-12-30 PROCEDURE — 99231 SBSQ HOSP IP/OBS SF/LOW 25: CPT | Performed by: INTERNAL MEDICINE

## 2024-12-30 PROCEDURE — 63710000001 INSULIN GLARGINE PER 5 UNITS: Performed by: INTERNAL MEDICINE

## 2024-12-30 PROCEDURE — 84100 ASSAY OF PHOSPHORUS: CPT | Performed by: INTERNAL MEDICINE

## 2024-12-30 PROCEDURE — 82948 REAGENT STRIP/BLOOD GLUCOSE: CPT

## 2024-12-30 PROCEDURE — 80053 COMPREHEN METABOLIC PANEL: CPT | Performed by: SURGERY

## 2024-12-30 PROCEDURE — 74230 X-RAY XM SWLNG FUNCJ C+: CPT

## 2024-12-30 PROCEDURE — 97163 PT EVAL HIGH COMPLEX 45 MIN: CPT

## 2024-12-30 PROCEDURE — 25010000002 HEPARIN (PORCINE) 25000-0.45 UT/250ML-% SOLUTION: Performed by: SURGERY

## 2024-12-30 PROCEDURE — 25010000002 MAGNESIUM SULFATE 2 GM/50ML SOLUTION: Performed by: INTERNAL MEDICINE

## 2024-12-30 PROCEDURE — 25010000002 HYDROMORPHONE PER 4 MG: Performed by: INTERNAL MEDICINE

## 2024-12-30 PROCEDURE — 84132 ASSAY OF SERUM POTASSIUM: CPT | Performed by: INTERNAL MEDICINE

## 2024-12-30 PROCEDURE — 63710000001 INSULIN LISPRO (HUMAN) PER 5 UNITS: Performed by: INTERNAL MEDICINE

## 2024-12-30 PROCEDURE — 99024 POSTOP FOLLOW-UP VISIT: CPT | Performed by: NURSE PRACTITIONER

## 2024-12-30 PROCEDURE — 92611 MOTION FLUOROSCOPY/SWALLOW: CPT

## 2024-12-30 RX ORDER — HYDROMORPHONE HYDROCHLORIDE 1 MG/ML
0.5 INJECTION, SOLUTION INTRAMUSCULAR; INTRAVENOUS; SUBCUTANEOUS ONCE
Status: COMPLETED | OUTPATIENT
Start: 2024-12-30 | End: 2024-12-30

## 2024-12-30 RX ORDER — OXYCODONE HYDROCHLORIDE 5 MG/1
5 TABLET ORAL EVERY 4 HOURS PRN
Status: DISPENSED | OUTPATIENT
Start: 2024-12-30 | End: 2025-01-04

## 2024-12-30 RX ORDER — QUETIAPINE FUMARATE 25 MG/1
75 TABLET, FILM COATED ORAL EVERY 12 HOURS SCHEDULED
Status: DISCONTINUED | OUTPATIENT
Start: 2024-12-30 | End: 2025-01-10 | Stop reason: HOSPADM

## 2024-12-30 RX ORDER — PANTOPRAZOLE SODIUM 40 MG/1
40 TABLET, DELAYED RELEASE ORAL
Status: DISCONTINUED | OUTPATIENT
Start: 2024-12-31 | End: 2025-01-10 | Stop reason: HOSPADM

## 2024-12-30 RX ORDER — LANSOPRAZOLE 30 MG/1
30 TABLET, ORALLY DISINTEGRATING, DELAYED RELEASE ORAL
Status: DISCONTINUED | OUTPATIENT
Start: 2024-12-31 | End: 2024-12-30

## 2024-12-30 RX ORDER — MIDODRINE HYDROCHLORIDE 5 MG/1
10 TABLET ORAL EVERY 8 HOURS SCHEDULED
Status: DISCONTINUED | OUTPATIENT
Start: 2024-12-30 | End: 2025-01-01

## 2024-12-30 RX ORDER — MAGNESIUM SULFATE HEPTAHYDRATE 40 MG/ML
2 INJECTION, SOLUTION INTRAVENOUS
Status: COMPLETED | OUTPATIENT
Start: 2024-12-30 | End: 2024-12-30

## 2024-12-30 RX ORDER — POTASSIUM CHLORIDE 1.5 G/1.58G
40 POWDER, FOR SOLUTION ORAL EVERY 4 HOURS
Status: COMPLETED | OUTPATIENT
Start: 2024-12-30 | End: 2024-12-30

## 2024-12-30 RX ADMIN — BARIUM SULFATE 183 ML: 960 POWDER, FOR SUSPENSION ORAL at 10:24

## 2024-12-30 RX ADMIN — ACETAMINOPHEN 650 MG: 325 TABLET, FILM COATED ORAL at 21:23

## 2024-12-30 RX ADMIN — HEPARIN SODIUM 18.9 UNITS/KG/HR: 10000 INJECTION, SOLUTION INTRAVENOUS at 17:55

## 2024-12-30 RX ADMIN — HEPARIN SODIUM 21.9 UNITS/KG/HR: 10000 INJECTION, SOLUTION INTRAVENOUS at 02:55

## 2024-12-30 RX ADMIN — NYSTATIN 1 APPLICATION: 100000 CREAM TOPICAL at 13:18

## 2024-12-30 RX ADMIN — QUETIAPINE FUMARATE 75 MG: 25 TABLET ORAL at 21:23

## 2024-12-30 RX ADMIN — INSULIN LISPRO 6 UNITS: 100 INJECTION, SOLUTION INTRAVENOUS; SUBCUTANEOUS at 12:21

## 2024-12-30 RX ADMIN — LANSOPRAZOLE 30 MG: 30 TABLET, ORALLY DISINTEGRATING ORAL at 05:47

## 2024-12-30 RX ADMIN — INSULIN GLARGINE-YFGN 22 UNITS: 100 INJECTION, SOLUTION SUBCUTANEOUS at 21:23

## 2024-12-30 RX ADMIN — MAGNESIUM SULFATE 2 G: 2 INJECTION INTRAVENOUS at 08:27

## 2024-12-30 RX ADMIN — OXYCODONE 5 MG: 5 TABLET ORAL at 23:51

## 2024-12-30 RX ADMIN — POTASSIUM CHLORIDE 40 MEQ: 1.5 POWDER, FOR SOLUTION ORAL at 08:27

## 2024-12-30 RX ADMIN — BARIUM SULFATE 135 ML: 980 POWDER, FOR SUSPENSION ORAL at 10:24

## 2024-12-30 RX ADMIN — POTASSIUM CHLORIDE 40 MEQ: 1.5 POWDER, FOR SOLUTION ORAL at 04:18

## 2024-12-30 RX ADMIN — OXYCODONE 5 MG: 5 TABLET ORAL at 11:21

## 2024-12-30 RX ADMIN — QUETIAPINE FUMARATE 75 MG: 25 TABLET ORAL at 08:27

## 2024-12-30 RX ADMIN — OXYCODONE 5 MG: 5 TABLET ORAL at 18:55

## 2024-12-30 RX ADMIN — BARIUM SULFATE 50 ML: 400 SUSPENSION ORAL at 10:25

## 2024-12-30 RX ADMIN — MAGNESIUM SULFATE 2 G: 2 INJECTION INTRAVENOUS at 04:18

## 2024-12-30 RX ADMIN — NYSTATIN 1 APPLICATION: 100000 CREAM TOPICAL at 05:47

## 2024-12-30 RX ADMIN — HYDROMORPHONE HYDROCHLORIDE 0.5 MG: 1 INJECTION, SOLUTION INTRAMUSCULAR; INTRAVENOUS; SUBCUTANEOUS at 11:37

## 2024-12-30 RX ADMIN — NYSTATIN 1 APPLICATION: 100000 CREAM TOPICAL at 21:23

## 2024-12-30 RX ADMIN — Medication 10 ML: at 08:28

## 2024-12-30 RX ADMIN — ACETAMINOPHEN 650 MG: 325 TABLET, FILM COATED ORAL at 08:27

## 2024-12-30 RX ADMIN — INSULIN LISPRO 2 UNITS: 100 INJECTION, SOLUTION INTRAVENOUS; SUBCUTANEOUS at 12:21

## 2024-12-30 RX ADMIN — MAGNESIUM SULFATE 2 G: 2 INJECTION INTRAVENOUS at 05:47

## 2024-12-30 RX ADMIN — Medication 10 ML: at 21:23

## 2024-12-30 NOTE — PROGRESS NOTES
[facility] MEDICINE SERVICE  DAILY PROGRESS NOTE    NAME: Yarelis Jackson  : 1970  MRN: [id number]      LOS: 12 days     PROVIDER OF SERVICE: Ella Dow MD    Chief Complaint: DKA (diabetic ketoacidosis)    Subjective:     Interval History:  History taken from: patient    Denies any acute complaints.  Denies nausea vomiting or diarrhea.      Patient seen with RN  Patient still with Dobbhoff tube in place as she failed swallow study  Awaiting repeat swallow eval  Follow-up per endocrine  Patient with DKA that has resolved  She is awake alert oriented x 3 feeling tired hemodynamically stable otherwise  Dissipate removal of Dobbhoff tube later today      Patient is back from swallow eval  She passed her swallow eval and Dobbhoff tube would not be discontinued  Is awake alert  Patient with right lower extremity EXTR ischemia status post thrombectomy and redo thrombectomy as well as fasciotomy  Plan for vacuum dressing change today with wound care  May transition from heparin drip to dual antiplatelets as per vascular recommendation  Patient seen per endocrine as she had DKA on admission that has resolved  Continue Lantus 22 units nightly and lispro 6 units Q6 plus sliding scale as per endocrine recommendation  Patient is getting wound care and having extreme pain  Oxycodone is resumed  Will give 1 dose of Dilaudid for care    Review of Systems:   Review of Systems  As above  Objective:     Vital Signs  Temp:  [98 °F (36.7 °C)-98.8 °F (37.1 °C)] 98.8 °F (37.1 °C)  Heart Rate:  [] 87  Resp:  [14-22] 17  BP: (128-180)/(59-87) 134/67  Flow (L/min) (Oxygen Therapy):  [2-3] 3   Body mass index is 38.67 kg/m².    Physical Exam  Physical Exam  Constitutional:       Appearance: Normal appearance.   HENT:      Nose:      Comments: NG tube  Cardiovascular:      Rate and Rhythm: Normal rate and regular rhythm.      Pulses: Normal pulses.      Heart sounds: Normal heart sounds. No murmur  heard.  Pulmonary:      Effort: Pulmonary effort is normal. No respiratory distress.      Breath sounds: Normal breath sounds. No wheezing or rales.   Abdominal:      General: Bowel sounds are normal. There is no distension.      Tenderness: There is no abdominal tenderness.   Musculoskeletal:         General: No swelling, tenderness, deformity or signs of injury.      Cervical back: Normal range of motion.      Right lower leg: Edema (Right lower extremity dressing) present.   Neurological:      General: No focal deficit present.      Mental Status: She is alert and oriented to person, place, and time. Mental status is at baseline.      Cranial Nerves: No cranial nerve deficit.      Sensory: No sensory deficit.      Motor: No weakness.      Coordination: Coordination normal.            Diagnostic Data    Results from last 7 days   Lab Units 12/30/24  0257   WBC 10*3/mm3 13.50*   HEMOGLOBIN g/dL 8.1*   HEMATOCRIT % 24.9*   PLATELETS 10*3/mm3 227   GLUCOSE mg/dL 100*   CREATININE mg/dL 0.81   BUN mg/dL 18   SODIUM mmol/L 141   POTASSIUM mmol/L 3.2*   AST (SGOT) U/L 31   ALT (SGPT) U/L 125*   ALK PHOS U/L 153*   BILIRUBIN mg/dL 0.6   ANION GAP mmol/L 7.8       No radiology results for the last day      I reviewed the patient's new clinical results.    Assessment/Plan:     Active and Resolved Problems  Active Hospital Problems    Diagnosis  POA    **DKA (diabetic ketoacidosis) [E11.10]  Yes    Arterial thrombosis [I74.9]  No      Resolved Hospital Problems   No resolved problems to display.       Diabetic ketoacidosis without coma, with new onset diabetes mellitus, type 2 / Metabolic acidosis, increased anion gap-Resolved  -Etiology uncertain, though new onset diabetes mellitus.  A1c 15.80 on admission, no prior available.  -Lantus 22 units at night, insulin lispro 6 units every 6 hours.  Continue insulin sliding scale every 6 hours.  -Endocrinology Dr. Winkler following  -Nutrition following patient due to enteric  feeds     Acute Kidney Injury requiring emergent hemodialysis  -Remains nonoliguric. Baseline creatinine 0.90.  Creatinine 2.10 on admission.  -Monitor Input/Output very closely.   -Avoid NSAIDs, nephrotoxic medications, and hypotension.  -Nephrology following.Initially on HD due to acute renal failure.  No history for the past 2 days.  Will evaluate need for HD on a daily basis per nephrology.      Acute occlusive thrombus of the right iliac artery with limb ischemia improved  -Arterial duplex with noted right femoral, deep femoral, and popliteal arteries being patent but with very low amplitude monophasic signals, suggesting severe inflow stenosis or occlusion; no measurable Doppler flow in the anterior or posterior tibial and peroneal arteries.  -Bilateral lower extremity ABIs ordered: Right STACEY critically reduced with STACEY of 0 and severe digital insufficiency; left STACEY is normal with moderate digital insufficiency.  -Emergent surgery per vascular surgeon 12/19/2024 for right iliac thrombectomy via open groin incision then return to surgery on 12/20/2024 for recurrent right lower extremity ischemia due to arterial thrombosis and underwent right iliofemoral popliteal thrombectomy, right femoral endarterectomy with patch, right lower extremity arteriogram, and closed right calf fasciotomies  -Continue on heparin drip per vascular surgery   -Hematology/oncology consulted  -Return to the OR morning (12/23) for emergent 4 compartment fasciotomies due to critical right leg ischemia  -Defer to vascular surgery for management of wound VAC.  Next wound VAC change on Monday     Nontraumatic rhabdomyolysis --> Improving  -CK downtrending  -Encourage enteric hydration      Leukocytosis likely reactive  -Continue to monitor clinically    Electrolyte derangements likely due to renal failure  -Monitor and replete as needed per protocol      Cutaneous candidiasis of groin  -Likely secondary to uncontrolled diabetes  mellitus  -Nystatin ordered     Essential Hypertension  -Clonidine patch ordered by nephrology-hold   -Titrate medications as needed.     Bilateral leg rash, concern for scabies infection  -Permethrin regimen ordered.  Completed first treatment on 12/18, repeat treatment in 2 weeks  -Keep in contact precautions x 1 week post initial treatment (12/25)     Transaminitis  -US of Liver: Hepatomegaly with steatosis.  -Hepatitis panel-negative.  -Monitor and trend LFTs.       COPD: Not in exacerbation.  Duonebs ordered as needed.  Obesity: Body mass index is 33.49 kg/m².       VTE Prophylaxis:  Pharmacologic VTE prophylaxis orders are present.             Disposition Planning:     Barriers to Discharge: medical optimization  Anticipated Date of Discharge: pending  Place of Discharge: home      Time: 35 minutes     Code Status and Medical Interventions: CPR (Attempt to Resuscitate); Full Support   Ordered at: 12/18/24 0830     Code Status (Patient has no pulse and is not breathing):    CPR (Attempt to Resuscitate)     Medical Interventions (Patient has pulse or is breathing):    Full Support       Signature: Electronically signed by Ella Dow MD, 12/30/24, 10:37 EST.  [facility] Hospitalist Team

## 2024-12-30 NOTE — PROGRESS NOTES
Name: Yarelis Jackson ADMIT: 2024   : 1970  PCP: Katie Duran PA    MRN: [id number] LOS: 12 days   AGE/SEX: 54 y.o. female  ROOM:  [place] [address]/     CC: Status post right leg thrombectomies and fasciotomies  Interval History:   Awake and oriented.  Unable to dorsiflex and plantarflex the right foot.  Awaiting swallow study this morning.    Subjective   Subjective     Review of Systems  Objective   Objective     Vitals:   Temp:  [98 °F (36.7 °C)-98.8 °F (37.1 °C)] 98.8 °F (37.1 °C)  Heart Rate:  [] 87  Resp:  [14-22] 17  BP: (128-180)/(59-87) 134/67    I/O this shift:  In: -   Out: 500 [Urine:500]    Scheduled Meds:     insulin glargine, 22 Units, Subcutaneous, Nightly  insulin lispro, 2-7 Units, Subcutaneous, Q6H  insulin lispro, 6 Units, Subcutaneous, Q6H  lansoprazole, 30 mg, Nasogastric, Q AM  magnesium sulfate, 2 g, Intravenous, Q2H  midodrine, 10 mg, Nasogastric, Q8H  nystatin, 1 Application, Topical, Q8H  QUEtiapine, 75 mg, Nasogastric, Q12H  sodium chloride, 10 mL, Intravenous, Q12H      IV Meds:   heparin, 16.9 Units/kg/hr, Last Rate: 21.9 Units/kg/hr (24 0255)        Physical Exam    NAD  Regular rhythm  Palpable bilateral DP pulses  Signals to bilateral PT  Right lower leg fasciotomy sites wound VAC in place, good seal, no leak noted  Right groin incision with staples, CDI and soft, no skin breakdown noted    Data Reviewed:  CBC    Results from last 7 days   Lab Units 24  0257 24  0337 24  0004 24  0535 24  0415 24  1035 24  0428 24  1142 24  0605   WBC 10*3/mm3 13.50* 14.76* 16.35* 15.02* 15.49*  --  16.72*  --  12.72*   HEMOGLOBIN g/dL 8.1* 8.0* 7.9* 8.1* 8.1* 8.2* 6.6*   < > 6.6*   HEMOGLOBIN, POC   --   --   --   --   --   --   --    < >  --    PLATELETS 10*3/mm3 227 216 166 148 147  --  121*  --  99*    < > = values in this interval not displayed.     BMP   Results from last 7 days   Lab Units 24  0257  12/29/24  0337 12/28/24  1224 12/28/24  0004 12/27/24  0535 12/26/24  1406 12/26/24  0428 12/26/24  0425 12/25/24  0605 12/24/24  0344   SODIUM mmol/L 141 146*  --  149* 143  --  139  --  136 137   POTASSIUM mmol/L 3.2* 3.7 4.0 3.3* 4.0 3.5 3.5  --  3.7 4.7   CHLORIDE mmol/L 100 108*  --  111* 108*  --  104  --  100 102   CO2 mmol/L 33.2* 31.3*  --  30.5* 26.6  --  23.3  --  24.5 22.7   BUN mg/dL 18 19  --  21* 21*  --  36*  --  35* 37*   CREATININE mg/dL 0.81 0.83  --  0.97 1.00  --  1.62* 1.73* 1.82* 2.15*   GLUCOSE mg/dL 100* 140*  --  128* 155*  --  175*  --  245* 258*   MAGNESIUM mg/dL 1.3* 1.7  --  4.0* 1.5*  --  2.0  --  1.8 1.7   PHOSPHORUS mg/dL 3.8 3.0  --  3.0 2.1*  --  2.8  --  3.0 2.8     Radiology(recent) No radiology results for the last day    Active Hospital Problems:   Active Hospital Problems    Diagnosis  POA    **DKA (diabetic ketoacidosis) [E11.10]  Yes    Arterial thrombosis [I74.9]  No      Resolved Hospital Problems   No resolved problems to display.      Assessment & Plan     Assessment / Plan     Right lower extremity ischemia: Status post thrombectomy and redo thrombectomy as well as fasciotomies.     Plan for VAC dressing change today with WOCN  Noted swallow study planned for today  If she passes and is able to start oral intake, okay to transition from heparin drip to DOAC from our standpoint    Alyssa Sneed, APRN  12/30/24  08:43 EST  [phone]    Copied text in this note has been reviewed by me and remains relevant as of 12/30/24.

## 2024-12-30 NOTE — PLAN OF CARE
Goal Outcome Evaluation:              Outcome Evaluation: Yarelis Jackson is a 54 y.o. female with PMH of COPD, migraine, and obesity, and presented to the hospital for altered mental status, and was admitted with a principal diagnosis of DKA (diabetic ketoacidosis). On 12/19, patient was intubated, nontunneled catheter placement was completed, and patient did require initiation of vasopressors.  HD was transitioned to CRRT  Emergent surgery per vascular surgeon 12/19/2024 for right iliac thrombectomy via open groin incision then return to surgery on 12/20/2024 for recurrent right lower extremity ischemia due to arterial thrombosis and underwent right iliofemoral popliteal thrombectomy, right femoral endarterectomy with patch, right lower extremity arteriogram, and closed right calf fasciotomies. Return to the OR morning (12/23) for emergent 4 compartment fasciotomies due to critical right leg ischemia.  Patient now with wound vac in place.  She is cleared for PT by nursing and the patient is found resting in bed, A&Ox4, pleasant and agreeable.  Her mobility is limited due to pain but she is motivated to participate and cooperative throughout.  She demonstrates absent sensation to light touch in the right distal LE and demos no active DF/PF.  R hip and knee with strength limitations which may be partially limited due to pain and guarding.  She requires modA for bed mobility, then is able to complete multiple lateral scoots and a partial stand with mod-maxA but limited to pain with R LE WBing.  Anticipate she will require ongoing rehabilitation once medically appropriate for discharge; current recommendation is SNF vs IRF.  Will follow while IP for strengthening, functional transfers, and progressive mobility training to tolerance.    Anticipated Discharge Disposition (PT): skilled nursing facility

## 2024-12-30 NOTE — NURSING NOTE
WOCN note:    54 yr old female admitted 12/18/24 with DKA. Patient underwent thrombectomies and fasciotomies to the RLE. WOCN follow up for wound VAC dressing changes. Patient medicated for pain but required additional pain medication during the dressing change.   75cc of thin serosanguinous exudate noted in the VAC canister. Dressings removed and wounds irrigated with NS. Both wounds with muscle layer exposed, beefy red and clean. No erythema or purulence noted.   Maxorb was applied to the open bambi-wound blisters. Both wound bases were lined with fenestrated silicone sheeting. Two pieces of black sponge were used in the lateral wound and one piece in the medial wound. A 4th and 5th piece of sponge was used to bridge the two wounds and support the trac pad.   The dressings were reattached to 125mmHg continuous negative pressure with good seal obtained. The dressings were secured with kerlix and ACE wrap from the base of the toes to the bend of the knee. An ABD pad was placed around the foot to cover the intact and open blisters. We will continue to follow.

## 2024-12-30 NOTE — CASE MANAGEMENT/SOCIAL WORK
Continued Stay Note  JOSEFA Guerrero     Patient Name: Yarelis Jackson  MRN: [id number]  Today's Date: 12/30/2024    Admit Date: 12/18/2024    Plan: From home with parents. Watch for wound vac @ d/c.   Discharge Plan       Row Name 12/30/24 8761       Plan    Plan Comments D/C Barriers: Hem/Onc/Nutrition/Endocrin/Vasc Sx/Neph following, Heparin gtt, wound vac dressing change today 12/30             Expected Discharge Date and Time       Expected Discharge Date Expected Discharge Time    Dec 31, 2024        Jackie Billy RN     [phone]  [email or web address]

## 2024-12-30 NOTE — SIGNIFICANT NOTE
12/30/24 1025   OTHER   Discipline physical therapist   Rehab Time/Intention   Session Not Performed   (off unit for swallow study, will check back.)   Therapy Assessment/Plan (PT)   Criteria for Skilled Interventions Met (PT) yes;meets criteria;skilled treatment is necessary   Recommendation   PT - Next Appointment 12/31/24

## 2024-12-30 NOTE — PROGRESS NOTES
Daily Progress Note    Patient Care Team:  Katie Duran PA as PCP - General (Physician Assistant)  Uli Starr MD as Consulting Physician (Nephrology)    Chief Complaint: Follow-up type 1 diabetes    HPI: Patient seen, clinically doing well.  No complaints at this time.    ROS:   Constitutional:  Denies fatigue, tiredness.    Respiratory: denies cough, shortness of breath.   Cardiovascular:  denies chest pain, edema   GI:  Denies abdominal pain, nausea, vomiting.         Vitals:    12/30/24 0733   BP: 134/67   Pulse: 87   Resp: 17   Temp: 98.8 °F (37.1 °C)   SpO2: (!) 89%     Body mass index is 38.67 kg/m².    Physical Exam:  GEN: NAD, conversant  PSYCH: Awake and coherent      Results Review:     I reviewed the patient's new clinical results.    Glucose   Date Value Ref Range Status   12/30/2024 100 (H) 65 - 99 mg/dL Final     Sodium   Date Value Ref Range Status   12/30/2024 141 136 - 145 mmol/L Final     Potassium   Date Value Ref Range Status   12/30/2024 3.2 (L) 3.5 - 5.2 mmol/L Final     CO2   Date Value Ref Range Status   12/30/2024 33.2 (H) 22.0 - 29.0 mmol/L Final     Chloride   Date Value Ref Range Status   12/30/2024 100 98 - 107 mmol/L Final     Anion Gap   Date Value Ref Range Status   12/30/2024 7.8 5.0 - 15.0 mmol/L Final     Creatinine   Date Value Ref Range Status   12/30/2024 0.81 0.57 - 1.00 mg/dL Final     BUN   Date Value Ref Range Status   12/30/2024 18 6 - 20 mg/dL Final     BUN/Creatinine Ratio   Date Value Ref Range Status   12/30/2024 22.2 7.0 - 25.0 Final     Calcium   Date Value Ref Range Status   12/30/2024 8.6 8.6 - 10.5 mg/dL Final     Alkaline Phosphatase   Date Value Ref Range Status   12/30/2024 153 (H) 39 - 117 U/L Final     Total Protein   Date Value Ref Range Status   12/30/2024 5.9 (L) 6.0 - 8.5 g/dL Final     ALT (SGPT)   Date Value Ref Range Status   12/30/2024 125 (H) 1 - 33 U/L Final     AST (SGOT)   Date Value Ref Range Status   12/30/2024 31 1 - 32  "U/L Final     Total Bilirubin   Date Value Ref Range Status   12/30/2024 0.6 0.0 - 1.2 mg/dL Final     Albumin   Date Value Ref Range Status   12/30/2024 2.8 (L) 3.5 - 5.2 g/dL Final     Globulin   Date Value Ref Range Status   12/30/2024 3.1 gm/dL Final     Magnesium   Date Value Ref Range Status   12/30/2024 1.3 (L) 1.6 - 2.6 mg/dL Final     Phosphorus   Date Value Ref Range Status   12/30/2024 3.8 2.5 - 4.5 mg/dL Final     Lab Results   Component Value Date    HGBA1C 15.80 (H) 12/18/2024     No results found for: \"GLUF\", \"MICROALBUR\"  Results from last 7 days   Lab Units 12/30/24  0737 12/29/24  2339 12/29/24  1755 12/29/24  1144 12/29/24  0007 12/28/24  1646   GLUCOSE mg/dL 159* 155* 165* 151* 144* 182*       Medication Review: Reviewed.     insulin glargine, 22 Units, Subcutaneous, Nightly  insulin lispro, 2-7 Units, Subcutaneous, Q6H  insulin lispro, 6 Units, Subcutaneous, Q6H  lansoprazole, 30 mg, Nasogastric, Q AM  midodrine, 10 mg, Nasogastric, Q8H  nystatin, 1 Application, Topical, Q8H  QUEtiapine, 75 mg, Nasogastric, Q12H  sodium chloride, 10 mL, Intravenous, Q12H        Assessment and plan:  Diabetes mellitus type 1 with hyperglycemia: Currently on Lantus 22 units subcu nightly with lispro 6 units every 6 hours along with lispro sliding scale every 6 hours.  Blood sugars are fair, will continue current regimen.  Follow blood sugars and make further adjustments as needed.    Rodriguez Worthy MD. FACE                "

## 2024-12-30 NOTE — THERAPY EVALUATION
Patient Name: Yarelis Jackson  : 1970    MRN: [id number]                              Today's Date: 2024       Admit Date: 2024    Visit Dx:     ICD-10-CM ICD-9-CM   1. Diabetic ketoacidosis without coma associated with other specified diabetes mellitus  E13.10 250.12   2. MARGOT (acute kidney injury)  N17.9 584.9   3. Hypernatremia  E87.0 276.0   4. Acute encephalopathy  G93.40 348.30   5. Shock  R57.9 785.50   6. Arterial thrombosis  I74.9 444.9     Patient Active Problem List   Diagnosis    Benign essential hypertension    Cervical radiculopathy    Chronic obstructive pulmonary disease    Cigarette smoker    Hyperlipidemia    Menopause    Migraine headache    DKA (diabetic ketoacidosis)    Arterial thrombosis     Past Medical History:   Diagnosis Date    COPD (chronic obstructive pulmonary disease)     Low back pain     Migraine     Neck pain      Past Surgical History:   Procedure Laterality Date    FASCIOTOMY Right 2024    Procedure: FASCIOTOMY LEG;  Surgeon: Chris Delgado MD;  Location: [facility] MAIN OR;  Service: Vascular;  Laterality: Right;    FEMORAL THROMBECTOMY/EMBOLECTOMY Right 2024    Procedure: FEMORAL right iliofemoral thrombectomy, arteriogram;  Surgeon: Ghassan Celestin MD;  Location: [facility] OR;  Service: Vascular;  Laterality: Right;    FEMORAL THROMBECTOMY/EMBOLECTOMY Right 2024    Procedure: Right femoral thrombectomy, right lower extremity arteriogram and right lower leg facitomies;  Surgeon: Ghassan Celestin MD;  Location: [facility] HYBRID OR;  Service: Vascular;  Laterality: Right;    LUNG SURGERY        General Information       Row Name 24 1446          Physical Therapy Time and Intention    Document Type evaluation  -RR     Mode of Treatment physical therapy  -RR       Row Name 24 1446          General Information    Patient Profile Reviewed yes  -RR     Prior Level of Function independent:;all household mobility;community  mobility;gait;transfer;driving  - falls, - working  -RR     Existing Precautions/Restrictions fall  wound vac R LE, 2L oxygen,no home oxygen  -RR       Row Name 12/30/24 1446          Living Environment    People in Home parent(s)  -RR       Row Name 12/30/24 1446          Home Main Entrance    Number of Stairs, Main Entrance one  -RR       Row Name 12/30/24 1446          Stairs Within Home, Primary    Number of Stairs, Within Home, Primary none  -RR       Row Name 12/30/24 1536 12/30/24 1446       Cognition    Orientation Status (Cognition) oriented x 4  -RR oriented x 4  -RR      Row Name 12/30/24 1536          Safety Issues/Impairments Affecting Functional Mobility    Impairments Affecting Function (Mobility) balance;pain  -RR               User Key  (r) = Recorded By, (t) = Taken By, (c) = Cosigned By      Initials Name Provider Type    RR Gianna Ortega, PT Physical Therapist                   Mobility       Row Name 12/30/24 1539          Bed Mobility    Bed Mobility bed mobility (all) activities  -RR     All Activities, F[place]ardo (Bed Mobility) moderate assist (50% patient effort)  -RR     Comment, (Bed Mobility) assist for R LE management and trunk  -RR       Row Name 12/30/24 1539          Bed-Chair Transfer    Comment, (Bed-Chair Transfer) limited due to pain  -RR       Row Name 12/30/24 1539          Sit-Stand Transfer    Sit-Stand Fajardo (Transfers) moderate assist (50% patient effort)  -RR     Comment, (Sit-Stand Transfer) completed partial stand with modA with UE support; limited due to pain  -RR       Row Name 12/30/24 1539          Gait/Stairs (Locomotion)    Fajardo Level (Gait) not tested  -RR     Patient was able to Ambulate no, other medical factors prevent ambulation  -RR     Reason Patient was unable to Ambulate Uncontrolled Pain;Excessive Weakness  -RR     [place]ardo Level (Stairs) not tested  -RR               User Key  (r) = Recorded By, (t) = Taken By, (c) = Cosigned By       Initials Name Provider Type    RR Gianna Ortega PT Physical Therapist                   Obj/Interventions       Row Name 12/30/24 1545          Range of Motion Comprehensive    General Range of Motion other (see comments)  -RR     Comment, General Range of Motion LLE WNL, R ankle without active movement passive movement limited due to wound vac, demos hip and knee with movement >50% against gravity; limited due to pain  -RR       Row Name 12/30/24 1543          Strength Comprehensive (MMT)    Comment, General Manual Muscle Testing (MMT) Assessment LLE WNL, R ankle without active movement 0/5, demos hip and knee strength 3-/5 with movement >50% against gravity; limited due to pain  -RR       Row Name 12/30/24 1543          Balance    Balance Assessment sitting static balance;sitting dynamic balance;standing static balance;standing dynamic balance  -RR     Static Sitting Balance contact guard  -RR     Dynamic Sitting Balance contact guard  -RR     Position, Sitting Balance unsupported  -RR     Static Standing Balance moderate assist  partial stand  -RR     Dynamic Standing Balance not tested  -RR       Row Name 12/30/24 1549          Sensory Assessment (Somatosensory)    Sensory Assessment (Somatosensory) --  unable to detect light touch R LE/foot  -RR               User Key  (r) = Recorded By, (t) = Taken By, (c) = Cosigned By      Initials Name Provider Type    RR Gianna Ortega PT Physical Therapist                   Goals/Plan       Row Name 12/30/24 8759          Bed Mobility Goal 1 (PT)    Activity/Assistive Device (Bed Mobility Goal 1, PT) bed mobility activities, all  -RR     Prather Level/Cues Needed (Bed Mobility Goal 1, PT) modified independence  -RR     Time Frame (Bed Mobility Goal 1, PT) long term goal (LTG);2 weeks  -RR       Row Name 12/30/24 3187          Transfer Goal 1 (PT)    Activity/Assistive Device (Transfer Goal 1, PT) transfers, all;walker, rolling  -RR     Prather  Level/Cues Needed (Transfer Goal 1, PT) modified independence  -RR     Time Frame (Transfer Goal 1, PT) long term goal (LTG);2 weeks  -RR       Row Name 12/30/24 1559          Gait Training Goal 1 (PT)    Activity/Assistive Device (Gait Training Goal 1, PT) gait (walking locomotion);walker, rolling  -RR     Pass Christian Level (Gait Training Goal 1, PT) modified independence  -RR     Distance (Gait Training Goal 1, PT) 15  -RR     Time Frame (Gait Training Goal 1, PT) long term goal (LTG);2 weeks  -RR       Row Name 12/30/24 1555          Therapy Assessment/Plan (PT)    Planned Therapy Interventions (PT) balance training;bed mobility training;gait training;neuromuscular re-education;patient/family education;stair training;strengthening;transfer training;wheelchair management/propulsion training;ROM (range of motion)  -RR               User Key  (r) = Recorded By, (t) = Taken By, (c) = Cosigned By      Initials Name Provider Type    RR Gianna Ortega, PT Physical Therapist                   Clinical Impression       Row Name 12/30/24 1453          Pain    Pretreatment Pain Rating 7/10  -RR     Posttreatment Pain Rating 7/10  -RR     Pain Side/Orientation right  -RR       Row Name 12/30/24 6171          Plan of Care Review    Outcome Evaluation Yarelis Jackson is a 54 y.o. female with PMH of COPD, migraine, and obesity, and presented to the hospital for altered mental status, and was admitted with a principal diagnosis of DKA (diabetic ketoacidosis). On 12/19, patient was intubated, nontunneled catheter placement was completed, and patient did require initiation of vasopressors.  HD was transitioned to CRRT  Emergent surgery per vascular surgeon 12/19/2024 for right iliac thrombectomy via open groin incision then return to surgery on 12/20/2024 for recurrent right lower extremity ischemia due to arterial thrombosis and underwent right iliofemoral popliteal thrombectomy, right femoral endarterectomy with patch,  right lower extremity arteriogram, and closed right calf fasciotomies. Return to the OR morning (12/23) for emergent 4 compartment fasciotomies due to critical right leg ischemia.  Patient now with wound vac in place.  She is cleared for PT by nursing and the patient is found resting in bed, A&Ox4, pleasant and agreeable.  Her mobility is limited due to pain but she is motivated to participate and cooperative throughout.  She demonstrates absent sensation to light touch in the right distal LE and demos no active DF/PF.  R hip and knee with strength limitations which may be partially limited due to pain and guarding.  She requires modA for bed mobility, then is able to complete multiple lateral scoots and a partial stand with mod-maxA but limited to pain with R LE WBing.  Anticipate she will require ongoing rehabilitation once medically appropriate for discharge; current recommendation is SNF vs IRF.  Will follow while IP for strengthening, functional transfers, and progressive mobility training to tolerance.  -RR       Row Name 12/30/24 1542          Therapy Assessment/Plan (PT)    Patient/Family Therapy Goals Statement (PT) improve mobilty  -RR     Rehab Potential (PT) good  -RR     Criteria for Skilled Interventions Met (PT) yes;meets criteria;skilled treatment is necessary  -RR     Therapy Frequency (PT) 5 times/wk  -RR     Predicted Duration of Therapy Intervention (PT) dc  -RR       Row Name 12/30/24 9138          Vital Signs    Recovery Time vitals stable throughout and patient assymptomatic with postion changes  -RR       Row Name 12/30/24 1542          Positioning and Restraints    Post Treatment Position bed  -RR     In Bed exit alarm on;call light within reach  -RR               User Key  (r) = Recorded By, (t) = Taken By, (c) = Cosigned By      Initials Name Provider Type    Gianna Benton, PT Physical Therapist                   Outcome Measures       Row Name 12/30/24 1559 12/30/24 0755       How  much help from another person do you currently need...    Turning from your back to your side while in flat bed without using bedrails? 3  -RR 3  -MC    Moving from lying on back to sitting on the side of a flat bed without bedrails? 3  -RR 2  -MC    Moving to and from a bed to a chair (including a wheelchair)? 1  -RR 2  -MC    Standing up from a chair using your arms (e.g., wheelchair, bedside chair)? 2  -RR 2  -MC    Climbing 3-5 steps with a railing? 1  -RR 1  -MC    To walk in hospital room? 1  -RR 2  -MC    AM-PAC 6 Clicks Score (PT) 11  -RR 12  -MC              User Key  (r) = Recorded By, (t) = Taken By, (c) = Cosigned By      Initials Name Provider Type     Joce Stover, RN Registered Nurse    RR Gianna Ortega, PT Physical Therapist                                 Physical Therapy Education       Title: PT OT SLP Therapies (Done)       Topic: Physical Therapy (Done)       Point: Mobility training (Done)       Learning Progress Summary            Patient Acceptance, E,TB, VU by  at 12/30/2024 1600                      Point: Home exercise program (Done)       Learning Progress Summary            Patient Acceptance, E,TB, VU by  at 12/30/2024 1600                                      User Key       Initials Effective Dates Name Provider Type Discipline     07/01/24 -  Gianna Ortega, ELISBAETH Physical Therapist PT                  PT Recommendation and Plan  Planned Therapy Interventions (PT): balance training, bed mobility training, gait training, neuromuscular re-education, patient/family education, stair training, strengthening, transfer training, wheelchair management/propulsion training, ROM (range of motion)  Outcome Evaluation: Yarelis Jackson is a 54 y.o. female with PMH of COPD, migraine, and obesity, and presented to the hospital for altered mental status, and was admitted with a principal diagnosis of DKA (diabetic ketoacidosis). On 12/19, patient was intubated, nontunneled catheter  placement was completed, and patient did require initiation of vasopressors.  HD was transitioned to CRRT  Emergent surgery per vascular surgeon 12/19/2024 for right iliac thrombectomy via open groin incision then return to surgery on 12/20/2024 for recurrent right lower extremity ischemia due to arterial thrombosis and underwent right iliofemoral popliteal thrombectomy, right femoral endarterectomy with patch, right lower extremity arteriogram, and closed right calf fasciotomies. Return to the OR morning (12/23) for emergent 4 compartment fasciotomies due to critical right leg ischemia.  Patient now with wound vac in place.  She is cleared for PT by nursing and the patient is found resting in bed, A&Ox4, pleasant and agreeable.  Her mobility is limited due to pain but she is motivated to participate and cooperative throughout.  She demonstrates absent sensation to light touch in the right distal LE and demos no active DF/PF.  R hip and knee with strength limitations which may be partially limited due to pain and guarding.  She requires modA for bed mobility, then is able to complete multiple lateral scoots and a partial stand with mod-maxA but limited to pain with R LE WBing.  Anticipate she will require ongoing rehabilitation once medically appropriate for discharge; current recommendation is SNF vs IRF.  Will follow while IP for strengthening, functional transfers, and progressive mobility training to tolerance.     Time Calculation:         PT Charges       Row Name 12/30/24 1601 12/30/24 1025          Time Calculation    Start Time 1445  -RR --     Stop Time 1522  -RR --     Time Calculation (min) 37 min  -RR --     PT Received On 12/30/24  -RR --     PT - Next Appointment 12/31/24  -RR 12/31/24  -RR     PT Goal Re-Cert Due Date 01/14/25  -RR --               User Key  (r) = Recorded By, (t) = Taken By, (c) = Cosigned By      Initials Name Provider Type    Gianna Benton, PT Physical Therapist                   Therapy Charges for Today       Code Description Service Date Service Provider Modifiers Qty    [id number] HC-PT EVAL HIGH COMPLEXITY 5 12/30/2024 Gianna Ortega, PT  1            PT G-Codes  AM-PAC 6 Clicks Score (PT): 11  PT Discharge Summary  Anticipated Discharge Disposition (PT): skilled nursing facility    Gianna Ortega, PT  12/30/2024

## 2024-12-30 NOTE — PLAN OF CARE
Goal Outcome Evaluation:   VFSS EXPLANATION/ OUTCOME:    MBSS performed in the lateral projection with the following consistencies respectively: thin by cup x1, thin by straw x2, puree x2, mechanical-soft x2, regular x1, NTL by straw x2. All trials with adequate barium concentration for fluoroscopy view. All trials administered via pt.     Baseline diet: Regular and thin liquids   Dentition: Poor natural dentition   PMH: No prior history of dysphagia      Oral phase: Marked by adequate bolus cohesion w/ some premature spillage to the vallecula. Adequate epiglottic deflection. Slightly prolonged mastication during solid trials. No anterior loss. Good labial seal w/ straw.      Pharyngeal phase: Transient penetration of thin liquids via straw and cup; clears.  Reduced laryngeal elevation. Hyoid and epiglottic movement, laryngeal vestibular closure, pharyngeal stripping wave, pharyngeal contraction, PES opening, and pharyngeal residue all WNL. Pt scored a 2 on the Penetration Aspiration Scale (PAS),details below.     Esophageal view: Unremarkable.      Pt demonstrates a MILD swallow impairment posing a low risk of aspiration and malnutrition risk. Dysphagia is most prominently characterized by reduced BOT strength and reduced hyolarnygeal excursion resulting in transient penetration of thin liquids by straw and cup; clears.      Strategies attempted: n/a     Education provided: Education provided to pt on diet recommendations. Pt verbalized agreement and understanding of recommendations given. Nursing staff made aware of recommendations. ST will continue to follow for meals to ensure diet tolerance/safety.          Rosenbeck's 8- Point Penetration Aspiration Scale  Material does not enter airway  Material enters the airway, remains above the vocal folds and is ejected from the airway  Material enters the airway, remains above the vocal folds, and is not ejected from the airway  Material enters the airway, contacts the  vocal folds and is ejected from the airway  Material enters the airway, contacts the vocal folds, and is not ejected from the airway  Material enters the airway, passes below the vocal folds, and is ejected out of the trachea  Material enters the airway, passes below the vocal folds and is not ejected from the trachea despite effort  Material enters the airway, passes below the vocal folds, and no effort is made to eject.     SLP Recommendation and Plan:    - STC whole and thin liquids  - Only feed when pt is fully awake and alert  - Meds: as tolerated  - Safe swallow strategies: HOB at a 90 degree angle, slow rate of intake, small bites/sips  -Oral care at least x2 daily   -ST will continue to follow for meal x1 to ensure diet tolerance and safety.        Anticipated Discharge Disposition (SLP): unknown          SLP Swallowing Diagnosis: mild, pharyngeal dysphagia, oral dysphagia (12/30/24 1025)        Plan for Continued Treatment (SLP): continue treatment per plan of care (12/30/24 1025)

## 2024-12-30 NOTE — PROGRESS NOTES
"NEPHROLOGY PROGRESS NOTE------[facility]    [facility]  [phone]  Raymundo Harris MD      Patient Care Team:  Katie Duran PA as PCP - General (Physician Assistant)  Uli Starr MD as Consulting Physician (Nephrology)      Provider:  Raymundo Harris MD  Patient Name: Yarelis Jackson  :  1970    SUBJECTIVE:    F/U ARF/MRAGOT/ELECTROLYTE ABNORMALITIES  No chest pain or shortness of breath    Medication:  insulin glargine, 22 Units, Subcutaneous, Nightly  insulin lispro, 2-7 Units, Subcutaneous, Q6H  insulin lispro, 6 Units, Subcutaneous, Q6H  lansoprazole, 30 mg, Nasogastric, Q AM  midodrine, 10 mg, Nasogastric, Q8H  nystatin, 1 Application, Topical, Q8H  QUEtiapine, 75 mg, Nasogastric, Q12H  sodium chloride, 10 mL, Intravenous, Q12H      heparin, 16.9 Units/kg/hr, Last Rate: Stopped (24 1026)        OBJECTIVE    Vital Sign Min/Max for last 24 hours  Temp  Min: 98 °F (36.7 °C)  Max: 98.8 °F (37.1 °C)   BP  Min: 128/63  Max: 180/83   Pulse  Min: 87  Max: 104   Resp  Min: 14  Max: 22   SpO2  Min: 89 %  Max: 98 %   No data recorded   Weight  Min: 102 kg (225 lb 5 oz)  Max: 102 kg (225 lb 5 oz)     Flowsheet Rows      Flowsheet Row First Filed Value   Admission Height 162.6 cm (64\") Documented at 2024 0443   Admission Weight 92.5 kg (203 lb 14.4 oz) Documented at 2024 0527            I/O this shift:  In: -   Out: 500 [Urine:500]  I/O last 3 completed shifts:  In: 9273 [I.V.:2335; Other:1611; NG/GT:5327]  Out: 3250 [Urine:3200; Other:50]    Physical Exam:     General Appearance:  NAD  Head: normocephalic, without obvious abnormality and atraumatic +DHT INTACT +DRY OP  Eyes: conjunctivae and sclerae normal and no icterus  Neck: supple and no JVD  Lungs: +SCATTERED RHONCHI  Heart: regular rhythm & normal rate and normal S1, S2 +THOMAS  Chest: Wall no abnormalities observed  Abdomen: normal bowel sounds and soft " "+OBESITY  Extremities: moves extremities well, +TRACE  BILAT PRETIBIAL EDEMA, no cyanosis and no redness. +DJD  Skin: Warm  Neurologic:  A AND O X 2    Labs:    WBC WBC   Date Value Ref Range Status   12/30/2024 13.50 (H) 3.40 - 10.80 10*3/mm3 Final   12/29/2024 14.76 (H) 3.40 - 10.80 10*3/mm3 Final   12/28/2024 16.35 (H) 3.40 - 10.80 10*3/mm3 Final      HGB Hemoglobin   Date Value Ref Range Status   12/30/2024 8.1 (L) 12.0 - 15.9 g/dL Final   12/29/2024 8.0 (L) 12.0 - 15.9 g/dL Final   12/28/2024 7.9 (L) 12.0 - 15.9 g/dL Final      HCT Hematocrit   Date Value Ref Range Status   12/30/2024 24.9 (L) 34.0 - 46.6 % Final   12/29/2024 24.9 (L) 34.0 - 46.6 % Final   12/28/2024 23.8 (L) 34.0 - 46.6 % Final      Platelets No results found for: \"LABPLAT\"   MCV MCV   Date Value Ref Range Status   12/30/2024 94.7 79.0 - 97.0 fL Final   12/29/2024 95.8 79.0 - 97.0 fL Final   12/28/2024 94.1 79.0 - 97.0 fL Final          Sodium Sodium   Date Value Ref Range Status   12/30/2024 141 136 - 145 mmol/L Final   12/29/2024 146 (H) 136 - 145 mmol/L Final   12/28/2024 149 (H) 136 - 145 mmol/L Final      Potassium Potassium   Date Value Ref Range Status   12/30/2024 3.2 (L) 3.5 - 5.2 mmol/L Final   12/29/2024 3.7 3.5 - 5.2 mmol/L Final   12/28/2024 4.0 3.5 - 5.2 mmol/L Final   12/28/2024 3.3 (L) 3.5 - 5.2 mmol/L Final      Chloride Chloride   Date Value Ref Range Status   12/30/2024 100 98 - 107 mmol/L Final   12/29/2024 108 (H) 98 - 107 mmol/L Final   12/28/2024 111 (H) 98 - 107 mmol/L Final      CO2 CO2   Date Value Ref Range Status   12/30/2024 33.2 (H) 22.0 - 29.0 mmol/L Final   12/29/2024 31.3 (H) 22.0 - 29.0 mmol/L Final   12/28/2024 30.5 (H) 22.0 - 29.0 mmol/L Final      BUN BUN   Date Value Ref Range Status   12/30/2024 18 6 - 20 mg/dL Final   12/29/2024 19 6 - 20 mg/dL Final   12/28/2024 21 (H) 6 - 20 mg/dL Final      Creatinine Creatinine   Date Value Ref Range Status   12/30/2024 0.81 0.57 - 1.00 mg/dL Final   12/29/2024 0.83 " "0.57 - 1.00 mg/dL Final   12/28/2024 0.97 0.57 - 1.00 mg/dL Final      Calcium Calcium   Date Value Ref Range Status   12/30/2024 8.6 8.6 - 10.5 mg/dL Final   12/29/2024 9.0 8.6 - 10.5 mg/dL Final   12/28/2024 8.6 8.6 - 10.5 mg/dL Final      PO4 No components found for: \"PO4\"   Albumin Albumin   Date Value Ref Range Status   12/30/2024 2.8 (L) 3.5 - 5.2 g/dL Final   12/29/2024 3.2 (L) 3.5 - 5.2 g/dL Final   12/28/2024 3.0 (L) 3.5 - 5.2 g/dL Final      Magnesium Magnesium   Date Value Ref Range Status   12/30/2024 1.3 (L) 1.6 - 2.6 mg/dL Final   12/29/2024 1.7 1.6 - 2.6 mg/dL Final   12/28/2024 4.0 (H) 1.6 - 2.6 mg/dL Final      Uric Acid No components found for: \"URIC ACID\"     Imaging Results (Last 72 Hours)       Procedure Component Value Units Date/Time    FL Video Swallow With Speech Single Contrast [[id number]] Resulted: 12/30/24 1026     Updated: 12/30/24 1026    Narrative:      This procedure was auto-finalized with no dictation required.            Results for orders placed during the hospital encounter of 12/18/24    XR Chest 1 View    Narrative  XR CHEST 1 VW    Date of Exam: 12/22/2024 12:35 AM EST    Indication: Intubated Patient    Comparison:  12/21/2024    FINDINGS:  Endotracheal tube tip appears in satisfactory position at the level of the aortic arch. Right IJ catheter appears in stable position. Enteric tube extends into the left upper quadrant. Heart size and mediastinal contour appear within normal limits. No  definite pneumothorax or effusion. Mild bibasilar opacities. Findings of advanced emphysema with suspected apical scarring.    Impression  1.Tubes and lines appear in satisfactory positions, as above.  2.Mild bibasilar opacities which could represent atelectasis or infiltrate.  3.Advanced emphysema.        Electronically Signed: Sam Haynes  12/22/2024 7:07 AM EST  Workstation ID: [id number]      XR Chest 1 View    Narrative  XR CHEST 1 VW    Date of Exam: 12/21/2024 4:32 AM " EST    Indication: Intubated Patient    Comparison: 12/20/2024    Findings:  There is been no interval tube or line changes. Heart size and pulmonary vessels are within normal limits. There are postoperative changes of the bilateral upper lobes. Lungs appear clear. No pleural effusion or pneumothorax.    Impression  Impression:    1. No tube or line change  2. No focal airspace consolidation or other acute process.      Electronically Signed: Julio Thorpe MD  12/21/2024 9:54 AM EST  Workstation ID: [id number]      XR Chest 1 View    Narrative  XR CHEST 1 VW    Date of Exam: 12/20/2024 12:10 AM EST    Indication: Intubated Patient    Comparison: 12/19/2024    Findings:  Endotracheal tube approximately 2.5 cm above the carmina. Feeding tube and right IJ dialysis catheter remain in place. No new tubes or lines heart size and pulmonary vasculature are stable. Bullous changes in the right upper lobe. Postoperative changes in  the left upper lobe. Lungs grossly clear. No pneumothorax    Impression  Impression:  Stable chest demonstrating COPD and postoperative changes with no acute cardiopulmonary process demonstrated      Electronically Signed: Shon Ponce  12/20/2024 7:19 AM EST  Workstation ID: [id number]      Results for orders placed during the hospital encounter of 12/18/24    Duplex Lower Extremity Art / Grafts - Right CAR    Interpretation Summary    Limited study due to body habitus and current dressing site.  Patent external iliac and femoral-popliteal arterial flow with low flow noted below the common femoral.  Common femoral artery poorly visualized.  Cannot rule out occlusion of the common femoral artery.        ASSESSMENT / PLAN      DKA (diabetic ketoacidosis)    Arterial thrombosis    ARF/MARGOT------ nonoliguric.  Follow for further dialysis need... +ARF/MARGOT with historically normal renal function. +ARF/MARGOT secondary to severe prerenal state and ATN from hypotension and Rhabdomyolysis.  Pressor support. Off  IVFs. No NSAIDs. Renal US without obstructive uropathy.   Dose meds for CrCl less than 10 cc/min     2. HYPERNATREMIA/HYPEROSMOLAR STATE-----encourage fluid intake     3. ACIDOSIS--Resolved     4. DVT PROPHYLAXIS-----On Heparin gtt     5. HYPOCALCEMIA------Replace IV and follow ionized levels     6. DM------BS ok     7. ANEMIA---------H/H stable     8. OA/DJD/HYPERURICEMIA------Allopurinol . No NSAIDs   Restart IVFs. CK down post multiple days of HD     10. GERD/PUD PROPHYLAXIS-----Renal dose adjusted Pepcid     11. DELIRIUM/TME     12. COPD/ACUTE HYPOXIC RESPIRATORY FAILURE-----S/P extubation    13. HYPOALBUMINEMIA-----S/P IV Albumin to temporize    14. EDEMA/VOLUME EXCESS---- Better post gentle UF with HD    15. HYPOTENSION-----BP ok    16. ELEVATED LFTS/TRANSAMINASES------Secondary to shock liver and Rhabdo. Follow    17. FASCITIS------S/P surgery      18. DELIRIUM---Better    19. HYPOPHOSPHATEMIA------Replaced    20. HYPOMAGNESEMIA------Replaced    Creatinine stable  Sodium improved  Replace potassium  Continue free water per feeding tube   Monitor renal function, fluid status and electrolytes    Raymundo Harris MD  [facility]  [phone]  12/30/24  11:19 EST

## 2024-12-30 NOTE — PROGRESS NOTES
Nutrition Services  Patient Name: Yarelis Jackson  YOB: 1970  MRN: [id number]  Admission date: 12/18/2024    PROGRESS NOTE      Nutrition Intervention Updates: Diet per SLP    Add Boost Glucose Control BID (Provides 380 kcals, 32 g protein if consumed)          Encounter Information: Checking in to monitor TF tolerance. S/P VFSS 12/30 with advancement to soft to chew with whole meat.  NG tube removed and TF order discontinued.         PO Diet: Diet: Diabetic; Consistent Carbohydrate; Texture: Soft to Chew (NDD 3); Soft to Chew: Whole Meat; Fluid Consistency: Thin (IDDSI 0)   PO Supplements: None ordered    PO Intake:  None documented yet       Current nutrition support:    Nutrition support review:        Labs (reviewed below): Hypernatremia - resolved   Hypomagnesemia - replaced today       GI Function:  Last documented BM 12/28 (x 2 days ago)       Brief weight review   Wt Readings from Last 10 Encounters:   12/30/24 0415 102 kg (225 lb 5 oz)   12/29/24 0437 104 kg (228 lb 6.3 oz)   12/28/24 0409 110 kg (241 lb 6.5 oz)   12/27/24 0500 96.3 kg (212 lb 4.9 oz)   12/25/24 0400 98.5 kg (217 lb 2.5 oz)   12/24/24 0431 100 kg (221 lb 5.5 oz)   12/24/24 0333 100 kg (221 lb 5.5 oz)   12/19/24 0600 88.5 kg (195 lb 1.7 oz)   12/18/24 0527 92.5 kg (203 lb 14.4 oz)   07/15/23 1132 98.1 kg (216 lb 4.3 oz)   04/04/23 0719 98.1 kg (216 lb 4.3 oz)   12/12/22 0952 85.5 kg (188 lb 6.4 oz)   01/31/22 1404 89.8 kg (198 lb)   01/24/22 1406 89.8 kg (198 lb)   12/23/21 1133 89.8 kg (198 lb)   11/24/21 1537 90.3 kg (199 lb)   11/16/21 0838 90.3 kg (199 lb)   09/29/21 0320 87.4 kg (192 lb 10.9 oz)        Results from last 7 days   Lab Units 12/30/24  0257 12/29/24  0337 12/28/24  1224 12/28/24  0004   SODIUM mmol/L 141 146*  --  149*   POTASSIUM mmol/L 3.2* 3.7 4.0 3.3*   CHLORIDE mmol/L 100 108*  --  111*   CO2 mmol/L 33.2* 31.3*  --  30.5*   BUN mg/dL 18 19  --  21*   CREATININE mg/dL 0.81 0.83  --  0.97   CALCIUM mg/dL  8.6 9.0  --  8.6   BILIRUBIN mg/dL 0.6 0.5  --  0.7   ALK PHOS U/L 153* 188*  --  222*   ALT (SGPT) U/L 125* 195*  --  284*   AST (SGOT) U/L 31 43*  --  92*   GLUCOSE mg/dL 100* 140*  --  128*     Results from last 7 days   Lab Units 12/30/24  0257 12/29/24  0337 12/28/24  0004   MAGNESIUM mg/dL 1.3* 1.7 4.0*   PHOSPHORUS mg/dL 3.8 3.0 3.0   HEMOGLOBIN g/dL 8.1* 8.0* 7.9*   HEMATOCRIT % 24.9* 24.9* 23.8*         RD to follow up per protocol.    Electronically signed by:  Ashley Hernandez RD  12/30/24 12:32 EST

## 2024-12-30 NOTE — PLAN OF CARE
Goal Outcome Evaluation:   Pt aox4. VSS. Pt passed video swallow study and ng tube removed. Heparin drip continued.

## 2024-12-30 NOTE — CONSULTS
"Diabetes Education  Assessment/Teaching    Patient Name:  Yarelis Jackson  YOB: 1970  MRN: [id number]  Admit Date:  12/18/2024      Assessment Date:  12/30/2024  Flowsheet Row Most Recent Value   General Information     Referral From: MD order  [Pt newly diagnosed with diabetes and needing general education, insulin teaching and bs monitoring education.]   Height 162.6 cm (64\")   Height Method Stated   Weight 102 kg (225 lb 5 oz)   Weight Method Bed scale   Pregnancy Assessment    Diabetes History    What type of diabetes do you have? Type 1   Length of Diabetes Diagnosis --  [Newly dx]   Feeling down, depressed, or hopeless Not at all   Little interest or pleasure in doing things Not at all   Education Preferences    Nutrition Information    Assessment Topics    DM Goals             Flowsheet Row Most Recent Value   DM Education Needs    Meter Needs meter  [Pt does not have reading glasses with her at this time. She is to have family bring in glasses and educator will instruct on bs monitoring on different day.]   Frequency of Testing 3 times a day  [Discussed importance of checking bs 3-4 times/day and recording readings. Discussed importance of sharing bs readings with PCP.]   Blood Glucose Target Range Discussed A1c result of 8.29%. Discussed healthy bs range and healthy A1c target. Discussed importance of bs control.   Medication --  [Pt has been started on Lantus and Lispro ac.]   Reducing Risks A1C testing  [Gave A1c info sheet.]   Healthy Eating --  [Pt usually eating 2 meals/day. She llkes Grape Crush. Pt states she will stop drinking sugared sodas. Pt also drinks a lot of whole milk. Discussed better option would be 2% or less for fat content. Discussed 1 cup of milk is a CHO serving.]   Physical Activity --  [Pt does not routinely exercise. Discussed importance of increasing activity within her limits.]   Motivation Engaged   Teaching Method Explanation, Discussion, Handouts   Patient " Response Verbalized understanding              Other Comments:  Met with pt at bedside. Pt lives with mom and aureliano. Pt states diabetes does run in her family. Discussed diabetes disease process. Reviewed diagnostic criteria for diabetes.     Reviewed with pt that she is receiving long-acting and mealtime insulin. Pt agreeable to taking at home. Discussed when to take the insulins and importance of not taking mealtime insulin if not eating a meal. Discussed injection sites, rotation of sites, storage of insulin and disposal of pen needles.     Discussed food groups containing CHO. Reviewed example serving sizes from these groups. Discussed trying to stay within 3 servings (45 grams of CHO) at each meal. Discussed low-fat meal prep.     Gave First Steps booklet, Planning Healthy Meals packet, Low bs handout, A1c info sheet and Insulin Pen Instruction sheet.     Educator will follow up on different day to teach pt insulin and bs monitoring. Rxs have been started for Lantus pen, Lispro pen, pen needles, Accuchek Guide meter, test strips and lancets.       Electronically signed by:  Dolly Montejo RN  12/30/24 18:08 EST

## 2024-12-30 NOTE — MBS/VFSS/FEES
Acute Care - Speech Language Pathology   Swallow Initial Evaluation [facility]     Patient Name: Yarelis Jackson  : 1970  MRN: [id number]  Today's Date: 2024               Admit Date: 2024    Visit Dx:     ICD-10-CM ICD-9-CM   1. Diabetic ketoacidosis without coma associated with other specified diabetes mellitus  E13.10 250.12   2. MARGOT (acute kidney injury)  N17.9 584.9   3. Hypernatremia  E87.0 276.0   4. Acute encephalopathy  G93.40 348.30   5. Shock  R57.9 785.50   6. Arterial thrombosis  I74.9 444.9     Patient Active Problem List   Diagnosis    Benign essential hypertension    Cervical radiculopathy    Chronic obstructive pulmonary disease    Cigarette smoker    Hyperlipidemia    Menopause    Migraine headache    DKA (diabetic ketoacidosis)    Arterial thrombosis     Past Medical History:   Diagnosis Date    COPD (chronic obstructive pulmonary disease)     Low back pain     Migraine     Neck pain      Past Surgical History:   Procedure Laterality Date    FASCIOTOMY Right 2024    Procedure: FASCIOTOMY LEG;  Surgeon: Chris Delgado MD;  Location: [facility] MAIN OR;  Service: Vascular;  Laterality: Right;    FEMORAL THROMBECTOMY/EMBOLECTOMY Right 2024    Procedure: FEMORAL right iliofemoral thrombectomy, arteriogram;  Surgeon: Ghassan Celestin MD;  Location: [facility] HYBRID OR;  Service: Vascular;  Laterality: Right;    FEMORAL THROMBECTOMY/EMBOLECTOMY Right 2024    Procedure: Right femoral thrombectomy, right lower extremity arteriogram and right lower leg facitomies;  Surgeon: Ghassan Celestin MD;  Location: [facility] OR;  Service: Vascular;  Laterality: Right;    LUNG SURGERY         SLP Recommendation and Plan  SLP Swallowing Diagnosis: mild, pharyngeal dysphagia, oral dysphagia (24 1025)  SLP Diet Recommendation: soft to chew textures, thin liquids (24 1025)  Recommended Precautions and Strategies: upright posture during/after eating, general  aspiration precautions (12/30/24 1025)  SLP Rec. for Method of Medication Administration: as tolerated (12/30/24 1025)     Monitor for Signs of Aspiration: yes, notify SLP if any concerns (12/30/24 1025)     Swallow Criteria for Skilled Therapeutic Interventions Met: demonstrates skilled criteria (12/30/24 1025)  Anticipated Discharge Disposition (SLP): unknown (12/30/24 1025)  Rehab Potential/Prognosis, Swallowing: good, to achieve stated therapy goals (12/30/24 1025)  Therapy Frequency (Swallow): PRN (12/30/24 1025)  Predicted Duration Therapy Intervention (Days): until discharge (12/30/24 1025)  Oral Care Recommendations: Oral Care BID/PRN, Before ice/water (12/30/24 1025)        Plan for Continued Treatment (SLP): continue treatment per plan of care (12/30/24 1025)    SWALLOW EVALUATION (Last 72 Hours)       SLP Adult Swallow Evaluation       Row Name 12/30/24 1025       Rehab Evaluation    Document Type evaluation  -MS    Subjective Information no complaints  -MS    Patient Observations alert;cooperative;agree to therapy  -MS    Patient/Family/Caregiver Comments/Observations n/a  -MS    Patient Effort good  -MS    Comment --    Symptoms Noted During/After Treatment none  -MS       Precautions/Limitations, Hearing --    Prior Level of Function-Communication WFL  -MS    Prior Level of Function-Swallowing --    Plans/Goals Discussed with patient  -MS    Patient's Goals for Discharge patient did not state  -MS       Pain    Pretreatment Pain Rating 0/10 - no pain  -MS    Posttreatment Pain Rating 0/10 - no pain  -MS    Additional Documentation Pain Scale: FACES Pre/Post-Treatment (Group)  -MS       Pain Scale: FACES Pre/Post-Treatment    Pain: FACES Scale, Pretreatment 0-->no hurt  -MS    Posttreatment Pain Rating 0-->no hurt  -MS       Oral Motor Structure and Function    Dentition Assessment teeth are in poor condition  -MS    Secretion Management WNL/WFL  -MS    Mucosal Quality dry  -MS    Volitional Swallow WFL   -MS    Volitional Cough --       Oral Musculature and Cranial Nerve Assessment    Oral Motor General Assessment generalized oral motor weakness  -MS       General Eating/Swallowing Observations    Respiratory Support Currently in Use nasal cannula  -MS    O2 Liters 4L  -MS    Eating/Swallowing Skills --       MBS/VFSS    Utensils Used spoon;cup;straw  -MS    Consistencies Trialed regular textures;mechanical ground textures;pureed;thin liquids;nectar/syrup-thick liquids  -MS       MBS/VFSS Interpretation    Oral Prep Phase impaired oral phase of swallowing  -MS    Oral Transit Phase WFL  -MS    Oral Residue WFL  -MS    VFSS Summary VFSS EXPLANATION/ OUTCOME:    MBSS performed in the lateral projection with the following consistencies respectively: thin by cup x1, thin by straw x2, puree x2, mechanical-soft x2, regular x1, NTL by straw x2. All trials with adequate barium concentration for fluoroscopy view. All trials administered via pt.     Baseline diet: Regular and thin liquids   Dentition: Poor natural dentition   PMH: No prior history of dysphagia      Oral phase: Marked by adequate bolus cohesion w/ some premature spillage to the vallecula. Adequate epiglottic deflection. Slightly prolonged mastication during solid trials. No anterior loss. Good labial seal w/ straw.      Pharyngeal phase: Transient penetration of thin liquids via straw and cup; clears.  Reduced laryngeal elevation. Hyoid and epiglottic movement, laryngeal vestibular closure, pharyngeal stripping wave, pharyngeal contraction, PES opening, and pharyngeal residue all WNL. Pt scored a 2 on the Penetration Aspiration Scale (PAS),details below.     Esophageal view: Unremarkable.      Pt demonstrates a MILD swallow impairment posing a low risk of aspiration and malnutrition risk. Dysphagia is most prominently characterized by reduced BOT strength and reduced hyolarnygeal excursion resulting in transient penetration of thin liquids by straw and cup;  clears.      Strategies attempted: n/a     Education provided: Education provided to pt on diet recommendations. Pt verbalized agreement and understanding of recommendations given. Nursing staff made aware of recommendations. ST will continue to follow for meals to ensure diet tolerance/safety.          Rosenbeck's 8- Point Penetration Aspiration Scale  Material does not enter airway  Material enters the airway, remains above the vocal folds and is ejected from the airway  Material enters the airway, remains above the vocal folds, and is not ejected from the airway  Material enters the airway, contacts the vocal folds and is ejected from the airway  Material enters the airway, contacts the vocal folds, and is not ejected from the airway  Material enters the airway, passes below the vocal folds, and is ejected out of the trachea  Material enters the airway, passes below the vocal folds and is not ejected from the trachea despite effort  Material enters the airway, passes below the vocal folds, and no effort is made to eject.     SLP Recommendation and Plan:    - STC whole and thin liquids  - Only feed when pt is fully awake and alert  - Meds: as tolerated  - Safe swallow strategies: HOB at a 90 degree angle, slow rate of intake, small bites/sips  -Oral care at least x2 daily   -ST will continue to follow for meal x1 to ensure diet tolerance and safety.          -MS       Oral Preparatory Phase    Oral Preparatory Phase --  prolonged mastication  -MS       Initiation of Pharyngeal Swallow    Initiation of Pharyngeal Swallow bolus in valleculae  -MS    Pharyngeal Phase impaired pharyngeal phase of swallowing  -MS    Penetration During the Swallow thin liquids  -MS    Depth of Penetration transient  -MS    Response to Penetration Yes  -MS    Rosenbek's Scale 2--->level 2;thin:  -MS       Esophageal Phase    Esophageal Phase no impairments  -MS       SLP Communication to Radiology    Severity Level of Dysphagia mild  dysphagia  -MS       SLP Evaluation Clinical Impression    SLP Swallowing Diagnosis mild;pharyngeal dysphagia;oral dysphagia  -MS    Functional Impact risk of aspiration/pneumonia;risk of malnutrition;risk of dehydration  -MS    Rehab Potential/Prognosis, Swallowing good, to achieve stated therapy goals  -MS    Swallow Criteria for Skilled Therapeutic Interventions Met demonstrates skilled criteria  -MS       SLP Treatment Clinical Impressions    Barriers to Overall Progress (SLP) --    Plan for Continued Treatment (SLP) continue treatment per plan of care  -MS    Care Plan Review evaluation/treatment results reviewed  -MS       Recommendations    Therapy Frequency (Swallow) PRN  -MS    Predicted Duration Therapy Intervention (Days) until discharge  -MS    SLP Diet Recommendation soft to chew textures;thin liquids  -MS    Recommended Diagnostics --    Recommended Precautions and Strategies upright posture during/after eating;general aspiration precautions  -MS    Oral Care Recommendations Oral Care BID/PRN;Before ice/water  -MS    SLP Rec. for Method of Medication Administration as tolerated  -MS    Monitor for Signs of Aspiration yes;notify SLP if any concerns  -MS    Anticipated Discharge Disposition (SLP) unknown  -MS       Swallow Goals (SLP)    Swallow LTGs Patient will demonstrate functional swallow for  -MS    Swallow STGs diet tolerance goal selection (SLP)  -MS    Diet Tolerance Goal Selection (SLP) Patient will tolerate trials of  -MS       (LTG) Patient will demonstrate functional swallow for    Diet Texture (Demonstrate functional swallow) regular textures  -MS    Liquid viscosity (Demonstrate functional swallow) thin liquids  -MS    [place] (Demonstrate functional swallow) independently (over 90% accuracy)  -MS    Time Frame (Demonstrate functional swallow) by discharge  -MS    Progress/Outcomes (Demonstrate functional swallow) new goal  -MS    Comment (Demonstrate functional swallow) --        (LTG) Swallow    (LTG) Swallow Pt will maximize swallow function for least restrictive PO diet, exhibiting no complication associated with dysphagia, adequate PO intake, and demonstrating independent use of swallow compensations  -MS    [place] (Swallow Long Term Goal) independently (over 90% accuracy)  -MS    Time Frame (Swallow Long Term Goal) by discharge  -MS    Progress/Outcomes (Swallow Long Term Goal) new goal  -MS    Comment (Swallow Long Term Goal) --       (STG) Patient will tolerate trials of    Consistencies Trialed (Tolerate trials) regular textures;thin liquids  -MS    Desired Outcome (Tolerate trials) without signs/symptoms of aspiration;without signs of distress;with adequate oral prep/transit/clearance  -MS    [place] (Tolerate trials) independently (over 90% accuracy)  -MS    Time Frame (Tolerate trials) by discharge  -MS    Progress/Outcomes (Tolerate trials) new goal  -MS       (STG) Swallow 1    (STG) Swallow 1 --    Time Frame (Swallow Short Term Goal 1) --    Progress/Outcomes (Swallow Short Term Goal 1) --              User Key  (r) = Recorded By, (t) = Taken By, (c) = Cosigned By      Initials Name Effective Dates    CP Mirtha Valdez, JUN 06/16/21 -     MS Lazaro Dumont SLP 04/22/24 -                     EDUCATION  The patient has been educated in the following areas:   Dysphagia (Swallowing Impairment).        SLP GOALS       Row Name 12/30/24 1025       (LTG) Patient will demonstrate functional swallow for    Diet Texture (Demonstrate functional swallow) regular textures  -MS    Liquid viscosity (Demonstrate functional swallow) thin liquids  -MS    [place] (Demonstrate functional swallow) independently (over 90% accuracy)  -MS    Time Frame (Demonstrate functional swallow) by discharge  -MS    Progress/Outcomes (Demonstrate functional swallow) new goal  -MS    Comment (Demonstrate functional swallow) --       (LTG) Swallow    (LTG) Swallow Pt will maximize swallow  function for least restrictive PO diet, exhibiting no complication associated with dysphagia, adequate PO intake, and demonstrating independent use of swallow compensations  -MS    [place] (Swallow Long Term Goal) independently (over 90% accuracy)  -MS    Time Frame (Swallow Long Term Goal) by discharge  -MS    Progress/Outcomes (Swallow Long Term Goal) new goal  -MS    Comment (Swallow Long Term Goal) --       (STG) Patient will tolerate trials of    Consistencies Trialed (Tolerate trials) regular textures;thin liquids  -MS    Desired Outcome (Tolerate trials) without signs/symptoms of aspiration;without signs of distress;with adequate oral prep/transit/clearance  -MS    [place] (Tolerate trials) independently (over 90% accuracy)  -MS    Time Frame (Tolerate trials) by discharge  -MS    Progress/Outcomes (Tolerate trials) new goal  -MS                 User Key  (r) = Recorded By, (t) = Taken By, (c) = Cosigned By      Initials Name Provider Type          Lazaro Avila SLP Speech and Language Pathologist                  JUN Hilario  12/30/2024

## 2024-12-31 LAB
ALBUMIN SERPL-MCNC: 3.1 G/DL (ref 3.5–5.2)
ALBUMIN/GLOB SERPL: 1 G/DL
ALP SERPL-CCNC: 131 U/L (ref 39–117)
ALT SERPL W P-5'-P-CCNC: 97 U/L (ref 1–33)
ANION GAP SERPL CALCULATED.3IONS-SCNC: 6.6 MMOL/L (ref 5–15)
APTT PPP: 101.6 SECONDS (ref 22.7–35.4)
APTT PPP: 93.7 SECONDS (ref 22.7–35.4)
AST SERPL-CCNC: 29 U/L (ref 1–32)
BASOPHILS # BLD AUTO: 0.04 10*3/MM3 (ref 0–0.2)
BASOPHILS NFR BLD AUTO: 0.3 % (ref 0–1.5)
BILIRUB SERPL-MCNC: 0.6 MG/DL (ref 0–1.2)
BUN SERPL-MCNC: 16 MG/DL (ref 6–20)
BUN/CREAT SERPL: 20 (ref 7–25)
CALCIUM SPEC-SCNC: 8.4 MG/DL (ref 8.6–10.5)
CHLORIDE SERPL-SCNC: 101 MMOL/L (ref 98–107)
CO2 SERPL-SCNC: 32.4 MMOL/L (ref 22–29)
CREAT SERPL-MCNC: 0.8 MG/DL (ref 0.57–1)
DEPRECATED RDW RBC AUTO: 52.7 FL (ref 37–54)
EGFRCR SERPLBLD CKD-EPI 2021: 88.2 ML/MIN/1.73
EOSINOPHIL # BLD AUTO: 0.08 10*3/MM3 (ref 0–0.4)
EOSINOPHIL NFR BLD AUTO: 0.7 % (ref 0.3–6.2)
ERYTHROCYTE [DISTWIDTH] IN BLOOD BY AUTOMATED COUNT: 16.1 % (ref 12.3–15.4)
GLOBULIN UR ELPH-MCNC: 3 GM/DL
GLUCOSE BLDC GLUCOMTR-MCNC: 121 MG/DL (ref 70–105)
GLUCOSE BLDC GLUCOMTR-MCNC: 125 MG/DL (ref 70–105)
GLUCOSE BLDC GLUCOMTR-MCNC: 150 MG/DL (ref 70–105)
GLUCOSE BLDC GLUCOMTR-MCNC: 167 MG/DL (ref 70–105)
GLUCOSE BLDC GLUCOMTR-MCNC: 87 MG/DL (ref 70–105)
GLUCOSE BLDC GLUCOMTR-MCNC: 90 MG/DL (ref 70–105)
GLUCOSE SERPL-MCNC: 173 MG/DL (ref 65–99)
HCT VFR BLD AUTO: 24.7 % (ref 34–46.6)
HGB BLD-MCNC: 8 G/DL (ref 12–15.9)
IMM GRANULOCYTES # BLD AUTO: 0.58 10*3/MM3 (ref 0–0.05)
IMM GRANULOCYTES NFR BLD AUTO: 4.8 % (ref 0–0.5)
LYMPHOCYTES # BLD AUTO: 2.64 10*3/MM3 (ref 0.7–3.1)
LYMPHOCYTES NFR BLD AUTO: 21.9 % (ref 19.6–45.3)
MAGNESIUM SERPL-MCNC: 1.9 MG/DL (ref 1.6–2.6)
MCH RBC QN AUTO: 30.9 PG (ref 26.6–33)
MCHC RBC AUTO-ENTMCNC: 32.4 G/DL (ref 31.5–35.7)
MCV RBC AUTO: 95.4 FL (ref 79–97)
MONOCYTES # BLD AUTO: 0.59 10*3/MM3 (ref 0.1–0.9)
MONOCYTES NFR BLD AUTO: 4.9 % (ref 5–12)
NEUTROPHILS NFR BLD AUTO: 67.4 % (ref 42.7–76)
NEUTROPHILS NFR BLD AUTO: 8.14 10*3/MM3 (ref 1.7–7)
NRBC BLD AUTO-RTO: 0 /100 WBC (ref 0–0.2)
PHOSPHATE SERPL-MCNC: 3.8 MG/DL (ref 2.5–4.5)
PLATELET # BLD AUTO: 242 10*3/MM3 (ref 140–450)
PMV BLD AUTO: 9.3 FL (ref 6–12)
POTASSIUM SERPL-SCNC: 3.8 MMOL/L (ref 3.5–5.2)
PROT SERPL-MCNC: 6.1 G/DL (ref 6–8.5)
RBC # BLD AUTO: 2.59 10*6/MM3 (ref 3.77–5.28)
SODIUM SERPL-SCNC: 140 MMOL/L (ref 136–145)
WBC NRBC COR # BLD AUTO: 12.07 10*3/MM3 (ref 3.4–10.8)

## 2024-12-31 PROCEDURE — 83735 ASSAY OF MAGNESIUM: CPT | Performed by: SURGERY

## 2024-12-31 PROCEDURE — G0108 DIAB MANAGE TRN  PER INDIV: HCPCS

## 2024-12-31 PROCEDURE — 25010000002 HEPARIN (PORCINE) 25000-0.45 UT/250ML-% SOLUTION: Performed by: SURGERY

## 2024-12-31 PROCEDURE — 99024 POSTOP FOLLOW-UP VISIT: CPT | Performed by: NURSE PRACTITIONER

## 2024-12-31 PROCEDURE — 85730 THROMBOPLASTIN TIME PARTIAL: CPT | Performed by: SURGERY

## 2024-12-31 PROCEDURE — 82948 REAGENT STRIP/BLOOD GLUCOSE: CPT | Performed by: NURSE PRACTITIONER

## 2024-12-31 PROCEDURE — 80053 COMPREHEN METABOLIC PANEL: CPT | Performed by: SURGERY

## 2024-12-31 PROCEDURE — 99232 SBSQ HOSP IP/OBS MODERATE 35: CPT | Performed by: INTERNAL MEDICINE

## 2024-12-31 PROCEDURE — 85025 COMPLETE CBC W/AUTO DIFF WBC: CPT | Performed by: SURGERY

## 2024-12-31 PROCEDURE — 97166 OT EVAL MOD COMPLEX 45 MIN: CPT

## 2024-12-31 PROCEDURE — 82948 REAGENT STRIP/BLOOD GLUCOSE: CPT

## 2024-12-31 PROCEDURE — 84100 ASSAY OF PHOSPHORUS: CPT | Performed by: INTERNAL MEDICINE

## 2024-12-31 PROCEDURE — 63710000001 INSULIN LISPRO (HUMAN) PER 5 UNITS: Performed by: INTERNAL MEDICINE

## 2024-12-31 PROCEDURE — 97530 THERAPEUTIC ACTIVITIES: CPT

## 2024-12-31 PROCEDURE — 85730 THROMBOPLASTIN TIME PARTIAL: CPT | Performed by: INTERNAL MEDICINE

## 2024-12-31 PROCEDURE — 63710000001 INSULIN GLARGINE PER 5 UNITS: Performed by: INTERNAL MEDICINE

## 2024-12-31 RX ORDER — OXYCODONE HCL 10 MG/1
10 TABLET, FILM COATED, EXTENDED RELEASE ORAL EVERY 12 HOURS SCHEDULED
Status: COMPLETED | OUTPATIENT
Start: 2025-01-01 | End: 2025-01-05

## 2024-12-31 RX ORDER — INSULIN LISPRO 100 [IU]/ML
6 INJECTION, SOLUTION INTRAVENOUS; SUBCUTANEOUS
Status: DISCONTINUED | OUTPATIENT
Start: 2024-12-31 | End: 2025-01-01

## 2024-12-31 RX ORDER — INSULIN LISPRO 100 [IU]/ML
2-7 INJECTION, SOLUTION INTRAVENOUS; SUBCUTANEOUS
Status: DISCONTINUED | OUTPATIENT
Start: 2024-12-31 | End: 2025-01-10 | Stop reason: HOSPADM

## 2024-12-31 RX ADMIN — APIXABAN 5 MG: 5 TABLET, FILM COATED ORAL at 09:02

## 2024-12-31 RX ADMIN — Medication 10 ML: at 08:35

## 2024-12-31 RX ADMIN — HEPARIN SODIUM 16.9 UNITS/KG/HR: 10000 INJECTION, SOLUTION INTRAVENOUS at 05:52

## 2024-12-31 RX ADMIN — OXYCODONE 5 MG: 5 TABLET ORAL at 19:12

## 2024-12-31 RX ADMIN — Medication 10 ML: at 20:49

## 2024-12-31 RX ADMIN — NYSTATIN 1 APPLICATION: 100000 CREAM TOPICAL at 05:52

## 2024-12-31 RX ADMIN — ACETAMINOPHEN 650 MG: 325 TABLET, FILM COATED ORAL at 22:19

## 2024-12-31 RX ADMIN — PANTOPRAZOLE SODIUM 40 MG: 40 TABLET, DELAYED RELEASE ORAL at 05:52

## 2024-12-31 RX ADMIN — OXYCODONE 5 MG: 5 TABLET ORAL at 04:34

## 2024-12-31 RX ADMIN — QUETIAPINE FUMARATE 75 MG: 25 TABLET ORAL at 08:31

## 2024-12-31 RX ADMIN — APIXABAN 5 MG: 5 TABLET, FILM COATED ORAL at 20:49

## 2024-12-31 RX ADMIN — OXYCODONE 5 MG: 5 TABLET ORAL at 14:36

## 2024-12-31 RX ADMIN — NYSTATIN 1 APPLICATION: 100000 CREAM TOPICAL at 15:35

## 2024-12-31 RX ADMIN — INSULIN GLARGINE-YFGN 22 UNITS: 100 INJECTION, SOLUTION SUBCUTANEOUS at 20:48

## 2024-12-31 RX ADMIN — NYSTATIN 1 APPLICATION: 100000 CREAM TOPICAL at 20:50

## 2024-12-31 RX ADMIN — QUETIAPINE FUMARATE 75 MG: 25 TABLET ORAL at 20:49

## 2024-12-31 RX ADMIN — INSULIN LISPRO 2 UNITS: 100 INJECTION, SOLUTION INTRAVENOUS; SUBCUTANEOUS at 00:04

## 2024-12-31 RX ADMIN — OXYCODONE 5 MG: 5 TABLET ORAL at 08:34

## 2024-12-31 RX ADMIN — ACETAMINOPHEN 650 MG: 325 TABLET, FILM COATED ORAL at 18:05

## 2024-12-31 RX ADMIN — OXYCODONE HYDROCHLORIDE 10 MG: 10 TABLET, FILM COATED, EXTENDED RELEASE ORAL at 23:17

## 2024-12-31 RX ADMIN — ACETAMINOPHEN 650 MG: 325 TABLET, FILM COATED ORAL at 03:02

## 2024-12-31 NOTE — PROGRESS NOTES
[facility] MEDICINE SERVICE  DAILY PROGRESS NOTE    NAME: Yarelis Jackson  : 1970  MRN: [id number]      LOS: 13 days     PROVIDER OF SERVICE: Ella Dow MD    Chief Complaint: DKA (diabetic ketoacidosis)    Subjective:     Interval History:  History taken from: patient    Denies any acute complaints.  Denies nausea vomiting or diarrhea.      Patient seen with RN  Patient still with Dobbhoff tube in place as she failed swallow study  Awaiting repeat swallow eval  Follow-up per endocrine  Patient with DKA that has resolved  She is awake alert oriented x 3 feeling tired hemodynamically stable otherwise  Dissipate removal of Dobbhoff tube later today      Patient is back from swallow eval  She passed her swallow eval and Dobbhoff tube would not be discontinued  Is awake alert  Patient with right lower extremity EXTR ischemia status post thrombectomy and redo thrombectomy as well as fasciotomy  Plan for vacuum dressing change today with wound care  May transition from heparin drip to dual antiplatelets as per vascular recommendation  Patient seen per endocrine as she had DKA on admission that has resolved  Continue Lantus 22 units nightly and lispro 6 units Q6 plus sliding scale as per endocrine recommendation  Patient is getting wound care and having extreme pain  Oxycodone is resumed  Will give 1 dose of Dilaudid for care      Patient feeling fine  Started on Eliquis per vascular  Treatments better controlled  Followed up per nephrology and endocrine and vascular  Anemia of chronic disease with stable hemoglobin of 8  Patient  8 wound VAC.  On discharge home health care for wound care  Discharge planning for tomorrow  Review of Systems:   Review of Systems  As above  Objective:     Vital Signs  Temp:  [98.6 °F (37 °C)-98.9 °F (37.2 °C)] 98.7 °F (37.1 °C)  Heart Rate:  [89-91] 89  Resp:  [14-19] 19  BP: (137-162)/(66-78) 148/76  Flow (L/min) (Oxygen Therapy):  [1-2.5] 1   Body mass  index is 38.67 kg/m².    Physical Exam  Physical Exam  Constitutional:       Appearance: Normal appearance.   HENT:      Nose:      Comments: NG tube  Cardiovascular:      Rate and Rhythm: Normal rate and regular rhythm.      Pulses: Normal pulses.      Heart sounds: Normal heart sounds. No murmur heard.  Pulmonary:      Effort: Pulmonary effort is normal. No respiratory distress.      Breath sounds: Normal breath sounds. No wheezing or rales.   Abdominal:      General: Bowel sounds are normal. There is no distension.      Tenderness: There is no abdominal tenderness.   Musculoskeletal:         General: No swelling, tenderness, deformity or signs of injury.      Cervical back: Normal range of motion.      Right lower leg: Edema (Right lower extremity dressing) present.   Neurological:      General: No focal deficit present.      Mental Status: She is alert and oriented to person, place, and time. Mental status is at baseline.      Cranial Nerves: No cranial nerve deficit.      Sensory: No sensory deficit.      Motor: No weakness.      Coordination: Coordination normal.            Diagnostic Data    Results from last 7 days   Lab Units 12/31/24  0000   WBC 10*3/mm3 12.07*   HEMOGLOBIN g/dL 8.0*   HEMATOCRIT % 24.7*   PLATELETS 10*3/mm3 242   GLUCOSE mg/dL 173*   CREATININE mg/dL 0.80   BUN mg/dL 16   SODIUM mmol/L 140   POTASSIUM mmol/L 3.8   AST (SGOT) U/L 29   ALT (SGPT) U/L 97*   ALK PHOS U/L 131*   BILIRUBIN mg/dL 0.6   ANION GAP mmol/L 6.6       No radiology results for the last day      I reviewed the patient's new clinical results.    Assessment/Plan:     Active and Resolved Problems  Active Hospital Problems    Diagnosis  POA    **DKA (diabetic ketoacidosis) [E11.10]  Yes    Arterial thrombosis [I74.9]  No      Resolved Hospital Problems   No resolved problems to display.       Diabetic ketoacidosis without coma, with new onset diabetes mellitus, type 2 / Metabolic acidosis, increased anion  gap-Resolved  -Etiology uncertain, though new onset diabetes mellitus.  A1c 15.80 on admission, no prior available.  -Lantus 22 units at night, insulin lispro 6 units every 6 hours.  Continue insulin sliding scale every 6 hours.  -Endocrinology Dr. Winkler following  -Nutrition following patient due to enteric feeds     Acute Kidney Injury requiring emergent hemodialysis  -Remains nonoliguric. Baseline creatinine 0.90.  Creatinine 2.10 on admission.  -Monitor Input/Output very closely.   -Avoid NSAIDs, nephrotoxic medications, and hypotension.  -Nephrology following.Initially on HD due to acute renal failure.  No history for the past 2 days.  Will evaluate need for HD on a daily basis per nephrology.      Acute occlusive thrombus of the right iliac artery with limb ischemia improved  -Arterial duplex with noted right femoral, deep femoral, and popliteal arteries being patent but with very low amplitude monophasic signals, suggesting severe inflow stenosis or occlusion; no measurable Doppler flow in the anterior or posterior tibial and peroneal arteries.  -Bilateral lower extremity ABIs ordered: Right STACEY critically reduced with STACEY of 0 and severe digital insufficiency; left STACEY is normal with moderate digital insufficiency.  -Emergent surgery per vascular surgeon 12/19/2024 for right iliac thrombectomy via open groin incision then return to surgery on 12/20/2024 for recurrent right lower extremity ischemia due to arterial thrombosis and underwent right iliofemoral popliteal thrombectomy, right femoral endarterectomy with patch, right lower extremity arteriogram, and closed right calf fasciotomies  -Continue on heparin drip per vascular surgery   -Hematology/oncology consulted  -Return to the OR morning (12/23) for emergent 4 compartment fasciotomies due to critical right leg ischemia  -Defer to vascular surgery for management of wound VAC.  Next wound VAC change on Monday     Nontraumatic rhabdomyolysis -->  Improving  -CK downtrending  -Encourage enteric hydration      Leukocytosis likely reactive  -Continue to monitor clinically    Electrolyte derangements likely due to renal failure  -Monitor and replete as needed per protocol      Cutaneous candidiasis of groin  -Likely secondary to uncontrolled diabetes mellitus  -Nystatin ordered     Essential Hypertension  -Clonidine patch ordered by nephrology-hold   -Titrate medications as needed.     Bilateral leg rash, concern for scabies infection  -Permethrin regimen ordered.  Completed first treatment on 12/18, repeat treatment in 2 weeks  -Keep in contact precautions x 1 week post initial treatment (12/25)     Transaminitis  -US of Liver: Hepatomegaly with steatosis.  -Hepatitis panel-negative.  -Monitor and trend LFTs.       COPD: Not in exacerbation.  Duonebs ordered as needed.  Obesity: Body mass index is 33.49 kg/m².       VTE Prophylaxis:  Pharmacologic VTE prophylaxis orders are present.             Disposition Planning:     Barriers to Discharge: medical optimization  Anticipated Date of Discharge: pending  Place of Discharge: home      Time: 35 minutes     Code Status and Medical Interventions: CPR (Attempt to Resuscitate); Full Support   Ordered at: 12/18/24 0830     Code Status (Patient has no pulse and is not breathing):    CPR (Attempt to Resuscitate)     Medical Interventions (Patient has pulse or is breathing):    Full Support       Signature: Electronically signed by Ella Dow MD, 12/31/24, 12:35 EST.  [facility] Hospitalist Team

## 2024-12-31 NOTE — PROGRESS NOTES
Daily Progress Note    Patient Care Team:  Katie Duran PA as PCP - General (Physician Assistant)  Uli Starr MD as Consulting Physician (Nephrology)    Chief Complaint: Follow-up type 1 diabetes    HPI: Patient seen, clinically doing better.  She has started eating some.  Off tube feeds.  Blood sugar log reviewed.  No complaints at this time.    ROS:   Constitutional:  Denies fatigue, tiredness.    Respiratory: denies cough, shortness of breath.   Cardiovascular:  denies chest pain, edema   GI:  Denies abdominal pain, nausea, vomiting.         Vitals:    12/31/24 1100   BP:    Pulse:    Resp: 19   Temp: 98.7 °F (37.1 °C)   SpO2:      Body mass index is 38.67 kg/m².    Physical Exam:  GEN: NAD, conversant  PSYCH: Awake and coherent      Results Review:     I reviewed the patient's new clinical results.    Glucose   Date Value Ref Range Status   12/31/2024 173 (H) 65 - 99 mg/dL Final     Sodium   Date Value Ref Range Status   12/31/2024 140 136 - 145 mmol/L Final     Potassium   Date Value Ref Range Status   12/31/2024 3.8 3.5 - 5.2 mmol/L Final     Comment:     Slight hemolysis detected by analyzer. Result may be falsely elevated.     CO2   Date Value Ref Range Status   12/31/2024 32.4 (H) 22.0 - 29.0 mmol/L Final     Chloride   Date Value Ref Range Status   12/31/2024 101 98 - 107 mmol/L Final     Anion Gap   Date Value Ref Range Status   12/31/2024 6.6 5.0 - 15.0 mmol/L Final     Creatinine   Date Value Ref Range Status   12/31/2024 0.80 0.57 - 1.00 mg/dL Final     BUN   Date Value Ref Range Status   12/31/2024 16 6 - 20 mg/dL Final     BUN/Creatinine Ratio   Date Value Ref Range Status   12/31/2024 20.0 7.0 - 25.0 Final     Calcium   Date Value Ref Range Status   12/31/2024 8.4 (L) 8.6 - 10.5 mg/dL Final     Alkaline Phosphatase   Date Value Ref Range Status   12/31/2024 131 (H) 39 - 117 U/L Final     Total Protein   Date Value Ref Range Status   12/31/2024 6.1 6.0 - 8.5 g/dL Final  "    ALT (SGPT)   Date Value Ref Range Status   12/31/2024 97 (H) 1 - 33 U/L Final     AST (SGOT)   Date Value Ref Range Status   12/31/2024 29 1 - 32 U/L Final     Total Bilirubin   Date Value Ref Range Status   12/31/2024 0.6 0.0 - 1.2 mg/dL Final     Albumin   Date Value Ref Range Status   12/31/2024 3.1 (L) 3.5 - 5.2 g/dL Final     Globulin   Date Value Ref Range Status   12/31/2024 3.0 gm/dL Final     Magnesium   Date Value Ref Range Status   12/31/2024 1.9 1.6 - 2.6 mg/dL Final     Phosphorus   Date Value Ref Range Status   12/31/2024 3.8 2.5 - 4.5 mg/dL Final     Lab Results   Component Value Date    HGBA1C 15.80 (H) 12/18/2024     No results found for: \"GLUF\", \"MICROALBUR\"  Results from last 7 days   Lab Units 12/31/24  1131 12/31/24  0825 12/31/24  0613 12/31/24  0434 12/30/24  2359 12/30/24  2056   GLUCOSE mg/dL 125* 87 121* 90 167* 220*     Medication Review: Reviewed.     apixaban, 5 mg, Oral, Q12H  insulin glargine, 22 Units, Subcutaneous, Nightly  insulin lispro, 2-7 Units, Subcutaneous, Q6H  insulin lispro, 6 Units, Subcutaneous, Q6H  midodrine, 10 mg, Oral, Q8H  nystatin, 1 Application, Topical, Q8H  pantoprazole, 40 mg, Oral, Q AM  QUEtiapine, 75 mg, Oral, Q12H  sodium chloride, 10 mL, Intravenous, Q12H      Assessment and plan:  Diabetes mellitus type 1 with hyperglycemia: Uncontrolled, overall doing well, will continue current regimen of glargine 22 units subcu nightly and change lispro to 6 units before meals along with lispro sliding scale at mealtime.  Will follow blood sugars and make further recommendations as needed.    Rodriguez Worthy MD. FACE                "

## 2024-12-31 NOTE — THERAPY EVALUATION
Patient Name: Yarelis Jackson  : 1970    MRN: [id number]                              Today's Date: 2024       Admit Date: 2024    Visit Dx:     ICD-10-CM ICD-9-CM   1. Diabetic ketoacidosis without coma associated with other specified diabetes mellitus  E13.10 250.12   2. MARGOT (acute kidney injury)  N17.9 584.9   3. Hypernatremia  E87.0 276.0   4. Acute encephalopathy  G93.40 348.30   5. Shock  R57.9 785.50   6. Arterial thrombosis  I74.9 444.9     Patient Active Problem List   Diagnosis    Benign essential hypertension    Cervical radiculopathy    Chronic obstructive pulmonary disease    Cigarette smoker    Hyperlipidemia    Menopause    Migraine headache    DKA (diabetic ketoacidosis)    Arterial thrombosis     Past Medical History:   Diagnosis Date    COPD (chronic obstructive pulmonary disease)     Low back pain     Migraine     Neck pain      Past Surgical History:   Procedure Laterality Date    FASCIOTOMY Right 2024    Procedure: FASCIOTOMY LEG;  Surgeon: Chris Delgado MD;  Location: [facility] MAIN OR;  Service: Vascular;  Laterality: Right;    FEMORAL THROMBECTOMY/EMBOLECTOMY Right 2024    Procedure: FEMORAL right iliofemoral thrombectomy, arteriogram;  Surgeon: Ghassan Celestin MD;  Location: [facility] OR;  Service: Vascular;  Laterality: Right;    FEMORAL THROMBECTOMY/EMBOLECTOMY Right 2024    Procedure: Right femoral thrombectomy, right lower extremity arteriogram and right lower leg facitomies;  Surgeon: Ghassan Celestin MD;  Location: [facility] HYBRID OR;  Service: Vascular;  Laterality: Right;    LUNG SURGERY        General Information       Row Name 24 1550          OT Time and Intention    Document Type evaluation  -LS     Mode of Treatment occupational therapy  -LS       Row Name 24 1550          General Information    Patient Profile Reviewed yes  -LS     Prior Level of Function independent:;ADL's;driving;all household mobility  -LS      Existing Precautions/Restrictions fall  Wound vac to RLE  -     Barriers to Rehab medically complex  -       Row Name 12/31/24 1550          Living Environment    People in Home parent(s)  -       Row Name 12/31/24 1550          Home Main Entrance    Number of Stairs, Main Entrance one  -       Row Name 12/31/24 1550          Stairs Within Home, Primary    Number of Stairs, Within Home, Primary none  -       Row Name 12/31/24 1550          Cognition    Orientation Status (Cognition) oriented x 4  -       Row Name 12/31/24 1550          Safety Issues/Impairments Affecting Functional Mobility    Impairments Affecting Function (Mobility) balance;pain;endurance/activity tolerance  -               User Key  (r) = Recorded By, (t) = Taken By, (c) = Cosigned By      Initials Name Provider Type    Carlos Alberto Murillo OT Occupational Therapist                     Mobility/ADL's       Row Name 12/31/24 1553          Bed Mobility    Bed Mobility bed mobility (all) activities  -     All Activities, [place] (Bed Mobility) moderate assist (50% patient effort);minimum assist (75% patient effort)  -       Row Name 12/31/24 1553          Transfers    Transfers sit-stand transfer;bed-chair transfer  -       Row Name 12/31/24 1553          Bed-Chair Transfer    Bed-Chair [place] (Transfers) moderate assist (50% patient effort);minimum assist (75% patient effort);2 person assist  -       Row Name 12/31/24 1553          Sit-Stand Transfer    Sit-Stand [place] (Transfers) moderate assist (50% patient effort);2 person assist;minimum assist (75% patient effort)  -     Assistive Device (Sit-Stand Transfers) walker, front-wheeled  -       Row Name 12/31/24 1553          Activities of Daily Living    BADL Assessment/Intervention lower body dressing  -       Row Name 12/31/24 1553          Lower Body Dressing Assessment/Training    [place] Level (Lower Body Dressing) lower body dressing  skills;maximum assist (25% patient effort)  -               User Key  (r) = Recorded By, (t) = Taken By, (c) = Cosigned By      Initials Name Provider Type     Carlos Alberto Hoover OT Occupational Therapist                   Obj/Interventions       Row Name 12/31/24 1554          Sensory Assessment (Somatosensory)    Sensory Assessment (Somatosensory) UE sensation intact  -[facility] Name 12/31/24 1554          Range of Motion Comprehensive    General Range of Motion bilateral upper extremity ROM WFL  -[facility] Name 12/31/24 1554          Strength Comprehensive (MMT)    Comment, General Manual Muscle Testing (MMT) Assessment BUEs with 4+/5 gross strength  -       Row Name 12/31/24 1554          Balance    Balance Assessment sitting static balance;sitting dynamic balance;standing static balance;standing dynamic balance  -     Static Sitting Balance modified independence  -LS     Dynamic Sitting Balance standby assist  -LS     Position, Sitting Balance sitting edge of bed;unsupported  -LS     Static Standing Balance minimal assist  -LS     Dynamic Standing Balance minimal assist;2-person assist  -LS     Position/Device Used, Standing Balance walker, front-wheeled  -               User Key  (r) = Recorded By, (t) = Taken By, (c) = Cosigned By      Initials Name Provider Type    LS Carlos Alberto Hoover OT Occupational Therapist                   Goals/Plan       Row Name 12/31/24 1602          Bed Mobility Goal 1 (OT)    Activity/Assistive Device (Bed Mobility Goal 1, OT) bed mobility activities, all  -LS     [place] Level/Cues Needed (Bed Mobility Goal 1, OT) modified independence  -     Time Frame (Bed Mobility Goal 1, OT) long term goal (LTG);2 weeks  -       Row Name 12/31/24 1602          Transfer Goal 1 (OT)    Activity/Assistive Device (Transfer Goal 1, OT) transfers, all  -LS     [place] Level/Cues Needed (Transfer Goal 1, OT) minimum assist (75% or more patient effort)  -LS     Time Frame  (Transfer Goal 1, OT) long term goal (LTG);2 weeks  -LS       Row Name 12/31/24 1602          Dressing Goal 1 (OT)    Activity/Device (Dressing Goal 1, OT) dressing skills, all  -LS     [place]/Cues Needed (Dressing Goal 1, OT) minimum assist (75% or more patient effort)  -LS     Time Frame (Dressing Goal 1, OT) long term goal (LTG);2 weeks  -LS       Row Name 12/31/24 1602          Toileting Goal 1 (OT)    Activity/Device (Toileting Goal 1, OT) toileting skills, all  -LS     [place] Level/Cues Needed (Toileting Goal 1, OT) minimum assist (75% or more patient effort)  -LS     Time Frame (Toileting Goal 1, OT) long term goal (LTG);2 weeks  -LS       Row Name 12/31/24 1602          Therapy Assessment/Plan (OT)    Planned Therapy Interventions (OT) activity tolerance training;BADL retraining;functional balance retraining;IADL retraining;occupation/activity based interventions;ROM/therapeutic exercise;strengthening exercise;transfer/mobility retraining;patient/caregiver education/training  -LS               User Key  (r) = Recorded By, (t) = Taken By, (c) = Cosigned By      Initials Name Provider Type    LS Carlos Alberto Hoover OT Occupational Therapist                   Clinical Impression       Row Name 12/31/24 7313          Pain Assessment    Pretreatment Pain Rating 2/10  -LS     Posttreatment Pain Rating 10/10  -LS     Pain Location extremity  -LS     Pain Side/Orientation right;lower  -LS       Row Name 12/31/24 1289          Plan of Care Review    Plan of Care Reviewed With patient  -LS     Outcome Evaluation Yarelis Jackson is a 54 y.o. female with PMH of COPD, migraine, and obesity, and presented to the hospital for altered mental status, and was admitted with a principal diagnosis of DKA (diabetic ketoacidosis). On 12/19, patient was intubated. HD was transitioned to CRRT. Emergent surgery per vascular surgeon 12/19/2024 for right iliac thrombectomy via open groin incision then return to surgery on  12/20/2024 for recurrent right lower extremity ischemia due to arterial thrombosis and underwent right iliofemoral popliteal thrombectomy, right femoral endarterectomy with patch, right lower extremity arteriogram, and closed right calf fasciotomies. Return to the OR morning (12/23) for emergent 4 compartment fasciotomies due to critical right leg ischemia. RLE now with wound vac in place. She is A&Ox4 today, with reports that pain is improved at rest. Min-Mod Ax2 to get to EOB this date. She does require Max A with lower body self-care, most barriers at this time are due to pain. She was able to stand with 2 attempts, requiring Min-Mod Ax2 and bed elevated. Difficulty plaacing weight through RLE due to pain, but ultimately able to transfer to chair with Mod Ax2 and RW. Recommended to nursing staff the use of Jeaneth Stedy for return to bed. Pt is far from her baseline and unsafe for return home. Will need SNF for rehab. OT will follow.  -       Row Name 12/31/24 8538          Therapy Assessment/Plan (OT)    Rehab Potential (OT) good  -     Criteria for Skilled Therapeutic Interventions Met (OT) yes;skilled treatment is necessary  -     Therapy Frequency (OT) 5 times/wk  -     Predicted Duration of Therapy Intervention (OT) until dc  -       Row Name 12/31/24 3303          Therapy Plan Review/Discharge Plan (OT)    Anticipated Discharge Disposition (OT) skilled nursing facility  -       Row Name 12/31/24 9349          Vital Signs    O2 Delivery Pre Treatment nasal cannula  -LS     O2 Delivery Intra Treatment nasal cannula  -LS     Post SpO2 (%) 98  -LS     O2 Delivery Post Treatment nasal cannula  -LS     Pre Patient Position Supine  -LS     Intra Patient Position Standing  -LS     Post Patient Position Sitting  -       Row Name 12/31/24 8929          Positioning and Restraints    Post Treatment Position chair  -LS     In Chair notified nsg;reclined;call light within reach;encouraged to call for  assist;exit alarm on  -LS               User Key  (r) = Recorded By, (t) = Taken By, (c) = Cosigned By      Initials Name Provider Type    Carlos Alberto Murillo OT Occupational Therapist                   Outcome Measures       Row Name 12/31/24 1603          How much help from another is currently needed...    Putting on and taking off regular lower body clothing? 2  -LS     Bathing (including washing, rinsing, and drying) 2  -LS     Toileting (which includes using toilet bed pan or urinal) 2  -LS     Putting on and taking off regular upper body clothing 2  -LS     Taking care of personal grooming (such as brushing teeth) 3  -LS     Eating meals 4  -LS     AM-PAC 6 Clicks Score (OT) 15  -LS       Row Name 12/31/24 1603          Functional Assessment    Outcome Measure Options AM-PAC 6 Clicks Daily Activity (OT)  -LS               User Key  (r) = Recorded By, (t) = Taken By, (c) = Cosigned By      Initials Name Provider Type    Carlos Alberto Murillo OT Occupational Therapist                    Occupational Therapy Education       Title: PT OT SLP Therapies (Done)       Topic: Occupational Therapy (Done)       Point: ADL training (Done)       Description:   Instruct learner(s) on proper safety adaptation and remediation techniques during self care or transfers.   Instruct in proper use of assistive devices.                  Learning Progress Summary            Patient Acceptance, E,TB, VU by LS at 12/31/2024 1603                      Point: Precautions (Done)       Description:   Instruct learner(s) on prescribed precautions during self-care and functional transfers.                  Learning Progress Summary            Patient Acceptance, E,TB, VU by LS at 12/31/2024 1603                      Point: Body mechanics (Done)       Description:   Instruct learner(s) on proper positioning and spine alignment during self-care, functional mobility activities and/or exercises.                  Learning Progress Summary             Patient Acceptance, E,TB, VU by  at 12/31/2024 5663                                      User Key       Initials Effective Dates Name Provider Type Discipline    LISA 09/22/22 -  Carlos Alberto Hoover OT Occupational Therapist OT                  OT Recommendation and Plan  Planned Therapy Interventions (OT): activity tolerance training, BADL retraining, functional balance retraining, IADL retraining, occupation/activity based interventions, ROM/therapeutic exercise, strengthening exercise, transfer/mobility retraining, patient/caregiver education/training  Therapy Frequency (OT): 5 times/wk  Plan of Care Review  Plan of Care Reviewed With: patient  Outcome Evaluation: Yarelis Jackson is a 54 y.o. female with PMH of COPD, migraine, and obesity, and presented to the hospital for altered mental status, and was admitted with a principal diagnosis of DKA (diabetic ketoacidosis). On 12/19, patient was intubated. HD was transitioned to CRRT. Emergent surgery per vascular surgeon 12/19/2024 for right iliac thrombectomy via open groin incision then return to surgery on 12/20/2024 for recurrent right lower extremity ischemia due to arterial thrombosis and underwent right iliofemoral popliteal thrombectomy, right femoral endarterectomy with patch, right lower extremity arteriogram, and closed right calf fasciotomies. Return to the OR morning (12/23) for emergent 4 compartment fasciotomies due to critical right leg ischemia. RLE now with wound vac in place. She is A&Ox4 today, with reports that pain is improved at rest. Min-Mod Ax2 to get to EOB this date. She does require Max A with lower body self-care, most barriers at this time are due to pain. She was able to stand with 2 attempts, requiring Min-Mod Ax2 and bed elevated. Difficulty plaacing weight through RLE due to pain, but ultimately able to transfer to chair with Mod Ax2 and RW. Recommended to nursing staff the use of Jeaneth Stedy for return to bed. Pt is far from her  baseline and unsafe for return home. Will need SNF for rehab. OT will follow.     Time Calculation:         Time Calculation- OT       Row Name 12/31/24 1603             Time Calculation- OT    OT Start Time 1131  -      OT Stop Time 1151  -      OT Time Calculation (min) 20 min  -LS      OT Received On 12/31/24  -      OT - Next Appointment 01/02/25  -      OT Goal Re-Cert Due Date 01/14/25  -         Untimed Charges    OT Eval/Re-eval Minutes 20  -LS         Total Minutes    Untimed Charges Total Minutes 20  -LS       Total Minutes 20  -LS                User Key  (r) = Recorded By, (t) = Taken By, (c) = Cosigned By      Initials Name Provider Type     Carlos Alberto Hoover OT Occupational Therapist                  Therapy Charges for Today       Code Description Service Date Service Provider Modifiers Qty    [id number] HC OT EVAL MOD COMPLEXITY 4 12/31/2024 Carlos Alberto Hoover OT GO 1                 Carlos Alberto Hoover OT  12/31/2024

## 2024-12-31 NOTE — PROGRESS NOTES
"Transitions of Care (test claim):    Patient insurance type: Medicaid - this means they are NOT eligible for a monthly discount card in the future (see \"free month\" note below)  Is patient signed up for M2B? yes    Drug Eliquis:   Covered/PA Required: Covered without PA  Copay Per Month: $0.00  Is the medication eligible for a trial card/copay card? N/A    This test claim coverage is only valid on the date the note is published. Copay/coverage could vary depending on patient coverage at a later date.  Additionally this test claim is for the sole purpose of a price check not a clinical recommendation.     For billing questions please call [facility] Pharmacist at ext: [phone]  For M2B questions please call [facility] at ext: [phone]     Geeta GriffinD, BCPS    "

## 2024-12-31 NOTE — PROGRESS NOTES
Name: Yarelis Jackson ADMIT: 2024   : 1970  PCP: Katie Duran PA    MRN: [id number] LOS: 13 days   AGE/SEX: 54 y.o. female  ROOM:  [place] [address]/     CC: Status post right leg thrombectomies and fasciotomies  Interval History:   Patient eating breakfast this morning without issue.  Reports stable pain to her right lower extremity.  Unable to wiggle right toes or move her right foot at the ankle.  Reports some numbness in her right toes.    Subjective   Subjective     Review of Systems  Objective   Objective     Vitals:   Temp:  [97.9 °F (36.6 °C)-98.9 °F (37.2 °C)] 98.7 °F (37.1 °C)  Heart Rate:  [89-91] 89  Resp:  [13-18] 16  BP: (137-162)/(66-80) 148/76    No intake/output data recorded.    Scheduled Meds:     insulin glargine, 22 Units, Subcutaneous, Nightly  insulin lispro, 2-7 Units, Subcutaneous, Q6H  insulin lispro, 6 Units, Subcutaneous, Q6H  midodrine, 10 mg, Oral, Q8H  nystatin, 1 Application, Topical, Q8H  pantoprazole, 40 mg, Oral, Q AM  QUEtiapine, 75 mg, Oral, Q12H  sodium chloride, 10 mL, Intravenous, Q12H      IV Meds:   heparin, 16.9 Units/kg/hr, Last Rate: 16.9 Units/kg/hr (24 0552)        Physical Exam    NAD  Regular rhythm  Palpable bilateral DP pulses  Signals to bilateral PT  Right lower leg fasciotomy sites wound VAC in place, good seal, no leak noted  Right groin incision with staples, CDI and soft, no skin breakdown noted  Unable to dorsiflex or plantar flex the right foot    Data Reviewed:  CBC    Results from last 7 days   Lab Units 24  0000 24  0257 24  0337 24  0004 24  0535 24  0415 24  1035 24  0428   WBC 10*3/mm3 12.07* 13.50* 14.76* 16.35* 15.02* 15.49*  --  16.72*   HEMOGLOBIN g/dL 8.0* 8.1* 8.0* 7.9* 8.1* 8.1* 8.2* 6.6*   PLATELETS 10*3/mm3 242 227 216 166 148 147  --  121*     BMP   Results from last 7 days   Lab Units 24  0000 24  1225 24  0257 24  0337 24  1224 24  0004  12/27/24  0535 12/26/24  1406 12/26/24  0428 12/26/24  0425 12/25/24  0605   SODIUM mmol/L 140  --  141 146*  --  149* 143  --  139  --  136   POTASSIUM mmol/L 3.8 4.0 3.2* 3.7 4.0 3.3* 4.0   < > 3.5  --  3.7   CHLORIDE mmol/L 101  --  100 108*  --  111* 108*  --  104  --  100   CO2 mmol/L 32.4*  --  33.2* 31.3*  --  30.5* 26.6  --  23.3  --  24.5   BUN mg/dL 16  --  18 19  --  21* 21*  --  36*  --  35*   CREATININE mg/dL 0.80  --  0.81 0.83  --  0.97 1.00  --  1.62* 1.73* 1.82*   GLUCOSE mg/dL 173*  --  100* 140*  --  128* 155*  --  175*  --  245*   MAGNESIUM mg/dL 1.9  --  1.3* 1.7  --  4.0* 1.5*  --  2.0  --  1.8   PHOSPHORUS mg/dL 3.8  --  3.8 3.0  --  3.0 2.1*  --  2.8  --  3.0    < > = values in this interval not displayed.     Radiology(recent) No radiology results for the last day    Active Hospital Problems:   Active Hospital Problems    Diagnosis  POA    **DKA (diabetic ketoacidosis) [E11.10]  Yes    Arterial thrombosis [I74.9]  No      Resolved Hospital Problems   No resolved problems to display.      Assessment & Plan     Assessment / Plan     Right lower extremity ischemia: Status post thrombectomy and redo thrombectomy as well as fasciotomies.       Tolerating regular diet, will transition to Eliquis today and stop the heparin drip  Plan for VAC dressing change tomorrow with WOJUSTINO Sneed, APRN  12/31/24  08:29 EST  [phone]    Copied text in this note has been reviewed by me and remains relevant as of 12/31/24.

## 2024-12-31 NOTE — CONSULTS
Diabetes Education    Patient Name:  Yarelis Jackson  YOB: 1970  MRN: [id number]  Admit Date:  12/18/2024    Follow up note: Met with pt at bedside. Pt has reading glasses at bedside.    Instructed pt in use of insulin pen. Pt performed return demo correctly and injected into foam pad correctly. Discussed when to take long-acting and mealtime insulin. Discussed importance of not taking mealtime insulin if not eating. Discussed injection sites, rotation of sites, storage of insulin and disposal of pen needles. Pt states she is not apprehensive about giving herself insulin injections. Discussed pt will want to practice injecting herself prior to discharge. Secure chat sent to pt's nurse to request having pt give her own insulin injections for practice while she is in the hospital.     Discussed low bs signs, symptoms and treatment. Pt has low bs handout.    Instructed pt in use of [employer] Guide Me meter. Pt inserted test strip into meter and understands where to apply the blood. Pt also loaded lancet into lancing device and understands how to cock the spring and prick her finger.     Rxs for insulin pens, pen needles, bs meter and supplies were all started for pt yesterday and secure chat has been sent to MD to request sending rxs through at discharge. Pt with no additional questions. She has education material at bedside for reinforcement.       Electronically signed by:  Dolly Montejo RN  12/31/24 17:44 EST

## 2024-12-31 NOTE — PLAN OF CARE
Goal Outcome Evaluation:  Plan of Care Reviewed With: patient           Outcome Evaluation: Yarelis Jackson is a 54 y.o. female with PMH of COPD, migraine, and obesity, and presented to the hospital for altered mental status, and was admitted with a principal diagnosis of DKA (diabetic ketoacidosis). On 12/19, patient was intubated. HD was transitioned to CRRT. Emergent surgery per vascular surgeon 12/19/2024 for right iliac thrombectomy via open groin incision then return to surgery on 12/20/2024 for recurrent right lower extremity ischemia due to arterial thrombosis and underwent right iliofemoral popliteal thrombectomy, right femoral endarterectomy with patch, right lower extremity arteriogram, and closed right calf fasciotomies. Return to the OR morning (12/23) for emergent 4 compartment fasciotomies due to critical right leg ischemia. RLE now with wound vac in place. She is A&Ox4 today, with reports that pain is improved at rest. Min-Mod Ax2 to get to EOB this date. She does require Max A with lower body self-care, most barriers at this time are due to pain. She was able to stand with 2 attempts, requiring Min-Mod Ax2 and bed elevated. Difficulty plaacing weight through RLE due to pain, but ultimately able to transfer to chair with Mod Ax2 and RW. Recommended to nursing staff the use of Jeaneth Stedy for return to bed. Pt is far from her baseline and unsafe for return home. Will need SNF for rehab. OT will follow.    Anticipated Discharge Disposition (OT): skilled nursing facility

## 2024-12-31 NOTE — PLAN OF CARE
Goal Outcome Evaluation:            Oriented x4. Pleasant and cooperative. Family and friends visited this shift. No distress. O2 titrated to 1 LPM. Scattered rhonchi in upper lobes. Infrequent cough. Productive at times of small amount of white thin sputum. Tolerating oral diet. Blood  sugars 220->167->90. Held 6 units of lispro  scheduled at 0000 and 0600 as patient no longer receiving tube feeds. Pulses dopplered to RLE.BLEs warm to touch. Held midodrine due to SBPs 137-162 DBPs 66-69. Unable to plantarflex or dorsiflex RLE; relates inability to pain.Continues to say unable to flex due to pain. No attempts noted. Responds to tactile stimuli to RLE. Skin soft and compressible above and below dressing. Nystatin cream applied to MASD to periarea and buttocks. No falls. Will continue with plan of care.

## 2024-12-31 NOTE — PROGRESS NOTES
"NEPHROLOGY PROGRESS NOTE------[facility]    [facility]  [phone]  Raymundo Harris MD      Patient Care Team:  Katie Duran PA as PCP - General (Physician Assistant)  Uli Starr MD as Consulting Physician (Nephrology)      Provider:  Raymundo Harris MD  Patient Name: Yarelis Jackson  :  1970    SUBJECTIVE:    F/U ARF/MARGOT/ELECTROLYTE ABNORMALITIES  No chest pain or shortness of breath    Medication:  apixaban, 5 mg, Oral, Q12H  insulin glargine, 22 Units, Subcutaneous, Nightly  insulin lispro, 2-7 Units, Subcutaneous, Q6H  insulin lispro, 6 Units, Subcutaneous, Q6H  midodrine, 10 mg, Oral, Q8H  nystatin, 1 Application, Topical, Q8H  pantoprazole, 40 mg, Oral, Q AM  QUEtiapine, 75 mg, Oral, Q12H  sodium chloride, 10 mL, Intravenous, Q12H             OBJECTIVE    Vital Sign Min/Max for last 24 hours  Temp  Min: 97.9 °F (36.6 °C)  Max: 98.9 °F (37.2 °C)   BP  Min: 137/66  Max: 162/78   Pulse  Min: 89  Max: 91   Resp  Min: 13  Max: 19   SpO2  Min: 97 %  Max: 100 %   No data recorded   No data recorded     Flowsheet Rows      Flowsheet Row First Filed Value   Admission Height 162.6 cm (64\") Documented at 2024 0443   Admission Weight 92.5 kg (203 lb 14.4 oz) Documented at 2024 0527            I/O this shift:  In: -   Out: 1100 [Urine:1100]  I/O last 3 completed shifts:  In: 73579.6 [I.V.:6411.6; Other:751; NG/GT:4991]  Out: 5550 [Urine:5500; Other:50]    Physical Exam:     General Appearance:  NAD  Head: normocephalic, without obvious abnormality and atraumatic +DHT INTACT +DRY OP  Eyes: conjunctivae and sclerae normal and no icterus  Neck: supple and no JVD  Lungs: +SCATTERED RHONCHI  Heart: regular rhythm & normal rate and normal S1, S2 +THOMAS  Chest: Wall no abnormalities observed  Abdomen: normal bowel sounds and soft +OBESITY  Extremities: moves extremities well, +TRACE  BILAT PRETIBIAL EDEMA, no cyanosis and no redness. " "+DJD  Skin: Warm  Neurologic:  A AND O X 2    Labs:    WBC WBC   Date Value Ref Range Status   12/31/2024 12.07 (H) 3.40 - 10.80 10*3/mm3 Final   12/30/2024 13.50 (H) 3.40 - 10.80 10*3/mm3 Final   12/29/2024 14.76 (H) 3.40 - 10.80 10*3/mm3 Final      HGB Hemoglobin   Date Value Ref Range Status   12/31/2024 8.0 (L) 12.0 - 15.9 g/dL Final   12/30/2024 8.1 (L) 12.0 - 15.9 g/dL Final   12/29/2024 8.0 (L) 12.0 - 15.9 g/dL Final      HCT Hematocrit   Date Value Ref Range Status   12/31/2024 24.7 (L) 34.0 - 46.6 % Final   12/30/2024 24.9 (L) 34.0 - 46.6 % Final   12/29/2024 24.9 (L) 34.0 - 46.6 % Final      Platelets No results found for: \"LABPLAT\"   MCV MCV   Date Value Ref Range Status   12/31/2024 95.4 79.0 - 97.0 fL Final   12/30/2024 94.7 79.0 - 97.0 fL Final   12/29/2024 95.8 79.0 - 97.0 fL Final          Sodium Sodium   Date Value Ref Range Status   12/31/2024 140 136 - 145 mmol/L Final   12/30/2024 141 136 - 145 mmol/L Final   12/29/2024 146 (H) 136 - 145 mmol/L Final      Potassium Potassium   Date Value Ref Range Status   12/31/2024 3.8 3.5 - 5.2 mmol/L Final     Comment:     Slight hemolysis detected by analyzer. Result may be falsely elevated.   12/30/2024 4.0 3.5 - 5.2 mmol/L Final   12/30/2024 3.2 (L) 3.5 - 5.2 mmol/L Final   12/29/2024 3.7 3.5 - 5.2 mmol/L Final   12/28/2024 4.0 3.5 - 5.2 mmol/L Final      Chloride Chloride   Date Value Ref Range Status   12/31/2024 101 98 - 107 mmol/L Final   12/30/2024 100 98 - 107 mmol/L Final   12/29/2024 108 (H) 98 - 107 mmol/L Final      CO2 CO2   Date Value Ref Range Status   12/31/2024 32.4 (H) 22.0 - 29.0 mmol/L Final   12/30/2024 33.2 (H) 22.0 - 29.0 mmol/L Final   12/29/2024 31.3 (H) 22.0 - 29.0 mmol/L Final      BUN BUN   Date Value Ref Range Status   12/31/2024 16 6 - 20 mg/dL Final   12/30/2024 18 6 - 20 mg/dL Final   12/29/2024 19 6 - 20 mg/dL Final      Creatinine Creatinine   Date Value Ref Range Status   12/31/2024 0.80 0.57 - 1.00 mg/dL Final   12/30/2024 " "0.81 0.57 - 1.00 mg/dL Final   12/29/2024 0.83 0.57 - 1.00 mg/dL Final      Calcium Calcium   Date Value Ref Range Status   12/31/2024 8.4 (L) 8.6 - 10.5 mg/dL Final   12/30/2024 8.6 8.6 - 10.5 mg/dL Final   12/29/2024 9.0 8.6 - 10.5 mg/dL Final      PO4 No components found for: \"PO4\"   Albumin Albumin   Date Value Ref Range Status   12/31/2024 3.1 (L) 3.5 - 5.2 g/dL Final   12/30/2024 2.8 (L) 3.5 - 5.2 g/dL Final   12/29/2024 3.2 (L) 3.5 - 5.2 g/dL Final      Magnesium Magnesium   Date Value Ref Range Status   12/31/2024 1.9 1.6 - 2.6 mg/dL Final   12/30/2024 1.3 (L) 1.6 - 2.6 mg/dL Final   12/29/2024 1.7 1.6 - 2.6 mg/dL Final      Uric Acid No components found for: \"URIC ACID\"     Imaging Results (Last 72 Hours)       Procedure Component Value Units Date/Time    FL Video Swallow With Speech Single Contrast [[id number]] Resulted: 12/30/24 1026     Updated: 12/30/24 1026    Narrative:      This procedure was auto-finalized with no dictation required.            Results for orders placed during the hospital encounter of 12/18/24    XR Chest 1 View    Narrative  XR CHEST 1 VW    Date of Exam: 12/22/2024 12:35 AM EST    Indication: Intubated Patient    Comparison:  12/21/2024    FINDINGS:  Endotracheal tube tip appears in satisfactory position at the level of the aortic arch. Right IJ catheter appears in stable position. Enteric tube extends into the left upper quadrant. Heart size and mediastinal contour appear within normal limits. No  definite pneumothorax or effusion. Mild bibasilar opacities. Findings of advanced emphysema with suspected apical scarring.    Impression  1.Tubes and lines appear in satisfactory positions, as above.  2.Mild bibasilar opacities which could represent atelectasis or infiltrate.  3.Advanced emphysema.        Electronically Signed: Sam Haynes  12/22/2024 7:07 AM EST  Workstation ID: [id number]      XR Chest 1 View    Narrative  XR CHEST 1 VW    Date of Exam: 12/21/2024 4:32 AM " EST    Indication: Intubated Patient    Comparison: 12/20/2024    Findings:  There is been no interval tube or line changes. Heart size and pulmonary vessels are within normal limits. There are postoperative changes of the bilateral upper lobes. Lungs appear clear. No pleural effusion or pneumothorax.    Impression  Impression:    1. No tube or line change  2. No focal airspace consolidation or other acute process.      Electronically Signed: Julio Thorpe MD  12/21/2024 9:54 AM EST  Workstation ID: [id number]      XR Chest 1 View    Narrative  XR CHEST 1 VW    Date of Exam: 12/20/2024 12:10 AM EST    Indication: Intubated Patient    Comparison: 12/19/2024    Findings:  Endotracheal tube approximately 2.5 cm above the carmina. Feeding tube and right IJ dialysis catheter remain in place. No new tubes or lines heart size and pulmonary vasculature are stable. Bullous changes in the right upper lobe. Postoperative changes in  the left upper lobe. Lungs grossly clear. No pneumothorax    Impression  Impression:  Stable chest demonstrating COPD and postoperative changes with no acute cardiopulmonary process demonstrated      Electronically Signed: Shon Ponce  12/20/2024 7:19 AM EST  Workstation ID: [id number]      Results for orders placed during the hospital encounter of 12/18/24    Duplex Lower Extremity Art / Grafts - Right CAR    Interpretation Summary    Limited study due to body habitus and current dressing site.  Patent external iliac and femoral-popliteal arterial flow with low flow noted below the common femoral.  Common femoral artery poorly visualized.  Cannot rule out occlusion of the common femoral artery.        ASSESSMENT / PLAN      DKA (diabetic ketoacidosis)    Arterial thrombosis    ARF/MARGOT------ nonoliguric.  Follow for further dialysis need... +ARF/MARGOT with historically normal renal function. +ARF/MARGOT secondary to severe prerenal state and ATN from hypotension and Rhabdomyolysis.  Pressor support. Off  IVFs. No NSAIDs. Renal US without obstructive uropathy.   Dose meds for CrCl less than 10 cc/min     2. HYPERNATREMIA/HYPEROSMOLAR STATE-----encourage fluid intake     3. ACIDOSIS--Resolved     4. DVT PROPHYLAXIS-----On Heparin gtt     5. HYPOCALCEMIA------Replace IV and follow ionized levels     6. DM------BS ok     7. ANEMIA---------H/H stable     8. OA/DJD/HYPERURICEMIA------Allopurinol . No NSAIDs   Restart IVFs. CK down post multiple days of HD     10. GERD/PUD PROPHYLAXIS-----Renal dose adjusted Pepcid     11. DELIRIUM/TME     12. COPD/ACUTE HYPOXIC RESPIRATORY FAILURE-----S/P extubation    13. HYPOALBUMINEMIA-----S/P IV Albumin to temporize    14. EDEMA/VOLUME EXCESS---- Better post gentle UF with HD    15. HYPOTENSION-----BP ok    16. ELEVATED LFTS/TRANSAMINASES------Secondary to shock liver and Rhabdo. Follow    17. FASCITIS------S/P surgery      18. DELIRIUM---Better    19. HYPOPHOSPHATEMIA------Replaced    20. HYPOMAGNESEMIA------Replaced    Creatinine stable/ brisk diuresis  Sodium improved  Replace potassium  Continue free water per feeding tube   Monitor renal function, fluid status and electrolytes    Raymundo Harris MD  [facility]  [phone]  12/31/24  11:51 EST

## 2024-12-31 NOTE — THERAPY TREATMENT NOTE
"Subjective: Pt agreeable to therapeutic plan of care. Patient is cleared for therapy by nursing.  Patient is pleasant and agreeable; A&Ox4 reports pain is improved from yesterday.    Objective:     Precautions - wound vac/R LE fasciotomy; absent R distal sensation DF/PF    Bed mobility - Min-A  Transfers - Mod-A, Assist x 2, and with rolling walker SPS  with increased time, cues for off loading through UE for R LE WB as needed for pain management.  Ambulation - not safe to try      Vitals: WNL    Pain: PRE: 4 VAS  Post:10/10 Location: RLE  Intervention for pain: Repositioned, Increased Activity, and Therapeutic Presence    Education: Provided education on the importance of mobility in the acute care setting and Transfer Training    Assessment: Yarelis Jackson presents with functional mobility impairments which indicate the need for skilled intervention. Patient requires increased time and assistance to fully transition into standing and for SPS.  Difficulty with Wbing through R LE due to pain.  Cues for offloading with UE.  Patient continues with no active R LE PF/DF and absent distal sensation.  Recommend SNF level of care once medically appropriate. Tolerating session today without incident. Will continue to follow and progress as tolerated.     Plan/Recommendations:   If medically appropriate, Moderate Intensity Therapy recommended post-acute care. This is recommended as therapy feels the patient would require 3-4 days per week and wouldn't tolerate \"3 hour daily\" rehab intensity. SNF would be the preferred choice. If the patient does not agree to SNF, arrange HH or OP depending on home bound status. If patient is medically complex, consider LTACH. Pt requires no DME at discharge.     Pt desires Skilled Rehab placement at discharge. Pt cooperative; agreeable to therapeutic recommendations and plan of care.         Basic Mobility 6-click:  Rollin = Total, A lot = 2, A little = 3; 4 = None  Supine>Sit: "   1 = Total, A lot = 2, A little = 3; 4 = None   Sit>Stand with arms:  1 = Total, A lot = 2, A little = 3; 4 = None  Bed>Chair:   1 = Total, A lot = 2, A little = 3; 4 = None  Ambulate in room:  1 = Total, A lot = 2, A little = 3; 4 = None  3-5 Steps with railin = Total, A lot = 2, A little = 3; 4 = None  Score: 14    Modified [place]: N/A = No pre-op stroke/TIA    Post-Tx Position: Up in Chair, Alarms activated, and Call light and personal items within reach  PPE: gloves    Therapy Charges for Today       Code Description Service Date Service Provider Modifiers Qty    77556603291 HC-PT EVAL HIGH COMPLEXITY 5 2024 Gianna Ortega, PT  1    [id number]  PT THERAPEUTIC ACT EA 15 MIN 2024 Gianna Ortega, PT GP 1           PT Charges       Row Name 24 1311             Time Calculation    Start Time 1133  -RR      Stop Time 1151  -RR      Time Calculation (min) 18 min  -RR      PT Received On 24  -RR      PT - Next Appointment 25  -RR         Time Calculation- PT    Total Timed Code Minutes- PT 18 minute(s)  -RR                User Key  (r) = Recorded By, (t) = Taken By, (c) = Cosigned By      Initials Name Provider Type    RR Gianna Ortega PT Physical Therapist

## 2024-12-31 NOTE — PLAN OF CARE
Goal Outcome Evaluation:         Assessment: Yarelis Jackson presents with functional mobility impairments which indicate the need for skilled intervention. Patient requires increased time and assistance to fully transition into standing and for SPS.  Difficulty with Wbing through R LE due to pain.  Cues for offloading with UE.  Patient continues with no active R LE PF/DF and absent distal sensation.  Recommend SNF level of care once medically appropriate. Tolerating session today without incident. Will continue to follow and progress as tolerated.     Anticipated Discharge Disposition (PT): skilled nursing facility

## 2025-01-01 LAB
ALBUMIN SERPL-MCNC: 3 G/DL (ref 3.5–5.2)
ALBUMIN/GLOB SERPL: 0.9 G/DL
ALP SERPL-CCNC: 112 U/L (ref 39–117)
ALT SERPL W P-5'-P-CCNC: 71 U/L (ref 1–33)
ANION GAP SERPL CALCULATED.3IONS-SCNC: 5.1 MMOL/L (ref 5–15)
AST SERPL-CCNC: 26 U/L (ref 1–32)
BASOPHILS # BLD AUTO: 0.03 10*3/MM3 (ref 0–0.2)
BASOPHILS NFR BLD AUTO: 0.3 % (ref 0–1.5)
BILIRUB SERPL-MCNC: 0.5 MG/DL (ref 0–1.2)
BUN SERPL-MCNC: 15 MG/DL (ref 6–20)
BUN/CREAT SERPL: 17.9 (ref 7–25)
CALCIUM SPEC-SCNC: 8.3 MG/DL (ref 8.6–10.5)
CHLORIDE SERPL-SCNC: 102 MMOL/L (ref 98–107)
CO2 SERPL-SCNC: 30.9 MMOL/L (ref 22–29)
CREAT SERPL-MCNC: 0.84 MG/DL (ref 0.57–1)
DEPRECATED RDW RBC AUTO: 54 FL (ref 37–54)
EGFRCR SERPLBLD CKD-EPI 2021: 83.2 ML/MIN/1.73
EOSINOPHIL # BLD AUTO: 0.13 10*3/MM3 (ref 0–0.4)
EOSINOPHIL NFR BLD AUTO: 1.3 % (ref 0.3–6.2)
ERYTHROCYTE [DISTWIDTH] IN BLOOD BY AUTOMATED COUNT: 16.3 % (ref 12.3–15.4)
GLOBULIN UR ELPH-MCNC: 3.4 GM/DL
GLUCOSE BLDC GLUCOMTR-MCNC: 121 MG/DL (ref 70–105)
GLUCOSE BLDC GLUCOMTR-MCNC: 129 MG/DL (ref 70–105)
GLUCOSE BLDC GLUCOMTR-MCNC: 135 MG/DL (ref 70–105)
GLUCOSE BLDC GLUCOMTR-MCNC: 212 MG/DL (ref 70–105)
GLUCOSE BLDC GLUCOMTR-MCNC: 218 MG/DL (ref 70–105)
GLUCOSE SERPL-MCNC: 203 MG/DL (ref 65–99)
HCT VFR BLD AUTO: 26.2 % (ref 34–46.6)
HGB BLD-MCNC: 8.1 G/DL (ref 12–15.9)
IMM GRANULOCYTES # BLD AUTO: 0.26 10*3/MM3 (ref 0–0.05)
IMM GRANULOCYTES NFR BLD AUTO: 2.6 % (ref 0–0.5)
LYMPHOCYTES # BLD AUTO: 2.59 10*3/MM3 (ref 0.7–3.1)
LYMPHOCYTES NFR BLD AUTO: 25.4 % (ref 19.6–45.3)
MAGNESIUM SERPL-MCNC: 1.6 MG/DL (ref 1.6–2.6)
MCH RBC QN AUTO: 29.8 PG (ref 26.6–33)
MCHC RBC AUTO-ENTMCNC: 30.9 G/DL (ref 31.5–35.7)
MCV RBC AUTO: 96.3 FL (ref 79–97)
MONOCYTES # BLD AUTO: 0.58 10*3/MM3 (ref 0.1–0.9)
MONOCYTES NFR BLD AUTO: 5.7 % (ref 5–12)
NEUTROPHILS NFR BLD AUTO: 6.6 10*3/MM3 (ref 1.7–7)
NEUTROPHILS NFR BLD AUTO: 64.7 % (ref 42.7–76)
NRBC BLD AUTO-RTO: 0.2 /100 WBC (ref 0–0.2)
PHOSPHATE SERPL-MCNC: 2.7 MG/DL (ref 2.5–4.5)
PLATELET # BLD AUTO: 255 10*3/MM3 (ref 140–450)
PMV BLD AUTO: 9.1 FL (ref 6–12)
POTASSIUM SERPL-SCNC: 3.6 MMOL/L (ref 3.5–5.2)
PROT SERPL-MCNC: 6.4 G/DL (ref 6–8.5)
RBC # BLD AUTO: 2.72 10*6/MM3 (ref 3.77–5.28)
SODIUM SERPL-SCNC: 138 MMOL/L (ref 136–145)
WBC NRBC COR # BLD AUTO: 10.19 10*3/MM3 (ref 3.4–10.8)

## 2025-01-01 PROCEDURE — 84100 ASSAY OF PHOSPHORUS: CPT | Performed by: INTERNAL MEDICINE

## 2025-01-01 PROCEDURE — 82948 REAGENT STRIP/BLOOD GLUCOSE: CPT | Performed by: INTERNAL MEDICINE

## 2025-01-01 PROCEDURE — 63710000001 INSULIN LISPRO (HUMAN) PER 5 UNITS: Performed by: INTERNAL MEDICINE

## 2025-01-01 PROCEDURE — 83735 ASSAY OF MAGNESIUM: CPT | Performed by: SURGERY

## 2025-01-01 PROCEDURE — 99232 SBSQ HOSP IP/OBS MODERATE 35: CPT | Performed by: INTERNAL MEDICINE

## 2025-01-01 PROCEDURE — 80053 COMPREHEN METABOLIC PANEL: CPT | Performed by: SURGERY

## 2025-01-01 PROCEDURE — 85025 COMPLETE CBC W/AUTO DIFF WBC: CPT | Performed by: SURGERY

## 2025-01-01 PROCEDURE — 63710000001 INSULIN GLARGINE PER 5 UNITS: Performed by: INTERNAL MEDICINE

## 2025-01-01 PROCEDURE — 99024 POSTOP FOLLOW-UP VISIT: CPT | Performed by: SURGERY

## 2025-01-01 PROCEDURE — 82948 REAGENT STRIP/BLOOD GLUCOSE: CPT

## 2025-01-01 RX ORDER — INSULIN LISPRO 100 [IU]/ML
7 INJECTION, SOLUTION INTRAVENOUS; SUBCUTANEOUS
Status: DISCONTINUED | OUTPATIENT
Start: 2025-01-01 | End: 2025-01-09

## 2025-01-01 RX ORDER — MIDODRINE HYDROCHLORIDE 5 MG/1
5 TABLET ORAL EVERY 8 HOURS SCHEDULED
Status: DISCONTINUED | OUTPATIENT
Start: 2025-01-01 | End: 2025-01-02

## 2025-01-01 RX ORDER — POTASSIUM CHLORIDE 1500 MG/1
40 TABLET, EXTENDED RELEASE ORAL EVERY 4 HOURS
Status: DISCONTINUED | OUTPATIENT
Start: 2025-01-01 | End: 2025-01-01

## 2025-01-01 RX ADMIN — APIXABAN 5 MG: 5 TABLET, FILM COATED ORAL at 08:56

## 2025-01-01 RX ADMIN — MIDODRINE HYDROCHLORIDE 5 MG: 5 TABLET ORAL at 21:07

## 2025-01-01 RX ADMIN — OXYCODONE 5 MG: 5 TABLET ORAL at 19:01

## 2025-01-01 RX ADMIN — OXYCODONE HYDROCHLORIDE 10 MG: 10 TABLET, FILM COATED, EXTENDED RELEASE ORAL at 21:08

## 2025-01-01 RX ADMIN — INSULIN LISPRO 6 UNITS: 100 INJECTION, SOLUTION INTRAVENOUS; SUBCUTANEOUS at 08:55

## 2025-01-01 RX ADMIN — INSULIN LISPRO 6 UNITS: 100 INJECTION, SOLUTION INTRAVENOUS; SUBCUTANEOUS at 12:45

## 2025-01-01 RX ADMIN — Medication 10 ML: at 08:58

## 2025-01-01 RX ADMIN — INSULIN LISPRO 7 UNITS: 100 INJECTION, SOLUTION INTRAVENOUS; SUBCUTANEOUS at 17:23

## 2025-01-01 RX ADMIN — INSULIN GLARGINE-YFGN 22 UNITS: 100 INJECTION, SOLUTION SUBCUTANEOUS at 21:07

## 2025-01-01 RX ADMIN — PANTOPRAZOLE SODIUM 40 MG: 40 TABLET, DELAYED RELEASE ORAL at 05:15

## 2025-01-01 RX ADMIN — Medication 10 ML: at 21:09

## 2025-01-01 RX ADMIN — NYSTATIN 1 APPLICATION: 100000 CREAM TOPICAL at 21:09

## 2025-01-01 RX ADMIN — NYSTATIN 1 APPLICATION: 100000 CREAM TOPICAL at 14:00

## 2025-01-01 RX ADMIN — NYSTATIN 1 APPLICATION: 100000 CREAM TOPICAL at 05:21

## 2025-01-01 RX ADMIN — APIXABAN 5 MG: 5 TABLET, FILM COATED ORAL at 21:07

## 2025-01-01 RX ADMIN — QUETIAPINE FUMARATE 75 MG: 25 TABLET ORAL at 08:56

## 2025-01-01 RX ADMIN — OXYCODONE HYDROCHLORIDE 10 MG: 10 TABLET, FILM COATED, EXTENDED RELEASE ORAL at 08:56

## 2025-01-01 RX ADMIN — OXYCODONE 5 MG: 5 TABLET ORAL at 05:19

## 2025-01-01 RX ADMIN — QUETIAPINE FUMARATE 75 MG: 25 TABLET ORAL at 21:07

## 2025-01-01 RX ADMIN — INSULIN LISPRO 3 UNITS: 100 INJECTION, SOLUTION INTRAVENOUS; SUBCUTANEOUS at 08:55

## 2025-01-01 NOTE — PROGRESS NOTES
[facility] MEDICINE SERVICE  DAILY PROGRESS NOTE    NAME: Yarelis Jackson  : 1970  MRN: [id number]      LOS: 14 days     PROVIDER OF SERVICE: Ella Dow MD    Chief Complaint: DKA (diabetic ketoacidosis)    Subjective:     Interval History:  History taken from: patient    Denies any acute complaints.  Denies nausea vomiting or diarrhea.      Patient seen with RN  Patient still with Dobbhoff tube in place as she failed swallow study  Awaiting repeat swallow eval  Follow-up per endocrine  Patient with DKA that has resolved  She is awake alert oriented x 3 feeling tired hemodynamically stable otherwise  Dissipate removal of Dobbhoff tube later today      Patient is back from swallow eval  She passed her swallow eval and Dobbhoff tube would not be discontinued  Is awake alert  Patient with right lower extremity EXTR ischemia status post thrombectomy and redo thrombectomy as well as fasciotomy  Plan for vacuum dressing change today with wound care  May transition from heparin drip to dual antiplatelets as per vascular recommendation  Patient seen per endocrine as she had DKA on admission that has resolved  Continue Lantus 22 units nightly and lispro 6 units Q6 plus sliding scale as per endocrine recommendation  Patient is getting wound care and having extreme pain  Oxycodone is resumed  Will give 1 dose of Dilaudid for care      Patient feeling fine  Started on Eliquis per vascular  Treatments better controlled  Followed up per nephrology and endocrine and vascular  Anemia of chronic disease with stable hemoglobin of 8  Patient  8 wound VAC.  On discharge home health care for wound care  25  Is doing fine no new complaint  On oral Eliquis  Blood sugar has been running from 100-250  Placement at the skilled nursing facility as per PT recommendation  Hemoglobin stable at 8  Her kidney function is back to baseline and she is hemodynamically stable  Dc midodrine as bp  stable        Review of Systems:   Review of Systems  As above  Objective:     Vital Signs  Temp:  [97.9 °F (36.6 °C)-98.7 °F (37.1 °C)] 97.9 °F (36.6 °C)  Heart Rate:  [87-95] 87  Resp:  [17-22] 20  BP: (139-163)/(69-82) 163/82  Flow (L/min) (Oxygen Therapy):  [1-2] 1   Body mass index is 37.27 kg/m².    Physical Exam  Physical Exam  Constitutional:       Appearance: Normal appearance.   HENT:      Nose:      Comments: NG tube  Cardiovascular:      Rate and Rhythm: Normal rate and regular rhythm.      Pulses: Normal pulses.      Heart sounds: Normal heart sounds. No murmur heard.  Pulmonary:      Effort: Pulmonary effort is normal. No respiratory distress.      Breath sounds: Normal breath sounds. No wheezing or rales.   Abdominal:      General: Bowel sounds are normal. There is no distension.      Tenderness: There is no abdominal tenderness.   Musculoskeletal:         General: No swelling, tenderness, deformity or signs of injury.      Cervical back: Normal range of motion.      Right lower leg: Edema (Right lower extremity dressing) present.   Neurological:      General: No focal deficit present.      Mental Status: She is alert and oriented to person, place, and time. Mental status is at baseline.      Cranial Nerves: No cranial nerve deficit.      Sensory: No sensory deficit.      Motor: No weakness.      Coordination: Coordination normal.            Diagnostic Data    Results from last 7 days   Lab Units 01/01/25  0251   WBC 10*3/mm3 10.19   HEMOGLOBIN g/dL 8.1*   HEMATOCRIT % 26.2*   PLATELETS 10*3/mm3 255   GLUCOSE mg/dL 203*   CREATININE mg/dL 0.84   BUN mg/dL 15   SODIUM mmol/L 138   POTASSIUM mmol/L 3.6   AST (SGOT) U/L 26   ALT (SGPT) U/L 71*   ALK PHOS U/L 112   BILIRUBIN mg/dL 0.5   ANION GAP mmol/L 5.1       No radiology results for the last day      I reviewed the patient's new clinical results.    Assessment/Plan:     Active and Resolved Problems  Active Hospital Problems    Diagnosis  POA    **DKA  (diabetic ketoacidosis) [E11.10]  Yes    Arterial thrombosis [I74.9]  No      Resolved Hospital Problems   No resolved problems to display.       Diabetic ketoacidosis without coma, with new onset diabetes mellitus, type 2 / Metabolic acidosis, increased anion gap-Resolved  -Etiology uncertain, though new onset diabetes mellitus.  A1c 15.80 on admission, no prior available.  -Lantus 22 units at night, insulin lispro 6 units every 6 hours.  Continue insulin sliding scale every 6 hours.  -Endocrinology Dr. Winkler following  -Nutrition following patient due to enteric feeds     Acute Kidney Injury requiring emergent hemodialysis  -Remains nonoliguric. Baseline creatinine 0.90.  Creatinine 2.10 on admission.  -Monitor Input/Output very closely.   -Avoid NSAIDs, nephrotoxic medications, and hypotension.  -Nephrology following.Initially on HD due to acute renal failure.  No history for the past 2 days.  Will evaluate need for HD on a daily basis per nephrology.      Acute occlusive thrombus of the right iliac artery with limb ischemia improved  -Arterial duplex with noted right femoral, deep femoral, and popliteal arteries being patent but with very low amplitude monophasic signals, suggesting severe inflow stenosis or occlusion; no measurable Doppler flow in the anterior or posterior tibial and peroneal arteries.  -Bilateral lower extremity ABIs ordered: Right STACEY critically reduced with STACEY of 0 and severe digital insufficiency; left STACEY is normal with moderate digital insufficiency.  -Emergent surgery per vascular surgeon 12/19/2024 for right iliac thrombectomy via open groin incision then return to surgery on 12/20/2024 for recurrent right lower extremity ischemia due to arterial thrombosis and underwent right iliofemoral popliteal thrombectomy, right femoral endarterectomy with patch, right lower extremity arteriogram, and closed right calf fasciotomies  -Continue on heparin drip per vascular surgery    -Hematology/oncology consulted  -Return to the OR morning (12/23) for emergent 4 compartment fasciotomies due to critical right leg ischemia  -Defer to vascular surgery for management of wound VAC.  Next wound VAC change on Monday     Nontraumatic rhabdomyolysis --> Improving  -CK downtrending  -Encourage enteric hydration      Leukocytosis likely reactive  -Continue to monitor clinically    Electrolyte derangements likely due to renal failure  -Monitor and replete as needed per protocol      Cutaneous candidiasis of groin  -Likely secondary to uncontrolled diabetes mellitus  -Nystatin ordered     Essential Hypertension  -Clonidine patch ordered by nephrology-hold   -Titrate medications as needed.     Bilateral leg rash, concern for scabies infection  -Permethrin regimen ordered.  Completed first treatment on 12/18, repeat treatment in 2 weeks  -Keep in contact precautions x 1 week post initial treatment (12/25)     Transaminitis  -US of Liver: Hepatomegaly with steatosis.  -Hepatitis panel-negative.  -Monitor and trend LFTs.       COPD: Not in exacerbation.  Duonebs ordered as needed.  Obesity: Body mass index is 33.49 kg/m².       VTE Prophylaxis:  Pharmacologic VTE prophylaxis orders are present.             Disposition Planning:     Barriers to Discharge: medical optimization  Anticipated Date of Discharge: pending  Place of Discharge: home      Time: 35 minutes     Code Status and Medical Interventions: CPR (Attempt to Resuscitate); Full Support   Ordered at: 12/18/24 0830     Code Status (Patient has no pulse and is not breathing):    CPR (Attempt to Resuscitate)     Medical Interventions (Patient has pulse or is breathing):    Full Support       Signature: Electronically signed by Ella Dow MD, 01/01/25, 11:10 EST.  [facility] Hospitalist Team

## 2025-01-01 NOTE — PROGRESS NOTES
Daily Progress Note    Patient Care Team:  Katie Duran PA as PCP - General (Physician Assistant)  Uli Starr MD as Consulting Physician (Nephrology)    Chief Complaint: Follow-up type 1 diabetes    HPI: Patient seen, clinically doing well.  Eating fair.  Blood sugar log reviewed.  Still have some high glucose.  No nausea or vomiting at this time    ROS:   Constitutional:  Denies fatigue, tiredness.    Respiratory: denies cough, shortness of breath.   Cardiovascular:  denies chest pain, edema   GI:  Denies abdominal pain, nausea, vomiting.         Vitals:    01/01/25 1141   BP: 148/78   Pulse: 87   Resp: 13   Temp: 98.2 °F (36.8 °C)   SpO2: 96%     Body mass index is 37.27 kg/m².    Physical Exam:  GEN: NAD, conversant  PSYCH: Awake and coherent      Results Review:     I reviewed the patient's new clinical results.    Glucose   Date Value Ref Range Status   01/01/2025 203 (H) 65 - 99 mg/dL Final     Sodium   Date Value Ref Range Status   01/01/2025 138 136 - 145 mmol/L Final     Potassium   Date Value Ref Range Status   01/01/2025 3.6 3.5 - 5.2 mmol/L Final     CO2   Date Value Ref Range Status   01/01/2025 30.9 (H) 22.0 - 29.0 mmol/L Final     Chloride   Date Value Ref Range Status   01/01/2025 102 98 - 107 mmol/L Final     Anion Gap   Date Value Ref Range Status   01/01/2025 5.1 5.0 - 15.0 mmol/L Final     Creatinine   Date Value Ref Range Status   01/01/2025 0.84 0.57 - 1.00 mg/dL Final     BUN   Date Value Ref Range Status   01/01/2025 15 6 - 20 mg/dL Final     BUN/Creatinine Ratio   Date Value Ref Range Status   01/01/2025 17.9 7.0 - 25.0 Final     Calcium   Date Value Ref Range Status   01/01/2025 8.3 (L) 8.6 - 10.5 mg/dL Final     Alkaline Phosphatase   Date Value Ref Range Status   01/01/2025 112 39 - 117 U/L Final     Total Protein   Date Value Ref Range Status   01/01/2025 6.4 6.0 - 8.5 g/dL Final     ALT (SGPT)   Date Value Ref Range Status   01/01/2025 71 (H) 1 - 33 U/L Final  "    AST (SGOT)   Date Value Ref Range Status   01/01/2025 26 1 - 32 U/L Final     Total Bilirubin   Date Value Ref Range Status   01/01/2025 0.5 0.0 - 1.2 mg/dL Final     Albumin   Date Value Ref Range Status   01/01/2025 3.0 (L) 3.5 - 5.2 g/dL Final     Globulin   Date Value Ref Range Status   01/01/2025 3.4 gm/dL Final     Magnesium   Date Value Ref Range Status   01/01/2025 1.6 1.6 - 2.6 mg/dL Final     Phosphorus   Date Value Ref Range Status   01/01/2025 2.7 2.5 - 4.5 mg/dL Final     Lab Results   Component Value Date    HGBA1C 15.80 (H) 12/18/2024     No results found for: \"GLUF\", \"MICROALBUR\"  Results from last 7 days   Lab Units 01/01/25  1141 01/01/25  0754 01/01/25  0247 12/31/24  2050 12/31/24  1131 12/31/24  0825   GLUCOSE mg/dL 129* 218* 212* 150* 125* 87     Medication Review: Reviewed.     apixaban, 5 mg, Oral, Q12H  insulin glargine, 22 Units, Subcutaneous, Nightly  insulin lispro, 2-7 Units, Subcutaneous, TID With Meals  insulin lispro, 6 Units, Subcutaneous, TID With Meals  midodrine, 10 mg, Oral, Q8H  nystatin, 1 Application, Topical, Q8H  oxyCODONE, 10 mg, Oral, Q12H  pantoprazole, 40 mg, Oral, Q AM  QUEtiapine, 75 mg, Oral, Q12H  sodium chloride, 10 mL, Intravenous, Q12H      Assessment and plan:  Diabetes mellitus type 1 with hyperglycemia: Uncontrolled, will increase lispro to 7 units with each meal and continue glargine 22 units SQ nightly along with lispro sliding scale with meals and follow blood sugars and make further adjustments as needed.    Rodriguez Worthy MD. FACE                "

## 2025-01-01 NOTE — PLAN OF CARE
Goal Outcome Evaluation:           Progress: improving  Outcome Evaluation: R leg remains wrapped with elastic bandage at this time. Bandage appears to be clean, dry, & intact. Wound vac remains intact to R leg with large amounts of bloody exudate observed. Wound vac cannister changed during this shift. Staples to pt's groin remain clean, dry, & intact. No drainage noted from site. L pedal pulse 2+, right pedal pulse 1+. N.O. received for pt to begin oxycontin 10 mg Q12 d/t pain medication was not lastint the pt 4 hours until she could have another dose. External cath remains intact. Pt resting abed throughout the night with family coming to visit & stay at bedside. Call light within reach. Plan of care remains ongoing.       Problem: Adult Inpatient Plan of Care  Goal: Plan of Care Review  Outcome: Progressing  Flowsheets (Taken 1/1/2025 0431)  Progress: improving  Outcome Evaluation: R leg remains wrapped with elastic bandage at this time. Bandage appears to be clean, dry, & intact. Wound vac remains intact to R leg with large amounts of bloody exudate observed. Wound vac cannister changed during this shift. Staples to pt's groin remain clean, dry, & intact. No drainage noted from site. L pedal pulse 2+, right pedal pulse 1+. N.O. received for pt to begin oxycontin 10 mg Q12 d/t pain medication was not lastint the pt 4 hours until she could have another dose. External cath remains intact. Pt resting abed throughout the night with family coming to visit & stay at bedside. Call light within reach. Plan of care remains ongoing.  Goal: Patient-Specific Goal (Individualized)  Outcome: Progressing  Goal: Absence of Hospital-Acquired Illness or Injury  Outcome: Progressing  Intervention: Identify and Manage Fall Risk  Recent Flowsheet Documentation  Taken 1/1/2025 0400 by Clair Coto, LORRAINE  Safety Promotion/Fall Prevention:   safety round/check completed   fall prevention program maintained  Taken 1/1/2025 0200 by  Nnamdi, Leaann, RN  Safety Promotion/Fall Prevention:   safety round/check completed   fall prevention program maintained  Taken 1/1/2025 0000 by Clair Coto RN  Safety Promotion/Fall Prevention:   safety round/check completed   fall prevention program maintained  Taken 12/31/2024 2200 by Clair Coto RN  Safety Promotion/Fall Prevention:   safety round/check completed   fall prevention program maintained  Taken 12/31/2024 1912 by Clair Coto RN  Safety Promotion/Fall Prevention:   safety round/check completed   nonskid shoes/slippers when out of bed   fall prevention program maintained   clutter free environment maintained   assistive device/personal items within reach   activity supervised  Intervention: Prevent Skin Injury  Recent Flowsheet Documentation  Taken 1/1/2025 0400 by Clair Coto RN  Body Position: position changed independently  Skin Protection:   incontinence pads utilized   transparent dressing maintained  Taken 1/1/2025 0000 by Clair Coto RN  Body Position: position changed independently  Skin Protection:   incontinence pads utilized   transparent dressing maintained  Taken 12/31/2024 1912 by Clair Coto RN  Body Position: position changed independently  Skin Protection:   incontinence pads utilized   transparent dressing maintained  Intervention: Prevent and Manage VTE (Venous Thromboembolism) Risk  Recent Flowsheet Documentation  Taken 12/31/2024 1912 by Clair Coto RN  VTE Prevention/Management:   SCDs (sequential compression devices) off   bilateral  Intervention: Prevent Infection  Recent Flowsheet Documentation  Taken 1/1/2025 0400 by Clair Coto RN  Infection Prevention:   hand hygiene promoted   personal protective equipment utilized   rest/sleep promoted   single patient room provided  Taken 1/1/2025 0000 by Clair Coto RN  Infection Prevention:   hand hygiene promoted   personal protective equipment utilized   rest/sleep promoted   single  patient room provided  Taken 12/31/2024 1912 by Clair Coto RN  Infection Prevention:   hand hygiene promoted   personal protective equipment utilized   rest/sleep promoted   single patient room provided  Goal: Optimal Comfort and Wellbeing  Outcome: Progressing  Intervention: Monitor Pain and Promote Comfort  Recent Flowsheet Documentation  Taken 12/31/2024 2219 by Clair Coto RN  Pain Management Interventions: pain medication given  Taken 12/31/2024 1912 by Clair Coto RN  Pain Management Interventions:   care clustered   diversional activity provided   pillow support provided   pain medication given   relaxation techniques promoted   unnecessary movement minimized  Intervention: Provide Person-Centered Care  Recent Flowsheet Documentation  Taken 12/31/2024 1912 by Clair Coto RN  Trust Relationship/Rapport:   care explained   choices provided   questions answered   questions encouraged   reassurance provided   thoughts/feelings acknowledged  Goal: Readiness for Transition of Care  Outcome: Progressing     Problem: Restraint, Nonviolent  Goal: Absence of Harm or Injury  Outcome: Progressing  Intervention: Implement Least Restrictive Safety Strategies  Recent Flowsheet Documentation  Taken 1/1/2025 0400 by Clair Coto RN  Medical Device Protection:   IV pole/bag removed from visual field   torso covered   tubing secured  Diversional Activities: television  Taken 1/1/2025 0000 by Clair Coto RN  Medical Device Protection:   IV pole/bag removed from visual field   torso covered   tubing secured  Diversional Activities: television  Taken 12/31/2024 1912 by Clair Coto RN  Medical Device Protection:   IV pole/bag removed from visual field   torso covered   tubing secured  Diversional Activities: television  Intervention: Protect Dignity, Rights and Personal Wellbeing  Recent Flowsheet Documentation  Taken 12/31/2024 1912 by Clair Coto RN  Trust Relationship/Rapport:   care  explained   choices provided   questions answered   questions encouraged   reassurance provided   thoughts/feelings acknowledged  Intervention: Protect Skin and Joint Integrity  Recent Flowsheet Documentation  Taken 1/1/2025 0400 by Clair Coto RN  Body Position: position changed independently  Skin Protection:   incontinence pads utilized   transparent dressing maintained  Taken 1/1/2025 0000 by Clair Coto RN  Body Position: position changed independently  Skin Protection:   incontinence pads utilized   transparent dressing maintained  Taken 12/31/2024 1912 by Clair Coto RN  Body Position: position changed independently  Skin Protection:   incontinence pads utilized   transparent dressing maintained     Problem: Fall Injury Risk  Goal: Absence of Fall and Fall-Related Injury  Outcome: Progressing  Intervention: Identify and Manage Contributors  Recent Flowsheet Documentation  Taken 1/1/2025 0400 by Clair Coto RN  Medication Review/Management: medications reviewed  Taken 1/1/2025 0200 by Clair Coto RN  Medication Review/Management: medications reviewed  Taken 1/1/2025 0000 by Clair Coto RN  Medication Review/Management: medications reviewed  Taken 12/31/2024 2200 by Clair Coto RN  Medication Review/Management: medications reviewed  Taken 12/31/2024 1912 by Clair Coto RN  Medication Review/Management: medications reviewed  Self-Care Promotion: BADL personal objects within reach  Intervention: Promote Injury-Free Environment  Recent Flowsheet Documentation  Taken 1/1/2025 0400 by Clair Coto RN  Safety Promotion/Fall Prevention:   safety round/check completed   fall prevention program maintained  Taken 1/1/2025 0200 by Clair Coto RN  Safety Promotion/Fall Prevention:   safety round/check completed   fall prevention program maintained  Taken 1/1/2025 0000 by Clair Coto RN  Safety Promotion/Fall Prevention:   safety round/check completed   fall prevention  program maintained  Taken 12/31/2024 2200 by Clair Coto RN  Safety Promotion/Fall Prevention:   safety round/check completed   fall prevention program maintained  Taken 12/31/2024 1912 by Clair Coto RN  Safety Promotion/Fall Prevention:   safety round/check completed   nonskid shoes/slippers when out of bed   fall prevention program maintained   clutter free environment maintained   assistive device/personal items within reach   activity supervised     Problem: Skin Injury Risk Increased  Goal: Skin Health and Integrity  Outcome: Progressing  Intervention: Optimize Skin Protection  Recent Flowsheet Documentation  Taken 1/1/2025 0400 by Clair Coto RN  Activity Management: activity encouraged  Pressure Reduction Techniques:   frequent weight shift encouraged   pressure points protected  Head of Bed (HOB) Positioning: HOB at 20-30 degrees  Pressure Reduction Devices: pressure-redistributing mattress utilized  Skin Protection:   incontinence pads utilized   transparent dressing maintained  Taken 1/1/2025 0000 by Clair Coto RN  Activity Management: activity encouraged  Pressure Reduction Techniques:   frequent weight shift encouraged   positioned off wounds  Head of Bed (HOB) Positioning: HOB at 30-45 degrees  Pressure Reduction Devices: pressure-redistributing mattress utilized  Skin Protection:   incontinence pads utilized   transparent dressing maintained  Taken 12/31/2024 1912 by Clair Coto RN  Activity Management: activity encouraged  Pressure Reduction Techniques:   frequent weight shift encouraged   weight shift assistance provided  Head of Bed (HOB) Positioning: HOB at 20-30 degrees  Pressure Reduction Devices: pressure-redistributing mattress utilized  Skin Protection:   incontinence pads utilized   transparent dressing maintained     Problem: Comorbidity Management  Goal: Maintenance of COPD Symptom Control  Outcome: Progressing  Intervention: Maintain COPD (Chronic Obstructive  Pulmonary Disease) Symptom Control  Recent Flowsheet Documentation  Taken 1/1/2025 0400 by Clair Coto RN  Breathing Techniques/Airway Clearance: deep/controlled cough encouraged  Medication Review/Management: medications reviewed  Taken 1/1/2025 0200 by Clair Coto RN  Medication Review/Management: medications reviewed  Taken 1/1/2025 0000 by Clair Coto RN  Breathing Techniques/Airway Clearance: deep/controlled cough encouraged  Medication Review/Management: medications reviewed  Taken 12/31/2024 2200 by Clair Coto RN  Medication Review/Management: medications reviewed  Taken 12/31/2024 1912 by Clair Coto RN  Breathing Techniques/Airway Clearance: deep/controlled cough encouraged  Medication Review/Management: medications reviewed     Problem: Violence Risk or Actual  Goal: Anger and Impulse Control  Outcome: Progressing  Intervention: Minimize Safety Risk  Recent Flowsheet Documentation  Taken 1/1/2025 0400 by Clair Coto RN  De-Escalation Techniques: diversional activity encouraged  Enhanced Safety Measures:   bed alarm set   family to remain at bedside   room near unit station  Taken 1/1/2025 0200 by Clair Coto RN  De-Escalation Techniques: diversional activity encouraged  Enhanced Safety Measures:   bed alarm set   family to remain at bedside   room near unit station  Taken 1/1/2025 0000 by Clair Coto RN  De-Escalation Techniques: diversional activity encouraged  Enhanced Safety Measures:   bed alarm set   family to remain at bedside   room near unit station  Taken 12/31/2024 2200 by Clair Coto RN  De-Escalation Techniques: diversional activity encouraged  Enhanced Safety Measures:   bed alarm set   room near unit station  Taken 12/31/2024 1912 by Clair Coto RN  Sensory Stimulation Regulation:   care clustered   television on  De-Escalation Techniques: diversional activity encouraged  Enhanced Safety Measures:   bed alarm set   room near unit  station  Intervention: Promote Self-Control  Recent Flowsheet Documentation  Taken 12/31/2024 1912 by Clair Coto RN  Supportive Measures:   active listening utilized   verbalization of feelings encouraged  Environmental Support: calm environment promoted     Problem: Mechanical Ventilation Invasive  Goal: Effective Communication  Outcome: Progressing  Intervention: Ensure Effective Communication  Recent Flowsheet Documentation  Taken 1/1/2025 0400 by Clair Coto RN  Diversional Activities: television  Taken 1/1/2025 0000 by Clair Coto RN  Diversional Activities: television  Taken 12/31/2024 1912 by Clair Coto RN  Trust Relationship/Rapport:   care explained   choices provided   questions answered   questions encouraged   reassurance provided   thoughts/feelings acknowledged  Diversional Activities: television  Communication Enhancement Strategies:   call light answered in person   communication board used  Goal: Optimal Device Function  Outcome: Progressing  Intervention: Optimize Device Care and Function  Recent Flowsheet Documentation  Taken 1/1/2025 0400 by Clair Coto RN  Airway/Ventilation Management:   airway patency maintained   oxygen therapy provided   pulmonary hygiene promoted  Airway Safety Measures:   oxygen flowmeter at bedside   suction at bedside  Taken 1/1/2025 0000 by Clair Coto RN  Airway/Ventilation Management:   airway patency maintained   oxygen therapy provided   pulmonary hygiene promoted  Airway Safety Measures:   oxygen flowmeter at bedside   suction at bedside  Taken 12/31/2024 2050 by Clair Coto RN  Oral Care:   teeth brushed - regular toothbrush   oral rinse provided   lip/mouth moisturizer applied  Taken 12/31/2024 1912 by Clair Coto RN  Airway/Ventilation Management:   airway patency maintained   calming measures promoted   oxygen therapy provided   pulmonary hygiene promoted  Airway Safety Measures:   oxygen flowmeter at bedside   suction  at bedside  Goal: Mechanical Ventilation Liberation  Outcome: Progressing  Intervention: Promote Extubation and Mechanical Ventilation Liberation  Recent Flowsheet Documentation  Taken 1/1/2025 0400 by Clair Coto RN  Medication Review/Management: medications reviewed  Taken 1/1/2025 0200 by Clair Coto RN  Medication Review/Management: medications reviewed  Taken 1/1/2025 0000 by Clair Coto RN  Medication Review/Management: medications reviewed  Taken 12/31/2024 2200 by Clair Coto RN  Medication Review/Management: medications reviewed  Taken 12/31/2024 1912 by Clair Coto RN  Environmental Support: calm environment promoted  Sleep/Rest Enhancement:   awakenings minimized   consistent schedule promoted   regular sleep/rest pattern promoted   relaxation techniques promoted  Medication Review/Management: medications reviewed  Goal: Optimal Nutrition Delivery  Outcome: Progressing  Goal: Absence of Device-Related Skin and Tissue Injury  Outcome: Progressing  Intervention: Maintain Skin and Tissue Health  Recent Flowsheet Documentation  Taken 1/1/2025 0400 by Clair Coto RN  Device Skin Pressure Protection:   absorbent pad utilized/changed   pressure points protected   skin-to-device areas padded   skin-to-skin areas padded   tubing/devices free from skin contact  Taken 1/1/2025 0000 by Clair Coto RN  Device Skin Pressure Protection:   absorbent pad utilized/changed   pressure points protected   skin-to-device areas padded   skin-to-skin areas padded   tubing/devices free from skin contact  Taken 12/31/2024 1912 by Clair Coto RN  Device Skin Pressure Protection:   absorbent pad utilized/changed   tubing/devices free from skin contact  Goal: Absence of Ventilator-Induced Lung Injury  Outcome: Progressing  Intervention: Prevent Ventilator-Associated Pneumonia  Recent Flowsheet Documentation  Taken 1/1/2025 0400 by Clair Coto RN  Head of Bed (HOB) Positioning: HOB at 20-30  degrees  Taken 1/1/2025 0000 by Clair Coto, RN  Head of Bed ([facility]) Positioning: HOB at 30-45 degrees  Taken 12/31/2024 2050 by Clair Coto, LORRAINE  Oral Care:   teeth brushed - regular toothbrush   oral rinse provided   lip/mouth moisturizer applied  Taken 12/31/2024 1912 by Clair Coto, RN  Head of Bed ([facility]) Positioning: HOB at 20-30 degrees

## 2025-01-01 NOTE — PLAN OF CARE
Problem: Adult Inpatient Plan of Care  Goal: Plan of Care Review  Outcome: Progressing  Goal: Patient-Specific Goal (Individualized)  Outcome: Progressing  Goal: Absence of Hospital-Acquired Illness or Injury  Outcome: Progressing  Intervention: Identify and Manage Fall Risk  Recent Flowsheet Documentation  Taken 1/1/2025 1600 by Lluvia Victor, RN  Safety Promotion/Fall Prevention:   safety round/check completed   room organization consistent   nonskid shoes/slippers when out of bed   lighting adjusted   fall prevention program maintained   clutter free environment maintained   assistive device/personal items within reach   activity supervised  Taken 1/1/2025 1400 by Lluvia Victor, RN  Safety Promotion/Fall Prevention:   safety round/check completed   room organization consistent   nonskid shoes/slippers when out of bed   lighting adjusted   fall prevention program maintained   clutter free environment maintained   assistive device/personal items within reach   activity supervised  Taken 1/1/2025 1204 by Lluvia Victor, RN  Safety Promotion/Fall Prevention:   safety round/check completed   room organization consistent   nonskid shoes/slippers when out of bed   lighting adjusted   clutter free environment maintained   assistive device/personal items within reach  Taken 1/1/2025 0959 by Lluvia Victor, RN  Safety Promotion/Fall Prevention:   safety round/check completed   room organization consistent   nonskid shoes/slippers when out of bed   lighting adjusted   fall prevention program maintained   clutter free environment maintained   assistive device/personal items within reach   activity supervised  Taken 1/1/2025 0731 by Lluvia Victor, RN  Safety Promotion/Fall Prevention:   safety round/check completed   room organization consistent   nonskid shoes/slippers when out of bed   lighting adjusted   fall prevention program maintained   clutter free environment maintained   assistive  device/personal items within reach   activity supervised  Intervention: Prevent Skin Injury  Recent Flowsheet Documentation  Taken 1/1/2025 1600 by Lluvia Victor RN  Body Position: position changed independently  Taken 1/1/2025 1400 by Lluvia Victor RN  Body Position: position changed independently  Taken 1/1/2025 1204 by Lluvia Victor RN  Body Position: position changed independently  Taken 1/1/2025 0959 by Lluvia Victor RN  Body Position: position changed independently  Taken 1/1/2025 0731 by Lluvia Victor RN  Body Position: position changed independently  Skin Protection: incontinence pads utilized  Goal: Optimal Comfort and Wellbeing  Outcome: Progressing  Intervention: Provide Person-Centered Care  Recent Flowsheet Documentation  Taken 1/1/2025 0731 by Lluvia Victor RN  Trust Relationship/Rapport:   questions answered   questions encouraged   reassurance provided   thoughts/feelings acknowledged   care explained   choices provided  Goal: Readiness for Transition of Care  Outcome: Progressing     Problem: Skin Injury Risk Increased  Goal: Skin Health and Integrity  Outcome: Progressing  Intervention: Optimize Skin Protection  Recent Flowsheet Documentation  Taken 1/1/2025 1600 by Lluvia Victor RN  Activity Management: activity encouraged  Head of Bed (HOB) Positioning: HOB elevated  Taken 1/1/2025 0959 by Lluvia Victor RN  Head of Bed (HOB) Positioning: HOB elevated  Taken 1/1/2025 0731 by Lluvia Victor RN  Activity Management: activity encouraged  Pressure Reduction Techniques:   frequent weight shift encouraged   weight shift assistance provided  Head of Bed (HOB) Positioning: HOB elevated  Pressure Reduction Devices:   pressure-redistributing mattress utilized   positioning supports utilized  Skin Protection: incontinence pads utilized     Problem: Comorbidity Management  Goal: Maintenance of COPD Symptom Control  Outcome: Progressing  Intervention:  Maintain COPD (Chronic Obstructive Pulmonary Disease) Symptom Control  Recent Flowsheet Documentation  Taken 1/1/2025 0731 by Lluvia Victor RN  Medication Review/Management: medications reviewed     Problem: Mechanical Ventilation Invasive  Goal: Effective Communication  Outcome: Progressing  Intervention: Ensure Effective Communication  Recent Flowsheet Documentation  Taken 1/1/2025 0731 by Lluvia Victor RN  Trust Relationship/Rapport:   questions answered   questions encouraged   reassurance provided   thoughts/feelings acknowledged   care explained   choices provided  Diversional Activities: television  Family/Support System Care:   involvement promoted   presence promoted   self-care encouraged   support provided  Goal: Optimal Device Function  Outcome: Progressing  Intervention: Optimize Device Care and Function  Recent Flowsheet Documentation  Taken 1/1/2025 0959 by Lluvia Victor RN  Airway Safety Measures: oxygen flowmeter at bedside  Goal: Mechanical Ventilation Liberation  Outcome: Progressing  Intervention: Promote Extubation and Mechanical Ventilation Liberation  Recent Flowsheet Documentation  Taken 1/1/2025 0731 by Lluvia Victor RN  Medication Review/Management: medications reviewed  Goal: Optimal Nutrition Delivery  Outcome: Progressing  Goal: Absence of Device-Related Skin and Tissue Injury  Outcome: Progressing  Intervention: Maintain Skin and Tissue Health  Recent Flowsheet Documentation  Taken 1/1/2025 0731 by Lluvia Victor RN  Device Skin Pressure Protection: absorbent pad utilized/changed  Goal: Absence of Ventilator-Induced Lung Injury  Outcome: Progressing  Intervention: Prevent Ventilator-Associated Pneumonia  Recent Flowsheet Documentation  Taken 1/1/2025 1600 by Lluvia Victor RN  Head of Bed (HOB) Positioning: HOB elevated  Taken 1/1/2025 0959 by Lluvia Victor RN  Head of Bed (HOB) Positioning: HOB elevated  Taken 1/1/2025 0731 by Lluvia Victor  RN  Head of Bed (HOB) Positioning: HOB elevated   Goal Outcome Evaluation:

## 2025-01-01 NOTE — PROGRESS NOTES
Name: Yarelis Jackson ADMIT: 2024   : 1970  PCP: Katie Duran PA    MRN: [id number] LOS: 14 days   AGE/SEX: 54 y.o. female  ROOM: [address] [place] [address]    54 y.o. female post iliofemoral embolectomy/endarterectomy and right calf fasciotomies for acute right lower extremity ischemia.  Arousable, alert, oriented.  No pain.  Spent much of the day out of bed yesterday.  Tolerating apixaban without bleeding.    Scheduled Medications:   apixaban, 5 mg, Oral, Q12H  insulin glargine, 22 Units, Subcutaneous, Nightly  insulin lispro, 2-7 Units, Subcutaneous, TID With Meals  insulin lispro, 6 Units, Subcutaneous, TID With Meals  midodrine, 10 mg, Oral, Q8H  nystatin, 1 Application, Topical, Q8H  oxyCODONE, 10 mg, Oral, Q12H  pantoprazole, 40 mg, Oral, Q AM  QUEtiapine, 75 mg, Oral, Q12H  sodium chloride, 10 mL, Intravenous, Q12H    Vital Signs  Vital Signs Patient Vitals for the past 24 hrs:   BP Temp Temp src Pulse Resp SpO2 Weight   25 0754 163/82 97.9 °F (36.6 °C) Oral 87 20 96 % --   25 0515 147/79 -- -- 91 -- -- --   25 0425 -- -- -- -- -- -- 98.5 kg (217 lb 2.5 oz)   25 0423 139/79 98.5 °F (36.9 °C) Oral 91 17 96 % --   25 0028 156/76 98.2 °F (36.8 °C) Oral 88 18 98 % --   24 139/69 98.6 °F (37 °C) Oral 95 22 95 % --   24 139/69 -- -- 90 -- -- --   24 1608 142/80 98.7 °F (37.1 °C) Oral -- 17 -- --   24 1100 -- 98.7 °F (37.1 °C) Oral -- 19 -- --     I/O:  I/O last 3 completed shifts:  In: 4796.6 [P.O.:720; I.V.:4076.6]  Out: 3900 [Urine:3400]    Physical Exam: VAC in place.  Palpable dorsalis pedis artery pulses.  Minimal motor function right foot.    CBC    Results from last 7 days   Lab Units 25  0251 24  0000 24  0257 24  0337 24  0004 24  0535 24  0415   WBC 10*3/mm3 10.19 12.07* 13.50* 14.76* 16.35* 15.02* 15.49*   HEMOGLOBIN g/dL 8.1* 8.0* 8.1* 8.0* 7.9* 8.1* 8.1*   PLATELETS 10*3/mm3 255 242 227  216 166 148 147     BMP   Results from last 7 days   Lab Units 01/01/25  0251 12/31/24  0000 12/30/24  1225 12/30/24  0257 12/29/24  0337 12/28/24  1224 12/28/24  0004 12/27/24  0535 12/26/24  1406 12/26/24  0428   SODIUM mmol/L 138 140  --  141 146*  --  149* 143  --  139   POTASSIUM mmol/L 3.6 3.8 4.0 3.2* 3.7 4.0 3.3* 4.0   < > 3.5   CHLORIDE mmol/L 102 101  --  100 108*  --  111* 108*  --  104   CO2 mmol/L 30.9* 32.4*  --  33.2* 31.3*  --  30.5* 26.6  --  23.3   BUN mg/dL 15 16  --  18 19  --  21* 21*  --  36*   CREATININE mg/dL 0.84 0.80  --  0.81 0.83  --  0.97 1.00  --  1.62*   GLUCOSE mg/dL 203* 173*  --  100* 140*  --  128* 155*  --  175*   MAGNESIUM mg/dL 1.6 1.9  --  1.3* 1.7  --  4.0* 1.5*  --  2.0   PHOSPHORUS mg/dL 2.7 3.8  --  3.8 3.0  --  3.0 2.1*  --  2.8    < > = values in this interval not displayed.     Cr Clearance Estimated Creatinine Clearance: 88.3 mL/min (by C-G formula based on SCr of 0.84 mg/dL).  Assessment & Plan   Assessment & Plan    DKA (diabetic ketoacidosis)    Arterial thrombosis    54 y.o. female convalescing post treatment of acute right lower extremity ischemia and compartment syndrome.  Continue PT/OT, apixaban and VAC.    Ghassan Celestin MD  01/01/25, 10:44 EST    Office contact: [phone]

## 2025-01-01 NOTE — PROGRESS NOTES
"NEPHROLOGY PROGRESS NOTE------[facility]    [facility]  [phone]  Raymundo Harris MD      Patient Care Team:  Katie Duran PA as PCP - General (Physician Assistant)  Uli Starr MD as Consulting Physician (Nephrology)      Provider:  Raymundo Harris MD  Patient Name: Yarelis Jackson  :  1970    SUBJECTIVE:    F/U ARF/MARGOT/ELECTROLYTE ABNORMALITIES  No chest pain or shortness of breath    Medication:  apixaban, 5 mg, Oral, Q12H  insulin glargine, 22 Units, Subcutaneous, Nightly  insulin lispro, 2-7 Units, Subcutaneous, TID With Meals  insulin lispro, 7 Units, Subcutaneous, TID With Meals  midodrine, 10 mg, Oral, Q8H  nystatin, 1 Application, Topical, Q8H  oxyCODONE, 10 mg, Oral, Q12H  pantoprazole, 40 mg, Oral, Q AM  QUEtiapine, 75 mg, Oral, Q12H  sodium chloride, 10 mL, Intravenous, Q12H             OBJECTIVE    Vital Sign Min/Max for last 24 hours  Temp  Min: 97.9 °F (36.6 °C)  Max: 98.7 °F (37.1 °C)   BP  Min: 139/79  Max: 163/82   Pulse  Min: 87  Max: 95   Resp  Min: 13  Max: 22   SpO2  Min: 95 %  Max: 98 %   No data recorded   Weight  Min: 98.5 kg (217 lb 2.5 oz)  Max: 98.5 kg (217 lb 2.5 oz)     Flowsheet Rows      Flowsheet Row First Filed Value   Admission Height 162.6 cm (64\") Documented at 2024 0443   Admission Weight 92.5 kg (203 lb 14.4 oz) Documented at 2024 0527            I/O this shift:  In: 240 [P.O.:240]  Out: -   I/O last 3 completed shifts:  In: 4796.6 [P.O.:720; I.V.:4076.6]  Out: 3900 [Urine:3400]    Physical Exam:     General Appearance:  NAD  Head: normocephalic, without obvious abnormality and atraumatic +DHT INTACT +DRY OP  Eyes: conjunctivae and sclerae normal and no icterus  Neck: supple and no JVD  Lungs: +SCATTERED RHONCHI  Heart: regular rhythm & normal rate and normal S1, S2 +THOMAS  Chest: Wall no abnormalities observed  Abdomen: normal bowel sounds and soft +OBESITY  Extremities: moves " "extremities well, +TRACE  BILAT PRETIBIAL EDEMA, no cyanosis and no redness. +DJD  Skin: Warm  Neurologic:  A AND O X 2    Labs:    WBC WBC   Date Value Ref Range Status   01/01/2025 10.19 3.40 - 10.80 10*3/mm3 Final   12/31/2024 12.07 (H) 3.40 - 10.80 10*3/mm3 Final   12/30/2024 13.50 (H) 3.40 - 10.80 10*3/mm3 Final      HGB Hemoglobin   Date Value Ref Range Status   01/01/2025 8.1 (L) 12.0 - 15.9 g/dL Final   12/31/2024 8.0 (L) 12.0 - 15.9 g/dL Final   12/30/2024 8.1 (L) 12.0 - 15.9 g/dL Final      HCT Hematocrit   Date Value Ref Range Status   01/01/2025 26.2 (L) 34.0 - 46.6 % Final   12/31/2024 24.7 (L) 34.0 - 46.6 % Final   12/30/2024 24.9 (L) 34.0 - 46.6 % Final      Platelets No results found for: \"LABPLAT\"   MCV MCV   Date Value Ref Range Status   01/01/2025 96.3 79.0 - 97.0 fL Final   12/31/2024 95.4 79.0 - 97.0 fL Final   12/30/2024 94.7 79.0 - 97.0 fL Final          Sodium Sodium   Date Value Ref Range Status   01/01/2025 138 136 - 145 mmol/L Final   12/31/2024 140 136 - 145 mmol/L Final   12/30/2024 141 136 - 145 mmol/L Final      Potassium Potassium   Date Value Ref Range Status   01/01/2025 3.6 3.5 - 5.2 mmol/L Final   12/31/2024 3.8 3.5 - 5.2 mmol/L Final     Comment:     Slight hemolysis detected by analyzer. Result may be falsely elevated.   12/30/2024 4.0 3.5 - 5.2 mmol/L Final   12/30/2024 3.2 (L) 3.5 - 5.2 mmol/L Final      Chloride Chloride   Date Value Ref Range Status   01/01/2025 102 98 - 107 mmol/L Final   12/31/2024 101 98 - 107 mmol/L Final   12/30/2024 100 98 - 107 mmol/L Final      CO2 CO2   Date Value Ref Range Status   01/01/2025 30.9 (H) 22.0 - 29.0 mmol/L Final   12/31/2024 32.4 (H) 22.0 - 29.0 mmol/L Final   12/30/2024 33.2 (H) 22.0 - 29.0 mmol/L Final      BUN BUN   Date Value Ref Range Status   01/01/2025 15 6 - 20 mg/dL Final   12/31/2024 16 6 - 20 mg/dL Final   12/30/2024 18 6 - 20 mg/dL Final      Creatinine Creatinine   Date Value Ref Range Status   01/01/2025 0.84 0.57 - 1.00 " "mg/dL Final   12/31/2024 0.80 0.57 - 1.00 mg/dL Final   12/30/2024 0.81 0.57 - 1.00 mg/dL Final      Calcium Calcium   Date Value Ref Range Status   01/01/2025 8.3 (L) 8.6 - 10.5 mg/dL Final   12/31/2024 8.4 (L) 8.6 - 10.5 mg/dL Final   12/30/2024 8.6 8.6 - 10.5 mg/dL Final      PO4 No components found for: \"PO4\"   Albumin Albumin   Date Value Ref Range Status   01/01/2025 3.0 (L) 3.5 - 5.2 g/dL Final   12/31/2024 3.1 (L) 3.5 - 5.2 g/dL Final   12/30/2024 2.8 (L) 3.5 - 5.2 g/dL Final      Magnesium Magnesium   Date Value Ref Range Status   01/01/2025 1.6 1.6 - 2.6 mg/dL Final   12/31/2024 1.9 1.6 - 2.6 mg/dL Final   12/30/2024 1.3 (L) 1.6 - 2.6 mg/dL Final      Uric Acid No components found for: \"URIC ACID\"     Imaging Results (Last 72 Hours)       Procedure Component Value Units Date/Time    FL Video Swallow With Speech Single Contrast [[id number]] Resulted: 12/30/24 1026     Updated: 12/30/24 1026    Narrative:      This procedure was auto-finalized with no dictation required.            Results for orders placed during the hospital encounter of 12/18/24    XR Chest 1 View    Narrative  XR CHEST 1 VW    Date of Exam: 12/22/2024 12:35 AM EST    Indication: Intubated Patient    Comparison:  12/21/2024    FINDINGS:  Endotracheal tube tip appears in satisfactory position at the level of the aortic arch. Right IJ catheter appears in stable position. Enteric tube extends into the left upper quadrant. Heart size and mediastinal contour appear within normal limits. No  definite pneumothorax or effusion. Mild bibasilar opacities. Findings of advanced emphysema with suspected apical scarring.    Impression  1.Tubes and lines appear in satisfactory positions, as above.  2.Mild bibasilar opacities which could represent atelectasis or infiltrate.  3.Advanced emphysema.        Electronically Signed: Sam Haynes  12/22/2024 7:07 AM EST  Workstation ID: [id number]      XR Chest 1 View    Narrative  XR CHEST 1 VW    Date of Exam: " 12/21/2024 4:32 AM EST    Indication: Intubated Patient    Comparison: 12/20/2024    Findings:  There is been no interval tube or line changes. Heart size and pulmonary vessels are within normal limits. There are postoperative changes of the bilateral upper lobes. Lungs appear clear. No pleural effusion or pneumothorax.    Impression  Impression:    1. No tube or line change  2. No focal airspace consolidation or other acute process.      Electronically Signed: Julio Thorpe MD  12/21/2024 9:54 AM EST  Workstation ID: [id number]      XR Chest 1 View    Narrative  XR CHEST 1 VW    Date of Exam: 12/20/2024 12:10 AM EST    Indication: Intubated Patient    Comparison: 12/19/2024    Findings:  Endotracheal tube approximately 2.5 cm above the carmina. Feeding tube and right IJ dialysis catheter remain in place. No new tubes or lines heart size and pulmonary vasculature are stable. Bullous changes in the right upper lobe. Postoperative changes in  the left upper lobe. Lungs grossly clear. No pneumothorax    Impression  Impression:  Stable chest demonstrating COPD and postoperative changes with no acute cardiopulmonary process demonstrated      Electronically Signed: Shon Ponce  12/20/2024 7:19 AM EST  Workstation ID: [id number]      Results for orders placed during the hospital encounter of 12/18/24    Duplex Lower Extremity Art / Grafts - Right CAR    Interpretation Summary    Limited study due to body habitus and current dressing site.  Patent external iliac and femoral-popliteal arterial flow with low flow noted below the common femoral.  Common femoral artery poorly visualized.  Cannot rule out occlusion of the common femoral artery.        ASSESSMENT / PLAN      DKA (diabetic ketoacidosis)    Arterial thrombosis    ARF/MARGOT------ nonoliguric.  Follow for further dialysis need... +ARF/MARGOT with historically normal renal function. +ARF/MARGOT secondary to severe prerenal state and ATN from hypotension and Rhabdomyolysis.   Pressor support. Off IVFs. No NSAIDs. Renal US without obstructive uropathy.   Dose meds for CrCl less than 10 cc/min     2. HYPERNATREMIA/HYPEROSMOLAR STATE-----encourage fluid intake     3. ACIDOSIS--Resolved     4. DVT PROPHYLAXIS     5. HYPOCALCEMIA------Replace IV and follow ionized levels     6. DM------BS ok     7. ANEMIA---------H/H stable     8. OA/DJD/HYPERURICEMIA------Allopurinol . No NSAIDs   Restart IVFs. CK down post multiple days of HD     10. GERD/PUD PROPHYLAXIS-----Renal dose adjusted Pepcid     11. DELIRIUM/TME     12. COPD/ACUTE HYPOXIC RESPIRATORY FAILURE-----S/P extubation    13. HYPOALBUMINEMIA-----S/P IV Albumin to temporize    14. EDEMA/VOLUME EXCESS---- Better post gentle UF with HD    15. HYPOTENSION-----BP ok    16. ELEVATED LFTS/TRANSAMINASES------Secondary to shock liver and Rhabdo. Follow    17. FASCITIS------S/P surgery      18. DELIRIUM---Better    19. HYPOPHOSPHATEMIA------Replaced    20. HYPOMAGNESEMIA------Replaced    Creatinine stable/ non oliguric  Blood pressure improved, will reduce midodrine and wean off.  Monitor renal function, fluid status and electrolytes    Raymundo Harris MD  [facility]  [phone]  01/01/25  13:59 EST

## 2025-01-02 LAB
ALBUMIN SERPL-MCNC: 3.2 G/DL (ref 3.5–5.2)
ALBUMIN/GLOB SERPL: 0.9 G/DL
ALP SERPL-CCNC: 130 U/L (ref 39–117)
ALT SERPL W P-5'-P-CCNC: 53 U/L (ref 1–33)
ANION GAP SERPL CALCULATED.3IONS-SCNC: 6.6 MMOL/L (ref 5–15)
AST SERPL-CCNC: 31 U/L (ref 1–32)
BASOPHILS # BLD AUTO: 0.05 10*3/MM3 (ref 0–0.2)
BASOPHILS NFR BLD AUTO: 0.5 % (ref 0–1.5)
BILIRUB SERPL-MCNC: 0.6 MG/DL (ref 0–1.2)
BUN SERPL-MCNC: 14 MG/DL (ref 6–20)
BUN/CREAT SERPL: 18.4 (ref 7–25)
CALCIUM SPEC-SCNC: 8.6 MG/DL (ref 8.6–10.5)
CHLORIDE SERPL-SCNC: 103 MMOL/L (ref 98–107)
CO2 SERPL-SCNC: 30.4 MMOL/L (ref 22–29)
CREAT SERPL-MCNC: 0.76 MG/DL (ref 0.57–1)
DEPRECATED RDW RBC AUTO: 54.4 FL (ref 37–54)
EGFRCR SERPLBLD CKD-EPI 2021: 93.8 ML/MIN/1.73
EOSINOPHIL # BLD AUTO: 0.16 10*3/MM3 (ref 0–0.4)
EOSINOPHIL NFR BLD AUTO: 1.4 % (ref 0.3–6.2)
ERYTHROCYTE [DISTWIDTH] IN BLOOD BY AUTOMATED COUNT: 16.3 % (ref 12.3–15.4)
GLOBULIN UR ELPH-MCNC: 3.4 GM/DL
GLUCOSE BLDC GLUCOMTR-MCNC: 138 MG/DL (ref 70–105)
GLUCOSE BLDC GLUCOMTR-MCNC: 154 MG/DL (ref 70–105)
GLUCOSE BLDC GLUCOMTR-MCNC: 166 MG/DL (ref 70–105)
GLUCOSE BLDC GLUCOMTR-MCNC: 168 MG/DL (ref 70–105)
GLUCOSE SERPL-MCNC: 108 MG/DL (ref 65–99)
HCT VFR BLD AUTO: 27.9 % (ref 34–46.6)
HGB BLD-MCNC: 8.7 G/DL (ref 12–15.9)
IMM GRANULOCYTES # BLD AUTO: 0.2 10*3/MM3 (ref 0–0.05)
IMM GRANULOCYTES NFR BLD AUTO: 1.8 % (ref 0–0.5)
LYMPHOCYTES # BLD AUTO: 2.53 10*3/MM3 (ref 0.7–3.1)
LYMPHOCYTES NFR BLD AUTO: 22.9 % (ref 19.6–45.3)
MAGNESIUM SERPL-MCNC: 1.7 MG/DL (ref 1.6–2.6)
MCH RBC QN AUTO: 30.4 PG (ref 26.6–33)
MCHC RBC AUTO-ENTMCNC: 31.2 G/DL (ref 31.5–35.7)
MCV RBC AUTO: 97.6 FL (ref 79–97)
MONOCYTES # BLD AUTO: 0.68 10*3/MM3 (ref 0.1–0.9)
MONOCYTES NFR BLD AUTO: 6.2 % (ref 5–12)
NEUTROPHILS NFR BLD AUTO: 67.2 % (ref 42.7–76)
NEUTROPHILS NFR BLD AUTO: 7.42 10*3/MM3 (ref 1.7–7)
NRBC BLD AUTO-RTO: 0 /100 WBC (ref 0–0.2)
PHOSPHATE SERPL-MCNC: 2.8 MG/DL (ref 2.5–4.5)
PLATELET # BLD AUTO: 276 10*3/MM3 (ref 140–450)
PMV BLD AUTO: 8.9 FL (ref 6–12)
POTASSIUM SERPL-SCNC: 3.8 MMOL/L (ref 3.5–5.2)
PROT SERPL-MCNC: 6.6 G/DL (ref 6–8.5)
RBC # BLD AUTO: 2.86 10*6/MM3 (ref 3.77–5.28)
SODIUM SERPL-SCNC: 140 MMOL/L (ref 136–145)
WBC NRBC COR # BLD AUTO: 11.04 10*3/MM3 (ref 3.4–10.8)

## 2025-01-02 PROCEDURE — 97112 NEUROMUSCULAR REEDUCATION: CPT

## 2025-01-02 PROCEDURE — 63710000001 INSULIN GLARGINE PER 5 UNITS: Performed by: INTERNAL MEDICINE

## 2025-01-02 PROCEDURE — 82948 REAGENT STRIP/BLOOD GLUCOSE: CPT

## 2025-01-02 PROCEDURE — 85025 COMPLETE CBC W/AUTO DIFF WBC: CPT | Performed by: SURGERY

## 2025-01-02 PROCEDURE — 84100 ASSAY OF PHOSPHORUS: CPT | Performed by: INTERNAL MEDICINE

## 2025-01-02 PROCEDURE — 97530 THERAPEUTIC ACTIVITIES: CPT

## 2025-01-02 PROCEDURE — 97535 SELF CARE MNGMENT TRAINING: CPT

## 2025-01-02 PROCEDURE — 99231 SBSQ HOSP IP/OBS SF/LOW 25: CPT | Performed by: INTERNAL MEDICINE

## 2025-01-02 PROCEDURE — 80053 COMPREHEN METABOLIC PANEL: CPT | Performed by: SURGERY

## 2025-01-02 PROCEDURE — 82948 REAGENT STRIP/BLOOD GLUCOSE: CPT | Performed by: INTERNAL MEDICINE

## 2025-01-02 PROCEDURE — 97110 THERAPEUTIC EXERCISES: CPT

## 2025-01-02 PROCEDURE — 83735 ASSAY OF MAGNESIUM: CPT | Performed by: SURGERY

## 2025-01-02 PROCEDURE — 63710000001 INSULIN LISPRO (HUMAN) PER 5 UNITS: Performed by: INTERNAL MEDICINE

## 2025-01-02 PROCEDURE — 25010000002 HYDROMORPHONE PER 4 MG: Performed by: INTERNAL MEDICINE

## 2025-01-02 RX ORDER — HYDROMORPHONE HYDROCHLORIDE 1 MG/ML
0.5 INJECTION, SOLUTION INTRAMUSCULAR; INTRAVENOUS; SUBCUTANEOUS ONCE
Status: COMPLETED | OUTPATIENT
Start: 2025-01-02 | End: 2025-01-02

## 2025-01-02 RX ADMIN — MIDODRINE HYDROCHLORIDE 5 MG: 5 TABLET ORAL at 05:27

## 2025-01-02 RX ADMIN — APIXABAN 5 MG: 5 TABLET, FILM COATED ORAL at 20:01

## 2025-01-02 RX ADMIN — INSULIN LISPRO 7 UNITS: 100 INJECTION, SOLUTION INTRAVENOUS; SUBCUTANEOUS at 12:29

## 2025-01-02 RX ADMIN — INSULIN LISPRO 7 UNITS: 100 INJECTION, SOLUTION INTRAVENOUS; SUBCUTANEOUS at 17:15

## 2025-01-02 RX ADMIN — Medication 10 ML: at 20:01

## 2025-01-02 RX ADMIN — INSULIN LISPRO 7 UNITS: 100 INJECTION, SOLUTION INTRAVENOUS; SUBCUTANEOUS at 08:46

## 2025-01-02 RX ADMIN — OXYCODONE 5 MG: 5 TABLET ORAL at 05:27

## 2025-01-02 RX ADMIN — OXYCODONE HYDROCHLORIDE 10 MG: 10 TABLET, FILM COATED, EXTENDED RELEASE ORAL at 20:01

## 2025-01-02 RX ADMIN — QUETIAPINE FUMARATE 75 MG: 25 TABLET ORAL at 20:00

## 2025-01-02 RX ADMIN — INSULIN LISPRO 2 UNITS: 100 INJECTION, SOLUTION INTRAVENOUS; SUBCUTANEOUS at 17:14

## 2025-01-02 RX ADMIN — PANTOPRAZOLE SODIUM 40 MG: 40 TABLET, DELAYED RELEASE ORAL at 05:27

## 2025-01-02 RX ADMIN — OXYCODONE 5 MG: 5 TABLET ORAL at 23:57

## 2025-01-02 RX ADMIN — NYSTATIN 1 APPLICATION: 100000 CREAM TOPICAL at 22:38

## 2025-01-02 RX ADMIN — NYSTATIN 1 APPLICATION: 100000 CREAM TOPICAL at 05:29

## 2025-01-02 RX ADMIN — INSULIN GLARGINE-YFGN 22 UNITS: 100 INJECTION, SOLUTION SUBCUTANEOUS at 20:01

## 2025-01-02 RX ADMIN — OXYCODONE HYDROCHLORIDE 10 MG: 10 TABLET, FILM COATED, EXTENDED RELEASE ORAL at 08:46

## 2025-01-02 RX ADMIN — INSULIN LISPRO 2 UNITS: 100 INJECTION, SOLUTION INTRAVENOUS; SUBCUTANEOUS at 12:28

## 2025-01-02 RX ADMIN — HYDROMORPHONE HYDROCHLORIDE 0.5 MG: 1 INJECTION, SOLUTION INTRAMUSCULAR; INTRAVENOUS; SUBCUTANEOUS at 12:38

## 2025-01-02 RX ADMIN — OXYCODONE 5 MG: 5 TABLET ORAL at 11:03

## 2025-01-02 RX ADMIN — APIXABAN 5 MG: 5 TABLET, FILM COATED ORAL at 08:47

## 2025-01-02 RX ADMIN — Medication 10 ML: at 08:47

## 2025-01-02 RX ADMIN — QUETIAPINE FUMARATE 75 MG: 25 TABLET ORAL at 08:46

## 2025-01-02 NOTE — PAYOR COMM NOTE
"This is clinical for Janet Alexander.  Reference/Auth#: [id number]     EXTENDED AUTHORIZATION PENDING:     Please call or fax determination to contact below.   Thank you.    Courtney Gonsalves RN, CCM  Utilization Review Nurse  [id number] [place] [address]   [phone]  Fx [phone]       Janet Alexander \"BRYANNA\" (54 y.o. Female)       Date of Birth   1970    Social Security Number       Address   [address] [place] [address] [place] [address]    Home Phone   [phone]    MRN   [id number]       Holiness   Mormonism    Marital Status   Single                            Admission Date   12/18/24    Admission Type   Emergency    Admitting Provider   Shamar Rodriguez DO    Attending Provider   Ella Dow MD    Department, Room/Bed   [address], 2213/1       Discharge Date       Discharge Disposition       Discharge Destination                                 Attending Provider: Ella Dow MD    Allergies: Aspirin, Ibuprofen    Isolation: None   Infection: MRSA No Isolation this Admit (12/19/24)   Code Status: CPR    Ht: 162.6 cm (64\")   Wt: 98.5 kg (217 lb 2.5 oz)    Admission Cmt: None   Principal Problem: DKA (diabetic ketoacidosis) [E11.10]                   Active Insurance as of 12/18/2024       Primary Coverage       Payor Plan Insurance Group Employer/Plan Group    [id number]       Payor Plan Address Payor Plan Phone Number Payor Plan Fax Number Effective Dates    MAIL STOP:   9/1/2021 - None Entered    [address] [address]       [facility] [id number]         Subscriber Name Subscriber Birth Date Member ID       JANET ALEXANDER 1970 [id number]                     Emergency Contacts        (Rel.) Home Phone Work Phone Mobile Phone    Sukh Saldivar (Son) -- -- [phone]    Rohit Saldivar (Son) -- -- [phone]    KYAW HU (Mother) [phone] -- [phone]      "         Operative/Procedure Notes (last 72 hours)  Notes from 24 1314 through 25 1314   No notes of this type exist for this encounter.          Physician Progress Notes (last 72 hours)        Ella Dow MD at 25 1232              [facility] MEDICINE SERVICE  DAILY PROGRESS NOTE    NAME: Yarelis Jackson  : 1970  MRN: [id number]      LOS: 15 days     PROVIDER OF SERVICE: Ella Dow MD    Chief Complaint: DKA (diabetic ketoacidosis)    Subjective:     Interval History:  History taken from: patient    Denies any acute complaints.  Denies nausea vomiting or diarrhea.      Patient seen with RN  Patient still with Dobbhoff tube in place as she failed swallow study  Awaiting repeat swallow eval  Follow-up per endocrine  Patient with DKA that has resolved  She is awake alert oriented x 3 feeling tired hemodynamically stable otherwise  Dissipate removal of Dobbhoff tube later today      Patient is back from swallow eval  She passed her swallow eval and Dobbhoff tube would not be discontinued  Is awake alert  Patient with right lower extremity EXTR ischemia status post thrombectomy and redo thrombectomy as well as fasciotomy  Plan for vacuum dressing change today with wound care  May transition from heparin drip to dual antiplatelets as per vascular recommendation  Patient seen per endocrine as she had DKA on admission that has resolved  Continue Lantus 22 units nightly and lispro 6 units Q6 plus sliding scale as per endocrine recommendation  Patient is getting wound care and having extreme pain  Oxycodone is resumed  Will give 1 dose of Dilaudid for care      Patient feeling fine  Started on Eliquis per vascular  Treatments better controlled  Followed up per nephrology and endocrine and vascular  Anemia of chronic disease with stable hemoglobin of 8  Patient  8 wound VAC.  On discharge home health care for wound care  25  Is doing fine no new complaint  On oral  Eliquis  Blood sugar has been running from 100-250  Placement at the skilled nursing facility as per PT recommendation  Hemoglobin stable at 8  Her kidney function is back to baseline and she is hemodynamically stable  Dc midodrine as bp stable    1/2/25    Patient is in significant pain while getting wound care  Her blood pressure has been stable  Will DC midodrine  Her hemoglobin is stable at 8  Discharge planning [facility] nursing [facility]      Review of Systems:   Review of Systems  As above  Objective:     Vital Signs  Temp:  [97.3 °F (36.3 °C)-99.4 °F (37.4 °C)] 97.8 °F (36.6 °C)  Heart Rate:  [89-95] 89  Resp:  [15-22] 15  BP: (118-168)/(62-98) 118/62  Flow (L/min) (Oxygen Therapy):  [1-1.5] 1.5   Body mass index is 37.27 kg/m².    Physical Exam  Physical Exam  Constitutional:       Appearance: Normal appearance.   HENT:      Nose:      Comments: NG tube  Cardiovascular:      Rate and Rhythm: Normal rate and regular rhythm.      Pulses: Normal pulses.      Heart sounds: Normal heart sounds. No murmur heard.  Pulmonary:      Effort: Pulmonary effort is normal. No respiratory distress.      Breath sounds: Normal breath sounds. No wheezing or rales.   Abdominal:      General: Bowel sounds are normal. There is no distension.      Tenderness: There is no abdominal tenderness.   Musculoskeletal:         General: No swelling, tenderness, deformity or signs of injury.      Cervical back: Normal range of motion.      Right lower leg: Edema (Right lower extremity dressing) present.   Neurological:      General: No focal deficit present.      Mental Status: She is alert and oriented to person, place, and time. Mental status is at baseline.      Cranial Nerves: No cranial nerve deficit.      Sensory: No sensory deficit.      Motor: No weakness.      Coordination: Coordination normal.            Diagnostic Data    Results from last 7 days   Lab Units 01/02/25  0323   WBC 10*3/mm3 11.04*   HEMOGLOBIN g/dL 8.7*   HEMATOCRIT %  27.9*   PLATELETS 10*3/mm3 276   GLUCOSE mg/dL 108*   CREATININE mg/dL 0.76   BUN mg/dL 14   SODIUM mmol/L 140   POTASSIUM mmol/L 3.8   AST (SGOT) U/L 31   ALT (SGPT) U/L 53*   ALK PHOS U/L 130*   BILIRUBIN mg/dL 0.6   ANION GAP mmol/L 6.6       No radiology results for the last day      I reviewed the patient's new clinical results.    Assessment/Plan:     Active and Resolved Problems  Active Hospital Problems    Diagnosis  POA    **DKA (diabetic ketoacidosis) [E11.10]  Yes    Arterial thrombosis [I74.9]  No      Resolved Hospital Problems   No resolved problems to display.       Diabetic ketoacidosis without coma, with new onset diabetes mellitus, type 2 / Metabolic acidosis, increased anion gap-Resolved  -Etiology uncertain, though new onset diabetes mellitus.  A1c 15.80 on admission, no prior available.  -Lantus 22 units at night, insulin lispro 6 units every 6 hours.  Continue insulin sliding scale every 6 hours.  -Endocrinology Dr. Winkler following  -Nutrition following patient due to enteric feeds     Acute Kidney Injury requiring emergent hemodialysis  -Remains nonoliguric. Baseline creatinine 0.90.  Creatinine 2.10 on admission.  -Monitor Input/Output very closely.   -Avoid NSAIDs, nephrotoxic medications, and hypotension.  -Nephrology following.Initially on HD due to acute renal failure.  No history for the past 2 days.  Will evaluate need for HD on a daily basis per nephrology.      Acute occlusive thrombus of the right iliac artery with limb ischemia improved  -Arterial duplex with noted right femoral, deep femoral, and popliteal arteries being patent but with very low amplitude monophasic signals, suggesting severe inflow stenosis or occlusion; no measurable Doppler flow in the anterior or posterior tibial and peroneal arteries.  -Bilateral lower extremity ABIs ordered: Right STACEY critically reduced with STACEY of 0 and severe digital insufficiency; left STACEY is normal with moderate digital  insufficiency.  -Emergent surgery per vascular surgeon 12/19/2024 for right iliac thrombectomy via open groin incision then return to surgery on 12/20/2024 for recurrent right lower extremity ischemia due to arterial thrombosis and underwent right iliofemoral popliteal thrombectomy, right femoral endarterectomy with patch, right lower extremity arteriogram, and closed right calf fasciotomies  -Continue on heparin drip per vascular surgery   -Hematology/oncology consulted  -Return to the OR morning (12/23) for emergent 4 compartment fasciotomies due to critical right leg ischemia  -Defer to vascular surgery for management of wound VAC.  Next wound VAC change on Monday     Nontraumatic rhabdomyolysis --> Improving  -CK downtrending  -Encourage enteric hydration      Leukocytosis likely reactive  -Continue to monitor clinically    Electrolyte derangements likely due to renal failure  -Monitor and replete as needed per protocol      Cutaneous candidiasis of groin  -Likely secondary to uncontrolled diabetes mellitus  -Nystatin ordered     Essential Hypertension  -Clonidine patch ordered by nephrology-hold   -Titrate medications as needed.     Bilateral leg rash, concern for scabies infection  -Permethrin regimen ordered.  Completed first treatment on 12/18, repeat treatment in 2 weeks  -Keep in contact precautions x 1 week post initial treatment (12/25)     Transaminitis  -US of Liver: Hepatomegaly with steatosis.  -Hepatitis panel-negative.  -Monitor and trend LFTs.       COPD: Not in exacerbation.  Duonebs ordered as needed.  Obesity: Body mass index is 33.49 kg/m².       VTE Prophylaxis:  Pharmacologic VTE prophylaxis orders are present.             Disposition Planning:     Barriers to Discharge: medical optimization  Anticipated Date of Discharge: pending  Place of Discharge: home      Time: 35 minutes     Code Status and Medical Interventions: CPR (Attempt to Resuscitate); Full Support   Ordered at: 12/18/24 0830  "    Code Status (Patient has no pulse and is not breathing):    CPR (Attempt to Resuscitate)     Medical Interventions (Patient has pulse or is breathing):    Full Support       Signature: Electronically signed by Ella Dow MD, 25, 12:32 [facility] [place]    Electronically signed by Ella Dow MD at 25 1233       Raymundo Harris MD at 25 8941          NEPHROLOGY PROGRESS NOTE------[facility]    [facility]  [phone]  Raymundo Harris MD      Patient Care Team:  Katie Duran PA as PCP - General (Physician Assistant)  Uli Starr MD as Consulting Physician (Nephrology)      Provider:  Raymundo Harris MD  Patient Name: Yarelis Jackson  :  1970    SUBJECTIVE:    F/U ARF/MARGOT/ELECTROLYTE ABNORMALITIES  No chest pain or shortness of breath    Medication:  apixaban, 5 mg, Oral, Q12H  insulin glargine, 22 Units, Subcutaneous, Nightly  insulin lispro, 2-7 Units, Subcutaneous, TID With Meals  insulin lispro, 7 Units, Subcutaneous, TID With Meals  midodrine, 10 mg, Oral, Q8H  nystatin, 1 Application, Topical, Q8H  oxyCODONE, 10 mg, Oral, Q12H  pantoprazole, 40 mg, Oral, Q AM  QUEtiapine, 75 mg, Oral, Q12H  sodium chloride, 10 mL, Intravenous, Q12H             OBJECTIVE    Vital Sign Min/Max for last 24 hours  Temp  Min: 97.9 °F (36.6 °C)  Max: 98.7 °F (37.1 °C)   BP  Min: 139/79  Max: 163/82   Pulse  Min: 87  Max: 95   Resp  Min: 13  Max: 22   SpO2  Min: 95 %  Max: 98 %   No data recorded   Weight  Min: 98.5 kg (217 lb 2.5 oz)  Max: 98.5 kg (217 lb 2.5 oz)     Flowsheet Rows      Flowsheet Row First Filed Value   Admission Height 162.6 cm (64\") Documented at 2024 0443   Admission Weight 92.5 kg (203 lb 14.4 oz) Documented at 2024 0527            I/O this shift:  In: 240 [P.O.:240]  Out: -   I/O last 3 completed shifts:  In: 4796.6 [P.O.:720; I.V.:4076.6]  Out: 3900 " "[Urine:3400]    Physical Exam:     General Appearance:  NAD  Head: normocephalic, without obvious abnormality and atraumatic +DHT INTACT +DRY OP  Eyes: conjunctivae and sclerae normal and no icterus  Neck: supple and no JVD  Lungs: +SCATTERED RHONCHI  Heart: regular rhythm & normal rate and normal S1, S2 +THOMAS  Chest: Wall no abnormalities observed  Abdomen: normal bowel sounds and soft +OBESITY  Extremities: moves extremities well, +TRACE  BILAT PRETIBIAL EDEMA, no cyanosis and no redness. +DJD  Skin: Warm  Neurologic:  A AND O X 2    Labs:    WBC WBC   Date Value Ref Range Status   01/01/2025 10.19 3.40 - 10.80 10*3/mm3 Final   12/31/2024 12.07 (H) 3.40 - 10.80 10*3/mm3 Final   12/30/2024 13.50 (H) 3.40 - 10.80 10*3/mm3 Final      HGB Hemoglobin   Date Value Ref Range Status   01/01/2025 8.1 (L) 12.0 - 15.9 g/dL Final   12/31/2024 8.0 (L) 12.0 - 15.9 g/dL Final   12/30/2024 8.1 (L) 12.0 - 15.9 g/dL Final      HCT Hematocrit   Date Value Ref Range Status   01/01/2025 26.2 (L) 34.0 - 46.6 % Final   12/31/2024 24.7 (L) 34.0 - 46.6 % Final   12/30/2024 24.9 (L) 34.0 - 46.6 % Final      Platelets No results found for: \"LABPLAT\"   MCV MCV   Date Value Ref Range Status   01/01/2025 96.3 79.0 - 97.0 fL Final   12/31/2024 95.4 79.0 - 97.0 fL Final   12/30/2024 94.7 79.0 - 97.0 fL Final          Sodium Sodium   Date Value Ref Range Status   01/01/2025 138 136 - 145 mmol/L Final   12/31/2024 140 136 - 145 mmol/L Final   12/30/2024 141 136 - 145 mmol/L Final      Potassium Potassium   Date Value Ref Range Status   01/01/2025 3.6 3.5 - 5.2 mmol/L Final   12/31/2024 3.8 3.5 - 5.2 mmol/L Final     Comment:     Slight hemolysis detected by analyzer. Result may be falsely elevated.   12/30/2024 4.0 3.5 - 5.2 mmol/L Final   12/30/2024 3.2 (L) 3.5 - 5.2 mmol/L Final      Chloride Chloride   Date Value Ref Range Status   01/01/2025 102 98 - 107 mmol/L Final   12/31/2024 101 98 - 107 mmol/L Final   12/30/2024 100 98 - 107 mmol/L Final " "     CO2 CO2   Date Value Ref Range Status   01/01/2025 30.9 (H) 22.0 - 29.0 mmol/L Final   12/31/2024 32.4 (H) 22.0 - 29.0 mmol/L Final   12/30/2024 33.2 (H) 22.0 - 29.0 mmol/L Final      BUN BUN   Date Value Ref Range Status   01/01/2025 15 6 - 20 mg/dL Final   12/31/2024 16 6 - 20 mg/dL Final   12/30/2024 18 6 - 20 mg/dL Final      Creatinine Creatinine   Date Value Ref Range Status   01/01/2025 0.84 0.57 - 1.00 mg/dL Final   12/31/2024 0.80 0.57 - 1.00 mg/dL Final   12/30/2024 0.81 0.57 - 1.00 mg/dL Final      Calcium Calcium   Date Value Ref Range Status   01/01/2025 8.3 (L) 8.6 - 10.5 mg/dL Final   12/31/2024 8.4 (L) 8.6 - 10.5 mg/dL Final   12/30/2024 8.6 8.6 - 10.5 mg/dL Final      PO4 No components found for: \"PO4\"   Albumin Albumin   Date Value Ref Range Status   01/01/2025 3.0 (L) 3.5 - 5.2 g/dL Final   12/31/2024 3.1 (L) 3.5 - 5.2 g/dL Final   12/30/2024 2.8 (L) 3.5 - 5.2 g/dL Final      Magnesium Magnesium   Date Value Ref Range Status   01/01/2025 1.6 1.6 - 2.6 mg/dL Final   12/31/2024 1.9 1.6 - 2.6 mg/dL Final   12/30/2024 1.3 (L) 1.6 - 2.6 mg/dL Final      Uric Acid No components found for: \"URIC ACID\"     Imaging Results (Last 72 Hours)       Procedure Component Value Units Date/Time    FL Video Swallow With Speech Single Contrast [[id number]] Resulted: 12/30/24 1026     Updated: 12/30/24 1026    Narrative:      This procedure was auto-finalized with no dictation required.            Results for orders placed during the hospital encounter of 12/18/24    XR Chest 1 View    Narrative  XR CHEST 1 VW    Date of Exam: 12/22/2024 12:35 AM EST    Indication: Intubated Patient    Comparison:  12/21/2024    FINDINGS:  Endotracheal tube tip appears in satisfactory position at the level of the aortic arch. Right IJ catheter appears in stable position. Enteric tube extends into the left upper quadrant. Heart size and mediastinal contour appear within normal limits. No  definite pneumothorax or effusion. Mild " bibasilar opacities. Findings of advanced emphysema with suspected apical scarring.    Impression  1.Tubes and lines appear in satisfactory positions, as above.  2.Mild bibasilar opacities which could represent atelectasis or infiltrate.  3.Advanced emphysema.        Electronically Signed: Sam Haynes  12/22/2024 7:07 AM EST  Workstation ID: [id number]      XR Chest 1 View    Narrative  XR CHEST 1 VW    Date of Exam: 12/21/2024 4:32 AM EST    Indication: Intubated Patient    Comparison: 12/20/2024    Findings:  There is been no interval tube or line changes. Heart size and pulmonary vessels are within normal limits. There are postoperative changes of the bilateral upper lobes. Lungs appear clear. No pleural effusion or pneumothorax.    Impression  Impression:    1. No tube or line change  2. No focal airspace consolidation or other acute process.      Electronically Signed: Julio Thorpe MD  12/21/2024 9:54 AM EST  Workstation ID: [id number]      XR Chest 1 View    Narrative  XR CHEST 1 VW    Date of Exam: 12/20/2024 12:10 AM EST    Indication: Intubated Patient    Comparison: 12/19/2024    Findings:  Endotracheal tube approximately 2.5 cm above the carmina. Feeding tube and right IJ dialysis catheter remain in place. No new tubes or lines heart size and pulmonary vasculature are stable. Bullous changes in the right upper lobe. Postoperative changes in  the left upper lobe. Lungs grossly clear. No pneumothorax    Impression  Impression:  Stable chest demonstrating COPD and postoperative changes with no acute cardiopulmonary process demonstrated      Electronically Signed: Shon Ponce  12/20/2024 7:19 AM EST  Workstation ID: [id number]      Results for orders placed during the hospital encounter of 12/18/24    Duplex Lower Extremity Art / Grafts - Right CAR    Interpretation Summary    Limited study due to body habitus and current dressing site.  Patent external iliac and femoral-popliteal arterial flow with low flow  noted below the common femoral.  Common femoral artery poorly visualized.  Cannot rule out occlusion of the common femoral artery.        ASSESSMENT / PLAN      DKA (diabetic ketoacidosis)    Arterial thrombosis    ARF/MARGOT------ nonoliguric.  Follow for further dialysis need... +ARF/MARGOT with historically normal renal function. +ARF/MARGOT secondary to severe prerenal state and ATN from hypotension and Rhabdomyolysis.  Pressor support. Off IVFs. No NSAIDs. Renal US without obstructive uropathy.   Dose meds for CrCl less than 10 cc/min     2. HYPERNATREMIA/HYPEROSMOLAR STATE-----encourage fluid intake     3. ACIDOSIS--Resolved     4. DVT PROPHYLAXIS     5. HYPOCALCEMIA------Replace IV and follow ionized levels     6. DM------BS ok     7. ANEMIA---------H/H stable     8. OA/DJD/HYPERURICEMIA------Allopurinol . No NSAIDs   Restart IVFs. CK down post multiple days of HD     10. GERD/PUD PROPHYLAXIS-----Renal dose adjusted Pepcid     11. DELIRIUM/TME     12. COPD/ACUTE HYPOXIC RESPIRATORY FAILURE-----S/P extubation    13. HYPOALBUMINEMIA-----S/P IV Albumin to temporize    14. EDEMA/VOLUME EXCESS---- Better post gentle UF with HD    15. HYPOTENSION-----BP ok    16. ELEVATED LFTS/TRANSAMINASES------Secondary to shock liver and Rhabdo. Follow    17. FASCITIS------S/P surgery      18. DELIRIUM---Better    19. HYPOPHOSPHATEMIA------Replaced    20. HYPOMAGNESEMIA------Replaced    Creatinine stable/ non oliguric  Blood pressure improved, will reduce midodrine and wean off.  Monitor renal function, fluid status and electrolytes    Raymundo Harris MD  [facility]  [phone]  01/01/25  13:59 EST      Electronically signed by Raymundo Harris MD at 01/01/25 5748       Rodriguez Worthy MD at 01/01/25 1226                 Daily Progress Note    Patient Care Team:  Katie Duran PA as PCP - General (Physician Assistant)  Uli Starr MD as Consulting Physician (Nephrology)    Chief Complaint:  Follow-up type 1 diabetes    HPI: Patient seen, clinically doing well.  Eating fair.  Blood sugar log reviewed.  Still have some high glucose.  No nausea or vomiting at this time    ROS:   Constitutional:  Denies fatigue, tiredness.    Respiratory: denies cough, shortness of breath.   Cardiovascular:  denies chest pain, edema   GI:  Denies abdominal pain, nausea, vomiting.         Vitals:    01/01/25 1141   BP: 148/78   Pulse: 87   Resp: 13   Temp: 98.2 °F (36.8 °C)   SpO2: 96%     Body mass index is 37.27 kg/m².    Physical Exam:  GEN: NAD, conversant  PSYCH: Awake and coherent      Results Review:     I reviewed the patient's new clinical results.    Glucose   Date Value Ref Range Status   01/01/2025 203 (H) 65 - 99 mg/dL Final     Sodium   Date Value Ref Range Status   01/01/2025 138 136 - 145 mmol/L Final     Potassium   Date Value Ref Range Status   01/01/2025 3.6 3.5 - 5.2 mmol/L Final     CO2   Date Value Ref Range Status   01/01/2025 30.9 (H) 22.0 - 29.0 mmol/L Final     Chloride   Date Value Ref Range Status   01/01/2025 102 98 - 107 mmol/L Final     Anion Gap   Date Value Ref Range Status   01/01/2025 5.1 5.0 - 15.0 mmol/L Final     Creatinine   Date Value Ref Range Status   01/01/2025 0.84 0.57 - 1.00 mg/dL Final     BUN   Date Value Ref Range Status   01/01/2025 15 6 - 20 mg/dL Final     BUN/Creatinine Ratio   Date Value Ref Range Status   01/01/2025 17.9 7.0 - 25.0 Final     Calcium   Date Value Ref Range Status   01/01/2025 8.3 (L) 8.6 - 10.5 mg/dL Final     Alkaline Phosphatase   Date Value Ref Range Status   01/01/2025 112 39 - 117 U/L Final     Total Protein   Date Value Ref Range Status   01/01/2025 6.4 6.0 - 8.5 g/dL Final     ALT (SGPT)   Date Value Ref Range Status   01/01/2025 71 (H) 1 - 33 U/L Final     AST (SGOT)   Date Value Ref Range Status   01/01/2025 26 1 - 32 U/L Final     Total Bilirubin   Date Value Ref Range Status   01/01/2025 0.5 0.0 - 1.2 mg/dL Final     Albumin   Date Value Ref  "Range Status   2025 3.0 (L) 3.5 - 5.2 g/dL Final     Globulin   Date Value Ref Range Status   2025 3.4 gm/dL Final     Magnesium   Date Value Ref Range Status   2025 1.6 1.6 - 2.6 mg/dL Final     Phosphorus   Date Value Ref Range Status   2025 2.7 2.5 - 4.5 mg/dL Final     Lab Results   Component Value Date    HGBA1C 15.80 (H) 2024     No results found for: \"GLUF\", \"MICROALBUR\"  Results from last 7 days   Lab Units 25  1141 25  0754 25  0247 24  2050 24  1131 24  0825   GLUCOSE mg/dL 129* 218* 212* 150* 125* 87     Medication Review: Reviewed.     apixaban, 5 mg, Oral, Q12H  insulin glargine, 22 Units, Subcutaneous, Nightly  insulin lispro, 2-7 Units, Subcutaneous, TID With Meals  insulin lispro, 6 Units, Subcutaneous, TID With Meals  midodrine, 10 mg, Oral, Q8H  nystatin, 1 Application, Topical, Q8H  oxyCODONE, 10 mg, Oral, Q12H  pantoprazole, 40 mg, Oral, Q AM  QUEtiapine, 75 mg, Oral, Q12H  sodium chloride, 10 mL, Intravenous, Q12H      Assessment and plan:  Diabetes mellitus type 2 with hyperglycemia: Uncontrolled, will increase lispro to 7 units with each meal and continue glargine 22 units SQ nightly along with lispro sliding scale with meals and follow blood sugars and make further adjustments as needed.    Rodriguez Worthy MD. FACE                  Electronically signed by Rodriguez Worthy MD at 25 1225       Ella Dow MD at 25 1110              [facility] MEDICINE SERVICE  DAILY PROGRESS NOTE    NAME: Yarelis Jackson  : 1970  MRN: [id number]      LOS: 14 days     PROVIDER OF SERVICE: Ella Dow MD    Chief Complaint: DKA (diabetic ketoacidosis)    Subjective:     Interval History:  History taken from: patient    Denies any acute complaints.  Denies nausea vomiting or diarrhea.      Patient seen with RN  Patient still with Dobbhoff tube in place as she failed swallow study  Awaiting repeat swallow " eval  Follow-up per endocrine  Patient with DKA that has resolved  She is awake alert oriented x 3 feeling tired hemodynamically stable otherwise  Dissipate removal of Dobbhoff tube later today    12/30  Patient is back from swallow eval  She passed her swallow eval and Dobbhoff tube would not be discontinued  Is awake alert  Patient with right lower extremity EXTR ischemia status post thrombectomy and redo thrombectomy as well as fasciotomy  Plan for vacuum dressing change today with wound care  May transition from heparin drip to dual antiplatelets as per vascular recommendation  Patient seen per endocrine as she had DKA on admission that has resolved  Continue Lantus 22 units nightly and lispro 6 units Q6 plus sliding scale as per endocrine recommendation  Patient is getting wound care and having extreme pain  Oxycodone is resumed  Will give 1 dose of Dilaudid for care    12/31  Patient feeling fine  Started on Eliquis per vascular  Treatments better controlled  Followed up per nephrology and endocrine and vascular  Anemia of chronic disease with stable hemoglobin of 8  Patient  8 wound VAC.  On discharge home health care for wound care  1/1/25  Is doing fine no new complaint  On oral Eliquis  Blood sugar has been running from 100-250  Placement at the skilled nursing facility as per PT recommendation  Hemoglobin stable at 8  Her kidney function is back to baseline and she is hemodynamically stable  Dc midodrine as bp stable        Review of Systems:   Review of Systems  As above  Objective:     Vital Signs  Temp:  [97.9 °F (36.6 °C)-98.7 °F (37.1 °C)] 97.9 °F (36.6 °C)  Heart Rate:  [87-95] 87  Resp:  [17-22] 20  BP: (139-163)/(69-82) 163/82  Flow (L/min) (Oxygen Therapy):  [1-2] 1   Body mass index is 37.27 kg/m².    Physical Exam  Physical Exam  Constitutional:       Appearance: Normal appearance.   HENT:      Nose:      Comments: NG tube  Cardiovascular:      Rate and Rhythm: Normal rate and regular rhythm.       Pulses: Normal pulses.      Heart sounds: Normal heart sounds. No murmur heard.  Pulmonary:      Effort: Pulmonary effort is normal. No respiratory distress.      Breath sounds: Normal breath sounds. No wheezing or rales.   Abdominal:      General: Bowel sounds are normal. There is no distension.      Tenderness: There is no abdominal tenderness.   Musculoskeletal:         General: No swelling, tenderness, deformity or signs of injury.      Cervical back: Normal range of motion.      Right lower leg: Edema (Right lower extremity dressing) present.   Neurological:      General: No focal deficit present.      Mental Status: She is alert and oriented to person, place, and time. Mental status is at baseline.      Cranial Nerves: No cranial nerve deficit.      Sensory: No sensory deficit.      Motor: No weakness.      Coordination: Coordination normal.            Diagnostic Data    Results from last 7 days   Lab Units 01/01/25  0251   WBC 10*3/mm3 10.19   HEMOGLOBIN g/dL 8.1*   HEMATOCRIT % 26.2*   PLATELETS 10*3/mm3 255   GLUCOSE mg/dL 203*   CREATININE mg/dL 0.84   BUN mg/dL 15   SODIUM mmol/L 138   POTASSIUM mmol/L 3.6   AST (SGOT) U/L 26   ALT (SGPT) U/L 71*   ALK PHOS U/L 112   BILIRUBIN mg/dL 0.5   ANION GAP mmol/L 5.1       No radiology results for the last day      I reviewed the patient's new clinical results.    Assessment/Plan:     Active and Resolved Problems  Active Hospital Problems    Diagnosis  POA    **DKA (diabetic ketoacidosis) [E11.10]  Yes    Arterial thrombosis [I74.9]  No      Resolved Hospital Problems   No resolved problems to display.       Diabetic ketoacidosis without coma, with new onset diabetes mellitus, type 2 / Metabolic acidosis, increased anion gap-Resolved  -Etiology uncertain, though new onset diabetes mellitus.  A1c 15.80 on admission, no prior available.  -Lantus 22 units at night, insulin lispro 6 units every 6 hours.  Continue insulin sliding scale every 6  hours.  -Endocrinology Dr. Winkler following  -Nutrition following patient due to enteric feeds     Acute Kidney Injury requiring emergent hemodialysis  -Remains nonoliguric. Baseline creatinine 0.90.  Creatinine 2.10 on admission.  -Monitor Input/Output very closely.   -Avoid NSAIDs, nephrotoxic medications, and hypotension.  -Nephrology following.Initially on HD due to acute renal failure.  No history for the past 2 days.  Will evaluate need for HD on a daily basis per nephrology.      Acute occlusive thrombus of the right iliac artery with limb ischemia improved  -Arterial duplex with noted right femoral, deep femoral, and popliteal arteries being patent but with very low amplitude monophasic signals, suggesting severe inflow stenosis or occlusion; no measurable Doppler flow in the anterior or posterior tibial and peroneal arteries.  -Bilateral lower extremity ABIs ordered: Right STACEY critically reduced with STACEY of 0 and severe digital insufficiency; left STACEY is normal with moderate digital insufficiency.  -Emergent surgery per vascular surgeon 12/19/2024 for right iliac thrombectomy via open groin incision then return to surgery on 12/20/2024 for recurrent right lower extremity ischemia due to arterial thrombosis and underwent right iliofemoral popliteal thrombectomy, right femoral endarterectomy with patch, right lower extremity arteriogram, and closed right calf fasciotomies  -Continue on heparin drip per vascular surgery   -Hematology/oncology consulted  -Return to the OR morning (12/23) for emergent 4 compartment fasciotomies due to critical right leg ischemia  -Defer to vascular surgery for management of wound VAC.  Next wound VAC change on Monday     Nontraumatic rhabdomyolysis --> Improving  -CK downtrending  -Encourage enteric hydration      Leukocytosis likely reactive  -Continue to monitor clinically    Electrolyte derangements likely due to renal failure  -Monitor and replete as needed per  protocol      Cutaneous candidiasis of groin  -Likely secondary to uncontrolled diabetes mellitus  -Nystatin ordered     Essential Hypertension  -Clonidine patch ordered by nephrology-hold   -Titrate medications as needed.     Bilateral leg rash, concern for scabies infection  -Permethrin regimen ordered.  Completed first treatment on , repeat treatment in 2 weeks  -Keep in contact precautions x 1 week post initial treatment ()     Transaminitis  -US of Liver: Hepatomegaly with steatosis.  -Hepatitis panel-negative.  -Monitor and trend LFTs.       COPD: Not in exacerbation.  Duonebs ordered as needed.  Obesity: Body mass index is 33.49 kg/m².       VTE Prophylaxis:  Pharmacologic VTE prophylaxis orders are present.             Disposition Planning:     Barriers to Discharge: medical optimization  Anticipated Date of Discharge: pending  Place of Discharge: home      Time: 35 minutes     Code Status and Medical Interventions: CPR (Attempt to Resuscitate); Full Support   Ordered at: 24 0830     Code Status (Patient has no pulse and is not breathing):    CPR (Attempt to Resuscitate)     Medical Interventions (Patient has pulse or is breathing):    Full Support       Signature: Electronically signed by Ella Dow MD, 25, 11:10 EST.  [facility] Hospitalist Team    Electronically signed by Ella Dow MD at 25 0905       Ghassan Celestin MD at 25 1044            Name: Yarelis Jackson ADMIT: 2024   : 1970  PCP: Katie Duran PA    MRN: [id number] LOS: 14 days   AGE/SEX: 54 y.o. female  ROOM: [address] [place] [address]    54 y.o. female post iliofemoral embolectomy/endarterectomy and right calf fasciotomies for acute right lower extremity ischemia.  Arousable, alert, oriented.  No pain.  Spent much of the day out of bed yesterday.  Tolerating apixaban without bleeding.    Scheduled Medications:   apixaban, 5 mg, Oral, Q12H  insulin glargine, 22 Units, Subcutaneous,  Nightly  insulin lispro, 2-7 Units, Subcutaneous, TID With Meals  insulin lispro, 6 Units, Subcutaneous, TID With Meals  midodrine, 10 mg, Oral, Q8H  nystatin, 1 Application, Topical, Q8H  oxyCODONE, 10 mg, Oral, Q12H  pantoprazole, 40 mg, Oral, Q AM  QUEtiapine, 75 mg, Oral, Q12H  sodium chloride, 10 mL, Intravenous, Q12H    Vital Signs  Vital Signs Patient Vitals for the past 24 hrs:   BP Temp Temp src Pulse Resp SpO2 Weight   01/01/25 0754 163/82 97.9 °F (36.6 °C) Oral 87 20 96 % --   01/01/25 0515 147/79 -- -- 91 -- -- --   01/01/25 0425 -- -- -- -- -- -- 98.5 kg (217 lb 2.5 oz)   01/01/25 0423 139/79 98.5 °F (36.9 °C) Oral 91 17 96 % --   01/01/25 0028 156/76 98.2 °F (36.8 °C) Oral 88 18 98 % --   12/31/24 2050 139/69 98.6 °F (37 °C) Oral 95 22 95 % --   12/31/24 2049 139/69 -- -- 90 -- -- --   12/31/24 1608 142/80 98.7 °F (37.1 °C) Oral -- 17 -- --   12/31/24 1100 -- 98.7 °F (37.1 °C) Oral -- 19 -- --     I/O:  I/O last 3 completed shifts:  In: 4796.6 [P.O.:720; I.V.:4076.6]  Out: 3900 [Urine:3400]    Physical Exam: VAC in place.  Palpable dorsalis pedis artery pulses.  Minimal motor function right foot.    CBC    Results from last 7 days   Lab Units 01/01/25  0251 12/31/24  0000 12/30/24  0257 12/29/24  0337 12/28/24  0004 12/27/24  0535 12/27/24  0415   WBC 10*3/mm3 10.19 12.07* 13.50* 14.76* 16.35* 15.02* 15.49*   HEMOGLOBIN g/dL 8.1* 8.0* 8.1* 8.0* 7.9* 8.1* 8.1*   PLATELETS 10*3/mm3 255 242 227 216 166 148 147     BMP   Results from last 7 days   Lab Units 01/01/25  0251 12/31/24  0000 12/30/24  1225 12/30/24  0257 12/29/24  0337 12/28/24  1224 12/28/24  0004 12/27/24  0535 12/26/24  1406 12/26/24  0428   SODIUM mmol/L 138 140  --  141 146*  --  149* 143  --  139   POTASSIUM mmol/L 3.6 3.8 4.0 3.2* 3.7 4.0 3.3* 4.0   < > 3.5   CHLORIDE mmol/L 102 101  --  100 108*  --  111* 108*  --  104   CO2 mmol/L 30.9* 32.4*  --  33.2* 31.3*  --  30.5* 26.6  --  23.3   BUN mg/dL 15 16  --  18 19  --  21* 21*  --  36*    CREATININE mg/dL 0.84 0.80  --  0.81 0.83  --  0.97 1.00  --  1.62*   GLUCOSE mg/dL 203* 173*  --  100* 140*  --  128* 155*  --  175*   MAGNESIUM mg/dL 1.6 1.9  --  1.3* 1.7  --  4.0* 1.5*  --  2.0   PHOSPHORUS mg/dL 2.7 3.8  --  3.8 3.0  --  3.0 2.1*  --  2.8    < > = values in this interval not displayed.     Cr Clearance Estimated Creatinine Clearance: 88.3 mL/min (by C-G formula based on SCr of 0.84 mg/dL).  Assessment & Plan   Assessment & Plan    DKA (diabetic ketoacidosis)    Arterial thrombosis    54 y.o. female convalescing post treatment of acute right lower extremity ischemia and compartment syndrome.  Continue PT/OT, apixaban and VAC.    Ghassan Celestin MD  01/01/25, 10:44 EST    Office contact: [phone]      Electronically signed by Ghassan Celestin MD at 01/01/25 1046       Rodriguez Worthy MD at 12/31/24 1235                 Daily Progress Note    Patient Care Team:  Katie Duran PA as PCP - General (Physician Assistant)  Uli Starr MD as Consulting Physician (Nephrology)    Chief Complaint: Follow-up type 1 diabetes    HPI: Patient seen, clinically doing better.  She has started eating some.  Off tube feeds.  Blood sugar log reviewed.  No complaints at this time.    ROS:   Constitutional:  Denies fatigue, tiredness.    Respiratory: denies cough, shortness of breath.   Cardiovascular:  denies chest pain, edema   GI:  Denies abdominal pain, nausea, vomiting.         Vitals:    12/31/24 1100   BP:    Pulse:    Resp: 19   Temp: 98.7 °F (37.1 °C)   SpO2:      Body mass index is 38.67 kg/m².    Physical Exam:  GEN: NAD, conversant  PSYCH: Awake and coherent      Results Review:     I reviewed the patient's new clinical results.    Glucose   Date Value Ref Range Status   12/31/2024 173 (H) 65 - 99 mg/dL Final     Sodium   Date Value Ref Range Status   12/31/2024 140 136 - 145 mmol/L Final     Potassium   Date Value Ref Range Status   12/31/2024 3.8 3.5 - 5.2 mmol/L Final      "Comment:     Slight hemolysis detected by analyzer. Result may be falsely elevated.     CO2   Date Value Ref Range Status   12/31/2024 32.4 (H) 22.0 - 29.0 mmol/L Final     Chloride   Date Value Ref Range Status   12/31/2024 101 98 - 107 mmol/L Final     Anion Gap   Date Value Ref Range Status   12/31/2024 6.6 5.0 - 15.0 mmol/L Final     Creatinine   Date Value Ref Range Status   12/31/2024 0.80 0.57 - 1.00 mg/dL Final     BUN   Date Value Ref Range Status   12/31/2024 16 6 - 20 mg/dL Final     BUN/Creatinine Ratio   Date Value Ref Range Status   12/31/2024 20.0 7.0 - 25.0 Final     Calcium   Date Value Ref Range Status   12/31/2024 8.4 (L) 8.6 - 10.5 mg/dL Final     Alkaline Phosphatase   Date Value Ref Range Status   12/31/2024 131 (H) 39 - 117 U/L Final     Total Protein   Date Value Ref Range Status   12/31/2024 6.1 6.0 - 8.5 g/dL Final     ALT (SGPT)   Date Value Ref Range Status   12/31/2024 97 (H) 1 - 33 U/L Final     AST (SGOT)   Date Value Ref Range Status   12/31/2024 29 1 - 32 U/L Final     Total Bilirubin   Date Value Ref Range Status   12/31/2024 0.6 0.0 - 1.2 mg/dL Final     Albumin   Date Value Ref Range Status   12/31/2024 3.1 (L) 3.5 - 5.2 g/dL Final     Globulin   Date Value Ref Range Status   12/31/2024 3.0 gm/dL Final     Magnesium   Date Value Ref Range Status   12/31/2024 1.9 1.6 - 2.6 mg/dL Final     Phosphorus   Date Value Ref Range Status   12/31/2024 3.8 2.5 - 4.5 mg/dL Final     Lab Results   Component Value Date    HGBA1C 15.80 (H) 12/18/2024     No results found for: \"GLUF\", \"MICROALBUR\"  Results from last 7 days   Lab Units 12/31/24  1131 12/31/24  0825 12/31/24  0613 12/31/24  0434 12/30/24  5379 12/30/24 2056   GLUCOSE mg/dL 125* 87 121* 90 167* 220*     Medication Review: Reviewed.     apixaban, 5 mg, Oral, Q12H  insulin glargine, 22 Units, Subcutaneous, Nightly  insulin lispro, 2-7 Units, Subcutaneous, Q6H  insulin lispro, 6 Units, Subcutaneous, Q6H  midodrine, 10 mg, Oral, " Q8H  nystatin, 1 Application, Topical, Q8H  pantoprazole, 40 mg, Oral, Q AM  QUEtiapine, 75 mg, Oral, Q12H  sodium chloride, 10 mL, Intravenous, Q12H      Assessment and plan:  Diabetes mellitus type 1 with hyperglycemia: Uncontrolled, overall doing well, will continue current regimen of glargine 22 units subcu nightly and change lispro to 6 units before meals along with lispro sliding scale at mealtime.  Will follow blood sugars and make further recommendations as needed.    Rodriguez Worthy MD. FACE                  Electronically signed by Rodriguez Worthy MD at 24 1236       Ella Dow MD at 24 1235              [facility] MEDICINE SERVICE  DAILY PROGRESS NOTE    NAME: Yarelis Jackson  : 1970  MRN: [id number]      LOS: 13 days     PROVIDER OF SERVICE: Ella Dow MD    Chief Complaint: DKA (diabetic ketoacidosis)    Subjective:     Interval History:  History taken from: patient    Denies any acute complaints.  Denies nausea vomiting or diarrhea.      Patient seen with RN  Patient still with Dobbhoff tube in place as she failed swallow study  Awaiting repeat swallow eval  Follow-up per endocrine  Patient with DKA that has resolved  She is awake alert oriented x 3 feeling tired hemodynamically stable otherwise  Dissipate removal of Dobbhoff tube later today      Patient is back from swallow eval  She passed her swallow eval and Dobbhoff tube would not be discontinued  Is awake alert  Patient with right lower extremity EXTR ischemia status post thrombectomy and redo thrombectomy as well as fasciotomy  Plan for vacuum dressing change today with wound care  May transition from heparin drip to dual antiplatelets as per vascular recommendation  Patient seen per endocrine as she had DKA on admission that has resolved  Continue Lantus 22 units nightly and lispro 6 units Q6 plus sliding scale as per endocrine recommendation  Patient is getting wound care and having extreme  pain  Oxycodone is resumed  Will give 1 dose of Dilaudid for care    12/31  Patient feeling fine  Started on Eliquis per vascular  Treatments better controlled  Followed up per nephrology and endocrine and vascular  Anemia of chronic disease with stable hemoglobin of 8  Patient  8 wound VAC.  On discharge home health care for wound care  Discharge planning for tomorrow  Review of Systems:   Review of Systems  As above  Objective:     Vital Signs  Temp:  [98.6 °F (37 °C)-98.9 °F (37.2 °C)] 98.7 °F (37.1 °C)  Heart Rate:  [89-91] 89  Resp:  [14-19] 19  BP: (137-162)/(66-78) 148/76  Flow (L/min) (Oxygen Therapy):  [1-2.5] 1   Body mass index is 38.67 kg/m².    Physical Exam  Physical Exam  Constitutional:       Appearance: Normal appearance.   HENT:      Nose:      Comments: NG tube  Cardiovascular:      Rate and Rhythm: Normal rate and regular rhythm.      Pulses: Normal pulses.      Heart sounds: Normal heart sounds. No murmur heard.  Pulmonary:      Effort: Pulmonary effort is normal. No respiratory distress.      Breath sounds: Normal breath sounds. No wheezing or rales.   Abdominal:      General: Bowel sounds are normal. There is no distension.      Tenderness: There is no abdominal tenderness.   Musculoskeletal:         General: No swelling, tenderness, deformity or signs of injury.      Cervical back: Normal range of motion.      Right lower leg: Edema (Right lower extremity dressing) present.   Neurological:      General: No focal deficit present.      Mental Status: She is alert and oriented to person, place, and time. Mental status is at baseline.      Cranial Nerves: No cranial nerve deficit.      Sensory: No sensory deficit.      Motor: No weakness.      Coordination: Coordination normal.            Diagnostic Data    Results from last 7 days   Lab Units 12/31/24  0000   WBC 10*3/mm3 12.07*   HEMOGLOBIN g/dL 8.0*   HEMATOCRIT % 24.7*   PLATELETS 10*3/mm3 242   GLUCOSE mg/dL 173*   CREATININE mg/dL 0.80    BUN mg/dL 16   SODIUM mmol/L 140   POTASSIUM mmol/L 3.8   AST (SGOT) U/L 29   ALT (SGPT) U/L 97*   ALK PHOS U/L 131*   BILIRUBIN mg/dL 0.6   ANION GAP mmol/L 6.6       No radiology results for the last day      I reviewed the patient's new clinical results.    Assessment/Plan:     Active and Resolved Problems  Active Hospital Problems    Diagnosis  POA    **DKA (diabetic ketoacidosis) [E11.10]  Yes    Arterial thrombosis [I74.9]  No      Resolved Hospital Problems   No resolved problems to display.       Diabetic ketoacidosis without coma, with new onset diabetes mellitus, type 2 / Metabolic acidosis, increased anion gap-Resolved  -Etiology uncertain, though new onset diabetes mellitus.  A1c 15.80 on admission, no prior available.  -Lantus 22 units at night, insulin lispro 6 units every 6 hours.  Continue insulin sliding scale every 6 hours.  -Endocrinology Dr. Winkler following  -Nutrition following patient due to enteric feeds     Acute Kidney Injury requiring emergent hemodialysis  -Remains nonoliguric. Baseline creatinine 0.90.  Creatinine 2.10 on admission.  -Monitor Input/Output very closely.   -Avoid NSAIDs, nephrotoxic medications, and hypotension.  -Nephrology following.Initially on HD due to acute renal failure.  No history for the past 2 days.  Will evaluate need for HD on a daily basis per nephrology.      Acute occlusive thrombus of the right iliac artery with limb ischemia improved  -Arterial duplex with noted right femoral, deep femoral, and popliteal arteries being patent but with very low amplitude monophasic signals, suggesting severe inflow stenosis or occlusion; no measurable Doppler flow in the anterior or posterior tibial and peroneal arteries.  -Bilateral lower extremity ABIs ordered: Right STACEY critically reduced with STACEY of 0 and severe digital insufficiency; left STACEY is normal with moderate digital insufficiency.  -Emergent surgery per vascular surgeon 12/19/2024 for right iliac thrombectomy  via open groin incision then return to surgery on 12/20/2024 for recurrent right lower extremity ischemia due to arterial thrombosis and underwent right iliofemoral popliteal thrombectomy, right femoral endarterectomy with patch, right lower extremity arteriogram, and closed right calf fasciotomies  -Continue on heparin drip per vascular surgery   -Hematology/oncology consulted  -Return to the OR morning (12/23) for emergent 4 compartment fasciotomies due to critical right leg ischemia  -Defer to vascular surgery for management of wound VAC.  Next wound VAC change on Monday     Nontraumatic rhabdomyolysis --> Improving  -CK downtrending  -Encourage enteric hydration      Leukocytosis likely reactive  -Continue to monitor clinically    Electrolyte derangements likely due to renal failure  -Monitor and replete as needed per protocol      Cutaneous candidiasis of groin  -Likely secondary to uncontrolled diabetes mellitus  -Nystatin ordered     Essential Hypertension  -Clonidine patch ordered by nephrology-hold   -Titrate medications as needed.     Bilateral leg rash, concern for scabies infection  -Permethrin regimen ordered.  Completed first treatment on 12/18, repeat treatment in 2 weeks  -Keep in contact precautions x 1 week post initial treatment (12/25)     Transaminitis  -US of Liver: Hepatomegaly with steatosis.  -Hepatitis panel-negative.  -Monitor and trend LFTs.       COPD: Not in exacerbation.  Duonebs ordered as needed.  Obesity: Body mass index is 33.49 kg/m².       VTE Prophylaxis:  Pharmacologic VTE prophylaxis orders are present.             Disposition Planning:     Barriers to Discharge: medical optimization  Anticipated Date of Discharge: pending  Place of Discharge: home      Time: 35 minutes     Code Status and Medical Interventions: CPR (Attempt to Resuscitate); Full Support   Ordered at: 12/18/24 0830     Code Status (Patient has no pulse and is not breathing):    CPR (Attempt to Resuscitate)      "Medical Interventions (Patient has pulse or is breathing):    Full Support       Signature: Electronically signed by Ella Dow MD, 24, 12:35 EST.  [facility] Hospitalist Team    Electronically signed by Ella Dow MD at 24 1237       Raymundo Harris MD at 24 1151          NEPHROLOGY PROGRESS NOTE------[facility]    [facility]  [phone]  Raymundo Harris MD      Patient Care Team:  Katie Duran PA as PCP - General (Physician Assistant)  Uli Starr MD as Consulting Physician (Nephrology)      Provider:  Raymundo Harris MD  Patient Name: Yarelis Jackson  :  1970    SUBJECTIVE:    F/U ARF/MARGOT/ELECTROLYTE ABNORMALITIES  No chest pain or shortness of breath    Medication:  apixaban, 5 mg, Oral, Q12H  insulin glargine, 22 Units, Subcutaneous, Nightly  insulin lispro, 2-7 Units, Subcutaneous, Q6H  insulin lispro, 6 Units, Subcutaneous, Q6H  midodrine, 10 mg, Oral, Q8H  nystatin, 1 Application, Topical, Q8H  pantoprazole, 40 mg, Oral, Q AM  QUEtiapine, 75 mg, Oral, Q12H  sodium chloride, 10 mL, Intravenous, Q12H             OBJECTIVE    Vital Sign Min/Max for last 24 hours  Temp  Min: 97.9 °F (36.6 °C)  Max: 98.9 °F (37.2 °C)   BP  Min: 137/66  Max: 162/78   Pulse  Min: 89  Max: 91   Resp  Min: 13  Max: 19   SpO2  Min: 97 %  Max: 100 %   No data recorded   No data recorded     Flowsheet Rows      Flowsheet Row First Filed Value   Admission Height 162.6 cm (64\") Documented at 2024 0443   Admission Weight 92.5 kg (203 lb 14.4 oz) Documented at 2024 0527            I/O this shift:  In: -   Out: 1100 [Urine:1100]  I/O last 3 completed shifts:  In: 31583.6 [I.V.:6411.6; Other:751; NG/GT:4991]  Out: 5550 [Urine:5500; Other:50]    Physical Exam:     General Appearance:  NAD  Head: normocephalic, without obvious abnormality and atraumatic +DHT INTACT +DRY OP  Eyes: conjunctivae and sclerae normal " "and no icterus  Neck: supple and no JVD  Lungs: +SCATTERED RHONCHI  Heart: regular rhythm & normal rate and normal S1, S2 +THOMAS  Chest: Wall no abnormalities observed  Abdomen: normal bowel sounds and soft +OBESITY  Extremities: moves extremities well, +TRACE  BILAT PRETIBIAL EDEMA, no cyanosis and no redness. +DJD  Skin: Warm  Neurologic:  A AND O X 2    Labs:    WBC WBC   Date Value Ref Range Status   12/31/2024 12.07 (H) 3.40 - 10.80 10*3/mm3 Final   12/30/2024 13.50 (H) 3.40 - 10.80 10*3/mm3 Final   12/29/2024 14.76 (H) 3.40 - 10.80 10*3/mm3 Final      HGB Hemoglobin   Date Value Ref Range Status   12/31/2024 8.0 (L) 12.0 - 15.9 g/dL Final   12/30/2024 8.1 (L) 12.0 - 15.9 g/dL Final   12/29/2024 8.0 (L) 12.0 - 15.9 g/dL Final      HCT Hematocrit   Date Value Ref Range Status   12/31/2024 24.7 (L) 34.0 - 46.6 % Final   12/30/2024 24.9 (L) 34.0 - 46.6 % Final   12/29/2024 24.9 (L) 34.0 - 46.6 % Final      Platelets No results found for: \"LABPLAT\"   MCV MCV   Date Value Ref Range Status   12/31/2024 95.4 79.0 - 97.0 fL Final   12/30/2024 94.7 79.0 - 97.0 fL Final   12/29/2024 95.8 79.0 - 97.0 fL Final          Sodium Sodium   Date Value Ref Range Status   12/31/2024 140 136 - 145 mmol/L Final   12/30/2024 141 136 - 145 mmol/L Final   12/29/2024 146 (H) 136 - 145 mmol/L Final      Potassium Potassium   Date Value Ref Range Status   12/31/2024 3.8 3.5 - 5.2 mmol/L Final     Comment:     Slight hemolysis detected by analyzer. Result may be falsely elevated.   12/30/2024 4.0 3.5 - 5.2 mmol/L Final   12/30/2024 3.2 (L) 3.5 - 5.2 mmol/L Final   12/29/2024 3.7 3.5 - 5.2 mmol/L Final   12/28/2024 4.0 3.5 - 5.2 mmol/L Final      Chloride Chloride   Date Value Ref Range Status   12/31/2024 101 98 - 107 mmol/L Final   12/30/2024 100 98 - 107 mmol/L Final   12/29/2024 108 (H) 98 - 107 mmol/L Final      CO2 CO2   Date Value Ref Range Status   12/31/2024 32.4 (H) 22.0 - 29.0 mmol/L Final   12/30/2024 33.2 (H) 22.0 - 29.0 mmol/L " "Final   12/29/2024 31.3 (H) 22.0 - 29.0 mmol/L Final      BUN BUN   Date Value Ref Range Status   12/31/2024 16 6 - 20 mg/dL Final   12/30/2024 18 6 - 20 mg/dL Final   12/29/2024 19 6 - 20 mg/dL Final      Creatinine Creatinine   Date Value Ref Range Status   12/31/2024 0.80 0.57 - 1.00 mg/dL Final   12/30/2024 0.81 0.57 - 1.00 mg/dL Final   12/29/2024 0.83 0.57 - 1.00 mg/dL Final      Calcium Calcium   Date Value Ref Range Status   12/31/2024 8.4 (L) 8.6 - 10.5 mg/dL Final   12/30/2024 8.6 8.6 - 10.5 mg/dL Final   12/29/2024 9.0 8.6 - 10.5 mg/dL Final      PO4 No components found for: \"PO4\"   Albumin Albumin   Date Value Ref Range Status   12/31/2024 3.1 (L) 3.5 - 5.2 g/dL Final   12/30/2024 2.8 (L) 3.5 - 5.2 g/dL Final   12/29/2024 3.2 (L) 3.5 - 5.2 g/dL Final      Magnesium Magnesium   Date Value Ref Range Status   12/31/2024 1.9 1.6 - 2.6 mg/dL Final   12/30/2024 1.3 (L) 1.6 - 2.6 mg/dL Final   12/29/2024 1.7 1.6 - 2.6 mg/dL Final      Uric Acid No components found for: \"URIC ACID\"     Imaging Results (Last 72 Hours)       Procedure Component Value Units Date/Time    FL Video Swallow With Speech Single Contrast [[id number]] Resulted: 12/30/24 1026     Updated: 12/30/24 1026    Narrative:      This procedure was auto-finalized with no dictation required.            Results for orders placed during the hospital encounter of 12/18/24    XR Chest 1 View    Narrative  XR CHEST 1 VW    Date of Exam: 12/22/2024 12:35 AM EST    Indication: Intubated Patient    Comparison:  12/21/2024    FINDINGS:  Endotracheal tube tip appears in satisfactory position at the level of the aortic arch. Right IJ catheter appears in stable position. Enteric tube extends into the left upper quadrant. Heart size and mediastinal contour appear within normal limits. No  definite pneumothorax or effusion. Mild bibasilar opacities. Findings of advanced emphysema with suspected apical scarring.    Impression  1.Tubes and lines appear in " satisfactory positions, as above.  2.Mild bibasilar opacities which could represent atelectasis or infiltrate.  3.Advanced emphysema.        Electronically Signed: Sam Montemayorrina  12/22/2024 7:07 AM EST  Workstation ID: [id number]      XR Chest 1 View    Narrative  XR CHEST 1 VW    Date of Exam: 12/21/2024 4:32 AM EST    Indication: Intubated Patient    Comparison: 12/20/2024    Findings:  There is been no interval tube or line changes. Heart size and pulmonary vessels are within normal limits. There are postoperative changes of the bilateral upper lobes. Lungs appear clear. No pleural effusion or pneumothorax.    Impression  Impression:    1. No tube or line change  2. No focal airspace consolidation or other acute process.      Electronically Signed: Julio Thorpe MD  12/21/2024 9:54 AM EST  Workstation ID: [id number]      XR Chest 1 View    Narrative  XR CHEST 1 VW    Date of Exam: 12/20/2024 12:10 AM EST    Indication: Intubated Patient    Comparison: 12/19/2024    Findings:  Endotracheal tube approximately 2.5 cm above the carmina. Feeding tube and right IJ dialysis catheter remain in place. No new tubes or lines heart size and pulmonary vasculature are stable. Bullous changes in the right upper lobe. Postoperative changes in  the left upper lobe. Lungs grossly clear. No pneumothorax    Impression  Impression:  Stable chest demonstrating COPD and postoperative changes with no acute cardiopulmonary process demonstrated      Electronically Signed: Shon Ponce  12/20/2024 7:19 AM EST  Workstation ID: [id number]      Results for orders placed during the hospital encounter of 12/18/24    Duplex Lower Extremity Art / Grafts - Right CAR    Interpretation Summary    Limited study due to body habitus and current dressing site.  Patent external iliac and femoral-popliteal arterial flow with low flow noted below the common femoral.  Common femoral artery poorly visualized.  Cannot rule out occlusion of the common femoral  artery.        ASSESSMENT / PLAN      DKA (diabetic ketoacidosis)    Arterial thrombosis    ARF/MARGOT------ nonoliguric.  Follow for further dialysis need... +ARF/MARGOT with historically normal renal function. +ARF/MARGOT secondary to severe prerenal state and ATN from hypotension and Rhabdomyolysis.  Pressor support. Off IVFs. No NSAIDs. Renal US without obstructive uropathy.   Dose meds for CrCl less than 10 cc/min     2. HYPERNATREMIA/HYPEROSMOLAR STATE-----encourage fluid intake     3. ACIDOSIS--Resolved     4. DVT PROPHYLAXIS-----On Heparin gtt     5. HYPOCALCEMIA------Replace IV and follow ionized levels     6. DM------BS ok     7. ANEMIA---------H/H stable     8. OA/DJD/HYPERURICEMIA------Allopurinol . No NSAIDs   Restart IVFs. CK down post multiple days of HD     10. GERD/PUD PROPHYLAXIS-----Renal dose adjusted Pepcid     11. DELIRIUM/TME     12. COPD/ACUTE HYPOXIC RESPIRATORY FAILURE-----S/P extubation    13. HYPOALBUMINEMIA-----S/P IV Albumin to temporize    14. EDEMA/VOLUME EXCESS---- Better post gentle UF with HD    15. HYPOTENSION-----BP ok    16. ELEVATED LFTS/TRANSAMINASES------Secondary to shock liver and Rhabdo. Follow    17. FASCITIS------S/P surgery      18. DELIRIUM---Better    19. HYPOPHOSPHATEMIA------Replaced    20. HYPOMAGNESEMIA------Replaced    Creatinine stable/ brisk diuresis  Sodium improved  Replace potassium  Continue free water per feeding tube   Monitor renal function, fluid status and electrolytes    Raymundo Harris MD  [facility]  [phone]  24  11:51 EST      Electronically signed by Raymundo Harris MD at 24 1742       Alyssa Sneed APRN at 24 0800            Name: Yarelis Jackson ADMIT: 2024   : 1970  PCP: Katie Duran PA    MRN: [id number] LOS: 13 days   AGE/SEX: 54 y.o. female  ROOM:  [place] [id number]     CC: Status post right leg thrombectomies and fasciotomies  Interval History:   Patient eating breakfast  this morning without issue.  Reports stable pain to her right lower extremity.  Unable to wiggle right toes or move her right foot at the ankle.  Reports some numbness in her right toes.    Subjective   Subjective     Review of Systems  Objective   Objective     Vitals:   Temp:  [97.9 °F (36.6 °C)-98.9 °F (37.2 °C)] 98.7 °F (37.1 °C)  Heart Rate:  [89-91] 89  Resp:  [13-18] 16  BP: (137-162)/(66-80) 148/76    No intake/output data recorded.    Scheduled Meds:     insulin glargine, 22 Units, Subcutaneous, Nightly  insulin lispro, 2-7 Units, Subcutaneous, Q6H  insulin lispro, 6 Units, Subcutaneous, Q6H  midodrine, 10 mg, Oral, Q8H  nystatin, 1 Application, Topical, Q8H  pantoprazole, 40 mg, Oral, Q AM  QUEtiapine, 75 mg, Oral, Q12H  sodium chloride, 10 mL, Intravenous, Q12H      IV Meds:   heparin, 16.9 Units/kg/hr, Last Rate: 16.9 Units/kg/hr (12/31/24 0552)        Physical Exam    NAD  Regular rhythm  Palpable bilateral DP pulses  Signals to bilateral PT  Right lower leg fasciotomy sites wound VAC in place, good seal, no leak noted  Right groin incision with staples, CDI and soft, no skin breakdown noted  Unable to dorsiflex or plantar flex the right foot    Data Reviewed:  CBC    Results from last 7 days   Lab Units 12/31/24  0000 12/30/24  0257 12/29/24  0337 12/28/24  0004 12/27/24  0535 12/27/24  0415 12/26/24  1035 12/26/24  0428   WBC 10*3/mm3 12.07* 13.50* 14.76* 16.35* 15.02* 15.49*  --  16.72*   HEMOGLOBIN g/dL 8.0* 8.1* 8.0* 7.9* 8.1* 8.1* 8.2* 6.6*   PLATELETS 10*3/mm3 242 227 216 166 148 147  --  121*     BMP   Results from last 7 days   Lab Units 12/31/24  0000 12/30/24  1225 12/30/24  0257 12/29/24  0337 12/28/24  1224 12/28/24  0004 12/27/24  0535 12/26/24  1406 12/26/24  0428 12/26/24  0425 12/25/24  0605   SODIUM mmol/L 140  --  141 146*  --  149* 143  --  139  --  136   POTASSIUM mmol/L 3.8 4.0 3.2* 3.7 4.0 3.3* 4.0   < > 3.5  --  3.7   CHLORIDE mmol/L 101  --  100 108*  --  111* 108*  --  104  --   100   CO2 mmol/L 32.4*  --  33.2* 31.3*  --  30.5* 26.6  --  23.3  --  24.5   BUN mg/dL 16  --  18 19  --  21* 21*  --  36*  --  35*   CREATININE mg/dL 0.80  --  0.81 0.83  --  0.97 1.00  --  1.62* 1.73* 1.82*   GLUCOSE mg/dL 173*  --  100* 140*  --  128* 155*  --  175*  --  245*   MAGNESIUM mg/dL 1.9  --  1.3* 1.7  --  4.0* 1.5*  --  2.0  --  1.8   PHOSPHORUS mg/dL 3.8  --  3.8 3.0  --  3.0 2.1*  --  2.8  --  3.0    < > = values in this interval not displayed.     Radiology(recent) No radiology results for the last day    Active Hospital Problems:   Active Hospital Problems    Diagnosis  POA    **DKA (diabetic ketoacidosis) [E11.10]  Yes    Arterial thrombosis [I74.9]  No      Resolved Hospital Problems   No resolved problems to display.      Assessment & Plan     Assessment / Plan     Right lower extremity ischemia: Status post thrombectomy and redo thrombectomy as well as fasciotomies.       Tolerating regular diet, will transition to Eliquis today and stop the heparin drip  Plan for VAC dressing change tomorrow with NICHOLAS Ramon  12/31/24  08:29 EST  [phone]    Copied text in this note has been reviewed by me and remains relevant as of 12/31/24.       Electronically signed by Alyssa Sneed APRN at 12/31/24 0830       Consult Notes (last 72 hours)  Notes from 12/30/24 1314 through 01/02/25 1314   No notes of this type exist for this encounter.

## 2025-01-02 NOTE — THERAPY TREATMENT NOTE
Subjective: Pt agreeable to therapeutic plan of care.  Patient cleared for therapy by nursing.  Patient is pleasant and agreeable A&Ox4.  Reports getting back to bed with the lashonda sapp went well    Objective:     Precautions -  wound vac/R LE fasciotomy; absent R distal sensation DF/PF     Bed mobility - Supervision  Transfers - Min-A SqPS toward L with cues for technqiue, sit<>stand with RW with CGA-Brenda and cues for hand placement  Ambulation - completed pre-gait tasks but limited due to pain.    Therapeutic Exercise -BLE: AP (no AROM R) QS, heel slides, hip ab/add, LAQ x10-15     Static standing x30s with CGA using RW;dynamic standingcompleting pregait task with R Le A/P stepping x5 reps with CGA-Brenda for balance with BUE support.    Dynamic sitting without back support with supervision x6 minutes    Vitals:   176/92mmHG,  87bpm, 96%   156/85mmHG 95, 97%      Pain: 7 VAS   Location: RLE  Intervention for pain: Repositioned, RN provided medication, Increased Activity, and Therapeutic Presence    Education: Provided education on the importance of mobility in the acute care setting, Transfer Training, and Gait Training  Increased time to discuss d/c dispo options and home modifications if home    Assessment: Yarelis Jackson presents with functional mobility impairments which indicate the need for skilled intervention. Patient with improved sit<>stand transitions; limited tolerance for upright due to pain.  Cues for offloadng with UE.  Brenda for squat pivot sit. Continue to recommend SNF level of care once medically appropriate; however patient voicing preference for dc home and she is progressing with transfers daily such that this may be appropriate with assistance and wheelchair for primary mobility. Tolerating session today without incident. Will continue to follow and progress as tolerated.     Plan/Recommendations:   If medically appropriate, Moderate Intensity Therapy recommended post-acute care. This is  "recommended as therapy feels the patient would require 3-4 days per week and wouldn't tolerate \"3 hour daily\" rehab intensity. SNF would be the preferred choice. If the patient does not agree to SNF, arrange HH or OP depending on home bound status. If patient is medically complex, consider LTACH. Pt requires wheelchair at discharge if home.     Pt desires Home with Home Health, Home with family assist, and Home Health at discharge. Pt cooperative; agreeable to therapeutic recommendations and plan of care.         Basic Mobility 6-click:  Rollin = Total, A lot = 2, A little = 3; 4 = None  Supine>Sit:   1 = Total, A lot = 2, A little = 3; 4 = None   Sit>Stand with arms:  1 = Total, A lot = 2, A little = 3; 4 = None  Bed>Chair:   1 = Total, A lot = 2, A little = 3; 4 = None  Ambulate in room:  1 = Total, A lot = 2, A little = 3; 4 = None  3-5 Steps with railin = Total, A lot = 2, A little = 3; 4 = None  Score: 12    Modified Cole: N/A = No pre-op stroke/TIA    Post-Tx Position: Up in Chair and Call light and personal items within reach; RN notified of technique for transfer back to bed and need for alarm in room.  PPE: gloves    Therapy Charges for Today       Code Description Service Date Service Provider Modifiers Qty    09669379628  PT THERAPEUTIC ACT EA 15 MIN 2025 Gianna Ortega, PT GP 1    [id number] HC PT NEUROMUSC RE EDUCATION EA 15 MIN 2025 Gianna Ortega, PT GP 1    [id number]  PT THER PROC EA 15 MIN 2025 Gianna Ortega, PT GP 1    [id number]  PT SELF CARE/MGMT/TRAIN EA 15 MIN 2025 Gianna Ortega, PT GP 1           PT Charges       Row Name 25 0923             Time Calculation    Start Time 0849  -RR      Stop Time 0923  -RR      Time Calculation (min) 34 min  -RR      PT Received On 25  -RR      PT - Next Appointment 25  -RR         Time Calculation- PT    Total Timed Code Minutes- PT 34 minute(s)  -RR                User Key  (r) = " Recorded By, (t) = Taken By, (c) = Cosigned By      Initials Name Provider Type    Gianna Benton, PT Physical Therapist

## 2025-01-02 NOTE — PLAN OF CARE
Goal Outcome Evaluation:         Assessment: Yarelis Jackson presents with functional mobility impairments which indicate the need for skilled intervention. Patient with improved sit<>stand transitions; limited tolerance for upright due to pain.  Cues for offloadng with UE.  Brenda for squat pivot sit. Continue to recommend SNF level of care once medically appropriate; however patient voicing preference for dc home and she is progressing with transfers daily such that this may be appropriate with assistance and wheelchair for primary mobility. Tolerating session today without incident. Will continue to follow and progress as tolerated.     Anticipated Discharge Disposition (PT): skilled nursing facility

## 2025-01-02 NOTE — NURSING NOTE
WOCN note:    54 yr old female admitted 12/18/24 with DKA. Patient underwent thrombectomies and fasciotomies to the E. WOCN follow up for wound VAC dressing changes. Patient medicated for pain but required additional IV pain medication during the dressing change.     VAC canister was changed with 450cc of thin serosanguinous exudate. The dressings were removed and the wounds were irrigated with NS. The blisters to the anterior and posterior lower leg are healing. Maxorb dressings were applied to the open blisters. The wound beds are clean and beefy red with muscle layer exposed.     Sheets of fenestrated silicone were placed into the wound beds. One piece of black granufoam was packed into each of the wounds. Two additional pieces were used to bridge the wounds together and support the trac pad.   The dressings were reattached to 125mmHg continuous negative pressure with good seal obtained. Dry gauze was placed over the foot blisters and the dressing was secured with kerlix and ACE from the base of the toes to the bend of the knee. The heel was floated off the bed surface.   Discussed option with patient of short term SNF care for pain control and extensive wound care. Time spent on wound care > 60 min.

## 2025-01-02 NOTE — PLAN OF CARE
Goal Outcome Evaluation:  Plan of Care Reviewed With: patient  Progress: improving      Problem: Adult Inpatient Plan of Care  Goal: Plan of Care Review  Outcome: Progressing  Flowsheets (Taken 1/2/2025 1850)  Progress: improving  Plan of Care Reviewed With: patient  Goal: Patient-Specific Goal (Individualized)  Outcome: Progressing  Goal: Absence of Hospital-Acquired Illness or Injury  Outcome: Progressing  Intervention: Identify and Manage Fall Risk  Recent Flowsheet Documentation  Taken 1/2/2025 1700 by Trung Rascon RN  Safety Promotion/Fall Prevention: safety round/check completed  Taken 1/2/2025 1600 by Trung Rascon RN  Safety Promotion/Fall Prevention: safety round/check completed  Taken 1/2/2025 1500 by Trung Rascon RN  Safety Promotion/Fall Prevention: safety round/check completed  Taken 1/2/2025 1400 by Trung Rascon RN  Safety Promotion/Fall Prevention: safety round/check completed  Taken 1/2/2025 1300 by Trung Rascon RN  Safety Promotion/Fall Prevention: safety round/check completed  Taken 1/2/2025 1200 by Trung Rascon RN  Safety Promotion/Fall Prevention: safety round/check completed  Taken 1/2/2025 1000 by Trung Rascon RN  Safety Promotion/Fall Prevention:   nonskid shoes/slippers when out of bed   safety round/check completed  Taken 1/2/2025 0900 by Trung Rascon RN  Safety Promotion/Fall Prevention:   nonskid shoes/slippers when out of bed   safety round/check completed  Taken 1/2/2025 0800 by Trung Rascon RN  Safety Promotion/Fall Prevention:   nonskid shoes/slippers when out of bed   safety round/check completed  Intervention: Prevent Skin Injury  Recent Flowsheet Documentation  Taken 1/2/2025 1700 by Trung Rascon RN  Body Position: position changed independently  Taken 1/2/2025 1600 by Trung Rascon RN  Skin Protection: incontinence pads utilized  Taken 1/2/2025 1500 by Trung Rascon RN  Body Position: position changed independently  Taken  1/2/2025 1300 by Trung Rascon RN  Body Position: position changed independently  Taken 1/2/2025 1238 by Trung Rascon RN  Skin Protection: incontinence pads utilized  Taken 1/2/2025 1104 by Trung Rascon RN  Body Position: position changed independently  Taken 1/2/2025 0900 by Trung Rascon RN  Body Position: position changed independently  Taken 1/2/2025 0800 by Trung Rascon RN  Skin Protection: incontinence pads utilized  Intervention: Prevent and Manage VTE (Venous Thromboembolism) Risk  Recent Flowsheet Documentation  Taken 1/2/2025 1600 by Trung Rascon RN  VTE Prevention/Management:   bilateral   SCDs (sequential compression devices) off  Taken 1/2/2025 1238 by Trung Rascon RN  VTE Prevention/Management:   bilateral   SCDs (sequential compression devices) off  Taken 1/2/2025 0800 by Trung Rascon RN  VTE Prevention/Management:   bilateral   SCDs (sequential compression devices) off  Intervention: Prevent Infection  Recent Flowsheet Documentation  Taken 1/2/2025 1238 by Trung Rascon RN  Infection Prevention:   environmental surveillance performed   equipment surfaces disinfected   hand hygiene promoted   personal protective equipment utilized   single patient room provided  Taken 1/2/2025 0800 by Trung Rascon RN  Infection Prevention:   environmental surveillance performed   equipment surfaces disinfected   personal protective equipment utilized   hand hygiene promoted   single patient room provided  Goal: Optimal Comfort and Wellbeing  Outcome: Progressing  Intervention: Monitor Pain and Promote Comfort  Recent Flowsheet Documentation  Taken 1/2/2025 1600 by Trung Rascon RN  Pain Management Interventions:   care clustered   unnecessary movement minimized   pillow support provided   position adjusted  Taken 1/2/2025 1308 by Trung Rascon RN  Pain Management Interventions:   care clustered   unnecessary movement minimized   position adjusted   pillow support  provided  Taken 1/2/2025 1238 by Trung Rascon, RN  Pain Management Interventions: pain medication given  Taken 1/2/2025 1102 by Trung Rascon RN  Pain Management Interventions: pain medication given  Taken 1/2/2025 0800 by Trung Rascon, RN  Pain Management Interventions:   care clustered   unnecessary movement minimized   pillow support provided   position adjusted  Intervention: Provide Person-Centered Care  Recent Flowsheet Documentation  Taken 1/2/2025 1600 by Trung Rascon, RN  Trust Relationship/Rapport:   care explained   reassurance provided  Taken 1/2/2025 1238 by Trung Rascon, RN  Trust Relationship/Rapport:   care explained   reassurance provided  Taken 1/2/2025 0800 by Trung Rascon RN  Trust Relationship/Rapport:   care explained   reassurance provided  Goal: Readiness for Transition of Care  Outcome: Progressing

## 2025-01-02 NOTE — PROGRESS NOTES
"NEPHROLOGY PROGRESS NOTE------[facility]    [facility]  [phone]  Raymundo Harris MD      Patient Care Team:  Katie Duran PA as PCP - General (Physician Assistant)  Uli Starr MD as Consulting Physician (Nephrology)      Provider:  Raymundo Harris MD  Patient Name: Yarelis Jackson  :  1970    SUBJECTIVE:    F/U ARF/MARGOT/ELECTROLYTE ABNORMALITIES  No chest pain or shortness of breath    Medication:  apixaban, 5 mg, Oral, Q12H  insulin glargine, 22 Units, Subcutaneous, Nightly  insulin lispro, 2-7 Units, Subcutaneous, TID With Meals  insulin lispro, 7 Units, Subcutaneous, TID With Meals  nystatin, 1 Application, Topical, Q8H  oxyCODONE, 10 mg, Oral, Q12H  pantoprazole, 40 mg, Oral, Q AM  QUEtiapine, 75 mg, Oral, Q12H  sodium chloride, 10 mL, Intravenous, Q12H             OBJECTIVE    Vital Sign Min/Max for last 24 hours  Temp  Min: 97.3 °F (36.3 °C)  Max: 99.4 °F (37.4 °C)   BP  Min: 144/73  Max: 168/66   Pulse  Min: 87  Max: 95   Resp  Min: 13  Max: 22   SpO2  Min: 96 %  Max: 100 %   No data recorded   No data recorded     Flowsheet Rows      Flowsheet Row First Filed Value   Admission Height 162.6 cm (64\") Documented at 2024 0443   Admission Weight 92.5 kg (203 lb 14.4 oz) Documented at 2024 0527            I/O this shift:  In: 240 [P.O.:240]  Out: -   I/O last 3 completed shifts:  In: 1080 [P.O.:1080]  Out: 4275 [Urine:3400]    Physical Exam:     General Appearance:  NAD  Head: normocephalic, without obvious abnormality and atraumatic  Eyes: conjunctivae and sclerae normal and no icterus  Neck: supple and no JVD  Lungs: +SCATTERED RHONCHI  Heart: regular rhythm & normal rate and normal S1, S2 +THOMAS  Chest: Wall no abnormalities observed  Abdomen: normal bowel sounds and soft +OBESITY  Extremities: moves extremities well, +TRACE  BILAT PRETIBIAL EDEMA, no cyanosis and no redness. +DJD  Skin: Warm  Neurologic:  A AND " "O X 2    Labs:    WBC WBC   Date Value Ref Range Status   01/02/2025 11.04 (H) 3.40 - 10.80 10*3/mm3 Final   01/01/2025 10.19 3.40 - 10.80 10*3/mm3 Final   12/31/2024 12.07 (H) 3.40 - 10.80 10*3/mm3 Final      HGB Hemoglobin   Date Value Ref Range Status   01/02/2025 8.7 (L) 12.0 - 15.9 g/dL Final   01/01/2025 8.1 (L) 12.0 - 15.9 g/dL Final   12/31/2024 8.0 (L) 12.0 - 15.9 g/dL Final      HCT Hematocrit   Date Value Ref Range Status   01/02/2025 27.9 (L) 34.0 - 46.6 % Final   01/01/2025 26.2 (L) 34.0 - 46.6 % Final   12/31/2024 24.7 (L) 34.0 - 46.6 % Final      Platelets No results found for: \"LABPLAT\"   MCV MCV   Date Value Ref Range Status   01/02/2025 97.6 (H) 79.0 - 97.0 fL Final   01/01/2025 96.3 79.0 - 97.0 fL Final   12/31/2024 95.4 79.0 - 97.0 fL Final          Sodium Sodium   Date Value Ref Range Status   01/02/2025 140 136 - 145 mmol/L Final   01/01/2025 138 136 - 145 mmol/L Final   12/31/2024 140 136 - 145 mmol/L Final      Potassium Potassium   Date Value Ref Range Status   01/02/2025 3.8 3.5 - 5.2 mmol/L Final   01/01/2025 3.6 3.5 - 5.2 mmol/L Final   12/31/2024 3.8 3.5 - 5.2 mmol/L Final     Comment:     Slight hemolysis detected by analyzer. Result may be falsely elevated.   12/30/2024 4.0 3.5 - 5.2 mmol/L Final      Chloride Chloride   Date Value Ref Range Status   01/02/2025 103 98 - 107 mmol/L Final   01/01/2025 102 98 - 107 mmol/L Final   12/31/2024 101 98 - 107 mmol/L Final      CO2 CO2   Date Value Ref Range Status   01/02/2025 30.4 (H) 22.0 - 29.0 mmol/L Final   01/01/2025 30.9 (H) 22.0 - 29.0 mmol/L Final   12/31/2024 32.4 (H) 22.0 - 29.0 mmol/L Final      BUN BUN   Date Value Ref Range Status   01/02/2025 14 6 - 20 mg/dL Final   01/01/2025 15 6 - 20 mg/dL Final   12/31/2024 16 6 - 20 mg/dL Final      Creatinine Creatinine   Date Value Ref Range Status   01/02/2025 0.76 0.57 - 1.00 mg/dL Final   01/01/2025 0.84 0.57 - 1.00 mg/dL Final   12/31/2024 0.80 0.57 - 1.00 mg/dL Final      Calcium " "Calcium   Date Value Ref Range Status   01/02/2025 8.6 8.6 - 10.5 mg/dL Final   01/01/2025 8.3 (L) 8.6 - 10.5 mg/dL Final   12/31/2024 8.4 (L) 8.6 - 10.5 mg/dL Final      PO4 No components found for: \"PO4\"   Albumin Albumin   Date Value Ref Range Status   01/02/2025 3.2 (L) 3.5 - 5.2 g/dL Final   01/01/2025 3.0 (L) 3.5 - 5.2 g/dL Final   12/31/2024 3.1 (L) 3.5 - 5.2 g/dL Final      Magnesium Magnesium   Date Value Ref Range Status   01/02/2025 1.7 1.6 - 2.6 mg/dL Final   01/01/2025 1.6 1.6 - 2.6 mg/dL Final   12/31/2024 1.9 1.6 - 2.6 mg/dL Final      Uric Acid No components found for: \"URIC ACID\"     Imaging Results (Last 72 Hours)       Procedure Component Value Units Date/Time    FL Video Swallow With Speech Single Contrast [[id number]] Resulted: 12/30/24 1026     Updated: 12/30/24 1026    Narrative:      This procedure was auto-finalized with no dictation required.            Results for orders placed during the hospital encounter of 12/18/24    XR Chest 1 View    Narrative  XR CHEST 1 VW    Date of Exam: 12/22/2024 12:35 AM EST    Indication: Intubated Patient    Comparison:  12/21/2024    FINDINGS:  Endotracheal tube tip appears in satisfactory position at the level of the aortic arch. Right IJ catheter appears in stable position. Enteric tube extends into the left upper quadrant. Heart size and mediastinal contour appear within normal limits. No  definite pneumothorax or effusion. Mild bibasilar opacities. Findings of advanced emphysema with suspected apical scarring.    Impression  1.Tubes and lines appear in satisfactory positions, as above.  2.Mild bibasilar opacities which could represent atelectasis or infiltrate.  3.Advanced emphysema.        Electronically Signed: Sam Haynes  12/22/2024 7:07 AM EST  Workstation ID: [id number]      XR Chest 1 View    Narrative  XR CHEST 1 VW    Date of Exam: 12/21/2024 4:32 AM EST    Indication: Intubated Patient    Comparison: 12/20/2024    Findings:  There is been no " interval tube or line changes. Heart size and pulmonary vessels are within normal limits. There are postoperative changes of the bilateral upper lobes. Lungs appear clear. No pleural effusion or pneumothorax.    Impression  Impression:    1. No tube or line change  2. No focal airspace consolidation or other acute process.      Electronically Signed: Julio Thorpe MD  12/21/2024 9:54 AM EST  Workstation ID: [id number]      XR Chest 1 View    Narrative  XR CHEST 1 VW    Date of Exam: 12/20/2024 12:10 AM EST    Indication: Intubated Patient    Comparison: 12/19/2024    Findings:  Endotracheal tube approximately 2.5 cm above the carmina. Feeding tube and right IJ dialysis catheter remain in place. No new tubes or lines heart size and pulmonary vasculature are stable. Bullous changes in the right upper lobe. Postoperative changes in  the left upper lobe. Lungs grossly clear. No pneumothorax    Impression  Impression:  Stable chest demonstrating COPD and postoperative changes with no acute cardiopulmonary process demonstrated      Electronically Signed: Shon Kris  12/20/2024 7:19 AM EST  Workstation ID: [id number]      Results for orders placed during the hospital encounter of 12/18/24    Duplex Lower Extremity Art / Grafts - Right CAR    Interpretation Summary    Limited study due to body habitus and current dressing site.  Patent external iliac and femoral-popliteal arterial flow with low flow noted below the common femoral.  Common femoral artery poorly visualized.  Cannot rule out occlusion of the common femoral artery.        ASSESSMENT / PLAN      DKA (diabetic ketoacidosis)    Arterial thrombosis    ARF/MARGOT------ nonoliguric.  Follow for further dialysis need... +ARF/MARGOT with historically normal renal function. +ARF/MARGOT secondary to severe prerenal state and ATN from hypotension and Rhabdomyolysis.  Pressor support. Off IVFs. No NSAIDs. Renal US without obstructive uropathy.   Dose meds for CrCl less than 10  cc/min     2. HYPERNATREMIA/HYPEROSMOLAR STATE-----encourage fluid intake     3. ACIDOSIS--Resolved     4. DVT PROPHYLAXIS     5. HYPOCALCEMIA------Replace IV and follow ionized levels     6. DM------BS ok     7. ANEMIA---------H/H stable     8. OA/DJD/HYPERURICEMIA------Allopurinol . No NSAIDs   Restart IVFs. CK down post multiple days of HD     10. GERD/PUD PROPHYLAXIS-----Renal dose adjusted Pepcid     11. DELIRIUM/TME     12. COPD/ACUTE HYPOXIC RESPIRATORY FAILURE-----S/P extubation    13. HYPOALBUMINEMIA-----S/P IV Albumin to temporize    14. EDEMA/VOLUME EXCESS---- Better post gentle UF with HD    15. HYPOTENSION-----BP ok    16. ELEVATED LFTS/TRANSAMINASES------Secondary to shock liver and Rhabdo. Follow    17. FASCITIS------S/P surgery      18. DELIRIUM---Better    19. HYPOPHOSPHATEMIA------Replaced    20. HYPOMAGNESEMIA------Replaced    Creatinine stable/ non oliguric  Blood pressure improved, off midodrine now  Monitor renal function, fluid status and electrolytes    Raymundo Harris MD  [facility]  [phone]  01/02/25  10:18 EST

## 2025-01-02 NOTE — PROGRESS NOTES
[facility] MEDICINE SERVICE  DAILY PROGRESS NOTE    NAME: Yarelis Jackson  : 1970  MRN: [id number]      LOS: 15 days     PROVIDER OF SERVICE: Ella Dow MD    Chief Complaint: DKA (diabetic ketoacidosis)    Subjective:     Interval History:  History taken from: patient    Denies any acute complaints.  Denies nausea vomiting or diarrhea.      Patient seen with RN  Patient still with Dobbhoff tube in place as she failed swallow study  Awaiting repeat swallow eval  Follow-up per endocrine  Patient with DKA that has resolved  She is awake alert oriented x 3 feeling tired hemodynamically stable otherwise  Dissipate removal of Dobbhoff tube later today      Patient is back from swallow eval  She passed her swallow eval and Dobbhoff tube would not be discontinued  Is awake alert  Patient with right lower extremity EXTR ischemia status post thrombectomy and redo thrombectomy as well as fasciotomy  Plan for vacuum dressing change today with wound care  May transition from heparin drip to dual antiplatelets as per vascular recommendation  Patient seen per endocrine as she had DKA on admission that has resolved  Continue Lantus 22 units nightly and lispro 6 units Q6 plus sliding scale as per endocrine recommendation  Patient is getting wound care and having extreme pain  Oxycodone is resumed  Will give 1 dose of Dilaudid for care      Patient feeling fine  Started on Eliquis per vascular  Treatments better controlled  Followed up per nephrology and endocrine and vascular  Anemia of chronic disease with stable hemoglobin of 8  Patient  8 wound VAC.  On discharge home health care for wound care  25  Is doing fine no new complaint  On oral Eliquis  Blood sugar has been running from 100-250  Placement at the skilled nursing facility as per PT recommendation  Hemoglobin stable at 8  Her kidney function is back to baseline and she is hemodynamically stable  Dc midodrine as bp  stable    1/2/25    Patient is in significant pain while getting wound care  Her blood pressure has been stable  Will DC midodrine  Her hemoglobin is stable at 8  Discharge planning skilled nursing facility      Review of Systems:   Review of Systems  As above  Objective:     Vital Signs  Temp:  [97.3 °F (36.3 °C)-99.4 °F (37.4 °C)] 97.8 °F (36.6 °C)  Heart Rate:  [89-95] 89  Resp:  [15-22] 15  BP: (118-168)/(62-98) 118/62  Flow (L/min) (Oxygen Therapy):  [1-1.5] 1.5   Body mass index is 37.27 kg/m².    Physical Exam  Physical Exam  Constitutional:       Appearance: Normal appearance.   HENT:      Nose:      Comments: NG tube  Cardiovascular:      Rate and Rhythm: Normal rate and regular rhythm.      Pulses: Normal pulses.      Heart sounds: Normal heart sounds. No murmur heard.  Pulmonary:      Effort: Pulmonary effort is normal. No respiratory distress.      Breath sounds: Normal breath sounds. No wheezing or rales.   Abdominal:      General: Bowel sounds are normal. There is no distension.      Tenderness: There is no abdominal tenderness.   Musculoskeletal:         General: No swelling, tenderness, deformity or signs of injury.      Cervical back: Normal range of motion.      Right lower leg: Edema (Right lower extremity dressing) present.   Neurological:      General: No focal deficit present.      Mental Status: She is alert and oriented to person, place, and time. Mental status is at baseline.      Cranial Nerves: No cranial nerve deficit.      Sensory: No sensory deficit.      Motor: No weakness.      Coordination: Coordination normal.            Diagnostic Data    Results from last 7 days   Lab Units 01/02/25  0323   WBC 10*3/mm3 11.04*   HEMOGLOBIN g/dL 8.7*   HEMATOCRIT % 27.9*   PLATELETS 10*3/mm3 276   GLUCOSE mg/dL 108*   CREATININE mg/dL 0.76   BUN mg/dL 14   SODIUM mmol/L 140   POTASSIUM mmol/L 3.8   AST (SGOT) U/L 31   ALT (SGPT) U/L 53*   ALK PHOS U/L 130*   BILIRUBIN mg/dL 0.6   ANION GAP mmol/L  6.6       No radiology results for the last day      I reviewed the patient's new clinical results.    Assessment/Plan:     Active and Resolved Problems  Active Hospital Problems    Diagnosis  POA    **DKA (diabetic ketoacidosis) [E11.10]  Yes    Arterial thrombosis [I74.9]  No      Resolved Hospital Problems   No resolved problems to display.       Diabetic ketoacidosis without coma, with new onset diabetes mellitus, type 2 / Metabolic acidosis, increased anion gap-Resolved  -Etiology uncertain, though new onset diabetes mellitus.  A1c 15.80 on admission, no prior available.  -Lantus 22 units at night, insulin lispro 6 units every 6 hours.  Continue insulin sliding scale every 6 hours.  -Endocrinology Dr. Winkler following  -Nutrition following patient due to enteric feeds     Acute Kidney Injury requiring emergent hemodialysis  -Remains nonoliguric. Baseline creatinine 0.90.  Creatinine 2.10 on admission.  -Monitor Input/Output very closely.   -Avoid NSAIDs, nephrotoxic medications, and hypotension.  -Nephrology following.Initially on HD due to acute renal failure.  No history for the past 2 days.  Will evaluate need for HD on a daily basis per nephrology.      Acute occlusive thrombus of the right iliac artery with limb ischemia improved  -Arterial duplex with noted right femoral, deep femoral, and popliteal arteries being patent but with very low amplitude monophasic signals, suggesting severe inflow stenosis or occlusion; no measurable Doppler flow in the anterior or posterior tibial and peroneal arteries.  -Bilateral lower extremity ABIs ordered: Right STACEY critically reduced with STACEY of 0 and severe digital insufficiency; left STACEY is normal with moderate digital insufficiency.  -Emergent surgery per vascular surgeon 12/19/2024 for right iliac thrombectomy via open groin incision then return to surgery on 12/20/2024 for recurrent right lower extremity ischemia due to arterial thrombosis and underwent right  iliofemoral popliteal thrombectomy, right femoral endarterectomy with patch, right lower extremity arteriogram, and closed right calf fasciotomies  -Continue on heparin drip per vascular surgery   -Hematology/oncology consulted  -Return to the OR morning (12/23) for emergent 4 compartment fasciotomies due to critical right leg ischemia  -Defer to vascular surgery for management of wound VAC.  Next wound VAC change on Monday     Nontraumatic rhabdomyolysis --> Improving  -CK downtrending  -Encourage enteric hydration      Leukocytosis likely reactive  -Continue to monitor clinically    Electrolyte derangements likely due to renal failure  -Monitor and replete as needed per protocol      Cutaneous candidiasis of groin  -Likely secondary to uncontrolled diabetes mellitus  -Nystatin ordered     Essential Hypertension  -Clonidine patch ordered by nephrology-hold   -Titrate medications as needed.     Bilateral leg rash, concern for scabies infection  -Permethrin regimen ordered.  Completed first treatment on 12/18, repeat treatment in 2 weeks  -Keep in contact precautions x 1 week post initial treatment (12/25)     Transaminitis  -US of Liver: Hepatomegaly with steatosis.  -Hepatitis panel-negative.  -Monitor and trend LFTs.       COPD: Not in exacerbation.  Duonebs ordered as needed.  Obesity: Body mass index is 33.49 kg/m².       VTE Prophylaxis:  Pharmacologic VTE prophylaxis orders are present.             Disposition Planning:     Barriers to Discharge: medical optimization  Anticipated Date of Discharge: pending  Place of Discharge: home      Time: 35 minutes     Code Status and Medical Interventions: CPR (Attempt to Resuscitate); Full Support   Ordered at: 12/18/24 0830     Code Status (Patient has no pulse and is not breathing):    CPR (Attempt to Resuscitate)     Medical Interventions (Patient has pulse or is breathing):    Full Support       Signature: Electronically signed by Ella Dow MD, 01/02/25, 12:32  EST.  [facility] Hospitalist Team

## 2025-01-03 LAB
ALBUMIN SERPL-MCNC: 3 G/DL (ref 3.5–5.2)
ALBUMIN/GLOB SERPL: 0.9 G/DL
ALP SERPL-CCNC: 101 U/L (ref 39–117)
ALT SERPL W P-5'-P-CCNC: 42 U/L (ref 1–33)
ANION GAP SERPL CALCULATED.3IONS-SCNC: 7.6 MMOL/L (ref 5–15)
ARTERIAL PATENCY WRIST A: POSITIVE
AST SERPL-CCNC: 26 U/L (ref 1–32)
ATMOSPHERIC PRESS: ABNORMAL MM[HG]
BASE EXCESS BLDA CALC-SCNC: 2.4 MMOL/L (ref 0–3)
BASOPHILS # BLD AUTO: 0.05 10*3/MM3 (ref 0–0.2)
BASOPHILS NFR BLD AUTO: 0.5 % (ref 0–1.5)
BDY SITE: ABNORMAL
BILIRUB SERPL-MCNC: 0.5 MG/DL (ref 0–1.2)
BUN SERPL-MCNC: 13 MG/DL (ref 6–20)
BUN/CREAT SERPL: 16.9 (ref 7–25)
CALCIUM SPEC-SCNC: 8.4 MG/DL (ref 8.6–10.5)
CHLORIDE SERPL-SCNC: 100 MMOL/L (ref 98–107)
CO2 BLDA-SCNC: 27.4 MMOL/L (ref 22–29)
CO2 SERPL-SCNC: 26.4 MMOL/L (ref 22–29)
CREAT SERPL-MCNC: 0.77 MG/DL (ref 0.57–1)
DEPRECATED RDW RBC AUTO: 55.3 FL (ref 37–54)
EGFRCR SERPLBLD CKD-EPI 2021: 92.4 ML/MIN/1.73
EOSINOPHIL # BLD AUTO: 0.16 10*3/MM3 (ref 0–0.4)
EOSINOPHIL NFR BLD AUTO: 1.5 % (ref 0.3–6.2)
ERYTHROCYTE [DISTWIDTH] IN BLOOD BY AUTOMATED COUNT: 16.6 % (ref 12.3–15.4)
GLOBULIN UR ELPH-MCNC: 3.3 GM/DL
GLUCOSE BLDC GLUCOMTR-MCNC: 126 MG/DL (ref 70–105)
GLUCOSE BLDC GLUCOMTR-MCNC: 140 MG/DL (ref 70–105)
GLUCOSE BLDC GLUCOMTR-MCNC: 158 MG/DL (ref 70–105)
GLUCOSE BLDC GLUCOMTR-MCNC: 178 MG/DL (ref 70–105)
GLUCOSE SERPL-MCNC: 213 MG/DL (ref 65–99)
HCO3 BLDA-SCNC: 26.3 MMOL/L (ref 21–28)
HCT VFR BLD AUTO: 26.2 % (ref 34–46.6)
HEMODILUTION: NO
HGB BLD-MCNC: 8.3 G/DL (ref 12–15.9)
IMM GRANULOCYTES # BLD AUTO: 0.1 10*3/MM3 (ref 0–0.05)
IMM GRANULOCYTES NFR BLD AUTO: 0.9 % (ref 0–0.5)
INHALED O2 CONCENTRATION: 21 %
LYMPHOCYTES # BLD AUTO: 2.6 10*3/MM3 (ref 0.7–3.1)
LYMPHOCYTES NFR BLD AUTO: 23.9 % (ref 19.6–45.3)
MAGNESIUM SERPL-MCNC: 1.6 MG/DL (ref 1.6–2.6)
MCH RBC QN AUTO: 31 PG (ref 26.6–33)
MCHC RBC AUTO-ENTMCNC: 31.7 G/DL (ref 31.5–35.7)
MCV RBC AUTO: 97.8 FL (ref 79–97)
MODALITY: ABNORMAL
MONOCYTES # BLD AUTO: 0.72 10*3/MM3 (ref 0.1–0.9)
MONOCYTES NFR BLD AUTO: 6.6 % (ref 5–12)
NEUTROPHILS NFR BLD AUTO: 66.6 % (ref 42.7–76)
NEUTROPHILS NFR BLD AUTO: 7.26 10*3/MM3 (ref 1.7–7)
NRBC BLD AUTO-RTO: 0 /100 WBC (ref 0–0.2)
PCO2 BLDA: 36.9 MM HG (ref 35–48)
PH BLDA: 7.46 PH UNITS (ref 7.35–7.45)
PHOSPHATE SERPL-MCNC: 2.8 MG/DL (ref 2.5–4.5)
PLATELET # BLD AUTO: 302 10*3/MM3 (ref 140–450)
PMV BLD AUTO: 8.9 FL (ref 6–12)
PO2 BLD: 336 MM[HG] (ref 0–500)
PO2 BLDA: 70.6 MM HG (ref 83–108)
POTASSIUM SERPL-SCNC: 3.8 MMOL/L (ref 3.5–5.2)
PROT SERPL-MCNC: 6.3 G/DL (ref 6–8.5)
RBC # BLD AUTO: 2.68 10*6/MM3 (ref 3.77–5.28)
SAO2 % BLDCOA: 94.9 % (ref 94–98)
SODIUM SERPL-SCNC: 134 MMOL/L (ref 136–145)
WBC NRBC COR # BLD AUTO: 10.89 10*3/MM3 (ref 3.4–10.8)

## 2025-01-03 PROCEDURE — 80053 COMPREHEN METABOLIC PANEL: CPT | Performed by: SURGERY

## 2025-01-03 PROCEDURE — 63710000001 INSULIN LISPRO (HUMAN) PER 5 UNITS: Performed by: INTERNAL MEDICINE

## 2025-01-03 PROCEDURE — 84100 ASSAY OF PHOSPHORUS: CPT | Performed by: INTERNAL MEDICINE

## 2025-01-03 PROCEDURE — 99231 SBSQ HOSP IP/OBS SF/LOW 25: CPT | Performed by: INTERNAL MEDICINE

## 2025-01-03 PROCEDURE — 36600 WITHDRAWAL OF ARTERIAL BLOOD: CPT

## 2025-01-03 PROCEDURE — 85025 COMPLETE CBC W/AUTO DIFF WBC: CPT | Performed by: SURGERY

## 2025-01-03 PROCEDURE — 63710000001 INSULIN GLARGINE PER 5 UNITS: Performed by: INTERNAL MEDICINE

## 2025-01-03 PROCEDURE — 82948 REAGENT STRIP/BLOOD GLUCOSE: CPT | Performed by: INTERNAL MEDICINE

## 2025-01-03 PROCEDURE — 83735 ASSAY OF MAGNESIUM: CPT | Performed by: SURGERY

## 2025-01-03 PROCEDURE — 92526 ORAL FUNCTION THERAPY: CPT

## 2025-01-03 PROCEDURE — 82803 BLOOD GASES ANY COMBINATION: CPT

## 2025-01-03 PROCEDURE — 82948 REAGENT STRIP/BLOOD GLUCOSE: CPT

## 2025-01-03 RX ADMIN — OXYCODONE HYDROCHLORIDE 10 MG: 10 TABLET, FILM COATED, EXTENDED RELEASE ORAL at 08:09

## 2025-01-03 RX ADMIN — APIXABAN 5 MG: 5 TABLET, FILM COATED ORAL at 08:09

## 2025-01-03 RX ADMIN — INSULIN GLARGINE-YFGN 22 UNITS: 100 INJECTION, SOLUTION SUBCUTANEOUS at 20:56

## 2025-01-03 RX ADMIN — INSULIN LISPRO 7 UNITS: 100 INJECTION, SOLUTION INTRAVENOUS; SUBCUTANEOUS at 13:05

## 2025-01-03 RX ADMIN — QUETIAPINE FUMARATE 75 MG: 25 TABLET ORAL at 08:09

## 2025-01-03 RX ADMIN — INSULIN LISPRO 7 UNITS: 100 INJECTION, SOLUTION INTRAVENOUS; SUBCUTANEOUS at 17:09

## 2025-01-03 RX ADMIN — Medication 10 ML: at 08:09

## 2025-01-03 RX ADMIN — APIXABAN 5 MG: 5 TABLET, FILM COATED ORAL at 20:57

## 2025-01-03 RX ADMIN — NYSTATIN 1 APPLICATION: 100000 CREAM TOPICAL at 21:45

## 2025-01-03 RX ADMIN — OXYCODONE 5 MG: 5 TABLET ORAL at 17:09

## 2025-01-03 RX ADMIN — QUETIAPINE FUMARATE 75 MG: 25 TABLET ORAL at 20:57

## 2025-01-03 RX ADMIN — OXYCODONE 5 MG: 5 TABLET ORAL at 04:33

## 2025-01-03 RX ADMIN — NYSTATIN 1 APPLICATION: 100000 CREAM TOPICAL at 06:15

## 2025-01-03 RX ADMIN — NYSTATIN 1 APPLICATION: 100000 CREAM TOPICAL at 13:08

## 2025-01-03 RX ADMIN — INSULIN LISPRO 7 UNITS: 100 INJECTION, SOLUTION INTRAVENOUS; SUBCUTANEOUS at 08:09

## 2025-01-03 RX ADMIN — PANTOPRAZOLE SODIUM 40 MG: 40 TABLET, DELAYED RELEASE ORAL at 06:39

## 2025-01-03 RX ADMIN — Medication 10 ML: at 20:58

## 2025-01-03 RX ADMIN — INSULIN LISPRO 2 UNITS: 100 INJECTION, SOLUTION INTRAVENOUS; SUBCUTANEOUS at 08:09

## 2025-01-03 RX ADMIN — OXYCODONE HYDROCHLORIDE 10 MG: 10 TABLET, FILM COATED, EXTENDED RELEASE ORAL at 20:57

## 2025-01-03 NOTE — CASE MANAGEMENT/SOCIAL WORK
Continued Stay Note  [facility]     Patient Name: Yarelis Jackson  MRN: [id number]  Today's Date: 1/3/2025    Admit Date: 12/18/2024    Plan: From home with parents. Watch for wound vac @ d/c.   Discharge Plan       Row Name 01/03/25 0838       Plan    Plan Comments DC Barriers: WOCN following for wound vac needs, WBC 10.89, endocrinology managing SS and monitoring blood sugars, neph monitoring renal fx             Lien Lindsay RN      [facility]  Office: [phone]  Cell: [phone]  Fax # [phone]

## 2025-01-03 NOTE — PLAN OF CARE
Goal Outcome Evaluation:   Pt was seen for a snack this date to determine an diet upgrade. Pt is currently on a STC diet and thin liquids. Pt has natural dentition. Pt was alert & awake. Pt lunch tray at bedside untouched. Pt was agreeable to trials of cracker and thin liquids today. Noted good rate of intake, prolonged mastication. Pt followed with liquid wash to clear residue. SLP noted weak cough x3 and throat clear x2 during thin liquid trials via cup and straw. SLP concerned for possible aspiration. VFSS completed on 12/30/24. SLP went back again to review VFSS in which pt penetrated thin liquids via cup and straw however it clears from laryngeal vestibule. SLP concerned pt could be now aspirating therefore SLP recommending pt initiate a STC diet and NTL. Pt agreeable to NTL trials at bedside in which she demonstrated no s/s of penetration/aspiration. SLP educated pt on risks of aspiration and pt agreeable to diet change. Nursing notified. ST will follow for meals and dysphagia tx .     Swallowing precautions include:  *Pt sitting upright at 90 degrees hip flexion  *Oral care to be completed at least x2 daily  *Notify SLP if any s/s of penetration/aspiration occur    Anticipated Discharge Disposition (SLP): unknown

## 2025-01-03 NOTE — THERAPY TREATMENT NOTE
Acute Care - Speech Language Pathology   Swallow Treatment Note [facility]     Patient Name: Yarelis Jackson  : 1970  MRN: [id number]  Today's Date: 1/3/2025               Admit Date: 2024    Visit Dx:     ICD-10-CM ICD-9-CM   1. Diabetic ketoacidosis without coma associated with other specified diabetes mellitus  E13.10 250.12   2. MARGOT (acute kidney injury)  N17.9 584.9   3. Hypernatremia  E87.0 276.0   4. Acute encephalopathy  G93.40 348.30   5. Shock  R57.9 785.50   6. Arterial thrombosis  I74.9 444.9     Patient Active Problem List   Diagnosis    Benign essential hypertension    Cervical radiculopathy    Chronic obstructive pulmonary disease    Cigarette smoker    Hyperlipidemia    Menopause    Migraine headache    DKA (diabetic ketoacidosis)    Arterial thrombosis     Past Medical History:   Diagnosis Date    COPD (chronic obstructive pulmonary disease)     Low back pain     Migraine     Neck pain      Past Surgical History:   Procedure Laterality Date    FASCIOTOMY Right 2024    Procedure: FASCIOTOMY LEG;  Surgeon: Chris Delgado MD;  Location: [facility] MAIN OR;  Service: Vascular;  Laterality: Right;    FEMORAL THROMBECTOMY/EMBOLECTOMY Right 2024    Procedure: FEMORAL right iliofemoral thrombectomy, arteriogram;  Surgeon: Ghassan Celestin MD;  Location: [facility] HYBRID OR;  Service: Vascular;  Laterality: Right;    FEMORAL THROMBECTOMY/EMBOLECTOMY Right 2024    Procedure: Right femoral thrombectomy, right lower extremity arteriogram and right lower leg facitomies;  Surgeon: Ghassan Celestin MD;  Location: [facility] HYBRID OR;  Service: Vascular;  Laterality: Right;    LUNG SURGERY           EDUCATION  The patient has been educated in the following areas:   Dysphagia (Swallowing Impairment).        SLP GOALS       Row Name 25 1500       (LTG) Patient will demonstrate functional swallow for    Diet Texture (Demonstrate functional swallow) regular textures  -MS     Liquid viscosity (Demonstrate functional swallow) thin liquids  -MS    [place] (Demonstrate functional swallow) independently (over 90% accuracy)  -MS    Time Frame (Demonstrate functional swallow) by discharge  -MS    Progress/Outcomes (Demonstrate functional swallow) goal ongoing  -MS       (LTG) Swallow    (LTG) Swallow Pt will maximize swallow function for least restrictive PO diet, exhibiting no complication associated with dysphagia, adequate PO intake, and demonstrating independent use of swallow compensations  -MS    [place] (Swallow Long Term Goal) independently (over 90% accuracy)  -MS    Time Frame (Swallow Long Term Goal) by discharge  -MS    Progress/Outcomes (Swallow Long Term Goal) new goal  -MS       (STG) Patient will tolerate trials of    Consistencies Trialed (Tolerate trials) regular textures;thin liquids  -MS    Desired Outcome (Tolerate trials) without signs/symptoms of aspiration;without signs of distress;with adequate oral prep/transit/clearance  -MS    [place] (Tolerate trials) independently (over 90% accuracy)  -MS    Time Frame (Tolerate trials) by discharge  -MS    Progress/Outcomes (Tolerate trials) goal ongoing  -MS    Comment (Tolerate trials) Pt was seen for a snack this date to determine an diet upgrade. Pt is currently on a STC diet and thin liquids. Pt has natural dentition. Pt was alert & awake. Pt lunch tray at bedside untouched. Pt was agreeable to trials of cracker and thin liquids today. Noted good rate of intake, prolonged mastication. Pt followed with liquid wash to clear residue. SLP noted weak cough x3 and throat clear x2 during thin liquid trials via cup and straw. SLP concerned for possible aspiration. VFSS completed on 12/30/24. SLP went back again to review VFSS in which pt penetrated thin liquids via cup and straw however it clears from laryngeal vestibule. SLP concerned pt could be now aspirating therefore SLP recommending pt initiate a STC diet and  NTL. Pt agreeable to NTL trials at bedside in which she demonstrated no s/s of penetration/aspiration. SLP educated pt on risks of aspiration and pt agreeable to diet change. Nursing notified. ST will follow for meals and dysphagia tx .     Swallowing precautions include:  *Pt sitting upright at 90 degrees hip flexion  *Oral care to be completed at least x2 daily  *Notify SLP if any s/s of penetration/aspiration occur       -MS       (STG) Swallow 1    (STG) Swallow 1 The patient will participate in ongoing assessment of swallow, including re-evaluation clinically and/or including instrumental assessment of swallow if indicated, to further assess swallow function in anticipation to initiate a PO diet  -MS    Time Frame (Swallow Short Term Goal 1) 1 week  -MS    Progress/Outcomes (Swallow Short Term Goal 1) goal ongoing  -MS              User Key  (r) = Recorded By, (t) = Taken By, (c) = Cosigned By      Initials Name Provider Type    Lazaro Avila, SLP Speech and Language Pathologist                      JUN Hilario  1/3/2025

## 2025-01-03 NOTE — DISCHARGE PLACEMENT REQUEST
"Janet Alexander \"BRYANNA\" (54 y.o. Female)       Date of Birth   1970    Social Security Number       Address   [address] [place] [address] [place] [address]    Home Phone   [phone]    MRN   [id number]       Shinto   Zoroastrianism    Marital Status   Single                            Admission Date   12/18/24    Admission Type   Emergency    Admitting Provider   Shamar Rodriguez DO    Attending Provider   Ella Dow MD    Department, Room/Bed   [facility] 2F, 2213/1       Discharge Date       Discharge Disposition       Discharge Destination                                 Attending Provider: Ella Dow MD    Allergies: Aspirin, Ibuprofen    Isolation: None   Infection: MRSA No Isolation this Admit (12/19/24)   Code Status: CPR    Ht: 162.6 cm (64\")   Wt: 98.5 kg (217 lb 2.5 oz)    Admission Cmt: None   Principal Problem: DKA (diabetic ketoacidosis) [E11.10]                   Active Insurance as of 12/18/2024       Primary Coverage       Payor Plan Insurance Group Employer/Plan Group    [id number]       Payor Plan Address Payor Plan Phone Number Payor Plan Fax Number Effective Dates    MAIL STOP:   9/1/2021 - None Entered    [address] [address]       [facility] [address]         Subscriber Name Subscriber Birth Date Member ID       JANET ALEXANDER 1970 [id number]                     Emergency Contacts        (Rel.) Home Phone Work Phone Mobile Phone    Sukh Saldivar (Son) -- -- [phone]    Rohit Saldivar (Son) -- -- [phone]    KYAW HU (Mother) [phone] -- [phone]            "

## 2025-01-03 NOTE — SIGNIFICANT NOTE
01/03/25 1509   OTHER   Discipline occupational therapist   Rehab Time/Intention   Session Not Performed other (see comments)  (2 attempts made. Eating breakfast first, sleeping soundly and unable to arouse on second attempt.)   Recommendation   OT - Next Appointment 01/06/25

## 2025-01-03 NOTE — CONSULTS
Nutrition Services    Patient Name: Yarelis Jackson  YOB: 1970  MRN: [id number]  Admission date: 12/18/2024    Comment:    --Diet per SLP    -- Continue Boost Glucose Control BID (Provides 380 kcals, 32 g protein if consumed)         CLINICAL NUTRITION ASSESSMENT      Reason for Assessment 1/3: Follow up protocol   12/27: Follow-up protocol   12/21: Tube feeding      H&P      Past Medical History:   Diagnosis Date    COPD (chronic obstructive pulmonary disease)     Low back pain     Migraine     Neck pain        Past Surgical History:   Procedure Laterality Date    FASCIOTOMY Right 12/23/2024    Procedure: FASCIOTOMY LEG;  Surgeon: Chris Delgado MD;  Location: [facility] MAIN OR;  Service: Vascular;  Laterality: Right;    FEMORAL THROMBECTOMY/EMBOLECTOMY Right 12/19/2024    Procedure: FEMORAL right iliofemoral thrombectomy, arteriogram;  Surgeon: Ghassan Celestin MD;  Location: [facility] HYBRID OR;  Service: Vascular;  Laterality: Right;    FEMORAL THROMBECTOMY/EMBOLECTOMY Right 12/20/2024    Procedure: Right femoral thrombectomy, right lower extremity arteriogram and right lower leg facitomies;  Surgeon: Ghassan Celestin MD;  Location: [facility] HYBRID OR;  Service: Vascular;  Laterality: Right;    LUNG SURGERY          Current Problems   Diabetic ketoacidosis without coma, with new onset diabetes mellitus, type 2   -Endocrinology is following   -diabetes educator following    Acute kidney injury  -Nephrology is following     Acute Hypernatremia    Septic shock    Acute occlusive thrombus of the right iliac artery with limb ischemia   -Vascular surgery is following   -underwent iliofemoral thrombectomy and arteriogram, demonstrating arterial patency 12/19  -OR for redo of right iliofemoral popliteal thrombectomy, right femoral endarterectomy with patch, right lower extremity arteriogram, and closed right calf fasciotomies. 12/20    Nontraumatic rhabdomyolysis     Hypertriglyceridemia    Acute  "metabolic encephalopathy     Acute respiratory failure without hypercapnia / On mechanical Ventilation   -Extubated     Cutaneous candidiasis of groin     Bilateral leg rash, concern for scabies infection     Transaminitis     COPD       Encounter Information        Trending Narrative     1/3: Since last RD review, patient continues to recover.  RD visited patient at bedside.  Patient asleep with breakfast tray on table.  RD left patient to rest.      12/27: Checking in to monitor TF tolerance. Since last RD review, pt extubated on 12/23. Continues to receive EN via DHT at goal rate. WOCN evaluating for possible wound vac placement today. HD yesterday with 800 mL removed. Precedex/heparin. Continues HFNC supplemental O2. Tolerating TF without noted issues.     12/21: Pt was admitted with DKA, endocrinology is following. Nephrology is following for MARGOT and hypernatremia. Vascular surgery is following. Underwent iliofemoral thrombectomy and arteriogram, demonstrating arterial patency 12/19, OR for redo of right iliofemoral popliteal thrombectomy, right femoral endarterectomy with patch, right lower extremity arteriogram, and closed right calf fasciotomies 12/20. Pt is currently intubated. On norepi. CRRT stopped, plan for HD. No family was in the room at my visit. NFPE completed and not consistent with nutrition diagnosis of malnutrition at this time using AND/ASPEN criteria       Anthropometrics        Current Height, Weight Height: 162.6 cm (64\")  Weight: 98.5 kg (217 lb 2.5 oz) (01/01/25 0425)       Usual Body Weight (UBW) Unknown        Trending Weight Hx     This admission: 1/3: 217#, 2.3% weight gain since last RD review, +12.3L since 12/20/24 per I/Os  12/27: 212# (8% weight gain in the last 7 days) - expect some fluctuations r/t HD pt.   12/21: 195# (obtained 12/19)              PTA: 12/21: No recent weight's documented     Wt Readings from Last 30 Encounters:   01/01/25 0425 98.5 kg (217 lb 2.5 oz)   12/30/24 " 0415 102 kg (225 lb 5 oz)   12/29/24 0437 104 kg (228 lb 6.3 oz)   12/28/24 0409 110 kg (241 lb 6.5 oz)   12/27/24 0500 96.3 kg (212 lb 4.9 oz)   12/25/24 0400 98.5 kg (217 lb 2.5 oz)   12/24/24 0431 100 kg (221 lb 5.5 oz)   12/24/24 0333 100 kg (221 lb 5.5 oz)   12/19/24 0600 88.5 kg (195 lb 1.7 oz)   12/18/24 0527 92.5 kg (203 lb 14.4 oz)   07/15/23 1132 98.1 kg (216 lb 4.3 oz)   04/04/23 0719 98.1 kg (216 lb 4.3 oz)   12/12/22 0952 85.5 kg (188 lb 6.4 oz)   01/31/22 1404 89.8 kg (198 lb)   01/24/22 1406 89.8 kg (198 lb)   12/23/21 1133 89.8 kg (198 lb)   11/24/21 1537 90.3 kg (199 lb)   11/16/21 0838 90.3 kg (199 lb)   09/29/21 0320 87.4 kg (192 lb 10.9 oz)   06/13/21 1652 81.4 kg (179 lb 6.4 oz)   05/14/21 1230 80.1 kg (176 lb 9.4 oz)   04/03/21 2304 80.7 kg (177 lb 14.6 oz)   04/09/20 0025 71 kg (156 lb 8.4 oz)   01/09/20 0532 63.5 kg (140 lb)   09/29/19 0216 70.1 kg (154 lb 8.7 oz)   08/31/19 2205 73.6 kg (162 lb 4.1 oz)   02/05/18 0902 72.6 kg (160 lb)   06/06/16 1303 69.8 kg (153 lb 14.4 oz)   05/04/16 0833 75.3 kg (165 lb 14.4 oz)   03/25/16 1313 78.2 kg (172 lb 6.4 oz)   03/03/16 1115 76 kg (167 lb 9.6 oz)   02/08/16 0952 77.1 kg (170 lb)      BMI kg/m2 Body mass index is 37.27 kg/m².       Labs        Pertinent Labs Reviewed, management per attending    Results from last 7 days   Lab Units 01/03/25 0422 01/02/25  0323 01/01/25  0251   SODIUM mmol/L 134* 140 138   POTASSIUM mmol/L 3.8 3.8 3.6   CHLORIDE mmol/L 100 103 102   CO2 mmol/L 26.4 30.4* 30.9*   BUN mg/dL 13 14 15   CREATININE mg/dL 0.77 0.76 0.84   CALCIUM mg/dL 8.4* 8.6 8.3*   BILIRUBIN mg/dL 0.5 0.6 0.5   ALK PHOS U/L 101 130* 112   ALT (SGPT) U/L 42* 53* 71*   AST (SGOT) U/L 26 31 26   GLUCOSE mg/dL 213* 108* 203*     Results from last 7 days   Lab Units 01/03/25  0422 01/02/25 0323 01/01/25  0251   MAGNESIUM mg/dL 1.6 1.7 1.6   PHOSPHORUS mg/dL 2.8 2.8 2.7   HEMOGLOBIN g/dL 8.3* 8.7* 8.1*   HEMATOCRIT % 26.2* 27.9* 26.2*     Lab Results    Component Value Date    HGBA1C 15.80 (H) 12/18/2024        Medications    Scheduled Medications apixaban, 5 mg, Oral, Q12H  insulin glargine, 22 Units, Subcutaneous, Nightly  insulin lispro, 2-7 Units, Subcutaneous, TID With Meals  insulin lispro, 7 Units, Subcutaneous, TID With Meals  nystatin, 1 Application, Topical, Q8H  oxyCODONE, 10 mg, Oral, Q12H  pantoprazole, 40 mg, Oral, Q AM  QUEtiapine, 75 mg, Oral, Q12H  sodium chloride, 10 mL, Intravenous, Q12H        Infusions        PRN Medications   acetaminophen **OR** acetaminophen    aluminum-magnesium hydroxide-simethicone    senna-docusate sodium **AND** polyethylene glycol **AND** bisacodyl **AND** bisacodyl    Calcium Replacement - Follow Nurse / BPA Driven Protocol    dextrose    dextrose    glucagon (human recombinant)    heparin (porcine)    heparin (porcine)    ipratropium-albuterol    Magnesium Standard Dose Replacement - Follow Nurse / BPA Driven Protocol    ondansetron ODT **OR** ondansetron    oxyCODONE    Phosphorus Replacement - Follow Nurse / BPA Driven Protocol    Potassium Replacement - Follow Nurse / BPA Driven Protocol    sodium chloride    sodium chloride    sodium chloride     Physical Findings        Trending Physical   Appearance, NFPE 1/3: NFPE completed and not consistent with nutrition diagnosis of malnutrition at this time using AND/ASPEN criteria     12/27: NFPE completed and not consistent with nutrition diagnosis of malnutrition at this time using AND/ASPEN criteria     12/21: NFPE completed and not consistent with nutrition diagnosis of malnutrition at this time using AND/ASPEN criteria     --  Edema  2+ right leg, right ankle, right foot  1+: dependent, generalized, left ankle, left foot     Bowel Function Last documented BM 1/3 (today)     Tubes No feeding tube      Chewing/Swallowing SLP following, S/P VFSS, current diet soft to chew with whole meat     Skin See WOCN note 1/2       Estimated/Assessed Needs    Estimated 12/21/24  - remains appropriate   Energy Requirements    EST Needs, Method, Wt used 1787 kcal/day (~32 kcals/kg IBW)       Protein Requirements    EST Needs, Method, Wt used 66 g/day (1.2 g/kg using IBW)        Fluid Requirements     Estimated Needs (mL/day) 1 ml/kcal or per MD     Fluid Deficit    Current Na Level (mEq/L)    Desired Na Level (mEq/L)    Estimated Fluid Deficit (L)       Current Nutrition Orders & Evaluation of Intake       Oral Nutrition     Food Allergies NKFA   Current PO Diet Diet: Diabetic; Consistent Carbohydrate; Texture: Soft to Chew (NDD 3); Soft to Chew: Whole Meat; Fluid Consistency: Thin (IDDSI 0)   Supplement Boost Glucose Control BID   PO Evaluation     Trending % PO Intake 1/3: NPO  12/27: NPO  12/21: NPO      Enteral Nutrition    Enteral Route    Order, Modulars, Flushes    Tolerance    TF Observation         Parenteral Nutrition     TPN Route    Total # Days on TPN    TPN Order, Lipid Details    MVI & Trace Element Freq    TPN Observation       Nutritional Risk Screening        NRS-2002 Score          Nutrition Diagnosis         Nutrition Dx Problem 1 Chewing/swallowing difficulty related to clinical status as evidenced by mechanically altered diet per SLP    Nutrition Dx Problem 2        Intervention Goal         Intervention Goal(s) To tolerate enteral nutrition   Maintain weight      Nutrition Intervention        RD Action Continue ONS  Monitor PO intakes      Nutrition Prescription          Diet Prescription Soft to Chew, Whole Meat   Supplement Prescription Boost Glucose Control BID     Enteral Prescription        TPN Prescription      Monitor/Evaluation        Monitor Pertinent labs, EN delivery/tolerance, POC/GOC       Electronically signed by:  Ashley Hernandez RD  01/03/25 12:32 EST

## 2025-01-03 NOTE — SIGNIFICANT NOTE
01/03/25 1501   OTHER   Discipline physical therapist   Rehab Time/Intention   Session Not Performed other (see comments)  (attempted x2; patient eating on first attempt & sleeping soundly and declining mobility.  Per RN patient did  not sleep last night.  Will check back as able.)   Therapy Assessment/Plan (PT)   Criteria for Skilled Interventions Met (PT) yes;meets criteria;skilled treatment is necessary   Recommendation   PT - Next Appointment 01/04/25

## 2025-01-03 NOTE — PLAN OF CARE
Problem: Adult Inpatient Plan of Care  Goal: Plan of Care Review  Outcome: Not Progressing  Flowsheets (Taken 1/3/2025 1635)  Progress: no change  Plan of Care Reviewed With: patient  Goal: Patient-Specific Goal (Individualized)  Outcome: Not Progressing  Goal: Absence of Hospital-Acquired Illness or Injury  Outcome: Not Progressing  Intervention: Identify and Manage Fall Risk  Recent Flowsheet Documentation  Taken 1/3/2025 1600 by Gayle Denson RN  Safety Promotion/Fall Prevention:   assistive device/personal items within reach   clutter free environment maintained   safety round/check completed  Taken 1/3/2025 1400 by Gayle Denson RN  Safety Promotion/Fall Prevention:   assistive device/personal items within reach   clutter free environment maintained   safety round/check completed   nonskid shoes/slippers when out of bed   fall prevention program maintained  Taken 1/3/2025 1200 by Gayle Denson RN  Safety Promotion/Fall Prevention: safety round/check completed  Taken 1/3/2025 1000 by Gayle Denson RN  Safety Promotion/Fall Prevention:   assistive device/personal items within reach   clutter free environment maintained   safety round/check completed   nonskid shoes/slippers when out of bed   fall prevention program maintained  Taken 1/3/2025 0840 by Gayle Denson RN  Safety Promotion/Fall Prevention:   clutter free environment maintained   assistive device/personal items within reach   safety round/check completed   nonskid shoes/slippers when out of bed   fall prevention program maintained  Intervention: Prevent Skin Injury  Recent Flowsheet Documentation  Taken 1/3/2025 1600 by Gayle Denson RN  Body Position: position changed independently  Taken 1/3/2025 1400 by Gayle Denson RN  Body Position: position changed independently  Taken 1/3/2025 1200 by Gayle Denson RN  Body Position: position changed independently  Taken 1/3/2025 0840 by Gayle Denson  RN  Body Position: supine  Goal: Optimal Comfort and Wellbeing  Outcome: Not Progressing  Intervention: Provide Person-Centered Care  Recent Flowsheet Documentation  Taken 1/3/2025 1200 by Gayle Denson RN  Trust Relationship/Rapport: care explained  Taken 1/3/2025 0840 by Gayle Denson RN  Trust Relationship/Rapport:   care explained   questions encouraged   questions answered  Goal: Readiness for Transition of Care  Outcome: Not Progressing     Problem: Restraint, Nonviolent  Goal: Absence of Harm or Injury  Outcome: Not Progressing  Intervention: Implement Least Restrictive Safety Strategies  Recent Flowsheet Documentation  Taken 1/3/2025 1600 by Gayle Denson RN  Diversional Activities: television  Taken 1/3/2025 1200 by Gayle Denson RN  Diversional Activities: television  Taken 1/3/2025 0840 by Gayle Denson RN  Diversional Activities:   television   smartphone  Intervention: Protect Dignity, Rights and Personal Wellbeing  Recent Flowsheet Documentation  Taken 1/3/2025 1200 by Gayle Denson RN  Trust Relationship/Rapport: care explained  Taken 1/3/2025 0840 by Gayle Denson RN  Trust Relationship/Rapport:   care explained   questions encouraged   questions answered  Intervention: Protect Skin and Joint Integrity  Recent Flowsheet Documentation  Taken 1/3/2025 1600 by Gayle Denson RN  Body Position: position changed independently  Taken 1/3/2025 1400 by Gayle Denson RN  Body Position: position changed independently  Taken 1/3/2025 1200 by Gayle Denson RN  Body Position: position changed independently  Taken 1/3/2025 0840 by Gayle Denson RN  Body Position: supine     Problem: Fall Injury Risk  Goal: Absence of Fall and Fall-Related Injury  Outcome: Not Progressing  Intervention: Promote Injury-Free Environment  Recent Flowsheet Documentation  Taken 1/3/2025 1600 by Gayle Denson RN  Safety Promotion/Fall Prevention:   assistive  device/personal items within reach   clutter free environment maintained   safety round/check completed  Taken 1/3/2025 1400 by Gayle Denson RN  Safety Promotion/Fall Prevention:   assistive device/personal items within reach   clutter free environment maintained   safety round/check completed   nonskid shoes/slippers when out of bed   fall prevention program maintained  Taken 1/3/2025 1200 by Gayle Denson RN  Safety Promotion/Fall Prevention: safety round/check completed  Taken 1/3/2025 1000 by Gayle Denson RN  Safety Promotion/Fall Prevention:   assistive device/personal items within reach   clutter free environment maintained   safety round/check completed   nonskid shoes/slippers when out of bed   fall prevention program maintained  Taken 1/3/2025 0840 by Gayle Denson RN  Safety Promotion/Fall Prevention:   clutter free environment maintained   assistive device/personal items within reach   safety round/check completed   nonskid shoes/slippers when out of bed   fall prevention program maintained     Problem: Skin Injury Risk Increased  Goal: Skin Health and Integrity  Outcome: Not Progressing  Intervention: Optimize Skin Protection  Recent Flowsheet Documentation  Taken 1/3/2025 1600 by Gayle Denson RN  Activity Management: activity encouraged  Head of Bed (HOB) Positioning: HOB elevated  Taken 1/3/2025 1400 by Gayle Denson RN  Head of Bed (HOB) Positioning: HOB elevated  Taken 1/3/2025 1200 by Gayle Denson RN  Activity Management: activity encouraged  Head of Bed (HOB) Positioning: HOB elevated  Taken 1/3/2025 1000 by Gayle Denson RN  Activity Management: activity encouraged  Taken 1/3/2025 0840 by Gayle Denson RN  Activity Management: activity encouraged  Head of Bed (HOB) Positioning: HOB elevated     Problem: Comorbidity Management  Goal: Maintenance of COPD Symptom Control  Outcome: Not Progressing     Problem: Violence Risk or Actual  Goal:  Anger and Impulse Control  Outcome: Not Progressing  Intervention: Minimize Safety Risk  Recent Flowsheet Documentation  Taken 1/3/2025 1400 by Gayle Denson RN  Enhanced Safety Measures: bed alarm set  Taken 1/3/2025 1000 by Gayle Denson RN  Enhanced Safety Measures: bed alarm set  Taken 1/3/2025 0840 by Gayle Denson RN  Enhanced Safety Measures: bed alarm set     Problem: Mechanical Ventilation Invasive  Goal: Effective Communication  Outcome: Not Progressing  Intervention: Ensure Effective Communication  Recent Flowsheet Documentation  Taken 1/3/2025 1600 by Gayle Denson RN  Diversional Activities: television  Taken 1/3/2025 1200 by Gayle Denson RN  Trust Relationship/Rapport: care explained  Diversional Activities: television  Taken 1/3/2025 0840 by Gayle Denson RN  Trust Relationship/Rapport:   care explained   questions encouraged   questions answered  Diversional Activities:   television   smartphone  Goal: Optimal Device Function  Outcome: Not Progressing  Intervention: Optimize Device Care and Function  Recent Flowsheet Documentation  Taken 1/3/2025 1200 by Gayle Denson RN  Airway Safety Measures:   oxygen flowmeter at bedside   suction at bedside  Taken 1/3/2025 1000 by Gayle Denson RN  Airway Safety Measures:   oxygen flowmeter at bedside   suction at bedside  Taken 1/3/2025 0840 by Gayle Denson RN  Airway Safety Measures:   oxygen flowmeter at bedside   suction at bedside  Taken 1/3/2025 0800 by Gayle Denson RN  Oral Care: teeth brushed - regular toothbrush  Goal: Mechanical Ventilation Liberation  Outcome: Not Progressing  Goal: Optimal Nutrition Delivery  Outcome: Not Progressing  Goal: Absence of Device-Related Skin and Tissue Injury  Outcome: Not Progressing  Goal: Absence of Ventilator-Induced Lung Injury  Outcome: Not Progressing  Intervention: Prevent Ventilator-Associated Pneumonia  Recent Flowsheet Documentation  Taken 1/3/2025  1600 by Gayle Denson RN  Head of Bed (HOB) Positioning: HOB elevated  Taken 1/3/2025 1400 by Gayle Denson RN  Head of Bed (HOB) Positioning: HOB elevated  Taken 1/3/2025 1200 by Gayle Denson RN  Head of Bed (HOB) Positioning: HOB elevated  Taken 1/3/2025 0840 by Gayle Denson RN  Head of Bed (HOB) Positioning: HOB elevated  Taken 1/3/2025 0800 by Gayle Denson, RN  Oral Care: teeth brushed - regular toothbrush   Goal Outcome Evaluation:  Plan of Care Reviewed With: patient        Progress: no change

## 2025-01-03 NOTE — CASE MANAGEMENT/SOCIAL WORK
Social Work Assessment  [facility]     Patient Name: Yarelis Jackson  MRN: [id number]  Today's Date: 1/3/2025    Admit Date: 12/18/2024     Discharge Plan       Row Name 01/03/25 1707       Plan    Plan SNF choices pending. PASRR QFR. Precert required.    Plan Comments CM updated on PASRR status.                  JESSI Wheeler, LSW, [facility]    Phone: [phone]  Cell: [phone]  Fax: [phone]  [email or web address]

## 2025-01-03 NOTE — CASE MANAGEMENT/SOCIAL WORK
Continued Stay Note  [facility]     Patient Name: Yarelis Jackson  MRN: [id number]  Today's Date: 1/3/2025    Admit Date: 12/18/2024    Plan: SNF choices pending. PASRR needed. Precert required.   Discharge Plan       Row Name 01/03/25 1306       Plan    Plan SNF choices pending. PASRR needed. Precert required.    Plan Comments CM met with patient at bedside to obtain SNF choices. SNF list provided. Patient prefers referrals to be sent to [facility]. CM sent referrals and notified liaisons.             Lien Lindsay RN     [facility]  Office: [phone]  Cell: [phone]  Fax # [phone]

## 2025-01-03 NOTE — PROGRESS NOTES
"NEPHROLOGY PROGRESS NOTE------[facility]    [facility]  [phone]  Raymundo Harris MD      Patient Care Team:  Katie Duran PA as PCP - General (Physician Assistant)  Uli Starr MD as Consulting Physician (Nephrology)      Provider:  Raymundo Harris MD  Patient Name: Yarelis Jackson  :  1970    SUBJECTIVE:    F/U ARF/MARGOT/ELECTROLYTE ABNORMALITIES  No chest pain or shortness of breath    Medication:  apixaban, 5 mg, Oral, Q12H  insulin glargine, 22 Units, Subcutaneous, Nightly  insulin lispro, 2-7 Units, Subcutaneous, TID With Meals  insulin lispro, 7 Units, Subcutaneous, TID With Meals  nystatin, 1 Application, Topical, Q8H  oxyCODONE, 10 mg, Oral, Q12H  pantoprazole, 40 mg, Oral, Q AM  QUEtiapine, 75 mg, Oral, Q12H  sodium chloride, 10 mL, Intravenous, Q12H             OBJECTIVE    Vital Sign Min/Max for last 24 hours  Temp  Min: 97.7 °F (36.5 °C)  Max: 99.3 °F (37.4 °C)   BP  Min: 137/71  Max: 150/70   Pulse  Min: 87  Max: 94   Resp  Min: 17  Max: 23   SpO2  Min: 93 %  Max: 97 %   No data recorded   No data recorded     Flowsheet Rows      Flowsheet Row First Filed Value   Admission Height 162.6 cm (64\") Documented at 2024 0443   Admission Weight 92.5 kg (203 lb 14.4 oz) Documented at 2024 0527            I/O this shift:  In: 120 [P.O.:120]  Out: 1100 [Urine:1100]  I/O last 3 completed shifts:  In: 600 [P.O.:600]  Out: 2800 [Urine:2200]    Physical Exam:     General Appearance:  NAD  Head: normocephalic, without obvious abnormality and atraumatic  Eyes: conjunctivae and sclerae normal and no icterus  Neck: supple and no JVD  Lungs: +SCATTERED RHONCHI  Heart: regular rhythm & normal rate and normal S1, S2 +THOMAS  Chest: Wall no abnormalities observed  Abdomen: normal bowel sounds and soft +OBESITY  Extremities: moves extremities well, +TRACE  BILAT PRETIBIAL EDEMA, no cyanosis and no redness. +DJD  Skin: " "Warm  Neurologic:  A AND O X 2    Labs:    WBC WBC   Date Value Ref Range Status   01/03/2025 10.89 (H) 3.40 - 10.80 10*3/mm3 Final   01/02/2025 11.04 (H) 3.40 - 10.80 10*3/mm3 Final   01/01/2025 10.19 3.40 - 10.80 10*3/mm3 Final      HGB Hemoglobin   Date Value Ref Range Status   01/03/2025 8.3 (L) 12.0 - 15.9 g/dL Final   01/02/2025 8.7 (L) 12.0 - 15.9 g/dL Final   01/01/2025 8.1 (L) 12.0 - 15.9 g/dL Final      HCT Hematocrit   Date Value Ref Range Status   01/03/2025 26.2 (L) 34.0 - 46.6 % Final   01/02/2025 27.9 (L) 34.0 - 46.6 % Final   01/01/2025 26.2 (L) 34.0 - 46.6 % Final      Platelets No results found for: \"LABPLAT\"   MCV MCV   Date Value Ref Range Status   01/03/2025 97.8 (H) 79.0 - 97.0 fL Final   01/02/2025 97.6 (H) 79.0 - 97.0 fL Final   01/01/2025 96.3 79.0 - 97.0 fL Final          Sodium Sodium   Date Value Ref Range Status   01/03/2025 134 (L) 136 - 145 mmol/L Final   01/02/2025 140 136 - 145 mmol/L Final   01/01/2025 138 136 - 145 mmol/L Final      Potassium Potassium   Date Value Ref Range Status   01/03/2025 3.8 3.5 - 5.2 mmol/L Final   01/02/2025 3.8 3.5 - 5.2 mmol/L Final   01/01/2025 3.6 3.5 - 5.2 mmol/L Final      Chloride Chloride   Date Value Ref Range Status   01/03/2025 100 98 - 107 mmol/L Final   01/02/2025 103 98 - 107 mmol/L Final   01/01/2025 102 98 - 107 mmol/L Final      CO2 CO2   Date Value Ref Range Status   01/03/2025 26.4 22.0 - 29.0 mmol/L Final   01/02/2025 30.4 (H) 22.0 - 29.0 mmol/L Final   01/01/2025 30.9 (H) 22.0 - 29.0 mmol/L Final      BUN BUN   Date Value Ref Range Status   01/03/2025 13 6 - 20 mg/dL Final   01/02/2025 14 6 - 20 mg/dL Final   01/01/2025 15 6 - 20 mg/dL Final      Creatinine Creatinine   Date Value Ref Range Status   01/03/2025 0.77 0.57 - 1.00 mg/dL Final   01/02/2025 0.76 0.57 - 1.00 mg/dL Final   01/01/2025 0.84 0.57 - 1.00 mg/dL Final      Calcium Calcium   Date Value Ref Range Status   01/03/2025 8.4 (L) 8.6 - 10.5 mg/dL Final   01/02/2025 8.6 8.6 " "- 10.5 mg/dL Final   01/01/2025 8.3 (L) 8.6 - 10.5 mg/dL Final      PO4 No components found for: \"PO4\"   Albumin Albumin   Date Value Ref Range Status   01/03/2025 3.0 (L) 3.5 - 5.2 g/dL Final   01/02/2025 3.2 (L) 3.5 - 5.2 g/dL Final   01/01/2025 3.0 (L) 3.5 - 5.2 g/dL Final      Magnesium Magnesium   Date Value Ref Range Status   01/03/2025 1.6 1.6 - 2.6 mg/dL Final   01/02/2025 1.7 1.6 - 2.6 mg/dL Final   01/01/2025 1.6 1.6 - 2.6 mg/dL Final      Uric Acid No components found for: \"URIC ACID\"     Imaging Results (Last 72 Hours)       ** No results found for the last 72 hours. **            Results for orders placed during the hospital encounter of 12/18/24    XR Chest 1 View    Narrative  XR CHEST 1 VW    Date of Exam: 12/22/2024 12:35 AM EST    Indication: Intubated Patient    Comparison:  12/21/2024    FINDINGS:  Endotracheal tube tip appears in satisfactory position at the level of the aortic arch. Right IJ catheter appears in stable position. Enteric tube extends into the left upper quadrant. Heart size and mediastinal contour appear within normal limits. No  definite pneumothorax or effusion. Mild bibasilar opacities. Findings of advanced emphysema with suspected apical scarring.    Impression  1.Tubes and lines appear in satisfactory positions, as above.  2.Mild bibasilar opacities which could represent atelectasis or infiltrate.  3.Advanced emphysema.        Electronically Signed: Sam Haynes  12/22/2024 7:07 AM EST  Workstation ID: [id number]      XR Chest 1 View    Narrative  XR CHEST 1 VW    Date of Exam: 12/21/2024 4:32 AM EST    Indication: Intubated Patient    Comparison: 12/20/2024    Findings:  There is been no interval tube or line changes. Heart size and pulmonary vessels are within normal limits. There are postoperative changes of the bilateral upper lobes. Lungs appear clear. No pleural effusion or pneumothorax.    Impression  Impression:    1. No tube or line change  2. No focal airspace " consolidation or other acute process.      Electronically Signed: Julio Thorpe MD  12/21/2024 9:54 AM EST  Workstation ID: [id number]      XR Chest 1 View    Narrative  XR CHEST 1 VW    Date of Exam: 12/20/2024 12:10 AM EST    Indication: Intubated Patient    Comparison: 12/19/2024    Findings:  Endotracheal tube approximately 2.5 cm above the carmina. Feeding tube and right IJ dialysis catheter remain in place. No new tubes or lines heart size and pulmonary vasculature are stable. Bullous changes in the right upper lobe. Postoperative changes in  the left upper lobe. Lungs grossly clear. No pneumothorax    Impression  Impression:  Stable chest demonstrating COPD and postoperative changes with no acute cardiopulmonary process demonstrated      Electronically Signed: Shon Ponce  12/20/2024 7:19 AM EST  Workstation ID: [id number]      Results for orders placed during the hospital encounter of 12/18/24    Duplex Lower Extremity Art / Grafts - Right CAR    Interpretation Summary    Limited study due to body habitus and current dressing site.  Patent external iliac and femoral-popliteal arterial flow with low flow noted below the common femoral.  Common femoral artery poorly visualized.  Cannot rule out occlusion of the common femoral artery.        ASSESSMENT / PLAN      DKA (diabetic ketoacidosis)    Arterial thrombosis    ARF/MARGOT------ nonoliguric.  Follow for further dialysis need... +ARF/MARGOT with historically normal renal function. +ARF/MARGOT secondary to severe prerenal state and ATN from hypotension and Rhabdomyolysis.  Pressor support. Off IVFs. No NSAIDs. Renal US without obstructive uropathy.   Dose meds for CrCl less than 10 cc/min     2. HYPERNATREMIA/HYPEROSMOLAR STATE-----encourage fluid intake     3. ACIDOSIS--Resolved     4. DVT PROPHYLAXIS     5. HYPOCALCEMIA------Replace IV and follow ionized levels     6. DM------BS ok     7. ANEMIA---------H/H stable     8. OA/DJD/HYPERURICEMIA------Allopurinol . No  NSAIDs   Restart IVFs. CK down post multiple days of HD     10. GERD/PUD PROPHYLAXIS-----Renal dose adjusted Pepcid     11. DELIRIUM/TME     12. COPD/ACUTE HYPOXIC RESPIRATORY FAILURE-----S/P extubation    13. HYPOALBUMINEMIA-----S/P IV Albumin to temporize    14. EDEMA/VOLUME EXCESS---- Better post gentle UF with HD    15. HYPOTENSION-----BP ok    16. ELEVATED LFTS/TRANSAMINASES------Secondary to shock liver and Rhabdo. Follow    17. FASCITIS------S/P surgery      18. DELIRIUM---Better    19. HYPOPHOSPHATEMIA------Replaced    20. HYPOMAGNESEMIA------Replaced    Creatinine stable/ non oliguric  Monitor renal function, fluid status and electrolytes    Raymundo Harris MD  [facility]  [phone]  01/03/25  12:44 EST

## 2025-01-03 NOTE — PROGRESS NOTES
[facility] MEDICINE SERVICE  DAILY PROGRESS NOTE    NAME: Yarelis Jackson  : 1970  MRN: [id number]      LOS: 16 days     PROVIDER OF SERVICE: Ella Dow MD    Chief Complaint: DKA (diabetic ketoacidosis)    Subjective:     Interval History:  History taken from: patient    Denies any acute complaints.  Denies nausea vomiting or diarrhea.      Patient seen with RN  Patient still with Dobbhoff tube in place as she failed swallow study  Awaiting repeat swallow eval  Follow-up per endocrine  Patient with DKA that has resolved  She is awake alert oriented x 3 feeling tired hemodynamically stable otherwise  Dissipate removal of Dobbhoff tube later today      Patient is back from swallow eval  She passed her swallow eval and Dobbhoff tube would not be discontinued  Is awake alert  Patient with right lower extremity EXTR ischemia status post thrombectomy and redo thrombectomy as well as fasciotomy  Plan for vacuum dressing change today with wound care  May transition from heparin drip to dual antiplatelets as per vascular recommendation  Patient seen per endocrine as she had DKA on admission that has resolved  Continue Lantus 22 units nightly and lispro 6 units Q6 plus sliding scale as per endocrine recommendation  Patient is getting wound care and having extreme pain  Oxycodone is resumed  Will give 1 dose of Dilaudid for care      Patient feeling fine  Started on Eliquis per vascular  Treatments better controlled  Followed up per nephrology and endocrine and vascular  Anemia of chronic disease with stable hemoglobin of 8  Patient  8 wound VAC.  On discharge home health care for wound care  25  Is doing fine no new complaint  On oral Eliquis  Blood sugar has been running from 100-250  Placement at the skilled nursing facility as per PT recommendation  Hemoglobin stable at 8  Her kidney function is back to baseline and she is hemodynamically stable  Dc midodrine as bp  stable    1/3/25    Patient is in significant pain while getting wound care  Her blood pressure has been stable  Will DC midodrine  Her hemoglobin is stable at 8  Discharge planning skilled nursing facility      Review of Systems:   Review of Systems  As above  Objective:     Vital Signs  Temp:  [97.7 °F (36.5 °C)-99.3 °F (37.4 °C)] 97.7 °F (36.5 °C)  Heart Rate:  [87-94] 87  Resp:  [17-23] 18  BP: (137-150)/(70-80) 138/70   Body mass index is 37.27 kg/m².    Physical Exam  Physical Exam  Constitutional:       Appearance: Normal appearance.   HENT:      Nose:      Comments: NG tube  Cardiovascular:      Rate and Rhythm: Normal rate and regular rhythm.      Pulses: Normal pulses.      Heart sounds: Normal heart sounds. No murmur heard.  Pulmonary:      Effort: Pulmonary effort is normal. No respiratory distress.      Breath sounds: Normal breath sounds. No wheezing or rales.   Abdominal:      General: Bowel sounds are normal. There is no distension.      Tenderness: There is no abdominal tenderness.   Musculoskeletal:         General: No swelling, tenderness, deformity or signs of injury.      Cervical back: Normal range of motion.      Right lower leg: Edema (Right lower extremity dressing) present.   Neurological:      General: No focal deficit present.      Mental Status: She is alert and oriented to person, place, and time. Mental status is at baseline.      Cranial Nerves: No cranial nerve deficit.      Sensory: No sensory deficit.      Motor: No weakness.      Coordination: Coordination normal.            Diagnostic Data    Results from last 7 days   Lab Units 01/03/25  0422   WBC 10*3/mm3 10.89*   HEMOGLOBIN g/dL 8.3*   HEMATOCRIT % 26.2*   PLATELETS 10*3/mm3 302   GLUCOSE mg/dL 213*   CREATININE mg/dL 0.77   BUN mg/dL 13   SODIUM mmol/L 134*   POTASSIUM mmol/L 3.8   AST (SGOT) U/L 26   ALT (SGPT) U/L 42*   ALK PHOS U/L 101   BILIRUBIN mg/dL 0.5   ANION GAP mmol/L 7.6       No radiology results for the last  day      I reviewed the patient's new clinical results.    Assessment/Plan:     Active and Resolved Problems  Active Hospital Problems    Diagnosis  POA    **DKA (diabetic ketoacidosis) [E11.10]  Yes    Arterial thrombosis [I74.9]  No      Resolved Hospital Problems   No resolved problems to display.       Diabetic ketoacidosis without coma, with new onset diabetes mellitus, type 2 / Metabolic acidosis, increased anion gap-Resolved  -Etiology uncertain, though new onset diabetes mellitus.  A1c 15.80 on admission, no prior available.  -Lantus 22 units at night, insulin lispro 6 units every 6 hours.  Continue insulin sliding scale every 6 hours.  -Endocrinology Dr. Winkler following  -Nutrition following patient due to enteric feeds     Acute Kidney Injury requiring emergent hemodialysis  -Remains nonoliguric. Baseline creatinine 0.90.  Creatinine 2.10 on admission.  -Monitor Input/Output very closely.   -Avoid NSAIDs, nephrotoxic medications, and hypotension.  -Nephrology following.Initially on HD due to acute renal failure.  No history for the past 2 days.  Will evaluate need for HD on a daily basis per nephrology.      Acute occlusive thrombus of the right iliac artery with limb ischemia improved  -Arterial duplex with noted right femoral, deep femoral, and popliteal arteries being patent but with very low amplitude monophasic signals, suggesting severe inflow stenosis or occlusion; no measurable Doppler flow in the anterior or posterior tibial and peroneal arteries.  -Bilateral lower extremity ABIs ordered: Right STACEY critically reduced with STACEY of 0 and severe digital insufficiency; left STACEY is normal with moderate digital insufficiency.  -Emergent surgery per vascular surgeon 12/19/2024 for right iliac thrombectomy via open groin incision then return to surgery on 12/20/2024 for recurrent right lower extremity ischemia due to arterial thrombosis and underwent right iliofemoral popliteal thrombectomy, right femoral  endarterectomy with patch, right lower extremity arteriogram, and closed right calf fasciotomies  -Continue on heparin drip per vascular surgery   -Hematology/oncology consulted  -Return to the OR morning (12/23) for emergent 4 compartment fasciotomies due to critical right leg ischemia  -Defer to vascular surgery for management of wound VAC.  Next wound VAC change on Monday     Nontraumatic rhabdomyolysis --> Improving  -CK downtrending  -Encourage enteric hydration      Leukocytosis likely reactive  -Continue to monitor clinically    Electrolyte derangements likely due to renal failure  -Monitor and replete as needed per protocol      Cutaneous candidiasis of groin  -Likely secondary to uncontrolled diabetes mellitus  -Nystatin ordered     Essential Hypertension  -Clonidine patch ordered by nephrology-hold   -Titrate medications as needed.     Bilateral leg rash, concern for scabies infection  -Permethrin regimen ordered.  Completed first treatment on 12/18, repeat treatment in 2 weeks  -Keep in contact precautions x 1 week post initial treatment (12/25)     Transaminitis  -US of Liver: Hepatomegaly with steatosis.  -Hepatitis panel-negative.  -Monitor and trend LFTs.       COPD: Not in exacerbation.  Duonebs ordered as needed.  Obesity: Body mass index is 33.49 kg/m².       VTE Prophylaxis:  Pharmacologic VTE prophylaxis orders are present.             Disposition Planning:     Barriers to Discharge: medical optimization  Anticipated Date of Discharge: pending  Place of Discharge: home      Time: 35 minutes     Code Status and Medical Interventions: CPR (Attempt to Resuscitate); Full Support   Ordered at: 12/18/24 0830     Code Status (Patient has no pulse and is not breathing):    CPR (Attempt to Resuscitate)     Medical Interventions (Patient has pulse or is breathing):    Full Support       Signature: Electronically signed by Ella Dow MD, 01/03/25, 13:07 EST.  [facility] Hospitalist Team

## 2025-01-03 NOTE — PROGRESS NOTES
Daily Progress Note    Patient Care Team:  Katie Duran PA as PCP - General (Physician Assistant)  Uli Starr MD as Consulting Physician (Nephrology)    Chief Complaint: Follow up type 1 diabetes    HPI:  Patient seen, clinically doing well. Blood sugars stable. No complaints at this time.     ROS:   Constitutional:  Denies fatigue, tiredness.    Respiratory: denies cough, shortness of breath.   Cardiovascular:  denies chest pain, edema   GI:  Denies abdominal pain, nausea, vomiting.         Vitals:    01/03/25 1137   BP: 138/70   Pulse: 87   Resp: 18   Temp: 97.7 °F (36.5 °C)   SpO2: 93%     Body mass index is 37.27 kg/m².    Physical Exam:  GEN: NAD, conversant  PSYCH: Awake and coherent      Results Review:     I reviewed the patient's new clinical results.    Glucose   Date Value Ref Range Status   01/03/2025 213 (H) 65 - 99 mg/dL Final     Sodium   Date Value Ref Range Status   01/03/2025 134 (L) 136 - 145 mmol/L Final     Potassium   Date Value Ref Range Status   01/03/2025 3.8 3.5 - 5.2 mmol/L Final     CO2   Date Value Ref Range Status   01/03/2025 26.4 22.0 - 29.0 mmol/L Final     Chloride   Date Value Ref Range Status   01/03/2025 100 98 - 107 mmol/L Final     Anion Gap   Date Value Ref Range Status   01/03/2025 7.6 5.0 - 15.0 mmol/L Final     Creatinine   Date Value Ref Range Status   01/03/2025 0.77 0.57 - 1.00 mg/dL Final     BUN   Date Value Ref Range Status   01/03/2025 13 6 - 20 mg/dL Final     BUN/Creatinine Ratio   Date Value Ref Range Status   01/03/2025 16.9 7.0 - 25.0 Final     Calcium   Date Value Ref Range Status   01/03/2025 8.4 (L) 8.6 - 10.5 mg/dL Final     Alkaline Phosphatase   Date Value Ref Range Status   01/03/2025 101 39 - 117 U/L Final     Total Protein   Date Value Ref Range Status   01/03/2025 6.3 6.0 - 8.5 g/dL Final     ALT (SGPT)   Date Value Ref Range Status   01/03/2025 42 (H) 1 - 33 U/L Final     AST (SGOT)   Date Value Ref Range Status   01/03/2025  "26 1 - 32 U/L Final     Total Bilirubin   Date Value Ref Range Status   01/03/2025 0.5 0.0 - 1.2 mg/dL Final     Albumin   Date Value Ref Range Status   01/03/2025 3.0 (L) 3.5 - 5.2 g/dL Final     Globulin   Date Value Ref Range Status   01/03/2025 3.3 gm/dL Final     Magnesium   Date Value Ref Range Status   01/03/2025 1.6 1.6 - 2.6 mg/dL Final     Phosphorus   Date Value Ref Range Status   01/03/2025 2.8 2.5 - 4.5 mg/dL Final     Lab Results   Component Value Date    HGBA1C 15.80 (H) 12/18/2024     No results found for: \"GLUF\", \"MICROALBUR\"  Results from last 7 days   Lab Units 01/03/25  1139 01/03/25  0755 01/02/25  1958 01/02/25  1600 01/02/25  1203 01/02/25  0746   GLUCOSE mg/dL 126* 158* 166* 168* 154* 138*             Medication Review: Reviewed.     apixaban, 5 mg, Oral, Q12H  insulin glargine, 22 Units, Subcutaneous, Nightly  insulin lispro, 2-7 Units, Subcutaneous, TID With Meals  insulin lispro, 7 Units, Subcutaneous, TID With Meals  nystatin, 1 Application, Topical, Q8H  oxyCODONE, 10 mg, Oral, Q12H  pantoprazole, 40 mg, Oral, Q AM  QUEtiapine, 75 mg, Oral, Q12H  sodium chloride, 10 mL, Intravenous, Q12H              Assessment and plan:  Diabetes mellitus type 2 with hyperglycemia: Overall fair control, will continue glargine 22 units subcu nightly and lispro 7 units with each meal along with lispro sliding scale with meals. Will follow blood sugars and make further adjustments as needed     Rodriguez Worthy MD. FACE                "

## 2025-01-04 LAB
ALBUMIN SERPL-MCNC: 3.1 G/DL (ref 3.5–5.2)
ALBUMIN/GLOB SERPL: 0.8 G/DL
ALP SERPL-CCNC: 97 U/L (ref 39–117)
ALT SERPL W P-5'-P-CCNC: 33 U/L (ref 1–33)
ANION GAP SERPL CALCULATED.3IONS-SCNC: 8.6 MMOL/L (ref 5–15)
AST SERPL-CCNC: 25 U/L (ref 1–32)
BASOPHILS # BLD AUTO: 0.04 10*3/MM3 (ref 0–0.2)
BASOPHILS NFR BLD AUTO: 0.4 % (ref 0–1.5)
BILIRUB SERPL-MCNC: 0.5 MG/DL (ref 0–1.2)
BUN SERPL-MCNC: 13 MG/DL (ref 6–20)
BUN/CREAT SERPL: 17.3 (ref 7–25)
CALCIUM SPEC-SCNC: 8.9 MG/DL (ref 8.6–10.5)
CHLORIDE SERPL-SCNC: 102 MMOL/L (ref 98–107)
CO2 SERPL-SCNC: 25.4 MMOL/L (ref 22–29)
CREAT SERPL-MCNC: 0.75 MG/DL (ref 0.57–1)
DEPRECATED RDW RBC AUTO: 56.7 FL (ref 37–54)
EGFRCR SERPLBLD CKD-EPI 2021: 95.3 ML/MIN/1.73
EOSINOPHIL # BLD AUTO: 0.18 10*3/MM3 (ref 0–0.4)
EOSINOPHIL NFR BLD AUTO: 1.8 % (ref 0.3–6.2)
ERYTHROCYTE [DISTWIDTH] IN BLOOD BY AUTOMATED COUNT: 17 % (ref 12.3–15.4)
GLOBULIN UR ELPH-MCNC: 3.9 GM/DL
GLUCOSE BLDC GLUCOMTR-MCNC: 127 MG/DL (ref 70–105)
GLUCOSE BLDC GLUCOMTR-MCNC: 150 MG/DL (ref 70–105)
GLUCOSE BLDC GLUCOMTR-MCNC: 156 MG/DL (ref 70–105)
GLUCOSE BLDC GLUCOMTR-MCNC: 160 MG/DL (ref 70–105)
GLUCOSE SERPL-MCNC: 140 MG/DL (ref 65–99)
HCT VFR BLD AUTO: 28.5 % (ref 34–46.6)
HGB BLD-MCNC: 8.7 G/DL (ref 12–15.9)
IMM GRANULOCYTES # BLD AUTO: 0.07 10*3/MM3 (ref 0–0.05)
IMM GRANULOCYTES NFR BLD AUTO: 0.7 % (ref 0–0.5)
LYMPHOCYTES # BLD AUTO: 2.53 10*3/MM3 (ref 0.7–3.1)
LYMPHOCYTES NFR BLD AUTO: 25.2 % (ref 19.6–45.3)
MAGNESIUM SERPL-MCNC: 1.7 MG/DL (ref 1.6–2.6)
MCH RBC QN AUTO: 30 PG (ref 26.6–33)
MCHC RBC AUTO-ENTMCNC: 30.5 G/DL (ref 31.5–35.7)
MCV RBC AUTO: 98.3 FL (ref 79–97)
MONOCYTES # BLD AUTO: 0.72 10*3/MM3 (ref 0.1–0.9)
MONOCYTES NFR BLD AUTO: 7.2 % (ref 5–12)
NEUTROPHILS NFR BLD AUTO: 6.5 10*3/MM3 (ref 1.7–7)
NEUTROPHILS NFR BLD AUTO: 64.7 % (ref 42.7–76)
NRBC BLD AUTO-RTO: 0 /100 WBC (ref 0–0.2)
PHOSPHATE SERPL-MCNC: 3.6 MG/DL (ref 2.5–4.5)
PLATELET # BLD AUTO: 337 10*3/MM3 (ref 140–450)
PMV BLD AUTO: 8.7 FL (ref 6–12)
POTASSIUM SERPL-SCNC: 3.9 MMOL/L (ref 3.5–5.2)
PROT SERPL-MCNC: 7 G/DL (ref 6–8.5)
RBC # BLD AUTO: 2.9 10*6/MM3 (ref 3.77–5.28)
SODIUM SERPL-SCNC: 136 MMOL/L (ref 136–145)
WBC NRBC COR # BLD AUTO: 10.04 10*3/MM3 (ref 3.4–10.8)

## 2025-01-04 PROCEDURE — 63710000001 INSULIN GLARGINE PER 5 UNITS: Performed by: INTERNAL MEDICINE

## 2025-01-04 PROCEDURE — 82948 REAGENT STRIP/BLOOD GLUCOSE: CPT

## 2025-01-04 PROCEDURE — 80053 COMPREHEN METABOLIC PANEL: CPT | Performed by: SURGERY

## 2025-01-04 PROCEDURE — 63710000001 INSULIN LISPRO (HUMAN) PER 5 UNITS: Performed by: INTERNAL MEDICINE

## 2025-01-04 PROCEDURE — 83735 ASSAY OF MAGNESIUM: CPT | Performed by: SURGERY

## 2025-01-04 PROCEDURE — 85025 COMPLETE CBC W/AUTO DIFF WBC: CPT | Performed by: SURGERY

## 2025-01-04 PROCEDURE — 84100 ASSAY OF PHOSPHORUS: CPT | Performed by: INTERNAL MEDICINE

## 2025-01-04 RX ORDER — OXYCODONE HYDROCHLORIDE 5 MG/1
5 TABLET ORAL EVERY 4 HOURS PRN
Status: DISPENSED | OUTPATIENT
Start: 2025-01-04 | End: 2025-01-09

## 2025-01-04 RX ADMIN — INSULIN LISPRO 7 UNITS: 100 INJECTION, SOLUTION INTRAVENOUS; SUBCUTANEOUS at 07:52

## 2025-01-04 RX ADMIN — OXYCODONE 5 MG: 5 TABLET ORAL at 04:11

## 2025-01-04 RX ADMIN — OXYCODONE HYDROCHLORIDE 10 MG: 10 TABLET, FILM COATED, EXTENDED RELEASE ORAL at 21:07

## 2025-01-04 RX ADMIN — QUETIAPINE FUMARATE 75 MG: 25 TABLET ORAL at 07:54

## 2025-01-04 RX ADMIN — INSULIN LISPRO 7 UNITS: 100 INJECTION, SOLUTION INTRAVENOUS; SUBCUTANEOUS at 17:16

## 2025-01-04 RX ADMIN — NYSTATIN 1 APPLICATION: 100000 CREAM TOPICAL at 12:02

## 2025-01-04 RX ADMIN — APIXABAN 5 MG: 5 TABLET, FILM COATED ORAL at 21:07

## 2025-01-04 RX ADMIN — OXYCODONE 5 MG: 5 TABLET ORAL at 17:20

## 2025-01-04 RX ADMIN — APIXABAN 5 MG: 5 TABLET, FILM COATED ORAL at 07:53

## 2025-01-04 RX ADMIN — OXYCODONE HYDROCHLORIDE 10 MG: 10 TABLET, FILM COATED, EXTENDED RELEASE ORAL at 07:55

## 2025-01-04 RX ADMIN — Medication 10 ML: at 07:54

## 2025-01-04 RX ADMIN — PANTOPRAZOLE SODIUM 40 MG: 40 TABLET, DELAYED RELEASE ORAL at 06:46

## 2025-01-04 RX ADMIN — NYSTATIN 1 APPLICATION: 100000 CREAM TOPICAL at 21:10

## 2025-01-04 RX ADMIN — INSULIN LISPRO 7 UNITS: 100 INJECTION, SOLUTION INTRAVENOUS; SUBCUTANEOUS at 12:29

## 2025-01-04 RX ADMIN — NYSTATIN 1 APPLICATION: 100000 CREAM TOPICAL at 06:46

## 2025-01-04 RX ADMIN — QUETIAPINE FUMARATE 75 MG: 25 TABLET ORAL at 21:07

## 2025-01-04 RX ADMIN — INSULIN GLARGINE-YFGN 22 UNITS: 100 INJECTION, SOLUTION SUBCUTANEOUS at 21:07

## 2025-01-04 RX ADMIN — INSULIN LISPRO 2 UNITS: 100 INJECTION, SOLUTION INTRAVENOUS; SUBCUTANEOUS at 07:52

## 2025-01-04 RX ADMIN — INSULIN LISPRO 2 UNITS: 100 INJECTION, SOLUTION INTRAVENOUS; SUBCUTANEOUS at 17:16

## 2025-01-04 NOTE — PROGRESS NOTES
"NEPHROLOGY PROGRESS NOTE------[facility]    [facility]  [phone]  Raymundo Harris MD      Patient Care Team:  Katie Duran PA as PCP - General (Physician Assistant)  Uli Starr MD as Consulting Physician (Nephrology)      Provider:  Raymundo Harris MD  Patient Name: Yarelis Jackson  :  1970    SUBJECTIVE:    F/U ARF/MARGOT/ELECTROLYTE ABNORMALITIES  No chest pain or shortness of breath    Medication:  apixaban, 5 mg, Oral, Q12H  insulin glargine, 22 Units, Subcutaneous, Nightly  insulin lispro, 2-7 Units, Subcutaneous, TID With Meals  insulin lispro, 7 Units, Subcutaneous, TID With Meals  nystatin, 1 Application, Topical, Q8H  oxyCODONE, 10 mg, Oral, Q12H  pantoprazole, 40 mg, Oral, Q AM  QUEtiapine, 75 mg, Oral, Q12H  sodium chloride, 10 mL, Intravenous, Q12H             OBJECTIVE    Vital Sign Min/Max for last 24 hours  Temp  Min: 97.7 °F (36.5 °C)  Max: 99.5 °F (37.5 °C)   BP  Min: 138/70  Max: 159/76   Pulse  Min: 85  Max: 92   Resp  Min: 17  Max: 20   SpO2  Min: 93 %  Max: 95 %   No data recorded   No data recorded     Flowsheet Rows      Flowsheet Row First Filed Value   Admission Height 162.6 cm (64\") Documented at 2024 0443   Admission Weight 92.5 kg (203 lb 14.4 oz) Documented at 2024 0527            No intake/output data recorded.  I/O last 3 completed shifts:  In: 1320 [P.O.:1320]  Out: 4475 [Urine:3975]    Physical Exam:     General Appearance:  NAD  Head: normocephalic, without obvious abnormality and atraumatic  Eyes: conjunctivae and sclerae normal and no icterus  Neck: supple and no JVD  Lungs: +SCATTERED RHONCHI  Heart: regular rhythm & normal rate and normal S1, S2 +THOMAS  Chest: Wall no abnormalities observed  Abdomen: normal bowel sounds and soft +OBESITY  Extremities: moves extremities well, +TRACE  BILAT PRETIBIAL EDEMA, no cyanosis and no redness. +DJD  Skin: Warm  Neurologic:  A AND O X " "2    Labs:    WBC WBC   Date Value Ref Range Status   01/04/2025 10.04 3.40 - 10.80 10*3/mm3 Final   01/03/2025 10.89 (H) 3.40 - 10.80 10*3/mm3 Final   01/02/2025 11.04 (H) 3.40 - 10.80 10*3/mm3 Final      HGB Hemoglobin   Date Value Ref Range Status   01/04/2025 8.7 (L) 12.0 - 15.9 g/dL Final   01/03/2025 8.3 (L) 12.0 - 15.9 g/dL Final   01/02/2025 8.7 (L) 12.0 - 15.9 g/dL Final      HCT Hematocrit   Date Value Ref Range Status   01/04/2025 28.5 (L) 34.0 - 46.6 % Final   01/03/2025 26.2 (L) 34.0 - 46.6 % Final   01/02/2025 27.9 (L) 34.0 - 46.6 % Final      Platelets No results found for: \"LABPLAT\"   MCV MCV   Date Value Ref Range Status   01/04/2025 98.3 (H) 79.0 - 97.0 fL Final   01/03/2025 97.8 (H) 79.0 - 97.0 fL Final   01/02/2025 97.6 (H) 79.0 - 97.0 fL Final          Sodium Sodium   Date Value Ref Range Status   01/04/2025 136 136 - 145 mmol/L Final   01/03/2025 134 (L) 136 - 145 mmol/L Final   01/02/2025 140 136 - 145 mmol/L Final      Potassium Potassium   Date Value Ref Range Status   01/04/2025 3.9 3.5 - 5.2 mmol/L Final   01/03/2025 3.8 3.5 - 5.2 mmol/L Final   01/02/2025 3.8 3.5 - 5.2 mmol/L Final      Chloride Chloride   Date Value Ref Range Status   01/04/2025 102 98 - 107 mmol/L Final   01/03/2025 100 98 - 107 mmol/L Final   01/02/2025 103 98 - 107 mmol/L Final      CO2 CO2   Date Value Ref Range Status   01/04/2025 25.4 22.0 - 29.0 mmol/L Final   01/03/2025 26.4 22.0 - 29.0 mmol/L Final   01/02/2025 30.4 (H) 22.0 - 29.0 mmol/L Final      BUN BUN   Date Value Ref Range Status   01/04/2025 13 6 - 20 mg/dL Final   01/03/2025 13 6 - 20 mg/dL Final   01/02/2025 14 6 - 20 mg/dL Final      Creatinine Creatinine   Date Value Ref Range Status   01/04/2025 0.75 0.57 - 1.00 mg/dL Final   01/03/2025 0.77 0.57 - 1.00 mg/dL Final   01/02/2025 0.76 0.57 - 1.00 mg/dL Final      Calcium Calcium   Date Value Ref Range Status   01/04/2025 8.9 8.6 - 10.5 mg/dL Final   01/03/2025 8.4 (L) 8.6 - 10.5 mg/dL Final " "  01/02/2025 8.6 8.6 - 10.5 mg/dL Final      PO4 No components found for: \"PO4\"   Albumin Albumin   Date Value Ref Range Status   01/04/2025 3.1 (L) 3.5 - 5.2 g/dL Final   01/03/2025 3.0 (L) 3.5 - 5.2 g/dL Final   01/02/2025 3.2 (L) 3.5 - 5.2 g/dL Final      Magnesium Magnesium   Date Value Ref Range Status   01/04/2025 1.7 1.6 - 2.6 mg/dL Final   01/03/2025 1.6 1.6 - 2.6 mg/dL Final   01/02/2025 1.7 1.6 - 2.6 mg/dL Final      Uric Acid No components found for: \"URIC ACID\"     Imaging Results (Last 72 Hours)       ** No results found for the last 72 hours. **            Results for orders placed during the hospital encounter of 12/18/24    XR Chest 1 View    Narrative  XR CHEST 1 VW    Date of Exam: 12/22/2024 12:35 AM EST    Indication: Intubated Patient    Comparison:  12/21/2024    FINDINGS:  Endotracheal tube tip appears in satisfactory position at the level of the aortic arch. Right IJ catheter appears in stable position. Enteric tube extends into the left upper quadrant. Heart size and mediastinal contour appear within normal limits. No  definite pneumothorax or effusion. Mild bibasilar opacities. Findings of advanced emphysema with suspected apical scarring.    Impression  1.Tubes and lines appear in satisfactory positions, as above.  2.Mild bibasilar opacities which could represent atelectasis or infiltrate.  3.Advanced emphysema.        Electronically Signed: Sam Haynes  12/22/2024 7:07 AM EST  Workstation ID: [id number]      XR Chest 1 View    Narrative  XR CHEST 1 VW    Date of Exam: 12/21/2024 4:32 AM EST    Indication: Intubated Patient    Comparison: 12/20/2024    Findings:  There is been no interval tube or line changes. Heart size and pulmonary vessels are within normal limits. There are postoperative changes of the bilateral upper lobes. Lungs appear clear. No pleural effusion or pneumothorax.    Impression  Impression:    1. No tube or line change  2. No focal airspace consolidation or other acute " process.      Electronically Signed: Julio Thorpe MD  12/21/2024 9:54 AM EST  Workstation ID: [id number]      XR Chest 1 View    Narrative  XR CHEST 1 VW    Date of Exam: 12/20/2024 12:10 AM EST    Indication: Intubated Patient    Comparison: 12/19/2024    Findings:  Endotracheal tube approximately 2.5 cm above the carmina. Feeding tube and right IJ dialysis catheter remain in place. No new tubes or lines heart size and pulmonary vasculature are stable. Bullous changes in the right upper lobe. Postoperative changes in  the left upper lobe. Lungs grossly clear. No pneumothorax    Impression  Impression:  Stable chest demonstrating COPD and postoperative changes with no acute cardiopulmonary process demonstrated      Electronically Signed: Shon Ponce  12/20/2024 7:19 AM EST  Workstation ID: [id number]      Results for orders placed during the hospital encounter of 12/18/24    Duplex Lower Extremity Art / Grafts - Right CAR    Interpretation Summary    Limited study due to body habitus and current dressing site.  Patent external iliac and femoral-popliteal arterial flow with low flow noted below the common femoral.  Common femoral artery poorly visualized.  Cannot rule out occlusion of the common femoral artery.        ASSESSMENT / PLAN      DKA (diabetic ketoacidosis)    Arterial thrombosis    ARF/MARGOT------ nonoliguric.  Follow for further dialysis need... +ARF/MARGOT with historically normal renal function. +ARF/MARGOT secondary to severe prerenal state and ATN from hypotension and Rhabdomyolysis.  Pressor support. Off IVFs. No NSAIDs. Renal US without obstructive uropathy.   Dose meds for CrCl less than 10 cc/min     2. HYPERNATREMIA/HYPEROSMOLAR STATE-----encourage fluid intake     3. ACIDOSIS--Resolved     4. DVT PROPHYLAXIS     5. HYPOCALCEMIA------Replace IV and follow ionized levels     6. DM------BS ok     7. ANEMIA---------H/H stable     8. OA/DJD/HYPERURICEMIA------Allopurinol . No NSAIDs   Restart IVFs. CK down  post multiple days of HD     10. GERD/PUD PROPHYLAXIS-----Renal dose adjusted Pepcid     11. DELIRIUM/TME     12. COPD/ACUTE HYPOXIC RESPIRATORY FAILURE-----S/P extubation    13. HYPOALBUMINEMIA-----S/P IV Albumin to temporize    14. EDEMA/VOLUME EXCESS---- Better post gentle UF with HD    15. HYPOTENSION-----BP ok    16. ELEVATED LFTS/TRANSAMINASES------Secondary to shock liver and Rhabdo. Follow    17. FASCITIS------S/P surgery      18. DELIRIUM---Better    19. HYPOPHOSPHATEMIA------Replaced    20. HYPOMAGNESEMIA------Replaced    Creatinine remains stable/ non oliguric  Monitor renal function, fluid status and electrolytes    Raymundo Harris MD  [facility]  [phone]  01/04/25  11:10 EST

## 2025-01-04 NOTE — PROGRESS NOTES
Daily Progress Note    Patient Care Team:  Katie Duran PA as PCP - General (Physician Assistant)  Uli Starr MD as Consulting Physician (Nephrology)    Chief Complaint: Follow-up type 1 diabetes    HPI: Chart reviewed and blood sugar log reviewed.         Vitals:    01/04/25 0734   BP: 152/78   Pulse: 89   Resp: 18   Temp: 97.8 °F (36.6 °C)   SpO2: 94%     Body mass index is 37.27 kg/m².      Results Review:     I reviewed the patient's new clinical results.    Glucose   Date Value Ref Range Status   01/04/2025 140 (H) 65 - 99 mg/dL Final     Sodium   Date Value Ref Range Status   01/04/2025 136 136 - 145 mmol/L Final     Potassium   Date Value Ref Range Status   01/04/2025 3.9 3.5 - 5.2 mmol/L Final     CO2   Date Value Ref Range Status   01/04/2025 25.4 22.0 - 29.0 mmol/L Final     Chloride   Date Value Ref Range Status   01/04/2025 102 98 - 107 mmol/L Final     Anion Gap   Date Value Ref Range Status   01/04/2025 8.6 5.0 - 15.0 mmol/L Final     Creatinine   Date Value Ref Range Status   01/04/2025 0.75 0.57 - 1.00 mg/dL Final     BUN   Date Value Ref Range Status   01/04/2025 13 6 - 20 mg/dL Final     BUN/Creatinine Ratio   Date Value Ref Range Status   01/04/2025 17.3 7.0 - 25.0 Final     Calcium   Date Value Ref Range Status   01/04/2025 8.9 8.6 - 10.5 mg/dL Final     Alkaline Phosphatase   Date Value Ref Range Status   01/04/2025 97 39 - 117 U/L Final     Total Protein   Date Value Ref Range Status   01/04/2025 7.0 6.0 - 8.5 g/dL Final     ALT (SGPT)   Date Value Ref Range Status   01/04/2025 33 1 - 33 U/L Final     AST (SGOT)   Date Value Ref Range Status   01/04/2025 25 1 - 32 U/L Final     Total Bilirubin   Date Value Ref Range Status   01/04/2025 0.5 0.0 - 1.2 mg/dL Final     Albumin   Date Value Ref Range Status   01/04/2025 3.1 (L) 3.5 - 5.2 g/dL Final     Globulin   Date Value Ref Range Status   01/04/2025 3.9 gm/dL Final     Magnesium   Date Value Ref Range Status    01/04/2025 1.7 1.6 - 2.6 mg/dL Final     Phosphorus   Date Value Ref Range Status   01/04/2025 3.6 2.5 - 4.5 mg/dL Final     Lab Results   Component Value Date    HGBA1C 15.80 (H) 12/18/2024       Results from last 7 days   Lab Units 01/04/25  0736 01/03/25  2037 01/03/25  1656 01/03/25  1139 01/03/25  0755 01/02/25  1958   GLUCOSE mg/dL 160* 178* 140* 126* 158* 166*       Medication Review: Reviewed.     apixaban, 5 mg, Oral, Q12H  insulin glargine, 22 Units, Subcutaneous, Nightly  insulin lispro, 2-7 Units, Subcutaneous, TID With Meals  insulin lispro, 7 Units, Subcutaneous, TID With Meals  nystatin, 1 Application, Topical, Q8H  oxyCODONE, 10 mg, Oral, Q12H  pantoprazole, 40 mg, Oral, Q AM  QUEtiapine, 75 mg, Oral, Q12H  sodium chloride, 10 mL, Intravenous, Q12H      Assessment and plan:  Diabetes mellitus type 1 with hyperglycemia: Blood sugars fair, continue current regimen of glargine 22 units subcu nightly and lispro 7 units with meals with lispro sliding scale.  Follow blood sugars and make further adjustments as needed.    Rodriguez Worthy MD. FACE

## 2025-01-05 LAB
ALBUMIN SERPL-MCNC: 3.2 G/DL (ref 3.5–5.2)
ALBUMIN/GLOB SERPL: 0.9 G/DL
ALP SERPL-CCNC: 113 U/L (ref 39–117)
ALT SERPL W P-5'-P-CCNC: 29 U/L (ref 1–33)
ANION GAP SERPL CALCULATED.3IONS-SCNC: 7.3 MMOL/L (ref 5–15)
AST SERPL-CCNC: 25 U/L (ref 1–32)
BASOPHILS # BLD AUTO: 0.04 10*3/MM3 (ref 0–0.2)
BASOPHILS NFR BLD AUTO: 0.4 % (ref 0–1.5)
BILIRUB SERPL-MCNC: 0.5 MG/DL (ref 0–1.2)
BUN SERPL-MCNC: 13 MG/DL (ref 6–20)
BUN/CREAT SERPL: 18.6 (ref 7–25)
CALCIUM SPEC-SCNC: 8.7 MG/DL (ref 8.6–10.5)
CHLORIDE SERPL-SCNC: 102 MMOL/L (ref 98–107)
CO2 SERPL-SCNC: 24.7 MMOL/L (ref 22–29)
CREAT SERPL-MCNC: 0.7 MG/DL (ref 0.57–1)
DEPRECATED RDW RBC AUTO: 58.4 FL (ref 37–54)
EGFRCR SERPLBLD CKD-EPI 2021: 103.6 ML/MIN/1.73
EOSINOPHIL # BLD AUTO: 0.2 10*3/MM3 (ref 0–0.4)
EOSINOPHIL NFR BLD AUTO: 1.9 % (ref 0.3–6.2)
ERYTHROCYTE [DISTWIDTH] IN BLOOD BY AUTOMATED COUNT: 17.1 % (ref 12.3–15.4)
GLOBULIN UR ELPH-MCNC: 3.7 GM/DL
GLUCOSE BLDC GLUCOMTR-MCNC: 112 MG/DL (ref 70–105)
GLUCOSE BLDC GLUCOMTR-MCNC: 133 MG/DL (ref 70–105)
GLUCOSE BLDC GLUCOMTR-MCNC: 161 MG/DL (ref 70–105)
GLUCOSE BLDC GLUCOMTR-MCNC: 89 MG/DL (ref 70–105)
GLUCOSE SERPL-MCNC: 115 MG/DL (ref 65–99)
HCT VFR BLD AUTO: 28.4 % (ref 34–46.6)
HGB BLD-MCNC: 8.9 G/DL (ref 12–15.9)
IMM GRANULOCYTES # BLD AUTO: 0.07 10*3/MM3 (ref 0–0.05)
IMM GRANULOCYTES NFR BLD AUTO: 0.7 % (ref 0–0.5)
LYMPHOCYTES # BLD AUTO: 2.19 10*3/MM3 (ref 0.7–3.1)
LYMPHOCYTES NFR BLD AUTO: 21.2 % (ref 19.6–45.3)
MAGNESIUM SERPL-MCNC: 1.7 MG/DL (ref 1.6–2.6)
MCH RBC QN AUTO: 30.2 PG (ref 26.6–33)
MCHC RBC AUTO-ENTMCNC: 31.3 G/DL (ref 31.5–35.7)
MCV RBC AUTO: 96.3 FL (ref 79–97)
MONOCYTES # BLD AUTO: 0.66 10*3/MM3 (ref 0.1–0.9)
MONOCYTES NFR BLD AUTO: 6.4 % (ref 5–12)
NEUTROPHILS NFR BLD AUTO: 69.4 % (ref 42.7–76)
NEUTROPHILS NFR BLD AUTO: 7.18 10*3/MM3 (ref 1.7–7)
NRBC BLD AUTO-RTO: 0 /100 WBC (ref 0–0.2)
PHOSPHATE SERPL-MCNC: 3.3 MG/DL (ref 2.5–4.5)
PLATELET # BLD AUTO: 325 10*3/MM3 (ref 140–450)
PMV BLD AUTO: 8.6 FL (ref 6–12)
POTASSIUM SERPL-SCNC: 3.7 MMOL/L (ref 3.5–5.2)
PROT SERPL-MCNC: 6.9 G/DL (ref 6–8.5)
RBC # BLD AUTO: 2.95 10*6/MM3 (ref 3.77–5.28)
SODIUM SERPL-SCNC: 134 MMOL/L (ref 136–145)
WBC NRBC COR # BLD AUTO: 10.34 10*3/MM3 (ref 3.4–10.8)

## 2025-01-05 PROCEDURE — 63710000001 INSULIN GLARGINE PER 5 UNITS: Performed by: INTERNAL MEDICINE

## 2025-01-05 PROCEDURE — 84100 ASSAY OF PHOSPHORUS: CPT | Performed by: INTERNAL MEDICINE

## 2025-01-05 PROCEDURE — 80053 COMPREHEN METABOLIC PANEL: CPT | Performed by: SURGERY

## 2025-01-05 PROCEDURE — 63710000001 INSULIN LISPRO (HUMAN) PER 5 UNITS: Performed by: INTERNAL MEDICINE

## 2025-01-05 PROCEDURE — 85025 COMPLETE CBC W/AUTO DIFF WBC: CPT | Performed by: SURGERY

## 2025-01-05 PROCEDURE — 92526 ORAL FUNCTION THERAPY: CPT

## 2025-01-05 PROCEDURE — 83735 ASSAY OF MAGNESIUM: CPT | Performed by: SURGERY

## 2025-01-05 PROCEDURE — 82948 REAGENT STRIP/BLOOD GLUCOSE: CPT | Performed by: INTERNAL MEDICINE

## 2025-01-05 RX ADMIN — INSULIN GLARGINE-YFGN 22 UNITS: 100 INJECTION, SOLUTION SUBCUTANEOUS at 20:49

## 2025-01-05 RX ADMIN — QUETIAPINE FUMARATE 75 MG: 25 TABLET ORAL at 20:49

## 2025-01-05 RX ADMIN — INSULIN LISPRO 7 UNITS: 100 INJECTION, SOLUTION INTRAVENOUS; SUBCUTANEOUS at 14:05

## 2025-01-05 RX ADMIN — OXYCODONE 5 MG: 5 TABLET ORAL at 20:49

## 2025-01-05 RX ADMIN — Medication 10 ML: at 20:49

## 2025-01-05 RX ADMIN — APIXABAN 5 MG: 5 TABLET, FILM COATED ORAL at 08:47

## 2025-01-05 RX ADMIN — NYSTATIN 1 APPLICATION: 100000 CREAM TOPICAL at 15:07

## 2025-01-05 RX ADMIN — OXYCODONE HYDROCHLORIDE 10 MG: 10 TABLET, FILM COATED, EXTENDED RELEASE ORAL at 08:46

## 2025-01-05 RX ADMIN — QUETIAPINE FUMARATE 75 MG: 25 TABLET ORAL at 08:46

## 2025-01-05 RX ADMIN — PANTOPRAZOLE SODIUM 40 MG: 40 TABLET, DELAYED RELEASE ORAL at 05:48

## 2025-01-05 RX ADMIN — Medication 10 ML: at 08:46

## 2025-01-05 RX ADMIN — APIXABAN 5 MG: 5 TABLET, FILM COATED ORAL at 20:49

## 2025-01-05 RX ADMIN — OXYCODONE 5 MG: 5 TABLET ORAL at 03:11

## 2025-01-05 NOTE — PROGRESS NOTES
[facility] MEDICINE SERVICE  DAILY PROGRESS NOTE    NAME: Yarelis Jackson  : 1970  MRN: [id number]      LOS: 18 days     PROVIDER OF SERVICE: Yany Rajan MD    Chief Complaint: DKA (diabetic ketoacidosis)    Subjective:     Interval History:  History taken from: patient    No new complaint      Review of Systems:   Review of Systems   All other systems reviewed and are negative.      Objective:     Vital Signs  Temp:  [97.8 °F (36.6 °C)-99.1 °F (37.3 °C)] 97.8 °F (36.6 °C)  Heart Rate:  [85-92] 90  Resp:  [15-23] 15  BP: (133-152)/(60-80) 144/74   Body mass index is 37.27 kg/m².    Physical Exam  Physical Exam  Constitutional:       Appearance: Normal appearance.   HENT:      Head: Normocephalic and atraumatic.      Nose: Nose normal.      Mouth/Throat:      Mouth: Mucous membranes are moist.   Eyes:      Extraocular Movements: Extraocular movements intact.      Pupils: Pupils are equal, round, and reactive to light.   Cardiovascular:      Rate and Rhythm: Normal rate and regular rhythm.   Pulmonary:      Effort: Pulmonary effort is normal.      Breath sounds: Normal breath sounds.   Abdominal:      General: Abdomen is flat. Bowel sounds are normal.      Palpations: Abdomen is soft.   Musculoskeletal:         General: Normal range of motion.      Cervical back: Normal range of motion and neck supple.   Skin:     General: Skin is warm and dry.   Neurological:      General: No focal deficit present.      Mental Status: She is alert and oriented to person, place, and time.   Psychiatric:         Mood and Affect: Mood normal.         Behavior: Behavior normal.         Thought Content: Thought content normal.         Judgment: Judgment normal.         Current Medications:  Scheduled Meds:apixaban, 5 mg, Oral, Q12H  insulin glargine, 22 Units, Subcutaneous, Nightly  insulin lispro, 2-7 Units, Subcutaneous, TID With Meals  insulin lispro, 7 Units, Subcutaneous, TID With Meals  nystatin, 1  Application, Topical, Q8H  oxyCODONE, 10 mg, Oral, Q12H  pantoprazole, 40 mg, Oral, Q AM  QUEtiapine, 75 mg, Oral, Q12H  sodium chloride, 10 mL, Intravenous, Q12H      Continuous Infusions:   PRN Meds:.  acetaminophen **OR** acetaminophen    aluminum-magnesium hydroxide-simethicone    senna-docusate sodium **AND** polyethylene glycol **AND** bisacodyl **AND** bisacodyl    Calcium Replacement - Follow Nurse / BPA Driven Protocol    dextrose    dextrose    glucagon (human recombinant)    heparin (porcine)    heparin (porcine)    ipratropium-albuterol    Magnesium Standard Dose Replacement - Follow Nurse / BPA Driven Protocol    ondansetron ODT **OR** ondansetron    oxyCODONE    Phosphorus Replacement - Follow Nurse / BPA Driven Protocol    Potassium Replacement - Follow Nurse / BPA Driven Protocol    sodium chloride    sodium chloride    sodium chloride       Diagnostic Data    Results from last 7 days   Lab Units 01/04/25  0441   WBC 10*3/mm3 10.04   HEMOGLOBIN g/dL 8.7*   HEMATOCRIT % 28.5*   PLATELETS 10*3/mm3 337   GLUCOSE mg/dL 140*   CREATININE mg/dL 0.75   BUN mg/dL 13   SODIUM mmol/L 136   POTASSIUM mmol/L 3.9   AST (SGOT) U/L 25   ALT (SGPT) U/L 33   ALK PHOS U/L 97   BILIRUBIN mg/dL 0.5   ANION GAP mmol/L 8.6       No radiology results for the last day      I reviewed the patient's new clinical results.    Assessment/Plan:     Active and Resolved Problems  Active Hospital Problems    Diagnosis  POA    **DKA (diabetic ketoacidosis) [E11.10]  Yes    Arterial thrombosis [I74.9]  No      Resolved Hospital Problems   No resolved problems to display.       DKA  - Resolved.  Continue Lantus and lispro for diabetes mellitus management.  Endocrinology following.  Blood glucose is relatively well-controlled.     Acute Kidney Injury requiring emergent hemodialysis  -Remains nonoliguric. Baseline creatinine 0.90.  Creatinine 2.10 on admission.  -Monitor Input/Output very closely.   -Avoid NSAIDs, nephrotoxic  medications, and hypotension.  -Nephrology following.Initially on HD due to acute renal failure.  No history for the past 2 days.  Will evaluate need for HD on a daily basis per nephrology.        Acute occlusive thrombus of the right iliac artery with limb ischemia improved  -Arterial duplex with noted right femoral, deep femoral, and popliteal arteries being patent but with very low amplitude monophasic signals, suggesting severe inflow stenosis or occlusion; no measurable Doppler flow in the anterior or posterior tibial and peroneal arteries.  -Bilateral lower extremity ABIs ordered: Right STACEY critically reduced with STACEY of 0 and severe digital insufficiency; left STACEY is normal with moderate digital insufficiency.  -Emergent surgery per vascular surgeon 12/19/2024 for right iliac thrombectomy via open groin incision then return to surgery on 12/20/2024 for recurrent right lower extremity ischemia due to arterial thrombosis and underwent right iliofemoral popliteal thrombectomy, right femoral endarterectomy with patch, right lower extremity arteriogram, and closed right calf fasciotomies  -Continue on heparin drip per vascular surgery   -Hematology/oncology consulted  -Return to the OR morning (12/23) for emergent 4 compartment fasciotomies due to critical right leg ischemia  -Defer to vascular surgery for management of wound VAC.  Next wound VAC change on Monday     Nontraumatic rhabdomyolysis --> Improving  -CK downtrending  -Encourage enteric hydration        Leukocytosis likely reactive  -Continue to monitor clinically     Electrolyte derangements likely due to renal failure  -Monitor and replete as needed per protocol        Cutaneous candidiasis of groin  -Likely secondary to uncontrolled diabetes mellitus  -Nystatin ordered     Essential Hypertension  -Clonidine patch ordered by nephrology-hold   -Titrate medications as needed.     Bilateral leg rash, concern for scabies infection  -Permethrin regimen  ordered.  Completed first treatment on 12/18, repeat treatment in 2 weeks  -Keep in contact precautions x 1 week post initial treatment (12/25)     Transaminitis  -US of Liver: Hepatomegaly with steatosis.  -Hepatitis panel-negative.  -Monitor and trend LFTs.    Pulmonary:    VTE Prophylaxis:  Pharmacologic VTE prophylaxis orders are present.             Disposition Planning:     Barriers to Discharge: Pending clinical improvement  Anticipated Date of Discharge: 1/13/2024  Place of Discharge: Home        Code Status and Medical Interventions: CPR (Attempt to Resuscitate); Full Support   Ordered at: 12/18/24 0830     Code Status (Patient has no pulse and is not breathing):    CPR (Attempt to Resuscitate)     Medical Interventions (Patient has pulse or is breathing):    Full Support       Signature: Electronically signed by Yany Rajan MD, 01/05/25, 03:07 EST.  [facility] Hospitalist Team

## 2025-01-05 NOTE — PLAN OF CARE
Problem: Adult Inpatient Plan of Care  Goal: Plan of Care Review  Outcome: Progressing  Goal: Patient-Specific Goal (Individualized)  Outcome: Progressing  Goal: Absence of Hospital-Acquired Illness or Injury  Outcome: Progressing  Intervention: Identify and Manage Fall Risk  Recent Flowsheet Documentation  Taken 1/4/2025 2230 by Maddie Asif RN  Safety Promotion/Fall Prevention:   safety round/check completed   room organization consistent   nonskid shoes/slippers when out of bed   clutter free environment maintained   assistive device/personal items within reach  Taken 1/4/2025 2030 by Maddie Asif RN  Safety Promotion/Fall Prevention:   safety round/check completed   room organization consistent   nonskid shoes/slippers when out of bed   clutter free environment maintained   assistive device/personal items within reach  Intervention: Prevent and Manage VTE (Venous Thromboembolism) Risk  Recent Flowsheet Documentation  Taken 1/4/2025 2030 by Maddie Asif RN  VTE Prevention/Management: (RIGHT LEG WOUND VAC) --  Intervention: Prevent Infection  Recent Flowsheet Documentation  Taken 1/4/2025 2230 by Maddie Asif RN  Infection Prevention:   single patient room provided   rest/sleep promoted   hand hygiene promoted  Goal: Optimal Comfort and Wellbeing  Outcome: Progressing  Intervention: Provide Person-Centered Care  Recent Flowsheet Documentation  Taken 1/4/2025 2030 by Maddie Asif RN  Trust Relationship/Rapport:   care explained   choices provided   questions answered   questions encouraged   thoughts/feelings acknowledged  Goal: Readiness for Transition of Care  Outcome: Progressing     Problem: Restraint, Nonviolent  Goal: Absence of Harm or Injury  Outcome: Progressing  Intervention: Implement Least Restrictive Safety Strategies  Recent Flowsheet Documentation  Taken 1/4/2025 2230 by Maddie Asif RN  Medical Device Protection: tubing secured  Taken 1/4/2025  2030 by Maddie Asif RN  Diversional Activities:   tablet   television  Intervention: Protect Dignity, Rights and Personal Wellbeing  Recent Flowsheet Documentation  Taken 1/4/2025 2030 by Maddie Asif RN  Trust Relationship/Rapport:   care explained   choices provided   questions answered   questions encouraged   thoughts/feelings acknowledged  Intervention: Protect Skin and Joint Integrity  Recent Flowsheet Documentation  Taken 1/4/2025 2030 by Maddie Asif RN  Range of Motion: active ROM (range of motion) encouraged     Problem: Fall Injury Risk  Goal: Absence of Fall and Fall-Related Injury  Outcome: Progressing  Intervention: Identify and Manage Contributors  Recent Flowsheet Documentation  Taken 1/4/2025 2230 by Maddie Asif RN  Medication Review/Management: medications reviewed  Taken 1/4/2025 2030 by Maddie Asif RN  Medication Review/Management: medications reviewed  Self-Care Promotion: independence encouraged  Intervention: Promote Injury-Free Environment  Recent Flowsheet Documentation  Taken 1/4/2025 2230 by Maddie Asif RN  Safety Promotion/Fall Prevention:   safety round/check completed   room organization consistent   nonskid shoes/slippers when out of bed   clutter free environment maintained   assistive device/personal items within reach  Taken 1/4/2025 2030 by Maddie Asif RN  Safety Promotion/Fall Prevention:   safety round/check completed   room organization consistent   nonskid shoes/slippers when out of bed   clutter free environment maintained   assistive device/personal items within reach     Problem: Skin Injury Risk Increased  Goal: Skin Health and Integrity  Outcome: Progressing  Intervention: Optimize Skin Protection  Recent Flowsheet Documentation  Taken 1/4/2025 2030 by Maddie Asif RN  Activity Management: activity encouraged  Head of Bed (HOB) Positioning: HOB elevated     Problem: Comorbidity Management  Goal:  Maintenance of COPD Symptom Control  Outcome: Progressing  Intervention: Maintain COPD (Chronic Obstructive Pulmonary Disease) Symptom Control  Recent Flowsheet Documentation  Taken 1/4/2025 2230 by Maddie Asif RN  Medication Review/Management: medications reviewed  Taken 1/4/2025 2030 by Maddie Asif RN  Medication Review/Management: medications reviewed     Problem: Violence Risk or Actual  Goal: Anger and Impulse Control  Outcome: Progressing  Intervention: Minimize Safety Risk  Recent Flowsheet Documentation  Taken 1/4/2025 2230 by Maddie Asif RN  Enhanced Safety Measures: bed alarm set  Taken 1/4/2025 2030 by Maddie Asif RN  Sensory Stimulation Regulation: care clustered  Enhanced Safety Measures: bed alarm set  Intervention: Promote Self-Control  Recent Flowsheet Documentation  Taken 1/4/2025 2030 by Maddie Asif RN  Supportive Measures: active listening utilized  Environmental Support: calm environment promoted     Problem: Mechanical Ventilation Invasive  Goal: Effective Communication  Outcome: Progressing  Intervention: Ensure Effective Communication  Recent Flowsheet Documentation  Taken 1/4/2025 2030 by Maddie Asif RN  Trust Relationship/Rapport:   care explained   choices provided   questions answered   questions encouraged   thoughts/feelings acknowledged  Diversional Activities:   tablet   television  Family/Support System Care:   support provided   self-care encouraged  Communication Enhancement Strategies: call light answered in person  Goal: Optimal Device Function  Outcome: Progressing  Intervention: Optimize Device Care and Function  Recent Flowsheet Documentation  Taken 1/4/2025 2230 by Maddie Asif RN  Airway Safety Measures: oxygen flowmeter at bedside  Taken 1/4/2025 2030 by Maddie Asif RN  Airway Safety Measures: oxygen flowmeter at bedside  Goal: Mechanical Ventilation Liberation  Outcome: Progressing  Intervention:  Promote Extubation and Mechanical Ventilation Liberation  Recent Flowsheet Documentation  Taken 1/4/2025 2230 by Maddie Asif, RN  Medication Review/Management: medications reviewed  Taken 1/4/2025 2030 by Maddie Asif, RN  Environmental Support: calm environment promoted  Medication Review/Management: medications reviewed  Goal: Optimal Nutrition Delivery  Outcome: Progressing  Goal: Absence of Device-Related Skin and Tissue Injury  Outcome: Progressing  Goal: Absence of Ventilator-Induced Lung Injury  Outcome: Progressing  Intervention: Prevent Ventilator-Associated Pneumonia  Recent Flowsheet Documentation  Taken 1/4/2025 2030 by Maddie Asif, RN  Head of Bed (HOB) Positioning: HOB elevated   Goal Outcome Evaluation:

## 2025-01-05 NOTE — PROGRESS NOTES
Daily Progress Note    Patient Care Team:  Katie Duran PA as PCP - General (Physician Assistant)  Uli Starr MD as Consulting Physician (Nephrology)  Due to inclement weather and road conditions in person visit was not done.     Chief Complaint: Follow up type 2 diabetes    HPI: BS log reviewed.         Vitals:    01/05/25 1130   BP: 138/74   Pulse: 82   Resp: 18   Temp: 98.3 °F (36.8 °C)   SpO2: 96%     Body mass index is 38.52 kg/m².        Glucose   Date Value Ref Range Status   01/05/2025 115 (H) 65 - 99 mg/dL Final     Sodium   Date Value Ref Range Status   01/05/2025 134 (L) 136 - 145 mmol/L Final     Potassium   Date Value Ref Range Status   01/05/2025 3.7 3.5 - 5.2 mmol/L Final     CO2   Date Value Ref Range Status   01/05/2025 24.7 22.0 - 29.0 mmol/L Final     Chloride   Date Value Ref Range Status   01/05/2025 102 98 - 107 mmol/L Final     Anion Gap   Date Value Ref Range Status   01/05/2025 7.3 5.0 - 15.0 mmol/L Final     Creatinine   Date Value Ref Range Status   01/05/2025 0.70 0.57 - 1.00 mg/dL Final     BUN   Date Value Ref Range Status   01/05/2025 13 6 - 20 mg/dL Final     BUN/Creatinine Ratio   Date Value Ref Range Status   01/05/2025 18.6 7.0 - 25.0 Final     Calcium   Date Value Ref Range Status   01/05/2025 8.7 8.6 - 10.5 mg/dL Final     Alkaline Phosphatase   Date Value Ref Range Status   01/05/2025 113 39 - 117 U/L Final     Total Protein   Date Value Ref Range Status   01/05/2025 6.9 6.0 - 8.5 g/dL Final     ALT (SGPT)   Date Value Ref Range Status   01/05/2025 29 1 - 33 U/L Final     AST (SGOT)   Date Value Ref Range Status   01/05/2025 25 1 - 32 U/L Final     Total Bilirubin   Date Value Ref Range Status   01/05/2025 0.5 0.0 - 1.2 mg/dL Final     Albumin   Date Value Ref Range Status   01/05/2025 3.2 (L) 3.5 - 5.2 g/dL Final     Globulin   Date Value Ref Range Status   01/05/2025 3.7 gm/dL Final     Magnesium   Date Value Ref Range Status   01/05/2025 1.7 1.6 -  "2.6 mg/dL Final     Phosphorus   Date Value Ref Range Status   01/05/2025 3.3 2.5 - 4.5 mg/dL Final     Lab Results   Component Value Date    HGBA1C 15.80 (H) 12/18/2024     No results found for: \"GLUF\", \"MICROALBUR\"  Results from last 7 days   Lab Units 01/05/25  1202 01/05/25  0741 01/04/25  2102 01/04/25  1556 01/04/25  1148 01/04/25  0736   GLUCOSE mg/dL 133* 112* 150* 156* 127* 160*       Medication Review: Reviewed.     apixaban, 5 mg, Oral, Q12H  insulin glargine, 22 Units, Subcutaneous, Nightly  insulin lispro, 2-7 Units, Subcutaneous, TID With Meals  insulin lispro, 7 Units, Subcutaneous, TID With Meals  nystatin, 1 Application, Topical, Q8H  pantoprazole, 40 mg, Oral, Q AM  QUEtiapine, 75 mg, Oral, Q12H  sodium chloride, 10 mL, Intravenous, Q12H      Assessment and plan:  Diabetes mellitus type 1 with hyperglycemia: Blood sugars fair, continue current regimen of glargine 22 units subcu nightly and lispro 7 units with meals with lispro sliding scale. Follow blood sugars and make further adjustments as needed.     Rodriguez Worthy MD. FACE                "

## 2025-01-05 NOTE — PROGRESS NOTES
"NEPHROLOGY PROGRESS NOTE------[facility]    [facility]  [phone]  Raymundo Harris MD      Patient Care Team:  Katie Duran PA as PCP - General (Physician Assistant)  Uli Starr MD as Consulting Physician (Nephrology)      Provider:  Raymundo Harris MD  Patient Name: Yarelis Jackson  :  1970    SUBJECTIVE:    F/U ARF/MARGOT/ELECTROLYTE ABNORMALITIES  No chest pain or shortness of breath    Medication:  apixaban, 5 mg, Oral, Q12H  insulin glargine, 22 Units, Subcutaneous, Nightly  insulin lispro, 2-7 Units, Subcutaneous, TID With Meals  insulin lispro, 7 Units, Subcutaneous, TID With Meals  nystatin, 1 Application, Topical, Q8H  pantoprazole, 40 mg, Oral, Q AM  QUEtiapine, 75 mg, Oral, Q12H  sodium chloride, 10 mL, Intravenous, Q12H             OBJECTIVE    Vital Sign Min/Max for last 24 hours  Temp  Min: 97.5 °F (36.4 °C)  Max: 98.3 °F (36.8 °C)   BP  Min: 133/71  Max: 144/82   Pulse  Min: 77  Max: 99   Resp  Min: 15  Max: 22   SpO2  Min: 74 %  Max: 96 %   No data recorded   Weight  Min: 102 kg (224 lb 6.9 oz)  Max: 102 kg (224 lb 6.9 oz)     Flowsheet Rows      Flowsheet Row First Filed Value   Admission Height 162.6 cm (64\") Documented at 2024 0443   Admission Weight 92.5 kg (203 lb 14.4 oz) Documented at 2024 0527            I/O this shift:  In: 240 [P.O.:240]  Out: -   I/O last 3 completed shifts:  In: 1200 [P.O.:1200]  Out: 2400 [Urine:2275]    Physical Exam:     General Appearance:  NAD  Head: normocephalic, without obvious abnormality and atraumatic  Eyes: conjunctivae and sclerae normal and no icterus  Neck: supple and no JVD  Lungs: +SCATTERED RHONCHI  Heart: regular rhythm & normal rate and normal S1, S2 +THOMAS  Chest: Wall no abnormalities observed  Abdomen: normal bowel sounds and soft +OBESITY  Extremities: moves extremities well, +TRACE  BILAT PRETIBIAL EDEMA, no cyanosis and no redness. +DJD  Skin: " "Warm  Neurologic:  A AND O X 2    Labs:    WBC WBC   Date Value Ref Range Status   01/05/2025 10.34 3.40 - 10.80 10*3/mm3 Final   01/04/2025 10.04 3.40 - 10.80 10*3/mm3 Final   01/03/2025 10.89 (H) 3.40 - 10.80 10*3/mm3 Final      HGB Hemoglobin   Date Value Ref Range Status   01/05/2025 8.9 (L) 12.0 - 15.9 g/dL Final   01/04/2025 8.7 (L) 12.0 - 15.9 g/dL Final   01/03/2025 8.3 (L) 12.0 - 15.9 g/dL Final      HCT Hematocrit   Date Value Ref Range Status   01/05/2025 28.4 (L) 34.0 - 46.6 % Final   01/04/2025 28.5 (L) 34.0 - 46.6 % Final   01/03/2025 26.2 (L) 34.0 - 46.6 % Final      Platelets No results found for: \"LABPLAT\"   MCV MCV   Date Value Ref Range Status   01/05/2025 96.3 79.0 - 97.0 fL Final   01/04/2025 98.3 (H) 79.0 - 97.0 fL Final   01/03/2025 97.8 (H) 79.0 - 97.0 fL Final          Sodium Sodium   Date Value Ref Range Status   01/05/2025 134 (L) 136 - 145 mmol/L Final   01/04/2025 136 136 - 145 mmol/L Final   01/03/2025 134 (L) 136 - 145 mmol/L Final      Potassium Potassium   Date Value Ref Range Status   01/05/2025 3.7 3.5 - 5.2 mmol/L Final   01/04/2025 3.9 3.5 - 5.2 mmol/L Final   01/03/2025 3.8 3.5 - 5.2 mmol/L Final      Chloride Chloride   Date Value Ref Range Status   01/05/2025 102 98 - 107 mmol/L Final   01/04/2025 102 98 - 107 mmol/L Final   01/03/2025 100 98 - 107 mmol/L Final      CO2 CO2   Date Value Ref Range Status   01/05/2025 24.7 22.0 - 29.0 mmol/L Final   01/04/2025 25.4 22.0 - 29.0 mmol/L Final   01/03/2025 26.4 22.0 - 29.0 mmol/L Final      BUN BUN   Date Value Ref Range Status   01/05/2025 13 6 - 20 mg/dL Final   01/04/2025 13 6 - 20 mg/dL Final   01/03/2025 13 6 - 20 mg/dL Final      Creatinine Creatinine   Date Value Ref Range Status   01/05/2025 0.70 0.57 - 1.00 mg/dL Final   01/04/2025 0.75 0.57 - 1.00 mg/dL Final   01/03/2025 0.77 0.57 - 1.00 mg/dL Final      Calcium Calcium   Date Value Ref Range Status   01/05/2025 8.7 8.6 - 10.5 mg/dL Final   01/04/2025 8.9 8.6 - 10.5 mg/dL " "Final   01/03/2025 8.4 (L) 8.6 - 10.5 mg/dL Final      PO4 No components found for: \"PO4\"   Albumin Albumin   Date Value Ref Range Status   01/05/2025 3.2 (L) 3.5 - 5.2 g/dL Final   01/04/2025 3.1 (L) 3.5 - 5.2 g/dL Final   01/03/2025 3.0 (L) 3.5 - 5.2 g/dL Final      Magnesium Magnesium   Date Value Ref Range Status   01/05/2025 1.7 1.6 - 2.6 mg/dL Final   01/04/2025 1.7 1.6 - 2.6 mg/dL Final   01/03/2025 1.6 1.6 - 2.6 mg/dL Final      Uric Acid No components found for: \"URIC ACID\"     Imaging Results (Last 72 Hours)       ** No results found for the last 72 hours. **            Results for orders placed during the hospital encounter of 12/18/24    XR Chest 1 View    Narrative  XR CHEST 1 VW    Date of Exam: 12/22/2024 12:35 AM EST    Indication: Intubated Patient    Comparison:  12/21/2024    FINDINGS:  Endotracheal tube tip appears in satisfactory position at the level of the aortic arch. Right IJ catheter appears in stable position. Enteric tube extends into the left upper quadrant. Heart size and mediastinal contour appear within normal limits. No  definite pneumothorax or effusion. Mild bibasilar opacities. Findings of advanced emphysema with suspected apical scarring.    Impression  1.Tubes and lines appear in satisfactory positions, as above.  2.Mild bibasilar opacities which could represent atelectasis or infiltrate.  3.Advanced emphysema.        Electronically Signed: Sam Haynes  12/22/2024 7:07 AM EST  Workstation ID: [id number]      XR Chest 1 View    Narrative  XR CHEST 1 VW    Date of Exam: 12/21/2024 4:32 AM EST    Indication: Intubated Patient    Comparison: 12/20/2024    Findings:  There is been no interval tube or line changes. Heart size and pulmonary vessels are within normal limits. There are postoperative changes of the bilateral upper lobes. Lungs appear clear. No pleural effusion or pneumothorax.    Impression  Impression:    1. No tube or line change  2. No focal airspace consolidation or " other acute process.      Electronically Signed: Julio Thorpe MD  12/21/2024 9:54 AM EST  Workstation ID: [id number]      XR Chest 1 View    Narrative  XR CHEST 1 VW    Date of Exam: 12/20/2024 12:10 AM EST    Indication: Intubated Patient    Comparison: 12/19/2024    Findings:  Endotracheal tube approximately 2.5 cm above the carmina. Feeding tube and right IJ dialysis catheter remain in place. No new tubes or lines heart size and pulmonary vasculature are stable. Bullous changes in the right upper lobe. Postoperative changes in  the left upper lobe. Lungs grossly clear. No pneumothorax    Impression  Impression:  Stable chest demonstrating COPD and postoperative changes with no acute cardiopulmonary process demonstrated      Electronically Signed: Shon Ponce  12/20/2024 7:19 AM EST  Workstation ID: [id number]      Results for orders placed during the hospital encounter of 12/18/24    Duplex Lower Extremity Art / Grafts - Right CAR    Interpretation Summary    Limited study due to body habitus and current dressing site.  Patent external iliac and femoral-popliteal arterial flow with low flow noted below the common femoral.  Common femoral artery poorly visualized.  Cannot rule out occlusion of the common femoral artery.        ASSESSMENT / PLAN      DKA (diabetic ketoacidosis)    Arterial thrombosis    ARF/MARGOT------ resolved.  Required dialysis... +ARF/MARGOT with historically normal renal function. +ARF/MARGOT secondary to severe prerenal state and ATN from hypotension and Rhabdomyolysis.  Pressor support. Off IVFs. No NSAIDs. Renal US without obstructive uropathy.    2. HYPERNATREMIA/HYPEROSMOLAR STATE-----encourage fluid intake     3. ACIDOSIS--Resolved     4. DVT PROPHYLAXIS     5. HYPOCALCEMIA------Replace IV and follow ionized levels     6. DM------BS ok     7. ANEMIA---------H/H stable     8. OA/DJD/HYPERURICEMIA------Allopurinol . No NSAIDs   Restart IVFs. CK down post multiple days of HD     10. GERD/PUD  PROPHYLAXIS-----Renal dose adjusted Pepcid     11. DELIRIUM/TME     12. COPD/ACUTE HYPOXIC RESPIRATORY FAILURE-----S/P extubation    13. HYPOALBUMINEMIA-----S/P IV Albumin to temporize    14. EDEMA/VOLUME EXCESS---- Better post gentle UF with HD    15. HYPOTENSION-----BP ok    16. ELEVATED LFTS/TRANSAMINASES------Secondary to shock liver and Rhabdo. Follow    17. FASCITIS------S/P surgery      18. DELIRIUM---Better    19. HYPOPHOSPHATEMIA------Replaced    20. HYPOMAGNESEMIA------Replaced    MARGOT resolved  Renal function remains stable over the course of last few days  Will sign off, available if any further questions.    Raymundo Harris MD  [facility]  [phone]  01/05/25  12:47 EST

## 2025-01-05 NOTE — THERAPY TREATMENT NOTE
Acute Care - Speech Language Pathology   Swallow Treatment Note [facility]     Patient Name: Yarelis Jackson  : 1970  MRN: [id number]  Today's Date: 2025               Admit Date: 2024    Visit Dx:     ICD-10-CM ICD-9-CM   1. Diabetic ketoacidosis without coma associated with other specified diabetes mellitus  E13.10 250.12   2. MARGOT (acute kidney injury)  N17.9 584.9   3. Hypernatremia  E87.0 276.0   4. Acute encephalopathy  G93.40 348.30   5. Shock  R57.9 785.50   6. Arterial thrombosis  I74.9 444.9     Patient Active Problem List   Diagnosis    Benign essential hypertension    Cervical radiculopathy    Chronic obstructive pulmonary disease    Cigarette smoker    Hyperlipidemia    Menopause    Migraine headache    DKA (diabetic ketoacidosis)    Arterial thrombosis     Past Medical History:   Diagnosis Date    COPD (chronic obstructive pulmonary disease)     Low back pain     Migraine     Neck pain      Past Surgical History:   Procedure Laterality Date    FASCIOTOMY Right 2024    Procedure: FASCIOTOMY LEG;  Surgeon: Chris Delgado MD;  Location: [facility] MAIN OR;  Service: Vascular;  Laterality: Right;    FEMORAL THROMBECTOMY/EMBOLECTOMY Right 2024    Procedure: FEMORAL right iliofemoral thrombectomy, arteriogram;  Surgeon: Ghassan Celestin MD;  Location: [facility] HYBRID OR;  Service: Vascular;  Laterality: Right;    FEMORAL THROMBECTOMY/EMBOLECTOMY Right 2024    Procedure: Right femoral thrombectomy, right lower extremity arteriogram and right lower leg facitomies;  Surgeon: Ghassan Celestin MD;  Location: [facility] HYBRID OR;  Service: Vascular;  Laterality: Right;    LUNG SURGERY         SLP Recommendation and Plan     EDUCATION  The patient has been educated in the following areas:   Dysphagia (Swallowing Impairment) Oral Care/Hydration.        SLP GOALS       Row Name 25 1000       (LTG) Patient will demonstrate functional swallow for    Diet Texture  (Demonstrate functional swallow) regular textures  -CB    Liquid viscosity (Demonstrate functional swallow) thin liquids  -CB    [place] (Demonstrate functional swallow) independently (over 90% accuracy)  -CB    Time Frame (Demonstrate functional swallow) by discharge  -CB    Progress/Outcomes (Demonstrate functional swallow) goal ongoing  -CB       (LTG) Swallow    (LTG) Swallow Pt will maximize swallow function for least restrictive PO diet, exhibiting no complication associated with dysphagia, adequate PO intake, and demonstrating independent use of swallow compensations  -CB    [place] (Swallow Long Term Goal) independently (over 90% accuracy)  -CB    Time Frame (Swallow Long Term Goal) by discharge  -CB    Progress/Outcomes (Swallow Long Term Goal) goal ongoing  -CB       (STG) Patient will tolerate trials of    Consistencies Trialed (Tolerate trials) regular textures;thin liquids  -CB    Desired Outcome (Tolerate trials) without signs/symptoms of aspiration;without signs of distress;with adequate oral prep/transit/clearance  -CB    [place] (Tolerate trials) independently (over 90% accuracy)  -CB    Time Frame (Tolerate trials) by discharge  -CB    Progress/Outcomes (Tolerate trials) goal ongoing  -CB    Comment (Tolerate trials) --       (STG) Swallow 1    (STG) Swallow 1 The patient will participate in ongoing assessment of swallow, including re-evaluation clinically and/or including instrumental assessment of swallow if indicated, to further assess swallow function in anticipation to initiate a PO diet  -CB    Time Frame (Swallow Short Term Goal 1) 1 week  -CB    Progress/Outcomes (Swallow Short Term Goal 1) goal ongoing  -CB       (STG) Swallow 2    (STG) Swallow 2 Patient will participate in full meal assessment to assure safety and adequacy of recommended diet and independent use of safe swallow strategies.  -CB    Time Frame (Swallow Short Term Goal 2) 1 week  -CB    Progress/Outcomes  (Swallow Short Term Goal 2) new goal  -CB    Comment (Swallow Short Term Goal 2) Patient is currently on a STC chopped diet with nectar thick liquids. Patient had already completed meal upon entering the room.  Patient reports that she is refusing to drink nectar thick liquids. She reports taking medication with water. Reiterated [place] Water Protocol stipulating that she can drink her water before and after meal after good oral care only. Provided rationale for the restriction. Patient appeared to understand the concept presented. Patient recently had a VFSS recommending STC with thins as patient displayed transient penetration with thins but does clear during video and did not aspirate. During a meal assessment, patient was coughing and clearing throat on thins. When given trials of nectar thick , she did not display any overt s/s of aspiration. Therefore, at that time ST downgraded patient to nectar thick. Since then patient has been not complying with changes. It is reflected during this visit continued education of recommendations and FWP was provided. Patient did state that she does not have any history with pneumonia. ST will continue to follow with emphasis on addressing noted weakness visualized during VFSS which was reduce BOT and hypolaryngeal excursion.  -CB       (STG) Pharyngeal Strengthening Exercise Goal 1 (SLP)    Activity (Pharyngeal Strengthening Goal 1, SLP) increase tongue base retraction  -CB    Increase Tongue Base Retraction hard effortful swallow;chin tuck against resistance (CTAR);radha  -CB    [place]/Accuracy (Pharyngeal Strengthening Goal 1, SLP) independently (over 90% accuracy)  -CB    Time Frame (Pharyngeal Strengthening Goal 1, SLP) by discharge  -CB    Progress/Outcomes (Pharyngeal Strengthening Goal 1, SLP) goal ongoing  -CB              User Key  (r) = Recorded By, (t) = Taken By, (c) = Cosigned By      Initials Name Provider Type    CB Ange Adams, SLP Speech and  Language Pathologist                               Time Calculation:                Ange Adams, SLP  1/5/2025

## 2025-01-06 LAB
ALBUMIN SERPL-MCNC: 3.1 G/DL (ref 3.5–5.2)
ALBUMIN/GLOB SERPL: 0.8 G/DL
ALP SERPL-CCNC: 106 U/L (ref 39–117)
ALT SERPL W P-5'-P-CCNC: 30 U/L (ref 1–33)
ANION GAP SERPL CALCULATED.3IONS-SCNC: 8.5 MMOL/L (ref 5–15)
AST SERPL-CCNC: 24 U/L (ref 1–32)
BASOPHILS # BLD AUTO: 0.06 10*3/MM3 (ref 0–0.2)
BASOPHILS NFR BLD AUTO: 0.7 % (ref 0–1.5)
BILIRUB SERPL-MCNC: 0.4 MG/DL (ref 0–1.2)
BUN SERPL-MCNC: 15 MG/DL (ref 6–20)
BUN/CREAT SERPL: 19.7 (ref 7–25)
CALCIUM SPEC-SCNC: 8.7 MG/DL (ref 8.6–10.5)
CHLORIDE SERPL-SCNC: 103 MMOL/L (ref 98–107)
CO2 SERPL-SCNC: 25.5 MMOL/L (ref 22–29)
CREAT SERPL-MCNC: 0.76 MG/DL (ref 0.57–1)
DEPRECATED RDW RBC AUTO: 59.4 FL (ref 37–54)
EGFRCR SERPLBLD CKD-EPI 2021: 93.8 ML/MIN/1.73
EOSINOPHIL # BLD AUTO: 0.25 10*3/MM3 (ref 0–0.4)
EOSINOPHIL NFR BLD AUTO: 2.7 % (ref 0.3–6.2)
ERYTHROCYTE [DISTWIDTH] IN BLOOD BY AUTOMATED COUNT: 17.2 % (ref 12.3–15.4)
GLOBULIN UR ELPH-MCNC: 3.9 GM/DL
GLUCOSE BLDC GLUCOMTR-MCNC: 143 MG/DL (ref 70–105)
GLUCOSE BLDC GLUCOMTR-MCNC: 151 MG/DL (ref 70–105)
GLUCOSE BLDC GLUCOMTR-MCNC: 156 MG/DL (ref 70–105)
GLUCOSE BLDC GLUCOMTR-MCNC: 173 MG/DL (ref 70–105)
GLUCOSE SERPL-MCNC: 139 MG/DL (ref 65–99)
HCT VFR BLD AUTO: 28.9 % (ref 34–46.6)
HGB BLD-MCNC: 9.2 G/DL (ref 12–15.9)
IMM GRANULOCYTES # BLD AUTO: 0.07 10*3/MM3 (ref 0–0.05)
IMM GRANULOCYTES NFR BLD AUTO: 0.8 % (ref 0–0.5)
LYMPHOCYTES # BLD AUTO: 2.51 10*3/MM3 (ref 0.7–3.1)
LYMPHOCYTES NFR BLD AUTO: 27.2 % (ref 19.6–45.3)
MAGNESIUM SERPL-MCNC: 1.8 MG/DL (ref 1.6–2.6)
MCH RBC QN AUTO: 31.2 PG (ref 26.6–33)
MCHC RBC AUTO-ENTMCNC: 31.8 G/DL (ref 31.5–35.7)
MCV RBC AUTO: 98 FL (ref 79–97)
MONOCYTES # BLD AUTO: 0.65 10*3/MM3 (ref 0.1–0.9)
MONOCYTES NFR BLD AUTO: 7 % (ref 5–12)
NEUTROPHILS NFR BLD AUTO: 5.68 10*3/MM3 (ref 1.7–7)
NEUTROPHILS NFR BLD AUTO: 61.6 % (ref 42.7–76)
NRBC BLD AUTO-RTO: 0 /100 WBC (ref 0–0.2)
PHOSPHATE SERPL-MCNC: 3.1 MG/DL (ref 2.5–4.5)
PLATELET # BLD AUTO: 356 10*3/MM3 (ref 140–450)
PMV BLD AUTO: 8.8 FL (ref 6–12)
POTASSIUM SERPL-SCNC: 3.7 MMOL/L (ref 3.5–5.2)
PROT SERPL-MCNC: 7 G/DL (ref 6–8.5)
RBC # BLD AUTO: 2.95 10*6/MM3 (ref 3.77–5.28)
SODIUM SERPL-SCNC: 137 MMOL/L (ref 136–145)
WBC NRBC COR # BLD AUTO: 9.22 10*3/MM3 (ref 3.4–10.8)

## 2025-01-06 PROCEDURE — 83735 ASSAY OF MAGNESIUM: CPT | Performed by: SURGERY

## 2025-01-06 PROCEDURE — 63710000001 INSULIN GLARGINE PER 5 UNITS: Performed by: INTERNAL MEDICINE

## 2025-01-06 PROCEDURE — 92526 ORAL FUNCTION THERAPY: CPT

## 2025-01-06 PROCEDURE — 63710000001 INSULIN LISPRO (HUMAN) PER 5 UNITS: Performed by: INTERNAL MEDICINE

## 2025-01-06 PROCEDURE — 84100 ASSAY OF PHOSPHORUS: CPT | Performed by: INTERNAL MEDICINE

## 2025-01-06 PROCEDURE — 80053 COMPREHEN METABOLIC PANEL: CPT | Performed by: SURGERY

## 2025-01-06 PROCEDURE — 82948 REAGENT STRIP/BLOOD GLUCOSE: CPT | Performed by: INTERNAL MEDICINE

## 2025-01-06 PROCEDURE — 82948 REAGENT STRIP/BLOOD GLUCOSE: CPT

## 2025-01-06 PROCEDURE — 85025 COMPLETE CBC W/AUTO DIFF WBC: CPT | Performed by: SURGERY

## 2025-01-06 RX ADMIN — QUETIAPINE FUMARATE 75 MG: 25 TABLET ORAL at 09:16

## 2025-01-06 RX ADMIN — QUETIAPINE FUMARATE 75 MG: 25 TABLET ORAL at 20:43

## 2025-01-06 RX ADMIN — NYSTATIN 1 APPLICATION: 100000 CREAM TOPICAL at 21:57

## 2025-01-06 RX ADMIN — INSULIN LISPRO 7 UNITS: 100 INJECTION, SOLUTION INTRAVENOUS; SUBCUTANEOUS at 09:16

## 2025-01-06 RX ADMIN — PANTOPRAZOLE SODIUM 40 MG: 40 TABLET, DELAYED RELEASE ORAL at 04:26

## 2025-01-06 RX ADMIN — OXYCODONE 5 MG: 5 TABLET ORAL at 04:26

## 2025-01-06 RX ADMIN — INSULIN LISPRO 7 UNITS: 100 INJECTION, SOLUTION INTRAVENOUS; SUBCUTANEOUS at 12:00

## 2025-01-06 RX ADMIN — Medication 10 ML: at 20:43

## 2025-01-06 RX ADMIN — APIXABAN 5 MG: 5 TABLET, FILM COATED ORAL at 20:43

## 2025-01-06 RX ADMIN — INSULIN LISPRO 7 UNITS: 100 INJECTION, SOLUTION INTRAVENOUS; SUBCUTANEOUS at 17:27

## 2025-01-06 RX ADMIN — Medication 10 ML: at 09:17

## 2025-01-06 RX ADMIN — NYSTATIN 1 APPLICATION: 100000 CREAM TOPICAL at 04:27

## 2025-01-06 RX ADMIN — NYSTATIN 1 APPLICATION: 100000 CREAM TOPICAL at 14:22

## 2025-01-06 RX ADMIN — INSULIN GLARGINE-YFGN 22 UNITS: 100 INJECTION, SOLUTION SUBCUTANEOUS at 20:43

## 2025-01-06 RX ADMIN — INSULIN LISPRO 2 UNITS: 100 INJECTION, SOLUTION INTRAVENOUS; SUBCUTANEOUS at 17:27

## 2025-01-06 RX ADMIN — INSULIN LISPRO 2 UNITS: 100 INJECTION, SOLUTION INTRAVENOUS; SUBCUTANEOUS at 12:00

## 2025-01-06 RX ADMIN — OXYCODONE 5 MG: 5 TABLET ORAL at 18:07

## 2025-01-06 RX ADMIN — APIXABAN 5 MG: 5 TABLET, FILM COATED ORAL at 09:16

## 2025-01-06 RX ADMIN — OXYCODONE 5 MG: 5 TABLET ORAL at 09:22

## 2025-01-06 NOTE — PROGRESS NOTES
[facility] MEDICINE SERVICE  DAILY PROGRESS NOTE    NAME: Yarelis Jackson  : 1970  MRN: [id number]      LOS: 18 days     PROVIDER OF SERVICE: Yany Rajan MD    Chief Complaint: DKA (diabetic ketoacidosis)    Subjective:     Interval History:  History taken from: patient    No new complaint      Review of Systems:   Review of Systems   All other systems reviewed and are negative.      Objective:     Vital Signs  Temp:  [97.5 °F (36.4 °C)-98.9 °F (37.2 °C)] 98.9 °F (37.2 °C)  Heart Rate:  [61-99] 93  Resp:  [15-18] 18  BP: (135-149)/(66-82) 149/72   Body mass index is 38.52 kg/m².    Physical Exam  Physical Exam  Constitutional:       Appearance: Normal appearance.   HENT:      Head: Normocephalic and atraumatic.      Nose: Nose normal.      Mouth/Throat:      Mouth: Mucous membranes are moist.   Eyes:      Extraocular Movements: Extraocular movements intact.      Pupils: Pupils are equal, round, and reactive to light.   Cardiovascular:      Rate and Rhythm: Normal rate and regular rhythm.   Pulmonary:      Effort: Pulmonary effort is normal.      Breath sounds: Normal breath sounds.   Abdominal:      General: Abdomen is flat. Bowel sounds are normal.      Palpations: Abdomen is soft.   Musculoskeletal:         General: Normal range of motion.      Cervical back: Normal range of motion and neck supple.   Skin:     General: Skin is warm and dry.   Neurological:      General: No focal deficit present.      Mental Status: She is alert and oriented to person, place, and time.   Psychiatric:         Mood and Affect: Mood normal.         Behavior: Behavior normal.         Thought Content: Thought content normal.         Judgment: Judgment normal.         Current Medications:  Scheduled Meds:apixaban, 5 mg, Oral, Q12H  insulin glargine, 22 Units, Subcutaneous, Nightly  insulin lispro, 2-7 Units, Subcutaneous, TID With Meals  insulin lispro, 7 Units, Subcutaneous, TID With Meals  nystatin, 1  Application, Topical, Q8H  pantoprazole, 40 mg, Oral, Q AM  QUEtiapine, 75 mg, Oral, Q12H  sodium chloride, 10 mL, Intravenous, Q12H      Continuous Infusions:   PRN Meds:.  acetaminophen **OR** acetaminophen    aluminum-magnesium hydroxide-simethicone    senna-docusate sodium **AND** polyethylene glycol **AND** bisacodyl **AND** bisacodyl    Calcium Replacement - Follow Nurse / BPA Driven Protocol    dextrose    dextrose    glucagon (human recombinant)    heparin (porcine)    heparin (porcine)    ipratropium-albuterol    Magnesium Standard Dose Replacement - Follow Nurse / BPA Driven Protocol    ondansetron ODT **OR** ondansetron    oxyCODONE    Phosphorus Replacement - Follow Nurse / BPA Driven Protocol    Potassium Replacement - Follow Nurse / BPA Driven Protocol    sodium chloride    sodium chloride    sodium chloride       Diagnostic Data    Results from last 7 days   Lab Units 01/05/25  0531   WBC 10*3/mm3 10.34   HEMOGLOBIN g/dL 8.9*   HEMATOCRIT % 28.4*   PLATELETS 10*3/mm3 325   GLUCOSE mg/dL 115*   CREATININE mg/dL 0.70   BUN mg/dL 13   SODIUM mmol/L 134*   POTASSIUM mmol/L 3.7   AST (SGOT) U/L 25   ALT (SGPT) U/L 29   ALK PHOS U/L 113   BILIRUBIN mg/dL 0.5   ANION GAP mmol/L 7.3       No radiology results for the last day      I reviewed the patient's new clinical results.    Assessment/Plan:     Active and Resolved Problems  Active Hospital Problems    Diagnosis  POA    **DKA (diabetic ketoacidosis) [E11.10]  Yes    Arterial thrombosis [I74.9]  No      Resolved Hospital Problems   No resolved problems to display.       DKA  - Resolved.  Continue Lantus and lispro for diabetes mellitus management.  Endocrinology following.  Blood glucose is relatively well-controlled.     Acute Kidney Injury required emergent hemodialysis  -resolved.  Nephrology following    Acute occlusive thrombus of the right iliac artery with limb ischemia improved  -Arterial duplex with noted right femoral, deep femoral, and  popliteal arteries being patent but with very low amplitude monophasic signals, suggesting severe inflow stenosis or occlusion; no measurable Doppler flow in the anterior or posterior tibial and peroneal arteries.  -Bilateral lower extremity ABIs ordered: Right STACEY critically reduced with STACEY of 0 and severe digital insufficiency; left STACEY is normal with moderate digital insufficiency.  -Emergent surgery per vascular surgeon 12/19/2024 for right iliac thrombectomy via open groin incision then return to surgery on 12/20/2024 for recurrent right lower extremity ischemia due to arterial thrombosis and underwent right iliofemoral popliteal thrombectomy, right femoral endarterectomy with patch, right lower extremity arteriogram, and closed right calf fasciotomies  -Was on heparin drip.  Now on Eliquis per vascular surgery.  -Hematology/oncology consulted  -Return to the OR morning (12/23) for emergent 4 compartment fasciotomies due to critical right leg ischemia  -Defer to vascular surgery for management of wound VAC.  Next wound VAC change on Monday     Nontraumatic rhabdomyolysis --> Improving  -CK downtrending  -Encourage enteric hydration        Leukocytosis likely reactive  -Continue to monitor clinically     Electrolyte derangements likely due to renal failure  -Monitor and replete as needed per protocol        Cutaneous candidiasis of groin  -Likely secondary to uncontrolled diabetes mellitus  -Nystatin ordered     Essential Hypertension  -Clonidine patch ordered by nephrology-hold   -Titrate medications as needed.     Bilateral leg rash, concern for scabies infection  -Permethrin regimen ordered.  Completed first treatment on 12/18, repeat treatment in 2 weeks  -Keep in contact precautions x 1 week post initial treatment (12/25)     Transaminitis  -US of Liver: Hepatomegaly with steatosis.  -Hepatitis panel-negative.  -Monitor and trend LFTs.    Pulmonary:    VTE Prophylaxis:  Pharmacologic VTE prophylaxis orders  are present.             Disposition Planning:     Barriers to Discharge: Pending clinical improvement  Anticipated Date of Discharge: 1/13/2024  Place of Discharge: SNF        Code Status and Medical Interventions: CPR (Attempt to Resuscitate); Full Support   Ordered at: 12/18/24 0830     Code Status (Patient has no pulse and is not breathing):    CPR (Attempt to Resuscitate)     Medical Interventions (Patient has pulse or is breathing):    Full Support       Signature: Electronically signed by Yany Rajan MD, 01/05/25, 20:58 EST.  [facility] Hospitalist Team

## 2025-01-06 NOTE — PLAN OF CARE
Goal Outcome Evaluation:   Patient a/ox4, able to make needs known to staff.

## 2025-01-06 NOTE — PLAN OF CARE
"Goal Outcome Evaluation:   Pt was seen for trials of thin liquids. Pt is currently on a [facility] chopped diet and NTL liquids. Pt has poor natural dentition. Pt was alert & oriented x5. Pt breakfast tray arrived at bedside however pt stated she was \"not ready to eat\". Pt stated to SLP she was not happy with being on NTL. SLP provided rationale. Pt was agreeable to trials of thin liquids prior to P.O. x5 trials of water via cup & x5 trials of water via straw. Pt sitting upright at 90 degrees in bed. No overt s/s of penetration/aspiration were noted this date with all trials of thin liquids. SLP recommending pt upgrade back to thin liquids at this time. Pt verbalized agreement. ST will continue to follow closely to assure safety and tolerance with recommended diet. It is recommended that nursing provide meds as pt tolerates.     Swallowing precautions include:  *Pt sitting upright at 90 degrees hip flexion  *Oral care to be completed at least x2 daily  *Notify SLP if any s/s of penetration/aspiration occurs    Anticipated Discharge Disposition (SLP): unknown                        "
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
## 2025-01-06 NOTE — THERAPY TREATMENT NOTE
Acute Care - Speech Language Pathology   Swallow Treatment Note [facility]     Patient Name: Yarelis Jackson  : 1970  MRN: [id number]  Today's Date: 2025               Admit Date: 2024    Visit Dx:     ICD-10-CM ICD-9-CM   1. Diabetic ketoacidosis without coma associated with other specified diabetes mellitus  E13.10 250.12   2. MARGOT (acute kidney injury)  N17.9 584.9   3. Hypernatremia  E87.0 276.0   4. Acute encephalopathy  G93.40 348.30   5. Shock  R57.9 785.50   6. Arterial thrombosis  I74.9 444.9     Patient Active Problem List   Diagnosis    Benign essential hypertension    Cervical radiculopathy    Chronic obstructive pulmonary disease    Cigarette smoker    Hyperlipidemia    Menopause    Migraine headache    DKA (diabetic ketoacidosis)    Arterial thrombosis     Past Medical History:   Diagnosis Date    COPD (chronic obstructive pulmonary disease)     Low back pain     Migraine     Neck pain      Past Surgical History:   Procedure Laterality Date    FASCIOTOMY Right 2024    Procedure: FASCIOTOMY LEG;  Surgeon: Chris Delgado MD;  Location: [facility] MAIN OR;  Service: Vascular;  Laterality: Right;    FEMORAL THROMBECTOMY/EMBOLECTOMY Right 2024    Procedure: FEMORAL right iliofemoral thrombectomy, arteriogram;  Surgeon: Ghassan Celestin MD;  Location: [facility] HYBRID OR;  Service: Vascular;  Laterality: Right;    FEMORAL THROMBECTOMY/EMBOLECTOMY Right 2024    Procedure: Right femoral thrombectomy, right lower extremity arteriogram and right lower leg facitomies;  Surgeon: Ghassan Celestin MD;  Location: [facility] HYBRID OR;  Service: Vascular;  Laterality: Right;    LUNG SURGERY         EDUCATION  The patient has been educated in the following areas:   Dysphagia (Swallowing Impairment).        SLP GOALS       Row Name 25 0900       (LTG) Patient will demonstrate functional swallow for    Diet Texture (Demonstrate functional swallow) regular textures  -MS     Liquid viscosity (Demonstrate functional swallow) thin liquids  -MS    [place] (Demonstrate functional swallow) independently (over 90% accuracy)  -MS    Time Frame (Demonstrate functional swallow) by discharge  -MS    Progress/Outcomes (Demonstrate functional swallow) goal ongoing  -MS       (LTG) Swallow    (LTG) Swallow Pt will maximize swallow function for least restrictive PO diet, exhibiting no complication associated with dysphagia, adequate PO intake, and demonstrating independent use of swallow compensations  -MS    [place] (Swallow Long Term Goal) independently (over 90% accuracy)  -MS    Time Frame (Swallow Long Term Goal) by discharge  -MS    Progress/Outcomes (Swallow Long Term Goal) goal ongoing  -MS    Comment (Swallow Long Term Goal) See above  -MS       (STG) Patient will tolerate trials of    Consistencies Trialed (Tolerate trials) regular textures;thin liquids  -MS    Desired Outcome (Tolerate trials) without signs/symptoms of aspiration;without signs of distress;with adequate oral prep/transit/clearance  -MS    [place] (Tolerate trials) independently (over 90% accuracy)  -MS    Time Frame (Tolerate trials) by discharge  -MS    Progress/Outcomes (Tolerate trials) goal ongoing  -MS    Comment (Tolerate trials) --       (STG) Swallow 1    (STG) Swallow 1 The patient will participate in ongoing assessment of swallow, including re-evaluation clinically and/or including instrumental assessment of swallow if indicated, to further assess swallow function in anticipation to initiate a PO diet  -MS    [place] (Swallow Short Term Goal 1) independently (over 90% accuracy)  -MS    Time Frame (Swallow Short Term Goal 1) 1 week  -MS    Progress/Outcomes (Swallow Short Term Goal 1) goal ongoing  -MS       (STG) Swallow 2    (STG) Swallow 2 Patient will participate in full meal assessment to assure safety and adequacy of recommended diet and independent use of safe swallow strategies.  -MS     "[place] (Swallow Short Term Goal 2) independently (over 90% accuracy)  -MS    Time Frame (Swallow Short Term Goal 2) 1 week  -MS    Progress/Outcomes (Swallow Short Term Goal 2) goal ongoing  -MS    Comment (Swallow Short Term Goal 2) Pt was seen for trials of thin liquids. Pt is currently on a [facility] chopped diet and NTL liquids. Pt has poor natural dentition. Pt was alert & oriented x5. Pt breakfast tray arrived at bedside however pt stated she was \"not ready to eat\". Pt stated to SLP she was not happy with being on NTL. SLP provided rationale. Pt was agreeable to trials of thin liquids prior to P.O. x5 trials of water via cup & x5 trials of water via straw. Pt sitting upright at 90 degrees in bed. No overt s/s of penetration/aspiration were noted this date with all trials of thin liquids. SLP recommending pt upgrade back to thin liquids at this time. Pt verbalized agreement. ST will continue to follow closely to assure safety and tolerance with recommended diet. It is recommended that nursing provide meds as pt tolerates.     Swallowing precautions include:  *Pt sitting upright at 90 degrees hip flexion  *Oral care to be completed at least x2 daily  *Notify SLP if any s/s of penetration/aspiration occurs      -MS              User Key  (r) = Recorded By, (t) = Taken By, (c) = Cosigned By      Initials Name Provider Type          Lazaro Avila SLP Speech and Language Pathologist                    JUN Hilario  1/6/2025  "

## 2025-01-06 NOTE — PROGRESS NOTES
[facility] MEDICINE SERVICE  DAILY PROGRESS NOTE    NAME: Yarelis Jackson  : 1970  MRN: [id number]      LOS: 19 days     PROVIDER OF SERVICE: Yany Rajan MD    Chief Complaint: DKA (diabetic ketoacidosis)    Subjective:     Interval History:  History taken from: patient    No new complaint      Review of Systems:   Review of Systems   All other systems reviewed and are negative.      Objective:     Vital Signs  Temp:  [97.9 °F (36.6 °C)-98.9 °F (37.2 °C)] 97.9 °F (36.6 °C)  Heart Rate:  [80-94] 80  Resp:  [16] 16  BP: (133-140)/(65-78) 140/78   Body mass index is 38.52 kg/m².    Physical Exam  Physical Exam  Constitutional:       Appearance: Normal appearance.   HENT:      Head: Normocephalic and atraumatic.      Nose: Nose normal.      Mouth/Throat:      Mouth: Mucous membranes are moist.   Eyes:      Extraocular Movements: Extraocular movements intact.      Pupils: Pupils are equal, round, and reactive to light.   Cardiovascular:      Rate and Rhythm: Normal rate and regular rhythm.   Pulmonary:      Effort: Pulmonary effort is normal.      Breath sounds: Normal breath sounds.   Abdominal:      General: Abdomen is flat. Bowel sounds are normal.      Palpations: Abdomen is soft.   Musculoskeletal:         General: Normal range of motion.      Cervical back: Normal range of motion and neck supple.   Skin:     General: Skin is warm and dry.   Neurological:      General: No focal deficit present.      Mental Status: She is alert and oriented to person, place, and time.   Psychiatric:         Mood and Affect: Mood normal.         Behavior: Behavior normal.         Thought Content: Thought content normal.         Judgment: Judgment normal.         Current Medications:  Scheduled Meds:apixaban, 5 mg, Oral, Q12H  insulin glargine, 22 Units, Subcutaneous, Nightly  insulin lispro, 2-7 Units, Subcutaneous, TID With Meals  insulin lispro, 7 Units, Subcutaneous, TID With Meals  nystatin, 1  Application, Topical, Q8H  pantoprazole, 40 mg, Oral, Q AM  QUEtiapine, 75 mg, Oral, Q12H  sodium chloride, 10 mL, Intravenous, Q12H      Continuous Infusions:   PRN Meds:.  acetaminophen **OR** acetaminophen    aluminum-magnesium hydroxide-simethicone    senna-docusate sodium **AND** polyethylene glycol **AND** bisacodyl **AND** bisacodyl    Calcium Replacement - Follow Nurse / BPA Driven Protocol    dextrose    dextrose    glucagon (human recombinant)    heparin (porcine)    heparin (porcine)    ipratropium-albuterol    Magnesium Standard Dose Replacement - Follow Nurse / BPA Driven Protocol    ondansetron ODT **OR** ondansetron    oxyCODONE    Phosphorus Replacement - Follow Nurse / BPA Driven Protocol    Potassium Replacement - Follow Nurse / BPA Driven Protocol    sodium chloride    sodium chloride    sodium chloride       Diagnostic Data    Results from last 7 days   Lab Units 01/06/25  0437   WBC 10*3/mm3 9.22   HEMOGLOBIN g/dL 9.2*   HEMATOCRIT % 28.9*   PLATELETS 10*3/mm3 356   GLUCOSE mg/dL 139*   CREATININE mg/dL 0.76   BUN mg/dL 15   SODIUM mmol/L 137   POTASSIUM mmol/L 3.7   AST (SGOT) U/L 24   ALT (SGPT) U/L 30   ALK PHOS U/L 106   BILIRUBIN mg/dL 0.4   ANION GAP mmol/L 8.5       No radiology results for the last day      I reviewed the patient's new clinical results.    Assessment/Plan:     Active and Resolved Problems  Active Hospital Problems    Diagnosis  POA    **DKA (diabetic ketoacidosis) [E11.10]  Yes    Arterial thrombosis [I74.9]  No      Resolved Hospital Problems   No resolved problems to display.       DKA  - Resolved.  Continue Lantus and lispro for diabetes mellitus management.  Endocrinology following.  Blood glucose is relatively well-controlled.     Acute Kidney Injury required emergent hemodialysis  -resolved.     Acute occlusive thrombus of the right iliac artery with limb ischemia improved  -Arterial duplex with noted right femoral, deep femoral, and popliteal arteries being patent  but with very low amplitude monophasic signals, suggesting severe inflow stenosis or occlusion; no measurable Doppler flow in the anterior or posterior tibial and peroneal arteries.  -Bilateral lower extremity ABIs ordered: Right STACEY critically reduced with STACEY of 0 and severe digital insufficiency; left STACEY is normal with moderate digital insufficiency.  -Emergent surgery per vascular surgeon 12/19/2024 for right iliac thrombectomy via open groin incision then return to surgery on 12/20/2024 for recurrent right lower extremity ischemia due to arterial thrombosis and underwent right iliofemoral popliteal thrombectomy, right femoral endarterectomy with patch, right lower extremity arteriogram, and closed right calf fasciotomies  -Was on heparin drip.  Now on Eliquis per vascular surgery.  -Hematology/oncology consulted  -Returned to the OR morning (12/23) for emergent 4 compartment fasciotomies due to critical right leg ischemia  -Defer to vascular surgery for management of wound VAC.         Nontraumatic rhabdomyolysis --> Improving  -CK downtrending  -Encourage enteric hydration        Leukocytosis likely reactive  -Continue to monitor clinically     Electrolyte derangements likely due to renal failure  -Monitor and replete as needed per protocol        Cutaneous candidiasis of groin  -Likely secondary to uncontrolled diabetes mellitus  -Nystatin ordered     Essential Hypertension  -Clonidine patch ordered by nephrology-hold   -Titrate medications as needed.     Bilateral leg rash, concern for scabies infection  -Permethrin regimen ordered.  Completed first treatment on 12/18, repeat treatment in 2 weeks  -Keep in contact precautions x 1 week post initial treatment (12/25)     Transaminitis  -US of Liver: Hepatomegaly with steatosis.  -Hepatitis panel-negative.  -Monitor and trend LFTs.    Pulmonary:    VTE Prophylaxis:  Pharmacologic VTE prophylaxis orders are present.             Disposition Planning:      Barriers to Discharge: Pending clinical improvement  Anticipated Date of Discharge: 1/13/2024  Place of Discharge: SNF        Code Status and Medical Interventions: CPR (Attempt to Resuscitate); Full Support   Ordered at: 12/18/24 0830     Code Status (Patient has no pulse and is not breathing):    CPR (Attempt to Resuscitate)     Medical Interventions (Patient has pulse or is breathing):    Full Support       Signature: Electronically signed by Yany Rajan MD, 01/06/25, 16:38 EST.  [facility], [place], [facility]

## 2025-01-07 ENCOUNTER — TELEPHONE (OUTPATIENT)
Dept: ONCOLOGY | Facility: CLINIC | Age: 55
End: 2025-01-07
Payer: MEDICAID

## 2025-01-07 LAB
ALBUMIN SERPL-MCNC: 3.2 G/DL (ref 3.5–5.2)
ALBUMIN/GLOB SERPL: 0.8 G/DL
ALP SERPL-CCNC: 118 U/L (ref 39–117)
ALT SERPL W P-5'-P-CCNC: 26 U/L (ref 1–33)
ANION GAP SERPL CALCULATED.3IONS-SCNC: 10.8 MMOL/L (ref 5–15)
AST SERPL-CCNC: 33 U/L (ref 1–32)
BASOPHILS # BLD AUTO: 0.06 10*3/MM3 (ref 0–0.2)
BASOPHILS NFR BLD AUTO: 0.7 % (ref 0–1.5)
BILIRUB SERPL-MCNC: 0.3 MG/DL (ref 0–1.2)
BUN SERPL-MCNC: 14 MG/DL (ref 6–20)
BUN/CREAT SERPL: 20.6 (ref 7–25)
CALCIUM SPEC-SCNC: 8.7 MG/DL (ref 8.6–10.5)
CHLORIDE SERPL-SCNC: 101 MMOL/L (ref 98–107)
CO2 SERPL-SCNC: 23.2 MMOL/L (ref 22–29)
CREAT SERPL-MCNC: 0.68 MG/DL (ref 0.57–1)
DEPRECATED RDW RBC AUTO: 59.5 FL (ref 37–54)
EGFRCR SERPLBLD CKD-EPI 2021: 104.3 ML/MIN/1.73
EOSINOPHIL # BLD AUTO: 0.33 10*3/MM3 (ref 0–0.4)
EOSINOPHIL NFR BLD AUTO: 3.8 % (ref 0.3–6.2)
ERYTHROCYTE [DISTWIDTH] IN BLOOD BY AUTOMATED COUNT: 17.2 % (ref 12.3–15.4)
GLOBULIN UR ELPH-MCNC: 3.8 GM/DL
GLUCOSE BLDC GLUCOMTR-MCNC: 108 MG/DL (ref 70–105)
GLUCOSE BLDC GLUCOMTR-MCNC: 121 MG/DL (ref 70–105)
GLUCOSE BLDC GLUCOMTR-MCNC: 140 MG/DL (ref 70–105)
GLUCOSE BLDC GLUCOMTR-MCNC: 226 MG/DL (ref 70–105)
GLUCOSE SERPL-MCNC: 168 MG/DL (ref 65–99)
HCT VFR BLD AUTO: 29 % (ref 34–46.6)
HGB BLD-MCNC: 9.5 G/DL (ref 12–15.9)
IMM GRANULOCYTES # BLD AUTO: 0.07 10*3/MM3 (ref 0–0.05)
IMM GRANULOCYTES NFR BLD AUTO: 0.8 % (ref 0–0.5)
LYMPHOCYTES # BLD AUTO: 2.59 10*3/MM3 (ref 0.7–3.1)
LYMPHOCYTES NFR BLD AUTO: 29.5 % (ref 19.6–45.3)
MAGNESIUM SERPL-MCNC: 1.7 MG/DL (ref 1.6–2.6)
MCH RBC QN AUTO: 31.7 PG (ref 26.6–33)
MCHC RBC AUTO-ENTMCNC: 32.8 G/DL (ref 31.5–35.7)
MCV RBC AUTO: 96.7 FL (ref 79–97)
MONOCYTES # BLD AUTO: 0.69 10*3/MM3 (ref 0.1–0.9)
MONOCYTES NFR BLD AUTO: 7.8 % (ref 5–12)
NEUTROPHILS NFR BLD AUTO: 5.05 10*3/MM3 (ref 1.7–7)
NEUTROPHILS NFR BLD AUTO: 57.4 % (ref 42.7–76)
NRBC BLD AUTO-RTO: 0 /100 WBC (ref 0–0.2)
PHOSPHATE SERPL-MCNC: 3 MG/DL (ref 2.5–4.5)
PLATELET # BLD AUTO: 367 10*3/MM3 (ref 140–450)
PMV BLD AUTO: 8.8 FL (ref 6–12)
POTASSIUM SERPL-SCNC: 3.8 MMOL/L (ref 3.5–5.2)
PROT SERPL-MCNC: 7 G/DL (ref 6–8.5)
RBC # BLD AUTO: 3 10*6/MM3 (ref 3.77–5.28)
SODIUM SERPL-SCNC: 135 MMOL/L (ref 136–145)
WBC NRBC COR # BLD AUTO: 8.79 10*3/MM3 (ref 3.4–10.8)

## 2025-01-07 PROCEDURE — 80053 COMPREHEN METABOLIC PANEL: CPT | Performed by: SURGERY

## 2025-01-07 PROCEDURE — 82948 REAGENT STRIP/BLOOD GLUCOSE: CPT

## 2025-01-07 PROCEDURE — 99232 SBSQ HOSP IP/OBS MODERATE 35: CPT | Performed by: INTERNAL MEDICINE

## 2025-01-07 PROCEDURE — 63710000001 INSULIN LISPRO (HUMAN) PER 5 UNITS: Performed by: INTERNAL MEDICINE

## 2025-01-07 PROCEDURE — 97530 THERAPEUTIC ACTIVITIES: CPT

## 2025-01-07 PROCEDURE — 63710000001 INSULIN GLARGINE PER 5 UNITS: Performed by: INTERNAL MEDICINE

## 2025-01-07 PROCEDURE — 85025 COMPLETE CBC W/AUTO DIFF WBC: CPT | Performed by: SURGERY

## 2025-01-07 PROCEDURE — 82948 REAGENT STRIP/BLOOD GLUCOSE: CPT | Performed by: INTERNAL MEDICINE

## 2025-01-07 PROCEDURE — 83735 ASSAY OF MAGNESIUM: CPT | Performed by: SURGERY

## 2025-01-07 PROCEDURE — 97110 THERAPEUTIC EXERCISES: CPT

## 2025-01-07 PROCEDURE — 97535 SELF CARE MNGMENT TRAINING: CPT

## 2025-01-07 PROCEDURE — 84100 ASSAY OF PHOSPHORUS: CPT | Performed by: INTERNAL MEDICINE

## 2025-01-07 PROCEDURE — 97112 NEUROMUSCULAR REEDUCATION: CPT

## 2025-01-07 RX ADMIN — OXYCODONE 5 MG: 5 TABLET ORAL at 04:49

## 2025-01-07 RX ADMIN — OXYCODONE 5 MG: 5 TABLET ORAL at 00:41

## 2025-01-07 RX ADMIN — NYSTATIN 1 APPLICATION: 100000 CREAM TOPICAL at 15:41

## 2025-01-07 RX ADMIN — Medication 10 ML: at 22:02

## 2025-01-07 RX ADMIN — INSULIN LISPRO 7 UNITS: 100 INJECTION, SOLUTION INTRAVENOUS; SUBCUTANEOUS at 12:42

## 2025-01-07 RX ADMIN — INSULIN GLARGINE 20 UNITS: 100 INJECTION, SOLUTION SUBCUTANEOUS at 22:03

## 2025-01-07 RX ADMIN — INSULIN LISPRO 7 UNITS: 100 INJECTION, SOLUTION INTRAVENOUS; SUBCUTANEOUS at 09:12

## 2025-01-07 RX ADMIN — OXYCODONE 5 MG: 5 TABLET ORAL at 19:28

## 2025-01-07 RX ADMIN — INSULIN LISPRO 7 UNITS: 100 INJECTION, SOLUTION INTRAVENOUS; SUBCUTANEOUS at 18:00

## 2025-01-07 RX ADMIN — Medication 10 ML: at 09:12

## 2025-01-07 RX ADMIN — QUETIAPINE FUMARATE 75 MG: 25 TABLET ORAL at 22:03

## 2025-01-07 RX ADMIN — APIXABAN 5 MG: 5 TABLET, FILM COATED ORAL at 09:12

## 2025-01-07 RX ADMIN — OXYCODONE 5 MG: 5 TABLET ORAL at 09:12

## 2025-01-07 RX ADMIN — NYSTATIN 1 APPLICATION: 100000 CREAM TOPICAL at 04:49

## 2025-01-07 RX ADMIN — NYSTATIN 1 APPLICATION: 100000 CREAM TOPICAL at 22:02

## 2025-01-07 RX ADMIN — QUETIAPINE FUMARATE 75 MG: 25 TABLET ORAL at 09:12

## 2025-01-07 RX ADMIN — APIXABAN 5 MG: 5 TABLET, FILM COATED ORAL at 22:04

## 2025-01-07 RX ADMIN — OXYCODONE 5 MG: 5 TABLET ORAL at 23:36

## 2025-01-07 RX ADMIN — PANTOPRAZOLE SODIUM 40 MG: 40 TABLET, DELAYED RELEASE ORAL at 04:49

## 2025-01-07 NOTE — PLAN OF CARE
"Assessment: Yarelis Jackson presents with ADL impairments affecting function including balance, endurance / activity tolerance, pain, range of motion (ROM), and strength. Pt with improved tolerance to activity today, slightly decreased pain. Improved functional mobility as well as overall function noted this date. SNF remains OT recommendation at this time, though discussed accessibility of home and pt does desire home rather than SNF. HHOT recommended if she dc home. Demonstrated functioning below baseline abilities indicate the need for continued skilled intervention while inpatient. Tolerating session today without incident. Will continue to follow and progress as tolerated.      Plan/Recommendations:   Moderate Intensity Therapy recommended post-acute care. This is recommended as therapy feels the patient would require 3-4 days per week and wouldn't tolerate \"3 hour daily\" rehab intensity. SNF would be the preferred choice. If the patient does not agree to SNF, arrange HH or OP depending on home bound status. If patient is medically complex, consider LTACH.. Pt requires rolling walker and BSC at discharge.      Pt desires Home with Home Health at discharge. Pt cooperative; agreeable to therapeutic recommendations and plan of care.                    "

## 2025-01-07 NOTE — PLAN OF CARE
Goal Outcome Evaluation:      Pt on ST caseload for meal assessment. Attempted to see this date for PM snack; however, pt was lethargic and declined intervention. ST will follow up on next available date.

## 2025-01-07 NOTE — THERAPY TREATMENT NOTE
"Subjective: Pt agreeable to therapeutic plan of care. Patient is cleared for therapy by nursing; patient is agreeable A&Ox4.  Voicing preference for dc home.    Objective:     Precautions - R LE wound vac, lacks acive DF/PF    Bed mobility - SBA  Transfers - CGA-Dayami cues for hand placement, SPS with RW, sit<>stand to RW x3 reps   Ambulation - 3 feet Min-A with RW    Patient instructed in dynamic standing with UE support reaching within JAMILA with CGA-Dayami for balance, demos heavy offloading onto LLE and BUE. Approx 2 minutes    Therapeutic Exercise -BLE: LAQ, MIP, hip ab/add; attempted BLE ankle pumbs with no activation R LE x2 10-15 reps     Vitals:   Supine: 136/79, 85bpm,96% RA    Pain: 4 VAS   Location: RLE  Intervention for pain: Repositioned, Increased Activity, and Therapeutic Presence premedicated    Education: Provided education on the importance of mobility in the acute care setting, Transfer Training, and Gait Training  discussed activity modifications at the wheelchair level     Assessment: Yarelis Jackson presents with functional mobility impairments which indicate the need for skilled intervention. Patient requires increased time for transitions due to elevated pain.  Able to initiate gait with RW for limited distances.  Progressing functional transfers but continues to require CGA-Dayami.  Continue to recommend SNF level of care for progressive mobility training and nursing care; however patient voicing that she would prefer discharge home with family. If home, patient will require wheelchair and walker along with HHPT.   Tolerating session today without incident. Will continue to follow and progress as tolerated.     Plan/Recommendations:   If medically appropriate, Moderate Intensity Therapy recommended post-acute care. This is recommended as therapy feels the patient would require 3-4 days per week and wouldn't tolerate \"3 hour daily\" rehab intensity. SNF would be the preferred choice. If the patient " does not agree to SNF, arrange HH or OP depending on home bound status. If patient is medically complex, consider LTACH. Pt requires rolling walker & WCat discharge.     Pt desires Home, Home with Home Health, Home with family assist, and Home Health at discharge. Pt cooperative; agreeable to therapeutic recommendations and plan of care.         Basic Mobility 6-click:  Rollin = Total, A lot = 2, A little = 3; 4 = None  Supine>Sit:   1 = Total, A lot = 2, A little = 3; 4 = None   Sit>Stand with arms:  1 = Total, A lot = 2, A little = 3; 4 = None  Bed>Chair:   1 = Total, A lot = 2, A little = 3; 4 = None  Ambulate in room:  1 = Total, A lot = 2, A little = 3; 4 = None  3-5 Steps with railin = Total, A lot = 2, A little = 3; 4 = None  Score: 14    Modified Harmon: N/A = No pre-op stroke/TIA    Post-Tx Position: Up in Chair, Alarms activated, and Call light and personal items within reach  PPE: gloves

## 2025-01-07 NOTE — DISCHARGE PLACEMENT REQUEST
"Janet Alexander \"BRYANNA\" (54 y.o. Female)       Date of Birth   1970    Social Security Number       Address   [address] [place] [address] [place] [address]    Home Phone   [phone]    MRN   [id number]       Vood[employer]   Worship    Marital Status   Single                            Admission Date   12/18/24    Admission Type   Emergency    Admitting Provider   Shamar Rodriguez DO    Attending Provider   Yany Rajan MD    Department, Room/Bed   [facility] 2F, 2213/1       Discharge Date       Discharge Disposition       Discharge Destination                                 Attending Provider: Yany Rajan MD    Allergies: Aspirin, Ibuprofen    Isolation: None   Infection: MRSA No Isolation this Admit (12/19/24)   Code Status: CPR    Ht: 162.6 cm (64\")   Wt: 102 kg (224 lb 6.9 oz)    Admission Cmt: None   Principal Problem: DKA (diabetic ketoacidosis) [E11.10]                   Active Insurance as of 12/18/2024       Primary Coverage       Payor Plan Insurance Group Employer/Plan Group    [id number]       Payor Plan Address Payor Plan Phone Number Payor Plan Fax Number Effective Dates    MAIL STOP:   9/1/2021 - None Entered    [address] [address]       [facility] [id number]         Subscriber Name Subscriber Birth Date Member ID       JANET ALEXANDER 1970 [id number]                     Emergency Contacts        (Rel.) Home Phone Work Phone Mobile Phone    Sukh Saldivar (Son) -- -- [phone]    Rohit Saldivar (Son) -- -- [phone]    KYAW HU (Mother) [phone] -- [phone]            "

## 2025-01-07 NOTE — CASE MANAGEMENT/SOCIAL WORK
Continued Stay Note  [facility]     Patient Name: Yarelis Jackson  MRN: [id number]  Today's Date: 1/7/2025    Admit Date: 12/18/2024    Plan: [facility] (accepted, skilled). PASRR QFR. Precert required.   Discharge Plan       Row Name 01/07/25 0823       Plan    Plan Comments [facility] accepted patient for skilled care. PASRR still QFR. Will start precert once approved. Pharmacy updated.       Row Name 01/07/25 0820       Plan    Plan [facility] (accepted, skilled). PASRR QFR. Precert required.             Lien Lindsay RN      [facility]  Office: [phone]  Cell: [phone]  Fax # [phone]

## 2025-01-07 NOTE — PLAN OF CARE
Goal Outcome Evaluation: Pt is alert and oriented. Up to chair with PT.  PRN medication given for RLE pain. Wound vac in place to RLE. Pt decided today she wants to go home instead of rehab. Vascular needs to sign papers in chart stating pt can go home with wound vac. Pt able to make needs known. Plan of care ongoing.

## 2025-01-07 NOTE — PROGRESS NOTES
ENDOCRINE CONSULT PROGRESS NOTE  DATE OF SERVICE: 25        PATIENT NAME: Yarelis Jackson  PATIENT : 1970 AGE: 54 y.o.  MRN NUMBER: [id number]    ==========================================================================    CHIEF COMPLAINT: Type 1 diabetes management    CARE TEAM:   Patient Care Team:  Katie Duran PA as PCP - General (Physician Assistant)  Uli Starr MD as Consulting Physician (Nephrology)    SUBJECTIVE    Pt seen and examined.  Tolerating meals.  Awake alert and oriented x 3.    ==========================================================================    CURRENT ACTIVE HOSPITAL MEDICATIONS    Scheduled Medications:  apixaban, 5 mg, Oral, Q12H  insulin glargine, 20 Units, Subcutaneous, Nightly  insulin lispro, 2-7 Units, Subcutaneous, TID With Meals  insulin lispro, 7 Units, Subcutaneous, TID With Meals  nystatin, 1 Application, Topical, Q8H  pantoprazole, 40 mg, Oral, Q AM  QUEtiapine, 75 mg, Oral, Q12H  sodium chloride, 10 mL, Intravenous, Q12H         PRN Medications:    acetaminophen **OR** acetaminophen    aluminum-magnesium hydroxide-simethicone    senna-docusate sodium **AND** polyethylene glycol **AND** bisacodyl **AND** bisacodyl    Calcium Replacement - Follow Nurse / BPA Driven Protocol    dextrose    dextrose    glucagon (human recombinant)    heparin (porcine)    heparin (porcine)    ipratropium-albuterol    Magnesium Standard Dose Replacement - Follow Nurse / BPA Driven Protocol    ondansetron ODT **OR** ondansetron    oxyCODONE    Phosphorus Replacement - Follow Nurse / BPA Driven Protocol    Potassium Replacement - Follow Nurse / BPA Driven Protocol    sodium chloride    sodium chloride    sodium chloride     ==========================================================================    OBJECTIVE    Vitals:    25 1142   BP: 124/74   Pulse: 85   Resp: 17   Temp: 97.8 °F (36.6 °C)   SpO2: 96%      Body mass index is 38.52 kg/m².     General  - A&Ox3, NAD, Calm    ==========================================================================    LAB EVALUATION    Lab Results   Component Value Date    GLUCOSE 168 (H) 01/07/2025    BUN 14 01/07/2025    CREATININE 0.68 01/07/2025    EGFRIFNONA 87 09/29/2021    BCR 20.6 01/07/2025    K 3.8 01/07/2025    CO2 23.2 01/07/2025    CALCIUM 8.7 01/07/2025    ALBUMIN 3.2 (L) 01/07/2025    AST 33 (H) 01/07/2025    ALT 26 01/07/2025       Lab Results   Component Value Date    HGBA1C 15.80 (H) 12/18/2024     Lab Results   Component Value Date    CREATININE 0.68 01/07/2025     Results from last 7 days   Lab Units 01/07/25  1147 01/07/25  0721 01/06/25  2021 01/06/25  1628 01/06/25  1131 01/06/25  0713   GLUCOSE mg/dL 140* 108* 151* 173* 156* 143*     ==========================================================================    ASSESSMENT AND PLAN    # Type 1 diabetes with hyperglycemia  - Blood sugar reviewed for last 24 hours  - Will adjust insulin to avoid any hypoglycemia  Insulin Lantus 20 units nightly  Insulin lispro 7 units with each meal and continue sliding scale  - Will review blood sugar for next 24 hours and adjust therapy accordingly  - In the meantime if patient is planned for discharge can be discharged on the same dosing and outpatient follow-up with endocrinology team    Will follow with you.  Rest as per primary team.    Part of this note may be an electronic transcription/translation of spoken language to printed text using the Dragon Dictation System.     Note: Portions of this note may have been copied from previous notes but documentation have been reviewed and edited as necessary to support clinical decision making for today's visit.  The time of this note does not reflect the time I saw the patient but the time that this note was written.    ==========================================================================  Patel Winkler MD  Department of Endocrine, Diabetes and Metabolism  [facility]  [place]  [place], [place]  ==========================================================================

## 2025-01-07 NOTE — PLAN OF CARE
Goal Outcome Evaluation:   Pt is alert and oriented. Pt has complaints of pain, prn medications given. Wound vac in place to RLE. Falls/Safety precautions in place.

## 2025-01-07 NOTE — PLAN OF CARE
"Goal Outcome Evaluation:            Assessment: Yarelis Jackson presents with functional mobility impairments which indicate the need for skilled intervention. Patient requires increased time for transitions due to elevated pain.  Able to initiate gait with RW for limited distances.  Progressing functional transfers but continues to require CGA-[place].  Continue to recommend SNF level of care for progressive mobility training and nursing care; however patient voicing that she would prefer discharge home with family. If home, patient will require wheelchair and walker along with HHPT.   Tolerating session today without incident. Will continue to follow and progress as tolerated.     Plan/Recommendations:   If medically appropriate, Moderate Intensity Therapy recommended post-acute care. This is recommended as therapy feels the patient would require 3-4 days per week and wouldn't tolerate \"3 hour daily\" rehab intensity. SNF would be the preferred choice. If the patient does not agree to SNF, arrange HH or OP depending on home bound status. If patient is medically complex, consider LTACH. Pt requires rolling walker & WCat discharge.     Pt desires Home, Home with Home Health, Home with family assist, and Home Health at discharge. Pt cooperative; agreeable to therapeutic recommendations and plan of care.     Anticipated Discharge Disposition (PT): skilled nursing facility                        "

## 2025-01-07 NOTE — CASE MANAGEMENT/SOCIAL WORK
Continued Stay Note  [facility]     Patient Name: Yarelis Jackson  MRN: [id number]  Today's Date: 1/7/2025    Admit Date: 12/18/2024    Plan: Return home with [facility] HH (accepted, need order)   Discharge Plan       Row Name 01/07/25 1435       Plan    Plan Return home with [facility] HHC (accepted, need order)    Plan Comments CM met with patient and family at bedside to discuss SNF/ acceptance. Patient states she is going home at discharge and does not want to go to SNF. Discussed wound care needs [facility] and that she would need to be able to apply wet/dry dressing if suction is lost, etc. and patient is agreeable and wants to pursue HHC at this time. HHC list provided. CM sent referral to [facility] and notified liaison Marti, per Marti they can accept with a start of services on Saturday and then they would change to MW. Patient needs to be educated on wet to dry dressing, primary nurse and WOCN notified as well.             Lien Lindsay RN     [facility]  Office: [phone]  Cell: [phone]  Fax # [phone]

## 2025-01-07 NOTE — PROGRESS NOTES
[facility] MEDICINE SERVICE  DAILY PROGRESS NOTE    NAME: Yarelis Jackson  : 1970  MRN: [id number]      LOS: 20 days     PROVIDER OF SERVICE: Yany Rajan MD    Chief Complaint: DKA (diabetic ketoacidosis)    Subjective:     Interval History:  History taken from: patient    No new complaint      Review of Systems:   Review of Systems   All other systems reviewed and are negative.      Objective:     Vital Signs  Temp:  [97.8 °F (36.6 °C)-98.9 °F (37.2 °C)] 98.2 °F (36.8 °C)  Heart Rate:  [74-97] 80  Resp:  [15-24] 15  BP: (124-160)/(74-84) 124/74   Body mass index is 38.52 kg/m².    Physical Exam  Physical Exam  Constitutional:       Appearance: Normal appearance.   HENT:      Head: Normocephalic and atraumatic.      Nose: Nose normal.      Mouth/Throat:      Mouth: Mucous membranes are moist.   Eyes:      Extraocular Movements: Extraocular movements intact.      Pupils: Pupils are equal, round, and reactive to light.   Cardiovascular:      Rate and Rhythm: Normal rate and regular rhythm.   Pulmonary:      Effort: Pulmonary effort is normal.      Breath sounds: Normal breath sounds.   Abdominal:      General: Abdomen is flat. Bowel sounds are normal.      Palpations: Abdomen is soft.   Musculoskeletal:         General: Normal range of motion.      Cervical back: Normal range of motion and neck supple.   Skin:     General: Skin is warm and dry.   Neurological:      General: No focal deficit present.      Mental Status: She is alert and oriented to person, place, and time.   Psychiatric:         Mood and Affect: Mood normal.         Behavior: Behavior normal.         Thought Content: Thought content normal.         Judgment: Judgment normal.         Current Medications:  Scheduled Meds:apixaban, 5 mg, Oral, Q12H  insulin glargine, 20 Units, Subcutaneous, Nightly  insulin lispro, 2-7 Units, Subcutaneous, TID With Meals  insulin lispro, 7 Units, Subcutaneous, TID With Meals  nystatin, 1  Increase fluids bland foods  Continue to apply a thick barrier of diaper cream the over the counter diaper cream Kiel sometimes will work better to make a barrier  Also use the nystatin cream  Follow up with pediatrician,   Diarrhea is concerning if you see blood, mucus, associated fevers, abdominal pain or vomiting.    Application, Topical, Q8H  pantoprazole, 40 mg, Oral, Q AM  QUEtiapine, 75 mg, Oral, Q12H  sodium chloride, 10 mL, Intravenous, Q12H      Continuous Infusions:   PRN Meds:.  acetaminophen **OR** acetaminophen    aluminum-magnesium hydroxide-simethicone    senna-docusate sodium **AND** polyethylene glycol **AND** bisacodyl **AND** bisacodyl    Calcium Replacement - Follow Nurse / BPA Driven Protocol    dextrose    dextrose    glucagon (human recombinant)    heparin (porcine)    heparin (porcine)    ipratropium-albuterol    Magnesium Standard Dose Replacement - Follow Nurse / BPA Driven Protocol    ondansetron ODT **OR** ondansetron    oxyCODONE    Phosphorus Replacement - Follow Nurse / BPA Driven Protocol    Potassium Replacement - Follow Nurse / BPA Driven Protocol    sodium chloride    sodium chloride    sodium chloride       Diagnostic Data    Results from last 7 days   Lab Units 01/07/25  0446   WBC 10*3/mm3 8.79   HEMOGLOBIN g/dL 9.5*   HEMATOCRIT % 29.0*   PLATELETS 10*3/mm3 367   GLUCOSE mg/dL 168*   CREATININE mg/dL 0.68   BUN mg/dL 14   SODIUM mmol/L 135*   POTASSIUM mmol/L 3.8   AST (SGOT) U/L 33*   ALT (SGPT) U/L 26   ALK PHOS U/L 118*   BILIRUBIN mg/dL 0.3   ANION GAP mmol/L 10.8       No radiology results for the last day      I reviewed the patient's new clinical results.    Assessment/Plan:     Active and Resolved Problems  Active Hospital Problems    Diagnosis  POA    **DKA (diabetic ketoacidosis) [E11.10]  Yes    Arterial thrombosis [I74.9]  No      Resolved Hospital Problems   No resolved problems to display.       DKA  - Resolved.  Continue Lantus and lispro for diabetes mellitus management.  Endocrinology following.  Blood glucose is relatively well-controlled.     Acute Kidney Injury required emergent hemodialysis  -resolved.     Acute occlusive thrombus of the right iliac artery with limb ischemia improved  -Arterial duplex with noted right femoral, deep femoral, and popliteal arteries being  patent but with very low amplitude monophasic signals, suggesting severe inflow stenosis or occlusion; no measurable Doppler flow in the anterior or posterior tibial and peroneal arteries.  -Bilateral lower extremity ABIs ordered: Right STACEY critically reduced with STACEY of 0 and severe digital insufficiency; left STACEY is normal with moderate digital insufficiency.  -Emergent surgery per vascular surgeon 12/19/2024 for right iliac thrombectomy via open groin incision then return to surgery on 12/20/2024 for recurrent right lower extremity ischemia due to arterial thrombosis and underwent right iliofemoral popliteal thrombectomy, right femoral endarterectomy with patch, right lower extremity arteriogram, and closed right calf fasciotomies  -Was on heparin drip.  Now on Eliquis per vascular surgery.  -Hematology/oncology consulted  -Returned to the OR morning (12/23) for emergent 4 compartment fasciotomies due to critical right leg ischemia  -Defer to vascular surgery for management of wound VAC.         Nontraumatic rhabdomyolysis --> Improving  -CK downtrending  -Encourage enteric hydration        Leukocytosis likely reactive  -Continue to monitor clinically     Electrolyte derangements likely due to renal failure  -Monitor and replete as needed per protocol        Cutaneous candidiasis of groin  -Likely secondary to uncontrolled diabetes mellitus  -Nystatin ordered     Essential Hypertension  -Clonidine patch ordered by nephrology-hold   -Titrate medications as needed.     Bilateral leg rash, concern for scabies infection  -Permethrin regimen ordered.  Completed first treatment on 12/18, repeat treatment in 2 weeks  -Keep in contact precautions x 1 week post initial treatment (12/25)     Transaminitis  -US of Liver: Hepatomegaly with steatosis.  -Hepatitis panel-negative.  -Monitor and trend LFTs.    Pulmonary:    VTE Prophylaxis:  Pharmacologic VTE prophylaxis orders are present.             Disposition Planning:      Barriers to Discharge: Pending clinical improvement  Anticipated Date of Discharge: 1/13/2024  Place of Discharge: SNF        Code Status and Medical Interventions: CPR (Attempt to Resuscitate); Full Support   Ordered at: 12/18/24 0830     Code Status (Patient has no pulse and is not breathing):    CPR (Attempt to Resuscitate)     Medical Interventions (Patient has pulse or is breathing):    Full Support       Signature: Electronically signed by Yany Rajan MD, 01/07/25, 18:00 [facility].  [facility] Hospitalist Team

## 2025-01-07 NOTE — PROGRESS NOTES
Nutrition Services  Patient Name: Yarelis Jackson  YOB: 1970  MRN: [id number]  Admission date: 12/18/2024    PROGRESS NOTE      Nutrition Intervention Updates: Diet per SLP, continue supplement and encourage good PO intakes.         Encounter Information: Check on for PO intakes.  Continues to work with SLP.         PO Diet: Diet: Regular/House; Texture: Soft to Chew (NDD 3); Soft to Chew: Chopped Meat; Fluid Consistency: Thin (IDDSI 0)   PO Supplements: Boost Glucose Control BID   PO Intake:  %       Current nutrition support:    Nutrition support review:        Labs (reviewed below): Reviewed, management per attending        GI Function:  Last documented BM 1/7 (today)       Brief weight review   Wt Readings from Last 10 Encounters:   01/05/25 0535 102 kg (224 lb 6.9 oz)   01/01/25 0425 98.5 kg (217 lb 2.5 oz)   12/30/24 0415 102 kg (225 lb 5 oz)   12/29/24 0437 104 kg (228 lb 6.3 oz)   12/28/24 0409 110 kg (241 lb 6.5 oz)   12/27/24 0500 96.3 kg (212 lb 4.9 oz)   12/25/24 0400 98.5 kg (217 lb 2.5 oz)   12/24/24 0431 100 kg (221 lb 5.5 oz)   12/24/24 0333 100 kg (221 lb 5.5 oz)   12/19/24 0600 88.5 kg (195 lb 1.7 oz)   12/18/24 0527 92.5 kg (203 lb 14.4 oz)   07/15/23 1132 98.1 kg (216 lb 4.3 oz)   04/04/23 0719 98.1 kg (216 lb 4.3 oz)   12/12/22 0952 85.5 kg (188 lb 6.4 oz)   01/31/22 1404 89.8 kg (198 lb)   01/24/22 1406 89.8 kg (198 lb)   12/23/21 1133 89.8 kg (198 lb)   11/24/21 1537 90.3 kg (199 lb)   11/16/21 0838 90.3 kg (199 lb)   09/29/21 0320 87.4 kg (192 lb 10.9 oz)        Results from last 7 days   Lab Units 01/07/25  0446 01/06/25  0437 01/05/25  0531   SODIUM mmol/L 135* 137 134*   POTASSIUM mmol/L 3.8 3.7 3.7   CHLORIDE mmol/L 101 103 102   CO2 mmol/L 23.2 25.5 24.7   BUN mg/dL 14 15 13   CREATININE mg/dL 0.68 0.76 0.70   CALCIUM mg/dL 8.7 8.7 8.7   BILIRUBIN mg/dL 0.3 0.4 0.5   ALK PHOS U/L 118* 106 113   ALT (SGPT) U/L 26 30 29   AST (SGOT) U/L 33* 24 25   GLUCOSE mg/dL 168*  139* 115*     Results from last 7 days   Lab Units 01/07/25  0446 01/06/25  0437 01/05/25  0531   MAGNESIUM mg/dL 1.7 1.8 1.7   PHOSPHORUS mg/dL 3.0 3.1 3.3   HEMOGLOBIN g/dL 9.5* 9.2* 8.9*   HEMATOCRIT % 29.0* 28.9* 28.4*         RD to follow up per protocol.    Electronically signed by:  Ashley Hernandez RD  01/07/25 13:36 EST

## 2025-01-07 NOTE — THERAPY TREATMENT NOTE
"Subjective: Pt agreeable to therapeutic plan of care.  Cognition: oriented to Person, Place, Time, and Situation    Objective:     Precautions - Wound vac RLE    Bed Mobility: SBA supine to sit, management of wound vac provided  Functional Transfers: Min-A and with rolling walker sit to stand, stand pivot bed to chair.     Balance: static, dynamic, and standing Min-A and with rolling walker  Functional Ambulation: Min-A and with rolling walker    Grooming: Supervision/setup  ADL Position: unsupported sitting  ADL Comments: Pt completed oral care regimen and facial hygiene in chair. Offered hair care however pt requests waiting for family to assist.       Therapeutic Exercise - 10 Reps B UE AROM supported sitting / chair    Vitals: WNL  Supine: 136/79, 85bpm,96% RA     Pain: 4 VAS  Location: RLE  Interventions for pain: Repositioned, Increased Activity, and Therapeutic Presence  Education: Provided education on the importance of mobility in the acute care setting, Verbal/Tactile Cues, ADL training, and Transfer Training      Assessment: Yarelis Jackson presents with ADL impairments affecting function including balance, endurance / activity tolerance, pain, range of motion (ROM), and strength. Pt with improved tolerance to activity today, slightly decreased pain. Improved functional mobility as well as overall function noted this date. SNF remains OT recommendation at this time, though discussed accessibility of home and pt does desire home rather than SNF. HHOT recommended if she dc home. Demonstrated functioning below baseline abilities indicate the need for continued skilled intervention while inpatient. Tolerating session today without incident. Will continue to follow and progress as tolerated.     Plan/Recommendations:   Moderate Intensity Therapy recommended post-acute care. This is recommended as therapy feels the patient would require 3-4 days per week and wouldn't tolerate \"3 hour daily\" rehab intensity. " SNF would be the preferred choice. If the patient does not agree to SNF, arrange HH or OP depending on home bound status. If patient is medically complex, consider LTACH.. Pt requires rolling walker and BSC at discharge.     Pt desires Home with Home Health at discharge. Pt cooperative; agreeable to therapeutic recommendations and plan of care.     Modified [place]: N/A = No pre-op stroke/TIA    Post-Tx Position: Up in Chair, Alarms activated, and Call light and personal items within reach  PPE: gloves    Therapy Charges for Today       Code Description Service Date Service Provider Modifiers Qty    80441776312  OT THER PROC EA 15 MIN 1/7/2025 Carlos Alberto Hoover OT GO 1    [id number]  OT THERAPEUTIC ACT EA 15 MIN 1/7/2025 Carlos Alberto Hoover OT GO 1    [id number]  OT SELF CARE/MGMT/TRAIN EA 15 MIN 1/7/2025 Carlos Alberto Hoover OT GO 1           Time Calculation- OT       Row Name 01/07/25 1601             Time Calculation- OT    OT Start Time 0950  -      OT Stop Time 1014  -      OT Time Calculation (min) 24 min  -      Total Timed Code Minutes- OT 24 minute(s)  -LS      OT Received On 01/07/25  -      OT - Next Appointment 01/09/25  -         Timed Charges    67674 - OT Therapeutic Exercise Minutes 8  -LS      38161 - OT Therapeutic Activity Minutes 8  -LS      57393 - OT Self Care/Mgmt Minutes 8  -LS         Total Minutes    Timed Charges Total Minutes 24  -LS       Total Minutes 24  -LS                User Key  (r) = Recorded By, (t) = Taken By, (c) = Cosigned By      Initials Name Provider Type    Carlos Alberto Murillo OT Occupational Therapist

## 2025-01-07 NOTE — TELEPHONE ENCOUNTER
Called patient to schedule a 4 week hospital follow up with Dr. Borja, mailbox if full and cannot leave a message

## 2025-01-08 LAB
ALBUMIN SERPL-MCNC: 3 G/DL (ref 3.5–5.2)
ALBUMIN/GLOB SERPL: 0.8 G/DL
ALP SERPL-CCNC: 106 U/L (ref 39–117)
ALT SERPL W P-5'-P-CCNC: 22 U/L (ref 1–33)
ANION GAP SERPL CALCULATED.3IONS-SCNC: 10.2 MMOL/L (ref 5–15)
AST SERPL-CCNC: 21 U/L (ref 1–32)
BASOPHILS # BLD AUTO: 0.04 10*3/MM3 (ref 0–0.2)
BASOPHILS NFR BLD AUTO: 0.5 % (ref 0–1.5)
BILIRUB SERPL-MCNC: 0.3 MG/DL (ref 0–1.2)
BUN SERPL-MCNC: 14 MG/DL (ref 6–20)
BUN/CREAT SERPL: 21.2 (ref 7–25)
CALCIUM SPEC-SCNC: 9 MG/DL (ref 8.6–10.5)
CHLORIDE SERPL-SCNC: 104 MMOL/L (ref 98–107)
CO2 SERPL-SCNC: 22.8 MMOL/L (ref 22–29)
CREAT SERPL-MCNC: 0.66 MG/DL (ref 0.57–1)
DEPRECATED RDW RBC AUTO: 61.6 FL (ref 37–54)
EGFRCR SERPLBLD CKD-EPI 2021: 105 ML/MIN/1.73
EOSINOPHIL # BLD AUTO: 0.29 10*3/MM3 (ref 0–0.4)
EOSINOPHIL NFR BLD AUTO: 4 % (ref 0.3–6.2)
ERYTHROCYTE [DISTWIDTH] IN BLOOD BY AUTOMATED COUNT: 17.3 % (ref 12.3–15.4)
GLOBULIN UR ELPH-MCNC: 3.9 GM/DL
GLUCOSE BLDC GLUCOMTR-MCNC: 163 MG/DL (ref 70–105)
GLUCOSE BLDC GLUCOMTR-MCNC: 167 MG/DL (ref 70–105)
GLUCOSE BLDC GLUCOMTR-MCNC: 169 MG/DL (ref 70–105)
GLUCOSE BLDC GLUCOMTR-MCNC: 173 MG/DL (ref 70–105)
GLUCOSE BLDC GLUCOMTR-MCNC: 211 MG/DL (ref 70–105)
GLUCOSE SERPL-MCNC: 154 MG/DL (ref 65–99)
HCT VFR BLD AUTO: 31.4 % (ref 34–46.6)
HGB BLD-MCNC: 9.6 G/DL (ref 12–15.9)
IMM GRANULOCYTES # BLD AUTO: 0.04 10*3/MM3 (ref 0–0.05)
IMM GRANULOCYTES NFR BLD AUTO: 0.5 % (ref 0–0.5)
LYMPHOCYTES # BLD AUTO: 2.47 10*3/MM3 (ref 0.7–3.1)
LYMPHOCYTES NFR BLD AUTO: 33.8 % (ref 19.6–45.3)
MAGNESIUM SERPL-MCNC: 1.7 MG/DL (ref 1.6–2.6)
MCH RBC QN AUTO: 30.3 PG (ref 26.6–33)
MCHC RBC AUTO-ENTMCNC: 30.6 G/DL (ref 31.5–35.7)
MCV RBC AUTO: 99.1 FL (ref 79–97)
MONOCYTES # BLD AUTO: 0.59 10*3/MM3 (ref 0.1–0.9)
MONOCYTES NFR BLD AUTO: 8.1 % (ref 5–12)
NEUTROPHILS NFR BLD AUTO: 3.88 10*3/MM3 (ref 1.7–7)
NEUTROPHILS NFR BLD AUTO: 53.1 % (ref 42.7–76)
NRBC BLD AUTO-RTO: 0 /100 WBC (ref 0–0.2)
PHOSPHATE SERPL-MCNC: 3.6 MG/DL (ref 2.5–4.5)
PLATELET # BLD AUTO: 352 10*3/MM3 (ref 140–450)
PMV BLD AUTO: 8.7 FL (ref 6–12)
POTASSIUM SERPL-SCNC: 3.6 MMOL/L (ref 3.5–5.2)
POTASSIUM SERPL-SCNC: 4.4 MMOL/L (ref 3.5–5.2)
PROT SERPL-MCNC: 6.9 G/DL (ref 6–8.5)
RBC # BLD AUTO: 3.17 10*6/MM3 (ref 3.77–5.28)
SODIUM SERPL-SCNC: 137 MMOL/L (ref 136–145)
WBC NRBC COR # BLD AUTO: 7.31 10*3/MM3 (ref 3.4–10.8)

## 2025-01-08 PROCEDURE — 82948 REAGENT STRIP/BLOOD GLUCOSE: CPT | Performed by: INTERNAL MEDICINE

## 2025-01-08 PROCEDURE — 93010 ELECTROCARDIOGRAM REPORT: CPT | Performed by: INTERNAL MEDICINE

## 2025-01-08 PROCEDURE — 63710000001 INSULIN GLARGINE PER 5 UNITS: Performed by: INTERNAL MEDICINE

## 2025-01-08 PROCEDURE — 99232 SBSQ HOSP IP/OBS MODERATE 35: CPT | Performed by: INTERNAL MEDICINE

## 2025-01-08 PROCEDURE — 84100 ASSAY OF PHOSPHORUS: CPT | Performed by: INTERNAL MEDICINE

## 2025-01-08 PROCEDURE — 93005 ELECTROCARDIOGRAM TRACING: CPT | Performed by: INTERNAL MEDICINE

## 2025-01-08 PROCEDURE — 85025 COMPLETE CBC W/AUTO DIFF WBC: CPT | Performed by: SURGERY

## 2025-01-08 PROCEDURE — 80053 COMPREHEN METABOLIC PANEL: CPT | Performed by: SURGERY

## 2025-01-08 PROCEDURE — 84132 ASSAY OF SERUM POTASSIUM: CPT | Performed by: INTERNAL MEDICINE

## 2025-01-08 PROCEDURE — 82948 REAGENT STRIP/BLOOD GLUCOSE: CPT

## 2025-01-08 PROCEDURE — 83735 ASSAY OF MAGNESIUM: CPT | Performed by: SURGERY

## 2025-01-08 PROCEDURE — 63710000001 INSULIN LISPRO (HUMAN) PER 5 UNITS: Performed by: INTERNAL MEDICINE

## 2025-01-08 RX ORDER — POTASSIUM CHLORIDE 1500 MG/1
40 TABLET, EXTENDED RELEASE ORAL EVERY 4 HOURS
Status: COMPLETED | OUTPATIENT
Start: 2025-01-08 | End: 2025-01-08

## 2025-01-08 RX ADMIN — NYSTATIN 1 APPLICATION: 100000 CREAM TOPICAL at 17:05

## 2025-01-08 RX ADMIN — NYSTATIN 1 APPLICATION: 100000 CREAM TOPICAL at 05:58

## 2025-01-08 RX ADMIN — OXYCODONE 5 MG: 5 TABLET ORAL at 06:08

## 2025-01-08 RX ADMIN — INSULIN LISPRO 2 UNITS: 100 INJECTION, SOLUTION INTRAVENOUS; SUBCUTANEOUS at 12:12

## 2025-01-08 RX ADMIN — INSULIN LISPRO 2 UNITS: 100 INJECTION, SOLUTION INTRAVENOUS; SUBCUTANEOUS at 17:04

## 2025-01-08 RX ADMIN — INSULIN LISPRO 2 UNITS: 100 INJECTION, SOLUTION INTRAVENOUS; SUBCUTANEOUS at 09:41

## 2025-01-08 RX ADMIN — APIXABAN 5 MG: 5 TABLET, FILM COATED ORAL at 09:38

## 2025-01-08 RX ADMIN — POTASSIUM CHLORIDE 40 MEQ: 1500 TABLET, EXTENDED RELEASE ORAL at 09:38

## 2025-01-08 RX ADMIN — INSULIN LISPRO 7 UNITS: 100 INJECTION, SOLUTION INTRAVENOUS; SUBCUTANEOUS at 12:13

## 2025-01-08 RX ADMIN — INSULIN LISPRO 7 UNITS: 100 INJECTION, SOLUTION INTRAVENOUS; SUBCUTANEOUS at 17:05

## 2025-01-08 RX ADMIN — PANTOPRAZOLE SODIUM 40 MG: 40 TABLET, DELAYED RELEASE ORAL at 05:58

## 2025-01-08 RX ADMIN — NYSTATIN 1 APPLICATION: 100000 CREAM TOPICAL at 22:03

## 2025-01-08 RX ADMIN — OXYCODONE 5 MG: 5 TABLET ORAL at 10:21

## 2025-01-08 RX ADMIN — QUETIAPINE FUMARATE 75 MG: 25 TABLET ORAL at 22:03

## 2025-01-08 RX ADMIN — OXYCODONE 5 MG: 5 TABLET ORAL at 21:41

## 2025-01-08 RX ADMIN — Medication 10 ML: at 09:38

## 2025-01-08 RX ADMIN — APIXABAN 5 MG: 5 TABLET, FILM COATED ORAL at 21:41

## 2025-01-08 RX ADMIN — QUETIAPINE FUMARATE 75 MG: 25 TABLET ORAL at 09:37

## 2025-01-08 RX ADMIN — INSULIN LISPRO 7 UNITS: 100 INJECTION, SOLUTION INTRAVENOUS; SUBCUTANEOUS at 09:38

## 2025-01-08 RX ADMIN — POTASSIUM CHLORIDE 40 MEQ: 1500 TABLET, EXTENDED RELEASE ORAL at 12:13

## 2025-01-08 RX ADMIN — Medication 10 ML: at 22:03

## 2025-01-08 RX ADMIN — INSULIN GLARGINE 20 UNITS: 100 INJECTION, SOLUTION SUBCUTANEOUS at 22:03

## 2025-01-08 NOTE — NURSING NOTE
WOCN note:    WOCN follow up for wound VAC dressing change to RLE fasciotomy sites x 2. Patient last had Roxicodone at 0600. [facility] requesting she be able to remove her own dressing in the event of VAC failure at home. She donned gloves and attempted to remove the dressing but was too painful. Agreed to return after next dose of pain med. Dressing re-sealed and VAC therapy resumed.

## 2025-01-08 NOTE — PLAN OF CARE
Goal Outcome Evaluation:      Pt remains hospitalized.     Wound vac in place. Dr. Patterson signed forms to allow for request of home use wound vac.     Pt to dc home with family    Potassium replaced. Awaiting recheck

## 2025-01-08 NOTE — PROGRESS NOTES
[facility] MEDICINE SERVICE  DAILY PROGRESS NOTE    NAME: Yarelis Jackson  : 1970  MRN: [id number]      LOS: 21 days     PROVIDER OF SERVICE: Yany Rajan MD    Chief Complaint: DKA (diabetic ketoacidosis)    Subjective:     Interval History:  History taken from: patient    No new complaint      Review of Systems:   Review of Systems   All other systems reviewed and are negative.      Objective:     Vital Signs  Temp:  [98.1 °F (36.7 °C)-99.1 °F (37.3 °C)] 99.1 °F (37.3 °C)  Heart Rate:  [78-95] 88  Resp:  [13-23] 23  BP: (117-155)/(67-78) 142/67   Body mass index is 37.01 kg/m².    Physical Exam  Physical Exam  Constitutional:       Appearance: Normal appearance.   HENT:      Head: Normocephalic and atraumatic.      Nose: Nose normal.      Mouth/Throat:      Mouth: Mucous membranes are moist.   Eyes:      Extraocular Movements: Extraocular movements intact.      Pupils: Pupils are equal, round, and reactive to light.   Cardiovascular:      Rate and Rhythm: Normal rate and regular rhythm.   Pulmonary:      Effort: Pulmonary effort is normal.      Breath sounds: Normal breath sounds.   Abdominal:      General: Abdomen is flat. Bowel sounds are normal.      Palpations: Abdomen is soft.   Musculoskeletal:         General: Normal range of motion.      Cervical back: Normal range of motion and neck supple.   Skin:     General: Skin is warm and dry.   Neurological:      General: No focal deficit present.      Mental Status: She is alert and oriented to person, place, and time.   Psychiatric:         Mood and Affect: Mood normal.         Behavior: Behavior normal.         Thought Content: Thought content normal.         Judgment: Judgment normal.         Current Medications:  Scheduled Meds:apixaban, 5 mg, Oral, Q12H  insulin glargine, 20 Units, Subcutaneous, Nightly  insulin lispro, 2-7 Units, Subcutaneous, TID With Meals  insulin lispro, 7 Units, Subcutaneous, TID With Meals  nystatin, 1  Application, Topical, Q8H  pantoprazole, 40 mg, Oral, Q AM  QUEtiapine, 75 mg, Oral, Q12H  sodium chloride, 10 mL, Intravenous, Q12H      Continuous Infusions:   PRN Meds:.  acetaminophen **OR** acetaminophen    aluminum-magnesium hydroxide-simethicone    senna-docusate sodium **AND** polyethylene glycol **AND** bisacodyl **AND** bisacodyl    Calcium Replacement - Follow Nurse / BPA Driven Protocol    dextrose    dextrose    glucagon (human recombinant)    heparin (porcine)    heparin (porcine)    ipratropium-albuterol    Magnesium Standard Dose Replacement - Follow Nurse / BPA Driven Protocol    ondansetron ODT **OR** ondansetron    oxyCODONE    Phosphorus Replacement - Follow Nurse / BPA Driven Protocol    Potassium Replacement - Follow Nurse / BPA Driven Protocol    sodium chloride    sodium chloride    sodium chloride       Diagnostic Data    Results from last 7 days   Lab Units 01/08/25  1654 01/08/25  0538   WBC 10*3/mm3  --  7.31   HEMOGLOBIN g/dL  --  9.6*   HEMATOCRIT %  --  31.4*   PLATELETS 10*3/mm3  --  352   GLUCOSE mg/dL  --  154*   CREATININE mg/dL  --  0.66   BUN mg/dL  --  14   SODIUM mmol/L  --  137   POTASSIUM mmol/L 4.4 3.6   AST (SGOT) U/L  --  21   ALT (SGPT) U/L  --  22   ALK PHOS U/L  --  106   BILIRUBIN mg/dL  --  0.3   ANION GAP mmol/L  --  10.2       No radiology results for the last day      I reviewed the patient's new clinical results.    Assessment/Plan:     Active and Resolved Problems  Active Hospital Problems    Diagnosis  POA    **DKA (diabetic ketoacidosis) [E11.10]  Yes    Arterial thrombosis [I74.9]  No      Resolved Hospital Problems   No resolved problems to display.       DKA  - Resolved.  Continue Lantus and lispro for diabetes mellitus management.  Endocrinology following.  Blood glucose is relatively well-controlled.     Acute Kidney Injury required emergent hemodialysis  -resolved.     Acute occlusive thrombus of the right iliac artery with limb ischemia  improved  -Arterial duplex with noted right femoral, deep femoral, and popliteal arteries being patent but with very low amplitude monophasic signals, suggesting severe inflow stenosis or occlusion; no measurable Doppler flow in the anterior or posterior tibial and peroneal arteries.  -Bilateral lower extremity ABIs ordered: Right STACEY critically reduced with STACEY of 0 and severe digital insufficiency; left STACEY is normal with moderate digital insufficiency.  -Emergent surgery per vascular surgeon 12/19/2024 for right iliac thrombectomy via open groin incision then return to surgery on 12/20/2024 for recurrent right lower extremity ischemia due to arterial thrombosis and underwent right iliofemoral popliteal thrombectomy, right femoral endarterectomy with patch, right lower extremity arteriogram, and closed right calf fasciotomies  -Was on heparin drip.  Now on Eliquis per vascular surgery.  -Hematology/oncology consulted  -Returned to the OR morning (12/23) for emergent 4 compartment fasciotomies due to critical right leg ischemia  -Defer to vascular surgery for management of wound VAC.         Nontraumatic rhabdomyolysis --> Improving  -CK downtrending  -Encourage enteric hydration        Leukocytosis likely reactive  -Continue to monitor clinically     Electrolyte derangements likely due to renal failure  -Monitor and replete as needed per protocol        Cutaneous candidiasis of groin  -Likely secondary to uncontrolled diabetes mellitus  -Nystatin ordered     Essential Hypertension  -Clonidine patch ordered by nephrology-hold   -Titrate medications as needed.     Bilateral leg rash, concern for scabies infection  -Permethrin regimen ordered.  Completed first treatment on 12/18, repeat treatment in 2 weeks  -Keep in contact precautions x 1 week post initial treatment (12/25)     Transaminitis  -US of Liver: Hepatomegaly with steatosis.  -Hepatitis panel-negative.  -Monitor and trend LFTs.    Pulmonary:    VTE  Prophylaxis:  Pharmacologic VTE prophylaxis orders are present.             Disposition Planning:     Barriers to Discharge: Pending clinical improvement  Anticipated Date of Discharge: 1/13/2024  Place of Discharge: SNF        Code Status and Medical Interventions: CPR (Attempt to Resuscitate); Full Support   Ordered at: 12/18/24 0830     Code Status (Patient has no pulse and is not breathing):    CPR (Attempt to Resuscitate)     Medical Interventions (Patient has pulse or is breathing):    Full Support       Signature: Electronically signed by Yany Rajan MD, 01/08/25, 18:28 EST.  [facility] Hospitalist Team

## 2025-01-08 NOTE — CASE MANAGEMENT/SOCIAL WORK
Continued Stay Note  [facility]     Patient Name: Yarelis Jackson  MRN: [id number]  Today's Date: 1/8/2025    Admit Date: 12/18/2024    Plan: Return home with [facility] (accepted, need order)   Discharge Plan       Row Name 01/08/25 0900       Plan    Plan Comments DC Barriers: wound vac-WOCN ordering home wound vac, patient now wants to go home with [facility] vs SNF, endo monitoring BS, ongoing pain control with wound care, wound teaching/wet to dry             Lien Lindsay RN      [facility]  Office: [phone]  Cell: [phone]  Fax # [phone]

## 2025-01-08 NOTE — SIGNIFICANT NOTE
01/08/25 1633   OTHER   Discipline physical therapist   Rehab Time/Intention   Session Not Performed patient unavailable for treatment  (patient sleeping soundly, difficult to wake; will check back as able.  Messaged CM regarding need for WC and RW if dc home; recs remain SNF)   Therapy Assessment/Plan (PT)   Criteria for Skilled Interventions Met (PT) yes;meets criteria;skilled treatment is necessary   Recommendation   PT - Next Appointment 01/09/25

## 2025-01-08 NOTE — PLAN OF CARE
Problem: Adult Inpatient Plan of Care  Goal: Plan of Care Review  Outcome: Progressing  Goal: Patient-Specific Goal (Individualized)  Outcome: Progressing  Goal: Absence of Hospital-Acquired Illness or Injury  Outcome: Progressing  Intervention: Identify and Manage Fall Risk  Description: Perform standard risk assessment on admission using a validated tool or comprehensive approach appropriate to the patient; reassess fall risk frequently, with change in status or transfer to another level of care.  Communicate risk to interprofessional healthcare team; ensure fall risk visible cue.  Determine need for increased observation, equipment and environmental modification, as well as use of supportive, nonskid footwear.  Adjust safety measures to individual needs and identified risk factors.  Reinforce the importance of active participation with fall risk prevention, safety, and physical activity with the patient and family.  Perform regular intentional rounding to assess need for position change, pain assessment and personal needs, including assistance with toileting.  Recent Flowsheet Documentation  Taken 1/8/2025 0000 by Soila Manuel, RN  Safety Promotion/Fall Prevention: safety round/check completed  Taken 1/7/2025 2243 by Soila Manuel, RN  Safety Promotion/Fall Prevention: safety round/check completed  Taken 1/7/2025 2000 by Soila Manuel, RN  Safety Promotion/Fall Prevention: safety round/check completed  Intervention: Prevent Skin Injury  Description: Perform a screening for skin injury risk, such as pressure or moisture-associated skin damage on admission and at regular intervals throughout hospital stay.  Keep all areas of skin (especially folds) clean and dry.  Maintain adequate skin hydration.  Relieve and redistribute pressure and protect bony prominences and skin at risk for injury; implement measures based on patient-specific risk factors.  Match turning and repositioning schedule to clinical  condition.  Encourage weight shift frequently; assist with reposition if unable to complete independently.  Float heels off bed; avoid pressure on the Achilles tendon.  Keep skin free from extended contact with medical devices.  Optimize nutrition and hydration.  Encourage functional activity and mobility, as early as tolerated.  Use aids (e.g., slide boards, mechanical lift) during transfer.  Recent Flowsheet Documentation  Taken 1/7/2025 2000 by Soila Manuel RN  Skin Protection: incontinence pads utilized  Intervention: Prevent Infection  Description: Maintain skin and mucous membrane integrity; promote hand, oral and pulmonary hygiene.  Optimize fluid balance, nutrition, sleep and glycemic control to maximize infection resistance.  Identify potential sources of infection early to prevent or mitigate progression of infection (e.g., wound, lines, devices).  Evaluate ongoing need for invasive devices; remove promptly when no longer indicated.  Review vaccination status.  Recent Flowsheet Documentation  Taken 1/8/2025 0000 by Soila Manuel RN  Infection Prevention: hand hygiene promoted  Taken 1/7/2025 2243 by Soila Manuel RN  Infection Prevention:   hand hygiene promoted   single patient room provided  Taken 1/7/2025 2000 by Soila Manuel RN  Infection Prevention: hand hygiene promoted  Goal: Optimal Comfort and Wellbeing  Outcome: Progressing  Intervention: Monitor Pain and Promote Comfort  Description: Assess pain level, treatment efficacy and patient response at regular intervals using a consistent pain scale.  Consider the presence and impact of preexisting chronic pain.  Encourage patient and caregiver involvement in pain assessment, interventions and safety measures.  Promote activity; balance with sleep and rest to enhance healing.  Recent Flowsheet Documentation  Taken 1/7/2025 2000 by Soila Manuel, RN  Pain Management Interventions: (patient requesting pain medication at 1930) pain  medication given  Taken 1/7/2025 1928 by Soila Manuel, RN  Pain Management Interventions: (patient requested pain medication) pain medication given  Intervention: Provide Person-Centered Care  Description: Use a family-focused approach to care; encourage support system presence and participation.  Develop trust and rapport by proactively providing information, encouraging questions, addressing concerns and offering reassurance.  Acknowledge emotional response to hospitalization.  Recognize and utilize personal coping strategies and strengths; develop goals via shared decision-making.  Honor spiritual and cultural preferences.  Recent Flowsheet Documentation  Taken 1/7/2025 2000 by Soila Manuel, RN  Trust Relationship/Rapport: care explained  Goal: Readiness for Transition of Care  Outcome: Progressing     Problem: Restraint, Nonviolent  Goal: Absence of Harm or Injury  Outcome: Progressing  Intervention: Implement Least Restrictive Safety Strategies  Description: Consider personal and environmental factors contributing to unsafe nonviolent, self-destructive behavior.  Utilize diversional activity or alternative device protection.  Document alternative strategies and less restrictive intervention attempts and their effects.  Use the least restrictive method of restraint.  Recognize restraint should only be used if needed to improve the patient's wellbeing and less restrictive interventions have been determined to be ineffective.  Reassess continued need frequently.  Recent Flowsheet Documentation  Taken 1/7/2025 2000 by Soila aMnuel, RN  Diversional Activities: television  Intervention: Protect Dignity, Rights and Personal Wellbeing  Description: Explain restraint rationale and procedure to patient and family/support person prior to or at time of application.  Regularly assess personal needs and for changes in behavior and clinical condition, as well as physical and emotional wellbeing.  Provide  reassurance and emotional support.  Recent Flowsheet Documentation  Taken 1/7/2025 2000 by Soila Manuel RN  Trust Relationship/Rapport: care explained  Intervention: Protect Skin and Joint Integrity  Description: Frequently assess restraint application site for skin integrity, edema and perfusion distal to the site; document findings.  Consider protective skin barrier with risk of skin injury.  Release and replace restraint at regular intervals.  Assist with frequent joint range of motion activity.  Recent Flowsheet Documentation  Taken 1/7/2025 2000 by Soila Manuel RN  Skin Protection: incontinence pads utilized     Problem: Fall Injury Risk  Goal: Absence of Fall and Fall-Related Injury  Outcome: Progressing  Intervention: Identify and Manage Contributors  Description: Develop a fall prevention plan, considering patient-centered interventions and family/caregiver involvement; identify and address patient's facilitators and barriers.  Provide reorientation, appropriate sensory stimulation and routines with changes in mental status to decrease risk of fall.  Promote use of personal vision and auditory aids.  Assess assistance level required for safe and effective self-care; provide support as needed, such as toileting and mobilization. For age 65 and older, implement timed toileting with assistance.  Encourage physical activity, such as performance of mobility and self-care at highest level of patient ability, multicomponent exercise program and provision of appropriate assistive devices.  If fall occurs, assess the severity of injury; implement fall injury protocol. Determine the cause and revise fall injury prevention plan.  Regularly review and advocate for medication adjustment to decrease fall risk; consider administration times, polypharmacy and age.  Balance adequate pain management with potential for oversedation.  Recent Flowsheet Documentation  Taken 1/7/2025 2000 by Soila Manuel  RN  Medication Review/Management: medications reviewed  Intervention: Promote Injury-Free Environment  Description: Provide a safe, barrier-free environment that encourages independent activity.  Keep care area uncluttered and well-lighted.  Determine need for increased observation or monitoring.  Avoid use of devices that minimize mobility, such as restraints or indwelling urinary catheter.  Recent Flowsheet Documentation  Taken 1/8/2025 0000 by Soila Manuel RN  Safety Promotion/Fall Prevention: safety round/check completed  Taken 1/7/2025 2243 by Soila Manuel RN  Safety Promotion/Fall Prevention: safety round/check completed  Taken 1/7/2025 2000 by Soila Manuel RN  Safety Promotion/Fall Prevention: safety round/check completed     Problem: Skin Injury Risk Increased  Goal: Skin Health and Integrity  Outcome: Progressing  Intervention: Optimize Skin Protection  Description: Perform a full pressure injury risk assessment, as indicated by screening, upon admission to care unit.  Reassess skin (full inspection and injury risk, including skin temperature, consistency and color) frequently (e.g., scheduled interval, with change in condition) to provide optimal early detection and prevention.  Maintain adequate tissue perfusion (e.g., encourage fluid balance; avoid crossing legs, constrictive clothing or devices) to promote tissue oxygenation.  Maintain head of bed at lowest degree of elevation tolerated, considering medical condition and other restrictions. Use positioning supports to prevent sliding and friction. Consider low friction textiles.  Avoid positioning onto an area that remains reddened or on bony prominences.  Minimize incontinence and moisture (e.g., toileting schedule; moisture-wicking pad, diaper or incontinence collection device; skin moisture barrier).  Cleanse skin promptly and gently, when soiled, utilizing a pH-balanced cleanser.  Relieve and redistribute pressure (e.g., scheduled  position changes, weight shifts, use of support surface, medical device repositioning, protective dressing application, use of positioning device, microclimate control, use of pressure-injury-monitor  Encourage increased activity, such as sitting in a chair at the bedside or early mobilization, when able to tolerate. Avoid prolonged sitting.  Recent Flowsheet Documentation  Taken 1/7/2025 2000 by Soila Manuel, RN  Pressure Reduction Techniques:   weight shift assistance provided   frequent weight shift encouraged  Pressure Reduction Devices: pressure-redistributing mattress utilized  Skin Protection: incontinence pads utilized     Problem: Comorbidity Management  Goal: Maintenance of COPD Symptom Control  Outcome: Progressing  Intervention: Maintain COPD (Chronic Obstructive Pulmonary Disease) Symptom Control  Description: Evaluate adherence to self-management (COPD action plan), such as medication, symptom control, trigger avoidance, infection prevention and self-monitoring.  Advocate for continuation of home regimen, including oxygen, medication and noninvasive positive pressure use.  Anticipate the need for breathing techniques and activity pacing to minimize fatigue and breathlessness.  Assess for proper use of inhaled medication and delivery technique; assist or reinstruct if needed.  Evaluate effectiveness of coping skills; encourage expression of feelings, expectations and concerns related to disease management and quality of life; reinforce education to enhance management plan and wellbeing.  Recent Flowsheet Documentation  Taken 1/7/2025 2000 by Soila Manuel, RN  Medication Review/Management: medications reviewed     Problem: Violence Risk or Actual  Goal: Anger and Impulse Control  Outcome: Progressing  Intervention: Minimize Safety Risk  Description: Listen actively, observing verbal and nonverbal cues (e.g., irritability, confusion, lack of cooperation, demanding behavior, body posture,  expression); take threats seriously.  Maintain a therapeutic presence; utilize calm, empathetic tone of voice, nonjudgmental attitude and nonthreatening body language; listen carefully.  Assess and provide for unmet needs, including nutrition, comfort, hydration, hygiene, companionship and appropriate rest.  Utilize empathetic but firm and concise communication; set limits, offer choices and propose alternatives.  Remove stimuli and objects that may lead to harming self or others.  Maintain clear path to room exit; keep door open during care.  Ask directly about homicidal and suicidal intent; provide additional safety measures based on level of risk (e.g., one-on-one observation, duty to warn).  Implement least restrictive measures if attempts to de-escalate violent or injurious behaviors are unsuccessful.  Recent Flowsheet Documentation  Taken 1/8/2025 0000 by Soila Manuel, RN  Enhanced Safety Measures: room near unit station  Taken 1/7/2025 2243 by Soila Manuel, RN  Enhanced Safety Measures: room near unit station  Taken 1/7/2025 2000 by Soila Manuel, RN  Sensory Stimulation Regulation: care clustered  Enhanced Safety Measures: room near unit station  Intervention: Promote Self-Control  Description: Explore perception of the situation; acknowledge feelings; provide information and reassurance.  Maintain a calm and reassuring environment; minimize noise; respect personal space.  Identify and reduce causes of stress, triggers or precipitating factors of unsafe behavior.  Involve support system and family members, if helpful.  Advocate for maintaining previously established self-management plan, including medication regimen for the treatment of an underlying disorder.  Discuss and implement methods to recognize and manage emotions (e.g., verbalization, calming techniques, physical activity).  Assess and monitor for signs and symptoms of behavioral health concerns (e.g., substance use,  psychosis).  Establish or reconnect linkage with social supports and community-based services.  Recent Flowsheet Documentation  Taken 1/7/2025 2000 by Soila Manuel, RN  Supportive Measures: active listening utilized  Environmental Support: calm environment promoted     Problem: Mechanical Ventilation Invasive  Goal: Effective Communication  Outcome: Progressing  Intervention: Ensure Effective Communication  Description: Keep call system within reach; adapt to meet needs; respond to call light in person.  Acknowledge and validate intensity and complexity of voicelessness. Maintain eye contact when speaking and awaiting response.  Promote calming presence. Involve patient in decision-making and care to promote inclusion, self-efficacy, confidence and sense of control.  Establish a nonverbal communication method. Use augmentative techniques to preserve self-identity and self-esteem, such as writing tools, letter board, computer, flash cards or picture boards.  If tracheostomy, consider alternative communication method to facilitate sound or speech, such as speaking valve, occlusive cap or electrolarynx; deflate tracheostomy cuff, if present, when using devices to allow exhalation; monitor closely.  Assess and monitor for signs of biopsychosocial concerns that may affect ability to communicate, such as delirium, anxiety and depression.  Recent Flowsheet Documentation  Taken 1/7/2025 2000 by Soila Manuel, RN  Trust Relationship/Rapport: care explained  Diversional Activities: television  Family/Support System Care: support provided  Communication Enhancement Strategies: call light answered in person  Goal: Optimal Device Function  Outcome: Progressing  Intervention: Optimize Device Care and Function  Description: Maintain head of bed elevation with regular position changes to minimize ventilation-perfusion mismatch and breathlessness.  Provide oral care regularly with subglottic suction to reduce the risk of  infection; perform prior to cuff deflation or tube manipulation.  Assess tube size, depth, location and securement frequently to minimize the risk of tube displacement; confirm placement with radiography or ultrasonography.  Facilitate regular mechanical ventilator and humidification equipment checks to ensure proper function; monitor and manage ventilator and alarm settings.  Provide humidification and evaluate need for suctioning to minimize risk of airway obstruction; regularly replace closed suction equipment.  Perform ongoing device and stoma care to prevent infection; minimize excessive moisture around device; ensure tracheostomy inner cannula or single lumen device is cleaned or replaced regularly to prevent obstruction from secretions.  Monitor and manage cuff pressure routinely, if present; deflate cuff when not clinically indicated.  Maintain readily available emergency equipment that includes appropriate-sized manual resuscitation bag, mask, suction equipment, cleaning supplies and replacement airway devices.  If displacement occurs, provide oxygen to the nose, mouth and stoma. Notify provider.  Recent Flowsheet Documentation  Taken 1/8/2025 0000 by Soila Manuel, RN  Airway Safety Measures: oxygen flowmeter at bedside  Taken 1/7/2025 2243 by Soila Manuel, RN  Airway Safety Measures: oxygen flowmeter at bedside  Taken 1/7/2025 2000 by Soila Manuel, RN  Airway Safety Measures: oxygen flowmeter at bedside  Goal: Mechanical Ventilation Liberation  Outcome: Progressing  Intervention: Promote Extubation and Mechanical Ventilation Liberation  Description: Assess for pain, agitation and delirium regularly, utilizing a validated tool; minimize medication effects that may contribute to agitation, delirium or delay extubation.  Encourage early rehabilitation using therapeutic intervention and functional mobility training to minimize deconditioning, weakness, functional dependence and delirium.  Assess  readiness to wake up, breathe, wean and extubate; consider protocol approach to reduce ventilator and intensive care days.  Perform spontaneous awakening trial; adjust medication to minimize effects that may contribute to extubation failure.  Perform SBT (spontaneous breathing trial); consider low inspiratory-pressure support.  Facilitate clustered care and uninterrupted sleep/rest pattern that supports home sleep routine; promote calm environment.  Acknowledge fear and anxiety related to the patient's and support system's experience of prolonged mechanical ventilation; encourage complementary therapies, such as music therapy.  Perform a cuff leak test to predict postextubation risk for swelling or stridor; consider intravenous or inhaled steroids for high-risk patients.  Consider prophylactic noninvasive ventilation after extubation for high-risk patients [e.g., COPD (chronic obstructive pulmonary disease), heart failure, older adults].  Consider the need for a longer-term airway.  Recent Flowsheet Documentation  Taken 1/7/2025 2000 by Soila Manuel RN  Environmental Support: calm environment promoted  Medication Review/Management: medications reviewed  Goal: Optimal Nutrition Delivery  Outcome: Progressing  Goal: Absence of Device-Related Skin and Tissue Injury  Outcome: Progressing  Intervention: Maintain Skin and Tissue Health  Description: Reposition and resecure endotracheal tube regularly; ensure proper tube location.  Monitor depth of suction catheter advancement to minimize the risk of internal tracheobronchial tissue injury.  Assess skin and mucosal areas around the device frequently.  Monitor tightness of securement device regularly; consider skin barrier protection.  Minimize pressure points and prevent traction on device, using careful positioning, flexible extenders and props.  Assess and monitor for the presence of bleeding that may indicate injury to tracheobronchial tissue. Notify provider for  persistent bleeding.  Anticipate adjunct therapy, such as cool mist, racemic epinephrine, corticosteroid or heliox, for symptoms related to airway swelling or stridor after removal of tube.  Recent Flowsheet Documentation  Taken 1/7/2025 2000 by Soila Manuel RN  Device Skin Pressure Protection: absorbent pad utilized/changed  Goal: Absence of Ventilator-Induced Lung Injury  Outcome: Progressing   Goal Outcome Evaluation:  Plan of Care Reviewed With: patient        Progress: improving

## 2025-01-08 NOTE — PLAN OF CARE
Goal Outcome Evaluation:         Pt on ST caseload to complete ongoing meal assessment in the setting of dysphagia. Pt is currently on a diet of soft to chew; chopped meats/ thin liquids. D/t caseload/staffing, ST was unable to assess this date. Will follow up on next available date.

## 2025-01-08 NOTE — PROGRESS NOTES
ENDOCRINE CONSULT PROGRESS NOTE  DATE OF SERVICE: 25        PATIENT NAME: Yarelis Jackson  PATIENT : 1970 AGE: 54 y.o.  MRN NUMBER: [id number]    ==========================================================================    CHIEF COMPLAINT: Type 1 diabetes management    CARE TEAM:   Patient Care Team:  Katie Duran PA as PCP - General (Physician Assistant)  Uli Starr MD as Consulting Physician (Nephrology)    SUBJECTIVE    Pt seen and examined.  Patient continues to be awake, alert and oriented x 3.  Tolerating medications.  Blood sugar reviewed for last 24 hours.    ==========================================================================    CURRENT ACTIVE HOSPITAL MEDICATIONS    Scheduled Medications:  apixaban, 5 mg, Oral, Q12H  insulin glargine, 20 Units, Subcutaneous, Nightly  insulin lispro, 2-7 Units, Subcutaneous, TID With Meals  insulin lispro, 7 Units, Subcutaneous, TID With Meals  nystatin, 1 Application, Topical, Q8H  pantoprazole, 40 mg, Oral, Q AM  QUEtiapine, 75 mg, Oral, Q12H  sodium chloride, 10 mL, Intravenous, Q12H         PRN Medications:    acetaminophen **OR** acetaminophen    aluminum-magnesium hydroxide-simethicone    senna-docusate sodium **AND** polyethylene glycol **AND** bisacodyl **AND** bisacodyl    Calcium Replacement - Follow Nurse / BPA Driven Protocol    dextrose    dextrose    glucagon (human recombinant)    heparin (porcine)    heparin (porcine)    ipratropium-albuterol    Magnesium Standard Dose Replacement - Follow Nurse / BPA Driven Protocol    ondansetron ODT **OR** ondansetron    oxyCODONE    Phosphorus Replacement - Follow Nurse / BPA Driven Protocol    Potassium Replacement - Follow Nurse / BPA Driven Protocol    sodium chloride    sodium chloride    sodium chloride     ==========================================================================    OBJECTIVE    Vitals:    25 1210   BP: 143/78   Pulse: 78   Resp: 16   Temp: 98.6 °F  (37 °C)   SpO2: 100%      Body mass index is 37.01 kg/m².     General - A&Ox3, NAD, Calm    ==========================================================================    LAB EVALUATION    Lab Results   Component Value Date    GLUCOSE 154 (H) 01/08/2025    BUN 14 01/08/2025    CREATININE 0.66 01/08/2025    EGFRIFNONA 87 09/29/2021    BCR 21.2 01/08/2025    K 3.6 01/08/2025    CO2 22.8 01/08/2025    CALCIUM 9.0 01/08/2025    ALBUMIN 3.0 (L) 01/08/2025    AST 21 01/08/2025    ALT 22 01/08/2025       Lab Results   Component Value Date    HGBA1C 15.80 (H) 12/18/2024     Lab Results   Component Value Date    CREATININE 0.66 01/08/2025     Results from last 7 days   Lab Units 01/08/25  1208 01/08/25  0853 01/07/25  2256 01/07/25  1655 01/07/25  1147 01/07/25  0721   GLUCOSE mg/dL 163* 167* 226* 121* 140* 108*     ==========================================================================    ASSESSMENT AND PLAN    # Type 1 diabetes with hyperglycemia  - Blood sugar reviewed for last 24 hours  - Continue insulin therapy without any changes:  Insulin Lantus 20 units nightly  Insulin lispro 7 units with each meal and continue sliding scale  - Will review blood sugar for next 24 hours and adjust therapy accordingly  - In the meantime if patient is planned for discharge can be discharged on the same dosing and outpatient follow-up with endocrinology team    Will follow with you.  Rest as per primary team.    Part of this note may be an electronic transcription/translation of spoken language to printed text using the Dragon Dictation System.     Note: Portions of this note may have been copied from previous notes but documentation have been reviewed and edited as necessary to support clinical decision making for today's visit.  The time of this note does not reflect the time I saw the patient but the time that this note was written.    ==========================================================================  Patel Winkler  MD  Department of Endocrine, Diabetes and Metabolism  [facility] [place]  ==========================================================================

## 2025-01-08 NOTE — NURSING NOTE
WOCN note:    WOCN follow up for VAC dressing change to RLE. Patient is s/p fasciotomies medial and lateral RLE. Pre-medicated with Roxicodone at 1020. Wound VAC canister changed.     Dressing removal was extremely painful. The wounds and surrounding skin were cleansed with Vashe. Healing well with beefy red tissue and covering of fascia and underlying structures. Muscle remains exposed. There is an area to the anterior leg with brown and yellow eschar at the site of skin necrosis measuring approximately 1.5 x 1.5 cm along with partial thickness skin loss from blistering. There is a second area of partial thickness skin loss along the posterior lower leg from blisters. These areas were covered with Maxorb.   There are two blisters to the medial and lateral ankle that are covered in a hard dry black eschar. These were covered with ABD pad.   The fasciotomy sites were lined with fenestrated silicone then packed with black sponge. Two pieces were used to each site. A third and fourth piece of sponge were used to bridge the two wounds together and support the trac pad. The dressing was reattached to 125mmHg continuous negative pressure with good seal obtained. The lower leg was wrapped with kerlix and ACE. Will continue to follow.   Addendum: 1515: Home VAC orders faxed for auth. Request deliver for 1/9 or 1/10.      Medial         Lateral         Anterior

## 2025-01-09 LAB
ALBUMIN SERPL-MCNC: 3.2 G/DL (ref 3.5–5.2)
ALBUMIN/GLOB SERPL: 0.8 G/DL
ALP SERPL-CCNC: 105 U/L (ref 39–117)
ALT SERPL W P-5'-P-CCNC: 21 U/L (ref 1–33)
ANION GAP SERPL CALCULATED.3IONS-SCNC: 7.9 MMOL/L (ref 5–15)
AST SERPL-CCNC: 23 U/L (ref 1–32)
BASOPHILS # BLD AUTO: 0.08 10*3/MM3 (ref 0–0.2)
BASOPHILS NFR BLD AUTO: 1.1 % (ref 0–1.5)
BILIRUB SERPL-MCNC: 0.4 MG/DL (ref 0–1.2)
BUN SERPL-MCNC: 15 MG/DL (ref 6–20)
BUN/CREAT SERPL: 25 (ref 7–25)
CALCIUM SPEC-SCNC: 9.4 MG/DL (ref 8.6–10.5)
CHLORIDE SERPL-SCNC: 101 MMOL/L (ref 98–107)
CO2 SERPL-SCNC: 25.1 MMOL/L (ref 22–29)
CONFIRM DRVVT: NORMAL SEC
CREAT SERPL-MCNC: 0.6 MG/DL (ref 0.57–1)
DEPRECATED RDW RBC AUTO: 61.1 FL (ref 37–54)
DRVVT SCREEN TO CONFIRM RATIO: NORMAL RATIO
EGFRCR SERPLBLD CKD-EPI 2021: 107.5 ML/MIN/1.73
EOSINOPHIL # BLD AUTO: 0.29 10*3/MM3 (ref 0–0.4)
EOSINOPHIL NFR BLD AUTO: 3.9 % (ref 0.3–6.2)
ERYTHROCYTE [DISTWIDTH] IN BLOOD BY AUTOMATED COUNT: 17.3 % (ref 12.3–15.4)
GLOBULIN UR ELPH-MCNC: 3.9 GM/DL
GLUCOSE BLDC GLUCOMTR-MCNC: 146 MG/DL (ref 70–105)
GLUCOSE BLDC GLUCOMTR-MCNC: 178 MG/DL (ref 70–105)
GLUCOSE BLDC GLUCOMTR-MCNC: 181 MG/DL (ref 70–105)
GLUCOSE BLDC GLUCOMTR-MCNC: 248 MG/DL (ref 70–105)
GLUCOSE SERPL-MCNC: 169 MG/DL (ref 65–99)
HCT VFR BLD AUTO: 31.1 % (ref 34–46.6)
HGB BLD-MCNC: 9.8 G/DL (ref 12–15.9)
IMM GRANULOCYTES # BLD AUTO: 0.04 10*3/MM3 (ref 0–0.05)
IMM GRANULOCYTES NFR BLD AUTO: 0.5 % (ref 0–0.5)
LYMPHOCYTES # BLD AUTO: 2.48 10*3/MM3 (ref 0.7–3.1)
LYMPHOCYTES NFR BLD AUTO: 33.6 % (ref 19.6–45.3)
MAGNESIUM SERPL-MCNC: 1.6 MG/DL (ref 1.6–2.6)
MCH RBC QN AUTO: 30.6 PG (ref 26.6–33)
MCHC RBC AUTO-ENTMCNC: 31.5 G/DL (ref 31.5–35.7)
MCV RBC AUTO: 97.2 FL (ref 79–97)
MONOCYTES # BLD AUTO: 0.57 10*3/MM3 (ref 0.1–0.9)
MONOCYTES NFR BLD AUTO: 7.7 % (ref 5–12)
NEUTROPHILS NFR BLD AUTO: 3.91 10*3/MM3 (ref 1.7–7)
NEUTROPHILS NFR BLD AUTO: 53.2 % (ref 42.7–76)
NRBC BLD AUTO-RTO: 0 /100 WBC (ref 0–0.2)
PHOSPHATE SERPL-MCNC: 3.6 MG/DL (ref 2.5–4.5)
PLATELET # BLD AUTO: 369 10*3/MM3 (ref 140–450)
PMV BLD AUTO: 8.6 FL (ref 6–12)
POTASSIUM SERPL-SCNC: 4 MMOL/L (ref 3.5–5.2)
PROT SERPL-MCNC: 7.1 G/DL (ref 6–8.5)
QT INTERVAL: 372 MS
QTC INTERVAL: 432 MS
RBC # BLD AUTO: 3.2 10*6/MM3 (ref 3.77–5.28)
SCREEN DRVVT: 44.1 SEC
SODIUM SERPL-SCNC: 134 MMOL/L (ref 136–145)
WBC NRBC COR # BLD AUTO: 7.37 10*3/MM3 (ref 3.4–10.8)

## 2025-01-09 PROCEDURE — 85025 COMPLETE CBC W/AUTO DIFF WBC: CPT | Performed by: SURGERY

## 2025-01-09 PROCEDURE — 82948 REAGENT STRIP/BLOOD GLUCOSE: CPT

## 2025-01-09 PROCEDURE — 63710000001 INSULIN LISPRO (HUMAN) PER 5 UNITS: Performed by: INTERNAL MEDICINE

## 2025-01-09 PROCEDURE — 84100 ASSAY OF PHOSPHORUS: CPT | Performed by: INTERNAL MEDICINE

## 2025-01-09 PROCEDURE — 97530 THERAPEUTIC ACTIVITIES: CPT

## 2025-01-09 PROCEDURE — 97116 GAIT TRAINING THERAPY: CPT

## 2025-01-09 PROCEDURE — 99232 SBSQ HOSP IP/OBS MODERATE 35: CPT | Performed by: INTERNAL MEDICINE

## 2025-01-09 PROCEDURE — 80053 COMPREHEN METABOLIC PANEL: CPT | Performed by: SURGERY

## 2025-01-09 PROCEDURE — 97535 SELF CARE MNGMENT TRAINING: CPT

## 2025-01-09 PROCEDURE — 63710000001 INSULIN GLARGINE PER 5 UNITS: Performed by: INTERNAL MEDICINE

## 2025-01-09 PROCEDURE — 83735 ASSAY OF MAGNESIUM: CPT | Performed by: SURGERY

## 2025-01-09 RX ORDER — INSULIN LISPRO 100 [IU]/ML
8 INJECTION, SOLUTION INTRAVENOUS; SUBCUTANEOUS
Status: DISCONTINUED | OUTPATIENT
Start: 2025-01-09 | End: 2025-01-10 | Stop reason: HOSPADM

## 2025-01-09 RX ORDER — OXYCODONE HYDROCHLORIDE 5 MG/1
5 TABLET ORAL EVERY 4 HOURS PRN
Status: DISCONTINUED | OUTPATIENT
Start: 2025-01-09 | End: 2025-01-10 | Stop reason: HOSPADM

## 2025-01-09 RX ADMIN — PANTOPRAZOLE SODIUM 40 MG: 40 TABLET, DELAYED RELEASE ORAL at 06:20

## 2025-01-09 RX ADMIN — NYSTATIN 1 APPLICATION: 100000 CREAM TOPICAL at 20:10

## 2025-01-09 RX ADMIN — APIXABAN 5 MG: 5 TABLET, FILM COATED ORAL at 10:14

## 2025-01-09 RX ADMIN — INSULIN LISPRO 7 UNITS: 100 INJECTION, SOLUTION INTRAVENOUS; SUBCUTANEOUS at 10:14

## 2025-01-09 RX ADMIN — OXYCODONE 5 MG: 5 TABLET ORAL at 06:20

## 2025-01-09 RX ADMIN — QUETIAPINE FUMARATE 75 MG: 25 TABLET ORAL at 20:10

## 2025-01-09 RX ADMIN — APIXABAN 5 MG: 5 TABLET, FILM COATED ORAL at 20:10

## 2025-01-09 RX ADMIN — Medication 10 ML: at 10:15

## 2025-01-09 RX ADMIN — NYSTATIN 1 APPLICATION: 100000 CREAM TOPICAL at 06:13

## 2025-01-09 RX ADMIN — OXYCODONE 5 MG: 5 TABLET ORAL at 20:15

## 2025-01-09 RX ADMIN — OXYCODONE 5 MG: 5 TABLET ORAL at 16:33

## 2025-01-09 RX ADMIN — INSULIN LISPRO 2 UNITS: 100 INJECTION, SOLUTION INTRAVENOUS; SUBCUTANEOUS at 10:15

## 2025-01-09 RX ADMIN — QUETIAPINE FUMARATE 75 MG: 25 TABLET ORAL at 10:14

## 2025-01-09 RX ADMIN — INSULIN LISPRO 8 UNITS: 100 INJECTION, SOLUTION INTRAVENOUS; SUBCUTANEOUS at 17:48

## 2025-01-09 RX ADMIN — INSULIN GLARGINE 20 UNITS: 100 INJECTION, SOLUTION SUBCUTANEOUS at 20:10

## 2025-01-09 RX ADMIN — INSULIN LISPRO 3 UNITS: 100 INJECTION, SOLUTION INTRAVENOUS; SUBCUTANEOUS at 17:48

## 2025-01-09 NOTE — THERAPY TREATMENT NOTE
"Subjective: Pt agreeable to therapeutic plan of care. Pt required encouragement to participate in therapy this date.  Cognition: oriented to Person, Place, Time, and Situation    Objective:     Precautions - Wound vac RLE, falls    Bed Mobility: SBA   Functional Transfers: CGA     Balance: supported, static, dynamic, and standing CGA and with rolling walker  Functional Ambulation: CGA and with rolling walker    Toileting: Max-A  ADL Position: supported standing  ADL Comments: Yasmeen-hygiene requires Max A in standing    Lower Body Dressing: Max-A  ADL Position: supine  ADL Comments: donning socks      Vitals: WNL    Pain: 5 VAS  Location: RLE  Interventions for pain: Increased Activity and Therapeutic Presence  Education: Provided education on the importance of mobility in the acute care setting, Verbal/Tactile Cues, ADL training, and Transfer Training      Assessment: Yarelis Jackson presents with ADL impairments affecting function including balance, endurance / activity tolerance, and pain. Pt continues to progress with functional mobility, but continues to require assistance with ADL care. Pt adamant that she will be able to do it at home in her own environment, but requires heavy assist currently. SNF continues to be OT recommendation, however pt voicing that she will be dc'ing home. Demonstrated functioning below baseline abilities indicate the need for continued skilled intervention while inpatient. Tolerating session today without incident. Will continue to follow and progress as tolerated.     Plan/Recommendations:   Moderate Intensity Therapy recommended post-acute care. This is recommended as therapy feels the patient would require 3-4 days per week and wouldn't tolerate \"3 hour daily\" rehab intensity. SNF would be the preferred choice. If the patient does not agree to SNF, arrange HH or OP depending on home bound status. If patient is medically complex, consider LTACH.. Pt requires no DME at discharge. "     Pt desires Home with Home Health and Home with family assist at discharge. Pt cooperative; agreeable to therapeutic recommendations and plan of care.     Modified [place]ores: N/A = No pre-op stroke/TIA    Post-Tx Position: Supine with HOB Elevated, Alarms activated, and Call light and personal items within reach  PPE: gloves    Therapy Charges for Today       Code Description Service Date Service Provider Modifiers Qty    17509264021 HC OT THERAPEUTIC ACT EA 15 MIN 1/9/2025 Carlos Alberto Hoover OT GO 1    81379002253 HC OT SELF CARE/MGMT/TRAIN EA 15 MIN 1/9/2025 Carlos Alberto Hoover OT GO 1           Time Calculation- OT       Row Name 01/09/25 1706             Time Calculation- OT    OT Start Time 1532  -LS      OT Stop Time 1550  -      OT Time Calculation (min) 18 min  -      Total Timed Code Minutes- OT 18 minute(s)  -LS      OT Received On 01/09/25  -      OT - Next Appointment 01/13/25  -         Timed Charges    01457 - OT Therapeutic Activity Minutes 9  -LS      64801 - OT Self Care/Mgmt Minutes 9  -LS         Total Minutes    Timed Charges Total Minutes 18  -LS       Total Minutes 18  -LS                User Key  (r) = Recorded By, (t) = Taken By, (c) = Cosigned By      Initials Name Provider Type    Carlos Alberto Murillo OT Occupational Therapist

## 2025-01-09 NOTE — PLAN OF CARE
Goal Outcome Evaluation:      ST attempted x1 to see this pt for meal assessment; however, pt had already finished AM snack. When SLP offered additional PO, she declined. ST will follow up on next available date to complete.

## 2025-01-09 NOTE — PLAN OF CARE
"Goal Outcome Evaluation:            Assessment: Yarelis Jackson presents with functional mobility impairments which indicate the need for skilled intervention.  Patient progressing with bed mobility and functional transfers using RW with CGA.  Short distance ambulation with RW with heavy offloading to UE for R SLS.  Continue to recommend SNF level of care due to limited mobility and nursing needs.  Patient voicing plan for discharge home, and use of crutches; advised against this and educated on safety concerns. If home patient will require wheelchair and RW for safety.  Tolerating session today without incident. Will continue to follow and progress as tolerated.     Plan/Recommendations:   If medically appropriate, Moderate Intensity Therapy recommended post-acute care. This is recommended as therapy feels the patient would require 3-4 days per week and wouldn't tolerate \"3 hour daily\" rehab intensity. SNF would be the preferred choice. If the patient does not agree to SNF, arrange HH or OP depending on home bound status. If patient is medically complex, consider LTACH. Pt requires no DME at discharge.     Anticipated Discharge Disposition (PT): skilled nursing facility                        "

## 2025-01-09 NOTE — PLAN OF CARE
"Assessment: Yarelis Jackson presents with ADL impairments affecting function including balance, endurance / activity tolerance, and pain. Pt continues to progress with functional mobility, but continues to require assistance with ADL care. Pt adamant that she will be able to do it at home in her own environment, but requires heavy assist currently. SNF continues to be OT recommendation, however pt voicing that she will be dc'ing home. Demonstrated functioning below baseline abilities indicate the need for continued skilled intervention while inpatient. Tolerating session today without incident. Will continue to follow and progress as tolerated.      Plan/Recommendations:   Moderate Intensity Therapy recommended post-acute care. This is recommended as therapy feels the patient would require 3-4 days per week and wouldn't tolerate \"3 hour daily\" rehab intensity. SNF would be the preferred choice. If the patient does not agree to SNF, arrange HH or OP depending on home bound status. If patient is medically complex, consider LTACH.. Pt requires no DME at discharge.      Pt desires Home with Home Health and Home with family assist at discharge. Pt cooperative; agreeable to therapeutic recommendations and plan of care.           "

## 2025-01-09 NOTE — THERAPY TREATMENT NOTE
"Subjective: Pt agreeable to therapeutic plan of care.  Patient cleared for therapy by nursing.  Patient is pleasant and agreeable.    Objective:     Precautions - wound vac, no R LE active DF/PF    Bed mobility - SBA  Transfers - CGA sit<>Stand with RW, cues for hand placement; patient refused to sit up in chair   Ambulation - 6 anterior and retro feet CGA  heavy off loading onto UE with R LE SLS    Patient instructed in participation in hygiene tasks but patient voicing that she cannot assist; states she will be able to do these tasks at home.  Increased time to educate patient on safety precautions and concerns with dc home.  Patient voicing that she plans to use the wheelchair and crutches.  Advised against crutch use due to impaired balance; recommend use of RW.        Vitals:   135/79 (91), 100bpm,96% RA    Pain: 5 VAS   Location: R LE  Intervention for pain: premedicated    Education: Provided education on the importance of mobility in the acute care setting, Transfer Training, and Gait Training    Assessment: Yarelis Jackson presents with functional mobility impairments which indicate the need for skilled intervention.  Patient progressing with bed mobility and functional transfers using RW with CGA.  Short distance ambulation with RW with heavy offloading to UE for R SLS.  Continue to recommend SNF level of care due to limited mobility and nursing needs.  Patient voicing plan for discharge home, and use of crutches; advised against this and educated on safety concerns. If home patient will require wheelchair and RW for safety.  Tolerating session today without incident. Will continue to follow and progress as tolerated.     Plan/Recommendations:   If medically appropriate, Moderate Intensity Therapy recommended post-acute care. This is recommended as therapy feels the patient would require 3-4 days per week and wouldn't tolerate \"3 hour daily\" rehab intensity. SNF would be the preferred choice. If the " patient does not agree to SNF, arrange HH or OP depending on home bound status. If patient is medically complex, consider LTACH. Pt requires no DME at discharge.     Pt desires Home at discharge. Pt cooperative; agreeable to therapeutic recommendations and plan of care.         Basic Mobility 6-click:  Rollin = Total, A lot = 2, A little = 3; 4 = None  Supine>Sit:   1 = Total, A lot = 2, A little = 3; 4 = None   Sit>Stand with arms:  1 = Total, A lot = 2, A little = 3; 4 = None  Bed>Chair:   1 = Total, A lot = 2, A little = 3; 4 = None  Ambulate in room:  1 = Total, A lot = 2, A little = 3; 4 = None  3-5 Steps with railin = Total, A lot = 2, A little = 3; 4 = None  Score: 18    Modified [place]: N/A = No pre-op stroke/TIA    Post-Tx Position: Supine with HOB Elevated, Alarms activated, and Call light and personal items within reach  PPE: gloves

## 2025-01-09 NOTE — PLAN OF CARE
Problem: Adult Inpatient Plan of Care  Goal: Plan of Care Review  Outcome: Progressing  Goal: Patient-Specific Goal (Individualized)  Outcome: Progressing  Goal: Absence of Hospital-Acquired Illness or Injury  Outcome: Progressing  Intervention: Identify and Manage Fall Risk  Recent Flowsheet Documentation  Taken 1/9/2025 1400 by Elizabeth Franklin RN  Safety Promotion/Fall Prevention: safety round/check completed  Taken 1/9/2025 1200 by Elizabeth Franklin RN  Safety Promotion/Fall Prevention: safety round/check completed  Taken 1/9/2025 1000 by Elizabeth Franklin RN  Safety Promotion/Fall Prevention: safety round/check completed  Taken 1/9/2025 0800 by Elizabeth Franklin RN  Safety Promotion/Fall Prevention: safety round/check completed  Intervention: Prevent Skin Injury  Recent Flowsheet Documentation  Taken 1/9/2025 1400 by Elizabeth Franklin RN  Body Position: position changed independently  Taken 1/9/2025 1200 by Elizabeth Franklin RN  Body Position: position changed independently  Taken 1/9/2025 1000 by Elizabeth Franklin RN  Body Position: position changed independently  Taken 1/9/2025 0800 by Elizabeth Franklin RN  Body Position: position changed independently  Goal: Optimal Comfort and Wellbeing  Outcome: Progressing  Intervention: Provide Person-Centered Care  Recent Flowsheet Documentation  Taken 1/9/2025 1600 by Elizabeth Franklin RN  Trust Relationship/Rapport:   care explained   choices provided   emotional support provided   empathic listening provided   questions answered   questions encouraged   thoughts/feelings acknowledged   reassurance provided  Taken 1/9/2025 1200 by Elizabeth Franklin RN  Trust Relationship/Rapport:   care explained   choices provided   emotional support provided   empathic listening provided   questions encouraged   reassurance provided   thoughts/feelings acknowledged   questions answered  Taken 1/9/2025 0800 by Elizabeth Franklin RN  Trust Relationship/Rapport:   care explained   choices provided    emotional support provided   empathic listening provided   questions answered   questions encouraged   reassurance provided   thoughts/feelings acknowledged  Goal: Readiness for Transition of Care  Outcome: Progressing     Problem: Restraint, Nonviolent  Goal: Absence of Harm or Injury  Outcome: Progressing  Intervention: Protect Dignity, Rights and Personal Wellbeing  Recent Flowsheet Documentation  Taken 1/9/2025 1600 by Elizabeth Franklin RN  Trust Relationship/Rapport:   care explained   choices provided   emotional support provided   empathic listening provided   questions answered   questions encouraged   thoughts/feelings acknowledged   reassurance provided  Taken 1/9/2025 1200 by Elizabeth Franklin RN  Trust Relationship/Rapport:   care explained   choices provided   emotional support provided   empathic listening provided   questions encouraged   reassurance provided   thoughts/feelings acknowledged   questions answered  Taken 1/9/2025 0800 by Elizabeth Franklin RN  Trust Relationship/Rapport:   care explained   choices provided   emotional support provided   empathic listening provided   questions answered   questions encouraged   reassurance provided   thoughts/feelings acknowledged  Intervention: Protect Skin and Joint Integrity  Recent Flowsheet Documentation  Taken 1/9/2025 1400 by Elizabeth Franklin RN  Body Position: position changed independently  Taken 1/9/2025 1200 by Elizabeth Franklin RN  Body Position: position changed independently  Taken 1/9/2025 1000 by Elizabeth Franklin RN  Body Position: position changed independently  Taken 1/9/2025 0800 by Elizabeth Franklin RN  Body Position: position changed independently     Problem: Fall Injury Risk  Goal: Absence of Fall and Fall-Related Injury  Outcome: Progressing  Intervention: Promote Injury-Free Environment  Recent Flowsheet Documentation  Taken 1/9/2025 1400 by Elizabeth Franklin RN  Safety Promotion/Fall Prevention: safety round/check completed  Taken 1/9/2025 1200  by Rigoberto, Elizabeth M, RN  Safety Promotion/Fall Prevention: safety round/check completed  Taken 1/9/2025 1000 by Elizabeth Franklin RN  Safety Promotion/Fall Prevention: safety round/check completed  Taken 1/9/2025 0800 by Elizabeth Franklin RN  Safety Promotion/Fall Prevention: safety round/check completed     Problem: Skin Injury Risk Increased  Goal: Skin Health and Integrity  Outcome: Progressing     Problem: Comorbidity Management  Goal: Maintenance of COPD Symptom Control  Outcome: Progressing     Problem: Violence Risk or Actual  Goal: Anger and Impulse Control  Outcome: Progressing     Problem: Mechanical Ventilation Invasive  Goal: Effective Communication  Outcome: Progressing  Intervention: Ensure Effective Communication  Recent Flowsheet Documentation  Taken 1/9/2025 1600 by Elizabeth Franklin RN  Trust Relationship/Rapport:   care explained   choices provided   emotional support provided   empathic listening provided   questions answered   questions encouraged   thoughts/feelings acknowledged   reassurance provided  Taken 1/9/2025 1200 by Elizabeth Franklin RN  Trust Relationship/Rapport:   care explained   choices provided   emotional support provided   empathic listening provided   questions encouraged   reassurance provided   thoughts/feelings acknowledged   questions answered  Taken 1/9/2025 0800 by Elizabeth Franklin RN  Trust Relationship/Rapport:   care explained   choices provided   emotional support provided   empathic listening provided   questions answered   questions encouraged   reassurance provided   thoughts/feelings acknowledged  Goal: Optimal Device Function  Outcome: Progressing  Goal: Mechanical Ventilation Liberation  Outcome: Progressing  Goal: Optimal Nutrition Delivery  Outcome: Progressing  Goal: Absence of Device-Related Skin and Tissue Injury  Outcome: Progressing  Goal: Absence of Ventilator-Induced Lung Injury  Outcome: Progressing   Goal Outcome Evaluation:

## 2025-01-09 NOTE — PLAN OF CARE
Problem: Adult Inpatient Plan of Care  Goal: Plan of Care Review  Outcome: Progressing  Flowsheets (Taken 1/9/2025 0233)  Progress: improving  Plan of Care Reviewed With: patient  Goal: Patient-Specific Goal (Individualized)  Outcome: Progressing  Goal: Absence of Hospital-Acquired Illness or Injury  Outcome: Progressing  Intervention: Identify and Manage Fall Risk  Description: Perform standard risk assessment on admission using a validated tool or comprehensive approach appropriate to the patient; reassess fall risk frequently, with change in status or transfer to another level of care.  Communicate risk to interprofessional healthcare team; ensure fall risk visible cue.  Determine need for increased observation, equipment and environmental modification, as well as use of supportive, nonskid footwear.  Adjust safety measures to individual needs and identified risk factors.  Reinforce the importance of active participation with fall risk prevention, safety, and physical activity with the patient and family.  Perform regular intentional rounding to assess need for position change, pain assessment and personal needs, including assistance with toileting.  Recent Flowsheet Documentation  Taken 1/9/2025 0200 by Soila Manuel, RN  Safety Promotion/Fall Prevention: safety round/check completed  Taken 1/9/2025 0000 by Soila Manuel, RN  Safety Promotion/Fall Prevention: safety round/check completed  Taken 1/8/2025 2200 by Soila Manuel, RN  Safety Promotion/Fall Prevention: safety round/check completed  Taken 1/8/2025 2000 by Soila Manuel, RN  Safety Promotion/Fall Prevention: safety round/check completed  Intervention: Prevent Infection  Description: Maintain skin and mucous membrane integrity; promote hand, oral and pulmonary hygiene.  Optimize fluid balance, nutrition, sleep and glycemic control to maximize infection resistance.  Identify potential sources of infection early to prevent or mitigate  progression of infection (e.g., wound, lines, devices).  Evaluate ongoing need for invasive devices; remove promptly when no longer indicated.  Review vaccination status.  Recent Flowsheet Documentation  Taken 1/9/2025 0200 by Soila Manuel RN  Infection Prevention:   hand hygiene promoted   equipment surfaces disinfected   environmental surveillance performed   rest/sleep promoted   personal protective equipment utilized  Taken 1/9/2025 0000 by Soila Manuel RN  Infection Prevention:   rest/sleep promoted   hand hygiene promoted  Taken 1/8/2025 2000 by Soila Manuel RN  Infection Prevention:   environmental surveillance performed   equipment surfaces disinfected   hand hygiene promoted   personal protective equipment utilized   rest/sleep promoted   single patient room provided  Goal: Optimal Comfort and Wellbeing  Outcome: Progressing  Intervention: Monitor Pain and Promote Comfort  Description: Assess pain level, treatment efficacy and patient response at regular intervals using a consistent pain scale.  Consider the presence and impact of preexisting chronic pain.  Encourage patient and caregiver involvement in pain assessment, interventions and safety measures.  Promote activity; balance with sleep and rest to enhance healing.  Recent Flowsheet Documentation  Taken 1/8/2025 2141 by Soila Manuel RN  Pain Management Interventions: (patient requested pain medication) pain medication given  Intervention: Provide Person-Centered Care  Description: Use a family-focused approach to care; encourage support system presence and participation.  Develop trust and rapport by proactively providing information, encouraging questions, addressing concerns and offering reassurance.  Acknowledge emotional response to hospitalization.  Recognize and utilize personal coping strategies and strengths; develop goals via shared decision-making.  Honor spiritual and cultural preferences.  Recent Flowsheet  Documentation  Taken 1/9/2025 0000 by Soila Manuel RN  Trust Relationship/Rapport: care explained  Taken 1/8/2025 2000 by Soila Manuel RN  Trust Relationship/Rapport: care explained  Goal: Readiness for Transition of Care  Outcome: Progressing     Problem: Restraint, Nonviolent  Goal: Absence of Harm or Injury  Outcome: Progressing  Intervention: Protect Dignity, Rights and Personal Wellbeing  Description: Explain restraint rationale and procedure to patient and family/support person prior to or at time of application.  Regularly assess personal needs and for changes in behavior and clinical condition, as well as physical and emotional wellbeing.  Provide reassurance and emotional support.  Recent Flowsheet Documentation  Taken 1/9/2025 0000 by Soila Manuel RN  Trust Relationship/Rapport: care explained  Taken 1/8/2025 2000 by Soila Manuel RN  Trust Relationship/Rapport: care explained     Problem: Fall Injury Risk  Goal: Absence of Fall and Fall-Related Injury  Outcome: Progressing  Intervention: Identify and Manage Contributors  Description: Develop a fall prevention plan, considering patient-centered interventions and family/caregiver involvement; identify and address patient's facilitators and barriers.  Provide reorientation, appropriate sensory stimulation and routines with changes in mental status to decrease risk of fall.  Promote use of personal vision and auditory aids.  Assess assistance level required for safe and effective self-care; provide support as needed, such as toileting and mobilization. For age 65 and older, implement timed toileting with assistance.  Encourage physical activity, such as performance of mobility and self-care at highest level of patient ability, multicomponent exercise program and provision of appropriate assistive devices.  If fall occurs, assess the severity of injury; implement fall injury protocol. Determine the cause and revise fall injury prevention  plan.  Regularly review and advocate for medication adjustment to decrease fall risk; consider administration times, polypharmacy and age.  Balance adequate pain management with potential for oversedation.  Recent Flowsheet Documentation  Taken 1/8/2025 2000 by Soila Manuel RN  Medication Review/Management: medications reviewed  Intervention: Promote Injury-Free Environment  Description: Provide a safe, barrier-free environment that encourages independent activity.  Keep care area uncluttered and well-lighted.  Determine need for increased observation or monitoring.  Avoid use of devices that minimize mobility, such as restraints or indwelling urinary catheter.  Recent Flowsheet Documentation  Taken 1/9/2025 0200 by Soila Manuel RN  Safety Promotion/Fall Prevention: safety round/check completed  Taken 1/9/2025 0000 by Soila Manuel RN  Safety Promotion/Fall Prevention: safety round/check completed  Taken 1/8/2025 2200 by Soila Manuel RN  Safety Promotion/Fall Prevention: safety round/check completed  Taken 1/8/2025 2000 by Soila Manuel RN  Safety Promotion/Fall Prevention: safety round/check completed     Problem: Skin Injury Risk Increased  Goal: Skin Health and Integrity  Outcome: Progressing     Problem: Comorbidity Management  Goal: Maintenance of COPD Symptom Control  Outcome: Progressing  Intervention: Maintain COPD (Chronic Obstructive Pulmonary Disease) Symptom Control  Description: Evaluate adherence to self-management (COPD action plan), such as medication, symptom control, trigger avoidance, infection prevention and self-monitoring.  Advocate for continuation of home regimen, including oxygen, medication and noninvasive positive pressure use.  Anticipate the need for breathing techniques and activity pacing to minimize fatigue and breathlessness.  Assess for proper use of inhaled medication and delivery technique; assist or reinstruct if needed.  Evaluate effectiveness of coping  skills; encourage expression of feelings, expectations and concerns related to disease management and quality of life; reinforce education to enhance management plan and wellbeing.  Recent Flowsheet Documentation  Taken 1/8/2025 2000 by Soila Manuel RN  Medication Review/Management: medications reviewed     Problem: Violence Risk or Actual  Goal: Anger and Impulse Control  Outcome: Progressing  Intervention: Minimize Safety Risk  Description: Listen actively, observing verbal and nonverbal cues (e.g., irritability, confusion, lack of cooperation, demanding behavior, body posture, expression); take threats seriously.  Maintain a therapeutic presence; utilize calm, empathetic tone of voice, nonjudgmental attitude and nonthreatening body language; listen carefully.  Assess and provide for unmet needs, including nutrition, comfort, hydration, hygiene, companionship and appropriate rest.  Utilize empathetic but firm and concise communication; set limits, offer choices and propose alternatives.  Remove stimuli and objects that may lead to harming self or others.  Maintain clear path to room exit; keep door open during care.  Ask directly about homicidal and suicidal intent; provide additional safety measures based on level of risk (e.g., one-on-one observation, duty to warn).  Implement least restrictive measures if attempts to de-escalate violent or injurious behaviors are unsuccessful.  Recent Flowsheet Documentation  Taken 1/9/2025 0200 by Soila Manuel RN  Enhanced Safety Measures: bed alarm set  Taken 1/9/2025 0000 by Soila Manuel, RN  Enhanced Safety Measures: bed alarm set  Taken 1/8/2025 2200 by Soila Manuel, RN  Enhanced Safety Measures: bed alarm set  Taken 1/8/2025 2000 by Soila Manuel, RN  Enhanced Safety Measures: room near unit station  Intervention: Promote Self-Control  Description: Explore perception of the situation; acknowledge feelings; provide information and  reassurance.  Maintain a calm and reassuring environment; minimize noise; respect personal space.  Identify and reduce causes of stress, triggers or precipitating factors of unsafe behavior.  Involve support system and family members, if helpful.  Advocate for maintaining previously established self-management plan, including medication regimen for the treatment of an underlying disorder.  Discuss and implement methods to recognize and manage emotions (e.g., verbalization, calming techniques, physical activity).  Assess and monitor for signs and symptoms of behavioral health concerns (e.g., substance use, psychosis).  Establish or reconnect linkage with social supports and community-based services.  Recent Flowsheet Documentation  Taken 1/9/2025 0000 by Soila Manuel RN  Supportive Measures: active listening utilized  Environmental Support: calm environment promoted     Problem: Mechanical Ventilation Invasive  Goal: Effective Communication  Outcome: Progressing  Intervention: Ensure Effective Communication  Description: Keep call system within reach; adapt to meet needs; respond to call light in person.  Acknowledge and validate intensity and complexity of voicelessness. Maintain eye contact when speaking and awaiting response.  Promote calming presence. Involve patient in decision-making and care to promote inclusion, self-efficacy, confidence and sense of control.  Establish a nonverbal communication method. Use augmentative techniques to preserve self-identity and self-esteem, such as writing tools, letter board, computer, flash cards or picture boards.  If tracheostomy, consider alternative communication method to facilitate sound or speech, such as speaking valve, occlusive cap or electrolarynx; deflate tracheostomy cuff, if present, when using devices to allow exhalation; monitor closely.  Assess and monitor for signs of biopsychosocial concerns that may affect ability to communicate, such as delirium,  anxiety and depression.  Recent Flowsheet Documentation  Taken 1/9/2025 0000 by Soila Manuel RN  Trust Relationship/Rapport: care explained  Family/Support System Care: support provided  Communication Enhancement Strategies: call light answered in person  Taken 1/8/2025 2000 by Soila Manuel, RN  Trust Relationship/Rapport: care explained  Communication Enhancement Strategies: call light answered in person  Goal: Optimal Device Function  Outcome: Progressing  Intervention: Optimize Device Care and Function  Description: Maintain head of bed elevation with regular position changes to minimize ventilation-perfusion mismatch and breathlessness.  Provide oral care regularly with subglottic suction to reduce the risk of infection; perform prior to cuff deflation or tube manipulation.  Assess tube size, depth, location and securement frequently to minimize the risk of tube displacement; confirm placement with radiography or ultrasonography.  Facilitate regular mechanical ventilator and humidification equipment checks to ensure proper function; monitor and manage ventilator and alarm settings.  Provide humidification and evaluate need for suctioning to minimize risk of airway obstruction; regularly replace closed suction equipment.  Perform ongoing device and stoma care to prevent infection; minimize excessive moisture around device; ensure tracheostomy inner cannula or single lumen device is cleaned or replaced regularly to prevent obstruction from secretions.  Monitor and manage cuff pressure routinely, if present; deflate cuff when not clinically indicated.  Maintain readily available emergency equipment that includes appropriate-sized manual resuscitation bag, mask, suction equipment, cleaning supplies and replacement airway devices.  If displacement occurs, provide oxygen to the nose, mouth and stoma. Notify provider.  Recent Flowsheet Documentation  Taken 1/9/2025 0200 by Soila Manuel, RN  Airway Safety  Measures: oxygen flowmeter at bedside  Taken 1/9/2025 0000 by Soila Manuel RN  Airway Safety Measures: oxygen flowmeter at bedside  Taken 1/8/2025 2200 by Soila Manuel RN  Airway Safety Measures: oxygen flowmeter at bedside  Taken 1/8/2025 2000 by Soila Manuel RN  Airway Safety Measures: oxygen flowmeter at bedside  Goal: Mechanical Ventilation Liberation  Outcome: Progressing  Intervention: Promote Extubation and Mechanical Ventilation Liberation  Description: Assess for pain, agitation and delirium regularly, utilizing a validated tool; minimize medication effects that may contribute to agitation, delirium or delay extubation.  Encourage early rehabilitation using therapeutic intervention and functional mobility training to minimize deconditioning, weakness, functional dependence and delirium.  Assess readiness to wake up, breathe, wean and extubate; consider protocol approach to reduce ventilator and intensive care days.  Perform spontaneous awakening trial; adjust medication to minimize effects that may contribute to extubation failure.  Perform SBT (spontaneous breathing trial); consider low inspiratory-pressure support.  Facilitate clustered care and uninterrupted sleep/rest pattern that supports home sleep routine; promote calm environment.  Acknowledge fear and anxiety related to the patient's and support system's experience of prolonged mechanical ventilation; encourage complementary therapies, such as music therapy.  Perform a cuff leak test to predict postextubation risk for swelling or stridor; consider intravenous or inhaled steroids for high-risk patients.  Consider prophylactic noninvasive ventilation after extubation for high-risk patients [e.g., COPD (chronic obstructive pulmonary disease), heart failure, older adults].  Consider the need for a longer-term airway.  Recent Flowsheet Documentation  Taken 1/9/2025 0000 by Soila Manuel RN  Environmental Support: calm environment  promoted  Taken 1/8/2025 2000 by Soila Manuel, RN  Medication Review/Management: medications reviewed  Goal: Optimal Nutrition Delivery  Outcome: Progressing  Goal: Absence of Device-Related Skin and Tissue Injury  Outcome: Progressing  Goal: Absence of Ventilator-Induced Lung Injury  Outcome: Progressing   Goal Outcome Evaluation:  Plan of Care Reviewed With: patient        Progress: improving

## 2025-01-09 NOTE — CASE MANAGEMENT/SOCIAL WORK
Continued Stay Note  [facility]     Patient Name: Yarelis Jackson  MRN: [id number]  Today's Date: 1/9/2025    Admit Date: 12/18/2024    Plan: TBD   Discharge Plan       Row Name 01/09/25 1600       Plan    Plan TBD    Plan Comments CM met with patient at bedside this morning after speaking to [facility] liaison Marti. Patient's listed PCP has not seen her in years so patient is not established. CM spoke to on call vascular MD and he will not sign [facility] orders. Per [facility] patient is not appropriate to discharge home and requires pain medication before wound vac changes and is unable to perform wet/dry dressing on her own. CM attempted to call [facility] to see if they can change the wound vac 3x weekly but was unable to reach anyone. Patient has transportation difficulties and [facility] recommends SNF. MD has spoke to the patient regarding safe discharge and told patient if she is leaving to go home today she would need to leave AMA. Primary nurse also spoke with patient at bedside in depth. PT/OT updated CM that patient is requiring assistance to do ADLs and will not be able to perform them safely at home. CM will reattempt conversation tomorrow morning with patient.             Lien Lindsay RN     [facility]  Office: [phone]  Cell: [phone]  Fax # [phone]

## 2025-01-09 NOTE — PROGRESS NOTES
ENDOCRINE CONSULT PROGRESS NOTE  DATE OF SERVICE: 25        PATIENT NAME: Yarelis Jackson  PATIENT : 1970 AGE: 54 y.o.  MRN NUMBER: [id number]    ==========================================================================    CHIEF COMPLAINT: Type 1 diabetes management    CARE TEAM:   Patient Care Team:  Katie Duran PA as PCP - General (Physician Assistant)  Uli Starr MD as Consulting Physician (Nephrology)    SUBJECTIVE    Pt seen and examined.  Patient awake, alert and oriented x 3.  Tolerating food.    ==========================================================================    CURRENT ACTIVE HOSPITAL MEDICATIONS    Scheduled Medications:  apixaban, 5 mg, Oral, Q12H  insulin glargine, 20 Units, Subcutaneous, Nightly  insulin lispro, 2-7 Units, Subcutaneous, TID With Meals  insulin lispro, 8 Units, Subcutaneous, TID With Meals  nystatin, 1 Application, Topical, Q8H  pantoprazole, 40 mg, Oral, Q AM  QUEtiapine, 75 mg, Oral, Q12H  sodium chloride, 10 mL, Intravenous, Q12H         PRN Medications:    acetaminophen **OR** acetaminophen    aluminum-magnesium hydroxide-simethicone    senna-docusate sodium **AND** polyethylene glycol **AND** bisacodyl **AND** bisacodyl    Calcium Replacement - Follow Nurse / BPA Driven Protocol    dextrose    dextrose    glucagon (human recombinant)    heparin (porcine)    heparin (porcine)    ipratropium-albuterol    Magnesium Standard Dose Replacement - Follow Nurse / BPA Driven Protocol    ondansetron ODT **OR** ondansetron    oxyCODONE    Phosphorus Replacement - Follow Nurse / BPA Driven Protocol    Potassium Replacement - Follow Nurse / BPA Driven Protocol    sodium chloride    sodium chloride    sodium chloride     ==========================================================================    OBJECTIVE    Vitals:    25 1648   BP: 151/81   Pulse: 93   Resp: 22   Temp: 98 °F (36.7 °C)   SpO2: 92%      Body mass index is 37.01 kg/m².      General - A&Ox3, NAD, Calm    ==========================================================================    LAB EVALUATION    Lab Results   Component Value Date    GLUCOSE 169 (H) 01/09/2025    BUN 15 01/09/2025    CREATININE 0.60 01/09/2025    EGFRIFNONA 87 09/29/2021    BCR 25.0 01/09/2025    K 4.0 01/09/2025    CO2 25.1 01/09/2025    CALCIUM 9.4 01/09/2025    ALBUMIN 3.2 (L) 01/09/2025    AST 23 01/09/2025    ALT 21 01/09/2025       Lab Results   Component Value Date    HGBA1C 15.80 (H) 12/18/2024     Lab Results   Component Value Date    CREATININE 0.60 01/09/2025     Results from last 7 days   Lab Units 01/09/25  1645 01/09/25  1127 01/09/25  0729 01/08/25  2102 01/08/25  1700 01/08/25  1626   GLUCOSE mg/dL 248* 181* 178* 211* 169* 173*     ==========================================================================    ASSESSMENT AND PLAN    # Type 1 diabetes with hyperglycemia  - Blood sugar reviewed for last 24 hours  - Adjusted insulin therapy as:  Insulin Lantus 20 units nightly  Insulin lispro 8 units with each meal and continue sliding scale  - Will review blood sugar for next 24 hours and adjust therapy accordingly  - In the meantime if patient is planned for discharge can be discharged on following regimen:  Lantus 22 units QHS  Insulin Lispro 9 units with each meal  - Patient to follow-up with endocrinology team as outpatient    Will follow with you.  Rest as per primary team.    Part of this note may be an electronic transcription/translation of spoken language to printed text using the Dragon Dictation System.     Note: Portions of this note may have been copied from previous notes but documentation have been reviewed and edited as necessary to support clinical decision making for today's visit.  The time of this note does not reflect the time I saw the patient but the time that this note was written.    ==========================================================================  Patel Winkler  MD  Department of Endocrine, Diabetes and Metabolism  [facility] [place]  ==========================================================================

## 2025-01-10 ENCOUNTER — READMISSION MANAGEMENT (OUTPATIENT)
Dept: CALL CENTER | Facility: HOSPITAL | Age: 55
End: 2025-01-10
Payer: MEDICAID

## 2025-01-10 VITALS
RESPIRATION RATE: 18 BRPM | DIASTOLIC BLOOD PRESSURE: 77 MMHG | OXYGEN SATURATION: 95 % | SYSTOLIC BLOOD PRESSURE: 129 MMHG | WEIGHT: 213.19 LBS | HEART RATE: 87 BPM | TEMPERATURE: 97.6 F | HEIGHT: 64 IN | BODY MASS INDEX: 36.4 KG/M2

## 2025-01-10 LAB
ALBUMIN SERPL-MCNC: 3.1 G/DL (ref 3.5–5.2)
ALBUMIN/GLOB SERPL: 0.8 G/DL
ALP SERPL-CCNC: 116 U/L (ref 39–117)
ALT SERPL W P-5'-P-CCNC: 16 U/L (ref 1–33)
ANION GAP SERPL CALCULATED.3IONS-SCNC: 11.4 MMOL/L (ref 5–15)
AST SERPL-CCNC: 23 U/L (ref 1–32)
BASOPHILS # BLD AUTO: 0.06 10*3/MM3 (ref 0–0.2)
BASOPHILS NFR BLD AUTO: 0.8 % (ref 0–1.5)
BILIRUB SERPL-MCNC: 0.3 MG/DL (ref 0–1.2)
BUN SERPL-MCNC: 18 MG/DL (ref 6–20)
BUN/CREAT SERPL: 29.5 (ref 7–25)
CALCIUM SPEC-SCNC: 9 MG/DL (ref 8.6–10.5)
CHLORIDE SERPL-SCNC: 98 MMOL/L (ref 98–107)
CO2 SERPL-SCNC: 23.6 MMOL/L (ref 22–29)
CREAT SERPL-MCNC: 0.61 MG/DL (ref 0.57–1)
DEPRECATED RDW RBC AUTO: 61.5 FL (ref 37–54)
EGFRCR SERPLBLD CKD-EPI 2021: 107.1 ML/MIN/1.73
EOSINOPHIL # BLD AUTO: 0.39 10*3/MM3 (ref 0–0.4)
EOSINOPHIL NFR BLD AUTO: 4.9 % (ref 0.3–6.2)
ERYTHROCYTE [DISTWIDTH] IN BLOOD BY AUTOMATED COUNT: 17.3 % (ref 12.3–15.4)
GLOBULIN UR ELPH-MCNC: 4 GM/DL
GLUCOSE BLDC GLUCOMTR-MCNC: 150 MG/DL (ref 70–105)
GLUCOSE BLDC GLUCOMTR-MCNC: 162 MG/DL (ref 70–105)
GLUCOSE SERPL-MCNC: 255 MG/DL (ref 65–99)
HCT VFR BLD AUTO: 30 % (ref 34–46.6)
HGB BLD-MCNC: 9.8 G/DL (ref 12–15.9)
IMM GRANULOCYTES # BLD AUTO: 0.06 10*3/MM3 (ref 0–0.05)
IMM GRANULOCYTES NFR BLD AUTO: 0.8 % (ref 0–0.5)
LYMPHOCYTES # BLD AUTO: 2.97 10*3/MM3 (ref 0.7–3.1)
LYMPHOCYTES NFR BLD AUTO: 37.1 % (ref 19.6–45.3)
MAGNESIUM SERPL-MCNC: 1.6 MG/DL (ref 1.6–2.6)
MCH RBC QN AUTO: 32 PG (ref 26.6–33)
MCHC RBC AUTO-ENTMCNC: 32.7 G/DL (ref 31.5–35.7)
MCV RBC AUTO: 98 FL (ref 79–97)
MONOCYTES # BLD AUTO: 0.57 10*3/MM3 (ref 0.1–0.9)
MONOCYTES NFR BLD AUTO: 7.1 % (ref 5–12)
NEUTROPHILS NFR BLD AUTO: 3.95 10*3/MM3 (ref 1.7–7)
NEUTROPHILS NFR BLD AUTO: 49.3 % (ref 42.7–76)
NRBC BLD AUTO-RTO: 0 /100 WBC (ref 0–0.2)
PHOSPHATE SERPL-MCNC: 3.4 MG/DL (ref 2.5–4.5)
PLATELET # BLD AUTO: 384 10*3/MM3 (ref 140–450)
PMV BLD AUTO: 8.6 FL (ref 6–12)
POTASSIUM SERPL-SCNC: 3.6 MMOL/L (ref 3.5–5.2)
PROT SERPL-MCNC: 7.1 G/DL (ref 6–8.5)
RBC # BLD AUTO: 3.06 10*6/MM3 (ref 3.77–5.28)
SODIUM SERPL-SCNC: 133 MMOL/L (ref 136–145)
WBC NRBC COR # BLD AUTO: 8 10*3/MM3 (ref 3.4–10.8)

## 2025-01-10 PROCEDURE — 99232 SBSQ HOSP IP/OBS MODERATE 35: CPT | Performed by: INTERNAL MEDICINE

## 2025-01-10 PROCEDURE — 92526 ORAL FUNCTION THERAPY: CPT

## 2025-01-10 PROCEDURE — 80053 COMPREHEN METABOLIC PANEL: CPT | Performed by: SURGERY

## 2025-01-10 PROCEDURE — 85025 COMPLETE CBC W/AUTO DIFF WBC: CPT | Performed by: SURGERY

## 2025-01-10 PROCEDURE — 63710000001 INSULIN LISPRO (HUMAN) PER 5 UNITS: Performed by: INTERNAL MEDICINE

## 2025-01-10 PROCEDURE — 84100 ASSAY OF PHOSPHORUS: CPT | Performed by: INTERNAL MEDICINE

## 2025-01-10 PROCEDURE — 82948 REAGENT STRIP/BLOOD GLUCOSE: CPT | Performed by: INTERNAL MEDICINE

## 2025-01-10 PROCEDURE — 83735 ASSAY OF MAGNESIUM: CPT | Performed by: SURGERY

## 2025-01-10 RX ORDER — BLOOD-GLUCOSE METER
1 EACH MISCELLANEOUS ONCE
Qty: 1 KIT | Refills: 0 | Status: SHIPPED | OUTPATIENT
Start: 2025-01-10 | End: 2025-01-12

## 2025-01-10 RX ORDER — PEN NEEDLE, DIABETIC 30 GX3/16"
1 NEEDLE, DISPOSABLE MISCELLANEOUS
Qty: 200 EACH | Refills: 2 | Status: SHIPPED | OUTPATIENT
Start: 2025-01-10 | End: 2025-01-12

## 2025-01-10 RX ORDER — INSULIN LISPRO 100 [IU]/ML
8 INJECTION, SOLUTION INTRAVENOUS; SUBCUTANEOUS
Qty: 7.2 ML | Refills: 0 | Status: SHIPPED | OUTPATIENT
Start: 2025-01-10 | End: 2025-01-10 | Stop reason: SDUPTHER

## 2025-01-10 RX ORDER — BLOOD-GLUCOSE METER
1 EACH MISCELLANEOUS ONCE
Qty: 1 KIT | Refills: 0 | Status: SHIPPED | OUTPATIENT
Start: 2025-01-10 | End: 2025-01-10

## 2025-01-10 RX ORDER — BLOOD SUGAR DIAGNOSTIC
1 STRIP MISCELLANEOUS
Qty: 150 EACH | Refills: 2 | Status: SHIPPED | OUTPATIENT
Start: 2025-01-10 | End: 2025-01-12

## 2025-01-10 RX ORDER — PEN NEEDLE, DIABETIC 30 GX3/16"
1 NEEDLE, DISPOSABLE MISCELLANEOUS
Qty: 200 EACH | Refills: 5 | Status: SHIPPED | OUTPATIENT
Start: 2025-01-10 | End: 2025-01-12

## 2025-01-10 RX ORDER — LANCETS
1 EACH MISCELLANEOUS
Qty: 200 EACH | Refills: 2 | Status: SHIPPED | OUTPATIENT
Start: 2025-01-10 | End: 2025-01-12

## 2025-01-10 RX ORDER — BLOOD SUGAR DIAGNOSTIC
1 STRIP MISCELLANEOUS
Qty: 150 EACH | Refills: 2 | Status: SHIPPED | OUTPATIENT
Start: 2025-01-10 | End: 2025-01-10

## 2025-01-10 RX ORDER — INSULIN LISPRO 100 [IU]/ML
INJECTION, SOLUTION INTRAVENOUS; SUBCUTANEOUS
Qty: 15 ML | Refills: 1 | Status: ON HOLD | OUTPATIENT
Start: 2025-01-10

## 2025-01-10 RX ORDER — OXYCODONE HYDROCHLORIDE 5 MG/1
5 TABLET ORAL EVERY 4 HOURS PRN
Qty: 18 TABLET | Refills: 0 | Status: SHIPPED | OUTPATIENT
Start: 2025-01-10 | End: 2025-01-16 | Stop reason: HOSPADM

## 2025-01-10 RX ORDER — LANCETS
1 EACH MISCELLANEOUS
Qty: 200 EACH | Refills: 2 | Status: SHIPPED | OUTPATIENT
Start: 2025-01-10 | End: 2025-01-10

## 2025-01-10 RX ORDER — PANTOPRAZOLE SODIUM 40 MG/1
40 TABLET, DELAYED RELEASE ORAL
Qty: 30 TABLET | Refills: 0 | Status: SHIPPED | OUTPATIENT
Start: 2025-01-10 | End: 2025-02-09

## 2025-01-10 RX ADMIN — Medication 10 ML: at 08:01

## 2025-01-10 RX ADMIN — APIXABAN 5 MG: 5 TABLET, FILM COATED ORAL at 08:01

## 2025-01-10 RX ADMIN — PANTOPRAZOLE SODIUM 40 MG: 40 TABLET, DELAYED RELEASE ORAL at 04:54

## 2025-01-10 RX ADMIN — INSULIN LISPRO 8 UNITS: 100 INJECTION, SOLUTION INTRAVENOUS; SUBCUTANEOUS at 12:50

## 2025-01-10 RX ADMIN — OXYCODONE 5 MG: 5 TABLET ORAL at 04:54

## 2025-01-10 RX ADMIN — INSULIN LISPRO 8 UNITS: 100 INJECTION, SOLUTION INTRAVENOUS; SUBCUTANEOUS at 08:01

## 2025-01-10 RX ADMIN — OXYCODONE 5 MG: 5 TABLET ORAL at 08:56

## 2025-01-10 RX ADMIN — INSULIN LISPRO 2 UNITS: 100 INJECTION, SOLUTION INTRAVENOUS; SUBCUTANEOUS at 08:01

## 2025-01-10 RX ADMIN — OXYCODONE 5 MG: 5 TABLET ORAL at 13:18

## 2025-01-10 RX ADMIN — QUETIAPINE FUMARATE 75 MG: 25 TABLET ORAL at 08:01

## 2025-01-10 NOTE — PROGRESS NOTES
ENDOCRINE CONSULT PROGRESS NOTE  DATE OF SERVICE: 01/10/25        PATIENT NAME: Yarelis Jackson  PATIENT : 1970 AGE: 54 y.o.  MRN NUMBER: [id number]    ==========================================================================    CHIEF COMPLAINT: Type 1 diabetes management    CARE TEAM:   Patient Care Team:  Katie Duran PA as PCP - General (Physician Assistant)  Uli Starr MD as Consulting Physician (Nephrology)    SUBJECTIVE    Pt seen and examined.  Patient awake, alert and oriented x 3.  Tolerating food.  Patient planned for discharge today.    ==========================================================================    CURRENT ACTIVE HOSPITAL MEDICATIONS    Scheduled Medications:  apixaban, 5 mg, Oral, Q12H  insulin glargine, 20 Units, Subcutaneous, Nightly  insulin lispro, 2-7 Units, Subcutaneous, TID With Meals  insulin lispro, 8 Units, Subcutaneous, TID With Meals  nystatin, 1 Application, Topical, Q8H  pantoprazole, 40 mg, Oral, Q AM  QUEtiapine, 75 mg, Oral, Q12H  sodium chloride, 10 mL, Intravenous, Q12H         PRN Medications:    acetaminophen **OR** acetaminophen    aluminum-magnesium hydroxide-simethicone    senna-docusate sodium **AND** polyethylene glycol **AND** bisacodyl **AND** bisacodyl    Calcium Replacement - Follow Nurse / BPA Driven Protocol    dextrose    dextrose    glucagon (human recombinant)    heparin (porcine)    heparin (porcine)    ipratropium-albuterol    Magnesium Standard Dose Replacement - Follow Nurse / BPA Driven Protocol    ondansetron ODT **OR** ondansetron    oxyCODONE    Phosphorus Replacement - Follow Nurse / BPA Driven Protocol    Potassium Replacement - Follow Nurse / BPA Driven Protocol    sodium chloride    sodium chloride    sodium chloride     ==========================================================================    OBJECTIVE    Vitals:    01/10/25 1132   BP: 129/77   Pulse: 87   Resp: 18   Temp: 97.6 °F (36.4 °C)   SpO2: 95%       Body mass index is 36.59 kg/m².     General - A&Ox3, NAD, Calm    ==========================================================================    LAB EVALUATION    Lab Results   Component Value Date    GLUCOSE 255 (H) 01/10/2025    BUN 18 01/10/2025    CREATININE 0.61 01/10/2025    EGFRIFNONA 87 09/29/2021    BCR 29.5 (H) 01/10/2025    K 3.6 01/10/2025    CO2 23.6 01/10/2025    CALCIUM 9.0 01/10/2025    ALBUMIN 3.1 (L) 01/10/2025    AST 23 01/10/2025    ALT 16 01/10/2025       Lab Results   Component Value Date    HGBA1C 15.80 (H) 12/18/2024     Lab Results   Component Value Date    CREATININE 0.61 01/10/2025     Results from last 7 days   Lab Units 01/10/25  1130 01/10/25  0738 01/09/25  2009 01/09/25  1645 01/09/25  1127 01/09/25  0729   GLUCOSE mg/dL 150* 162* 146* 248* 181* 178*     ==========================================================================    ASSESSMENT AND PLAN    # Type 1 diabetes with hyperglycemia  - Blood sugar reviewed for last 24 hours  - Continue insulin therapy as:  Insulin Lantus 20 units nightly  Insulin lispro 8 units with each meal and continue sliding scale  - Patient planned for discharge today, discharge insulin recommendation and follows:  Lantus 22 units QHS  Insulin Lispro 9 units with each meal with SSI  - Patient to follow-up with endocrinology team as outpatient in 6-8 weeks, endoocrine discharge instruction as detailed below    Will follow with you.  Rest as per primary team.    Part of this note may be an electronic transcription/translation of spoken language to printed text using the Dragon Dictation System.     Note: Portions of this note may have been copied from previous notes but documentation have been reviewed and edited as necessary to support clinical decision making for today's visit.  The time of this note does not reflect the time I saw the patient but the time that this note was written.      =======================================  Endocrinology Discharge  Instructions:    # Diabetes Management:    Please start insulin therapy as:    - Insulin Glargine (Slow acting insulin) 22 Units in the morning.  - Insulin lispro / aspart (Fast acting / meal time insulin): 9 Units with each meal.    Meal time insulin need to be taken 15 mins before meal. You can bring your insulin with you if you are planning to eat out.    If you plan to miss the meal please miss the meal time insulin as well.    Check your blood glucose four times a day: before breakfast, before lunch, before dinner and before bedtime. Maintain blood glucose logs.    - If your blood glucose in the morning / fasting is less than 100 please decrease the dose of Insulin Glargine by 2 units.    - If your blood glucose before meal is less than 100 then decrease the dose of Insulin Lispro / Aspart by 2 units    Use correction insulin if your blood is high along with your meal time insulin. Use this correction insulin before meal only, not to be taken without meal.    Corrections for High Blood Sugar  Use the following guide to “correct” for pre-meal high blood sugar.  This insulin will help “correct” the high reading.  You will still need to take as per scheduled meal insulin.    If your   Blood Sugar Reading is…. Number of additional   units of Insulin Lispro to Take…  (Correction)    Take 0 additional unit   151-200 Take 1 additional unit   201-250 Take 2 additional unit   251-300 Take 3 additional unit   301-350 Take 4 additional unit   351-400 Take 5 additional unit   More than 400 Take 6 additional unit     Low Blood Glucose:    - If you feel sweaty, anxious, shakiness, feel weak, trouble walking, feels like passing out or trouble seeing thing, please check your blood glucose and if its less than 70 or if your feel symptoms of low blood glucose then take one of the following or use Glucagon Injection:    *3 or 4 glucose tablets  *½ cup of juice or regular soda (not sugar-free)  *2 tablespoons of raisins  *4  or 5 saltine crackers  *1 tablespoon of sugar  *1 tablespoon of honey or corn syrup  *6 to 8 hard candies        Please plan follow up visit with endocrinology in 6-8 weeks.  Contact details for the office are as follows:    [address] [place] [address], [place] [address]  Ph: [phone]  Fax: [phone]    You may need referral from your primary care to establish follow up visit, please reach out to your PCP or insurance for confirmation.  =======================================    ==========================================================================  Patel Winkler MD  Department of Endocrine, Diabetes and Metabolism  [facility] [place], [place]  ==========================================================================

## 2025-01-10 NOTE — THERAPY TREATMENT NOTE
Acute Care - Speech Language Pathology   Swallow Treatment Note [facility]     Patient Name: Yarelis Jackson  : 1970  MRN: [id number]  Today's Date: 1/10/2025               Admit Date: 2024    Visit Dx:     ICD-10-CM ICD-9-CM   1. Diabetic ketoacidosis without coma associated with other specified diabetes mellitus  E13.10 250.12   2. MARGOT (acute kidney injury)  N17.9 584.9   3. Hypernatremia  E87.0 276.0   4. Acute encephalopathy  G93.40 348.30   5. Shock  R57.9 785.50   6. Arterial thrombosis  I74.9 444.9   7. H/O fasciotomy  Z98.890 V15.29     Patient Active Problem List   Diagnosis    Benign essential hypertension    Cervical radiculopathy    Chronic obstructive pulmonary disease    Cigarette smoker    Hyperlipidemia    Menopause    Migraine headache    DKA (diabetic ketoacidosis)    Arterial thrombosis     Past Medical History:   Diagnosis Date    COPD (chronic obstructive pulmonary disease)     Low back pain     Migraine     Neck pain      Past Surgical History:   Procedure Laterality Date    FASCIOTOMY Right 2024    Procedure: FASCIOTOMY LEG;  Surgeon: Chris Delgado MD;  Location: [facility] MAIN OR;  Service: Vascular;  Laterality: Right;    FEMORAL THROMBECTOMY/EMBOLECTOMY Right 2024    Procedure: FEMORAL right iliofemoral thrombectomy, arteriogram;  Surgeon: Ghassan Celestin MD;  Location: [facility] HYBRID OR;  Service: Vascular;  Laterality: Right;    FEMORAL THROMBECTOMY/EMBOLECTOMY Right 2024    Procedure: Right femoral thrombectomy, right lower extremity arteriogram and right lower leg facitomies;  Surgeon: Ghassan Celestin MD;  Location: [facility] HYBRID OR;  Service: Vascular;  Laterality: Right;    LUNG SURGERY         SLP Recommendation and Plan                                                                                          EDUCATION  The patient has been educated in the following areas:   Dysphagia (Swallowing Impairment) Modified Diet Instruction.         SLP GOALS       Row Name 01/10/25 1100       (LTG) Patient will demonstrate functional swallow for    Diet Texture (Demonstrate functional swallow) regular textures  -MB    Liquid viscosity (Demonstrate functional swallow) thin liquids  -MB    Long (Demonstrate functional swallow) independently (over 90% accuracy)  -MB    Time Frame (Demonstrate functional swallow) by discharge  -MB    Progress/Outcomes (Demonstrate functional swallow) goal ongoing  -MB       (LTG) Swallow    (LTG) Swallow Pt will maximize swallow function for least restrictive PO diet, exhibiting no complication associated with dysphagia, adequate PO intake, and demonstrating independent use of swallow compensations  -MB    Long (Swallow Long Term Goal) independently (over 90% accuracy)  -MB    Time Frame (Swallow Long Term Goal) by discharge  -MB    Progress/Outcomes (Swallow Long Term Goal) goal ongoing  -MB    Comment (Swallow Long Term Goal) See above  -MB       (STG) Patient will tolerate trials of    Consistencies Trialed (Tolerate trials) regular textures;thin liquids  -MB    Desired Outcome (Tolerate trials) without signs/symptoms of aspiration;without signs of distress;with adequate oral prep/transit/clearance  -MB    Long (Tolerate trials) independently (over 90% accuracy)  -MB    Time Frame (Tolerate trials) by discharge  -MB    Progress/Outcomes (Tolerate trials) goal ongoing  -MB       (STG) Swallow 1    (STG) Swallow 1 The patient will participate in ongoing assessment of swallow, including re-evaluation clinically and/or including instrumental assessment of swallow if indicated, to further assess swallow function in anticipation to initiate a PO diet  -MB    Long (Swallow Short Term Goal 1) independently (over 90% accuracy)  -MB    Time Frame (Swallow Short Term Goal 1) 1 week  -MB    Progress/Outcomes (Swallow Short Term Goal 1) goal ongoing  -MB       (STG) Swallow 2    (STG) Swallow 2 Patient  will participate in full meal assessment to assure safety and adequacy of recommended diet and independent use of safe swallow strategies.  -MB    [place] (Swallow Short Term Goal 2) independently (over 90% accuracy)  -MB    Time Frame (Swallow Short Term Goal 2) 1 week  -MB    Progress/Outcomes (Swallow Short Term Goal 2) goal ongoing  -MB    Comment (Swallow Short Term Goal 2) Pt was seen for skilled ST targeting meal assessment. Pt semi-reclined in bed upon arrival. Pt with natural dentition minus some lower bottom molars. Noted moderately rough/weak vocal quality. Pleasant and agreeable to tx. HOB positioned upright as pt could tolerate. SLP provided STC snack and thin liquid by straw. Assessed x1 bite STC and x4 sequential drinks thin by straw. Pt exhibited adequate labial seal; mildly extended mastication; no perceived wetness of voice; clear oral cavity; and no overt s/sx of aspiration. Pt with functional, yet extended rotary mastication, adequate labial seal. Given presentation with AM snack, pt is appropriate for current diet.      Recommend:  soft to chew solids; chopped meats/ thin liquids. Exercise swallowing precautions: small bites- one at a time, slow rate, upright posture, alternate solids/liquids. ST will continue to follow to ensure safety and tolerance to diet and conduct skilled ST tx to remediate current underlying issues.  -MB       (STG) Pharyngeal Strengthening Exercise Goal 1 (SLP)    Activity (Pharyngeal Strengthening Goal 1, SLP) increase tongue base retraction  -MB    Increase Tongue Base Retraction hard effortful swallow;chin tuck against resistance (CTAR);radha  -MB    [place]/Accuracy (Pharyngeal Strengthening Goal 1, SLP) independently (over 90% accuracy)  -MB    Time Frame (Pharyngeal Strengthening Goal 1, SLP) by discharge  -MB    Progress/Outcomes (Pharyngeal Strengthening Goal 1, SLP) goal ongoing  -MB              User Key  (r) = Recorded By, (t) = Taken By, (c) =  Cosigned By      Initials Name Provider Type    Jeff Moon, SLP Speech and Language Pathologist                         Time Calculation:                JUN Granger  1/10/2025

## 2025-01-10 NOTE — SIGNIFICANT NOTE
01/10/25 1425   OTHER   Discipline physical therapy assistant   Rehab Time/Intention   Session Not Performed patient/family declined, not feeling well  (on 2 attempts; reviewed recommendations for use of RW and wheelchair if discharging home.  Patient verbalized understanding)   Therapy Assessment/Plan (PT)   Criteria for Skilled Interventions Met (PT) yes;meets criteria;skilled treatment is necessary   Recommendation   PT - Next Appointment 01/11/25

## 2025-01-10 NOTE — CASE MANAGEMENT/SOCIAL WORK
Continued Stay Note  JOSEFA Guerrero     Patient Name: Yarelis Jackson  MRN: [id number]  Today's Date: 1/10/2025    Admit Date: 12/18/2024    Plan: Return home alone   Discharge Plan       Row Name 01/10/25 4456       Plan    Plan Comments [employer] delivered wound vac to unit and CM and primary nurse taught at bedside with patient and son Sukh. CM educated on s.sx infection, when to call wound care clinic, MD, or be seen in the ED for evaluation as well as nutrition, increased protein needs for wound healing, blood glucose control, edema control, wound care, wound vac trouble shooting as well as pressure ulcer prevention. Patient's wound vac attached to home wound vac with good seal and 125mmHg continuous negative pressure. Right leg unwrapped and rewrapped educating patient/son on placing dry gauze over foot blisters and securing with kerlix and then ACE wrap from the base of the toes to the bend of the knee. Educated on layering kerlix and ACE to ensure vac tubing does not lay against any skin directly. Patient tolerated without complaint. Patient verbalized understanding of monitoring blood glucose levels and importance of nutrition in wound healing. CM added instructions to AVS to f/u on at [facility] appt on Monday regarding additional wound care supply ordering and I wound vac supplies. CM contacted PCP via secure chat and requested HHC be arranged once patient has been seen in office and established and MD confirmed they would arrange. Wet/Dry wound care teaching, cannister change, and vac trouble shooting able to be taught back by son. Patient able to teach back trouble shooting wound vac, cannister change, turning off/on, and checking suction/seal. Son viewed pictures of wound in EPIC and educated on packing wound fully and leaving versatel in wound bed if vac fails per WOCN Kimberlee to help with comfort during dressing change. Taught infection prevention and provided wound care supplies until patient can be seen in clinic  and educated on where they can purchase additional supplies. CM educated on supplies to take to [facility] appt on Monday. AVS updated with information. Blayne Bibi with [employer] delivered wheelchair and rolling walker to bedside. Patient and son voice they have no additional questions and provided number to call on AVS if they have any after discharge.       Row Name 01/10/25 4383       Plan    Plan Return home alone    Plan Comments CM, CM supervisor, and WOCN met with patient at bedside this morning to discuss safe discharge planning and what needed to happen in order to achieve that. Patient does not want to go to SNF. CM spoke with son Sukh via phone and he voiced hesitation and does not think he can perform wound care but came to the hospital to discuss. Son updated CM that he cannot get her to agree to go to SNF either and he will learn the wound care to assist her at home and be able to help her as needed for any wound vac malfunctions or wound care needed until she can be seen at the wound care center. CM updated  liaison that patient will not be discharging to SNF. [employer] called unit and updated primary nurse that wound vac will deliver before 3pm today. CM spoke with IBAN Kimberlee and can teach if she has already left for the day. Patient has appt with Wound care clinic on Monday at 1:15 and new PCP appt scheduled.                  Lien Lindsay RN     [facility]  Office: [phone]  Cell: [phone]  Fax # [phone]

## 2025-01-10 NOTE — PROGRESS NOTES
[facility] MEDICINE SERVICE  DAILY PROGRESS NOTE    NAME: Yarelis Jackson  : 1970  MRN: [id number]      LOS: 22 days     PROVIDER OF SERVICE: Yany Rajan MD    Chief Complaint: DKA (diabetic ketoacidosis)    Subjective:     Interval History:  History taken from: patient    No new complaint      Review of Systems:   Review of Systems   All other systems reviewed and are negative.      Objective:     Vital Signs  Temp:  [97.7 °F (36.5 °C)-98.1 °F (36.7 °C)] 98 °F (36.7 °C)  Heart Rate:  [86-96] 93  Resp:  [15-23] 22  BP: (140-166)/(72-84) 151/81   Body mass index is 37.01 kg/m².    Physical Exam  Physical Exam  Constitutional:       Appearance: Normal appearance.   HENT:      Head: Normocephalic and atraumatic.      Nose: Nose normal.      Mouth/Throat:      Mouth: Mucous membranes are moist.   Eyes:      Extraocular Movements: Extraocular movements intact.      Pupils: Pupils are equal, round, and reactive to light.   Cardiovascular:      Rate and Rhythm: Normal rate and regular rhythm.   Pulmonary:      Effort: Pulmonary effort is normal.      Breath sounds: Normal breath sounds.   Abdominal:      General: Abdomen is flat. Bowel sounds are normal.      Palpations: Abdomen is soft.   Musculoskeletal:         General: Normal range of motion.      Cervical back: Normal range of motion and neck supple.   Skin:     General: Skin is warm and dry.   Neurological:      General: No focal deficit present.      Mental Status: She is alert and oriented to person, place, and time.   Psychiatric:         Mood and Affect: Mood normal.         Behavior: Behavior normal.         Thought Content: Thought content normal.         Judgment: Judgment normal.         Current Medications:  Scheduled Meds:apixaban, 5 mg, Oral, Q12H  insulin glargine, 20 Units, Subcutaneous, Nightly  insulin lispro, 2-7 Units, Subcutaneous, TID With Meals  insulin lispro, 8 Units, Subcutaneous, TID With Meals  nystatin, 1  Application, Topical, Q8H  pantoprazole, 40 mg, Oral, Q AM  QUEtiapine, 75 mg, Oral, Q12H  sodium chloride, 10 mL, Intravenous, Q12H      Continuous Infusions:   PRN Meds:.  acetaminophen **OR** acetaminophen    aluminum-magnesium hydroxide-simethicone    senna-docusate sodium **AND** polyethylene glycol **AND** bisacodyl **AND** bisacodyl    Calcium Replacement - Follow Nurse / BPA Driven Protocol    dextrose    dextrose    glucagon (human recombinant)    heparin (porcine)    heparin (porcine)    ipratropium-albuterol    Magnesium Standard Dose Replacement - Follow Nurse / BPA Driven Protocol    ondansetron ODT **OR** ondansetron    oxyCODONE    Phosphorus Replacement - Follow Nurse / BPA Driven Protocol    Potassium Replacement - Follow Nurse / BPA Driven Protocol    sodium chloride    sodium chloride    sodium chloride       Diagnostic Data    Results from last 7 days   Lab Units 01/09/25  0613   WBC 10*3/mm3 7.37   HEMOGLOBIN g/dL 9.8*   HEMATOCRIT % 31.1*   PLATELETS 10*3/mm3 369   GLUCOSE mg/dL 169*   CREATININE mg/dL 0.60   BUN mg/dL 15   SODIUM mmol/L 134*   POTASSIUM mmol/L 4.0   AST (SGOT) U/L 23   ALT (SGPT) U/L 21   ALK PHOS U/L 105   BILIRUBIN mg/dL 0.4   ANION GAP mmol/L 7.9       No radiology results for the last day      I reviewed the patient's new clinical results.    Assessment/Plan:     Active and Resolved Problems  Active Hospital Problems    Diagnosis  POA    **DKA (diabetic ketoacidosis) [E11.10]  Yes    Arterial thrombosis [I74.9]  No      Resolved Hospital Problems   No resolved problems to display.       DKA  - Resolved.  Continue Lantus and lispro for diabetes mellitus management.  Endocrinology following.  Blood glucose is relatively well-controlled.     Acute Kidney Injury required emergent hemodialysis  -resolved.     Acute occlusive thrombus of the right iliac artery with limb ischemia improved  -Arterial duplex with noted right femoral, deep femoral, and popliteal arteries being  patent but with very low amplitude monophasic signals, suggesting severe inflow stenosis or occlusion; no measurable Doppler flow in the anterior or posterior tibial and peroneal arteries.  -Bilateral lower extremity ABIs ordered: Right STACEY critically reduced with STACEY of 0 and severe digital insufficiency; left STACEY is normal with moderate digital insufficiency.  -Emergent surgery per vascular surgeon 12/19/2024 for right iliac thrombectomy via open groin incision then return to surgery on 12/20/2024 for recurrent right lower extremity ischemia due to arterial thrombosis and underwent right iliofemoral popliteal thrombectomy, right femoral endarterectomy with patch, right lower extremity arteriogram, and closed right calf fasciotomies  -Was on heparin drip.  Now on Eliquis per vascular surgery.  -Hematology/oncology consulted  -Returned to the OR morning (12/23) for emergent 4 compartment fasciotomies due to critical right leg ischemia  -Defer to vascular surgery for management of wound VAC.         Nontraumatic rhabdomyolysis --> Improving  -CK downtrending  -Encourage enteric hydration        Leukocytosis likely reactive  -Continue to monitor clinically     Electrolyte derangements likely due to renal failure  -Monitor and replete as needed per protocol        Cutaneous candidiasis of groin  -Likely secondary to uncontrolled diabetes mellitus  -Nystatin ordered     Essential Hypertension  -Clonidine patch ordered by nephrology-hold   -Titrate medications as needed.     Bilateral leg rash, concern for scabies infection  -Permethrin regimen ordered.  Completed first treatment on 12/18, repeat treatment in 2 weeks  -Keep in contact precautions x 1 week post initial treatment (12/25)     Transaminitis  -US of Liver: Hepatomegaly with steatosis.  -Hepatitis panel-negative.  -Monitor and trend LFTs.    Pulmonary:    VTE Prophylaxis:  Pharmacologic VTE prophylaxis orders are present.             Disposition Planning:      Barriers to Discharge: Pending clinical improvement  Anticipated Date of Discharge: 1/13/2024  Place of Discharge: SNF        Code Status and Medical Interventions: CPR (Attempt to Resuscitate); Full Support   Ordered at: 12/18/24 0830     Code Status (Patient has no pulse and is not breathing):    CPR (Attempt to Resuscitate)     Medical Interventions (Patient has pulse or is breathing):    Full Support       Signature: Electronically signed by Yany Rajan MD, 01/09/25, 19:02 EST.  [facility] Hospitalist Team

## 2025-01-10 NOTE — DISCHARGE INSTR - OTHER ORDERS
______________________________________________    Wound Care Clinic on Monday 1/13/25 Instructions:    Ask about additional supplies for home if they can order and run through insurance    Ask if they will be reordering wound vac supplies through [employer] or if you need to call to reorder them  [facility] usually requests that you reorder supplies when you have 1 remaining box of dressing kits or   1 box left of canisters due to shipping times    Home Health Care can be arranged once you are seen by your PCP at your new patient appt on Jan 17th with Rohit Schrader DO    Wet/dry dressing supplies can be purchased at most pharmacies locally including [facility] on [facility] [address] [facility], or [email or web address] if you run out of hospital supplied wound care items or if you are waiting on shipment and/or insurance approval    If the wound vac malfunctions and/or you suspect problems with the machine itself please call [facility] at the number on the front of the black hard shell case or on the back of the vac itself

## 2025-01-10 NOTE — DISCHARGE SUMMARY
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  She was started on IV insulin drip as well as aggressive IV fluids.  Anion gap was monitored and electrolytes replaced as needed.  Diabetic ketoacidosis eventually resolved and she was converted to subcutaneous insulin.  She was comanaged by endocrinology.  She was also found to be in acute renal failure/acute kidney injury which was treated with IV fluids and required emergent hemodialysis.  This gradually improved and eventually resolved.  She was comanaged by nephrology.  During the hospital course there was an abrupt change of the vascular examination of the right lower extremity.  Vascular surgery was consulted and ordered arterial Dopplers of the right lower extremity.  It showed that the right femoral, deep femoral and popliteal arteries were patent, with very low amplitude monophasic signals, suggesting severe inflow stenosis or occlusion. The external iliac artery also appeared patent. No measurable Doppler flow in the anterior and posterior tibial and peroneal arteries.  Patient was started on IV heparin drip.  The patient was taken to the operating room for emergent surgery with a right iliac thrombectomy via Open groin incision.  This was done on 2024.  The patient returned to the operating room on 2024 for recurrent right lower extremity ischemia due to arterial thrombosis  "and underwent a right iliofemoral popliteal thrombectomy, right femoral endarterectomy with patch, right lower extremity arteriogram and close right calf fasciotomy.  IV heparin was continued and eventually transitioned to oral Eliquis per vascular surgery.  Wound care was initiated during hospital course and a wound VAC was ordered.  On 12/23/2024 the patient returned to the OR for emergent 4 compartment fasciotomies due to critical right leg ischemia.  All of this was managed by vascular surgery.  She started to improve and has continued to do well.  She had a prolonged hospital course due to the multiple medical comorbidities but as already mentioned has gradually improved.  She was seen by physical therapy and Occupational Therapy and was recommended to be discharged to a skilled nursing facility which she adamantly refused.  Patient does not have a PCP and case management could not set up home health immediately at this time due to lack of a provider to manage the home health orders outpatient.  Case management has set up a new PCP appointment for 1/17/2025 at 3:30 PM and will liaise with her PCP's office to set up home health for the patient at that time and in the meantime case management here has set up this patient in the wound care clinic to be seen on 01/13/2025.  The patient is going home with a wound VAC per vascular surgery's recommendation.  The patient's son and caregiver has been taught by nursing on how \"to take care of the wound in the interim until home health care is started.  Also as already mentioned the patient will go to wound care clinic on 1/13/2023 which is in about 3 days.  The patient had threatened to leave AGAINST MEDICAL ADVICE adamantly refusing to be discharged to SNF and so these alternate arrangements had to be made for the patient.  The patient is being discharged home today.  Case management has been actively involved in this patient's discharge planning with frequent updates " being made available to me.      DISCHARGE Follow Up Recommendations:-Follow-up PCP in 1 week, follow-up with vascular surgery 1 week, follow-up with nephrology in 1 week.  Follow-up with endocrinology in 1 week.      Day of Discharge     Vital Signs:  Temp:  [97.6 °F (36.4 °C)-98 °F (36.7 °C)] 97.6 °F (36.4 °C)  Heart Rate:  [83-93] 87  Resp:  [13-22] 18  BP: (125-164)/(77-88) 129/77    Physical Exam:  Physical Exam  Constitutional:       Appearance: Normal appearance.   HENT:      Head: Normocephalic and atraumatic.      Nose: Nose normal.      Mouth/Throat:      Mouth: Mucous membranes are moist.   Eyes:      Extraocular Movements: Extraocular movements intact.      Pupils: Pupils are equal, round, and reactive to light.   Cardiovascular:      Rate and Rhythm: Normal rate and regular rhythm.   Pulmonary:      Effort: Pulmonary effort is normal.      Breath sounds: Normal breath sounds.   Abdominal:      General: Abdomen is flat. Bowel sounds are normal.      Palpations: Abdomen is soft.   Musculoskeletal:      Cervical back: Normal range of motion and neck supple.   Skin:     General: Skin is warm and dry.   Neurological:      General: No focal deficit present.      Mental Status: She is alert and oriented to person, place, and time.   Psychiatric:         Mood and Affect: Mood normal.         Behavior: Behavior normal.         Thought Content: Thought content normal.         Judgment: Judgment normal.           Pertinent  and/or Most Recent Results     LAB RESULTS:      Lab 01/10/25  0456 01/09/25  0613 01/08/25  0538 01/07/25  0446 01/06/25  0437   WBC 8.00 7.37 7.31 8.79 9.22   HEMOGLOBIN 9.8* 9.8* 9.6* 9.5* 9.2*   HEMATOCRIT 30.0* 31.1* 31.4* 29.0* 28.9*   PLATELETS 384 369 352 367 356   NEUTROS ABS 3.95 3.91 3.88 5.05 5.68   IMMATURE GRANS (ABS) 0.06* 0.04 0.04 0.07* 0.07*   LYMPHS ABS 2.97 2.48 2.47 2.59 2.51   MONOS ABS 0.57 0.57 0.59 0.69 0.65   EOS ABS 0.39 0.29 0.29 0.33 0.25   MCV 98.0* 97.2* 99.1*  96.7 98.0*         Lab 01/10/25  0456 01/09/25  0613 01/08/25  1654 01/08/25  0538 01/07/25  0446 01/06/25  0437   SODIUM 133* 134*  --  137 135* 137   POTASSIUM 3.6 4.0 4.4 3.6 3.8 3.7   CHLORIDE 98 101  --  104 101 103   CO2 23.6 25.1  --  22.8 23.2 25.5   ANION GAP 11.4 7.9  --  10.2 10.8 8.5   BUN 18 15  --  14 14 15   CREATININE 0.61 0.60  --  0.66 0.68 0.76   EGFR 107.1 107.5  --  105.0 104.3 93.8   GLUCOSE 255* 169*  --  154* 168* 139*   CALCIUM 9.0 9.4  --  9.0 8.7 8.7   MAGNESIUM 1.6 1.6  --  1.7 1.7 1.8   PHOSPHORUS 3.4 3.6  --  3.6 3.0 3.1         Lab 01/10/25  0456 01/09/25  0613 01/08/25  0538 01/07/25  0446 01/06/25  0437   TOTAL PROTEIN 7.1 7.1 6.9 7.0 7.0   ALBUMIN 3.1* 3.2* 3.0* 3.2* 3.1*   GLOBULIN 4.0 3.9 3.9 3.8 3.9   ALT (SGPT) 16 21 22 26 30   AST (SGOT) 23 23 21 33* 24   BILIRUBIN 0.3 0.4 0.3 0.3 0.4   ALK PHOS 116 105 106 118* 106                     Brief Urine Lab Results  (Last result in the past 365 days)        Color   Clarity   Blood   Leuk Est   Nitrite   Protein   CREAT   Urine HCG        12/18/24 0508 Yellow   Clear   Moderate (2+)   Negative   Negative   100 mg/dL (2+)                 Microbiology Results (last 10 days)       ** No results found for the last 240 hours. **            XR Chest 1 View    Result Date: 12/22/2024  Impression: 1.Tubes and lines appear in satisfactory positions, as above. 2.Mild bibasilar opacities which could represent atelectasis or infiltrate. 3.Advanced emphysema. Electronically Signed: Sam Haynes  12/22/2024 7:07 AM EST  Workstation ID: [id number]    XR Chest 1 View    Result Date: 12/21/2024  Impression: Impression: 1. No tube or line change 2. No focal airspace consolidation or other acute process. Electronically Signed: Julio Thorpe MD  12/21/2024 9:54 AM EST  Workstation ID: [id number]    XR Chest 1 View    Result Date: 12/20/2024  Impression: Impression: Stable chest demonstrating COPD and postoperative changes with no acute cardiopulmonary  process demonstrated Electronically Signed: Shon Kris  12/20/2024 7:19 AM EST  Workstation ID: [id number]    XR Chest 1 View    Result Date: 12/19/2024  Impression: Impression: Interval placement of various support lines as noted. No acute process of the lung fields. Electronically Signed: Emilia Orellana MD  12/19/2024 12:27 PM EST  Workstation ID: [id number]    US Renal Bilateral    Result Date: 12/19/2024  Impression: Impression: Unremarkable renal ultrasound. Electronically Signed: Luciana Guerra MD  12/19/2024 11:22 AM EST  Workstation ID: [id number]    US Pancreas    Result Date: 12/19/2024  Impression: Impression: Negative exam. Electronically Signed: Emilia Orellana MD  12/19/2024 11:05 AM EST  Workstation ID: [id number]    XR Abdomen KUB    Result Date: 12/19/2024  Impression: Impression: Feeding tube is in the stomach. Electronically Signed: Maxwell Carrion MD  12/19/2024 1:11 AM EST  Workstation ID: [id number]    US Liver    Result Date: 12/18/2024  Impression: Impression: Hepatomegaly with steatosis. Electronically Signed: Alea Subramanian MD  12/18/2024 9:54 AM EST  Workstation ID: [id number]    CT Head Without Contrast    Result Date: 12/18/2024  Impression: Impression: 1. No acute intracranial findings. 2. Features suggestive of mild chronic microvascular disease. Electronically Signed: Mariah Keenan MD  12/18/2024 8:09 AM EST  Workstation ID: [id number]    XR Chest 1 View    Result Date: 12/18/2024  Impression: Impression: 1. No acute process. 2. Severe emphysema. Electronically Signed: Gaurav Miles MD  12/18/2024 7:53 AM EST  Workstation ID: [id number]     Results for orders placed during the hospital encounter of 12/18/24    Duplex Lower Extremity Art / Grafts - Right CAR    Interpretation Summary    Limited study due to body habitus and current dressing site.  Patent external iliac and femoral-popliteal arterial flow with low flow noted below the common femoral.  Common femoral artery poorly visualized.  Cannot  rule out occlusion of the common femoral artery.      Results for orders placed during the hospital encounter of 12/18/24    Duplex Lower Extremity Art / Grafts - Right CAR    Interpretation Summary    Limited study due to body habitus and current dressing site.  Patent external iliac and femoral-popliteal arterial flow with low flow noted below the common femoral.  Common femoral artery poorly visualized.  Cannot rule out occlusion of the common femoral artery.      Results for orders placed during the hospital encounter of 12/18/24    Adult Transthoracic Echo Complete w/ Color, Spectral and Contrast if Necessary Per Protocol    Interpretation Summary    Left ventricular systolic function is normal. Left ventricular ejection fraction appears to be 56 - 60%.    Left ventricular diastolic function was normal.    No significant valvular disease.    Estimated right ventricular systolic pressure from tricuspid regurgitation is normal (<35 mmHg).    Electronically signed by Raúl Madrid MD, 12/20/24, 1:26 PM EST.      Labs Pending at Discharge:  Pending Results       None            Procedures Performed  Procedure(s):  FASCIOTOMY LEG         Consults:   Consults       Date and Time Order Name Status Description    12/27/2024 10:17 AM Inpatient Hospitalist Consult      12/21/2024  3:09 PM Hematology & Oncology Inpatient Consult      12/21/2024  3:09 PM Inpatient Intensivist Consult      12/19/2024  3:41 PM Inpatient Endocrinology Consult      12/19/2024  2:09 PM Inpatient Vascular Surgery Consult Completed     12/18/2024  8:32 PM Inpatient Nephrology Consult Completed     12/18/2024  8:24 AM Inpatient Intensivist Consult                Discharge Details        Discharge Medications        New Medications        Instructions Start Date   Accu-Chek Guide Me w/Device kit   1 kit, Not Applicable, Once, Dx: E10.65      Accu-Chek Guide Test test strip  Generic drug: glucose blood   1 each, Other, 4  Times Daily Before Meals & Nightly, Dx: E10.65. Use as instructed      Accu-Chek Softclix Lancets lancets   1 each, Other, 4 Times Daily Before Meals & Nightly, Dx: E10.65. Use as instructed      apixaban 5 MG tablet tablet  Commonly known as: ELIQUIS   5 mg, Oral, Every 12 Hours Scheduled      Insulin Glargine 100 UNIT/ML injection pen  Commonly known as: LANTUS SOLOSTAR   20 Units, Subcutaneous, Nightly      Insulin Lispro (1 Unit Dial) 100 UNIT/ML solution pen-injector  Commonly known as: HumaLOG KwikPen   8 Units, Subcutaneous, 3 Times Daily Before Meals      oxyCODONE 5 MG immediate release tablet  Commonly known as: ROXICODONE   5 mg, Oral, Every 4 Hours PRN      pantoprazole 40 MG EC tablet  Commonly known as: PROTONIX   40 mg, Oral, Every Early Morning      Pen Needles 32G X 4 MM misc   1 each, Not Applicable, 4 Times Daily Before Meals & Nightly               Allergies   Allergen Reactions    Aspirin GI Intolerance    Ibuprofen Itching         Discharge Disposition:   Home or Self Care    Diet: Cardiac diabetic      Discharge Activity:   Activity Instructions       Activity as Tolerated                CODE STATUS:  Code Status and Medical Interventions: CPR (Attempt to Resuscitate); Full Support   Ordered at: 12/18/24 0830     Code Status (Patient has no pulse and is not breathing):    CPR (Attempt to Resuscitate)     Medical Interventions (Patient has pulse or is breathing):    Full Support         Future Appointments   Date Time Provider Department Center   1/17/2025  3:00 PM Rohit Schrader DO [facility]   1/28/2025  9:30 AM Ghassan Celestin MD [facility]       Additional Instructions for the Follow-ups that You Need to Schedule       Ambulatory Referral to Wound Clinic   As directed      Discharge Follow-up with PCP   As directed       Currently Documented PCP:    Katie Duran PA    PCP Phone Number:    [phone]     Follow Up Details: 1 week        Discharge Follow-up with  Specified Provider: Endocrinology; 1 Week   As directed      To: Endocrinology   Follow Up: 1 Week        Discharge Follow-up with Specified Provider: Nephrology; 2 Weeks   As directed      To: Nephrology   Follow Up: 2 Weeks        Discharge Follow-up with Specified Provider: Vascularu surgery; 1 Week   As directed      To: Vascularu surgery   Follow Up: 1 Week                Time spent on Discharge including face to face service: Greater than 30 minutes    Signature: Electronically signed by Yany Rajan MD, 01/10/25, 14:37 EST.  [employer] [facility]ist Team

## 2025-01-10 NOTE — CONSULTS
Nutrition Services    Patient Name: Yarelis Jackson  YOB: 1970  MRN: [id number]  Admission date: 12/18/2024    Comment:    --Diet per SLP    -- Continue Boost Glucose Control BID (Provides 380 kcals, 32 g protein if consumed)         CLINICAL NUTRITION ASSESSMENT      Reason for Assessment 1/10: Follow up protocol   1/3: Follow up protocol   12/27: Follow-up protocol   12/21: Tube feeding      H&P      Past Medical History:   Diagnosis Date    COPD (chronic obstructive pulmonary disease)     Low back pain     Migraine     Neck pain        Past Surgical History:   Procedure Laterality Date    FASCIOTOMY Right 12/23/2024    Procedure: FASCIOTOMY LEG;  Surgeon: Chris Delgado MD;  Location: [facility] MAIN OR;  Service: Vascular;  Laterality: Right;    FEMORAL THROMBECTOMY/EMBOLECTOMY Right 12/19/2024    Procedure: FEMORAL right iliofemoral thrombectomy, arteriogram;  Surgeon: Ghassan Celestin MD;  Location: [facility] OR;  Service: Vascular;  Laterality: Right;    FEMORAL THROMBECTOMY/EMBOLECTOMY Right 12/20/2024    Procedure: Right femoral thrombectomy, right lower extremity arteriogram and right lower leg facitomies;  Surgeon: Ghassan Celestin MD;  Location: [facility] OR;  Service: Vascular;  Laterality: Right;    LUNG SURGERY          Current Problems   Diabetic ketoacidosis without coma, with new onset diabetes mellitus, type 2   -Endocrinology is following   -diabetes educator following    Acute kidney injury  -Nephrology is following     Acute Hypernatremia    Septic shock    Acute occlusive thrombus of the right iliac artery with limb ischemia   -Vascular surgery is following   -underwent iliofemoral thrombectomy and arteriogram, demonstrating arterial patency 12/19  -OR for redo of right iliofemoral popliteal thrombectomy, right femoral endarterectomy with patch, right lower extremity arteriogram, and closed right calf fasciotomies. 12/20  -Returned to the OR morning (12/23)  "for emergent 4 compartment fasciotomies due to critical right leg ischemia     Nontraumatic rhabdomyolysis     Hypertriglyceridemia    Acute metabolic encephalopathy     Acute respiratory failure without hypercapnia / On mechanical Ventilation   -Extubated     Cutaneous candidiasis of groin     Bilateral leg rash, concern for scabies infection     Transaminitis     COPD       Encounter Information        Trending Narrative     1/10: Pt was sitting up and eating lunch at my visit. She reports a good appetite/intake. Pt gets the boost at times and tolerates it well.     1/3: Since last RD review, patient continues to recover.  RD visited patient at bedside.  Patient asleep with breakfast tray on table.  RD left patient to rest.      12/27: Checking in to monitor TF tolerance. Since last RD review, pt extubated on 12/23. Continues to receive EN via DHT at goal rate. WOCN evaluating for possible wound vac placement today. HD yesterday with 800 mL removed. Precedex/heparin. Continues HFNC supplemental O2. Tolerating TF without noted issues.     12/21: Pt was admitted with DKA, endocrinology is following. Nephrology is following for MARGOT and hypernatremia. Vascular surgery is following. Underwent iliofemoral thrombectomy and arteriogram, demonstrating arterial patency 12/19, OR for redo of right iliofemoral popliteal thrombectomy, right femoral endarterectomy with patch, right lower extremity arteriogram, and closed right calf fasciotomies 12/20. Pt is currently intubated. On norepi. CRRT stopped, plan for HD. No family was in the room at my visit. NFPE completed and not consistent with nutrition diagnosis of malnutrition at this time using AND/ASPEN criteria       Anthropometrics        Current Height, Weight Height: 162.6 cm (64\")  Weight: 96.7 kg (213 lb 3 oz) (01/10/25 9236)       Usual Body Weight (UBW) Unknown        Trending Weight Hx     This admission: 1/10: 213# (2% loss) - although pt's weight is still elevated " from admission   1/3: 217#, 2.3% weight gain since last RD review, +12.3L since 12/20/24 per I/Os  12/27: 212# (8% weight gain in the last 7 days) - expect some fluctuations r/t HD pt.   12/21: 195# (obtained 12/19)              PTA: 12/21: No recent weight's documented     Wt Readings from Last 30 Encounters:   01/10/25 0456 96.7 kg (213 lb 3 oz)   01/08/25 0533 97.8 kg (215 lb 9.8 oz)   01/05/25 0535 102 kg (224 lb 6.9 oz)   01/01/25 0425 98.5 kg (217 lb 2.5 oz)   12/30/24 0415 102 kg (225 lb 5 oz)   12/29/24 0437 104 kg (228 lb 6.3 oz)   12/28/24 0409 110 kg (241 lb 6.5 oz)   12/27/24 0500 96.3 kg (212 lb 4.9 oz)   12/25/24 0400 98.5 kg (217 lb 2.5 oz)   12/24/24 0431 100 kg (221 lb 5.5 oz)   12/24/24 0333 100 kg (221 lb 5.5 oz)   12/19/24 0600 88.5 kg (195 lb 1.7 oz)   12/18/24 0527 92.5 kg (203 lb 14.4 oz)   07/15/23 1132 98.1 kg (216 lb 4.3 oz)   04/04/23 0719 98.1 kg (216 lb 4.3 oz)   12/12/22 0952 85.5 kg (188 lb 6.4 oz)   01/31/22 1404 89.8 kg (198 lb)   01/24/22 1406 89.8 kg (198 lb)   12/23/21 1133 89.8 kg (198 lb)   11/24/21 1537 90.3 kg (199 lb)   11/16/21 0838 90.3 kg (199 lb)   09/29/21 0320 87.4 kg (192 lb 10.9 oz)   06/13/21 1652 81.4 kg (179 lb 6.4 oz)   05/14/21 1230 80.1 kg (176 lb 9.4 oz)   04/03/21 2304 80.7 kg (177 lb 14.6 oz)   04/09/20 0025 71 kg (156 lb 8.4 oz)   01/09/20 0532 63.5 kg (140 lb)   09/29/19 0216 70.1 kg (154 lb 8.7 oz)   08/31/19 2205 73.6 kg (162 lb 4.1 oz)   02/05/18 0902 72.6 kg (160 lb)   06/06/16 1303 69.8 kg (153 lb 14.4 oz)   05/04/16 0833 75.3 kg (165 lb 14.4 oz)   03/25/16 1313 78.2 kg (172 lb 6.4 oz)   03/03/16 1115 76 kg (167 lb 9.6 oz)   02/08/16 0952 77.1 kg (170 lb)      BMI kg/m2 Body mass index is 36.59 kg/m².       Labs        Pertinent Labs Low sodium per attending    Results from last 7 days   Lab Units 01/10/25  0456 01/09/25  0613 01/08/25  1654 01/08/25  0538   SODIUM mmol/L 133* 134*  --  137   POTASSIUM mmol/L 3.6 4.0 4.4 3.6   CHLORIDE mmol/L 98 101   --  104   CO2 mmol/L 23.6 25.1  --  22.8   BUN mg/dL 18 15  --  14   CREATININE mg/dL 0.61 0.60  --  0.66   CALCIUM mg/dL 9.0 9.4  --  9.0   BILIRUBIN mg/dL 0.3 0.4  --  0.3   ALK PHOS U/L 116 105  --  106   ALT (SGPT) U/L 16 21  --  22   AST (SGOT) U/L 23 23  --  21   GLUCOSE mg/dL 255* 169*  --  154*     Results from last 7 days   Lab Units 01/10/25  0456 01/09/25  0613 01/08/25  0538   MAGNESIUM mg/dL 1.6 1.6 1.7   PHOSPHORUS mg/dL 3.4 3.6 3.6   HEMOGLOBIN g/dL 9.8* 9.8* 9.6*   HEMATOCRIT % 30.0* 31.1* 31.4*     Lab Results   Component Value Date    HGBA1C 15.80 (H) 12/18/2024        Medications    Scheduled Medications apixaban, 5 mg, Oral, Q12H  insulin glargine, 20 Units, Subcutaneous, Nightly  insulin lispro, 2-7 Units, Subcutaneous, TID With Meals  insulin lispro, 8 Units, Subcutaneous, TID With Meals  nystatin, 1 Application, Topical, Q8H  pantoprazole, 40 mg, Oral, Q AM  QUEtiapine, 75 mg, Oral, Q12H  sodium chloride, 10 mL, Intravenous, Q12H        Infusions        PRN Medications   acetaminophen **OR** acetaminophen    aluminum-magnesium hydroxide-simethicone    senna-docusate sodium **AND** polyethylene glycol **AND** bisacodyl **AND** bisacodyl    Calcium Replacement - Follow Nurse / BPA Driven Protocol    dextrose    dextrose    glucagon (human recombinant)    heparin (porcine)    heparin (porcine)    ipratropium-albuterol    Magnesium Standard Dose Replacement - Follow Nurse / BPA Driven Protocol    ondansetron ODT **OR** ondansetron    oxyCODONE    Phosphorus Replacement - Follow Nurse / BPA Driven Protocol    Potassium Replacement - Follow Nurse / BPA Driven Protocol    sodium chloride    sodium chloride    sodium chloride     Physical Findings        Trending Physical   Appearance, NFPE 1/10: NFPE completed and not consistent with nutrition diagnosis of malnutrition at this time using AND/ASPEN criteria      1/3: NFPE completed and not consistent with nutrition diagnosis of malnutrition at this  time using AND/ASPEN criteria     12/27: NFPE completed and not consistent with nutrition diagnosis of malnutrition at this time using AND/ASPEN criteria     12/21: NFPE completed and not consistent with nutrition diagnosis of malnutrition at this time using AND/ASPEN criteria     --  Edema  3+ right ankle and leg   2+ right foot  1+: dependent, generalized, left ankle, left foot     Bowel Function Last documented BM 1/9, pt has stool softeners ordered PRN      Tubes No feeding tube      Chewing/Swallowing SLP following, soft to chew diet recommended      Skin See WOCN note 1/8       Estimated/Assessed Needs    Estimated 12/21/24 - remains appropriate   Energy Requirements    EST Needs, Method, Wt used 1787 kcal/day (~32 kcals/kg IBW)       Protein Requirements    EST Needs, Method, Wt used 66 g/day (1.2 g/kg using IBW)        Fluid Requirements     Estimated Needs (mL/day) 1 ml/kcal or per MD     Fluid Deficit    Current Na Level (mEq/L)    Desired Na Level (mEq/L)    Estimated Fluid Deficit (L)       Current Nutrition Orders & Evaluation of Intake       Oral Nutrition     Food Allergies NKFA   Current PO Diet Diet: Regular/House; Texture: Soft to Chew (NDD 3); Soft to Chew: Chopped Meat; Fluid Consistency: Thin (IDDSI 0)   Supplement Boost Glucose Control BID   PO Evaluation     Trending % PO Intake 1/10: %   1/3: NPO  12/27: NPO  12/21: NPO      Enteral Nutrition    Enteral Route    Order, Modulars, Flushes    Tolerance    TF Observation         Parenteral Nutrition     TPN Route    Total # Days on TPN    TPN Order, Lipid Details    MVI & Trace Element Freq    TPN Observation       Nutritional Risk Screening        NRS-2002 Score          Nutrition Diagnosis         Nutrition Dx Problem 1 Chewing/swallowing difficulty related to clinical status as evidenced by mechanically altered diet per SLP    Nutrition Dx Problem 2        Intervention Goal         Intervention Goal(s) To continue with % of meals       Nutrition Intervention        RD Action Continue ONS  Monitor PO intakes      Nutrition Prescription          Diet Prescription Soft to Chew, chopped Meat   Supplement Prescription Boost Glucose Control BID     Enteral Prescription        TPN Prescription      Monitor/Evaluation        Monitor PO intake, Supplement intake, Skin status, Swallow function       Electronically signed by:  Marina Vidales RD  01/10/25 13:02 EST

## 2025-01-10 NOTE — NURSING NOTE
Extensive discharge teaching completed with RN as well as  Lien. Patient and son both verbalize understanding of all discharge instructions and appointment dates. Patient discharged with son and took all belongings with her. Home wound vac in place and taken by the patient as well as all prescriptions, wheelchair and walker. No further questions from patient or son at this time.

## 2025-01-10 NOTE — OUTREACH NOTE
Prep Survey      Flowsheet Row Responses   [facility] facility patient discharged from? [facility]   Is LACE score < 7 ? No   Eligibility [facility]   Date of Admission 12/18/24   Date of Discharge 01/10/25   Discharge Disposition Home or Self Care   Discharge diagnosis Femoral right thrmobectomy, Fasciotomy   Does the patient have one of the following disease processes/diagnoses(primary or secondary)? General Surgery   Does the patient have Home health ordered? No   Is there a DME ordered? Yes   What DME was ordered? [employer] for a wound vac   Prep survey completed? Yes            SAL BOYER - Registered Nurse

## 2025-01-10 NOTE — CASE MANAGEMENT/SOCIAL WORK
Continued Stay Note  [facility]     Patient Name: Yarelis Jackson  MRN: [id number]  Today's Date: 1/10/2025    Admit Date: 12/18/2024       Discharge Plan       Row Name 01/10/25 1117       Plan    Plan Comments Refer to assigned CM's discharge plan note regarding patient's final discharge plan. This CM confirmed new PCP appointment for 1/17/25 at 3:00 pm with Dr. Rohit Schrader. New PCP appointment on patient's AVS. This CM updated assigned CM. Per Cristi (Wound Care RN), patient's wound care will now be x2/week. CM called [facility] and spoke to Melissa. CM scheduled patient for 1/13/25 at 1:15 pm with Dr. Yelitza Gilman. [facility] will schedule remaining follow up appointments at her first appointment. Patient's appointment will be on patient's AVS. This CM updated assigned CM.             GARCIA Nelson, RN, CCM    [address] [address]  Phone: [phone]  Fax: [phone]

## 2025-01-11 NOTE — CASE MANAGEMENT/SOCIAL WORK
Case Management Discharge Note      Final Note: routine home    Provided Post Acute Provider List?: N/A  Provided Post Acute Provider Quality & Resource List?: N/A    Selected Continued Care - Discharged on 1/10/2025 Admission date: 12/18/2024 - Discharge disposition: Home or Self Care       Transportation Services  Private: Car    Final Discharge Disposition Code: 01 - home or self-care

## 2025-01-12 ENCOUNTER — HOSPITAL ENCOUNTER (OUTPATIENT)
Facility: HOSPITAL | Age: 55
Setting detail: OBSERVATION
Discharge: NURSING, REHAB OR LONG-TERM CARE | End: 2025-01-16
Attending: INTERNAL MEDICINE | Admitting: INTERNAL MEDICINE
Payer: MEDICAID

## 2025-01-12 ENCOUNTER — READMISSION MANAGEMENT (OUTPATIENT)
Dept: CALL CENTER | Facility: HOSPITAL | Age: 55
End: 2025-01-12
Payer: MEDICAID

## 2025-01-12 DIAGNOSIS — L97.912 NON-PRESSURE ULCER OF LOWER EXTREMITY WITH FAT LAYER EXPOSED, RIGHT: ICD-10-CM

## 2025-01-12 DIAGNOSIS — M79.604 ACUTE PAIN OF RIGHT LOWER EXTREMITY: Primary | ICD-10-CM

## 2025-01-12 PROBLEM — M79.606 LEG PAIN: Status: ACTIVE | Noted: 2025-01-12

## 2025-01-12 LAB
BASOPHILS # BLD AUTO: 0.07 10*3/MM3 (ref 0–0.2)
BASOPHILS NFR BLD AUTO: 0.7 % (ref 0–1.5)
DEPRECATED RDW RBC AUTO: 62.7 FL (ref 37–54)
EOSINOPHIL # BLD AUTO: 0.37 10*3/MM3 (ref 0–0.4)
EOSINOPHIL NFR BLD AUTO: 3.8 % (ref 0.3–6.2)
ERYTHROCYTE [DISTWIDTH] IN BLOOD BY AUTOMATED COUNT: 17.6 % (ref 12.3–15.4)
GLUCOSE BLDC GLUCOMTR-MCNC: 133 MG/DL (ref 70–105)
GLUCOSE BLDC GLUCOMTR-MCNC: 197 MG/DL (ref 70–105)
GLUCOSE BLDC GLUCOMTR-MCNC: 229 MG/DL (ref 70–105)
HCT VFR BLD AUTO: 30.8 % (ref 34–46.6)
HGB BLD-MCNC: 9.9 G/DL (ref 12–15.9)
HOLD SPECIMEN: NORMAL
HOLD SPECIMEN: NORMAL
IMM GRANULOCYTES # BLD AUTO: 0.09 10*3/MM3 (ref 0–0.05)
IMM GRANULOCYTES NFR BLD AUTO: 0.9 % (ref 0–0.5)
LYMPHOCYTES # BLD AUTO: 2.44 10*3/MM3 (ref 0.7–3.1)
LYMPHOCYTES NFR BLD AUTO: 24.9 % (ref 19.6–45.3)
MCH RBC QN AUTO: 31.5 PG (ref 26.6–33)
MCHC RBC AUTO-ENTMCNC: 32.1 G/DL (ref 31.5–35.7)
MCV RBC AUTO: 98.1 FL (ref 79–97)
MONOCYTES # BLD AUTO: 0.8 10*3/MM3 (ref 0.1–0.9)
MONOCYTES NFR BLD AUTO: 8.2 % (ref 5–12)
NEUTROPHILS NFR BLD AUTO: 6.03 10*3/MM3 (ref 1.7–7)
NEUTROPHILS NFR BLD AUTO: 61.5 % (ref 42.7–76)
NRBC BLD AUTO-RTO: 0 /100 WBC (ref 0–0.2)
PLATELET # BLD AUTO: 378 10*3/MM3 (ref 140–450)
PMV BLD AUTO: 8.3 FL (ref 6–12)
RBC # BLD AUTO: 3.14 10*6/MM3 (ref 3.77–5.28)
WBC NRBC COR # BLD AUTO: 9.8 10*3/MM3 (ref 3.4–10.8)
WHOLE BLOOD HOLD COAG: NORMAL

## 2025-01-12 PROCEDURE — G0378 HOSPITAL OBSERVATION PER HR: HCPCS

## 2025-01-12 PROCEDURE — 96375 TX/PRO/DX INJ NEW DRUG ADDON: CPT

## 2025-01-12 PROCEDURE — 99285 EMERGENCY DEPT VISIT HI MDM: CPT

## 2025-01-12 PROCEDURE — 25010000002 ONDANSETRON PER 1 MG

## 2025-01-12 PROCEDURE — 82948 REAGENT STRIP/BLOOD GLUCOSE: CPT

## 2025-01-12 PROCEDURE — 25010000002 HYDROMORPHONE 1 MG/ML SOLUTION

## 2025-01-12 PROCEDURE — 85025 COMPLETE CBC W/AUTO DIFF WBC: CPT

## 2025-01-12 PROCEDURE — 96374 THER/PROPH/DIAG INJ IV PUSH: CPT

## 2025-01-12 PROCEDURE — 99213 OFFICE O/P EST LOW 20 MIN: CPT | Performed by: SURGERY

## 2025-01-12 PROCEDURE — 63710000001 INSULIN GLARGINE PER 5 UNITS

## 2025-01-12 PROCEDURE — 96376 TX/PRO/DX INJ SAME DRUG ADON: CPT

## 2025-01-12 PROCEDURE — 63710000001 INSULIN LISPRO (HUMAN) PER 5 UNITS

## 2025-01-12 RX ORDER — SODIUM CHLORIDE 0.9 % (FLUSH) 0.9 %
10 SYRINGE (ML) INJECTION AS NEEDED
Status: DISCONTINUED | OUTPATIENT
Start: 2025-01-12 | End: 2025-01-16 | Stop reason: HOSPADM

## 2025-01-12 RX ORDER — SODIUM CHLORIDE 9 MG/ML
40 INJECTION, SOLUTION INTRAVENOUS AS NEEDED
Status: DISCONTINUED | OUTPATIENT
Start: 2025-01-12 | End: 2025-01-16 | Stop reason: HOSPADM

## 2025-01-12 RX ORDER — DEXTROSE MONOHYDRATE 25 G/50ML
25 INJECTION, SOLUTION INTRAVENOUS
Status: DISCONTINUED | OUTPATIENT
Start: 2025-01-12 | End: 2025-01-16 | Stop reason: HOSPADM

## 2025-01-12 RX ORDER — ACETAMINOPHEN 160 MG/5ML
650 SOLUTION ORAL EVERY 4 HOURS PRN
Status: DISCONTINUED | OUTPATIENT
Start: 2025-01-12 | End: 2025-01-16 | Stop reason: HOSPADM

## 2025-01-12 RX ORDER — POLYETHYLENE GLYCOL 3350 17 G/17G
17 POWDER, FOR SOLUTION ORAL DAILY PRN
Status: DISCONTINUED | OUTPATIENT
Start: 2025-01-12 | End: 2025-01-16 | Stop reason: HOSPADM

## 2025-01-12 RX ORDER — ACETAMINOPHEN 325 MG/1
650 TABLET ORAL EVERY 4 HOURS PRN
Status: DISCONTINUED | OUTPATIENT
Start: 2025-01-12 | End: 2025-01-16 | Stop reason: HOSPADM

## 2025-01-12 RX ORDER — ONDANSETRON 2 MG/ML
4 INJECTION INTRAMUSCULAR; INTRAVENOUS ONCE
Status: COMPLETED | OUTPATIENT
Start: 2025-01-12 | End: 2025-01-12

## 2025-01-12 RX ORDER — OXYCODONE HYDROCHLORIDE 5 MG/1
10 TABLET ORAL EVERY 4 HOURS PRN
Status: DISCONTINUED | OUTPATIENT
Start: 2025-01-12 | End: 2025-01-14

## 2025-01-12 RX ORDER — ACETAMINOPHEN 650 MG/1
650 SUPPOSITORY RECTAL EVERY 4 HOURS PRN
Status: DISCONTINUED | OUTPATIENT
Start: 2025-01-12 | End: 2025-01-16 | Stop reason: HOSPADM

## 2025-01-12 RX ORDER — BISACODYL 10 MG
10 SUPPOSITORY, RECTAL RECTAL DAILY PRN
Status: DISCONTINUED | OUTPATIENT
Start: 2025-01-12 | End: 2025-01-16 | Stop reason: HOSPADM

## 2025-01-12 RX ORDER — IBUPROFEN 600 MG/1
1 TABLET ORAL
Status: DISCONTINUED | OUTPATIENT
Start: 2025-01-12 | End: 2025-01-16 | Stop reason: HOSPADM

## 2025-01-12 RX ORDER — ONDANSETRON 4 MG/1
4 TABLET, ORALLY DISINTEGRATING ORAL EVERY 6 HOURS PRN
Status: DISCONTINUED | OUTPATIENT
Start: 2025-01-12 | End: 2025-01-16 | Stop reason: HOSPADM

## 2025-01-12 RX ORDER — SODIUM CHLORIDE 0.9 % (FLUSH) 0.9 %
10 SYRINGE (ML) INJECTION EVERY 12 HOURS SCHEDULED
Status: DISCONTINUED | OUTPATIENT
Start: 2025-01-12 | End: 2025-01-16 | Stop reason: HOSPADM

## 2025-01-12 RX ORDER — BISACODYL 5 MG/1
5 TABLET, DELAYED RELEASE ORAL DAILY PRN
Status: DISCONTINUED | OUTPATIENT
Start: 2025-01-12 | End: 2025-01-16 | Stop reason: HOSPADM

## 2025-01-12 RX ORDER — OXYCODONE HYDROCHLORIDE 5 MG/1
5 TABLET ORAL EVERY 4 HOURS PRN
Status: DISCONTINUED | OUTPATIENT
Start: 2025-01-12 | End: 2025-01-13

## 2025-01-12 RX ORDER — INSULIN LISPRO 100 [IU]/ML
2-9 INJECTION, SOLUTION INTRAVENOUS; SUBCUTANEOUS
Status: DISCONTINUED | OUTPATIENT
Start: 2025-01-12 | End: 2025-01-16 | Stop reason: HOSPADM

## 2025-01-12 RX ORDER — AMOXICILLIN 250 MG
2 CAPSULE ORAL 2 TIMES DAILY PRN
Status: DISCONTINUED | OUTPATIENT
Start: 2025-01-12 | End: 2025-01-16 | Stop reason: HOSPADM

## 2025-01-12 RX ORDER — NICOTINE POLACRILEX 4 MG
15 LOZENGE BUCCAL
Status: DISCONTINUED | OUTPATIENT
Start: 2025-01-12 | End: 2025-01-16 | Stop reason: HOSPADM

## 2025-01-12 RX ADMIN — HYDROMORPHONE HYDROCHLORIDE 0.5 MG: 1 INJECTION, SOLUTION INTRAMUSCULAR; INTRAVENOUS; SUBCUTANEOUS at 11:31

## 2025-01-12 RX ADMIN — OXYCODONE 10 MG: 5 TABLET ORAL at 19:44

## 2025-01-12 RX ADMIN — Medication 10 ML: at 14:50

## 2025-01-12 RX ADMIN — ONDANSETRON 4 MG: 2 INJECTION INTRAMUSCULAR; INTRAVENOUS at 11:30

## 2025-01-12 RX ADMIN — Medication 10 ML: at 21:50

## 2025-01-12 RX ADMIN — INSULIN LISPRO 4 UNITS: 100 INJECTION, SOLUTION INTRAVENOUS; SUBCUTANEOUS at 21:48

## 2025-01-12 RX ADMIN — APIXABAN 5 MG: 5 TABLET, FILM COATED ORAL at 21:59

## 2025-01-12 RX ADMIN — APIXABAN 5 MG: 5 TABLET, FILM COATED ORAL at 14:49

## 2025-01-12 RX ADMIN — INSULIN GLARGINE 20 UNITS: 100 INJECTION, SOLUTION SUBCUTANEOUS at 21:48

## 2025-01-12 RX ADMIN — HYDROMORPHONE HYDROCHLORIDE 0.5 MG: 1 INJECTION, SOLUTION INTRAMUSCULAR; INTRAVENOUS; SUBCUTANEOUS at 14:30

## 2025-01-12 NOTE — Clinical Note
Level of Care: Med/Surg [1]   Diagnosis: Leg pain [199497]   Admitting Physician: DAVID CRISTINA [[id number]]   Attending Physician: DAVID CRISTINA [[id number]]

## 2025-01-12 NOTE — CONSULTS
Name: Yarelis Jackson ADMIT: 2025   : 1970  PCP: Provider, No Known    MRN: [id number] LOS: 0 days   AGE/SEX: 54 y.o. female  ROOM: [facility] ED/2     Inpatient Vascular Surgery Consult  Consult performed by: Willie Son MD  Consult ordered by: Charly Hernandez APRN        CC: Wound VAC problems and right leg pain  Subjective     History of Present Illness  54 y.o. female who is known to my partner Dr. Ghassan Celestin when she presented with acute limb ischemia on the right.  She underwent iliac and femoral-popliteal thrombectomy as well as a right femoral endarterectomy.  Also required fasciotomies.  Patient went home 2 days ago after declining SNF but found that she was having pain that was not improving and was not able to care for herself.  Also her vacuum dressing was not working properly.  It was changed on Wednesday and due to be changed on Monday but was alarming.  We were asked to see her.      Review of Systems    History Review Reviewed Comments   Past Medical History:  [x]  COPD, GERD, hypertension, diabetes, acute limb ischemia   Past Surgical History: []  Thrombectomy 1219, redo thrombectomy 1220, fasciotomies 1223   Family History: [x]     Social History: [x]       Scheduled Meds:     apixaban, 5 mg, Oral, Q12H  insulin glargine, 20 Units, Subcutaneous, Nightly  insulin lispro, 2-9 Units, Subcutaneous, 4x Daily AC & at Bedtime  sodium chloride, 10 mL, Intravenous, Q12H      IV Meds:        Allergies: Aspirin and Ibuprofen    Objective   Temp:  [98.8 °F (37.1 °C)] 98.8 °F (37.1 °C)  Heart Rate:  [86-93] 89  Resp:  [18] 18  BP: (137-153)/() 148/83    I/O this shift:  In: 30 [P.O.:30]  Out: 0     Physical Exam  Patient is awake alert and oriented x 3.  She appears to be in moderate discomfort.  Her vacuum dressing is not holding a seal ripped.  There is some erythema around the edges with significant exudate noted.  DP pulses easily palpable.  She has foot drop.    Data  Reviewed:  CBC    Results from last 7 days   Lab Units 01/12/25  1046 01/10/25  0456 01/09/25  0613 01/08/25  0538 01/07/25  0446 01/06/25  0437   WBC 10*3/mm3 9.80 8.00 7.37 7.31 8.79 9.22   HEMOGLOBIN g/dL 9.9* 9.8* 9.8* 9.6* 9.5* 9.2*   PLATELETS 10*3/mm3 378 384 369 352 367 356     BMP   Results from last 7 days   Lab Units 01/10/25  0456 01/09/25  0613 01/08/25  1654 01/08/25  0538 01/07/25  0446 01/06/25  0437   SODIUM mmol/L 133* 134*  --  137 135* 137   POTASSIUM mmol/L 3.6 4.0 4.4 3.6 3.8 3.7   CHLORIDE mmol/L 98 101  --  104 101 103   CO2 mmol/L 23.6 25.1  --  22.8 23.2 25.5   BUN mg/dL 18 15  --  14 14 15   CREATININE mg/dL 0.61 0.60  --  0.66 0.68 0.76   GLUCOSE mg/dL 255* 169*  --  154* 168* 139*   MAGNESIUM mg/dL 1.6 1.6  --  1.7 1.7 1.8   PHOSPHORUS mg/dL 3.4 3.6  --  3.6 3.0 3.1       Labs significant for: Hemoglobin 9.9.  Creatinine 0.6.  Glucose 255    Imaging Studies:  Angiogram images reviewed.    Active Hospital Problems    Diagnosis  POA    **Leg pain [M79.606]  Yes      Resolved Hospital Problems   No resolved problems to display.       Data Points:  During this visit the following were done:  Labs Reviewed [x]  []  []  Labs Ordered []  []  []  Radiology Reports Reviewed [x]    Radiology Ordered []    EKG, echo, and/or stress test reviewed []    Vascular lab results reviewed  []    Vascular lab images reviewed and interpreted per myself   []    Referring Provider Records Reviewed []    ER Records Reviewed []    Hospital Records Reviewed/Summarized [x]    History Obtained From Family []    Radiological images view and Interpreted per myself []    Case Discussed with referring provider []     Decision to obtain and request outside records  []        Active Hospital Problems:   Active Hospital Problems    Diagnosis  POA    **Leg pain [M79.606]  Yes      Resolved Hospital Problems   No resolved problems to display.      Assessment & Plan   Assessment / Plan     54 y.o. female who is known to my  partner Dr. Ghassan Collins for recent admission and surgical management of acute limb ischemia.  She has a past medical history significant for hypertension, diabetes, and COPD.  She was just discharged from the hospital 2 days ago after a prolonged stay beginning on December 18.  She was admitted in [facility] and found to have right limb ischemia.  On 12/19/2024 she underwent thrombectomy and then had to be taken back this next day for recurrent thrombosis and had a right iliofemoral and popliteal thrombectomy with endarterectomy.  Initially she had fasciotomies that were closed and these were taken back and opened on 12/23/2024.  Patient went home 2 days ago and had previously denied going to a subacute nursing facility but found that her pain and overall care were too difficult to manage at home so she Jannet presented here for evaluation and hopeful to be readmitted and transferred to a subacute nursing facility so she can obtain some additional help.  She says her pain is not improvements but it also has not acutely worsened.    She had a palpable femoral pulse.  She had a lot of drainage and exudate from her vacuum dressing and it was fairly malodorous.  For this reason she was medicated and then I removed all of them.  While the skin is a bit inflamed overall there is good granulation tissue and no obvious abscess or collection.  This was cleaned and Vaseline gauze was applied and then wrapped with ABDs and secured with Curlex.         Will have wound care team reevaluate her in the morning again for reapplication of negative wound pressure therapy.  Continue anticoagulation.  May be ready to have her right groin staples removed as well. Will follow      I discussed the patients findings and my recommendations with patient, primary care team, and consulting provider.  Please call my office with any question: [phone]

## 2025-01-12 NOTE — H&P
[facility] Medicine Services  History & Physical    Patient Name: Yarelis Jackson  : 1970  MRN: [id number]  Primary Care Physician:  Provider, No Known  Date of admission: 2025  Date and Time of Service: 2025 at 1230    Subjective      Chief Complaint: Right leg pain    History of Present Illness: Yarelis Jackson is a 54 y.o. female with a CMH of COPD, GERD, hypertension, diabetes, PAD with recent right critical limb ischemia status post right iliac thrombectomy, right iliofemoral popliteal thrombectomy, right femoral endarterectomy who presented to [facility] on 2025 with right leg pain.    Patient was just discharged from the hospital 2 days ago after an extended hospital stay from -1/10.  She was initially admitted with diabetic ketoacidosis with new onset diabetes mellitus type 2.  She developed right limb ischemia.  She underwent a right iliac thrombectomy on .  On  she went back to the operating room for recurrent right lower extremity ischemia and underwent a right iliofemoral popliteal thrombectomy, right femoral endarterectomy with patch, right lower extremity arteriogram and close right calf fasciotomy.  On  patient returned to the OR for emergent 4 compartment fasciotomies due to critical right leg ischemia.  She has a wound VAC in place.  She also had an MARGOT and rhabdomyolysis which was treated with IV fluids and she required emergent hemodialysis.  During her hospitalization, she was seen by PT/OT and recommendation was made for her to go to SNF.  Patient declined SNF and went home.  She was set up at the wound care clinic but was unable to set up home health because the patient does not have a PCP.  She was sent home with oxycodone 5 mg tablets for pain control, however, patient states her pain has been uncontrolled.  She has been getting around with a wheelchair at home but is having some difficulty with this now.  She lives with her  mother and father.  She now believes she needs rehab.  She does have some numbness in her foot which has been present since her initial surgery and is not any worse than it has been.  She denies any new symptoms since she was discharged.  She denies any fevers, pain elsewhere, vomiting.  She is tolerating a diet and moving her bowels.  She is taking her medications as prescribed.    ED course: Vitals 98.8-93-/105-99% on room air.  Labs are remarkable for hemoglobin of 9.9.  She is being admitted for further management and SNF placement    Review of Systems   Constitutional:  Positive for activity change. Negative for chills and fever.   HENT: Negative.     Respiratory: Negative.     Cardiovascular: Negative.    Gastrointestinal: Negative.    Genitourinary: Negative.    Musculoskeletal:  Positive for gait problem and myalgias.   Skin: Negative.    Neurological:  Positive for weakness. Negative for dizziness, tremors, seizures, syncope, facial asymmetry, speech difficulty, light-headedness, numbness and headaches.       Personal History     Past Medical History:   Diagnosis Date    COPD (chronic obstructive pulmonary disease)     Low back pain     Migraine     Neck pain        Past Surgical History:   Procedure Laterality Date    FASCIOTOMY Right 12/23/2024    Procedure: FASCIOTOMY LEG;  Surgeon: Chris Delgado MD;  Location: [facility] MAIN OR;  Service: Vascular;  Laterality: Right;    FEMORAL THROMBECTOMY/EMBOLECTOMY Right 12/19/2024    Procedure: FEMORAL right iliofemoral thrombectomy, arteriogram;  Surgeon: Ghassan Celestin MD;  Location: [facility] HYBRID OR;  Service: Vascular;  Laterality: Right;    FEMORAL THROMBECTOMY/EMBOLECTOMY Right 12/20/2024    Procedure: Right femoral thrombectomy, right lower extremity arteriogram and right lower leg facitomies;  Surgeon: Ghassan Celestin MD;  Location: [facility] HYBRID OR;  Service: Vascular;  Laterality: Right;    LUNG SURGERY         Family History:  family history includes COPD in her paternal grandmother; Cancer in her paternal grandmother and paternal uncle; Diabetes in her mother and paternal grandmother. Otherwise pertinent FHx was reviewed and not pertinent to current issue.    Social History:  reports that she has been smoking cigarettes. She has never used smokeless tobacco. She reports that she does not currently use alcohol. She reports that she does not use drugs.    Home Medications:  Prior to Admission Medications       Prescriptions Last Dose Informant Patient Reported? Taking?    Accu-Chek Softclix Lancets lancets   No No    1 each by Other route 4 (Four) Times a Day Before Meals & at Bedtime. Dx: E10.65. Use as instructed    apixaban (ELIQUIS) 5 MG tablet tablet   No No    Take 1 tablet by mouth Every 12 (Twelve) Hours for 30 days. Indications: Other - full anticoagulation, recent arterial thrombosis requiring thrombectomy    Blood Glucose Monitoring Suppl (Accu-Chek Guide Me) w/Device kit   No No    Use 1 kit 1 (One) Time for 1 dose. Dx: E10.65    glucose blood (Accu-Chek Guide Test) test strip   No No    1 each by Other route 4 (Four) Times a Day Before Meals & at Bedtime. Dx: E10.65. Use as instructed    Insulin Glargine (LANTUS SOLOSTAR) 100 UNIT/ML injection pen   No No    Inject 22 Units under the skin into the appropriate area as directed Every Night for 136 days.    Insulin Lispro, 1 Unit Dial, (HumaLOG KwikPen) 100 UNIT/ML solution pen-injector   No No    9 units with each with each meal with sliding scale not more than 15 units with each meal    Insulin Pen Needle (Pen Needles) 32G X 4 MM misc   No No    Use 1 each 4 (Four) Times a Day Before Meals & at Bedtime.    Insulin Pen Needle (Pen Needles) 32G X 4 MM misc   No No    Use 1 each 4 (Four) Times a Day Before Meals & at Bedtime.    oxyCODONE (ROXICODONE) 5 MG immediate release tablet   No No    Take 1 tablet by mouth Every 4 (Four) Hours As Needed for Severe Pain for up to 3 days.     pantoprazole (PROTONIX) 40 MG EC tablet   No No    Take 1 tablet by mouth Every Morning for 30 days.              Allergies:  Allergies   Allergen Reactions    Aspirin GI Intolerance    Ibuprofen Itching       Objective      Vitals:   Temp:  [98.8 °F (37.1 °C)] 98.8 °F (37.1 °C)  Heart Rate:  [86-93] 86  Resp:  [18] 18  BP: (137-153)/() 149/79  Body mass index is 36.97 kg/m².  Physical Exam  Constitutional:       Appearance: Normal appearance.   HENT:      Head: Normocephalic and atraumatic.      Mouth/Throat:      Mouth: Mucous membranes are moist.   Eyes:      Extraocular Movements: Extraocular movements intact.   Cardiovascular:      Rate and Rhythm: Normal rate and regular rhythm.   Pulmonary:      Effort: Pulmonary effort is normal.      Breath sounds: Normal breath sounds.   Abdominal:      General: There is no distension.      Palpations: Abdomen is soft.      Tenderness: There is no abdominal tenderness.   Musculoskeletal:      Cervical back: Normal range of motion.      Comments: Right leg dressings are in place.  Wound vac in place. She has palpable pulses bilateral   Skin:     General: Skin is warm.   Neurological:      General: No focal deficit present.      Mental Status: She is alert and oriented to person, place, and time.         Diagnostic Data:  Lab Results (last 24 hours)       Procedure Component Value Units Date/Time    Extra Tubes [[id number]] Collected: 01/12/25 1046    Specimen: Blood, Venous Line Updated: 01/12/25 1100    Narrative:      The following orders were created for panel order Extra Tubes.  Procedure                               Abnormality         Status                     ---------                               -----------         ------                     Gold Top - SST[[id number]]                                   Final result               Green Top (Gel)[[id number]]                                  Final result               Light Blue Top[[id number]]                                    Final result                 Please view results for these tests on the individual orders.    Gold Top - SST [[id number]] Collected: 01/12/25 1046    Specimen: Blood Updated: 01/12/25 1100     Extra Tube Hold for add-ons.     Comment: Auto resulted.       Green Top (Gel) [[id number]] Collected: 01/12/25 1046    Specimen: Blood Updated: 01/12/25 1100     Extra Tube Hold for add-ons.     Comment: Auto resulted.       Light Blue Top [[id number]] Collected: 01/12/25 1046    Specimen: Blood Updated: 01/12/25 1100     Extra Tube Hold for add-ons.     Comment: Auto resulted       CBC & Differential [[id number]]  (Abnormal) Collected: 01/12/25 1046    Specimen: Blood Updated: 01/12/25 1053    Narrative:      The following orders were created for panel order CBC & Differential.  Procedure                               Abnormality         Status                     ---------                               -----------         ------                     CBC Auto Differential[[id number]]        Abnormal            Final result                 Please view results for these tests on the individual orders.    CBC Auto Differential [[id number]]  (Abnormal) Collected: 01/12/25 1046    Specimen: Blood Updated: 01/12/25 1053     WBC 9.80 10*3/mm3      RBC 3.14 10*6/mm3      Hemoglobin 9.9 g/dL      Hematocrit 30.8 %      MCV 98.1 fL      MCH 31.5 pg      MCHC 32.1 g/dL      RDW 17.6 %      RDW-SD 62.7 fl      MPV 8.3 fL      Platelets 378 10*3/mm3      Neutrophil % 61.5 %      Lymphocyte % 24.9 %      Monocyte % 8.2 %      Eosinophil % 3.8 %      Basophil % 0.7 %      Immature Grans % 0.9 %      Neutrophils, Absolute 6.03 10*3/mm3      Lymphocytes, Absolute 2.44 10*3/mm3      Monocytes, Absolute 0.80 10*3/mm3      Eosinophils, Absolute 0.37 10*3/mm3      Basophils, Absolute 0.07 10*3/mm3      Immature Grans, Absolute 0.09 10*3/mm3      nRBC 0.0 /100 WBC     POC Glucose Once [[id number]]  (Abnormal) Collected: 01/12/25 1014     Specimen: Blood Updated: 01/12/25 1016     Glucose 197 mg/dL      Comment: Serial Number: [id number]:  [id number]                Imaging Results (Last 24 Hours)       ** No results found for the last 24 hours. **              Assessment & Plan        This is a 54 y.o. female with:    Active and Resolved Problems  Active Hospital Problems    Diagnosis  POA    **Leg pain [M79.606]  Yes      Resolved Hospital Problems   No resolved problems to display.       Severe PAD status post right iliac thrombectomy, right iliofemoral popliteal thrombectomy, right femoral endarterectomy  Intractable right leg pain  -12/19 - right iliac thrombectomy.  12/20 - right iliofemoral popliteal thrombectomy, right femoral endarterectomy with patch, right lower extremity arteriogram and close right calf fasciotomy. 12/23 - emergent 4 compartment fasciotomies.  She has a wound vac in place, has not had dressing changed since 1/8, but has an appt at the wound clinic tomorrow.  -Returns with uncontrolled right leg pain.  She has palpable pulses.  Vascular surgery was consulted by the ED.  She is now willing to go to SNF.  Will have PT/OT reevaluate and make recommendations.  Oxycodone dose increased.  -Continue Eliquis  -Consult wound care    T2DM  -Resume Lantus  -SSI  -CCD  -Hypoglycemia protocol in place    Anemia  -Hgb 9.9 which is stable from when she was discharged  -Monitor    GERD  -PPI        VTE Prophylaxis:  No VTE prophylaxis order currently exists.        The patient desires to be as follows:    CODE STATUS:    Code Status (Patient has no pulse and is not breathing): CPR (Attempt to Resuscitate)  Medical Interventions (Patient has pulse or is breathing): Full Support        Sukh Saldivar, who can be contacted at [phone], is the designated person to make medical decisions on the patient's behalf if She is incapable of doing so. This was clarified with patient and/or next of kin on 1/12/2025 during the course of this  H&P.    Admission Status:  I believe this patient meets obs status.    Expected Length of Stay: 1 day    PDMP and Medication Dispenses via Sidebar reviewed and consistent with patient reported medications.    I discussed the patient's findings and my recommendations with patient.      Signature:     This document has been electronically signed by Brielle Alexander PA-C on January 12, 2025 13:15 EST   [facility] Hospitalist Team

## 2025-01-12 NOTE — ED PROVIDER NOTES
Subjective   History of Present Illness  Patient is a 54-year-old female presenting to the ED for right lower extremity pain.  Patient states she was recently admitted due to hyperglycemia, was noted to have ischemic limb in her right lower extremity, underwent multiple surgeries and fasciotomies, was supposed to be discharged here to a skilled rehab but she declined at that time and left AMA to go home 2 days ago.  Patient states she has been having increasing pain that her Oxy fives are no longer helping with and she is now willing to go to skilled rehab.  Patient has been getting around at home in a wheelchair, is having trouble with that now.  She reports pain in her right lower extremity from below her knee down into her toes.  She denies any new loss of sensation or decreased range of motion, fever or chills, or abdominal pain.  Patient was supposed to have a wound care appointment tomorrow however she cannot wait long enough due to the pain.        Review of Systems   Constitutional:  Negative for chills and fever.   Gastrointestinal:  Negative for abdominal pain.   Musculoskeletal:  Positive for arthralgias.   Skin:  Positive for wound.       Past Medical History:   Diagnosis Date    COPD (chronic obstructive pulmonary disease)     Low back pain     Migraine     Neck pain        Allergies   Allergen Reactions    Aspirin GI Intolerance    Ibuprofen Itching       Past Surgical History:   Procedure Laterality Date    FASCIOTOMY Right 12/23/2024    Procedure: FASCIOTOMY LEG;  Surgeon: Chris Delgado MD;  Location: [facility] MAIN OR;  Service: Vascular;  Laterality: Right;    FEMORAL THROMBECTOMY/EMBOLECTOMY Right 12/19/2024    Procedure: FEMORAL right iliofemoral thrombectomy, arteriogram;  Surgeon: Ghassan Celestin MD;  Location: [facility] HYBRID OR;  Service: Vascular;  Laterality: Right;    FEMORAL THROMBECTOMY/EMBOLECTOMY Right 12/20/2024    Procedure: Right femoral thrombectomy, right lower extremity  arteriogram and right lower leg facitomies;  Surgeon: Ghassan Celestin MD;  Location: [facility] HYBRID OR;  Service: Vascular;  Laterality: Right;    LUNG SURGERY         Family History   Problem Relation Age of Onset    Diabetes Mother     Cancer Paternal Uncle     Cancer Paternal Grandmother     COPD Paternal Grandmother     Diabetes Paternal Grandmother        Social History     Socioeconomic History    Marital status: Single   Tobacco Use    Smoking status: Every Day     Current packs/day: 0.50     Types: Cigarettes    Smokeless tobacco: Never   Vaping Use    Vaping status: Never Used   Substance and Sexual Activity    Alcohol use: Not Currently    Drug use: Never    Sexual activity: Defer           Objective   Physical Exam  Constitutional:       Appearance: Normal appearance.   HENT:      Head: Normocephalic and atraumatic.      Nose: Nose normal.      Mouth/Throat:      Mouth: Mucous membranes are moist.   Eyes:      Extraocular Movements: Extraocular movements intact.   Cardiovascular:      Rate and Rhythm: Normal rate and regular rhythm.      Pulses: Normal pulses.      Heart sounds: Normal heart sounds.   Pulmonary:      Effort: Pulmonary effort is normal.      Breath sounds: Normal breath sounds.   Abdominal:      General: Abdomen is flat. Bowel sounds are normal.      Palpations: Abdomen is soft.      Tenderness: There is no abdominal tenderness.   Musculoskeletal:      Cervical back: Normal range of motion.        Legs:       Comments: Wound VAC in place noted above.  Strong pulses detected with Doppler bilaterally.  No pallor or coolness to the touch compared to left leg.  Patient reports continuing decreased sensation and movement in right foot and toes, unchanged since surgeries.  She reports continuing pain and tenderness along right calf.  Compartments soft.   Skin:     General: Skin is warm and dry.      Capillary Refill: Capillary refill takes less than 2 seconds.   Neurological:      General:  "No focal deficit present.      Mental Status: She is alert and oriented to person, place, and time.   Psychiatric:         Mood and Affect: Mood normal.         Behavior: Behavior normal.         Procedures           ED Course  ED Course as of 01/12/25 1521   Sun Jan 12, 2025   1156 Spoke with Brielle TORIBIO with hospitalist group who agrees to admit patient. [EC]      ED Course User Index  [EC] Jackelyn Jane MALU BOYER-KATHARINE    /83   Pulse 89   Temp 98.8 °F (37.1 °C) (Oral)   Resp 18   Ht 162.6 cm (64\")   Wt 86.7 kg (191 lb 2.2 oz)   LMP  (LMP Unknown)   SpO2 100%   BMI 32.81 kg/m²   Labs Reviewed   CBC WITH AUTO DIFFERENTIAL - Abnormal; Notable for the following components:       Result Value    RBC 3.14 (*)     Hemoglobin 9.9 (*)     Hematocrit 30.8 (*)     MCV 98.1 (*)     RDW 17.6 (*)     RDW-SD 62.7 (*)     Immature Grans % 0.9 (*)     Immature Grans, Absolute 0.09 (*)     All other components within normal limits   POCT GLUCOSE FINGERSTICK - Abnormal; Notable for the following components:    Glucose 197 (*)     All other components within normal limits   POCT GLUCOSE FINGERSTICK   POCT GLUCOSE FINGERSTICK   POCT GLUCOSE FINGERSTICK   POCT GLUCOSE FINGERSTICK   CBC AND DIFFERENTIAL    Narrative:     The following orders were created for panel order CBC & Differential.  Procedure                               Abnormality         Status                     ---------                               -----------         ------                     CBC Auto Differential[[id number]]        Abnormal            Final result                 Please view results for these tests on the individual orders.   EXTRA TUBES    Narrative:     The following orders were created for panel order Extra Tubes.  Procedure                               Abnormality         Status                     ---------                               -----------         ------                     Gold Top - SST[[id number]]                                   " Final result               Green Top (Gel)[[id number]]                                  Final result               Light Blue Top[[id number]]                                   Final result                 Please view results for these tests on the individual orders.   GOLD TOP - SST   GREEN TOP   LIGHT BLUE TOP     Medications   sodium chloride 0.9 % flush 10 mL (10 mL Intravenous Given 1/12/25 1450)   sodium chloride 0.9 % flush 10 mL (has no administration in time range)   sodium chloride 0.9 % infusion 40 mL (has no administration in time range)   Potassium Replacement - Follow Nurse / BPA Driven Protocol (has no administration in time range)   Magnesium Standard Dose Replacement - Follow Nurse / BPA Driven Protocol (has no administration in time range)   Phosphorus Replacement - Follow Nurse / BPA Driven Protocol (has no administration in time range)   Calcium Replacement - Follow Nurse / BPA Driven Protocol (has no administration in time range)   acetaminophen (TYLENOL) tablet 650 mg (has no administration in time range)     Or   acetaminophen (TYLENOL) 160 MG/5ML oral solution 650 mg (has no administration in time range)     Or   acetaminophen (TYLENOL) suppository 650 mg (has no administration in time range)   sennosides-docusate (PERICOLACE) 8.6-50 MG per tablet 2 tablet (has no administration in time range)     And   polyethylene glycol (MIRALAX) packet 17 g (has no administration in time range)     And   bisacodyl (DULCOLAX) EC tablet 5 mg (has no administration in time range)     And   bisacodyl (DULCOLAX) suppository 10 mg (has no administration in time range)   ondansetron ODT (ZOFRAN-ODT) disintegrating tablet 4 mg (has no administration in time range)   oxyCODONE (ROXICODONE) immediate release tablet 5 mg (has no administration in time range)   oxyCODONE (ROXICODONE) immediate release tablet 10 mg (has no administration in time range)   insulin glargine (LANTUS, SEMGLEE) injection 20 Units (has no  administration in time range)   dextrose (GLUTOSE) oral gel 15 g (has no administration in time range)   dextrose (D50W) (25 g/50 mL) IV injection 25 g (has no administration in time range)   glucagon (GLUCAGEN) injection 1 mg (has no administration in time range)   insulin lispro (HUMALOG/ADMELOG) injection 2-9 Units (has no administration in time range)   apixaban (ELIQUIS) tablet 5 mg (5 mg Oral Given 1/12/25 1449)   HYDROmorphone (DILAUDID) injection 0.5 mg (0.5 mg Intravenous Given 1/12/25 1131)   ondansetron (ZOFRAN) injection 4 mg (4 mg Intravenous Given 1/12/25 1130)   HYDROmorphone (DILAUDID) injection 0.5 mg (0.5 mg Intravenous Given 1/12/25 1430)     No radiology results for the last day                                                     Medical Decision Making  Chart review: 1/10/25 Dr. Rajan hospitalist: Pt hospitalized for DKA, ARF/MARGOT, this resolved. During hospital stay patient noted to have abrupt change in vascular exam of RLE. Patient was started on IV heparin drip.  The patient was taken to the operating room for emergent surgery with a right iliac thrombectomy via Open groin incision.  This was done on 12/19/2024.  The patient returned to the operating room on 12/20/2024 for recurrent right lower extremity ischemia due to arterial thrombosis and underwent a right iliofemoral popliteal thrombectomy, right femoral endarterectomy with patch, right lower extremity arteriogram and close right calf fasciotomy.  IV heparin was continued and eventually transitioned to oral Eliquis per vascular surgery.  Wound care was initiated during hospital course and a wound VAC was ordered.  On 12/23/2024 the patient returned to the OR for emergent 4 compartment fasciotomies due to critical right leg ischemia.  All of this was managed by vascular surgery. She was to be discharged to SNF, patient refused and left AMA.     Patient presented to the ED for the above complaint.    Patient underwent the above exam  and evaluation.    While in the ED CBC was obtained to assess for worsening infection.  Patient's dressing was taken down and strong pulses were obtained via Doppler.  Patient's wound VAC in place working properly.  Right lower extremity showing no signs of worsening infection or decreased blood flow.  Spoke with Dr. Son vascular surgeon who states if we admit patient he will come see her.  Spoke with Brielle TORIBIO with hospitalist group who agrees to admit patient.  Patient was given IV pain medication.  Upon reevaluation patient is resting comfortably.  Discussed findings and plan with patient.  Educated her that the process of placing her in SNF now has to be restarted due to her leaving AMA so we will have to admit her to get the process going.  Patient voiced understanding, agreeable with dispo plan.    Labs were independently interpreted by myself and deemed remarkable for the following: WBC 9.8, hemoglobin 9.9, glucose 197.  Imaging considered and deemed unnecessary, patient afebrile, nontoxic in appearance, pulses detected, no pallor or other concern for ischemia in right lower extremity.    Appropriate PPE was worn during each patient encounter.      Problems Addressed:  Acute pain of right lower extremity: complicated acute illness or injury    Amount and/or Complexity of Data Reviewed  Labs: ordered.    Risk  Prescription drug management.  Decision regarding hospitalization.        Final diagnoses:   Acute pain of right lower extremity       ED Disposition  ED Disposition       ED Disposition   Decision to Admit    Condition   --    Comment   Level of Care: Med/Surg [1]   Admitting Physician: DAVID CRISTINA [[id number]]   Attending Physician: DAVID CRISTINA [[id number]]                 No follow-up provider specified.       Medication List        ASK your doctor about these medications      Accu-Chek Guide Me w/Device kit  Use 1 kit 1 (One) Time for 1 dose. Dx: E10.65  Ask about: Should I take this medication?                  Jane Hayden PA-C  01/12/25 1523

## 2025-01-12 NOTE — ED NOTES
Pt came in for right leg pain, pt currently have a wound vac in place. Noted woundvac working without leak. Pt states that she thinks her pain medication isn't working. Call light within reach

## 2025-01-13 PROBLEM — Z98.890 H/O FASCIOTOMY: Status: ACTIVE | Noted: 2025-01-13

## 2025-01-13 PROBLEM — L97.912 NON-PRESSURE ULCER OF LOWER EXTREMITY WITH FAT LAYER EXPOSED, RIGHT: Status: ACTIVE | Noted: 2025-01-13

## 2025-01-13 LAB
ANION GAP SERPL CALCULATED.3IONS-SCNC: 5.7 MMOL/L (ref 5–15)
BASOPHILS # BLD AUTO: 0.05 10*3/MM3 (ref 0–0.2)
BASOPHILS NFR BLD AUTO: 0.5 % (ref 0–1.5)
BUN SERPL-MCNC: 14 MG/DL (ref 6–20)
BUN/CREAT SERPL: 20.3 (ref 7–25)
CALCIUM SPEC-SCNC: 9 MG/DL (ref 8.6–10.5)
CHLORIDE SERPL-SCNC: 101 MMOL/L (ref 98–107)
CO2 SERPL-SCNC: 27.3 MMOL/L (ref 22–29)
CREAT SERPL-MCNC: 0.69 MG/DL (ref 0.57–1)
DEPRECATED RDW RBC AUTO: 60.6 FL (ref 37–54)
EGFRCR SERPLBLD CKD-EPI 2021: 103.9 ML/MIN/1.73
EOSINOPHIL # BLD AUTO: 0.35 10*3/MM3 (ref 0–0.4)
EOSINOPHIL NFR BLD AUTO: 3.8 % (ref 0.3–6.2)
ERYTHROCYTE [DISTWIDTH] IN BLOOD BY AUTOMATED COUNT: 17.2 % (ref 12.3–15.4)
GLUCOSE BLDC GLUCOMTR-MCNC: 141 MG/DL (ref 70–105)
GLUCOSE BLDC GLUCOMTR-MCNC: 149 MG/DL (ref 70–105)
GLUCOSE BLDC GLUCOMTR-MCNC: 210 MG/DL (ref 70–105)
GLUCOSE BLDC GLUCOMTR-MCNC: 228 MG/DL (ref 70–105)
GLUCOSE SERPL-MCNC: 165 MG/DL (ref 65–99)
HCT VFR BLD AUTO: 29.5 % (ref 34–46.6)
HGB BLD-MCNC: 9.7 G/DL (ref 12–15.9)
IMM GRANULOCYTES # BLD AUTO: 0.04 10*3/MM3 (ref 0–0.05)
IMM GRANULOCYTES NFR BLD AUTO: 0.4 % (ref 0–0.5)
LYMPHOCYTES # BLD AUTO: 2.65 10*3/MM3 (ref 0.7–3.1)
LYMPHOCYTES NFR BLD AUTO: 28.5 % (ref 19.6–45.3)
MAGNESIUM SERPL-MCNC: 1.7 MG/DL (ref 1.6–2.6)
MCH RBC QN AUTO: 31.9 PG (ref 26.6–33)
MCHC RBC AUTO-ENTMCNC: 32.9 G/DL (ref 31.5–35.7)
MCV RBC AUTO: 97 FL (ref 79–97)
MONOCYTES # BLD AUTO: 0.64 10*3/MM3 (ref 0.1–0.9)
MONOCYTES NFR BLD AUTO: 6.9 % (ref 5–12)
NEUTROPHILS NFR BLD AUTO: 5.56 10*3/MM3 (ref 1.7–7)
NEUTROPHILS NFR BLD AUTO: 59.9 % (ref 42.7–76)
NRBC BLD AUTO-RTO: 0 /100 WBC (ref 0–0.2)
PLATELET # BLD AUTO: 404 10*3/MM3 (ref 140–450)
PMV BLD AUTO: 8.3 FL (ref 6–12)
POTASSIUM SERPL-SCNC: 3.8 MMOL/L (ref 3.5–5.2)
RBC # BLD AUTO: 3.04 10*6/MM3 (ref 3.77–5.28)
SODIUM SERPL-SCNC: 134 MMOL/L (ref 136–145)
WBC NRBC COR # BLD AUTO: 9.29 10*3/MM3 (ref 3.4–10.8)

## 2025-01-13 PROCEDURE — 82948 REAGENT STRIP/BLOOD GLUCOSE: CPT

## 2025-01-13 PROCEDURE — 85025 COMPLETE CBC W/AUTO DIFF WBC: CPT

## 2025-01-13 PROCEDURE — 83735 ASSAY OF MAGNESIUM: CPT

## 2025-01-13 PROCEDURE — 63710000001 INSULIN GLARGINE PER 5 UNITS

## 2025-01-13 PROCEDURE — 25010000002 HYDROMORPHONE PER 4 MG: Performed by: FAMILY MEDICINE

## 2025-01-13 PROCEDURE — 96376 TX/PRO/DX INJ SAME DRUG ADON: CPT

## 2025-01-13 PROCEDURE — G0378 HOSPITAL OBSERVATION PER HR: HCPCS

## 2025-01-13 PROCEDURE — 99213 OFFICE O/P EST LOW 20 MIN: CPT | Performed by: NURSE PRACTITIONER

## 2025-01-13 PROCEDURE — 80048 BASIC METABOLIC PNL TOTAL CA: CPT

## 2025-01-13 PROCEDURE — 63710000001 INSULIN LISPRO (HUMAN) PER 5 UNITS

## 2025-01-13 RX ORDER — HYDROMORPHONE HYDROCHLORIDE 1 MG/ML
0.5 INJECTION, SOLUTION INTRAMUSCULAR; INTRAVENOUS; SUBCUTANEOUS
Status: DISCONTINUED | OUTPATIENT
Start: 2025-01-13 | End: 2025-01-14

## 2025-01-13 RX ADMIN — OXYCODONE 10 MG: 5 TABLET ORAL at 12:58

## 2025-01-13 RX ADMIN — INSULIN LISPRO 4 UNITS: 100 INJECTION, SOLUTION INTRAVENOUS; SUBCUTANEOUS at 12:58

## 2025-01-13 RX ADMIN — APIXABAN 5 MG: 5 TABLET, FILM COATED ORAL at 08:45

## 2025-01-13 RX ADMIN — APIXABAN 5 MG: 5 TABLET, FILM COATED ORAL at 20:06

## 2025-01-13 RX ADMIN — OXYCODONE 10 MG: 5 TABLET ORAL at 03:14

## 2025-01-13 RX ADMIN — Medication 10 ML: at 20:07

## 2025-01-13 RX ADMIN — Medication 10 ML: at 08:46

## 2025-01-13 RX ADMIN — INSULIN LISPRO 4 UNITS: 100 INJECTION, SOLUTION INTRAVENOUS; SUBCUTANEOUS at 22:45

## 2025-01-13 RX ADMIN — INSULIN GLARGINE 20 UNITS: 100 INJECTION, SOLUTION SUBCUTANEOUS at 22:10

## 2025-01-13 RX ADMIN — HYDROMORPHONE HYDROCHLORIDE 0.5 MG: 1 INJECTION, SOLUTION INTRAMUSCULAR; INTRAVENOUS; SUBCUTANEOUS at 22:10

## 2025-01-13 RX ADMIN — OXYCODONE 10 MG: 5 TABLET ORAL at 19:42

## 2025-01-13 RX ADMIN — OXYCODONE 10 MG: 5 TABLET ORAL at 08:45

## 2025-01-13 RX ADMIN — HYDROMORPHONE HYDROCHLORIDE 0.5 MG: 1 INJECTION, SOLUTION INTRAMUSCULAR; INTRAVENOUS; SUBCUTANEOUS at 18:02

## 2025-01-13 NOTE — OUTREACH NOTE
General Surgery Week 2 Survey      Flowsheet Row Responses   [facility] patient discharged from? [facility]   Does the patient have one of the following disease processes/diagnoses(primary or secondary)? General Surgery   Week 2 attempt successful? No   Unsuccessful attempts Attempt 1   Revoke Readmitted            [facility] - Registered Nurse  
Detail Level: Zone
Quality 265: Biopsy Follow-Up: Biopsy results reviewed, communicated, tracked, and documented
Detail Level: Detailed

## 2025-01-13 NOTE — NURSING NOTE
4-year-old female admitted to the hospital with leg pain.  Patient had just been discharged from the facility where she has a history significant for hypertension, diabetes and COPD.  She had had a previous prolonged hospital stay as she had right lower extremity ischemia.  She underwent thrombectomy as well as an iliofemoral and popliteal thrombectomy and R endarterectomy.  She also had fasciotomies which are open.  She has had a wound VAC to the fasciotomy sites.  She is seen today for replacement of the wound VAC.  She continues to complain of leg pain.    The dressings were gently removed.  Patient did have pain medication on board.  Heavy amounts of exudate noted on the old dressing and had saturated through the layers onto the bed linens.  The wounds were all irrigated well with normal saline.  No odor.  No warmth erythema induration noted.  There is some dry hard scabs lifting to the foot and there are multiple superficial areas open to the calf as well as to the shin that are draining.  The wound bases to the wounds are full-thickness.  They are pink and they are clean.    A silicone fenestrated sheet was applied to the open wounds.  Calcium alginate dressings were applied to the more superficial blisters to the shin.  The eschar was left intact.  Black foam was applied to the medial lateral wound.  The wounds were Bucky suction was set at 125 continuous.  Patient complains of pain however she did quite well throughout the procedure.  She was then wrapped with Kerlix and two 4 inch Ace wrap slightly from her base of her toes to just below her knee.    Will continue to follow while in the hospital

## 2025-01-13 NOTE — PLAN OF CARE
Goal Outcome Evaluation:  Plan of Care Reviewed With: patient                           Problem: Adult Inpatient Plan of Care  Goal: Plan of Care Review  1/12/2025 1945 by Xin Avila, RN  Outcome: Progressing  1/12/2025 1944 by Xin Avila, RN  Outcome: Progressing  Flowsheets (Taken 1/12/2025 1944)  Plan of Care Reviewed With: patient

## 2025-01-13 NOTE — DISCHARGE PLACEMENT REQUEST
"Janet Alexander \"BRYANNA\" (54 y.o. Female)       Date of Birth   1970    Social Security Number       Address   [address] [place] [address] [place] [address]    Home Phone   [phone]    MRN   [id number]       Islam   Uatsdin    Marital Status   Single                            Admission Date   1/12/25    Admission Type   Emergency    Admitting Provider   Harvey Espinal MD    Attending Provider   Stiven Hernandez DO    Department, Room/Bed   [facility] SURGICAL INPATIENT, 4122/1       Discharge Date       Discharge Disposition       Discharge Destination                                 Attending Provider: Stiven Hernandez DO    Allergies: Aspirin, Ibuprofen    Isolation: None   Infection: MRSA No Isolation this Admit (12/19/24)   Code Status: CPR    Ht: 162.6 cm (64\")   Wt: 86.7 kg (191 lb 2.2 oz)    Admission Cmt: None   Principal Problem: Leg pain [M79.606]                   Active Insurance as of 1/12/2025       Primary Coverage       Payor Plan Insurance Group Employer/Plan Group    [id number]       Payor Plan Address Payor Plan Phone Number Payor Plan Fax Number Effective Dates    MAIL STOP:   9/1/2021 - None Entered    [address] [address]       [facility] [id number]         Subscriber Name Subscriber Birth Date Member ID       JANET ALEXANDER 1970 [id number]                     Emergency Contacts        (Rel.) Home Phone Work Phone Mobile Phone    Joya Saldivarhan (Son) -- -- [phone]    YarielRohit galindo (Son) -- -- [phone]    KYAW HU (Mother) [phone] -- [phone]                "

## 2025-01-13 NOTE — PROGRESS NOTES
[facility] MEDICINE SERVICE  DAILY PROGRESS NOTE    NAME: Yarelis Jackson  : 1970  MRN: [id number]      LOS: 0 days     PROVIDER OF SERVICE: Stiven Hernandez DO    Chief Complaint: Leg pain    Subjective:     Interval History:  History taken from: patient    Patient still having uncontrolled pain in right leg; says she can barely walk on it; denies chest pain or SOB; now she is willing to go to [facility]         Review of Systems:   Review of Systems    Objective:     Vital Signs  Temp:  [98.1 °F (36.7 °C)-98.5 °F (36.9 °C)] 98.5 °F (36.9 °C)  Heart Rate:  [78-90] 80  Resp:  [12-20] 20  BP: (130-166)/(78-85) 137/82   Body mass index is 32.81 kg/m².    Physical Exam  Physical Exam  General: Sitting up in bed; NAD  CV: RRR, S1-S2  Lungs:  CTA bilaterally  Abdomen: soft, NT  MS:  right LE with large open wound; being dressed by wound care RN       Diagnostic Data    Results from last 7 days   Lab Units 25  0039 25  1046 01/10/25  0456   WBC 10*3/mm3 9.29   < > 8.00   HEMOGLOBIN g/dL 9.7*   < > 9.8*   HEMATOCRIT % 29.5*   < > 30.0*   PLATELETS 10*3/mm3 404   < > 384   GLUCOSE mg/dL 165*  --  255*   CREATININE mg/dL 0.69  --  0.61   BUN mg/dL 14  --  18   SODIUM mmol/L 134*  --  133*   POTASSIUM mmol/L 3.8  --  3.6   AST (SGOT) U/L  --   --  23   ALT (SGPT) U/L  --   --  16   ALK PHOS U/L  --   --  116   BILIRUBIN mg/dL  --   --  0.3   ANION GAP mmol/L 5.7  --  11.4    < > = values in this interval not displayed.       No radiology results for the last day      I reviewed the patient's new clinical results.    Assessment/Plan:     Active and Resolved Problems  Active Hospital Problems    Diagnosis  POA    **Leg pain [M79.606]  Yes    H/O fasciotomy [Z98.890]  Not Applicable    Non-pressure ulcer of lower extremity with fat layer exposed, right [L97.240]  Unknown      Resolved Hospital Problems   No resolved problems to display.       Severe PAD status post right iliac thrombectomy, right  iliofemoral popliteal thrombectomy, right femoral endarterectomy  Intractable right leg pain  -12/19 - right iliac thrombectomy.  12/20 - right iliofemoral popliteal thrombectomy, right femoral endarterectomy with patch, right lower extremity arteriogram and close right calf fasciotomy. 12/23 - emergent 4 compartment fasciotomies.  She has a wound vac in place, has not had dressing changed since 1/8, but did have appointment in wound clinic today   -seen by Vascular surgery who recommends no surgical intervention  - wound redressed today; continue wound vace  -pain control remains an issue; increase oxycodone to 15 mg q4h prn; prn IV dilaudid   -Continue Eliquis  -appreciate wound care assistance  - patient says she is now willing to go to [facility] at d/c      T2DM  -continue Lantus and SSI  -monitor blood sugars and adjust doses as needed      Anemia  -Hgb 9.9 which is stable from when she was discharged; 9. Today   -Monitor     GERD  -PPI       VTE Prophylaxis:  Pharmacologic VTE prophylaxis orders are present.             Disposition Planning: To [facility] as early as tomorrow if pain controlled and arrangements made       Code Status and Medical Interventions: CPR (Attempt to Resuscitate); Full Support   Ordered at: 01/12/25 1238     Code Status (Patient has no pulse and is not breathing):    CPR (Attempt to Resuscitate)     Medical Interventions (Patient has pulse or is breathing):    Full Support       Signature: Electronically signed by Stiven Hernandez DO, 01/13/25, 15:50 EST.  [facility] [place] Hospitalist Team

## 2025-01-13 NOTE — PROGRESS NOTES
[facility] Vascular Surgery Progress Note    Name: Yarelis Jackson ADMIT: 2025   : 1970  PCP: Provider, No Known    MRN: [id number] LOS: 0 days   AGE/SEX: 54 y.o. female  ROOM: [address] [place] [address]    CC: Right leg wounds    Subjective     Patient resting in bed.  She is without any new complaint this morning.  Still with some numbness to her right foot and foot drop.    Objective     Scheduled Medications:   apixaban, 5 mg, Oral, Q12H  insulin glargine, 20 Units, Subcutaneous, Nightly  insulin lispro, 2-9 Units, Subcutaneous, 4x Daily AC & at Bedtime  sodium chloride, 10 mL, Intravenous, Q12H        Active Infusions:       As Needed Medications:    acetaminophen **OR** acetaminophen **OR** acetaminophen    senna-docusate sodium **AND** polyethylene glycol **AND** bisacodyl **AND** bisacodyl    Calcium Replacement - Follow Nurse / BPA Driven Protocol    dextrose    dextrose    glucagon (human recombinant)    Magnesium Standard Dose Replacement - Follow Nurse / BPA Driven Protocol    ondansetron ODT    oxyCODONE    oxyCODONE    Phosphorus Replacement - Follow Nurse / BPA Driven Protocol    Potassium Replacement - Follow Nurse / BPA Driven Protocol    sodium chloride    sodium chloride    Vital Signs  Vitals:    25 0748   BP: 138/78   Pulse: 85   Resp: 20   Temp: 98.3 °F (36.8 °C)   SpO2: 96%      Body mass index is 32.81 kg/m².     Physical Exam:  NAD, alert and oriented  RRR  Lungs clear  Abd soft, benign  Vascular: Palpable bilateral DP pulses  Skin: Right groin incision with staples, CDI healing well  Right leg wounds with dressings in place    Results Review:     CBC    Results from last 7 days   Lab Units 25  0039 25  1046 01/10/25  0456 25  0613 25  0538 25  0446   WBC 10*3/mm3 9.29 9.80 8.00 7.37 7.31 8.79   HEMOGLOBIN g/dL 9.7* 9.9* 9.8* 9.8* 9.6* 9.5*   PLATELETS 10*3/mm3 404 378 384 369 352 367     BMP   Results from last 7 days   Lab Units  01/13/25  0039 01/10/25  0456 01/09/25  0613 01/08/25  1654 01/08/25  0538 01/07/25  0446   SODIUM mmol/L 134* 133* 134*  --  137 135*   POTASSIUM mmol/L 3.8 3.6 4.0 4.4 3.6 3.8   CHLORIDE mmol/L 101 98 101  --  104 101   CO2 mmol/L 27.3 23.6 25.1  --  22.8 23.2   BUN mg/dL 14 18 15  --  14 14   CREATININE mg/dL 0.69 0.61 0.60  --  0.66 0.68   GLUCOSE mg/dL 165* 255* 169*  --  154* 168*   MAGNESIUM mg/dL 1.7 1.6 1.6  --  1.7 1.7   PHOSPHORUS mg/dL  --  3.4 3.6  --  3.6 3.0     HbA1C   Lab Results   Component Value Date    HGBA1C 15.80 (H) 12/18/2024     Infection     Radiology(recent) No radiology results for the last day    VTE Prophylaxis:  Pharmacologic VTE prophylaxis orders are present.         Problems:    Active Hospital Problems:  Active Hospital Problems    Diagnosis  POA    **Leg pain [M79.606]  Yes      Resolved Hospital Problems   No resolved problems to display.        Assessment & Plan   Assessment / Plan     Leg pain      54-year-old female who underwent right leg thrombectomy and right iliofemoral/popliteal thrombectomy with endarterectomy and right leg fasciotomies in December    Right leg wounds okay to have wound vacs replaced by wound nurse today  Right groin incision staples removed at bedside without issue  No plans for an acute vascular surgery intervention  Continue Eliquis  Okay to increase activity with PT/OT  Will likely need rehab  Medical management per primary team        NICHOLAS Castro  [facility] Vascular Surgery  01/13/25   O: [phone]  F: [phone]

## 2025-01-13 NOTE — PLAN OF CARE
Goal Outcome Evaluation:         Patient able to make needs known. Pain managed per MAR. Wound VAC placed by wound care nurse. Plan of care ongoing.

## 2025-01-13 NOTE — CONSULTS
Diabetes Education    Patient Name:  Yarelis Jackson  YOB: 1970  MRN: [id number]  Admit Date:  1/12/2025        MD consult for newly diagnosed. Patient was seen and educated on 12/30/2024 for newly diagnosed. Patient has the Accu-chek Guide Me glucometer that is 2 weeks old and has been checking her blood sugar 4-5X a day with results around 150. At home patient stated that she takes Lantus 22 units at bedtime and Humalog 9 units with sliding scale before meals. Patient stated she has not had any low blood sugars. At home patient stated she drinks ice water. Patient just had wound vac applied to right leg. Discussed with patient the importance of exercise in blood sugar control and demonstrated chair exercises. Patient has no further questions or concerns related to diabetes at this time.      Electronically signed by:  Gisele Rojas RN  01/13/25 13:25 EST

## 2025-01-13 NOTE — PLAN OF CARE
Goal Outcome Evaluation: Pt pain managed per MAR. RLE elevated up on pillow. Dried blood noted on dressing. Wound care to see pt. Plan of care ongoing,

## 2025-01-13 NOTE — PAYOR COMM NOTE
"This is dc notification for Janet Alexander.  Dc'd on 1/10/25 routine home.     AUTHORIZATION: [id number]   THANK YOU.      Courtney Gonsalves RN, CCM  Utilization Nurse  [facility]   [address] [place] [address]   [phone]  Fx [phone]       Janet Alexander \"BRYANNA\" (54 y.o. Female)       Date of Birth   1970    Social Security Number       Address   [address] [place] [address] [place] [address]    Home Phone   [phone]    MRN   [id number]       Buddhist   Spiritism    Marital Status   Single                            Admission Date   12/18/24    Admission Type   Emergency    Admitting Provider   Shamar Rodriguez DO    Attending Provider       Department, Room/Bed   [facility] 2F, 2213/1       Discharge Date   1/10/2025    Discharge Disposition   Home or Self Care    Discharge Destination                                 Attending Provider: (none)   Allergies: Aspirin, Ibuprofen    Isolation: None   Infection: MRSA No Isolation this Admit (12/19/24)   Code Status: CPR    Ht: 162.6 cm (64\")   Wt: 96.7 kg (213 lb 3 oz)    Admission Cmt: None   Principal Problem: DKA (diabetic ketoacidosis) [E11.10]                   Active Insurance as of 12/18/2024       Primary Coverage       Payor Plan Insurance Group Employer/Plan Group    [id number]       Payor Plan Address Payor Plan Phone Number Payor Plan Fax Number Effective Dates    MAIL STOP:   9/1/2021 - None Entered    [address] [address]       [facility] [address]         Subscriber Name Subscriber Birth Date Member ID       JANET ALEXANDER 1970 [id number]                     Emergency Contacts        (Rel.) Home Phone Work Phone Mobile Phone    Joya Saldivarhan (Son) -- -- [phone]    Rohit Saldivar (Son) -- -- [phone]    KYAW HU (Mother) [phone] -- [phone]                 Discharge Summary        Yany Rajan MD at 01/10/25 1436  "                      [facility] Medicine Services  Discharge Summary    Date of Service: 1/10/2025  Patient Name: Yarelis Jackson  : 1970  MRN: [id number]    Date of Admission: 2024  Discharge Diagnosis:     DKA (diabetic ketoacidosis)  Acute occlusive thrombus of the right iliac artery with limb ischemia  Acute renal failure/acute kidney injury  Cutaneous candidiasis of groin-resolved  Hypertension  GERD    Date of Discharge: 1/10/2025  Primary Care Physician: Katie Duran PA              Hospital Course         Hospital Course:    This patient is a 54-year-old lady who presented in diabetic ketoacidosis.  She was started on IV insulin drip as well as aggressive IV fluids.  Anion gap was monitored and electrolytes replaced as needed.  Diabetic ketoacidosis eventually resolved and she was converted to subcutaneous insulin.  She was comanaged by endocrinology.  She was also found to be in acute renal failure/acute kidney injury which was treated with IV fluids and required emergent hemodialysis.  This gradually improved and eventually resolved.  She was comanaged by nephrology.  During the hospital course there was an abrupt change of the vascular examination of the right lower extremity.  Vascular surgery was consulted and ordered arterial Dopplers of the right lower extremity.  It showed that the right femoral, deep femoral and popliteal arteries were patent, with very low amplitude monophasic signals, suggesting severe inflow stenosis or occlusion. The external iliac artery also appeared patent. No measurable Doppler flow in the anterior and posterior tibial and peroneal arteries.  Patient was started on IV heparin drip.  The patient was taken to the operating room for emergent surgery with a right iliac thrombectomy via Open groin incision.  This was done on 2024.  The patient returned to the operating room on 2024 for recurrent right lower extremity ischemia due to arterial  "thrombosis and underwent a right iliofemoral popliteal thrombectomy, right femoral endarterectomy with patch, right lower extremity arteriogram and close right calf fasciotomy.  IV heparin was continued and eventually transitioned to oral Eliquis per vascular surgery.  Wound care was initiated during hospital course and a wound VAC was ordered.  On 12/23/2024 the patient returned to the OR for emergent 4 compartment fasciotomies due to critical right leg ischemia.  All of this was managed by vascular surgery.  She started to improve and has continued to do well.  She had a prolonged hospital course due to the multiple medical comorbidities but as already mentioned has gradually improved.  She was seen by physical therapy and Occupational Therapy and was recommended to be discharged to a skilled nursing facility which she adamantly refused.  Patient does not have a PCP and case management could not set up home health immediately at this time due to lack of a provider to manage the home health orders outpatient.  Case management has set up a new PCP appointment for 1/17/2025 at 3:30 PM and will liaise with her PCP's office to set up home health for the patient at that time and in the meantime case management here has set up this patient in the wound care clinic to be seen on 01/13/2025.  The patient is going home with a wound VAC per vascular surgery's recommendation.  The patient's son and caregiver has been taught by nursing on how \"to take care of the wound in the interim until home health care is started.  Also as already mentioned the patient will go to wound care clinic on 1/13/2023 which is in about 3 days.  The patient had threatened to leave AGAINST MEDICAL ADVICE adamantly refusing to be discharged to SNF and so these alternate arrangements had to be made for the patient.  The patient is being discharged home today.  Case management has been actively involved in this patient's discharge planning with " frequent updates being made available to me.      DISCHARGE Follow Up Recommendations:-Follow-up PCP in 1 week, follow-up with vascular surgery 1 week, follow-up with nephrology in 1 week.  Follow-up with endocrinology in 1 week.      Day of Discharge     Vital Signs:  Temp:  [97.6 °F (36.4 °C)-98 °F (36.7 °C)] 97.6 °F (36.4 °C)  Heart Rate:  [83-93] 87  Resp:  [13-22] 18  BP: (125-164)/(77-88) 129/77    Physical Exam:  Physical Exam  Constitutional:       Appearance: Normal appearance.   HENT:      Head: Normocephalic and atraumatic.      Nose: Nose normal.      Mouth/Throat:      Mouth: Mucous membranes are moist.   Eyes:      Extraocular Movements: Extraocular movements intact.      Pupils: Pupils are equal, round, and reactive to light.   Cardiovascular:      Rate and Rhythm: Normal rate and regular rhythm.   Pulmonary:      Effort: Pulmonary effort is normal.      Breath sounds: Normal breath sounds.   Abdominal:      General: Abdomen is flat. Bowel sounds are normal.      Palpations: Abdomen is soft.   Musculoskeletal:      Cervical back: Normal range of motion and neck supple.   Skin:     General: Skin is warm and dry.   Neurological:      General: No focal deficit present.      Mental Status: She is alert and oriented to person, place, and time.   Psychiatric:         Mood and Affect: Mood normal.         Behavior: Behavior normal.         Thought Content: Thought content normal.         Judgment: Judgment normal.           Pertinent  and/or Most Recent Results     LAB RESULTS:      Lab 01/10/25  0456 01/09/25  0613 01/08/25  0538 01/07/25  0446 01/06/25  0437   WBC 8.00 7.37 7.31 8.79 9.22   HEMOGLOBIN 9.8* 9.8* 9.6* 9.5* 9.2*   HEMATOCRIT 30.0* 31.1* 31.4* 29.0* 28.9*   PLATELETS 384 369 352 367 356   NEUTROS ABS 3.95 3.91 3.88 5.05 5.68   IMMATURE GRANS (ABS) 0.06* 0.04 0.04 0.07* 0.07*   LYMPHS ABS 2.97 2.48 2.47 2.59 2.51   MONOS ABS 0.57 0.57 0.59 0.69 0.65   EOS ABS 0.39 0.29 0.29 0.33 0.25   MCV  98.0* 97.2* 99.1* 96.7 98.0*         Lab 01/10/25  0456 01/09/25  0613 01/08/25  1654 01/08/25  0538 01/07/25  0446 01/06/25  0437   SODIUM 133* 134*  --  137 135* 137   POTASSIUM 3.6 4.0 4.4 3.6 3.8 3.7   CHLORIDE 98 101  --  104 101 103   CO2 23.6 25.1  --  22.8 23.2 25.5   ANION GAP 11.4 7.9  --  10.2 10.8 8.5   BUN 18 15  --  14 14 15   CREATININE 0.61 0.60  --  0.66 0.68 0.76   EGFR 107.1 107.5  --  105.0 104.3 93.8   GLUCOSE 255* 169*  --  154* 168* 139*   CALCIUM 9.0 9.4  --  9.0 8.7 8.7   MAGNESIUM 1.6 1.6  --  1.7 1.7 1.8   PHOSPHORUS 3.4 3.6  --  3.6 3.0 3.1         Lab 01/10/25  0456 01/09/25  0613 01/08/25  0538 01/07/25  0446 01/06/25  0437   TOTAL PROTEIN 7.1 7.1 6.9 7.0 7.0   ALBUMIN 3.1* 3.2* 3.0* 3.2* 3.1*   GLOBULIN 4.0 3.9 3.9 3.8 3.9   ALT (SGPT) 16 21 22 26 30   AST (SGOT) 23 23 21 33* 24   BILIRUBIN 0.3 0.4 0.3 0.3 0.4   ALK PHOS 116 105 106 118* 106                     Brief Urine Lab Results  (Last result in the past 365 days)        Color   Clarity   Blood   Leuk Est   Nitrite   Protein   CREAT   Urine HCG        12/18/24 0508 Yellow   Clear   Moderate (2+)   Negative   Negative   100 mg/dL (2+)                 Microbiology Results (last 10 days)       ** No results found for the last 240 hours. **            XR Chest 1 View    Result Date: 12/22/2024  Impression: 1.Tubes and lines appear in satisfactory positions, as above. 2.Mild bibasilar opacities which could represent atelectasis or infiltrate. 3.Advanced emphysema. Electronically Signed: Sam Haynes  12/22/2024 7:07 AM EST  Workstation ID: [id number]    XR Chest 1 View    Result Date: 12/21/2024  Impression: Impression: 1. No tube or line change 2. No focal airspace consolidation or other acute process. Electronically Signed: Julio Thorpe MD  12/21/2024 9:54 AM EST  Workstation ID: [id number]    XR Chest 1 View    Result Date: 12/20/2024  Impression: Impression: Stable chest demonstrating COPD and postoperative changes with no acute  cardiopulmonary process demonstrated Electronically Signed: Shon Ponce  12/20/2024 7:19 AM EST  Workstation ID: [id number]    XR Chest 1 View    Result Date: 12/19/2024  Impression: Impression: Interval placement of various support lines as noted. No acute process of the lung fields. Electronically Signed: Emilia Orellana MD  12/19/2024 12:27 PM EST  Workstation ID: [id number]    US Renal Bilateral    Result Date: 12/19/2024  Impression: Impression: Unremarkable renal ultrasound. Electronically Signed: Luciana Guerra MD  12/19/2024 11:22 AM EST  Workstation ID: [id number]    US Pancreas    Result Date: 12/19/2024  Impression: Impression: Negative exam. Electronically Signed: Emilia Orellana MD  12/19/2024 11:05 AM EST  Workstation ID: [id number]    XR Abdomen KUB    Result Date: 12/19/2024  Impression: Impression: Feeding tube is in the stomach. Electronically Signed: Maxwell Carrion MD  12/19/2024 1:11 AM EST  Workstation ID: [id number]    US Liver    Result Date: 12/18/2024  Impression: Impression: Hepatomegaly with steatosis. Electronically Signed: Alea Subramanian MD  12/18/2024 9:54 AM EST  Workstation ID: [id number]    CT Head Without Contrast    Result Date: 12/18/2024  Impression: Impression: 1. No acute intracranial findings. 2. Features suggestive of mild chronic microvascular disease. Electronically Signed: Mariah Keenan MD  12/18/2024 8:09 AM EST  Workstation ID: [id number]    XR Chest 1 View    Result Date: 12/18/2024  Impression: Impression: 1. No acute process. 2. Severe emphysema. Electronically Signed: Gaurav Miles MD  12/18/2024 7:53 AM EST  Workstation ID: [id number]     Results for orders placed during the hospital encounter of 12/18/24    Duplex Lower Extremity Art / Grafts - Right CAR    Interpretation Summary    Limited study due to body habitus and current dressing site.  Patent external iliac and femoral-popliteal arterial flow with low flow noted below the common femoral.  Common femoral artery poorly  visualized.  Cannot rule out occlusion of the common femoral artery.      Results for orders placed during the hospital encounter of 12/18/24    Duplex Lower Extremity Art / Grafts - Right CAR    Interpretation Summary    Limited study due to body habitus and current dressing site.  Patent external iliac and femoral-popliteal arterial flow with low flow noted below the common femoral.  Common femoral artery poorly visualized.  Cannot rule out occlusion of the common femoral artery.      Results for orders placed during the hospital encounter of 12/18/24    Adult Transthoracic Echo Complete w/ Color, Spectral and Contrast if Necessary Per Protocol    Interpretation Summary    Left ventricular systolic function is normal. Left ventricular ejection fraction appears to be 56 - 60%.    Left ventricular diastolic function was normal.    No significant valvular disease.    Estimated right ventricular systolic pressure from tricuspid regurgitation is normal (<35 mmHg).    Electronically signed by Raúl Madrid MD, 12/20/24, 1:26 PM EST.      Labs Pending at Discharge:  Pending Results       None            Procedures Performed  Procedure(s):  FASCIOTOMY LEG         Consults:   Consults       Date and Time Order Name Status Description    12/27/2024 10:17 AM Inpatient Hospitalist Consult      12/21/2024  3:09 PM Hematology & Oncology Inpatient Consult      12/21/2024  3:09 PM Inpatient Intensivist Consult      12/19/2024  3:41 PM Inpatient Endocrinology Consult      12/19/2024  2:09 PM Inpatient Vascular Surgery Consult Completed     12/18/2024  8:32 PM Inpatient Nephrology Consult Completed     12/18/2024  8:24 AM Inpatient Intensivist Consult                Discharge Details        Discharge Medications        New Medications        Instructions Start Date   Accu-Chek Guide Me w/Device kit   1 kit, Not Applicable, Once, Dx: E10.65      Accu-Chek Guide Test test strip  Generic drug: glucose blood    1 each, Other, 4 Times Daily Before Meals & Nightly, Dx: E10.65. Use as instructed      Accu-Chek Softclix Lancets lancets   1 each, Other, 4 Times Daily Before Meals & Nightly, Dx: E10.65. Use as instructed      apixaban 5 MG tablet tablet  Commonly known as: ELIQUIS   5 mg, Oral, Every 12 Hours Scheduled      Insulin Glargine 100 UNIT/ML injection pen  Commonly known as: LANTUS SOLOSTAR   20 Units, Subcutaneous, Nightly      Insulin Lispro (1 Unit Dial) 100 UNIT/ML solution pen-injector  Commonly known as: HumaLOG KwikPen   8 Units, Subcutaneous, 3 Times Daily Before Meals      oxyCODONE 5 MG immediate release tablet  Commonly known as: ROXICODONE   5 mg, Oral, Every 4 Hours PRN      pantoprazole 40 MG EC tablet  Commonly known as: PROTONIX   40 mg, Oral, Every Early Morning      Pen Needles 32G X 4 MM misc   1 each, Not Applicable, 4 Times Daily Before Meals & Nightly               Allergies   Allergen Reactions    Aspirin GI Intolerance    Ibuprofen Itching         Discharge Disposition:   Home or Self Care    Diet: Cardiac diabetic      Discharge Activity:   Activity Instructions       Activity as Tolerated                CODE STATUS:  Code Status and Medical Interventions: CPR (Attempt to Resuscitate); Full Support   Ordered at: 12/18/24 0830     Code Status (Patient has no pulse and is not breathing):    CPR (Attempt to Resuscitate)     Medical Interventions (Patient has pulse or is breathing):    Full Support         Future Appointments   Date Time Provider Department Center   1/17/2025  3:00 PM Rohit Schrader DO [facility]   1/28/2025  9:30 AM Ghassan Celestin MD [facility]       Additional Instructions for the Follow-ups that You Need to Schedule       Ambulatory Referral to Wound Clinic   As directed      Discharge Follow-up with PCP   As directed       Currently Documented PCP:    Katie Duran PA    PCP Phone Number:    [phone]     Follow Up Details: 1 week        Discharge  Follow-up with Specified Provider: Endocrinology; 1 Week   As directed      To: Endocrinology   Follow Up: 1 Week        Discharge Follow-up with Specified Provider: Nephrology; 2 Weeks   As directed      To: Nephrology   Follow Up: 2 Weeks        Discharge Follow-up with Specified Provider: Vascularu surgery; 1 Week   As directed      To: Vascularu surgery   Follow Up: 1 Week                Time spent on Discharge including face to face service: Greater than 30 minutes    Signature: Electronically signed by Shakira Rajan MD, 01/10/25, 14:37 EST.  [facility], operated by [facility] Hospitalist Team     Electronically signed by Shakira Rajan MD at 01/10/25 1548       Discharge Order (From admission, onward)       Start     Ordered    01/10/25 1403  Discharge patient  Once        Expected Discharge Date: 01/10/25   Expected Discharge Time: Morning   Discharge Disposition: Home or Self Care   Physician of Record for Attribution - Please select from Treatment Team: SHAKIRA RAJAN [[id number]]   Review needed by CMO to determine Physician of Record: No      Question Answer Comment   Physician of Record for Attribution - Please select from Treatment Team SHAKIRA RAJAN    Review needed by CMO to determine Physician of Record No        01/10/25 1401

## 2025-01-13 NOTE — CASE MANAGEMENT/SOCIAL WORK
Discharge Planning Assessment   Cesar     Patient Name: Yarelis Jackson  MRN: [id number]  Today's Date: 1/13/2025    Admit Date: 1/12/2025    Plan: Accepted to [facility] skilled,   Pending PT OT eval. . Will need precert and PASRR  Patient has wound vac.   Discharge Needs Assessment       Row Name 01/13/25 1746       Living Environment    People in Home parent(s)    Name(s) of People in Home Ledy Laurent    Current Living Arrangements home    Potentially Unsafe Housing Conditions none    In the past 12 months has the electric, gas, oil, or water company threatened to shut off services in your home? No    Primary Care Provided by self    Provides Primary Care For no one    Family Caregiver if Needed parent(s)    Family Caregiver Names Ledy Laurent    Quality of Family Relationships helpful;involved;supportive    Able to Return to Prior Arrangements yes       Resource/Environmental Concerns    Resource/Environmental Concerns none    Transportation Concerns none       Transportation Needs    In the past 12 months, has lack of transportation kept you from medical appointments or from getting medications? no    In the past 12 months, has lack of transportation kept you from meetings, work, or from getting things needed for daily living? No       Food Insecurity    Within the past 12 months, you worried that your food would run out before you got the money to buy more. Never true    Within the past 12 months, the food you bought just didn't last and you didn't have money to get more. Never true       Transition Planning    Patient/Family Anticipates Transition to other (see comments)  snf    Patient/Family Anticipated Services at Transition skilled nursing    Transportation Anticipated family or friend will provide       Discharge Needs Assessment    Readmission Within the Last 30 Days no previous admission in last 30 days    Equipment Currently Used at Home wheelchair;walker, rolling;glucometer;wound care supplies     Concerns to be Addressed care coordination/care conferences;discharge planning    Do you want help finding or keeping work or a job? I do not need or want help    Do you want help with school or training? For example, starting or completing job training or getting a high school diploma, GED or equivalent No    Anticipated Changes Related to Illness none    Equipment Needed After Discharge none    Outpatient/Agency/Support Group Needs skilled nursing facility    Discharge Facility/Level of Care Needs nursing facility, skilled    Provided Post Acute Provider List? Yes    Post Acute Provider List Nursing Home    Delivered To Patient    Method of Delivery In person    Patient's Choice of Community Agency(s) [facility]                   Discharge Plan       Row Name 01/13/25 1749       Plan    Plan Accepted to [facility] snf skilled,   Pending PT OT eval. . Will need precert and PASRR  Patient has wound vac.    Patient/Family in Agreement with Plan yes    Plan Comments Patient lives at home with parents. . Patient reports she drives,  Family  will transport at discharge. Patient performs ADL. PCP and pharmacy confirmed. Denies financial assistance needs for medication and/or food. [place]f [place] due to complexity of wound with wound vac.  Patient accepted at [facility], pending OT PT eval, will need precert. and PASRR .                  Continued Care and Services - Admitted Since 1/12/2025       Destination       Service Provider Request Status Services Address Phone Fax Patient Preferred    [facility] [place] Accepted -- [address] [place] [address] [phone] --                  Expected Discharge Date and Time       Expected Discharge Date Expected Discharge Time    Jan 14, 2025            Demographic Summary       Row Name 01/13/25 8605       General Information    Admission Type observation    Arrived From emergency department    Referral  Source admission list    Reason for Consult discharge planning    Preferred Language English       Contact Information    Permission Granted to Share Info With                    Functional Status       Row Name 01/13/25 1745       Functional Status    Usual Activity Tolerance moderate    Current Activity Tolerance fair       Physical Activity    On average, how many days per week do you engage in moderate to strenuous exercise (like a brisk walk)? 0 days    On average, how many minutes do you engage in exercise at this level? 0 min    Number of minutes of exercise per week 0       Functional Status, IADL    Medications assistive person    Meal Preparation independent    Housekeeping independent    Laundry independent    Shopping assistive person    If for any reason you need help with day-to-day activities such as bathing, preparing meals, shopping, managing finances, etc., do you get the help you need? I get all the help I need       Mental Status    General Appearance WDL WDL       Mental Status Summary    Recent Changes in Mental Status/Cognitive Functioning no changes                      Vicki Astorga RN    SIPS 1  [email or web address]  Office [phone]  Cell [phone]

## 2025-01-14 LAB
ANION GAP SERPL CALCULATED.3IONS-SCNC: 8 MMOL/L (ref 5–15)
ANION GAP SERPL CALCULATED.3IONS-SCNC: 8.6 MMOL/L (ref 5–15)
BASOPHILS # BLD AUTO: 0.04 10*3/MM3 (ref 0–0.2)
BASOPHILS # BLD AUTO: 0.05 10*3/MM3 (ref 0–0.2)
BASOPHILS NFR BLD AUTO: 0.5 % (ref 0–1.5)
BASOPHILS NFR BLD AUTO: 0.5 % (ref 0–1.5)
BUN SERPL-MCNC: 13 MG/DL (ref 6–20)
BUN SERPL-MCNC: 14 MG/DL (ref 6–20)
BUN/CREAT SERPL: 23 (ref 7–25)
BUN/CREAT SERPL: 24.1 (ref 7–25)
CALCIUM SPEC-SCNC: 8.9 MG/DL (ref 8.6–10.5)
CALCIUM SPEC-SCNC: 9.4 MG/DL (ref 8.6–10.5)
CHLORIDE SERPL-SCNC: 98 MMOL/L (ref 98–107)
CHLORIDE SERPL-SCNC: 98 MMOL/L (ref 98–107)
CO2 SERPL-SCNC: 29.4 MMOL/L (ref 22–29)
CO2 SERPL-SCNC: 30 MMOL/L (ref 22–29)
CREAT SERPL-MCNC: 0.54 MG/DL (ref 0.57–1)
CREAT SERPL-MCNC: 0.61 MG/DL (ref 0.57–1)
DEPRECATED RDW RBC AUTO: 60 FL (ref 37–54)
DEPRECATED RDW RBC AUTO: 60.3 FL (ref 37–54)
EGFRCR SERPLBLD CKD-EPI 2021: 107.1 ML/MIN/1.73
EGFRCR SERPLBLD CKD-EPI 2021: 110.2 ML/MIN/1.73
EOSINOPHIL # BLD AUTO: 0.42 10*3/MM3 (ref 0–0.4)
EOSINOPHIL # BLD AUTO: 0.57 10*3/MM3 (ref 0–0.4)
EOSINOPHIL NFR BLD AUTO: 5.3 % (ref 0.3–6.2)
EOSINOPHIL NFR BLD AUTO: 5.6 % (ref 0.3–6.2)
ERYTHROCYTE [DISTWIDTH] IN BLOOD BY AUTOMATED COUNT: 16.5 % (ref 12.3–15.4)
ERYTHROCYTE [DISTWIDTH] IN BLOOD BY AUTOMATED COUNT: 16.7 % (ref 12.3–15.4)
GLUCOSE BLDC GLUCOMTR-MCNC: 142 MG/DL (ref 70–105)
GLUCOSE BLDC GLUCOMTR-MCNC: 177 MG/DL (ref 70–105)
GLUCOSE BLDC GLUCOMTR-MCNC: 196 MG/DL (ref 70–105)
GLUCOSE BLDC GLUCOMTR-MCNC: 215 MG/DL (ref 70–105)
GLUCOSE SERPL-MCNC: 120 MG/DL (ref 65–99)
GLUCOSE SERPL-MCNC: 162 MG/DL (ref 65–99)
HCT VFR BLD AUTO: 31.4 % (ref 34–46.6)
HCT VFR BLD AUTO: 32.5 % (ref 34–46.6)
HGB BLD-MCNC: 10.2 G/DL (ref 12–15.9)
HGB BLD-MCNC: 9.9 G/DL (ref 12–15.9)
IMM GRANULOCYTES # BLD AUTO: 0.04 10*3/MM3 (ref 0–0.05)
IMM GRANULOCYTES # BLD AUTO: 0.05 10*3/MM3 (ref 0–0.05)
IMM GRANULOCYTES NFR BLD AUTO: 0.5 % (ref 0–0.5)
IMM GRANULOCYTES NFR BLD AUTO: 0.5 % (ref 0–0.5)
LYMPHOCYTES # BLD AUTO: 2.52 10*3/MM3 (ref 0.7–3.1)
LYMPHOCYTES # BLD AUTO: 2.79 10*3/MM3 (ref 0.7–3.1)
LYMPHOCYTES NFR BLD AUTO: 27.2 % (ref 19.6–45.3)
LYMPHOCYTES NFR BLD AUTO: 31.6 % (ref 19.6–45.3)
MAGNESIUM SERPL-MCNC: 1.7 MG/DL (ref 1.6–2.6)
MAGNESIUM SERPL-MCNC: 2 MG/DL (ref 1.6–2.6)
MCH RBC QN AUTO: 30.8 PG (ref 26.6–33)
MCH RBC QN AUTO: 31 PG (ref 26.6–33)
MCHC RBC AUTO-ENTMCNC: 31.4 G/DL (ref 31.5–35.7)
MCHC RBC AUTO-ENTMCNC: 31.5 G/DL (ref 31.5–35.7)
MCV RBC AUTO: 97.8 FL (ref 79–97)
MCV RBC AUTO: 98.8 FL (ref 79–97)
MONOCYTES # BLD AUTO: 0.58 10*3/MM3 (ref 0.1–0.9)
MONOCYTES # BLD AUTO: 0.71 10*3/MM3 (ref 0.1–0.9)
MONOCYTES NFR BLD AUTO: 6.9 % (ref 5–12)
MONOCYTES NFR BLD AUTO: 7.3 % (ref 5–12)
NEUTROPHILS NFR BLD AUTO: 4.37 10*3/MM3 (ref 1.7–7)
NEUTROPHILS NFR BLD AUTO: 54.8 % (ref 42.7–76)
NEUTROPHILS NFR BLD AUTO: 59.3 % (ref 42.7–76)
NEUTROPHILS NFR BLD AUTO: 6.07 10*3/MM3 (ref 1.7–7)
NRBC BLD AUTO-RTO: 0 /100 WBC (ref 0–0.2)
NRBC BLD AUTO-RTO: 0 /100 WBC (ref 0–0.2)
PLATELET # BLD AUTO: 452 10*3/MM3 (ref 140–450)
PLATELET # BLD AUTO: 458 10*3/MM3 (ref 140–450)
PMV BLD AUTO: 8.2 FL (ref 6–12)
PMV BLD AUTO: 8.3 FL (ref 6–12)
POTASSIUM SERPL-SCNC: 3.4 MMOL/L (ref 3.5–5.2)
POTASSIUM SERPL-SCNC: 4.2 MMOL/L (ref 3.5–5.2)
RBC # BLD AUTO: 3.21 10*6/MM3 (ref 3.77–5.28)
RBC # BLD AUTO: 3.29 10*6/MM3 (ref 3.77–5.28)
SODIUM SERPL-SCNC: 136 MMOL/L (ref 136–145)
SODIUM SERPL-SCNC: 136 MMOL/L (ref 136–145)
WBC NRBC COR # BLD AUTO: 10.24 10*3/MM3 (ref 3.4–10.8)
WBC NRBC COR # BLD AUTO: 7.97 10*3/MM3 (ref 3.4–10.8)

## 2025-01-14 PROCEDURE — 80048 BASIC METABOLIC PNL TOTAL CA: CPT

## 2025-01-14 PROCEDURE — 63710000001 INSULIN GLARGINE PER 5 UNITS

## 2025-01-14 PROCEDURE — 83735 ASSAY OF MAGNESIUM: CPT

## 2025-01-14 PROCEDURE — 63710000001 INSULIN LISPRO (HUMAN) PER 5 UNITS

## 2025-01-14 PROCEDURE — 25010000002 HYDROMORPHONE PER 4 MG: Performed by: FAMILY MEDICINE

## 2025-01-14 PROCEDURE — 97162 PT EVAL MOD COMPLEX 30 MIN: CPT

## 2025-01-14 PROCEDURE — 85025 COMPLETE CBC W/AUTO DIFF WBC: CPT

## 2025-01-14 PROCEDURE — 97166 OT EVAL MOD COMPLEX 45 MIN: CPT

## 2025-01-14 PROCEDURE — 96376 TX/PRO/DX INJ SAME DRUG ADON: CPT

## 2025-01-14 PROCEDURE — 99213 OFFICE O/P EST LOW 20 MIN: CPT | Performed by: NURSE PRACTITIONER

## 2025-01-14 PROCEDURE — G0378 HOSPITAL OBSERVATION PER HR: HCPCS

## 2025-01-14 PROCEDURE — 82948 REAGENT STRIP/BLOOD GLUCOSE: CPT

## 2025-01-14 RX ORDER — OXYCODONE HYDROCHLORIDE 5 MG/1
15 TABLET ORAL EVERY 4 HOURS PRN
Status: DISCONTINUED | OUTPATIENT
Start: 2025-01-14 | End: 2025-01-16 | Stop reason: HOSPADM

## 2025-01-14 RX ORDER — HYDROMORPHONE HYDROCHLORIDE 1 MG/ML
0.5 INJECTION, SOLUTION INTRAMUSCULAR; INTRAVENOUS; SUBCUTANEOUS EVERY 8 HOURS PRN
Status: DISCONTINUED | OUTPATIENT
Start: 2025-01-14 | End: 2025-01-15

## 2025-01-14 RX ADMIN — OXYCODONE 10 MG: 5 TABLET ORAL at 10:00

## 2025-01-14 RX ADMIN — INSULIN GLARGINE 20 UNITS: 100 INJECTION, SOLUTION SUBCUTANEOUS at 21:23

## 2025-01-14 RX ADMIN — INSULIN LISPRO 2 UNITS: 100 INJECTION, SOLUTION INTRAVENOUS; SUBCUTANEOUS at 11:45

## 2025-01-14 RX ADMIN — HYDROMORPHONE HYDROCHLORIDE 0.5 MG: 1 INJECTION, SOLUTION INTRAMUSCULAR; INTRAVENOUS; SUBCUTANEOUS at 08:32

## 2025-01-14 RX ADMIN — Medication 10 ML: at 21:23

## 2025-01-14 RX ADMIN — OXYCODONE 10 MG: 5 TABLET ORAL at 05:36

## 2025-01-14 RX ADMIN — INSULIN LISPRO 2 UNITS: 100 INJECTION, SOLUTION INTRAVENOUS; SUBCUTANEOUS at 21:23

## 2025-01-14 RX ADMIN — OXYCODONE 15 MG: 5 TABLET ORAL at 20:29

## 2025-01-14 RX ADMIN — HYDROMORPHONE HYDROCHLORIDE 0.5 MG: 1 INJECTION, SOLUTION INTRAMUSCULAR; INTRAVENOUS; SUBCUTANEOUS at 02:00

## 2025-01-14 RX ADMIN — HYDROMORPHONE HYDROCHLORIDE 0.5 MG: 1 INJECTION, SOLUTION INTRAMUSCULAR; INTRAVENOUS; SUBCUTANEOUS at 17:32

## 2025-01-14 RX ADMIN — APIXABAN 5 MG: 5 TABLET, FILM COATED ORAL at 20:29

## 2025-01-14 RX ADMIN — OXYCODONE 10 MG: 5 TABLET ORAL at 00:36

## 2025-01-14 RX ADMIN — APIXABAN 5 MG: 5 TABLET, FILM COATED ORAL at 08:32

## 2025-01-14 RX ADMIN — OXYCODONE 15 MG: 5 TABLET ORAL at 15:37

## 2025-01-14 RX ADMIN — Medication 10 ML: at 08:32

## 2025-01-14 RX ADMIN — INSULIN LISPRO 6 UNITS: 100 INJECTION, SOLUTION INTRAVENOUS; SUBCUTANEOUS at 17:32

## 2025-01-14 NOTE — THERAPY EVALUATION
Patient Name: Yarelis Jackson  : 1970    MRN: [id number]                              Today's Date: 2025       Admit Date: 2025    Visit Dx:     ICD-10-CM ICD-9-CM   1. Acute pain of right lower extremity  M79.604 729.5     Patient Active Problem List   Diagnosis    Benign essential hypertension    Cervical radiculopathy    Chronic obstructive pulmonary disease    Cigarette smoker    Hyperlipidemia    Menopause    Migraine headache    DKA (diabetic ketoacidosis)    Arterial thrombosis    Leg pain    H/O fasciotomy    Non-pressure ulcer of lower extremity with fat layer exposed, right     Past Medical History:   Diagnosis Date    COPD (chronic obstructive pulmonary disease)     Deep vein thrombosis     Low back pain     Migraine     Neck pain      Past Surgical History:   Procedure Laterality Date    FASCIOTOMY Right 2024    Procedure: FASCIOTOMY LEG;  Surgeon: Chris Delgado MD;  Location: [facility] MAIN OR;  Service: Vascular;  Laterality: Right;    FEMORAL THROMBECTOMY/EMBOLECTOMY Right 2024    Procedure: FEMORAL right iliofemoral thrombectomy, arteriogram;  Surgeon: Ghassan Celestin MD;  Location: [facility] OR;  Service: Vascular;  Laterality: Right;    FEMORAL THROMBECTOMY/EMBOLECTOMY Right 2024    Procedure: Right femoral thrombectomy, right lower extremity arteriogram and right lower leg facitomies;  Surgeon: Ghassan Celestin MD;  Location: [facility] OR;  Service: Vascular;  Laterality: Right;    LUNG SURGERY        General Information       Row Name 25 1008          OT Time and Intention    Document Type evaluation  -SR     Mode of Treatment occupational therapy  -SR       Row Name 25 1008          Occupational Profile    Reason for Services/Referral (Occupational Profile) Yarelis Jackson is a 54 y.o. female with a CMH of COPD, GERD, hypertension, diabetes, PAD with recent right critical limb ischemia status post right iliac thrombectomy,  right iliofemoral popliteal thrombectomy, right femoral endarterectomy who presented to [facility] on 1/12/2025 with right leg pain.  On 12/20 she went back to the operating room for recurrent right lower extremity ischemia and underwent a right iliofemoral popliteal thrombectomy, right femoral endarterectomy with patch, right lower extremity arteriogram and close right calf fasciotomy.  On 12/23 patient returned to the OR for emergent 4 compartment fasciotomies due to critical right leg ischemia.  She has a wound VAC in place.  Pt lives at home with elderly mother and father, who are unable to physically assist.  She has walker and wc.  Reports the extent of mobility she was able to do at home was use wc to get to bathroom and back to couch.  Typically independent.  -SR       Row Name 01/14/25 1008          Living Environment    People in Home parent(s)  -SR       Row Name 01/14/25 1008          Cognition    Orientation Status (Cognition) oriented x 4  -SR               User Key  (r) = Recorded By, (t) = Taken By, (c) = Cosigned By      Initials Name Provider Type    SR Krysta Manning OT Occupational Therapist                     Mobility/ADL's       Row Name 01/14/25 1008          Bed Mobility    Bed Mobility bed mobility (all) activities  -SR     All Activities, [place] (Bed Mobility) contact guard  -SR       Row Name 01/14/25 1008          Transfers    Comment, (Transfers) Pt declined transfer OOB du to pain.  -SR       Row Name 01/14/25 1008          Functional Mobility    Reason Patient was unable to Ambulate Uncontrolled Pain  -SR               User Key  (r) = Recorded By, (t) = Taken By, (c) = Cosigned By      Initials Name Provider Type    SR Krysta Manning, OT Occupational Therapist                   Obj/Interventions       Row Name 01/14/25 1013          Range of Motion Comprehensive    General Range of Motion no range of motion deficits identified  -SR       Row Name  01/14/25 1013          Strength Comprehensive (MMT)    General Manual Muscle Testing (MMT) Assessment no strength deficits identified  -SR       Row Name 01/14/25 1013          Balance    Balance Interventions sitting;standing;sit to stand;supported;static;dynamic;minimal challenge  -SR               User Key  (r) = Recorded By, (t) = Taken By, (c) = Cosigned By      Initials Name Provider Type    SR Krysta Manning, OT Occupational Therapist                   Goals/Plan       Row Name 01/14/25 1029          Bathing Goal 1 (OT)    Activity/Device (Bathing Goal 1, OT) bathing skills, all  -SR     [place] Level/Cues Needed (Bathing Goal 1, OT) minimum assist (75% or more patient effort)  -SR     Time Frame (Bathing Goal 1, OT) 2 weeks  -SR       Row Name 01/14/25 1029          Toileting Goal 1 (OT)    Activity/Device (Toileting Goal 1, OT) toileting skills, all  -SR     [place] Level/Cues Needed (Toileting Goal 1, OT) minimum assist (75% or more patient effort)  -SR     Time Frame (Toileting Goal 1, OT) 2 weeks  -SR       Row Name 01/14/25 1029          Therapy Assessment/Plan (OT)    Planned Therapy Interventions (OT) activity tolerance training;BADL retraining;IADL retraining;functional balance retraining;neuromuscular control/coordination retraining;ROM/therapeutic exercise;transfer/mobility retraining;strengthening exercise;occupation/activity based interventions  -SR               User Key  (r) = Recorded By, (t) = Taken By, (c) = Cosigned By      Initials Name Provider Type    SR Krysta Manning, OT Occupational Therapist                   Clinical Impression       Row Name 01/14/25 1013          Pain Assessment    Pretreatment Pain Rating 8/10  Simultaneous filing. User may be unaware of other data.  -SR     Posttreatment Pain Rating 10/10  Simultaneous filing. User may be unaware of other data.  -SR       Row Name 01/14/25 1026          Plan of Care Review    Outcome Evaluation Yarelis  MATHEUS Jackson is a 54 y.o. female with a CMH of COPD, GERD, hypertension, diabetes, PAD with recent right critical limb ischemia status post right iliac thrombectomy, right iliofemoral popliteal thrombectomy, right femoral endarterectomy who presented to [facility] on 1/12/2025 with right leg pain.  On 12/20 she went back to the operating room for recurrent right lower extremity ischemia and underwent a right iliofemoral popliteal thrombectomy, right femoral endarterectomy with patch, right lower extremity arteriogram and close right calf fasciotomy.  On 12/23 patient returned to the OR for emergent 4 compartment fasciotomies due to critical right leg ischemia.  She has a wound VAC in place.  Pt lives at home with elderly mother and father, who are unable to physically assist.  She has walker and wc.  Reports the extent of mobility she was able to do at home was use wc to get to bathroom and back to couch.  Typically independent.  Today pt limited by pain and unable to attempt transfer. She has been transferring to [facility] with staff with difficulty. Able to complete upper body ADLs though anticipate significant difficulty with lower body ADLs due to pain.  She is not safe to return home.  Recommend SNF at discharge.  -SR       Row Name 01/14/25 1013          Therapy Assessment/Plan (OT)    Rehab Potential (OT) good  -SR     Criteria for Skilled Therapeutic Interventions Met (OT) yes  -SR     Therapy Frequency (OT) 5 times/wk  -SR       Row Name 01/14/25 1013          Therapy Plan Review/Discharge Plan (OT)    Anticipated Discharge Disposition (OT) skilled nursing facility  -SR       Row Name 01/14/25 1013          Positioning and Restraints    Pre-Treatment Position in bed  -SR     In Chair call light within reach;encouraged to call for assist;exit alarm on  -SR               User Key  (r) = Recorded By, (t) = Taken By, (c) = Cosigned By      Initials Name Provider Type    SR Krysta Manning, OT  Occupational Therapist                   Outcome Measures       Row Name 01/14/25 1023 01/14/25 0832       How much help from another person do you currently need...    Turning from your back to your side while in flat bed without using bedrails? 4  -AH 4  -SH    Moving from lying on back to sitting on the side of a flat bed without bedrails? 4  -AH 4  -SH    Moving to and from a bed to a chair (including a wheelchair)? 3  -AH 4  -SH    Standing up from a chair using your arms (e.g., wheelchair, bedside chair)? 3  -AH 4  -SH    Climbing 3-5 steps with a railing? 1  -AH 2  -SH    To walk in hospital room? 2  -AH 3  -SH    AM-PAC 6 Clicks Score (PT) 17  -AH 21  -SH    Highest Level of Mobility Goal 5 --> Static standing  -AH 6 --> Walk 10 steps or more  -SH      Row Name 01/14/25 1023          Functional Assessment    Outcome Measure Options AM-PAC 6 Clicks Basic Mobility (PT)  -               User Key  (r) = Recorded By, (t) = Taken By, (c) = Cosigned By      Initials Name Provider Type     Marisol Lamas LPN Licensed Nurse    Soila Hermosillo PT Physical Therapist                    Occupational Therapy Education       Title: PT OT SLP Therapies (In Progress)       Topic: Occupational Therapy (In Progress)       Point: ADL training (In Progress)       Description:   Instruct learner(s) on proper safety adaptation and remediation techniques during self care or transfers.   Instruct in proper use of assistive devices.                  Learning Progress Summary            Patient Acceptance, E,TB, NR by SR at 1/14/2025 1029                      Point: Home exercise program (In Progress)       Description:   Instruct learner(s) on appropriate technique for monitoring, assisting and/or progressing therapeutic exercises/activities.                  Learning Progress Summary            Patient Acceptance, E,TB, NR by SR at 1/14/2025 1029                      Point: Precautions (Not Started)       Description:    Instruct learner(s) on prescribed precautions during self-care and functional transfers.                  Learner Progress:  Not documented in this visit.              Point: Body mechanics (Not Started)       Description:   Instruct learner(s) on proper positioning and spine alignment during self-care, functional mobility activities and/or exercises.                  Learner Progress:  Not documented in this visit.                              User Key       Initials Effective Dates Name Provider Type Discipline     06/16/21 -  Krysta Manning, OT Occupational Therapist OT                  OT Recommendation and Plan  Planned Therapy Interventions (OT): activity tolerance training, BADL retraining, IADL retraining, functional balance retraining, neuromuscular control/coordination retraining, ROM/therapeutic exercise, transfer/mobility retraining, strengthening exercise, occupation/activity based interventions  Therapy Frequency (OT): 5 times/wk  Plan of Care Review  Outcome Evaluation: Yarelis Jackson is a 54 y.o. female with a CMH of COPD, GERD, hypertension, diabetes, PAD with recent right critical limb ischemia status post right iliac thrombectomy, right iliofemoral popliteal thrombectomy, right femoral endarterectomy who presented to [facility] on 1/12/2025 with right leg pain.  On 12/20 she went back to the operating room for recurrent right lower extremity ischemia and underwent a right iliofemoral popliteal thrombectomy, right femoral endarterectomy with patch, right lower extremity arteriogram and close right calf fasciotomy.  On 12/23 patient returned to the OR for emergent 4 compartment fasciotomies due to critical right leg ischemia.  She has a wound VAC in place.  Pt lives at home with elderly mother and father, who are unable to physically assist.  She has walker and wc.  Reports the extent of mobility she was able to do at home was use wc to get to bathroom and back to couch.   Typically independent.  Today pt limited by pain and unable to attempt transfer. She has been transferring to [facility] with staff with difficulty. Able to complete upper body ADLs though anticipate significant difficulty with lower body ADLs due to pain.  She is not safe to return home.  Recommend SNF at discharge.     Time Calculation:         Time Calculation- OT       Row Name 01/14/25 1030             Time Calculation- OT    OT Start Time 0950  -SR      OT Stop Time 1002  -SR      OT Time Calculation (min) 12 min  -SR      Total Timed Code Minutes- OT 0 minute(s)  -SR      OT Received On 01/14/25  -SR      OT - Next Appointment 01/15/25  -SR      OT Goal Re-Cert Due Date 01/28/25  -SR                User Key  (r) = Recorded By, (t) = Taken By, (c) = Cosigned By      Initials Name Provider Type    SR Krysta Manning OT Occupational Therapist                  Therapy Charges for Today       Code Description Service Date Service Provider Modifiers Qty    43403915700 HC OT EVAL MOD COMPLEXITY 4 1/14/2025 Krysta Mannign OT GO 1                 Krysta Manning OT  1/14/2025

## 2025-01-14 NOTE — PROGRESS NOTES
[facility] MEDICINE SERVICE  DAILY PROGRESS NOTE    NAME: Yarelis Jackson  : 1970  MRN: [id number]      LOS: 0 days     PROVIDER OF SERVICE: Stiven Hernandez DO    Chief Complaint: Leg pain    Subjective:     Interval History:  History taken from: patient    Patient still having uncontrolled pain in right leg; denies SOB or chest pain      Review of Systems:   Review of Systems    Objective:     Vital Signs  Temp:  [97.9 °F (36.6 °C)-98.2 °F (36.8 °C)] 98 °F (36.7 °C)  Heart Rate:  [63-94] 81  Resp:  [19-22] 19  BP: (136-158)/(80-84) 137/80   Body mass index is 32.81 kg/m².    Physical Exam  Physical Exam  General: Sitting up in bed; drowsy; NAD  CV: RRR, S1-S2  Lungs:  CTA bilaterally  Abdomen: soft, NT  MS:  right LE wound with wound vac in place        Diagnostic Data    Results from last 7 days   Lab Units 25  0048 25  1046 01/10/25  0456   WBC 10*3/mm3 10.24   < > 8.00   HEMOGLOBIN g/dL 10.2*   < > 9.8*   HEMATOCRIT % 32.5*   < > 30.0*   PLATELETS 10*3/mm3 452*   < > 384   GLUCOSE mg/dL 162*   < > 255*   CREATININE mg/dL 0.61   < > 0.61   BUN mg/dL 14   < > 18   SODIUM mmol/L 136   < > 133*   POTASSIUM mmol/L 4.2   < > 3.6   AST (SGOT) U/L  --   --  23   ALT (SGPT) U/L  --   --  16   ALK PHOS U/L  --   --  116   BILIRUBIN mg/dL  --   --  0.3   ANION GAP mmol/L 8.6   < > 11.4    < > = values in this interval not displayed.       No radiology results for the last day      I reviewed the patient's new clinical results.    Assessment/Plan:     Active and Resolved Problems  Active Hospital Problems    Diagnosis  POA    **Leg pain [M79.606]  Yes    H/O fasciotomy [Z98.890]  Not Applicable    Non-pressure ulcer of lower extremity with fat layer exposed, right [L97.912]  Unknown      Resolved Hospital Problems   No resolved problems to display.       Severe PAD status post right iliac thrombectomy, right iliofemoral popliteal thrombectomy, right femoral endarterectomy  Intractable right  leg pain  -12/19 - right iliac thrombectomy.  12/20 - right iliofemoral popliteal thrombectomy, right femoral endarterectomy with patch, right lower extremity arteriogram and close right calf fasciotomy. 12/23 - emergent 4 compartment fasciotomies.  She has a wound vac in place, has not had dressing changed since 1/8, but did have appointment in wound clinic today   -seen by Vascular surgery who recommends no surgical intervention  - wound redressed today; continue wound vace  -pain control remains an issue; increase oxycodone to 15 mg q4h prn; wean IV dilaudid   -Continue Eliquis  -appreciate wound care assistance  - patient says she is now willing to go to [facility] at d/c      T2DM  - blood sugars fairly well controlled   -continue Lantus and SSI  -monitor blood sugars and adjust doses as needed      Anemia  -Hgb 10.2 which is stable from when she was discharged; 9. Today   -Monitor     GERD  -PPI       VTE Prophylaxis:  Pharmacologic VTE prophylaxis orders are present.             Disposition Planning: To [facility] in 1-2 days if pain better controlled on increased dose of oxycodone    Code Status and Medical Interventions: CPR (Attempt to Resuscitate); Full Support   Ordered at: 01/12/25 1238     Code Status (Patient has no pulse and is not breathing):    CPR (Attempt to Resuscitate)     Medical Interventions (Patient has pulse or is breathing):    Full Support       Signature: Electronically signed by Stiven Hernandez DO, 01/14/25, 16:21 EST.  [facility] [place] Hospitalist Team

## 2025-01-14 NOTE — PLAN OF CARE
Goal Outcome Evaluation:  Plan of Care Reviewed With: patient           Outcome Evaluation: Yarelis Jackson is a 54 y.o. female with PMH of COPD, migraine, and obesity, who presented from home with poorly managed RLE pain, after she signed out AMA 2 days prior. Pt's had a long and complicated hospital course which started with DKA in Dec 2024. During that admission she was intubated, required CRRT, had 3 emergent vascular surgeries including 4 compartment fasciotomies due to critical right leg ischemia on 12/23/24.  PT/OT recommended rehab but pt elected to return home with her parents. She has a WC and a RW. Pt reports she was unable to care for herself at home, and mobility was severely limited by pain. Patient  has a wound vac in place. Pt presents with poor tolerance to activity, only agreeable to bed mobility and strength/ROM testing at EOB. Pt reports getting up with assist of nursing but is only able to go to the [facility] with the walker. Pt is not safe to return home and needs SNF to address mobility deficits and ongoing wound care needs. PT will follow.    Anticipated Discharge Disposition (PT): skilled nursing facility

## 2025-01-14 NOTE — PLAN OF CARE
Goal Outcome Evaluation:              Outcome Evaluation: Yarelis Jackson is a 54 y.o. female with a CMH of COPD, GERD, hypertension, diabetes, PAD with recent right critical limb ischemia status post right iliac thrombectomy, right iliofemoral popliteal thrombectomy, right femoral endarterectomy who presented to [facility] on 1/12/2025 with right leg pain.  On 12/20 she went back to the operating room for recurrent right lower extremity ischemia and underwent a right iliofemoral popliteal thrombectomy, right femoral endarterectomy with patch, right lower extremity arteriogram and close right calf fasciotomy.  On 12/23 patient returned to the OR for emergent 4 compartment fasciotomies due to critical right leg ischemia.  She has a wound VAC in place.  Pt lives at home with elderly mother and father, who are unable to physically assist.  She has walker and wc.  Reports the extent of mobility she was able to do at home was use wc to get to bathroom and back to couch.  Typically independent.  Today pt limited by pain and unable to attempt transfer. She has been transferring to [facility] with staff with difficulty. Able to complete upper body ADLs though anticipate significant difficulty with lower body ADLs due to pain.  She is not safe to return home.  Recommend SNF at discharge.    Anticipated Discharge Disposition (OT): skilled nursing facility

## 2025-01-14 NOTE — DISCHARGE PLACEMENT REQUEST
"Janet Alexander \"BRYANNA\" (54 y.o. Female)       Date of Birth   1970    Social Security Number       Address   [address] [place] [address] [place] [address]    Home Phone   [phone]    MRN   [id number]       Catholic   Mu-ism    Marital Status   Single                            Admission Date   25    Admission Type   Emergency    Admitting Provider   Harvey Espinal MD    Attending Provider   Stiven Hernandez DO    Department, Room/Bed   [facility] SURGICAL INPATIENT,        Discharge Date       Discharge Disposition       Discharge Destination                                 Attending Provider: Stiven Hernandez DO    Allergies: Aspirin, Ibuprofen    Isolation: None   Infection: MRSA No Isolation this Admit (24)   Code Status: CPR    Ht: 162.6 cm (64\")   Wt: 86.7 kg (191 lb 2.2 oz)    Admission Cmt: None   Principal Problem: Leg pain [M79.606]                   Active Insurance as of 2025       Primary Coverage       Payor Plan Insurance Group Employer/Plan Group    [id number]       Payor Plan Address Payor Plan Phone Number Payor Plan Fax Number Effective Dates    MAIL STOP:   2021 - None Entered    [address] [address]       [facility] [address]         Subscriber Name Subscriber Birth Date Member ID       JANET ALEXANDER 1970 [id number]                     Emergency Contacts        (Rel.) Home Phone Work Phone Mobile Phone    Sukh Saldivar (Son) -- -- [phone]    Rohit Saldivar (Son) -- -- [phone]    KYAW HU (Mother) [phone] -- [phone]                 History & Physical        Brielle Alexander PA-C at 25 1315       Attestation signed by Harvey Espinal MD at 25 1522    I have reviewed this documentation and agree.                      [facility] Medicine Services  History & Physical    Patient Name: Janet Alexander  : 1970  MRN: [id number]  Primary Care Physician:  " Provider, No Known  Date of admission: 1/12/2025  Date and Time of Service: 1/12/2025 at 1230    Subjective      Chief Complaint: Right leg pain    History of Present Illness: Yarelis Jackson is a 54 y.o. female with a CMH of COPD, GERD, hypertension, diabetes, PAD with recent right critical limb ischemia status post right iliac thrombectomy, right iliofemoral popliteal thrombectomy, right femoral endarterectomy who presented to [facility] on 1/12/2025 with right leg pain.    Patient was just discharged from the hospital 2 days ago after an extended hospital stay from 12/18-1/10.  She was initially admitted with diabetic ketoacidosis with new onset diabetes mellitus type 2.  She developed right limb ischemia.  She underwent a right iliac thrombectomy on 12/19.  On 12/20 she went back to the operating room for recurrent right lower extremity ischemia and underwent a right iliofemoral popliteal thrombectomy, right femoral endarterectomy with patch, right lower extremity arteriogram and close right calf fasciotomy.  On 12/23 patient returned to the OR for emergent 4 compartment fasciotomies due to critical right leg ischemia.  She has a wound VAC in place.  She also had an MARGOT and rhabdomyolysis which was treated with IV fluids and she required emergent hemodialysis.  During her hospitalization, she was seen by PT/OT and recommendation was made for her to go to SNF.  Patient declined SNF and went home.  She was set up at the wound care clinic but was unable to set up home health because the patient does not have a PCP.  She was sent home with oxycodone 5 mg tablets for pain control, however, patient states her pain has been uncontrolled.  She has been getting around with a wheelchair at home but is having some difficulty with this now.  She lives with her mother and father.  She now believes she needs rehab.  She does have some numbness in her foot which has been present since her initial surgery and is not  any worse than it has been.  She denies any new symptoms since she was discharged.  She denies any fevers, pain elsewhere, vomiting.  She is tolerating a diet and moving her bowels.  She is taking her medications as prescribed.    ED course: Vitals 98.8-93-/105-99% on room air.  Labs are remarkable for hemoglobin of 9.9.  She is being admitted for further management and SNF placement    Review of Systems   Constitutional:  Positive for activity change. Negative for chills and fever.   HENT: Negative.     Respiratory: Negative.     Cardiovascular: Negative.    Gastrointestinal: Negative.    Genitourinary: Negative.    Musculoskeletal:  Positive for gait problem and myalgias.   Skin: Negative.    Neurological:  Positive for weakness. Negative for dizziness, tremors, seizures, syncope, facial asymmetry, speech difficulty, light-headedness, numbness and headaches.       Personal History     Past Medical History:   Diagnosis Date    COPD (chronic obstructive pulmonary disease)     Low back pain     Migraine     Neck pain        Past Surgical History:   Procedure Laterality Date    FASCIOTOMY Right 12/23/2024    Procedure: FASCIOTOMY LEG;  Surgeon: Chris Delgado MD;  Location: [facility] MAIN OR;  Service: Vascular;  Laterality: Right;    FEMORAL THROMBECTOMY/EMBOLECTOMY Right 12/19/2024    Procedure: FEMORAL right iliofemoral thrombectomy, arteriogram;  Surgeon: Ghassan Celestin MD;  Location: [facility] OR;  Service: Vascular;  Laterality: Right;    FEMORAL THROMBECTOMY/EMBOLECTOMY Right 12/20/2024    Procedure: Right femoral thrombectomy, right lower extremity arteriogram and right lower leg facitomies;  Surgeon: Ghassan Celestin MD;  Location: [facility] OR;  Service: Vascular;  Laterality: Right;    LUNG SURGERY         Family History: family history includes COPD in her paternal grandmother; Cancer in her paternal grandmother and paternal uncle; Diabetes in her mother and paternal  grandmother. Otherwise pertinent FHx was reviewed and not pertinent to current issue.    Social History:  reports that she has been smoking cigarettes. She has never used smokeless tobacco. She reports that she does not currently use alcohol. She reports that she does not use drugs.    Home Medications:  Prior to Admission Medications       Prescriptions Last Dose Informant Patient Reported? Taking?    Accu-Chek Softclix Lancets lancets   No No    1 each by Other route 4 (Four) Times a Day Before Meals & at Bedtime. Dx: E10.65. Use as instructed    apixaban (ELIQUIS) 5 MG tablet tablet   No No    Take 1 tablet by mouth Every 12 (Twelve) Hours for 30 days. Indications: Other - full anticoagulation, recent arterial thrombosis requiring thrombectomy    Blood Glucose Monitoring Suppl (Accu-Chek Guide Me) w/Device kit   No No    Use 1 kit 1 (One) Time for 1 dose. Dx: E10.65    glucose blood (Accu-Chek Guide Test) test strip   No No    1 each by Other route 4 (Four) Times a Day Before Meals & at Bedtime. Dx: E10.65. Use as instructed    Insulin Glargine (LANTUS SOLOSTAR) 100 UNIT/ML injection pen   No No    Inject 22 Units under the skin into the appropriate area as directed Every Night for 136 days.    Insulin Lispro, 1 Unit Dial, (HumaLOG KwikPen) 100 UNIT/ML solution pen-injector   No No    9 units with each with each meal with sliding scale not more than 15 units with each meal    Insulin Pen Needle (Pen Needles) 32G X 4 MM misc   No No    Use 1 each 4 (Four) Times a Day Before Meals & at Bedtime.    Insulin Pen Needle (Pen Needles) 32G X 4 MM misc   No No    Use 1 each 4 (Four) Times a Day Before Meals & at Bedtime.    oxyCODONE (ROXICODONE) 5 MG immediate release tablet   No No    Take 1 tablet by mouth Every 4 (Four) Hours As Needed for Severe Pain for up to 3 days.    pantoprazole (PROTONIX) 40 MG EC tablet   No No    Take 1 tablet by mouth Every Morning for 30 days.              Allergies:  Allergies   Allergen  Reactions    Aspirin GI Intolerance    Ibuprofen Itching       Objective      Vitals:   Temp:  [98.8 °F (37.1 °C)] 98.8 °F (37.1 °C)  Heart Rate:  [86-93] 86  Resp:  [18] 18  BP: (137-153)/() 149/79  Body mass index is 36.97 kg/m².  Physical Exam  Constitutional:       Appearance: Normal appearance.   HENT:      Head: Normocephalic and atraumatic.      Mouth/Throat:      Mouth: Mucous membranes are moist.   Eyes:      Extraocular Movements: Extraocular movements intact.   Cardiovascular:      Rate and Rhythm: Normal rate and regular rhythm.   Pulmonary:      Effort: Pulmonary effort is normal.      Breath sounds: Normal breath sounds.   Abdominal:      General: There is no distension.      Palpations: Abdomen is soft.      Tenderness: There is no abdominal tenderness.   Musculoskeletal:      Cervical back: Normal range of motion.      Comments: Right leg dressings are in place.  Wound vac in place. She has palpable pulses bilateral   Skin:     General: Skin is warm.   Neurological:      General: No focal deficit present.      Mental Status: She is alert and oriented to person, place, and time.         Diagnostic Data:  Lab Results (last 24 hours)       Procedure Component Value Units Date/Time    Extra Tubes [[id number]] Collected: 01/12/25 1046    Specimen: Blood, Venous Line Updated: 01/12/25 1100    Narrative:      The following orders were created for panel order Extra Tubes.  Procedure                               Abnormality         Status                     ---------                               -----------         ------                     Gold Top - SST[[id number]]                                   Final result               Green Top (Gel)[[id number]]                                  Final result               Light Blue Top[[id number]]                                   Final result                 Please view results for these tests on the individual orders.    Gold Top - SST [[id number]] Collected:  01/12/25 1046    Specimen: Blood Updated: 01/12/25 1100     Extra Tube Hold for add-ons.     Comment: Auto resulted.       Green Top (Gel) [[id number]] Collected: 01/12/25 1046    Specimen: Blood Updated: 01/12/25 1100     Extra Tube Hold for add-ons.     Comment: Auto resulted.       Light Blue Top [[id number]] Collected: 01/12/25 1046    Specimen: Blood Updated: 01/12/25 1100     Extra Tube Hold for add-ons.     Comment: Auto resulted       CBC & Differential [[id number]]  (Abnormal) Collected: 01/12/25 1046    Specimen: Blood Updated: 01/12/25 1053    Narrative:      The following orders were created for panel order CBC & Differential.  Procedure                               Abnormality         Status                     ---------                               -----------         ------                     CBC Auto Differential[[id number]]        Abnormal            Final result                 Please view results for these tests on the individual orders.    CBC Auto Differential [[id number]]  (Abnormal) Collected: 01/12/25 1046    Specimen: Blood Updated: 01/12/25 1053     WBC 9.80 10*3/mm3      RBC 3.14 10*6/mm3      Hemoglobin 9.9 g/dL      Hematocrit 30.8 %      MCV 98.1 fL      MCH 31.5 pg      MCHC 32.1 g/dL      RDW 17.6 %      RDW-SD 62.7 fl      MPV 8.3 fL      Platelets 378 10*3/mm3      Neutrophil % 61.5 %      Lymphocyte % 24.9 %      Monocyte % 8.2 %      Eosinophil % 3.8 %      Basophil % 0.7 %      Immature Grans % 0.9 %      Neutrophils, Absolute 6.03 10*3/mm3      Lymphocytes, Absolute 2.44 10*3/mm3      Monocytes, Absolute 0.80 10*3/mm3      Eosinophils, Absolute 0.37 10*3/mm3      Basophils, Absolute 0.07 10*3/mm3      Immature Grans, Absolute 0.09 10*3/mm3      nRBC 0.0 /100 WBC     POC Glucose Once [[id number]]  (Abnormal) Collected: 01/12/25 1014    Specimen: Blood Updated: 01/12/25 1016     Glucose 197 mg/dL      Comment: Serial Number: [id number]:  [id number]                Imaging  Results (Last 24 Hours)       ** No results found for the last 24 hours. **              Assessment & Plan        This is a 54 y.o. female with:    Active and Resolved Problems  Active Hospital Problems    Diagnosis  POA    **Leg pain [M79.606]  Yes      Resolved Hospital Problems   No resolved problems to display.       Severe PAD status post right iliac thrombectomy, right iliofemoral popliteal thrombectomy, right femoral endarterectomy  Intractable right leg pain  -12/19 - right iliac thrombectomy.  12/20 - right iliofemoral popliteal thrombectomy, right femoral endarterectomy with patch, right lower extremity arteriogram and close right calf fasciotomy. 12/23 - emergent 4 compartment fasciotomies.  She has a wound vac in place, has not had dressing changed since 1/8, but has an appt at the wound clinic tomorrow.  -Returns with uncontrolled right leg pain.  She has palpable pulses.  Vascular surgery was consulted by the ED.  She is now willing to go to SNF.  Will have PT/OT reevaluate and make recommendations.  Oxycodone dose increased.  -Continue Eliquis  -Consult wound care    T2DM  -Resume Lantus  -SSI  -CCD  -Hypoglycemia protocol in place    Anemia  -Hgb 9.9 which is stable from when she was discharged  -Monitor    GERD  -PPI        VTE Prophylaxis:  No VTE prophylaxis order currently exists.        The patient desires to be as follows:    CODE STATUS:    Code Status (Patient has no pulse and is not breathing): CPR (Attempt to Resuscitate)  Medical Interventions (Patient has pulse or is breathing): Full Support        Sukh Saldivar, who can be contacted at [phone], is the designated person to make medical decisions on the patient's behalf if She is incapable of doing so. This was clarified with patient and/or next of kin on 1/12/2025 during the course of this H&P.    Admission Status:  I believe this patient meets obs status.    Expected Length of Stay: 1 day    PDMP and Medication Dispenses via Sidebar  reviewed and consistent with patient reported medications.    I discussed the patient's findings and my recommendations with patient.      Signature:     This document has been electronically signed by Brielle Alexander PA-C on 2025 13:15 EST   [facility] Hospitalist Team    Electronically signed by Harvey Espinal MD at 25 1522       Operative/Procedure Notes (all)    No notes of this type exist for this encounter.          Physician Progress Notes (last 48 hours)        Alyssa Sneed APRN at 25 0828          [facility] Vascular Surgery Progress Note    Name: Yarelis Jackson ADMIT: 2025   : 1970  PCP: Provider, No Known    MRN: [id number] LOS: 0 days   AGE/SEX: 54 y.o. female  ROOM: [address] [place] [address]    CC: Right leg wounds    Subjective     Patient resting in bed.  Wound vac in place to right leg.     Objective     Scheduled Medications:   apixaban, 5 mg, Oral, Q12H  insulin glargine, 20 Units, Subcutaneous, Nightly  insulin lispro, 2-9 Units, Subcutaneous, 4x Daily AC & at Bedtime  sodium chloride, 10 mL, Intravenous, Q12H        Active Infusions:       As Needed Medications:    acetaminophen **OR** acetaminophen **OR** acetaminophen    senna-docusate sodium **AND** polyethylene glycol **AND** bisacodyl **AND** bisacodyl    Calcium Replacement - Follow Nurse / BPA Driven Protocol    dextrose    dextrose    glucagon (human recombinant)    HYDROmorphone    Magnesium Standard Dose Replacement - Follow Nurse / BPA Driven Protocol    ondansetron ODT    oxyCODONE    Phosphorus Replacement - Follow Nurse / BPA Driven Protocol    Potassium Replacement - Follow Nurse / BPA Driven Protocol    sodium chloride    sodium chloride    Vital Signs  Vitals:    25 0509   BP: 158/84   Pulse: 63   Resp: 20   Temp: 98.2 °F (36.8 °C)   SpO2: 97%      Body mass index is 32.81 kg/m².     Physical Exam:  NAD, alert and oriented  RRR  Lungs clear  Abd soft, benign  Vascular: Palpable  bilateral DP pulses  Skin: Right leg with wound vac in place    Results Review:     CBC    Results from last 7 days   Lab Units 01/14/25  0048 01/13/25  0039 01/12/25  1046 01/10/25  0456 01/09/25  0613 01/08/25  0538   WBC 10*3/mm3 10.24 9.29 9.80 8.00 7.37 7.31   HEMOGLOBIN g/dL 10.2* 9.7* 9.9* 9.8* 9.8* 9.6*   PLATELETS 10*3/mm3 452* 404 378 384 369 352     BMP   Results from last 7 days   Lab Units 01/14/25  0048 01/13/25  0039 01/10/25  0456 01/09/25  0613 01/08/25  1654 01/08/25  0538   SODIUM mmol/L 136 134* 133* 134*  --  137   POTASSIUM mmol/L 4.2 3.8 3.6 4.0 4.4 3.6   CHLORIDE mmol/L 98 101 98 101  --  104   CO2 mmol/L 29.4* 27.3 23.6 25.1  --  22.8   BUN mg/dL 14 14 18 15  --  14   CREATININE mg/dL 0.61 0.69 0.61 0.60  --  0.66   GLUCOSE mg/dL 162* 165* 255* 169*  --  154*   MAGNESIUM mg/dL 1.7 1.7 1.6 1.6  --  1.7   PHOSPHORUS mg/dL  --   --  3.4 3.6  --  3.6     HbA1C   Lab Results   Component Value Date    HGBA1C 15.80 (H) 12/18/2024     Infection     Radiology(recent) No radiology results for the last day    VTE Prophylaxis:  Pharmacologic VTE prophylaxis orders are present.         Problems:    Active Hospital Problems:  Active Hospital Problems    Diagnosis  POA    **Leg pain [M79.606]  Yes    H/O fasciotomy [Z98.890]  Not Applicable    Non-pressure ulcer of lower extremity with fat layer exposed, right [L97.912]  Unknown      Resolved Hospital Problems   No resolved problems to display.        Assessment & Plan   Assessment / Plan     Leg pain    H/O fasciotomy    Non-pressure ulcer of lower extremity with fat layer exposed, right      54-year-old female who underwent right leg thrombectomy and right iliofemoral/popliteal thrombectomy with endarterectomy and right leg fasciotomies in December    Continues to do well  Wound VAC placed to the right leg fasciotomy wounds yesterday by WOJUSTINO, plans to change tomorrow  No plans for an acute vascular surgery intervention  Continue Eliquis  PT/OT to  evaluate, plans for SNF rehab  Okay to discharge to rehab when bed available from our standpoint       NICHOLAS Castro  [facility]  25   O: [phone]  F: [phone]    Electronically signed by Alyssa Sneed APRN at 25 0971       Stiven Hernandez DO at 25 1550              [facility] MEDICINE SERVICE  DAILY PROGRESS NOTE    NAME: Yarelis Jackson  : 1970  MRN: [id number]      LOS: 0 days     PROVIDER OF SERVICE: Stiven Hernandez DO    Chief Complaint: Leg pain    Subjective:     Interval History:  History taken from: patient    Patient still having uncontrolled pain in right leg; says she can barely walk on it; denies chest pain or SOB; now she is willing to go to [facility]         Review of Systems:   Review of Systems    Objective:     Vital Signs  Temp:  [98.1 °F (36.7 °C)-98.5 °F (36.9 °C)] 98.5 °F (36.9 °C)  Heart Rate:  [78-90] 80  Resp:  [12-20] 20  BP: (130-166)/(78-85) 137/82   Body mass index is 32.81 kg/m².    Physical Exam  Physical Exam  General: Sitting up in bed; NAD  CV: RRR, S1-S2  Lungs:  CTA bilaterally  Abdomen: soft, NT  MS:  right LE with large open wound; being dressed by wound care RN       Diagnostic Data    Results from last 7 days   Lab Units 25  0039 25  1046 01/10/25  0456   WBC 10*3/mm3 9.29   < > 8.00   HEMOGLOBIN g/dL 9.7*   < > 9.8*   HEMATOCRIT % 29.5*   < > 30.0*   PLATELETS 10*3/mm3 404   < > 384   GLUCOSE mg/dL 165*  --  255*   CREATININE mg/dL 0.69  --  0.61   BUN mg/dL 14  --  18   SODIUM mmol/L 134*  --  133*   POTASSIUM mmol/L 3.8  --  3.6   AST (SGOT) U/L  --   --  23   ALT (SGPT) U/L  --   --  16   ALK PHOS U/L  --   --  116   BILIRUBIN mg/dL  --   --  0.3   ANION GAP mmol/L 5.7  --  11.4    < > = values in this interval not displayed.       No radiology results for the last day      I reviewed the patient's new clinical results.    Assessment/Plan:     Active and Resolved Problems  Active Hospital  Problems    Diagnosis  POA    **Leg pain [M79.606]  Yes    H/O fasciotomy [Z98.890]  Not Applicable    Non-pressure ulcer of lower extremity with fat layer exposed, right [L97.912]  Unknown      Resolved Hospital Problems   No resolved problems to display.       Severe PAD status post right iliac thrombectomy, right iliofemoral popliteal thrombectomy, right femoral endarterectomy  Intractable right leg pain  -12/19 - right iliac thrombectomy.  12/20 - right iliofemoral popliteal thrombectomy, right femoral endarterectomy with patch, right lower extremity arteriogram and close right calf fasciotomy. 12/23 - emergent 4 compartment fasciotomies.  She has a wound vac in place, has not had dressing changed since 1/8, but did have appointment in wound clinic today   -seen by Vascular surgery who recommends no surgical intervention  - wound redressed today; continue wound vace  -pain control remains an issue; increase oxycodone to 15 mg q4h prn; prn IV dilaudid   -Continue Eliquis  -appreciate wound care assistance  - patient says she is now willing to go to [facility] at d/c      T2DM  -continue Lantus and SSI  -monitor blood sugars and adjust doses as needed      Anemia  -Hgb 9.9 which is stable from when she was discharged; 9. Today   -Monitor     GERD  -PPI       VTE Prophylaxis:  Pharmacologic VTE prophylaxis orders are present.             Disposition Planning: To [facility] as early as tomorrow if pain controlled and arrangements made       Code Status and Medical Interventions: CPR (Attempt to Resuscitate); Full Support   Ordered at: 01/12/25 1238     Code Status (Patient has no pulse and is not breathing):    CPR (Attempt to Resuscitate)     Medical Interventions (Patient has pulse or is breathing):    Full Support       Signature: Electronically signed by Stiven Hernandez DO, 01/13/25, 15:50 EST.  [facility] Hospitalist Team    Electronically signed by Stiven Hernandez DO at 01/13/25 2570       Alyssa Sneed, APRN  at 25 0840          [facility] Vascular Surgery Progress Note    Name: Yarelis Jackson ADMIT: 2025   : 1970  PCP: Provider, No Known    MRN: [id number] LOS: 0 days   AGE/SEX: 54 y.o. female  ROOM: [address] [place] [address]    CC: Right leg wounds    Subjective     Patient resting in bed.  She is without any new complaint this morning.  Still with some numbness to her right foot and foot drop.    Objective     Scheduled Medications:   apixaban, 5 mg, Oral, Q12H  insulin glargine, 20 Units, Subcutaneous, Nightly  insulin lispro, 2-9 Units, Subcutaneous, 4x Daily AC & at Bedtime  sodium chloride, 10 mL, Intravenous, Q12H        Active Infusions:       As Needed Medications:    acetaminophen **OR** acetaminophen **OR** acetaminophen    senna-docusate sodium **AND** polyethylene glycol **AND** bisacodyl **AND** bisacodyl    Calcium Replacement - Follow Nurse / BPA Driven Protocol    dextrose    dextrose    glucagon (human recombinant)    Magnesium Standard Dose Replacement - Follow Nurse / BPA Driven Protocol    ondansetron ODT    oxyCODONE    oxyCODONE    Phosphorus Replacement - Follow Nurse / BPA Driven Protocol    Potassium Replacement - Follow Nurse / BPA Driven Protocol    sodium chloride    sodium chloride    Vital Signs  Vitals:    25 0748   BP: 138/78   Pulse: 85   Resp: 20   Temp: 98.3 °F (36.8 °C)   SpO2: 96%      Body mass index is 32.81 kg/m².     Physical Exam:  NAD, alert and oriented  RRR  Lungs clear  Abd soft, benign  Vascular: Palpable bilateral DP pulses  Skin: Right groin incision with staples, CDI healing well  Right leg wounds with dressings in place    Results Review:     CBC    Results from last 7 days   Lab Units 25  0039 25  1046 01/10/25  0456 25  0613 25  0538 25  0446   WBC 10*3/mm3 9.29 9.80 8.00 7.37 7.31 8.79   HEMOGLOBIN g/dL 9.7* 9.9* 9.8* 9.8* 9.6* 9.5*   PLATELETS 10*3/mm3 404 378 384 369 352 367     BMP   Results from last 7  days   Lab Units 25  0039 01/10/25  0456 25  0613 25  1654 25  0538 25  0446   SODIUM mmol/L 134* 133* 134*  --  137 135*   POTASSIUM mmol/L 3.8 3.6 4.0 4.4 3.6 3.8   CHLORIDE mmol/L 101 98 101  --  104 101   CO2 mmol/L 27.3 23.6 25.1  --  22.8 23.2   BUN mg/dL 14 18 15  --  14 14   CREATININE mg/dL 0.69 0.61 0.60  --  0.66 0.68   GLUCOSE mg/dL 165* 255* 169*  --  154* 168*   MAGNESIUM mg/dL 1.7 1.6 1.6  --  1.7 1.7   PHOSPHORUS mg/dL  --  3.4 3.6  --  3.6 3.0     HbA1C   Lab Results   Component Value Date    HGBA1C 15.80 (H) 2024     Infection     Radiology(recent) No radiology results for the last day    VTE Prophylaxis:  Pharmacologic VTE prophylaxis orders are present.         Problems:    Active Hospital Problems:  Active Hospital Problems    Diagnosis  POA    **Leg pain [M79.606]  Yes      Resolved Hospital Problems   No resolved problems to display.        Assessment & Plan   Assessment / Plan     Leg pain      54-year-old female who underwent right leg thrombectomy and right iliofemoral/popliteal thrombectomy with endarterectomy and right leg fasciotomies in December    Right leg wounds okay to have wound vacs replaced by wound nurse today  Right groin incision staples removed at bedside without issue  No plans for an acute vascular surgery intervention  Continue Eliquis  Okay to increase activity with PT/OT  Will likely need rehab  Medical management per primary team       NICHOLAS Castro  [facility] [place] [facility]  25   O: [phone]  F: [phone]    Electronically signed by Alyssa Sneed APRN at 25 0952          Consult Notes (last 48 hours)        Willie Son MD at 25 1500        Consult Orders    1. Inpatient Vascular Surgery Consult [[id number]] ordered by Charly Hernandez APRN                   Name: Yarelis Jackson ADMIT: 2025   : 1970  PCP: Provider, No Known    MRN: [id number] LOS: 0 days   AGE/SEX: 54  y.o. female  ROOM: [facility] EDH2/2     Inpatient Vascular Surgery Consult  Consult performed by: Willie Son MD  Consult ordered by: Charly Hernandez APRN        CC: Wound VAC problems and right leg pain  Subjective     History of Present Illness  54 y.o. female who is known to my partner Dr. Ghassan Celestin when she presented with acute limb ischemia on the right.  She underwent iliac and femoral-popliteal thrombectomy as well as a right femoral endarterectomy.  Also required fasciotomies.  Patient went home 2 days ago after declining SNF but found that she was having pain that was not improving and was not able to care for herself.  Also her vacuum dressing was not working properly.  It was changed on Wednesday and due to be changed on Monday but was alarming.  We were asked to see her.      Review of Systems    History Review Reviewed Comments   Past Medical History:  [x]  COPD, GERD, hypertension, diabetes, acute limb ischemia   Past Surgical History: []  Thrombectomy 1219, redo thrombectomy 1220, fasciotomies 1223   Family History: [x]     Social History: [x]       Scheduled Meds:     apixaban, 5 mg, Oral, Q12H  insulin glargine, 20 Units, Subcutaneous, Nightly  insulin lispro, 2-9 Units, Subcutaneous, 4x Daily AC & at Bedtime  sodium chloride, 10 mL, Intravenous, Q12H      IV Meds:        Allergies: Aspirin and Ibuprofen    Objective   Temp:  [98.8 °F (37.1 °C)] 98.8 °F (37.1 °C)  Heart Rate:  [86-93] 89  Resp:  [18] 18  BP: (137-153)/() 148/83    I/O this shift:  In: 30 [P.O.:30]  Out: 0     Physical Exam  Patient is awake alert and oriented x 3.  She appears to be in moderate discomfort.  Her vacuum dressing is not holding a seal ripped.  There is some erythema around the edges with significant exudate noted.  DP pulses easily palpable.  She has foot drop.    Data Reviewed:  CBC    Results from last 7 days   Lab Units 01/12/25  1046 01/10/25  0456 01/09/25  0613 01/08/25  0538 01/07/25  0446  01/06/25  0437   WBC 10*3/mm3 9.80 8.00 7.37 7.31 8.79 9.22   HEMOGLOBIN g/dL 9.9* 9.8* 9.8* 9.6* 9.5* 9.2*   PLATELETS 10*3/mm3 378 384 369 352 367 356     BMP   Results from last 7 days   Lab Units 01/10/25  0456 01/09/25  0613 01/08/25  1654 01/08/25  0538 01/07/25  0446 01/06/25  0437   SODIUM mmol/L 133* 134*  --  137 135* 137   POTASSIUM mmol/L 3.6 4.0 4.4 3.6 3.8 3.7   CHLORIDE mmol/L 98 101  --  104 101 103   CO2 mmol/L 23.6 25.1  --  22.8 23.2 25.5   BUN mg/dL 18 15  --  14 14 15   CREATININE mg/dL 0.61 0.60  --  0.66 0.68 0.76   GLUCOSE mg/dL 255* 169*  --  154* 168* 139*   MAGNESIUM mg/dL 1.6 1.6  --  1.7 1.7 1.8   PHOSPHORUS mg/dL 3.4 3.6  --  3.6 3.0 3.1       Labs significant for: Hemoglobin 9.9.  Creatinine 0.6.  Glucose 255    Imaging Studies:  Angiogram images reviewed.    Active Hospital Problems    Diagnosis  POA    **Leg pain [M79.606]  Yes      Resolved Hospital Problems   No resolved problems to display.       Data Points:  During this visit the following were done:  Labs Reviewed [x]  []  []  Labs Ordered []  []  []  Radiology Reports Reviewed [x]    Radiology Ordered []    EKG, echo, and/or stress test reviewed []    Vascular lab results reviewed  []    Vascular lab images reviewed and interpreted per myself   []    Referring Provider Records Reviewed []    ER Records Reviewed []    Hospital Records Reviewed/Summarized [x]    History Obtained From Family []    Radiological images view and Interpreted per myself []    Case Discussed with referring provider []     Decision to obtain and request outside records  []        Active Hospital Problems:   Active Hospital Problems    Diagnosis  POA    **Leg pain [M79.606]  Yes      Resolved Hospital Problems   No resolved problems to display.      Assessment & Plan   Assessment / Plan     54 y.o. female who is known to my partner Dr. Ghassan Collins for recent admission and surgical management of acute limb ischemia.  She has a past medical history  significant for hypertension, diabetes, and COPD.  She was just discharged from the hospital 2 days ago after a prolonged stay beginning on December 18.  She was admitted in DKA and found to have right limb ischemia.  On 12/19/2024 she underwent thrombectomy and then had to be taken back this next day for recurrent thrombosis and had a right iliofemoral and popliteal thrombectomy with endarterectomy.  Initially she had fasciotomies that were closed and these were taken back and opened on 12/23/2024.  Patient went home 2 days ago and had previously denied going to a subacute nursing facility but found that her pain and overall care were too difficult to manage at home so she Jannet presented here for evaluation and hopeful to be readmitted and transferred to a subacute nursing facility so she can obtain some additional help.  She says her pain is not improvements but it also has not acutely worsened.    She had a palpable femoral pulse.  She had a lot of drainage and exudate from her vacuum dressing and it was fairly malodorous.  For this reason she was medicated and then I removed all of them.  While the skin is a bit inflamed overall there is good granulation tissue and no obvious abscess or collection.  This was cleaned and Vaseline gauze was applied and then wrapped with ABDs and secured with Curlex.         Will have wound care team reevaluate her in the morning again for reapplication of negative wound pressure therapy.  Continue anticoagulation.  May be ready to have her right groin staples removed as well. Will follow      I discussed the patients findings and my recommendations with patient, primary care team, and consulting provider.  Please call my office with any question: [phone]                       Electronically signed by Willie Son MD at 01/12/25 1506       Nutrition Notes (most recent note)    No notes exist for this encounter.       Speech Language Pathology Notes (most recent  note)    No notes exist for this encounter.       Soila Frias, ELISABETH   Physical Therapist  Specialty:  Physical Therapy  Therapy Evaluation     Signed  Date of Service:  25 1026  Creation Time:  25     Signed        Expand All Collapse All  Patient Name: Yarelis Jackson                 : 1970                        MRN: [id number]                              Today's Date: 2025                                   Admit Date: 2025                        Visit Dx:   Visit Diagnosis       ICD-10-CM ICD-9-CM   1. Acute pain of right lower extremity  M79.604 729.5         Problem List       Patient Active Problem List   Diagnosis    Benign essential hypertension    Cervical radiculopathy    Chronic obstructive pulmonary disease    Cigarette smoker    Hyperlipidemia    Menopause    Migraine headache    DKA (diabetic ketoacidosis)    Arterial thrombosis    Leg pain    H/O fasciotomy    Non-pressure ulcer of lower extremity with fat layer exposed, right         Medical History        Past Medical History:   Diagnosis Date    COPD (chronic obstructive pulmonary disease)      Deep vein thrombosis      Low back pain      Migraine      Neck pain           Surgical History         Past Surgical History:   Procedure Laterality Date    FASCIOTOMY Right 2024     Procedure: FASCIOTOMY LEG;  Surgeon: Chris Delgado MD;  Location: [facility] MAIN OR;  Service: Vascular;  Laterality: Right;    FEMORAL THROMBECTOMY/EMBOLECTOMY Right 2024     Procedure: FEMORAL right iliofemoral thrombectomy, arteriogram;  Surgeon: Ghassan Celestin MD;  Location: [facility] OR;  Service: Vascular;  Laterality: Right;    FEMORAL THROMBECTOMY/EMBOLECTOMY Right 2024     Procedure: Right femoral thrombectomy, right lower extremity arteriogram and right lower leg facitomies;  Surgeon: Ghassan Celestin MD;  Location: [facility] HYBRID OR;  Service: Vascular;  Laterality: Right;    LUNG SURGERY                General Information         [facility] Name 01/14/25 0954                 Physical Therapy Time and Intention     Document Type evaluation  -       Mode of Treatment physical therapy  -[facility] Name 01/14/25 0954                 General Information     Patient Profile Reviewed yes  -       Prior Level of Function independent:;all household mobility;ADL's;wound care  has walker and WC. Was propelling only short distances with  BUE.  -       Existing Precautions/Restrictions fall  -       Barriers to Rehab previous functional deficit;medically complex  -[facility] Name 01/14/25 0954                 Living Environment     People in Home parent(s)  -[facility] Name 01/14/25 0954                 Home Main Entrance     Number of Stairs, Main Entrance one  -       Stair Railings, Main Entrance none  -[facility] Name 01/14/25 0954                 Stairs Within Home, Primary     Number of Stairs, Within Home, Primary none  -[facility] Name 01/14/25 0954                 Cognition     Orientation Status (Cognition) oriented x 4  -[facility] Name 01/14/25 0954                 Safety Issues/Impairments Affecting Functional Mobility     Impairments Affecting Function (Mobility) strength;range of motion (ROM);pain;endurance/activity tolerance  -                       User Key  (r) = Recorded By, (t) = Taken By, (c) = Cosigned By        Initials Name Provider Type      Soila Frias, PT Physical Therapist                            Mobility         Row Name 01/14/25 1006                 Bed Mobility     Bed Mobility bed mobility (all) activities  -       All Activities, [place] (Bed Mobility) contact guard  -       Assistive Device (Bed Mobility) head of bed elevated  -       Comment, (Bed Mobility) supine to sit and seated scooting to HOB.  -[facility] Name 01/14/25 1006                 Transfers     Comment, (Transfers) pt is unable to tolerate OOB transfer at this  time due to poorly managed pain.  -[facility] Name 01/14/25 1006                 Gait/Stairs (Locomotion)     Patient was able to Ambulate no, other medical factors prevent ambulation  -       Reason Patient was unable to Ambulate Uncontrolled Pain  -                       User Key  (r) = Recorded By, (t) = Taken By, (c) = Cosigned By        Initials Name Provider Type      Soila Frias, PT Physical Therapist                            Obj/Interventions         Row Name 01/14/25 1007                 Range of Motion Comprehensive     Comment, General Range of Motion Absent PF/DF or toe movement RLE. R knee and hip grossly 50% AROM. LLE WFL AROM.  -[facility] Name 01/14/25 1007                 Strength Comprehensive (MMT)     General Manual Muscle Testing (MMT) Assessment lower extremity strength deficits identified  -       Comment, General Manual Muscle Testing (MMT) Assessment R foot/ankle 0/5, R 3/5 hip and knee. LLE 4/5.  -[facility] Name 01/14/25 1007                 Motor Skills     Motor Skills functional endurance  -       Functional Endurance poor - pain limited  -[facility] Name 01/14/25 1007                 Balance     Balance Assessment sitting static balance;sitting dynamic balance  -       Static Sitting Balance standby assist  -       Dynamic Sitting Balance standby assist  -       Position, Sitting Balance sitting edge of bed  -[facility] Name 01/14/25 1007                 Sensory Assessment (Somatosensory)     Sensory Assessment (Somatosensory) --  lacks light touch sensation in R foot.  -                       User Key  (r) = Recorded By, (t) = Taken By, (c) = Cosigned By        Initials Name Provider Type      Soila Frias, PT Physical Therapist                            Goals/Plan         [facility] Name 01/14/25 1021                 Bed Mobility Goal 1 (PT)     Activity/Assistive Device (Bed Mobility Goal 1, PT) bed mobility activities, all  -        [place] Level/Cues Needed (Bed Mobility Goal 1, PT) independent  -       Time Frame (Bed Mobility Goal 1, PT) long term goal (LTG)  -[facility] Name 01/14/25 1021                 Transfer Goal 1 (PT)     Activity/Assistive Device (Transfer Goal 1, PT) transfers, all;sit-to-stand/stand-to-sit;bed-to-chair/chair-to-bed;toilet  -       [place] Level/Cues Needed (Transfer Goal 1, PT) modified independence  -       Time Frame (Transfer Goal 1, PT) long term goal (LTG);2 weeks  -[facility] Name 01/14/25 1021                 Gait Training Goal 1 (PT)     Activity/Assistive Device (Gait Training Goal 1, PT) gait (walking locomotion);assistive device use;walker, rolling  -       [place] Level (Gait Training Goal 1, PT) standby assist  -       Distance (Gait Training Goal 1, PT) 25'  -       Time Frame (Gait Training Goal 1, PT) long term goal (LTG);2 weeks  -[facility] Name 01/14/25 1021                 Balance Goal 1 (PT)     Activity/Assistive Device (Balance Goal) standing static balance;standing dynamic balance;walker, rolling  -       [place] Level/Cues Needed (Balance Goal 1, PT) supervision required  -       Time Frame (Balance Goal 1, PT) long-term goal (LTG);2 weeks  -[facility] Name 01/14/25 1021                 Therapy Assessment/Plan (PT)     Planned Therapy Interventions (PT) balance training;bed mobility training;gait training;home exercise program;transfer training;strengthening;patient/family education;ROM (range of motion);stretching  -                    User Key  (r) = Recorded By, (t) = Taken By, (c) = Cosigned By        Initials Name Provider Type      Soila Frias, PT Physical Therapist                         Clinical Impression         [facility] Name 01/14/25 1013                 Pain     Pain Side/Orientation right;lower  -[facility] Name 01/14/25 1013                 Plan of Care Review     Plan of Care Reviewed With patient  -        Outcome Evaluation Yarelis Jackson is a 54 y.o. female with PMH of COPD, migraine, and obesity, who presented from home with poorly managed RLE pain, after she signed out AMA 2 days prior. Pt's had a long and complicated hospital course which started with DKA in Dec 2024. During that admission she was intubated, required CRRT, had 3 emergent vascular surgeries including 4 compartment fasciotomies due to critical right leg ischemia on 12/23/24.  PT/OT recommended rehab but pt elected to return home with her parents. She has a WC and a RW. Pt reports she was unable to care for herself at home, and mobility was severely limited by pain. Patient  has a wound vac in place. Pt presents with poor tolerance to activity, only agreeable to bed mobility and strength/ROM testing at EOB. Pt reports getting up with assist of nursing but is only able to go to the [facility] with the walker. Pt is not safe to return home and needs SNF to address mobility deficits and ongoing wound care needs. PT will follow.  Simultaneous filing. User may be unaware of other data.  -          Row Name 01/14/25 1013                 Therapy Assessment/Plan (PT)     Patient/Family Therapy Goals Statement (PT) get the pain under control.  -       Rehab Potential (PT) good  -       Criteria for Skilled Interventions Met (PT) yes;meets criteria  -       Therapy Frequency (PT) 3 times/wk  -          Row Name 01/14/25 1013                 Positioning and Restraints     Post Treatment Position bed  Simultaneous filing. User may be unaware of other data.  -       In Bed notified nsg;supine;encouraged to call for assist;exit alarm on;call light within reach  -                       User Key  (r) = Recorded By, (t) = Taken By, (c) = Cosigned By        Initials Name Provider Type      Soila Frias, PT Physical Therapist                            Outcome Measures         Row Name 01/14/25 1023 01/14/25 0832            How much help from another  person do you currently need...     Turning from your back to your side while in flat bed without using bedrails? 4  - 4  -SH     Moving from lying on back to sitting on the side of a flat bed without bedrails? 4  - 4  -SH     Moving to and from a bed to a chair (including a wheelchair)? 3  - 4  -SH     Standing up from a chair using your arms (e.g., wheelchair, bedside chair)? 3  - 4  -SH     Climbing 3-5 steps with a railing? 1  - 2  -SH     To walk in hospital room? 2  - 3  -SH     AM-PAC 6 Clicks Score (PT) 17  - 21  -     Highest Level of Mobility Goal 5 --> Static standing  - 6 --> Walk 10 steps or more  -        Row Name 01/14/25 1023                 Functional Assessment     Outcome Measure Options AM-PAC 6 Clicks Basic Mobility (PT)  -                       User Key  (r) = Recorded By, (t) = Taken By, (c) = Cosigned By        Initials Name Provider Type      Marisol Lamas LPN Licensed Nurse      Soila Frias, PT Physical Therapist                          Physical Therapy Education            Title: PT OT SLP Therapies (Done)         Topic: Physical Therapy (Done)         Point: Mobility training (Done)         Learning Progress Summary             Patient Acceptance, E, VU by  at 1/14/2025 1023                            Point: Home exercise program (Done)         Learning Progress Summary             Patient Acceptance, E, VU by  at 1/14/2025 1023                            Point: Body mechanics (Done)         Learning Progress Summary             Patient Acceptance, E, VU by  at 1/14/2025 1023                            Point: Precautions (Done)         Learning Progress Summary             Patient Acceptance, E, VU by  at 1/14/2025 1023                                                User Key         Initials Effective Dates Name Provider Type [facility] 12/04/23 -  Soila Frias, PT Physical Therapist PT                          PT Recommendation and  Plan  Planned Therapy Interventions (PT): balance training, bed mobility training, gait training, home exercise program, transfer training, strengthening, patient/family education, ROM (range of motion), stretching  Outcome Evaluation: Yarelis Jackson is a 54 y.o. female with PMH of COPD, migraine, and obesity, who presented from home with poorly managed RLE pain, after she signed out AMA 2 days prior. Pt's had a long and complicated hospital course which started with DKA in Dec 2024. During that admission she was intubated, required CRRT, had 3 emergent vascular surgeries including 4 compartment fasciotomies due to critical right leg ischemia on 12/23/24.  PT/OT recommended rehab but pt elected to return home with her parents. She has a WC and a RW. Pt reports she was unable to care for herself at home, and mobility was severely limited by pain. Patient  has a wound vac in place. Pt presents with poor tolerance to activity, only agreeable to bed mobility and strength/ROM testing at EOB. Pt reports getting up with assist of nursing but is only able to go to the [facility] with the walker. Pt is not safe to return home and needs SNF to address mobility deficits and ongoing wound care needs. PT will follow. (Simultaneous filing. User may be unaware of other data.)      Time Calculation:        PT Charges         Row Name 01/14/25 1024                       Time Calculation     Start Time 0950  -         Stop Time 1002  -         Time Calculation (min) 12 min  -         PT Non-Billable Time (min) 0 min  -         PT Received On 01/14/25  -         PT - Next Appointment 01/16/25  -         PT Goal Re-Cert Due Date 01/28/25  -                 Time Calculation- PT     Total Timed Code Minutes- PT 0 minute(s)  -                      User Key  (r) = Recorded By, (t) = Taken By, (c) = Cosigned By        Initials Name Provider Type     Soila Hermosillo, PT Physical Therapist                       Therapy Charges  for Today         Code Description Service Date Service Provider Modifiers Qty     [id number] HC PT EVAL MOD COMPLEXITY 3 2025 Soila Frias, PT GP 1                PT G-Codes  Outcome Measure Options: AM-PAC 6 Clicks Basic Mobility (PT)  AM-PAC 6 Clicks Score (PT): 17  PT Discharge Summary  Anticipated Discharge Disposition (PT): skilled nursing facility     Soila Frias, PT               2025                                    Krysta Manning, OT   Occupational Therapist  Occupational Therapy  Therapy Evaluation     Signed  Date of Service:  25  Creation Time:  25     Signed        Expand All Collapse All  Patient Name: Yarelis Jackson                 : 1970                        MRN: [id number]                              Today's Date: 2025                                   Admit Date: 2025                        Visit Dx:   Visit Diagnosis       ICD-10-CM ICD-9-CM   1. Acute pain of right lower extremity  M79.604 729.5         Problem List       Patient Active Problem List   Diagnosis    Benign essential hypertension    Cervical radiculopathy    Chronic obstructive pulmonary disease    Cigarette smoker    Hyperlipidemia    Menopause    Migraine headache    DKA (diabetic ketoacidosis)    Arterial thrombosis    Leg pain    H/O fasciotomy    Non-pressure ulcer of lower extremity with fat layer exposed, right         Medical History        Past Medical History:   Diagnosis Date    COPD (chronic obstructive pulmonary disease)      Deep vein thrombosis      Low back pain      Migraine      Neck pain           Surgical History         Past Surgical History:   Procedure Laterality Date    FASCIOTOMY Right 2024     Procedure: FASCIOTOMY LEG;  Surgeon: Chris Delgado MD;  Location: [facility];  Service: Vascular;  Laterality: Right;    FEMORAL THROMBECTOMY/EMBOLECTOMY Right 2024     Procedure: FEMORAL right iliofemoral thrombectomy,  arteriogram;  Surgeon: Ghassan Celestin MD;  Location: [facility] HYBRID OR;  Service: Vascular;  Laterality: Right;    FEMORAL THROMBECTOMY/EMBOLECTOMY Right 12/20/2024     Procedure: Right femoral thrombectomy, right lower extremity arteriogram and right lower leg facitomies;  Surgeon: Ghassan Celestin MD;  Location: [facility] HYBRID OR;  Service: Vascular;  Laterality: Right;    LUNG SURGERY               General Information         Row Name 01/14/25 1008                 OT Time and Intention     Document Type evaluation  -SR       Mode of Treatment occupational therapy  -SR          Row Name 01/14/25 1008                 Occupational Profile     Reason for Services/Referral (Occupational Profile) Yarelis Jackson is a 54 y.o. female with a CMH of COPD, GERD, hypertension, diabetes, PAD with recent right critical limb ischemia status post right iliac thrombectomy, right iliofemoral popliteal thrombectomy, right femoral endarterectomy who presented to [facility] on 1/12/2025 with right leg pain.  On 12/20 she went back to the operating room for recurrent right lower extremity ischemia and underwent a right iliofemoral popliteal thrombectomy, right femoral endarterectomy with patch, right lower extremity arteriogram and close right calf fasciotomy.  On 12/23 patient returned to the OR for emergent 4 compartment fasciotomies due to critical right leg ischemia.  She has a wound VAC in place.  Pt lives at home with elderly mother and father, who are unable to physically assist.  She has walker and wc.  Reports the extent of mobility she was able to do at home was use wc to get to bathroom and back to couch.  Typically independent.  -SR          Row Name 01/14/25 1008                 Living Environment     People in Home parent(s)  -SR          Row Name 01/14/25 1008                 Cognition     Orientation Status (Cognition) oriented x 4  -SR                       User Key  (r) = Recorded By, (t) =  Taken By, (c) = Cosigned By        Initials Name Provider Type     SR Krysta Manning, OT Occupational Therapist                                     Mobility/ADL's         Row Name 01/14/25 1008                 Bed Mobility     Bed Mobility bed mobility (all) activities  -SR       All Activities, [place] (Bed Mobility) contact guard  -SR          Row Name 01/14/25 1008                 Transfers     Comment, (Transfers) Pt declined transfer OOB du to pain.  -SR          Row Name 01/14/25 1008                 Functional Mobility     Reason Patient was unable to Ambulate Uncontrolled Pain  -SR                       User Key  (r) = Recorded By, (t) = Taken By, (c) = Cosigned By        Initials Name Provider Type     SR Krysta Manning, OT Occupational Therapist                            Obj/Interventions         Row Name 01/14/25 1013                 Range of Motion Comprehensive     General Range of Motion no range of motion deficits identified  -SR          Row Name 01/14/25 1013                 Strength Comprehensive (MMT)     General Manual Muscle Testing (MMT) Assessment no strength deficits identified  -SR          Row Name 01/14/25 1013                 Balance     Balance Interventions sitting;standing;sit to stand;supported;static;dynamic;minimal challenge  -SR                       User Key  (r) = Recorded By, (t) = Taken By, (c) = Cosigned By        Initials Name Provider Type     SR Krysta Manning, OT Occupational Therapist                            Goals/Plan         Row Name 01/14/25 1029                 Bathing Goal 1 (OT)     Activity/Device (Bathing Goal 1, OT) bathing skills, all  -SR       [place] Level/Cues Needed (Bathing Goal 1, OT) minimum assist (75% or more patient effort)  -SR       Time Frame (Bathing Goal 1, OT) 2 weeks  -SR          Row Name 01/14/25 1029                 Toileting Goal 1 (OT)     Activity/Device (Toileting Goal 1, OT) toileting skills, all   -SR       [place] Level/Cues Needed (Toileting Goal 1, OT) minimum assist (75% or more patient effort)  -SR       Time Frame (Toileting Goal 1, OT) 2 weeks  -SR          Row Name 01/14/25 1029                 Therapy Assessment/Plan (OT)     Planned Therapy Interventions (OT) activity tolerance training;BADL retraining;IADL retraining;functional balance retraining;neuromuscular control/coordination retraining;ROM/therapeutic exercise;transfer/mobility retraining;strengthening exercise;occupation/activity based interventions  -SR                    User Key  (r) = Recorded By, (t) = Taken By, (c) = Cosigned By        Initials Name Provider Type     SR Krysta Manning, OT Occupational Therapist                         Clinical Impression         Row Name 01/14/25 1013                 Pain Assessment     Pretreatment Pain Rating 8/10  Simultaneous filing. User may be unaware of other data.  -SR       Posttreatment Pain Rating 10/10  Simultaneous filing. User may be unaware of other data.  -SR          Row Name 01/14/25 1026                 Plan of Care Review     Outcome Evaluation Yarelis Jackson is a 54 y.o. female with a CMH of COPD, GERD, hypertension, diabetes, PAD with recent right critical limb ischemia status post right iliac thrombectomy, right iliofemoral popliteal thrombectomy, right femoral endarterectomy who presented to [facility] on 1/12/2025 with right leg pain.  On 12/20 she went back to the operating room for recurrent right lower extremity ischemia and underwent a right iliofemoral popliteal thrombectomy, right femoral endarterectomy with patch, right lower extremity arteriogram and close right calf fasciotomy.  On 12/23 patient returned to the OR for emergent 4 compartment fasciotomies due to critical right leg ischemia.  She has a wound VAC in place.  Pt lives at home with elderly mother and father, who are unable to physically assist.  She has walker and wc.  Reports the  extent of mobility she was able to do at home was use wc to get to bathroom and back to couch.  Typically independent.  Today pt limited by pain and unable to attempt transfer. She has been transferring to [facility] [place] with staff with difficulty. Able to complete upper body ADLs though anticipate significant difficulty with lower body ADLs due to pain.  She is not safe to return home.  Recommend SNF at discharge.  -SR          Row Name 01/14/25 1013                 Therapy Assessment/Plan (OT)     Rehab Potential (OT) good  -SR       Criteria for Skilled Therapeutic Interventions Met (OT) yes  -SR       Therapy Frequency (OT) 5 times/wk  -SR          Row Name 01/14/25 1013                 Therapy Plan Review/Discharge Plan (OT)     Anticipated Discharge Disposition (OT) skilled nursing facility  -SR          Row Name 01/14/25 1013                 Positioning and Restraints     Pre-Treatment Position in bed  -SR       In Chair call light within reach;encouraged to call for assist;exit alarm on  -SR                       User Key  (r) = Recorded By, (t) = Taken By, (c) = Cosigned By        Initials Name Provider Type     SR Krysta Manning, OT Occupational Therapist                            Outcome Measures         Row Name 01/14/25 1023 01/14/25 0832            How much help from another person do you currently need...     Turning from your back to your side while in flat bed without using bedrails? 4  -AH 4  -SH     Moving from lying on back to sitting on the side of a flat bed without bedrails? 4  -AH 4  -SH     Moving to and from a bed to a chair (including a wheelchair)? 3  -AH 4  -SH     Standing up from a chair using your arms (e.g., wheelchair, bedside chair)? 3  - 4  -SH     Climbing 3-5 steps with a railing? 1  - 2  -SH     To walk in hospital room? 2  -AH 3  -SH     AM-PAC 6 Clicks Score (PT) 17  -AH 21  -SH     Highest Level of Mobility Goal 5 --> Static standing  - 6 --> Walk 10 steps or more  -SH         Row Name 01/14/25 1023                 Functional Assessment     Outcome Measure Options AM-PAC 6 Clicks Basic Mobility (PT)  -                       User Key  (r) = Recorded By, (t) = Taken By, (c) = Cosigned By        Initials Name Provider Type      Marisol Lamas LPN Licensed Nurse     Soila Hermosillo PT Physical Therapist                             Occupational Therapy Education            Title: PT OT SLP Therapies (In Progress)         Topic: Occupational Therapy (In Progress)         Point: ADL training (In Progress)         Description:   Instruct learner(s) on proper safety adaptation and remediation techniques during self care or transfers.   Instruct in proper use of assistive devices.                       Learning Progress Summary             Patient Acceptance, E,TB, NR by SR at 1/14/2025 1029                            Point: Home exercise program (In Progress)         Description:   Instruct learner(s) on appropriate technique for monitoring, assisting and/or progressing therapeutic exercises/activities.                       Learning Progress Summary             Patient Acceptance, E,TB, NR by SR at 1/14/2025 1029                            Point: Precautions (Not Started)         Description:   Instruct learner(s) on prescribed precautions during self-care and functional transfers.                       Learner Progress:  Not documented in this visit.                  Point: Body mechanics (Not Started)         Description:   Instruct learner(s) on proper positioning and spine alignment during self-care, functional mobility activities and/or exercises.                       Learner Progress:  Not documented in this visit.                                      User Key         Initials Effective Dates Name Provider Type Discipline      06/16/21 -  Krysta Manning OT Occupational Therapist OT                          OT Recommendation and Plan  Planned Therapy Interventions  (OT): activity tolerance training, BADL retraining, IADL retraining, functional balance retraining, neuromuscular control/coordination retraining, ROM/therapeutic exercise, transfer/mobility retraining, strengthening exercise, occupation/activity based interventions  Therapy Frequency (OT): 5 times/wk  Plan of Care Review  Outcome Evaluation: Yarelis Jackson is a 54 y.o. female with a CMH of COPD, GERD, hypertension, diabetes, PAD with recent right critical limb ischemia status post right iliac thrombectomy, right iliofemoral popliteal thrombectomy, right femoral endarterectomy who presented to [facility] on 1/12/2025 with right leg pain.  On 12/20 she went back to the operating room for recurrent right lower extremity ischemia and underwent a right iliofemoral popliteal thrombectomy, right femoral endarterectomy with patch, right lower extremity arteriogram and close right calf fasciotomy.  On 12/23 patient returned to the OR for emergent 4 compartment fasciotomies due to critical right leg ischemia.  She has a wound VAC in place.  Pt lives at home with elderly mother and father, who are unable to physically assist.  She has walker and wc.  Reports the extent of mobility she was able to do at home was use wc to get to bathroom and back to couch.  Typically independent.  Today pt limited by pain and unable to attempt transfer. [place] [facility] [place]. Able to complete upper body ADLs though anticipate significant difficulty with lower body ADLs due to pain.  She is not safe to return home.  Recommend SNF at discharge.      Time Calculation:        Time Calculation- OT         Row Name 01/14/25 1030                       Time Calculation- OT     OT Start Time 0950  -SR         OT Stop Time 1002  -SR         OT Time Calculation (min) 12 min  -SR         Total Timed Code Minutes- OT 0 minute(s)  -SR         OT Received On 01/14/25  -SR         OT - Next Appointment  01/15/25  -SR         OT Goal Re-Cert Due Date 01/28/25  -SR                      User Key  (r) = Recorded By, (t) = Taken By, (c) = Cosigned By        Initials Name Provider Type     Krysta Jay OT Occupational Therapist                       Therapy Charges for Today         Code Description Service Date Service Provider Modifiers Qty     [id number] HC OT EVAL MOD COMPLEXITY 4 1/14/2025 Krysta Manning OT GO 1                   Krysta Manning OT              1/14/2025                  Caitlyn Machuca APRN   Nurse Practitioner  Wound Care  Nursing Note     Signed  Date of Service:  01/13/25 1227  Creation Time:  01/13/25 1227     Signed               4-year-old female admitted to the hospital with leg pain.  Patient had just been discharged from the facility where she has a history significant for hypertension, diabetes and COPD.  She had had a previous prolonged hospital stay as she had right lower extremity ischemia.  She underwent thrombectomy as well as an iliofemoral and popliteal thrombectomy and R endarterectomy.  She also had fasciotomies which are open.  She has had a wound VAC to the fasciotomy sites.  She is seen today for replacement of the wound VAC.  She continues to complain of leg pain.     The dressings were gently removed.  Patient did have pain medication on board.  Heavy amounts of exudate noted on the old dressing and had saturated through the layers onto the bed linens.  The wounds were all irrigated well with normal saline.  No odor.  No warmth erythema induration noted.  There is some dry hard scabs lifting to the foot and there are multiple superficial areas open to the calf as well as to the shin that are draining.  The wound bases to the wounds are full-thickness.  They are pink and they are clean.     A silicone fenestrated sheet was applied to the open wounds.  Calcium alginate dressings were applied to the more superficial blisters to the shin.  The  eschar was left intact.  Black foam was applied to the medial lateral wound.  The wounds were Bucky suction was set at 125 continuous.  Patient complains of pain however she did quite well throughout the procedure.  She was then wrapped with Kerlix and two 4 inch Ace wrap slightly from her base of her toes to just below her knee.     Will continue to follow while in the hospital

## 2025-01-14 NOTE — THERAPY EVALUATION
Patient Name: Yarelis Jackson  : 1970    MRN: [id number]                              Today's Date: 2025       Admit Date: 2025    Visit Dx:     ICD-10-CM ICD-9-CM   1. Acute pain of right lower extremity  M79.604 729.5     Patient Active Problem List   Diagnosis    Benign essential hypertension    Cervical radiculopathy    Chronic obstructive pulmonary disease    Cigarette smoker    Hyperlipidemia    Menopause    Migraine headache    DKA (diabetic ketoacidosis)    Arterial thrombosis    Leg pain    H/O fasciotomy    Non-pressure ulcer of lower extremity with fat layer exposed, right     Past Medical History:   Diagnosis Date    COPD (chronic obstructive pulmonary disease)     Deep vein thrombosis     Low back pain     Migraine     Neck pain      Past Surgical History:   Procedure Laterality Date    FASCIOTOMY Right 2024    Procedure: FASCIOTOMY LEG;  Surgeon: Chris Delgado MD;  Location: [facility] MAIN OR;  Service: Vascular;  Laterality: Right;    FEMORAL THROMBECTOMY/EMBOLECTOMY Right 2024    Procedure: FEMORAL right iliofemoral thrombectomy, arteriogram;  Surgeon: Ghassan Celestin MD;  Location: [facility] OR;  Service: Vascular;  Laterality: Right;    FEMORAL THROMBECTOMY/EMBOLECTOMY Right 2024    Procedure: Right femoral thrombectomy, right lower extremity arteriogram and right lower leg facitomies;  Surgeon: Ghassan Celestin MD;  Location: [facility] OR;  Service: Vascular;  Laterality: Right;    LUNG SURGERY        General Information       Row Name 25 0954          Physical Therapy Time and Intention    Document Type evaluation  -     Mode of Treatment physical therapy  -       Row Name 25 0954          General Information    Patient Profile Reviewed yes  -     Prior Level of Function independent:;all household mobility;ADL's;wound care  has walker and WC. Was propelling only short distances with  BUE.  -     Existing  Precautions/Restrictions fall  -     Barriers to Rehab previous functional deficit;medically complex  -[facility] Name 01/14/25 0954          Living Environment    People in Home parent(s)  -[facility] Name 01/14/25 0954          Home Main Entrance    Number of Stairs, Main Entrance one  -     Stair Railings, Main Entrance none  -[facility] Name 01/14/25 0954          Stairs Within Home, Primary    Number of Stairs, Within Home, Primary none  -[facility] Name 01/14/25 0954          Cognition    Orientation Status (Cognition) oriented x 4  -[facility] Name 01/14/25 0954          Safety Issues/Impairments Affecting Functional Mobility    Impairments Affecting Function (Mobility) strength;range of motion (ROM);pain;endurance/activity tolerance  -               User Key  (r) = Recorded By, (t) = Taken By, (c) = Cosigned By      Initials Name Provider Type     Soila Frias, PT Physical Therapist                   Mobility       Row Name 01/14/25 1006          Bed Mobility    Bed Mobility bed mobility (all) activities  -     All Activities, [place] (Bed Mobility) contact guard  -     Assistive Device (Bed Mobility) head of bed elevated  -     Comment, (Bed Mobility) supine to sit and seated scooting to HOB.  -[facility] Name 01/14/25 1006          Transfers    Comment, (Transfers) pt is unable to tolerate OOB transfer at this time due to poorly managed pain.  -[facility] Name 01/14/25 1006          Gait/Stairs (Locomotion)    Patient was able to Ambulate no, other medical factors prevent ambulation  -     Reason Patient was unable to Ambulate Uncontrolled Pain  -               User Key  (r) = Recorded By, (t) = Taken By, (c) = Cosigned By      Initials Name Provider Type    Soila Hermosillo, PT Physical Therapist                   Obj/Interventions       [facility] Name 01/14/25 1007          Range of Motion Comprehensive    Comment, General Range of Motion Absent PF/DF or toe movement  RLE. R knee and hip grossly 50% AROM. LLE WFL AROM.  -[facility] Name 01/14/25 1007          Strength Comprehensive (MMT)    General Manual Muscle Testing (MMT) Assessment lower extremity strength deficits identified  -     Comment, General Manual Muscle Testing (MMT) Assessment R foot/ankle 0/5, R 3/5 hip and knee. LLE 4/5.  -[facility] Name 01/14/25 1007          Motor Skills    Motor Skills functional endurance  -     Functional Endurance poor - pain limited  -[facility] Name 01/14/25 1007          Balance    Balance Assessment sitting static balance;sitting dynamic balance  -     Static Sitting Balance standby assist  -     Dynamic Sitting Balance standby assist  -     Position, Sitting Balance sitting edge of bed  -[facility] Name 01/14/25 1007          Sensory Assessment (Somatosensory)    Sensory Assessment (Somatosensory) --  lacks light touch sensation in R foot.  -               User Key  (r) = Recorded By, (t) = Taken By, (c) = Cosigned By      Initials Name Provider Type     Soila Frias, PT Physical Therapist                   Goals/Plan       [facility] Name 01/14/25 1021          Bed Mobility Goal 1 (PT)    Activity/Assistive Device (Bed Mobility Goal 1, PT) bed mobility activities, all  -     [place] Level/Cues Needed (Bed Mobility Goal 1, PT) independent  -     Time Frame (Bed Mobility Goal 1, PT) long term goal (LTG)  -[facility] Name 01/14/25 1021          Transfer Goal 1 (PT)    Activity/Assistive Device (Transfer Goal 1, PT) transfers, all;sit-to-stand/stand-to-sit;bed-to-chair/chair-to-bed;toilet  -     [place] Level/Cues Needed (Transfer Goal 1, PT) modified independence  -     Time Frame (Transfer Goal 1, PT) long term goal (LTG);2 weeks  -[facility] Name 01/14/25 1021          Gait Training Goal 1 (PT)    Activity/Assistive Device (Gait Training Goal 1, PT) gait (walking locomotion);assistive device use;walker, rolling  -     [place] Level (Gait  Training Goal 1, PT) standby assist  -     Distance (Gait Training Goal 1, PT) 25'  -     Time Frame (Gait Training Goal 1, PT) long term goal (LTG);2 weeks  -       Row Name 01/14/25 1021          Balance Goal 1 (PT)    Activity/Assistive Device (Balance Goal) standing static balance;standing dynamic balance;walker, rolling  -     [place] Level/Cues Needed (Balance Goal 1, PT) supervision required  -     Time Frame (Balance Goal 1, PT) long-term goal (LTG);2 weeks  -       Row Name 01/14/25 1021          Therapy Assessment/Plan (PT)    Planned Therapy Interventions (PT) balance training;bed mobility training;gait training;home exercise program;transfer training;strengthening;patient/family education;ROM (range of motion);stretching  -               User Key  (r) = Recorded By, (t) = Taken By, (c) = Cosigned By      Initials Name Provider Type     Soila Frias, PT Physical Therapist                   Clinical Impression       Row Name 01/14/25 1013          Pain    Pain Side/Orientation right;lower  -       Row Name 01/14/25 1013          Plan of Care Review    Plan of Care Reviewed With patient  -     Outcome Evaluation Yarelis Jackson is a 54 y.o. female with PMH of COPD, migraine, and obesity, who presented from home with poorly managed RLE pain, after she signed out AMA 2 days prior. Pt's had a long and complicated hospital course which started with DKA in Dec 2024. During that admission she was intubated, required CRRT, had 3 emergent vascular surgeries including 4 compartment fasciotomies due to critical right leg ischemia on 12/23/24.  PT/OT recommended rehab but pt elected to return home with her parents. She has a WC and a RW. Pt reports she was unable to care for herself at home, and mobility was severely limited by pain. Patient  has a wound vac in place. Pt presents with poor tolerance to activity, only agreeable to bed mobility and strength/ROM testing at EOB. Pt reports  getting up with assist of nursing but is only able to go to the [facility] [place] the [facility]er. Pt is not safe to return home and needs SNF to address mobility deficits and ongoing wound care needs. PT will follow.  Simultaneous filing. User may be unaware of other data.  -       Row Name 01/14/25 1013          Therapy Assessment/Plan (PT)    Patient/Family Therapy Goals Statement (PT) get the pain under control.  -     Rehab Potential (PT) good  -     Criteria for Skilled Interventions Met (PT) yes;meets criteria  -     Therapy Frequency (PT) 3 times/wk  -       Row Name 01/14/25 1013          Positioning and Restraints    Post Treatment Position bed  Simultaneous filing. User may be unaware of other data.  -     In Bed notified nsg;supine;encouraged to call for assist;exit alarm on;call light within reach  -               User Key  (r) = Recorded By, (t) = Taken By, (c) = Cosigned By      Initials Name Provider Type     Soila Frias, PT Physical Therapist                   Outcome Measures       Row Name 01/14/25 1023 01/14/25 0832       How much help from another person do you currently need...    Turning from your back to your side while in flat bed without using bedrails? 4  - 4  -SH    Moving from lying on back to sitting on the side of a flat bed without bedrails? 4  - 4  -SH    Moving to and from a bed to a chair (including a wheelchair)? 3  - 4  -SH    Standing up from a chair using your arms (e.g., wheelchair, bedside chair)? 3  - 4  -SH    Climbing 3-5 steps with a railing? 1  - 2  -SH    To walk in hospital room? 2  - 3  -SH    AM-PAC 6 Clicks Score (PT) 17  - 21  -SH    Highest Level of Mobility Goal 5 --> Static standing  - 6 --> Walk 10 steps or more  -      Row Name 01/14/25 1023          Functional Assessment    Outcome Measure Options AM-PAC 6 Clicks Basic Mobility (PT)  -               User Key  (r) = Recorded By, (t) = Taken By, (c) = Cosigned By      Initials Name  Provider Type     Marisol Lamas LPN Licensed Nurse    Soila Hermosillo, ELISABETH Physical Therapist                                 Physical Therapy Education       Title: PT OT SLP Therapies (Done)       Topic: Physical Therapy (Done)       Point: Mobility training (Done)       Learning Progress Summary            Patient Acceptance, E, VU by  at 1/14/2025 1023                      Point: Home exercise program (Done)       Learning Progress Summary            Patient Acceptance, E, VU by  at 1/14/2025 1023                      Point: Body mechanics (Done)       Learning Progress Summary            Patient Acceptance, E, VU by  at 1/14/2025 1023                      Point: Precautions (Done)       Learning Progress Summary            Patient Acceptance, E, VU by  at 1/14/2025 1023                                      User Key       Initials Effective Dates Name Provider Type Discipline     12/04/23 -  Soila Frias PT Physical Therapist PT                  PT Recommendation and Plan  Planned Therapy Interventions (PT): balance training, bed mobility training, gait training, home exercise program, transfer training, strengthening, patient/family education, ROM (range of motion), stretching  Outcome Evaluation: Yareils Jackson is a 54 y.o. female with PMH of COPD, migraine, and obesity, who presented from home with poorly managed RLE pain, after she signed out AMA 2 days prior. Pt's had a long and complicated hospital course which started with DKA in Dec 2024. During that admission she was intubated, required CRRT, had 3 emergent vascular surgeries including 4 compartment fasciotomies due to critical right leg ischemia on 12/23/24.  PT/OT recommended rehab but pt elected to return home with her parents. She has a WC and a RW. Pt reports she was unable to care for herself at home, and mobility was severely limited by pain. Patient  has a wound vac in place. Pt presents with poor tolerance to activity, only  agreeable to bed mobility and strength/ROM testing at EOB. Pt reports getting up with assist of nursing but is only able to [facility] to the [facility] with the walker. Pt is not safe to return home and needs SNF to address mobility deficits and ongoing wound care needs. PT will follow. (Simultaneous filing. User may be unaware of other data.)     Time Calculation:         PT Charges       Row Name 01/14/25 1024             Time Calculation    Start Time 0950  -      Stop Time 1002  -      Time Calculation (min) 12 min  -      PT Non-Billable Time (min) 0 min  -      PT Received On 01/14/25  -      PT - Next Appointment 01/16/25  -      PT Goal Re-Cert Due Date 01/28/25  -         Time Calculation- PT    Total Timed Code Minutes- PT 0 minute(s)  -                User Key  (r) = Recorded By, (t) = Taken By, (c) = Cosigned By      Initials Name Provider Type     Soila Frias, PT Physical Therapist                  Therapy Charges for Today       Code Description Service Date Service Provider Modifiers Qty    [id number]  PT EVAL MOD COMPLEXITY 3 1/14/2025 Soila Frias, PT GP 1            PT G-Codes  Outcome Measure Options: AM-PAC 6 Clicks Basic Mobility (PT)  AM-PAC 6 Clicks Score (PT): 17  PT Discharge Summary  Anticipated Discharge Disposition (PT): skilled nursing facility    Soila Frias PT  1/14/2025

## 2025-01-14 NOTE — PROGRESS NOTES
[facility] Vascular Surgery Progress Note    Name: Yarelis Jackson ADMIT: 2025   : 1970  PCP: Provider, No Known    MRN: [id number] LOS: 0 days   AGE/SEX: 54 y.o. female  ROOM: [address] [place] [address]    CC: Right leg wounds    Subjective     Patient resting in bed.  Wound vac in place to right leg.     Objective     Scheduled Medications:   apixaban, 5 mg, Oral, Q12H  insulin glargine, 20 Units, Subcutaneous, Nightly  insulin lispro, 2-9 Units, Subcutaneous, 4x Daily AC & at Bedtime  sodium chloride, 10 mL, Intravenous, Q12H        Active Infusions:       As Needed Medications:    acetaminophen **OR** acetaminophen **OR** acetaminophen    senna-docusate sodium **AND** polyethylene glycol **AND** bisacodyl **AND** bisacodyl    Calcium Replacement - Follow Nurse / BPA Driven Protocol    dextrose    dextrose    glucagon (human recombinant)    HYDROmorphone    Magnesium Standard Dose Replacement - Follow Nurse / BPA Driven Protocol    ondansetron ODT    oxyCODONE    Phosphorus Replacement - Follow Nurse / BPA Driven Protocol    Potassium Replacement - Follow Nurse / BPA Driven Protocol    sodium chloride    sodium chloride    Vital Signs  Vitals:    25 0509   BP: 158/84   Pulse: 63   Resp: 20   Temp: 98.2 °F (36.8 °C)   SpO2: 97%      Body mass index is 32.81 kg/m².     Physical Exam:  NAD, alert and oriented  RRR  Lungs clear  Abd soft, benign  Vascular: Palpable bilateral DP pulses  Skin: Right leg with wound vac in place    Results Review:     CBC    Results from last 7 days   Lab Units 25  0048 25  0039 25  1046 01/10/25  0456 25  0613 25  0538   WBC 10*3/mm3 10.24 9.29 9.80 8.00 7.37 7.31   HEMOGLOBIN g/dL 10.2* 9.7* 9.9* 9.8* 9.8* 9.6*   PLATELETS 10*3/mm3 452* 404 378 384 369 352     BMP   Results from last 7 days   Lab Units 25  0048 25  0039 01/10/25  0456 25  0613 25  1654 25  0538   SODIUM mmol/L 136 134* 133* 134*  --  137    POTASSIUM mmol/L 4.2 3.8 3.6 4.0 4.4 3.6   CHLORIDE mmol/L 98 101 98 101  --  104   CO2 mmol/L 29.4* 27.3 23.6 25.1  --  22.8   BUN mg/dL 14 14 18 15  --  14   CREATININE mg/dL 0.61 0.69 0.61 0.60  --  0.66   GLUCOSE mg/dL 162* 165* 255* 169*  --  154*   MAGNESIUM mg/dL 1.7 1.7 1.6 1.6  --  1.7   PHOSPHORUS mg/dL  --   --  3.4 3.6  --  3.6     HbA1C   Lab Results   Component Value Date    HGBA1C 15.80 (H) 12/18/2024     Infection     Radiology(recent) No radiology results for the last day    VTE Prophylaxis:  Pharmacologic VTE prophylaxis orders are present.         Problems:    Active Hospital Problems:  Active Hospital Problems    Diagnosis  POA    **Leg pain [M79.606]  Yes    H/O fasciotomy [Z98.890]  Not Applicable    Non-pressure ulcer of lower extremity with fat layer exposed, right [L97.912]  Unknown      Resolved Hospital Problems   No resolved problems to display.        Assessment & Plan   Assessment / Plan     Leg pain    H/O fasciotomy    Non-pressure ulcer of lower extremity with fat layer exposed, right      54-year-old female who underwent right leg thrombectomy and right iliofemoral/popliteal thrombectomy with endarterectomy and right leg fasciotomies in December    Continues to do well  Wound VAC placed to the right leg fasciotomy wounds yesterday by WOCN, plans to change tomorrow  No plans for an acute vascular surgery intervention  Continue Eliquis  PT/OT to evaluate, plans for SNF rehab  Okay to discharge to rehab when bed available from our standpoint        NICHOLAS Castro  [facility] Vascular Surgery  01/14/25   O: [phone]  F: [phone]

## 2025-01-14 NOTE — SIGNIFICANT NOTE
01/14/25 1519   Living Situation   Current Living Arrangements home   Potentially Unsafe Housing Conditions none   Food Insecurity   Within the past 12 months, you worried that your food would run out before you got the money to buy more. Never true   Within the past 12 months, the food you bought just didn't last and you didn't have money to get more. Never true   Transportation Needs   In the past 12 months, has lack of transportation kept you from medical appointments or from getting medications? no   In the past 12 months, has lack of transportation kept you from meetings, work, or from getting things needed for daily living? No   Utilities   In the past 12 months has the electric, gas, oil, or water company threatened to shut off services in your home? No   Abuse Screen (yes response referral indicated)   Feels Unsafe at Home or Work/School no   Feels Threatened by Someone no   Does Anyone Try to Keep You From Having Contact with Others or Doing Things Outside Your Home? no   Physical Signs of Abuse Present no   Financial Resource Strain   How hard is it for you to pay for the very basics like food, housing, medical care, and heating? Not hard   Employment   Do you want help finding or keeping work or a job? I do not need or want help   Family and Community Support   If for any reason you need help with day-to-day activities such as bathing, preparing meals, shopping, managing finances, etc., do you get the help you need? I get all the help I need   How often do you feel lonely or isolated from those around you? Never   Education   Preferred Language English   Do you want help with school or training? For example, starting or completing job training or getting a high school diploma, GED or equivalent No   Physical Activity   On average, how many days per week do you engage in moderate to strenuous exercise (like a brisk walk)? 0 days   On average, how many minutes do you engage in exercise at this level? 0  min   Number of minutes of exercise per week (!) 0   Alcohol Use   Q1: How often do you have a drink containing alcohol? Never   Q2: How many drinks containing alcohol do you have on a typical day when you are drinking? None   Q3: How often do you have six or more drinks on one occasion? Never   Stress   Do you feel stress - tense, restless, nervous, or anxious, or unable to sleep at night because your mind is troubled all the time - these days? Only a littl   Mental Health   Little interest or pleasure in doing things Not at all   Feeling down, depressed, or hopeless Not at all   Disabilities   Difficulty Concentrating, Remembering or Making Decisions no   Difficulty Managing Errands Independently yes   Errands Management famly helps transport patient for errands

## 2025-01-14 NOTE — PLAN OF CARE
Goal Outcome Evaluation:Pt painful this shift and pain managed per MAR. Wound vac in place to LLE. Up to [facility] several times this shift with SBA. Educated pt on diet. Plan of care ongoing.

## 2025-01-14 NOTE — PLAN OF CARE
Goal Outcome Evaluation:   Trial ing new pain regiment for potential dc tomorrow, oxy increased to 15 mg and dilaudid changed to Q8. Ambulating to and from [facility] with assistance. Call light within reach, plan of care on going.

## 2025-01-14 NOTE — SIGNIFICANT NOTE
Case Management/Social Work    Patient Name:  Yarelis Jackson  YOB: 1970  MRN: [id number]  Admit Date:  1/12/2025 01/14/25 1539   Post Acute Pre-Cert Documentation   Date Post Acute Pre-Cert Completed 01/14/25   All Clinicals Submitted? Yes   Response Received from Insurance? Approval   Post Acute Pre-Cert Initiated Comment ALBINO received call from Benjamín Randall ([phone]) for SNF precert approval. Reference number [id number]. Valid 1/14-1/22. ALBINO requested approval letter faxed to Care Coordination department at [phone]. CM made aware.           Electronically signed by:  Lazaro Hodges CMA  01/14/25 15:41 EST    Lazaro Hodges  Case Management Associate  [address] [address]  P: [phone]  F: [phone]

## 2025-01-14 NOTE — CASE MANAGEMENT/SOCIAL WORK
Continued Stay Note  [facility]     Patient Name: Yarelis Jackson  MRN: [id number]  Today's Date: 1/14/2025    Admit Date: 1/12/2025    Plan: Accepted to [facility]. Precert approved . PASRR  approved.  Facility ordering wound vac and likely to have tomorrow 1/15/25   Discharge Plan       Row Name 01/14/25 1554       Plan    Plan Accepted to [facility]. Precert approved . PASRR  approved.  Facility ordering wound vac and likely to have tomorrow 1/15/25    Plan Comments DC barriers: pain control,  pending wound vac arrival to facility   Precert approved for [facility].  Possible dc 1/15/25                      Expected Discharge Date and Time       Expected Discharge Date Expected Discharge Time    Dieter 15, 2025             Vicki Astorga RN    SIPS 1  [email or web address]  Office [phone]  Cell [phone]

## 2025-01-15 LAB
GLUCOSE BLDC GLUCOMTR-MCNC: 151 MG/DL (ref 70–105)
GLUCOSE BLDC GLUCOMTR-MCNC: 169 MG/DL (ref 70–105)
GLUCOSE BLDC GLUCOMTR-MCNC: 190 MG/DL (ref 70–105)
GLUCOSE BLDC GLUCOMTR-MCNC: 95 MG/DL (ref 70–105)
POTASSIUM SERPL-SCNC: 4 MMOL/L (ref 3.5–5.2)

## 2025-01-15 PROCEDURE — 82948 REAGENT STRIP/BLOOD GLUCOSE: CPT

## 2025-01-15 PROCEDURE — 84132 ASSAY OF SERUM POTASSIUM: CPT | Performed by: FAMILY MEDICINE

## 2025-01-15 PROCEDURE — G0378 HOSPITAL OBSERVATION PER HR: HCPCS

## 2025-01-15 PROCEDURE — 63710000001 INSULIN LISPRO (HUMAN) PER 5 UNITS

## 2025-01-15 PROCEDURE — 63710000001 INSULIN GLARGINE PER 5 UNITS

## 2025-01-15 RX ORDER — POTASSIUM CHLORIDE 1500 MG/1
40 TABLET, EXTENDED RELEASE ORAL EVERY 4 HOURS
Status: COMPLETED | OUTPATIENT
Start: 2025-01-15 | End: 2025-01-15

## 2025-01-15 RX ADMIN — OXYCODONE 15 MG: 5 TABLET ORAL at 17:55

## 2025-01-15 RX ADMIN — POLYETHYLENE GLYCOL 3350 17 G: 17 POWDER, FOR SOLUTION ORAL at 10:42

## 2025-01-15 RX ADMIN — OXYCODONE 15 MG: 5 TABLET ORAL at 01:12

## 2025-01-15 RX ADMIN — INSULIN LISPRO 2 UNITS: 100 INJECTION, SOLUTION INTRAVENOUS; SUBCUTANEOUS at 12:05

## 2025-01-15 RX ADMIN — OXYCODONE 15 MG: 5 TABLET ORAL at 10:42

## 2025-01-15 RX ADMIN — APIXABAN 5 MG: 5 TABLET, FILM COATED ORAL at 20:48

## 2025-01-15 RX ADMIN — Medication 10 ML: at 20:49

## 2025-01-15 RX ADMIN — SENNOSIDES AND DOCUSATE SODIUM 2 TABLET: 50; 8.6 TABLET ORAL at 10:42

## 2025-01-15 RX ADMIN — APIXABAN 5 MG: 5 TABLET, FILM COATED ORAL at 08:25

## 2025-01-15 RX ADMIN — POTASSIUM CHLORIDE 40 MEQ: 1500 TABLET, EXTENDED RELEASE ORAL at 08:25

## 2025-01-15 RX ADMIN — Medication 10 ML: at 08:25

## 2025-01-15 RX ADMIN — INSULIN GLARGINE 20 UNITS: 100 INJECTION, SOLUTION SUBCUTANEOUS at 20:48

## 2025-01-15 RX ADMIN — INSULIN LISPRO 4 UNITS: 100 INJECTION, SOLUTION INTRAVENOUS; SUBCUTANEOUS at 21:10

## 2025-01-15 RX ADMIN — INSULIN LISPRO 2 UNITS: 100 INJECTION, SOLUTION INTRAVENOUS; SUBCUTANEOUS at 17:17

## 2025-01-15 RX ADMIN — OXYCODONE 15 MG: 5 TABLET ORAL at 21:44

## 2025-01-15 RX ADMIN — POTASSIUM CHLORIDE 40 MEQ: 1500 TABLET, EXTENDED RELEASE ORAL at 04:22

## 2025-01-15 RX ADMIN — OXYCODONE 15 MG: 5 TABLET ORAL at 05:21

## 2025-01-15 NOTE — PROGRESS NOTES
[facility] MEDICINE SERVICE  DAILY PROGRESS NOTE    NAME: Yarelis Jackson  : 1970  MRN: [id number]      LOS: 0 days     PROVIDER OF SERVICE: Stiven Hernandez DO    Chief Complaint: Leg pain    Subjective:     Interval History:  History taken from: patient    Patient says leg pain better controlled today with oral meds; denies SOB or chest pain      Review of Systems:   Review of Systems    Objective:     Vital Signs  Temp:  [97.7 °F (36.5 °C)-98 °F (36.7 °C)] 97.7 °F (36.5 °C)  Heart Rate:  [82-83] 82  Resp:  [14-16] 16  BP: (148-164)/(82-84) 148/84   Body mass index is 32.81 kg/m².    Physical Exam  Physical Exam  General:Chronically ill appearing female; lying in bed; drowsy  CV: RRR, S1-S2  Lungs:  CTA bilaterally  Abdomen: soft, NT  MS:  right LE wound with wound vac in place        Diagnostic Data    Results from last 7 days   Lab Units 01/15/25  1319 25  2319 25  1046 01/10/25  0456   WBC 10*3/mm3  --  7.97   < > 8.00   HEMOGLOBIN g/dL  --  9.9*   < > 9.8*   HEMATOCRIT %  --  31.4*   < > 30.0*   PLATELETS 10*3/mm3  --  458*   < > 384   GLUCOSE mg/dL  --  120*   < > 255*   CREATININE mg/dL  --  0.54*   < > 0.61   BUN mg/dL  --  13   < > 18   SODIUM mmol/L  --  136   < > 133*   POTASSIUM mmol/L 4.0 3.4*   < > 3.6   AST (SGOT) U/L  --   --   --  23   ALT (SGPT) U/L  --   --   --  16   ALK PHOS U/L  --   --   --  116   BILIRUBIN mg/dL  --   --   --  0.3   ANION GAP mmol/L  --  8.0   < > 11.4    < > = values in this interval not displayed.       No radiology results for the last day      I reviewed the patient's new clinical results.    Assessment/Plan:     Active and Resolved Problems  Active Hospital Problems    Diagnosis  POA    **Leg pain [M79.606]  Yes    H/O fasciotomy [Z98.890]  Not Applicable    Non-pressure ulcer of lower extremity with fat layer exposed, right [P00.117]  Unknown      Resolved Hospital Problems   No resolved problems to display.       Severe PAD status post  right iliac thrombectomy, right iliofemoral popliteal thrombectomy, right femoral endarterectomy  Intractable right leg pain  -12/19 - right iliac thrombectomy.  12/20 - right iliofemoral popliteal thrombectomy, right femoral endarterectomy with patch, right lower extremity arteriogram and close right calf fasciotomy. 12/23 - emergent 4 compartment fasciotomies.  She has a wound vac in place, has not had dressing changed since 1/8, but did have appointment in wound clinic today   -seen by Vascular surgery who recommends no surgical intervention  - wound redressed today; continue wound vace  -pain control better today; continue oxycodone to 15 mg q4h prn; stop IV dilaudid   -Continue Eliquis  -appreciate wound care assistance  - patient says she is now willing to go to [facility] at d/c      T2DM  - blood sugars fairly well controlled   -continue Lantus and SSI  -monitor blood sugars and adjust doses as needed      Anemia  -Hgb 10.2 which is stable from when she was discharged; 9. Today   -Monitor     GERD  -PPI       VTE Prophylaxis:  Pharmacologic VTE prophylaxis orders are present.             Disposition Planning: To [facility] tomorrow    Code Status and Medical Interventions: CPR (Attempt to Resuscitate); Full Support   Ordered at: 01/12/25 1238     Code Status (Patient has no pulse and is not breathing):    CPR (Attempt to Resuscitate)     Medical Interventions (Patient has pulse or is breathing):    Full Support       Signature: Electronically signed by Stiven Hernandez DO, 01/15/25, 17:24 EST.  [facility] Hospitalist Team

## 2025-01-15 NOTE — PLAN OF CARE
Goal Outcome Evaluation: Pt doing well with current pain meds on board. Pain managed per MAR. Up to BSC with SBA. Wound vac in place to RLE. Possible discharge to SNF pending.  Electrolyte replace in place. Plan of care ongoing

## 2025-01-16 VITALS
HEIGHT: 64 IN | BODY MASS INDEX: 32.63 KG/M2 | DIASTOLIC BLOOD PRESSURE: 93 MMHG | WEIGHT: 191.14 LBS | HEART RATE: 82 BPM | OXYGEN SATURATION: 94 % | RESPIRATION RATE: 16 BRPM | TEMPERATURE: 97.9 F | SYSTOLIC BLOOD PRESSURE: 146 MMHG

## 2025-01-16 LAB
GLUCOSE BLDC GLUCOMTR-MCNC: 145 MG/DL (ref 70–105)
GLUCOSE BLDC GLUCOMTR-MCNC: 211 MG/DL (ref 70–105)

## 2025-01-16 PROCEDURE — 82948 REAGENT STRIP/BLOOD GLUCOSE: CPT

## 2025-01-16 PROCEDURE — G0378 HOSPITAL OBSERVATION PER HR: HCPCS

## 2025-01-16 PROCEDURE — 63710000001 INSULIN LISPRO (HUMAN) PER 5 UNITS

## 2025-01-16 RX ORDER — OXYCODONE HYDROCHLORIDE 15 MG/1
15 TABLET ORAL EVERY 4 HOURS PRN
Qty: 24 TABLET | Refills: 0 | Status: SHIPPED | OUTPATIENT
Start: 2025-01-16

## 2025-01-16 RX ORDER — AMOXICILLIN 250 MG
2 CAPSULE ORAL 2 TIMES DAILY PRN
Qty: 10 TABLET | Refills: 0 | Status: SHIPPED | OUTPATIENT
Start: 2025-01-16

## 2025-01-16 RX ADMIN — OXYCODONE 15 MG: 5 TABLET ORAL at 06:11

## 2025-01-16 RX ADMIN — OXYCODONE 15 MG: 5 TABLET ORAL at 10:39

## 2025-01-16 RX ADMIN — ACETAMINOPHEN 650 MG: 325 TABLET, FILM COATED ORAL at 02:00

## 2025-01-16 RX ADMIN — INSULIN LISPRO 4 UNITS: 100 INJECTION, SOLUTION INTRAVENOUS; SUBCUTANEOUS at 12:28

## 2025-01-16 RX ADMIN — Medication 10 ML: at 08:30

## 2025-01-16 RX ADMIN — OXYCODONE 15 MG: 5 TABLET ORAL at 14:38

## 2025-01-16 RX ADMIN — APIXABAN 5 MG: 5 TABLET, FILM COATED ORAL at 08:30

## 2025-01-16 RX ADMIN — OXYCODONE 15 MG: 5 TABLET ORAL at 02:00

## 2025-01-16 NOTE — SIGNIFICANT NOTE
01/16/25 1458   OTHER   Discipline physical therapy assistant   Rehab Time/Intention   Session Not Performed other (see comments)  (Pt discharge in progress.)   Therapy Assessment/Plan (PT)   Criteria for Skilled Interventions Met (PT) yes   Recommendation   PT - Next Appointment 01/17/25

## 2025-01-16 NOTE — CASE MANAGEMENT/SOCIAL WORK
Continued Stay Note  [facility]     Patient Name: Yarelis Jackson  MRN: [id number]  Today's Date: 1/16/2025    Admit Date: 1/12/2025    Plan: Accepted to [facility]. Precert approved . PASRR approved. Wound vac at facility . Bed ready 1/16/25. Pharmacy updated   Discharge Plan       Row Name 01/16/25 1012       Plan    Plan Accepted to [facility]. Precert approved . PASRR approved. Wound vac at facility . Bed ready 1/16/25. Pharmacy updated    Plan Comments Precert approved for [facility]. PASRR approved Bed ready today 1/16/25, facility has wound vac for patient  Pharmacy updated.                 Expected Discharge Date and Time       Expected Discharge Date Expected Discharge Time    Jan 16, 2025             Vicki Astorga RN    SIPS 1  [email or web address]  Office [phone]  Cell [phone]

## 2025-01-16 NOTE — DISCHARGE SUMMARY
[facility] Medicine Services  Discharge Summary    Date of Service: 2025  Patient Name: Yarelis Jackson  : 1970  MRN: [id number]    Date of Admission: 2025  Discharge Diagnosis: Leg pain  Date of Discharge: 2025  Primary Care Physician: Provider, No Known      Presenting Problem:   Leg pain [M79.606]  Acute pain of right lower extremity [M79.604]    Active and Resolved Hospital Problems:  Active Hospital Problems    Diagnosis POA    **Leg pain [M79.606] Yes    H/O fasciotomy [Z98.890] Not Applicable    Non-pressure ulcer of lower extremity with fat layer exposed, right [L97.912] Unknown      Resolved Hospital Problems   No resolved problems to display.         Hospital Course     HPI:  55 y/o female with hx of PAD was admitted to [facility] for right leg pain/wound.  See below for details of her hospitalization:      Severe PAD status post right iliac thrombectomy, right iliofemoral popliteal thrombectomy, right femoral endarterectomy  Intractable right leg pain  - - right iliac thrombectomy.   - right iliofemoral popliteal thrombectomy, right femoral endarterectomy with patch, right lower extremity arteriogram and close right calf fasciotomy.  - emergent 4 compartment fasciotomies.  She has a wound vac in place, has not had dressing changed since , but did have appointment in wound clinic today   -seen by Vascular surgery who recommends no surgical intervention  - wound redressed today; continue wound vac  -pain control better today; continue oxycodone to 15 mg q4h prn  -Continue Eliquis  -appreciate wound care assistance  - she is stable for d/c to [facility] today  - follow-up with Vascular surgery in 2-3 weeks  - will need to follow-up with wound care office after d/c from [facility]      T2DM  - blood sugars fairly well controlled   -continue Lantus and SSI  -monitor blood sugars and adjust doses as needed      Anemia  -Hgb 10.2 which is stable from when she was  discharged; 9  -Monitor as an outpatient      GERD  -continue PPI      Patient is doing well, and medically stable for d/c to [facility] today.          VTE Prophylaxis:  Pharmacologic VTE prophylaxis orders are present.          Day of Discharge     Vital Signs:  Temp:  [97.3 °F (36.3 °C)-98.2 °F (36.8 °C)] 97.9 °F (36.6 °C)  Heart Rate:  [82-93] 82  Resp:  [16] 16  BP: (124-146)/(64-93) 146/93    Physical Exam:  Physical Exam   General: Lying in bed; mild distress  CV: RRR, S1-S2  Lungs: CTA bilaterally  Abdomen: soft, NT, ND  Extremities:  right LE bandaged; wound vac in place       Pertinent  and/or Most Recent Results     LAB RESULTS:      Lab 01/14/25  2319 01/14/25  0048 01/13/25  0039 01/12/25  1046 01/10/25  0456   WBC 7.97 10.24 9.29 9.80 8.00   HEMOGLOBIN 9.9* 10.2* 9.7* 9.9* 9.8*   HEMATOCRIT 31.4* 32.5* 29.5* 30.8* 30.0*   PLATELETS 458* 452* 404 378 384   NEUTROS ABS 4.37 6.07 5.56 6.03 3.95   IMMATURE GRANS (ABS) 0.04 0.05 0.04 0.09* 0.06*   LYMPHS ABS 2.52 2.79 2.65 2.44 2.97   MONOS ABS 0.58 0.71 0.64 0.80 0.57   EOS ABS 0.42* 0.57* 0.35 0.37 0.39   MCV 97.8* 98.8* 97.0 98.1* 98.0*         Lab 01/15/25  1319 01/14/25  2319 01/14/25  0048 01/13/25  0039 01/10/25  0456   SODIUM  --  136 136 134* 133*   POTASSIUM 4.0 3.4* 4.2 3.8 3.6   CHLORIDE  --  98 98 101 98   CO2  --  30.0* 29.4* 27.3 23.6   ANION GAP  --  8.0 8.6 5.7 11.4   BUN  --  13 14 14 18   CREATININE  --  0.54* 0.61 0.69 0.61   EGFR  --  110.2 107.1 103.9 107.1   GLUCOSE  --  120* 162* 165* 255*   CALCIUM  --  9.4 8.9 9.0 9.0   MAGNESIUM  --  2.0 1.7 1.7 1.6   PHOSPHORUS  --   --   --   --  3.4         Lab 01/10/25  0456   TOTAL PROTEIN 7.1   ALBUMIN 3.1*   GLOBULIN 4.0   ALT (SGPT) 16   AST (SGOT) 23   BILIRUBIN 0.3   ALK PHOS 116                     Brief Urine Lab Results  (Last result in the past 365 days)        Color   Clarity   Blood   Leuk Est   Nitrite   Protein   CREAT   Urine HCG        12/18/24 0508 Yellow   Clear   Moderate (2+)    Negative   Negative   100 mg/dL (2+)                 Microbiology Results (last 10 days)       ** No results found for the last 240 hours. **            XR Chest 1 View    Result Date: 12/22/2024  Impression: 1.Tubes and lines appear in satisfactory positions, as above. 2.Mild bibasilar opacities which could represent atelectasis or infiltrate. 3.Advanced emphysema. Electronically Signed: Sam Haynes  12/22/2024 7:07 AM EST  Workstation ID: [id number]    XR Chest 1 View    Result Date: 12/21/2024  Impression: Impression: 1. No tube or line change 2. No focal airspace consolidation or other acute process. Electronically Signed: Julio Thorpe MD  12/21/2024 9:54 AM EST  Workstation ID: [id number]    XR Chest 1 View    Result Date: 12/20/2024  Impression: Impression: Stable chest demonstrating COPD and postoperative changes with no acute cardiopulmonary process demonstrated Electronically Signed: Shon Ponce  12/20/2024 7:19 AM EST  Workstation ID: [id number]    XR Chest 1 View    Result Date: 12/19/2024  Impression: Impression: Interval placement of various support lines as noted. No acute process of the lung fields. Electronically Signed: Emiila Orellana MD  12/19/2024 12:27 PM EST  Workstation ID: [id number]    US Renal Bilateral    Result Date: 12/19/2024  Impression: Impression: Unremarkable renal ultrasound. Electronically Signed: Luciana Guerra MD  12/19/2024 11:22 AM EST  Workstation ID: [id number]    US Pancreas    Result Date: 12/19/2024  Impression: Impression: Negative exam. Electronically Signed: Emilia Orellana MD  12/19/2024 11:05 AM EST  Workstation ID: [id number]    XR Abdomen KUB    Result Date: 12/19/2024  Impression: Impression: Feeding tube is in the stomach. Electronically Signed: Maxwell Carrion MD  12/19/2024 1:11 AM EST  Workstation ID: [id number]    US Liver    Result Date: 12/18/2024  Impression: Impression: Hepatomegaly with steatosis. Electronically Signed: Alea Subramanian MD  12/18/2024 9:54 AM EST   Workstation ID: [id number]    CT Head Without Contrast    Result Date: 12/18/2024  Impression: Impression: 1. No acute intracranial findings. 2. Features suggestive of mild chronic microvascular disease. Electronically Signed: Mariah Keenan MD  12/18/2024 8:09 AM EST  Workstation ID: [id number]    XR Chest 1 View    Result Date: 12/18/2024  Impression: Impression: 1. No acute process. 2. Severe emphysema. Electronically Signed: Gaurav Miles MD  12/18/2024 7:53 AM EST  Workstation ID: [id number]     Results for orders placed during the hospital encounter of 12/18/24    Duplex Lower Extremity Art / Grafts - Right CAR    Interpretation Summary    Limited study due to body habitus and current dressing site.  Patent external iliac and femoral-popliteal arterial flow with low flow noted below the common femoral.  Common femoral artery poorly visualized.  Cannot rule out occlusion of the common femoral artery.      Results for orders placed during the hospital encounter of 12/18/24    Duplex Lower Extremity Art / Grafts - Right CAR    Interpretation Summary    Limited study due to body habitus and current dressing site.  Patent external iliac and femoral-popliteal arterial flow with low flow noted below the common femoral.  Common femoral artery poorly visualized.  Cannot rule out occlusion of the common femoral artery.      Results for orders placed during the hospital encounter of 12/18/24    Adult Transthoracic Echo Complete w/ Color, Spectral and Contrast if Necessary Per Protocol    Interpretation Summary    Left ventricular systolic function is normal. Left ventricular ejection fraction appears to be 56 - 60%.    Left ventricular diastolic function was normal.    No significant valvular disease.    Estimated right ventricular systolic pressure from tricuspid regurgitation is normal (<35 mmHg).    Electronically signed by Raúl Madrid MD, 12/20/24, 1:26 PM EST.      Labs Pending at  Discharge:  Pending Results       None            Procedures Performed           Consults:   Consults       Date and Time Order Name Status Description    1/12/2025 11:17 AM Hospitalist (on-call MD unless specified)      12/19/2024  2:09 PM Inpatient Vascular Surgery Consult Completed     12/18/2024  8:32 PM Inpatient Nephrology Consult Completed               Discharge Details        Discharge Medications        New Medications        Instructions Start Date   sennosides-docusate 8.6-50 MG per tablet  Commonly known as: PERICOLACE   2 tablets, Oral, 2 Times Daily PRN             Changes to Medications        Instructions Start Date   oxyCODONE 15 MG immediate release tablet  Commonly known as: ROXICODONE  What changed:   medication strength  how much to take  reasons to take this   15 mg, Oral, Every 4 Hours PRN             Continue These Medications        Instructions Start Date   Eliquis 5 MG tablet tablet  Generic drug: apixaban   5 mg, Oral, Every 12 Hours Scheduled      Insulin Lispro (1 Unit Dial) 100 UNIT/ML solution pen-injector  Commonly known as: HumaLOG KwikPen   9 units with each with each meal with sliding scale not more than 15 units with each meal      Lantus SoloStar 100 UNIT/ML injection pen  Generic drug: Insulin Glargine   22 Units, Subcutaneous, Nightly      pantoprazole 40 MG EC tablet  Commonly known as: PROTONIX   40 mg, Oral, Every Early Morning               Allergies   Allergen Reactions    Aspirin GI Intolerance    Ibuprofen Itching         Discharge Disposition:   Skilled Nursing Facility (DC - External)    Diet:  Hospital:  Diet Order   Procedures    Diet: Diabetic; Consistent Carbohydrate; Fluid Consistency: Thin (IDDSI 0)         Discharge Activity:   Activity Instructions       Activity as Tolerated                CODE STATUS:  Code Status and Medical Interventions: CPR (Attempt to Resuscitate); Full Support   Ordered at: 01/12/25 1238     Code Status (Patient has no pulse and is  not breathing):    CPR (Attempt to Resuscitate)     Medical Interventions (Patient has pulse or is breathing):    Full Support         Future Appointments   Date Time Provider Department Center   1/17/2025  3:00 PM Rohit Schrader DO [facility]   1/28/2025  9:30 AM Ghassan Celestin MD [facility]       Additional Instructions for the Follow-ups that You Need to Schedule       Discharge Follow-up with PCP   As directed       Currently Documented PCP:    Provider, No Known    PCP Phone Number:    None     Follow Up Details: In 1-2 weeks after d/c from [facility]        Discharge Follow-up with Specialty: Follow-up with Vascular surgery Dr Ghassan Collins in 2-3 weeks; 2 Weeks   As directed      Specialty: Follow-up with Vascular surgery Dr Ghassan Collins in 2-3 weeks   Follow Up: 2 Weeks                Time spent on Discharge including face to face service:  35 minutes    Signature: Electronically signed by Stiven Hernandez DO, 01/16/25, 12:20 EST.  [facility] Hospitalist Team

## 2025-01-16 NOTE — PLAN OF CARE
Goal Outcome Evaluation:  Plan of Care Reviewed With: patient        Progress: improving        Pt VSS, able to make needs known, pt refuses to get up to chair but will get up to BSC, plan to DC tomorrow. Plan of care ongoing.

## 2025-01-16 NOTE — CASE MANAGEMENT/SOCIAL WORK
Case Management Discharge Note      Final Note: [facility] SNF SKILLED    Provided Post Acute Provider List?: Yes  Post Acute Provider List: Nursing Home  Delivered To: Patient  Method of Delivery: In person    Selected Continued Care - Discharged on 1/16/2025 Admission date: 1/12/2025 - Discharge disposition: Skilled Nursing Facility (DC - External)      Destination Coordination complete.      Service Provider Services Address Phone Fax Patient Preferred    [facility] [place] [facility] [address] [place] [address] [place] [address] [phone] --                    Transportation Services  Private: Car    Final Discharge Disposition Code: 03 - skilled nursing facility (SNF)

## 2025-01-16 NOTE — PLAN OF CARE
Goal Outcome Evaluation:  Pt remains painful this shift and pain managed per MAR. Wound vac in place. Stand pivot to BSC. Educated pt on management of blood sugar. Plan to D.C to SNF facility

## 2025-01-17 ENCOUNTER — TELEPHONE (OUTPATIENT)
Dept: DIABETES SERVICES | Facility: HOSPITAL | Age: 55
End: 2025-01-17
Payer: MEDICAID

## 2025-01-20 ENCOUNTER — TELEPHONE (OUTPATIENT)
Dept: DIABETES SERVICES | Facility: HOSPITAL | Age: 55
End: 2025-01-20
Payer: MEDICAID

## 2025-01-20 NOTE — TELEPHONE ENCOUNTER
Mother answered home phone number. Mother states pt currently at rehab. Pt was discharged from [facility] hospital on 1/16/2025 and she did receive the lantus pens, lispro pens, pen needles, Accuchek Guide meter, test strips and lancets. Mother states pt was home for one day and then returned to hospital. Mother states she has no questions for educator at this time.

## 2025-02-03 NOTE — PATIENT INSTRUCTIONS
"Healthy Eating Suggestions    Healthy eating may help you get and keep a healthy body weight, reduce the risk of chronic illnesses, and live a longer and more productive life. It is important to follow a healthy eating pattern. Your nutritional and calorie needs should be met mainly by different nutrient-rich foods.  Here are some tips:  Reading food labels  Read labels and choose the following:  Reduced or low sodium products.  Juices with 100% fruit juice.  Foods with low saturated fats (<3 g per serving) and high polyunsaturated and monounsaturated fats.  Foods with whole grains, such as whole wheat, cracked wheat, brown rice, and wild rice.  Whole grains that are fortified with folic acid. This is recommended for females who are pregnant or who want to become pregnant.  Read labels and do not eat or drink the following:  Foods or drinks with added sugars. These include foods that contain brown sugar, corn sweetener, corn syrup, dextrose, fructose, glucose, high-fructose corn syrup, honey, invert sugar, lactose, malt syrup, maltose, molasses, raw sugar, sucrose, trehalose, or turbinado sugar.  Limit your intake of added sugars to less than 10% of your total daily calories. Do not eat more than the following amounts of added sugar per day:  6 teaspoons (25 g) for females.  9 teaspoons (38 g) for males.  Foods that contain processed or refined starches and grains.  Refined grain products, such as white flour, degermed cornmeal, white bread, and white rice.  Shopping  Choose nutrient-rich snacks, such as vegetables, whole fruits, and nuts. Avoid high-calorie and high-sugar snacks, such as potato chips, fruit snacks, and candy.  Use oil-based dressings and spreads on foods instead of solid fats such as butter, margarine, sour cream, or cream cheese.  Limit pre-made sauces, mixes, and \"instant\" products such as flavored rice, instant noodles, and ready-made pasta.  Try more plant-protein sources, such as tofu, black " beans, edamame, lentils, nuts, and seeds.  Explore eating plans such as the Mediterranean diet or vegetarian diet.  Try heart-healthy dips made with beans and healthy fats like hummus and guacamole. Vegetables go great with these.  Cooking  Use oil to sauté or stir-samuels foods instead of solid fats such as butter, margarine, or lard.  Try baking, boiling, grilling, or broiling instead of frying.  Remove the fatty part of meats before cooking.  Steam vegetables in water or broth.  Meal planning    At meals, imagine dividing your plate into fourths:  One-half of your plate is fruits and vegetables.  One-fourth of your plate is whole grains.  One-fourth of your plate is protein, especially lean meats, poultry, eggs, tofu, beans, or nuts.  Include low-fat dairy as part of your daily diet.  Lifestyle  Choose healthy options in all settings, including home, work, school, restaurants, or stores.  Prepare your food safely:  Wash your hands after handling raw meats.  Where you prepare food, keep surfaces clean by regularly washing with hot, soapy water.  Keep raw meat separate from ready-to-eat foods, such as fruits and vegetables.  , meat, poultry, and eggs to the recommended temperature. Get a food thermometer.  Store foods at safe temperatures. In general:  Keep cold foods at 40°F (4.4°C) or below.  Keep hot foods at 140°F (60°C) or above.  Keep your freezer at 0°F (-17.8°C) or below.  Foods are not safe to eat if they have been between the temperatures of °F (4.4-60°C) for more than 2 hours.  What foods should I eat?  Fruits  Aim to eat 1½-2½ cups of fresh, canned (in natural juice), or frozen fruits each day. One cup of fruit equals 1 small apple, 1 large banana, 8 large strawberries, 1 cup (237 g) canned fruit, ½ cup (82 g) dried fruit, or 1 cup (240 mL) 100% juice.  Vegetables  Aim to eat 2-4 cups of fresh and frozen vegetables each day, including different varieties and colors. One cup of vegetables  equals 1 cup (91 g) broccoli or cauliflower florets, 2 medium carrots, 2 cups (150 g) raw, leafy greens, 1 large tomato, 1 large kinsey pepper, 1 large sweet potato, or 1 medium white potato.  Grains  Aim to eat 5-10 ounce-equivalents of whole grains each day. Examples of 1 ounce-equivalent of grains include 1 slice of bread, 1 cup (40 g) ready-to-eat cereal, 3 cups (24 g) popcorn, or ½ cup (93 g) cooked rice.  Meats and other proteins  Try to eat 5-7 ounce-equivalents of protein each day. Examples of 1 ounce-equivalent of protein include 1 egg, ½ oz nuts (12 almonds, 24 pistachios, or 7 walnut halves), 1/4 cup (90 g) cooked beans, 6 tablespoons (90 g) hummus or 1 tablespoon (16 g) peanut butter. A cut of meat or fish that is the size of a deck of cards is about 3-4 ounce-equivalents (85 g).  Of the protein you eat each week, try to have at least 8 sounce (227 g) of seafood. This is about 2 servings per week. This includes salmon, trout, herring, sardines, and anchovies.  Dairy  Aim to eat 3 cup-equivalents of fat-free or low-fat dairy each day. Examples of 1 cup-equivalent of dairy include 1 cup (240 mL) milk, 8 ounces (250 g) yogurt, 1½ ounces (44 g) natural cheese, or 1 cup (240 mL) fortified soy milk.  Fats and oils  Aim for about 5 teaspoons (21 g) of fats and oils per day. Choose monounsaturated fats, such as canola and olive oils, mayonnaise made with olive oil or avocado oil, avocados, peanut butter, and most nuts, or polyunsaturated fats, such as sunflower, corn, and soybean oils, walnuts, pine nuts, sesame seeds, sunflower seeds, and flaxseed.  Beverages  Aim for 6 eight-ounce glasses of water per day. Limit coffee to 3-5 eight-ounce cups per day.  Limit caffeinated beverages that have added calories, such as soda and energy drinks.  If you drink alcohol:  Limit how much you have to:  0-1 drink a day if you are female.  0-2 drinks a day if you are male.  Know how much alcohol is in your drink. In the [place]  one drink is one 12 oz bottle of beer (355 mL), one 5 oz glass of wine (148 mL), or one 1½ oz glass of hard liquor (44 mL).  Seasoning and other foods  Try not to add too much salt to your food. Try using herbs and spices instead of salt.  Try not to add sugar to food.  This information is based on [place] nutrition guidelines. To learn more, visit choose[employer] Foundationplate.gov. Exact amounts may vary. You may need different amounts.  This information is not intended to replace advice given to you by your health care provider. Make sure you discuss any questions you have with your health care provider.    Elsevier Patient Education © 2024 Elsevier Inc.

## 2025-02-03 NOTE — PROGRESS NOTES
"Chief Complaint  Post-op    Subjective      HPI: Yarelis Jackson is a 54 y.o. female returns for follow-up of acute right lower extremity ischemia due to arterial thrombosis, post iliofemoral popliteal thrombectomy, femoral thrombectomy and fasciotomies.  Now convalescing at [facility].  Current wound care on open fasciotomy wounds consists of saline dressings changed once daily.  Wound care nurse visits once weekly.  In the hospital, we had been using VAC dressings, subsequently discontinued for reasons that are unknown to me.  Having numbness and pain in the foot.  Working with physical therapy.  Has walked up to 80 feet.    Objective   Vital Signs:  /87 (BP Location: Left arm)   Ht 162.6 cm (64.02\")   Wt 84.4 kg (186 lb)   BMI 31.91 kg/m²   Estimated body mass index is 31.91 kg/m² as calculated from the following:    Height as of this encounter: 162.6 cm (64.02\").    Weight as of this encounter: 84.4 kg (186 lb).   BMI is >= 30 and <35. (Class 1 Obesity). The following options were offered after discussion;: Information on healthy weight added to patient's after visit summary.   Yarelis Jackson  reports that she has been smoking cigarettes. She started smoking about 40 years ago. She has a 20 pack-year smoking history. She has never used smokeless tobacco..     Exam: Alert, oriented and appropriate.  Feels well.  Right groin scar healed.  Right foot swollen, without palpable pedal pulses.  Large, gaping fasciotomy wounds with moderate calf swelling and biofilm.  No cellulitis.  Some bleeding, especially laterally.       Assessment and Plan     Diagnoses and all orders for this visit:    1. Arterial thrombosis (Primary)    2. H/O fasciotomy    3. Non-pressure ulcer of lower extremity with fat layer exposed, right    Summary: 54-year-old woman with history of recent critical illness associated with uncontrolled but previously undiagnosed diabetes and DKA, who developed acute right lower " extremity ischemia and underwent femoral thrombectomy with arteriogram 12/19/2024, with return trip to the operating room for femoral endarterectomy and fasciotomies 12/20/2024.  Developed compartment syndrome and underwent completion fasciotomies 12/23/2024.  Had a long postoperative course requiring wound care for her fasciotomy incisions and neurologic dysfunction likely related to postischemic neuropathy.  Wounds are large and has biofilm, but not much necrotic debris.  I think she would benefit from short run of Dakin's or Vashe dressing changes and have provided a prescription for both along with orders for her to take back to [facility].  After this, we may be able to transition back to VAC.  Or maybe not, for example if there is an insurance issue making her not a candidate for a VAC.  Agree with ongoing efforts for PT.  Return 2 weeks.    Follow Up     Return in about 2 weeks (around 2/18/2025).  Patient was given instructions and counseling regarding her condition or for health maintenance advice. Please see specific information pulled into the AVS if appropriate.

## 2025-02-04 ENCOUNTER — OFFICE VISIT (OUTPATIENT)
Age: 55
End: 2025-02-04
Payer: MEDICAID

## 2025-02-04 VITALS
WEIGHT: 186 LBS | SYSTOLIC BLOOD PRESSURE: 159 MMHG | HEIGHT: 64 IN | DIASTOLIC BLOOD PRESSURE: 87 MMHG | BODY MASS INDEX: 31.76 KG/M2

## 2025-02-04 DIAGNOSIS — I74.9 ARTERIAL THROMBOSIS: Primary | ICD-10-CM

## 2025-02-04 DIAGNOSIS — Z98.890 H/O FASCIOTOMY: ICD-10-CM

## 2025-02-04 DIAGNOSIS — L97.912 NON-PRESSURE ULCER OF LOWER EXTREMITY WITH FAT LAYER EXPOSED, RIGHT: ICD-10-CM

## 2025-02-04 PROCEDURE — 99024 POSTOP FOLLOW-UP VISIT: CPT | Performed by: SURGERY

## 2025-02-14 NOTE — PROGRESS NOTES
"Chief Complaint  No chief complaint on file.    Subjective      HPI: Yarelis Jackson is a 54 y.o. female with history of acute right lower extremity ischemia post femoral thrombectomies and endarterectomy with calf fasciotomies returns for follow-up.  Having more pain and difficulty bearing weight on her foot.  Remains on apixaban.    Objective   Vital Signs:  There were no vitals taken for this visit.  Estimated body mass index is 31.91 kg/m² as calculated from the following:    Height as of 2/4/25: 162.6 cm (64.02\").    Weight as of 2/4/25: 84.4 kg (186 lb).        Yarelis Jackson  reports that she has been smoking cigarettes. She started smoking about 40 years ago. She has a 20.1 pack-year smoking history. She has never used smokeless tobacco..     Exam: Beefy red, healing fasciotomy incisions, flush with the skin, without tunneling or undermining.  No cellulitis.  Skin around toenail is beginning to slough as healing is occurring from underneath.  Foot pink, warm and well-perfused.  Some subjective sensation to light touch, but not much motor movement.       Assessment and Plan     Diagnoses and all orders for this visit:    1. Arterial thrombosis (Primary)    2. H/O fasciotomy    3. Non-pressure ulcer of lower extremity with fat layer exposed, right    Summary: 54-year-old woman newly identified with diabetes, admitted to the hospital critically ill on ventilator and pressors.  Developed acute right lower extremity ischemia post iliofemoral thrombectomy and subsequent femoral endarterectomy and calf fasciotomies.  Returns for follow-up of her fasciotomy wounds.  Wounds look good, healing appropriately on current wound care regimen.  Having significant pain, likely related to postischemic neuropathy.  Does not have any indication of infection.  Recommend continuing wound care regimen.  I think the likelihood of developing wound infection or wound healing complication at this time is remote.  Return again 3 " weeks for reassessment, sooner as needed.    Follow Up     No follow-ups on file.  Patient was given instructions and counseling regarding her condition or for health maintenance advice. Please see specific information pulled into the AVS if appropriate.

## 2025-02-18 ENCOUNTER — OFFICE VISIT (OUTPATIENT)
Age: 55
End: 2025-02-18
Payer: MEDICAID

## 2025-02-18 VITALS
DIASTOLIC BLOOD PRESSURE: 77 MMHG | HEIGHT: 64 IN | WEIGHT: 186 LBS | BODY MASS INDEX: 31.76 KG/M2 | SYSTOLIC BLOOD PRESSURE: 149 MMHG

## 2025-02-18 DIAGNOSIS — Z98.890 H/O FASCIOTOMY: ICD-10-CM

## 2025-02-18 DIAGNOSIS — I74.9 ARTERIAL THROMBOSIS: Primary | ICD-10-CM

## 2025-02-18 DIAGNOSIS — L97.912 NON-PRESSURE ULCER OF LOWER EXTREMITY WITH FAT LAYER EXPOSED, RIGHT: ICD-10-CM

## 2025-02-18 PROCEDURE — 99024 POSTOP FOLLOW-UP VISIT: CPT | Performed by: SURGERY

## 2025-02-18 RX ORDER — APIXABAN 5 MG/1
5 TABLET, FILM COATED ORAL EVERY 12 HOURS SCHEDULED
COMMUNITY
Start: 2025-01-16

## 2025-02-18 RX ORDER — ZINC OXIDE 20 %
OINTMENT (GRAM) TOPICAL EVERY 8 HOURS SCHEDULED
COMMUNITY
Start: 2025-01-17 | End: 2025-02-25

## 2025-02-18 RX ORDER — MAGNESIUM HYDROXIDE 1200 MG/15ML
0.03 LIQUID ORAL ONCE
COMMUNITY

## 2025-02-18 RX ORDER — PANTOPRAZOLE SODIUM 40 MG/1
40 TABLET, DELAYED RELEASE ORAL
COMMUNITY
Start: 2025-01-16

## 2025-02-18 RX ORDER — AMOXICILLIN 250 MG
2 CAPSULE ORAL EVERY OTHER DAY
COMMUNITY
Start: 2025-01-16 | End: 2025-02-25

## 2025-02-20 RX ORDER — ZINC OXIDE 20 %
OINTMENT (GRAM) TOPICAL EVERY 8 HOURS SCHEDULED
OUTPATIENT
Start: 2025-02-20

## 2025-02-20 RX ORDER — MAGNESIUM HYDROXIDE 1200 MG/15ML
0.03 LIQUID ORAL ONCE
Qty: 1 ML | Refills: 0 | OUTPATIENT
Start: 2025-02-20 | End: 2025-02-20

## 2025-02-20 RX ORDER — APIXABAN 5 MG/1
5 TABLET, FILM COATED ORAL EVERY 12 HOURS SCHEDULED
Qty: 60 TABLET | OUTPATIENT
Start: 2025-02-20

## 2025-02-20 RX ORDER — PANTOPRAZOLE SODIUM 40 MG/1
40 TABLET, DELAYED RELEASE ORAL
OUTPATIENT
Start: 2025-02-20

## 2025-02-20 NOTE — TELEPHONE ENCOUNTER
Rx Refill Note  Requested Prescriptions     Pending Prescriptions Disp Refills    Eliquis 5 MG tablet tablet 60 tablet      Sig: Take 1 tablet by mouth Every 12 (Twelve) Hours.    naloxone (NARCAN) 4 MG/0.1ML nasal spray       Sig: Administer  into the nostril(s) as directed by provider.    pantoprazole (PROTONIX) 40 MG EC tablet       Sig: Take 1 tablet by mouth.    zinc oxide 20 % ointment       Sig: Apply  topically to the appropriate area as directed Every 8 (Eight) Hours.    sodium chloride (NS) 0.9 % irrigation 1 mL 0     Sig: Irrigate with 1 mL to the affected area as directed by provider 1 (One) Time for 1 dose.      Last office visit with prescribing clinician: Visit date not found   Last telemedicine visit with prescribing clinician: Visit date not found   Next office visit with prescribing clinician: 2/24/2025                         Would you like a call back once the refill request has been completed: [] Yes [] No    If the office needs to give you a call back, can they leave a voicemail: [] Yes [] No    Krysta Armendariz MA  02/20/25, 13:04 EST

## 2025-02-24 ENCOUNTER — HOSPITAL ENCOUNTER (INPATIENT)
Facility: HOSPITAL | Age: 55
LOS: 7 days | Discharge: HOME HEALTH | End: 2025-03-03
Attending: EMERGENCY MEDICINE | Admitting: STUDENT IN AN ORGANIZED HEALTH CARE EDUCATION/TRAINING PROGRAM
Payer: MEDICAID

## 2025-02-24 ENCOUNTER — OFFICE VISIT (OUTPATIENT)
Dept: FAMILY MEDICINE CLINIC | Facility: CLINIC | Age: 55
End: 2025-02-24
Payer: MEDICAID

## 2025-02-24 ENCOUNTER — LAB REQUISITION (OUTPATIENT)
Dept: LAB | Facility: HOSPITAL | Age: 55
End: 2025-02-24
Payer: MEDICAID

## 2025-02-24 ENCOUNTER — OFFICE VISIT (OUTPATIENT)
Dept: WOUND CARE | Facility: HOSPITAL | Age: 55
End: 2025-02-24
Payer: MEDICAID

## 2025-02-24 VITALS
BODY MASS INDEX: 31.76 KG/M2 | DIASTOLIC BLOOD PRESSURE: 74 MMHG | SYSTOLIC BLOOD PRESSURE: 126 MMHG | TEMPERATURE: 97.9 F | WEIGHT: 186 LBS | HEART RATE: 112 BPM | HEIGHT: 64 IN | OXYGEN SATURATION: 97 %

## 2025-02-24 DIAGNOSIS — Z98.890 HISTORY OF FASCIOTOMY: ICD-10-CM

## 2025-02-24 DIAGNOSIS — R52 PAIN: ICD-10-CM

## 2025-02-24 DIAGNOSIS — L03.115 CELLULITIS OF RIGHT LEG: ICD-10-CM

## 2025-02-24 DIAGNOSIS — E11.65 TYPE 2 DIABETES MELLITUS WITH HYPERGLYCEMIA, UNSPECIFIED WHETHER LONG TERM INSULIN USE: ICD-10-CM

## 2025-02-24 DIAGNOSIS — T81.31XA DISRUPTION OF EXTERNAL OPERATION (SURGICAL) WOUND, NOT ELSEWHERE CLASSIFIED, INITIAL ENCOUNTER: ICD-10-CM

## 2025-02-24 DIAGNOSIS — L97.912 NON-PRESSURE ULCER OF LOWER EXTREMITY WITH FAT LAYER EXPOSED, RIGHT: ICD-10-CM

## 2025-02-24 DIAGNOSIS — Z98.890 H/O FASCIOTOMY: ICD-10-CM

## 2025-02-24 DIAGNOSIS — J44.1 COPD WITH EXACERBATION: ICD-10-CM

## 2025-02-24 DIAGNOSIS — Z09 ENCOUNTER FOR FOLLOW-UP: Primary | ICD-10-CM

## 2025-02-24 DIAGNOSIS — Z12.31 ENCOUNTER FOR SCREENING MAMMOGRAM FOR MALIGNANT NEOPLASM OF BREAST: ICD-10-CM

## 2025-02-24 DIAGNOSIS — L03.115 CELLULITIS OF RIGHT LOWER LEG: ICD-10-CM

## 2025-02-24 DIAGNOSIS — L03.115 CELLULITIS OF RIGHT LOWER EXTREMITY: Primary | ICD-10-CM

## 2025-02-24 DIAGNOSIS — Z12.11 SCREENING FOR COLON CANCER: ICD-10-CM

## 2025-02-24 LAB
ALBUMIN SERPL-MCNC: 3.7 G/DL (ref 3.5–5.2)
ALBUMIN/GLOB SERPL: 0.7 G/DL
ALP SERPL-CCNC: 131 U/L (ref 39–117)
ALT SERPL W P-5'-P-CCNC: 9 U/L (ref 1–33)
ANION GAP SERPL CALCULATED.3IONS-SCNC: 12.4 MMOL/L (ref 5–15)
AST SERPL-CCNC: 15 U/L (ref 1–32)
BASOPHILS # BLD AUTO: 0.04 10*3/MM3 (ref 0–0.2)
BASOPHILS NFR BLD AUTO: 0.3 % (ref 0–1.5)
BILIRUB SERPL-MCNC: 0.3 MG/DL (ref 0–1.2)
BUN SERPL-MCNC: 12 MG/DL (ref 6–20)
BUN/CREAT SERPL: 18.5 (ref 7–25)
CALCIUM SPEC-SCNC: 9.8 MG/DL (ref 8.6–10.5)
CHLORIDE SERPL-SCNC: 94 MMOL/L (ref 98–107)
CO2 SERPL-SCNC: 24.6 MMOL/L (ref 22–29)
CREAT SERPL-MCNC: 0.65 MG/DL (ref 0.57–1)
D-LACTATE SERPL-SCNC: 2 MMOL/L (ref 0.3–2)
DEPRECATED RDW RBC AUTO: 53.4 FL (ref 37–54)
EGFRCR SERPLBLD CKD-EPI 2021: 105.4 ML/MIN/1.73
EOSINOPHIL # BLD AUTO: 0.07 10*3/MM3 (ref 0–0.4)
EOSINOPHIL NFR BLD AUTO: 0.6 % (ref 0.3–6.2)
ERYTHROCYTE [DISTWIDTH] IN BLOOD BY AUTOMATED COUNT: 15.2 % (ref 12.3–15.4)
GLOBULIN UR ELPH-MCNC: 5.1 GM/DL
GLUCOSE SERPL-MCNC: 163 MG/DL (ref 65–99)
HCT VFR BLD AUTO: 35.5 % (ref 34–46.6)
HGB BLD-MCNC: 11.2 G/DL (ref 12–15.9)
HOLD SPECIMEN: NORMAL
HOLD SPECIMEN: NORMAL
IMM GRANULOCYTES # BLD AUTO: 0.04 10*3/MM3 (ref 0–0.05)
IMM GRANULOCYTES NFR BLD AUTO: 0.3 % (ref 0–0.5)
LYMPHOCYTES # BLD AUTO: 1.75 10*3/MM3 (ref 0.7–3.1)
LYMPHOCYTES NFR BLD AUTO: 15.1 % (ref 19.6–45.3)
MCH RBC QN AUTO: 29.9 PG (ref 26.6–33)
MCHC RBC AUTO-ENTMCNC: 31.5 G/DL (ref 31.5–35.7)
MCV RBC AUTO: 94.9 FL (ref 79–97)
MONOCYTES # BLD AUTO: 0.67 10*3/MM3 (ref 0.1–0.9)
MONOCYTES NFR BLD AUTO: 5.8 % (ref 5–12)
NEUTROPHILS NFR BLD AUTO: 77.9 % (ref 42.7–76)
NEUTROPHILS NFR BLD AUTO: 9 10*3/MM3 (ref 1.7–7)
NRBC BLD AUTO-RTO: 0 /100 WBC (ref 0–0.2)
PLATELET # BLD AUTO: 494 10*3/MM3 (ref 140–450)
PMV BLD AUTO: 8.3 FL (ref 6–12)
POTASSIUM SERPL-SCNC: 3.4 MMOL/L (ref 3.5–5.2)
PROT SERPL-MCNC: 8.8 G/DL (ref 6–8.5)
RBC # BLD AUTO: 3.74 10*6/MM3 (ref 3.77–5.28)
SODIUM SERPL-SCNC: 131 MMOL/L (ref 136–145)
WBC NRBC COR # BLD AUTO: 11.57 10*3/MM3 (ref 3.4–10.8)
WHOLE BLOOD HOLD COAG: NORMAL
WHOLE BLOOD HOLD SPECIMEN: NORMAL

## 2025-02-24 PROCEDURE — 80053 COMPREHEN METABOLIC PANEL: CPT | Performed by: EMERGENCY MEDICINE

## 2025-02-24 PROCEDURE — 87077 CULTURE AEROBIC IDENTIFY: CPT

## 2025-02-24 PROCEDURE — 3074F SYST BP LT 130 MM HG: CPT | Performed by: INTERNAL MEDICINE

## 2025-02-24 PROCEDURE — 3078F DIAST BP <80 MM HG: CPT | Performed by: INTERNAL MEDICINE

## 2025-02-24 PROCEDURE — G0463 HOSPITAL OUTPT CLINIC VISIT: HCPCS

## 2025-02-24 PROCEDURE — 25010000002 AMPICILLIN-SULBACTAM PER 1.5 G: Performed by: EMERGENCY MEDICINE

## 2025-02-24 PROCEDURE — G0378 HOSPITAL OBSERVATION PER HR: HCPCS

## 2025-02-24 PROCEDURE — 1160F RVW MEDS BY RX/DR IN RCRD: CPT | Performed by: INTERNAL MEDICINE

## 2025-02-24 PROCEDURE — 25010000002 VANCOMYCIN 1.5-0.9 GM/500ML-% SOLUTION: Performed by: EMERGENCY MEDICINE

## 2025-02-24 PROCEDURE — 99204 OFFICE O/P NEW MOD 45 MIN: CPT | Performed by: INTERNAL MEDICINE

## 2025-02-24 PROCEDURE — 25010000002 MORPHINE PER 10 MG: Performed by: EMERGENCY MEDICINE

## 2025-02-24 PROCEDURE — 87040 BLOOD CULTURE FOR BACTERIA: CPT | Performed by: EMERGENCY MEDICINE

## 2025-02-24 PROCEDURE — 85025 COMPLETE CBC W/AUTO DIFF WBC: CPT | Performed by: EMERGENCY MEDICINE

## 2025-02-24 PROCEDURE — 87070 CULTURE OTHR SPECIMN AEROBIC: CPT

## 2025-02-24 PROCEDURE — 97602 WOUND(S) CARE NON-SELECTIVE: CPT

## 2025-02-24 PROCEDURE — 1125F AMNT PAIN NOTED PAIN PRSNT: CPT | Performed by: INTERNAL MEDICINE

## 2025-02-24 PROCEDURE — 25010000002 ONDANSETRON PER 1 MG: Performed by: EMERGENCY MEDICINE

## 2025-02-24 PROCEDURE — 99285 EMERGENCY DEPT VISIT HI MDM: CPT

## 2025-02-24 PROCEDURE — 83605 ASSAY OF LACTIC ACID: CPT

## 2025-02-24 PROCEDURE — 36415 COLL VENOUS BLD VENIPUNCTURE: CPT

## 2025-02-24 PROCEDURE — 87205 SMEAR GRAM STAIN: CPT

## 2025-02-24 PROCEDURE — 1159F MED LIST DOCD IN RCRD: CPT | Performed by: INTERNAL MEDICINE

## 2025-02-24 RX ORDER — POTASSIUM CHLORIDE 1500 MG/1
40 TABLET, EXTENDED RELEASE ORAL EVERY 4 HOURS
Status: COMPLETED | OUTPATIENT
Start: 2025-02-25 | End: 2025-02-25

## 2025-02-24 RX ORDER — ACETAMINOPHEN 325 MG/1
650 TABLET ORAL EVERY 4 HOURS PRN
Status: DISCONTINUED | OUTPATIENT
Start: 2025-02-24 | End: 2025-03-03 | Stop reason: HOSPADM

## 2025-02-24 RX ORDER — ONDANSETRON 2 MG/ML
4 INJECTION INTRAMUSCULAR; INTRAVENOUS ONCE
Status: COMPLETED | OUTPATIENT
Start: 2025-02-24 | End: 2025-02-24

## 2025-02-24 RX ORDER — IBUPROFEN 600 MG/1
1 TABLET ORAL
Status: DISCONTINUED | OUTPATIENT
Start: 2025-02-24 | End: 2025-03-03 | Stop reason: HOSPADM

## 2025-02-24 RX ORDER — AMOXICILLIN 250 MG
2 CAPSULE ORAL 2 TIMES DAILY PRN
Status: DISCONTINUED | OUTPATIENT
Start: 2025-02-24 | End: 2025-03-03 | Stop reason: HOSPADM

## 2025-02-24 RX ORDER — VANCOMYCIN/0.9 % SOD CHLORIDE 1.5G/250ML
20 PLASTIC BAG, INJECTION (ML) INTRAVENOUS ONCE
Status: COMPLETED | OUTPATIENT
Start: 2025-02-24 | End: 2025-02-25

## 2025-02-24 RX ORDER — ACETAMINOPHEN 650 MG/1
650 SUPPOSITORY RECTAL EVERY 4 HOURS PRN
Status: DISCONTINUED | OUTPATIENT
Start: 2025-02-24 | End: 2025-03-03 | Stop reason: HOSPADM

## 2025-02-24 RX ORDER — SODIUM CHLORIDE 9 MG/ML
40 INJECTION, SOLUTION INTRAVENOUS AS NEEDED
Status: DISCONTINUED | OUTPATIENT
Start: 2025-02-24 | End: 2025-03-03 | Stop reason: HOSPADM

## 2025-02-24 RX ORDER — BISACODYL 10 MG
10 SUPPOSITORY, RECTAL RECTAL DAILY PRN
Status: DISCONTINUED | OUTPATIENT
Start: 2025-02-24 | End: 2025-03-03 | Stop reason: HOSPADM

## 2025-02-24 RX ORDER — SODIUM CHLORIDE 0.9 % (FLUSH) 0.9 %
10 SYRINGE (ML) INJECTION AS NEEDED
Status: DISCONTINUED | OUTPATIENT
Start: 2025-02-24 | End: 2025-03-03 | Stop reason: HOSPADM

## 2025-02-24 RX ORDER — SODIUM CHLORIDE 0.9 % (FLUSH) 0.9 %
10 SYRINGE (ML) INJECTION EVERY 12 HOURS SCHEDULED
Status: DISCONTINUED | OUTPATIENT
Start: 2025-02-24 | End: 2025-03-03 | Stop reason: HOSPADM

## 2025-02-24 RX ORDER — INSULIN LISPRO 100 [IU]/ML
2-7 INJECTION, SOLUTION INTRAVENOUS; SUBCUTANEOUS
Status: DISCONTINUED | OUTPATIENT
Start: 2025-02-25 | End: 2025-03-03 | Stop reason: HOSPADM

## 2025-02-24 RX ORDER — ACETAMINOPHEN 160 MG/5ML
650 SOLUTION ORAL EVERY 4 HOURS PRN
Status: DISCONTINUED | OUTPATIENT
Start: 2025-02-24 | End: 2025-03-03 | Stop reason: HOSPADM

## 2025-02-24 RX ORDER — POLYETHYLENE GLYCOL 3350 17 G/17G
17 POWDER, FOR SOLUTION ORAL DAILY PRN
Status: DISCONTINUED | OUTPATIENT
Start: 2025-02-24 | End: 2025-03-03 | Stop reason: HOSPADM

## 2025-02-24 RX ORDER — INSULIN LISPRO 100 [IU]/ML
8 INJECTION, SOLUTION INTRAVENOUS; SUBCUTANEOUS
Status: DISCONTINUED | OUTPATIENT
Start: 2025-02-25 | End: 2025-03-03 | Stop reason: HOSPADM

## 2025-02-24 RX ORDER — BUDESONIDE AND FORMOTEROL FUMARATE DIHYDRATE 160; 4.5 UG/1; UG/1
2 AEROSOL RESPIRATORY (INHALATION)
Qty: 1 EACH | Refills: 12 | Status: SHIPPED | OUTPATIENT
Start: 2025-02-24 | End: 2025-02-25

## 2025-02-24 RX ORDER — DEXTROSE MONOHYDRATE 25 G/50ML
25 INJECTION, SOLUTION INTRAVENOUS
Status: DISCONTINUED | OUTPATIENT
Start: 2025-02-24 | End: 2025-03-03 | Stop reason: HOSPADM

## 2025-02-24 RX ORDER — ENOXAPARIN SODIUM 100 MG/ML
40 INJECTION SUBCUTANEOUS DAILY
Status: DISCONTINUED | OUTPATIENT
Start: 2025-02-25 | End: 2025-02-25

## 2025-02-24 RX ORDER — HYDROCODONE BITARTRATE AND ACETAMINOPHEN 10; 325 MG/1; MG/1
1 TABLET ORAL EVERY 6 HOURS PRN
Qty: 40 TABLET | Refills: 0 | Status: SHIPPED | OUTPATIENT
Start: 2025-02-24 | End: 2025-02-25

## 2025-02-24 RX ORDER — BISACODYL 5 MG/1
5 TABLET, DELAYED RELEASE ORAL DAILY PRN
Status: DISCONTINUED | OUTPATIENT
Start: 2025-02-24 | End: 2025-03-03 | Stop reason: HOSPADM

## 2025-02-24 RX ORDER — NICOTINE POLACRILEX 4 MG
15 LOZENGE BUCCAL
Status: DISCONTINUED | OUTPATIENT
Start: 2025-02-24 | End: 2025-03-03 | Stop reason: HOSPADM

## 2025-02-24 RX ADMIN — Medication 1500 MG: at 23:53

## 2025-02-24 RX ADMIN — MORPHINE SULFATE 4 MG: 4 INJECTION, SOLUTION INTRAMUSCULAR; INTRAVENOUS at 21:33

## 2025-02-24 RX ADMIN — AMPICILLIN SODIUM AND SULBACTAM SODIUM 3 G: 2; 1 INJECTION, POWDER, FOR SOLUTION INTRAMUSCULAR; INTRAVENOUS at 21:33

## 2025-02-24 RX ADMIN — ONDANSETRON 4 MG: 2 INJECTION, SOLUTION INTRAMUSCULAR; INTRAVENOUS at 21:33

## 2025-02-24 NOTE — PROGRESS NOTES
"Chief Complaint  Hospital Follow Up Visit (Blood clotsurgery/Prev provider  Dr Marrero/Already seeing Wound Care)    Subjective        Yarelis Jackson presents to [facility]  History of Present Illness  Patient is here for follow-up from hospital.  Establish for primary care.  She was in the hospital for a arterial clot in her right leg.  Which caused her to have a fasciotomy she is also noted to have diabetic ketoacidosis.  She is in wound care at this point.  She went to a rehab facility to get ambulatory although she is not able to get up on her leg.  Patient states she was at wound care today.  And they noted that she had greenish discharge  From the medial right lower leg.  They did a wound probe packed her wound started her on doxycycline 100 mg twice a day.  Patient states she is having pain.  When she puts weight on her leg.  She cannot put any weight on it.  She does not have any streaking up beyond the knee or the lower leg.  Examination of the wound showed that there is a green purulent discharge from the medial side of the leg.  With some minimal erythema.  No streaking  Objective   Vital Signs:  /74 (BP Location: Right arm, Patient Position: Sitting, Cuff Size: Adult)   Pulse 112   Temp 97.9 °F (36.6 °C) (Infrared)   Ht 162.6 cm (64.02\")   Wt 84.4 kg (186 lb)   SpO2 97%   BMI 31.91 kg/m²   Estimated body mass index is 31.91 kg/m² as calculated from the following:    Height as of this encounter: 162.6 cm (64.02\").    Weight as of this encounter: 84.4 kg (186 lb).            Review of Systems   Constitutional:  Positive for activity change. Negative for fever.   Musculoskeletal:  Positive for gait problem and joint swelling.   Skin:  Positive for color change and wound.   All other systems reviewed and are negative.       Physical Exam  Vitals and nursing note reviewed.   Constitutional:       Appearance: Normal appearance.   HENT:      Head: " Normocephalic.      Nose: Nose normal.      Mouth/Throat:      Mouth: Mucous membranes are moist.   Eyes:      Conjunctiva/sclera: Conjunctivae normal.   Cardiovascular:      Rate and Rhythm: Regular rhythm.   Pulmonary:      Effort: Pulmonary effort is normal.      Breath sounds: Normal breath sounds. No wheezing or rhonchi.   Abdominal:      Palpations: Abdomen is soft.   Musculoskeletal:         General: Swelling and tenderness present.      Cervical back: Neck supple.   Skin:     Findings: Erythema present.             Comments: Patient has green purulent discharge from the medial right lower leg as diagrammed above.  There are some mild erythema.  No streaking going up above the leg or the knee.  Tender to palpation there is some +2 pitting edema.   Neurological:      General: No focal deficit present.      Mental Status: She is alert.   Psychiatric:         Mood and Affect: Mood normal.         Behavior: Behavior normal.        Result Review :                Assessment and Plan   Diagnoses and all orders for this visit:    1. Encounter for follow-up (Primary)    2. Type 2 diabetes mellitus with hyperglycemia, unspecified whether long term insulin use    3. Cellulitis of right lower leg  -     HYDROcodone-acetaminophen (NORCO)  MG per tablet; Take 1 tablet by mouth Every 6 (Six) Hours As Needed for Moderate Pain or Severe Pain for up to 10 days.  Dispense: 40 tablet; Refill: 0  -     naloxone (NARCAN) 4 MG/0.1ML nasal spray; Call 911. Don't prime. [place] in 1 nostril for overdose. Repeat in 2-3 minutes in other nostril if no or minimal breathing/responsiveness.  Dispense: 1 each; Refill: 1    4. H/O fasciotomy  -     naloxone (NARCAN) 4 MG/0.1ML nasal spray; Call 911. Don't prime. [place] in 1 nostril for overdose. Repeat in 2-3 minutes in other nostril if no or minimal breathing/responsiveness.  Dispense: 1 each; Refill: 1    5. Screening for colon cancer  -     Cologuard - Stool, Per Rectum; Future    6.  Encounter for screening mammogram for malignant neoplasm of breast  -     Mammo Screening Bilateral With CAD; Future    7. COPD with exacerbation  -     budesonide-formoterol (Symbicort) 160-4.5 MCG/ACT inhaler; Inhale 2 puffs 2 (Two) Times a Day for 30 days.  Dispense: 1 each; Refill: 12    Patient was put on antibiotics today she saw wound care they have cultured the wound.  Will await cultures.  I would imagine they are going to do the antibiotics I told her to go to the emergency room if she started having fevers or chills or if she started having streaks going up the leg.  Patient is keep an eye on her blood sugar she is complaining of pain.  She is out of her pain medication we will try some Norco she had been on Roxicodone 15 immediate release tablets.  She said it was not helping much.  Prescription for Norco for 10 days and Narcan were sent to the pharmacy.  Screening for colon cancer he will do Cologuard and also mammogram she says she thinks she had one a couple years ago.  Will refill her Symbicort inhaler  I spent 45 minutes caring for Yarelis on this date of service. This time includes time spent by me in the following activities: preparing for the visit, reviewing tests, performing a medically appropriate examination and/or evaluation, counseling and educating the patient/family/caregiver, documenting information in the medical record, ordering medications, and ordering test(s)        Follow Up   Return in about 4 weeks (around 3/24/2025).  Patient was given instructions and counseling regarding her condition or for health maintenance advice. Please see specific information pulled into the AVS if appropriate.

## 2025-02-24 NOTE — Clinical Note
Level of Care: Telemetry [5]   Diagnosis: Cellulitis of right lower extremity [[id number]]   Admitting Physician: LLANES ALVAREZ, CARLOS [[id number]]   Attending Physician: LLANES ALVAREZ, CARLOS [[id number]]

## 2025-02-25 PROBLEM — L03.115 CELLULITIS OF RIGHT LEG: Status: ACTIVE | Noted: 2025-02-25

## 2025-02-25 LAB
APTT PPP: 37.2 SECONDS (ref 22.7–35.4)
BASOPHILS # BLD AUTO: 0.03 10*3/MM3 (ref 0–0.2)
BASOPHILS NFR BLD AUTO: 0.3 % (ref 0–1.5)
DEPRECATED RDW RBC AUTO: 53.9 FL (ref 37–54)
EOSINOPHIL # BLD AUTO: 0.06 10*3/MM3 (ref 0–0.4)
EOSINOPHIL NFR BLD AUTO: 0.6 % (ref 0.3–6.2)
ERYTHROCYTE [DISTWIDTH] IN BLOOD BY AUTOMATED COUNT: 15.3 % (ref 12.3–15.4)
GLUCOSE BLDC GLUCOMTR-MCNC: 130 MG/DL (ref 70–105)
GLUCOSE BLDC GLUCOMTR-MCNC: 149 MG/DL (ref 70–105)
GLUCOSE BLDC GLUCOMTR-MCNC: 153 MG/DL (ref 70–105)
GLUCOSE BLDC GLUCOMTR-MCNC: 169 MG/DL (ref 70–105)
HCT VFR BLD AUTO: 29 % (ref 34–46.6)
HGB BLD-MCNC: 9.1 G/DL (ref 12–15.9)
IMM GRANULOCYTES # BLD AUTO: 0.04 10*3/MM3 (ref 0–0.05)
IMM GRANULOCYTES NFR BLD AUTO: 0.4 % (ref 0–0.5)
INR PPP: 1.62 (ref 0.9–1.1)
LYMPHOCYTES # BLD AUTO: 1.82 10*3/MM3 (ref 0.7–3.1)
LYMPHOCYTES NFR BLD AUTO: 17.3 % (ref 19.6–45.3)
MCH RBC QN AUTO: 29.8 PG (ref 26.6–33)
MCHC RBC AUTO-ENTMCNC: 31.4 G/DL (ref 31.5–35.7)
MCV RBC AUTO: 95.1 FL (ref 79–97)
MONOCYTES # BLD AUTO: 0.9 10*3/MM3 (ref 0.1–0.9)
MONOCYTES NFR BLD AUTO: 8.5 % (ref 5–12)
NEUTROPHILS NFR BLD AUTO: 7.69 10*3/MM3 (ref 1.7–7)
NEUTROPHILS NFR BLD AUTO: 72.9 % (ref 42.7–76)
NRBC BLD AUTO-RTO: 0 /100 WBC (ref 0–0.2)
PLATELET # BLD AUTO: 366 10*3/MM3 (ref 140–450)
PMV BLD AUTO: 8.2 FL (ref 6–12)
POTASSIUM SERPL-SCNC: 4 MMOL/L (ref 3.5–5.2)
PROTHROMBIN TIME: 19.3 SECONDS (ref 11.7–14.2)
RBC # BLD AUTO: 3.05 10*6/MM3 (ref 3.77–5.28)
WBC NRBC COR # BLD AUTO: 10.54 10*3/MM3 (ref 3.4–10.8)

## 2025-02-25 PROCEDURE — 85025 COMPLETE CBC W/AUTO DIFF WBC: CPT | Performed by: STUDENT IN AN ORGANIZED HEALTH CARE EDUCATION/TRAINING PROGRAM

## 2025-02-25 PROCEDURE — 94761 N-INVAS EAR/PLS OXIMETRY MLT: CPT

## 2025-02-25 PROCEDURE — 25810000003 SODIUM CHLORIDE 0.9 % SOLUTION 250 ML FLEX CONT: Performed by: SURGERY

## 2025-02-25 PROCEDURE — 84132 ASSAY OF SERUM POTASSIUM: CPT | Performed by: EMERGENCY MEDICINE

## 2025-02-25 PROCEDURE — 82948 REAGENT STRIP/BLOOD GLUCOSE: CPT

## 2025-02-25 PROCEDURE — 63710000001 INSULIN GLARGINE PER 5 UNITS: Performed by: STUDENT IN AN ORGANIZED HEALTH CARE EDUCATION/TRAINING PROGRAM

## 2025-02-25 PROCEDURE — G0378 HOSPITAL OBSERVATION PER HR: HCPCS

## 2025-02-25 PROCEDURE — 94640 AIRWAY INHALATION TREATMENT: CPT

## 2025-02-25 PROCEDURE — 94799 UNLISTED PULMONARY SVC/PX: CPT

## 2025-02-25 PROCEDURE — 25810000003 SEPSIS FLUID NS 0.9 % SOLUTION: Performed by: STUDENT IN AN ORGANIZED HEALTH CARE EDUCATION/TRAINING PROGRAM

## 2025-02-25 PROCEDURE — 99024 POSTOP FOLLOW-UP VISIT: CPT | Performed by: NURSE PRACTITIONER

## 2025-02-25 PROCEDURE — 25010000002 CEFEPIME PER 500 MG: Performed by: STUDENT IN AN ORGANIZED HEALTH CARE EDUCATION/TRAINING PROGRAM

## 2025-02-25 PROCEDURE — 25010000002 VANCOMYCIN 1 G RECONSTITUTED SOLUTION 1 EACH VIAL: Performed by: SURGERY

## 2025-02-25 PROCEDURE — 25010000002 HEPARIN (PORCINE) 25000-0.45 UT/250ML-% SOLUTION: Performed by: STUDENT IN AN ORGANIZED HEALTH CARE EDUCATION/TRAINING PROGRAM

## 2025-02-25 PROCEDURE — 85730 THROMBOPLASTIN TIME PARTIAL: CPT | Performed by: STUDENT IN AN ORGANIZED HEALTH CARE EDUCATION/TRAINING PROGRAM

## 2025-02-25 PROCEDURE — 85610 PROTHROMBIN TIME: CPT | Performed by: STUDENT IN AN ORGANIZED HEALTH CARE EDUCATION/TRAINING PROGRAM

## 2025-02-25 PROCEDURE — 63710000001 INSULIN LISPRO (HUMAN) PER 5 UNITS: Performed by: STUDENT IN AN ORGANIZED HEALTH CARE EDUCATION/TRAINING PROGRAM

## 2025-02-25 RX ORDER — PANTOPRAZOLE SODIUM 40 MG/1
40 TABLET, DELAYED RELEASE ORAL
Status: DISCONTINUED | OUTPATIENT
Start: 2025-02-25 | End: 2025-03-03 | Stop reason: HOSPADM

## 2025-02-25 RX ORDER — HEPARIN SODIUM 10000 [USP'U]/100ML
12 INJECTION, SOLUTION INTRAVENOUS
Status: DISCONTINUED | OUTPATIENT
Start: 2025-02-25 | End: 2025-02-25

## 2025-02-25 RX ORDER — HYDROCODONE BITARTRATE AND ACETAMINOPHEN 10; 325 MG/1; MG/1
1 TABLET ORAL EVERY 6 HOURS PRN
Status: DISCONTINUED | OUTPATIENT
Start: 2025-02-25 | End: 2025-02-28

## 2025-02-25 RX ORDER — BUDESONIDE AND FORMOTEROL FUMARATE DIHYDRATE 160; 4.5 UG/1; UG/1
2 AEROSOL RESPIRATORY (INHALATION)
Status: DISCONTINUED | OUTPATIENT
Start: 2025-02-25 | End: 2025-03-03 | Stop reason: HOSPADM

## 2025-02-25 RX ORDER — DOXYCYCLINE 100 MG/1
100 CAPSULE ORAL 2 TIMES DAILY
COMMUNITY
Start: 2025-02-24 | End: 2025-03-03 | Stop reason: HOSPADM

## 2025-02-25 RX ADMIN — CEFEPIME 2000 MG: 2 INJECTION, POWDER, FOR SOLUTION INTRAVENOUS at 03:46

## 2025-02-25 RX ADMIN — HYDROCODONE BITARTRATE AND ACETAMINOPHEN 1 TABLET: 10; 325 TABLET ORAL at 03:05

## 2025-02-25 RX ADMIN — BUDESONIDE AND FORMOTEROL FUMARATE DIHYDRATE 2 PUFF: 160; 4.5 AEROSOL RESPIRATORY (INHALATION) at 08:14

## 2025-02-25 RX ADMIN — POTASSIUM CHLORIDE 40 MEQ: 20 TABLET, EXTENDED RELEASE ORAL at 03:51

## 2025-02-25 RX ADMIN — HEPARIN SODIUM 12 UNITS/KG/HR: 10000 INJECTION, SOLUTION INTRAVENOUS at 07:46

## 2025-02-25 RX ADMIN — CEFEPIME 2000 MG: 2 INJECTION, POWDER, FOR SOLUTION INTRAVENOUS at 10:37

## 2025-02-25 RX ADMIN — SODIUM CHLORIDE 1000 MG: 9 INJECTION, SOLUTION INTRAVENOUS at 22:10

## 2025-02-25 RX ADMIN — APIXABAN 5 MG: 5 TABLET, FILM COATED ORAL at 15:41

## 2025-02-25 RX ADMIN — POTASSIUM CHLORIDE 40 MEQ: 20 TABLET, EXTENDED RELEASE ORAL at 00:17

## 2025-02-25 RX ADMIN — BUDESONIDE AND FORMOTEROL FUMARATE DIHYDRATE 2 PUFF: 160; 4.5 AEROSOL RESPIRATORY (INHALATION) at 21:52

## 2025-02-25 RX ADMIN — SODIUM CHLORIDE 2244 ML: 9 INJECTION, SOLUTION INTRAVENOUS at 00:08

## 2025-02-25 RX ADMIN — Medication 10 ML: at 20:21

## 2025-02-25 RX ADMIN — APIXABAN 5 MG: 5 TABLET, FILM COATED ORAL at 20:21

## 2025-02-25 RX ADMIN — INSULIN GLARGINE 20 UNITS: 100 INJECTION, SOLUTION SUBCUTANEOUS at 20:21

## 2025-02-25 RX ADMIN — CEFEPIME 2000 MG: 2 INJECTION, POWDER, FOR SOLUTION INTRAVENOUS at 18:29

## 2025-02-25 RX ADMIN — INSULIN LISPRO 2 UNITS: 100 INJECTION, SOLUTION INTRAVENOUS; SUBCUTANEOUS at 12:46

## 2025-02-25 RX ADMIN — PANTOPRAZOLE SODIUM 40 MG: 40 TABLET, DELAYED RELEASE ORAL at 07:09

## 2025-02-25 RX ADMIN — HYDROCODONE BITARTRATE AND ACETAMINOPHEN 1 TABLET: 10; 325 TABLET ORAL at 22:15

## 2025-02-25 RX ADMIN — SODIUM CHLORIDE 1000 MG: 9 INJECTION, SOLUTION INTRAVENOUS at 11:14

## 2025-02-25 NOTE — PROGRESS NOTES
"Pharmacy Antimicrobial Dosing Service    Subjective:  Yarelis Jackson is a 54 y.o.female admitted with worsening pain and . Pharmacy has been consulted to dose Vancomycin for possible SSTI.    PMH: DM, PAD with recent right critical limb ischemia status post right iliac thrombectomy; recent hospitalization at [facility]      Assessment/Plan    1. Day #1 Vancomycin: Goal -600 mcg*h/mL. Vancomycin 1500 mg IV x 1, followed by 1000 mg IV q12h. [employer] predicts therapeutic  mcg/mL*h on this regimen. Peak and trough are ordered for 2/26 at 0200 and 0900 for clinical review respectively.     2. Day #1 Cefepime: 2gm IV q8h for estCrCl > 60 mL/min. One dose of ampicillin/sulbactam 3 gm IV given in the ED.     Will continue to monitor drug levels, renal function, culture and sensitivities, and patient clinical status.       Objective:  Relevant clinical data and objective history reviewed:  162.6 cm (64\")   74.8 kg (165 lb)   Ideal body weight: 54.7 kg (120 lb 9.5 oz)  Adjusted ideal body weight: 62.8 kg (138 lb 5.7 oz)  Body mass index is 28.32 kg/m².        Results from last 7 days   Lab Units 02/24/25  2056   CREATININE mg/dL 0.65     Estimated Creatinine Clearance: 99.1 mL/min (by C-G formula based on SCr of 0.65 mg/dL).  No intake/output data recorded.    Results from last 7 days   Lab Units 02/25/25  0359 02/24/25  2056   WBC 10*3/mm3 10.54 11.57*     Temperature    02/24/25 2022 02/25/25 0100   Temp: (!) 101.4 °F (38.6 °C) (!) 101 °F (38.3 °C)     Baseline culture/source/susceptibility:  Microbiology Results (last 10 days)       Procedure Component Value - Date/Time    Wound Culture - Wound, Leg, Right [[id number]] Collected: 02/24/25 0830    Lab Status: Preliminary result Specimen: Wound from Leg, Right Updated: 02/24/25 1705     Gram Stain Many (4+) WBCs per low power field      Many (4+) Gram positive cocci in pairs and chains            Neelam Mathew, PharmD  02/25/25 06:29 EST    "

## 2025-02-25 NOTE — PROGRESS NOTES
[facility] MEDICINE SERVICE  DAILY PROGRESS NOTE    NAME: Yarelis Jackson  : 1970  MRN: [id number]      LOS: 0 days     PROVIDER OF SERVICE: Usama Ware MD    Chief Complaint: Cellulitis of right lower extremity    Subjective:     Interval History:  History taken from: patient    No new complaints. Swelling of RLE        Review of Systems:   Review of Systems   Constitutional:  Negative for chills and fever.   HENT:  Negative for congestion, ear discharge, ear pain, sinus pain and sore throat.    Respiratory:  Negative for apnea, cough, chest tightness, shortness of breath and wheezing.    Cardiovascular:  Negative for chest pain and palpitations.   Gastrointestinal:  Negative for abdominal pain, blood in stool, constipation, diarrhea, nausea and vomiting.   Endocrine: Negative for cold intolerance and heat intolerance.   Genitourinary:  Negative for dysuria, flank pain, hematuria and urgency.   Musculoskeletal:  Negative for arthralgias, back pain, joint swelling, myalgias and neck pain.   Skin: Negative.  Negative for color change and wound.   Neurological: Negative.    Hematological:  Does not bruise/bleed easily.   Psychiatric/Behavioral:  Negative for agitation, confusion, hallucinations, sleep disturbance and suicidal ideas. The patient is not nervous/anxious.        Objective:     Vital Signs  Temp:  [97.9 °F (36.6 °C)-101.4 °F (38.6 °C)] 99.4 °F (37.4 °C)  Heart Rate:  [] 83  Resp:  [12-17] 17  BP: (126-159)/(68-88) 132/69   Body mass index is 28.32 kg/m².    Physical Exam  Physical Exam  Constitutional:       General: She is not in acute distress.     Appearance: Normal appearance.   HENT:      Head: Normocephalic and atraumatic.      Nose: Nose normal.      Mouth/Throat:      Mouth: Mucous membranes are moist.   Eyes:      Extraocular Movements: Extraocular movements intact.      Conjunctiva/sclera: Conjunctivae normal.   Cardiovascular:      Rate and Rhythm: Normal rate  and regular rhythm.   Pulmonary:      Effort: Pulmonary effort is normal.      Breath sounds: Normal breath sounds.   Abdominal:      General: Abdomen is flat. Bowel sounds are normal.      Palpations: Abdomen is soft.   Musculoskeletal:         General: Swelling present. Normal range of motion.      Cervical back: Normal range of motion and neck supple.   Skin:     General: Skin is warm and dry.      Findings: Erythema present.   Neurological:      General: No focal deficit present.      Mental Status: She is alert.   Psychiatric:         Mood and Affect: Mood normal.            Diagnostic Data    Results from last 7 days   Lab Units 02/25/25  0359 02/24/25  2056   WBC 10*3/mm3 10.54 11.57*   HEMOGLOBIN g/dL 9.1* 11.2*   HEMATOCRIT % 29.0* 35.5   PLATELETS 10*3/mm3 366 494*   GLUCOSE mg/dL  --  163*   CREATININE mg/dL  --  0.65   BUN mg/dL  --  12   SODIUM mmol/L  --  131*   POTASSIUM mmol/L 4.0 3.4*   AST (SGOT) U/L  --  15   ALT (SGPT) U/L  --  9   ALK PHOS U/L  --  131*   BILIRUBIN mg/dL  --  0.3   ANION GAP mmol/L  --  12.4       No radiology results for the last day      I reviewed the patient's new clinical results.    Assessment/Plan:     Active and Resolved Problems  Active Hospital Problems    Diagnosis  POA    **Cellulitis of right lower extremity [L03.115]  Yes    Cellulitis of right leg [L03.115]  Yes      Resolved Hospital Problems   No resolved problems to display.       Sepsis due to RLE cellulitis  Severe PAD  status post right iliac thrombectomy, right iliofemoral popliteal thrombectomy, right femoral endarterectomy   Intractable right leg pain     12/19 - right iliac thrombectomy. 12/20 - right iliofemoral popliteal thrombectomy, right femoral endarterectomy with patch, right lower extremity arteriogram and close right calf fasciotomy. 12/23 - emergent 4 compartment fasciotomies.  Cont IV abx  Follow up blood culture  Per vasc surgery, no need for surgical intervention at this time  Resume  eliquis     Type 2 DM  Continue Lantus and SSI        Chronic Anemia  Monitor daily CBC, transfuse as needed          VTE Prophylaxis:  Pharmacologic VTE prophylaxis orders are present.             Disposition Planning:     Barriers to Discharge:response to treatment celluilitis  Anticipated Date of Discharge: 2/27/25  Place of Discharge: pending pt      Time: 35 minutes     Code Status and Medical Interventions: CPR (Attempt to Resuscitate); Full Support   Ordered at: 02/24/25 8076     Code Status (Patient has no pulse and is not breathing):    CPR (Attempt to Resuscitate)     Medical Interventions (Patient has pulse or is breathing):    Full Support       Signature: Electronically signed by Usama Ware MD, 02/25/25, 13:23 EST.  [facility] Hospitalist Team

## 2025-02-25 NOTE — PLAN OF CARE
Goal Outcome Evaluation:  Plan of Care Reviewed With: patient        Progress: no change  Outcome Evaluation: VSS, pt admitted for cellulitis of RLE. Sepsis screen positive, order set intiated per protocol. Admission database completed, admission orders and care plan initiated. Pt a/o x4, able to make needs known. Pain controlled w/PRN meds. All needs addressed.

## 2025-02-25 NOTE — CASE MANAGEMENT/SOCIAL WORK
Discharge Planning Assessment   Cesar     Patient Name: Yarelis Jackson  MRN: [id number]  Today's Date: 2/25/2025    Admit Date: 2/24/2025    Plan: From home with parents. [facility] following for potential needs.   Discharge Needs Assessment       Row Name 02/25/25 1324       Living Environment    People in Home parent(s)    Name(s) of People in Home parents    Current Living Arrangements home    Potentially Unsafe Housing Conditions none    In the past 12 months has the electric, gas, oil, or water company threatened to shut off services in your home? No    Primary Care Provided by self    Provides Primary Care For no one    Family Caregiver if Needed none    Quality of Family Relationships unable to assess    Able to Return to Prior Arrangements yes       Resource/Environmental Concerns    Resource/Environmental Concerns none    Transportation Concerns none       Transportation Needs    In the past 12 months, has lack of transportation kept you from medical appointments or from getting medications? no    In the past 12 months, has lack of transportation kept you from meetings, work, or from getting things needed for daily living? No       Food Insecurity    Within the past 12 months, you worried that your food would run out before you got the money to buy more. Never true    Within the past 12 months, the food you bought just didn't last and you didn't have money to get more. Never true       Transition Planning    Patient/Family Anticipates Transition to home with help/services    Patient/Family Anticipated Services at Transition home health care    Transportation Anticipated family or friend will provide       Discharge Needs Assessment    Readmission Within the Last 30 Days no previous admission in last 30 days    Equipment Currently Used at Home wheelchair;walker, standard;noninvasive ventilator    Concerns to be Addressed discharge planning    Do you want help finding or keeping work or a job? I do not  need or want help    Do you want help with school or training? For example, starting or completing job training or getting a high school diploma, GED or equivalent Patient declined    Anticipated Changes Related to Illness none    Equipment Needed After Discharge none                   Discharge Plan       Row Name 02/25/25 1326       Plan    Plan From home with parents. [facility]  following for potential needs.    Patient/Family in Agreement with Plan yes    Plan Comments CM met with patient at bedside. Confirmed PCP, insurance, and pharmacy.  Meds to beds enrolled. Patient states she lives with her parents and was supposed to have home health set up but has never heard from anyone. Patient not current with any therapies, but states if she could get home health she has no preference just whatever agency will take her insurance. Sent Marti with [facility] a message to see if they would take her insurance and they will take for therapy unless there is a hands on skill. [facility]  to follow for needs. Confirmed transportation at discharge will be her mom. DC Barriers: heparin gtt, wound/blood cultures pending, monitoring vitals, IV abx, wound care.                  Continued Care and Services - Admitted Since 2/24/2025       Home Medical Care       Service Provider Request Status Services Address Phone Fax Patient Preferred    [facility] Pending - Request Sent -- [address] [facility] [place] [address] [phone] --                Demographic Summary       Row Name 02/25/25 1322       General Information    Admission Type inpatient    Arrived From emergency department    Referral Source admission list    Reason for Consult discharge planning    Preferred Language English       Contact Information    Permission Granted to Share Info With                    Functional Status       Row Name 02/25/25 1324       Functional Status    Usual Activity Tolerance moderate    Current Activity  Tolerance fair       Functional Status, IADL    Medications independent    Meal Preparation independent    Housekeeping independent    Laundry independent    Shopping independent    If for any reason you need help with day-to-day activities such as bathing, preparing meals, shopping, managing finances, etc., do you get the help you need? I could use a little more help             Radha Fernandez RN     Office: [phone]

## 2025-02-25 NOTE — H&P
"[facility] Medicine Services  History & Physical    Patient Name: Yarelis Jackson  : 1970  MRN: [id number]  Primary Care Physician:  Rohit Schrader DO  Date of admission: 2025  Date and Time of Service: 2025 at 2340    Subjective      Chief Complaint: \"right leg pain, swelling, fever\"    History of Present Illness: Yarelis Jackson is a 54 y.o. female with a PMH of COPD, GERD, Hypertension, type 2 DM, PAD with recent right critical limb ischemia status post right iliac thrombectomy, right iliofemoral popliteal thrombectomy, right femoral endarterectomy, ongoing smoking, she was hospitalized from  through 25 and was seen in follow up by vascular surgery 6 days ago for follow up of fasciotomy wounds apparently she was doing well  who presented to [facility] on 2025 with worsening pain and swelling with erythema since past couple of days, started having subjective fever and chills earlier today. In ED she is noted to have fever temp 101.4, tachycardic 116 x min. CBC labs notable for Hb 11.2, wbc 11.57. The decision to admit was made.       Review of Systems   Constitutional:  Positive for chills, fatigue and fever.   HENT:  Negative for drooling.    Respiratory:  Positive for cough. Negative for choking, chest tightness and stridor.    Cardiovascular:  Positive for leg swelling. Negative for chest pain.   Gastrointestinal:  Negative for abdominal pain and diarrhea.   Genitourinary:  Negative for dysuria and genital sores.   Musculoskeletal:  Positive for joint swelling. Negative for arthralgias.   Skin:  Positive for wound. Negative for pallor.   Neurological:  Negative for headaches.   Psychiatric/Behavioral:  Negative for confusion.        Personal History     Past Medical History:   Diagnosis Date    COPD (chronic obstructive pulmonary disease)     Deep vein thrombosis     Low back pain     Migraine     Neck pain        Past Surgical History:   Procedure " Laterality Date    FASCIOTOMY Right 12/23/2024    Procedure: FASCIOTOMY LEG;  Surgeon: Chris Delgado MD;  Location: [facility] MAIN OR;  Service: Vascular;  Laterality: Right;    FEMORAL THROMBECTOMY/EMBOLECTOMY Right 12/19/2024    Procedure: FEMORAL right iliofemoral thrombectomy, arteriogram;  Surgeon: Ghassan Celestin MD;  Location: [facility] HYBRID OR;  Service: Vascular;  Laterality: Right;    FEMORAL THROMBECTOMY/EMBOLECTOMY Right 12/20/2024    Procedure: Right femoral thrombectomy, right lower extremity arteriogram and right lower leg facitomies;  Surgeon: Ghassan Celestin MD;  Location: [facility] HYBRID OR;  Service: Vascular;  Laterality: Right;    LUNG SURGERY         Family History: family history includes COPD in her paternal grandmother; Cancer in her paternal grandmother and paternal uncle; Diabetes in her mother and paternal grandmother. Otherwise pertinent FHx was reviewed and not pertinent to current issue.    Social History:  reports that she has been smoking cigarettes. She started smoking about 40 years ago. She has a 20.1 pack-year smoking history. She has never used smokeless tobacco. She reports that she does not currently use alcohol. She reports current drug use. Drug: Marijuana.    Home Medications:  Prior to Admission Medications       Prescriptions Last Dose Informant Patient Reported? Taking?    Eliquis 5 MG tablet tablet 2/24/2025  Yes Yes    Take 1 tablet by mouth Every 12 (Twelve) Hours.    Insulin Glargine (LANTUS SOLOSTAR) 100 UNIT/ML injection pen 2/24/2025  No Yes    Inject 22 Units under the skin into the appropriate area as directed Every Night for 136 days.    Insulin Lispro, 1 Unit Dial, (HumaLOG KwikPen) 100 UNIT/ML solution pen-injector 2/24/2025  No Yes    9 units with each with each meal with sliding scale not more than 15 units with each meal    naloxone (NARCAN) 4 MG/0.1ML nasal spray Unknown  Yes No    Administer  into the nostril(s) as directed by provider.     oxyCODONE (ROXICODONE) 15 MG immediate release tablet 2/24/2025  No Yes    Take 1 tablet by mouth Every 4 (Four) Hours As Needed for Moderate Pain.    pantoprazole (PROTONIX) 40 MG EC tablet 2/24/2025  Yes Yes    Take 1 tablet by mouth.    sodium chloride (NS) 0.9 % irrigation 2/23/2025  Yes Yes    Irrigate with 0.033 mL to the affected area as directed by provider 1 (One) Time.    budesonide-formoterol (Symbicort) 160-4.5 MCG/ACT inhaler Unknown  No No    Inhale 2 puffs 2 (Two) Times a Day for 30 days.    doxycycline (MONODOX) 100 MG capsule 2/24/2025  Yes Yes    Take 1 capsule by mouth 2 (Two) Times a Day.    HYDROcodone-acetaminophen (NORCO)  MG per tablet Unknown  No No    Take 1 tablet by mouth Every 6 (Six) Hours As Needed for Moderate Pain or Severe Pain for up to 10 days.    naloxone (NARCAN) 4 MG/0.1ML nasal spray Unknown  No No    Call 911. Don't prime. Eldridge in 1 nostril for overdose. Repeat in 2-3 minutes in other nostril if no or minimal breathing/responsiveness.              Allergies:  Allergies   Allergen Reactions    Aspirin GI Intolerance    Ibuprofen Itching       Objective      Vitals:   Temp:  [97.9 °F (36.6 °C)-101.4 °F (38.6 °C)] 101 °F (38.3 °C)  Heart Rate:  [104-121] 115  Resp:  [12] 12  BP: (126-159)/(68-88) 141/70  Body mass index is 28.32 kg/m².  Physical Exam  Constitutional:       General: She is not in acute distress.     Appearance: Normal appearance. She is not toxic-appearing.   HENT:      Head: Normocephalic.      Nose: Nose normal.      Mouth/Throat:      Mouth: Mucous membranes are dry.   Eyes:      Extraocular Movements: Extraocular movements intact.      Pupils: Pupils are equal, round, and reactive to light.   Cardiovascular:      Rate and Rhythm: Regular rhythm. Tachycardia present.      Pulses: Normal pulses.      Heart sounds: Normal heart sounds. No murmur heard.  Pulmonary:      Effort: Pulmonary effort is normal. No respiratory distress.      Breath sounds: Normal  breath sounds.   Abdominal:      General: Abdomen is flat. There is no distension.      Palpations: Abdomen is soft.      Tenderness: There is no abdominal tenderness.   Musculoskeletal:         General: Normal range of motion.      Cervical back: Neck supple.      Right lower leg: Edema present.      Left lower leg: No edema.      Comments: RLE pedal edema with marked circumferential erythema entire right leg with surgical wound medial aspect with purulent draining. Tender to touch, warmth.    Skin:     General: Skin is warm.      Capillary Refill: Capillary refill takes less than 2 seconds.      Findings: Erythema and lesion present.   Neurological:      Mental Status: She is alert and oriented to person, place, and time.   Psychiatric:         Behavior: Behavior normal.         Diagnostic Data:  Lab Results (last 24 hours)       Procedure Component Value Units Date/Time    Comprehensive Metabolic Panel [[id number]]  (Abnormal) Collected: 02/24/25 2056    Specimen: Blood Updated: 02/24/25 2130     Glucose 163 mg/dL      BUN 12 mg/dL      Creatinine 0.65 mg/dL      Sodium 131 mmol/L      Potassium 3.4 mmol/L      Chloride 94 mmol/L      CO2 24.6 mmol/L      Calcium 9.8 mg/dL      Total Protein 8.8 g/dL      Albumin 3.7 g/dL      ALT (SGPT) 9 U/L      AST (SGOT) 15 U/L      Alkaline Phosphatase 131 U/L      Total Bilirubin 0.3 mg/dL      Globulin 5.1 gm/dL      A/G Ratio 0.7 g/dL      BUN/Creatinine Ratio 18.5     Anion Gap 12.4 mmol/L      eGFR 105.4 mL/min/1.73     Narrative:      GFR Categories in Chronic Kidney Disease (CKD)      GFR Category          GFR (mL/min/1.73)    Interpretation  G1                     90 or greater         Normal or high (1)  G2                      60-89                Mild decrease (1)  G3a                   45-59                Mild to moderate decrease  G3b                   30-44                Moderate to severe decrease  G4                    15-29                Severe  decrease  G5                    14 or less           Kidney failure          (1)In the absence of evidence of kidney disease, neither GFR category G1 or G2 fulfill the criteria for CKD.    eGFR calculation 2021 CKD-EPI creatinine equation, which does not include race as a factor    [place] Draw [[id number]] Collected: 02/24/25 2056    Specimen: Blood Updated: 02/24/25 2115    Narrative:      The following orders were created for panel order [place] Draw.  Procedure                               Abnormality         Status                     ---------                               -----------         ------                     Green Top (Gel)[[id number]]                                  Final result               Lavender Top[[id number]]                                     Final result               Gold Top - SST[[id number]]                                   Final result               Light Blue Top[[id number]]                                   Final result                 Please view results for these tests on the individual orders.    Green Top (Gel) [[id number]] Collected: 02/24/25 2056    Specimen: Blood Updated: 02/24/25 2115     Extra Tube Hold for add-ons.     Comment: Auto resulted.       Lavender Top [[id number]] Collected: 02/24/25 2056    Specimen: Blood Updated: 02/24/25 2115     Extra Tube hold for add-on     Comment: Auto resulted       Gold Top - SST [[id number]] Collected: 02/24/25 2056    Specimen: Blood Updated: 02/24/25 2115     Extra Tube Hold for add-ons.     Comment: Auto resulted.       Light Blue Top [[id number]] Collected: 02/24/25 2056    Specimen: Blood Updated: 02/24/25 2115     Extra Tube Hold for add-ons.     Comment: Auto resulted       CBC & Differential [[id number]]  (Abnormal) Collected: 02/24/25 2056    Specimen: Blood Updated: 02/24/25 2106    Narrative:      The following orders were created for panel order CBC & Differential.  Procedure                               Abnormality          Status                     ---------                               -----------         ------                     CBC Auto Differential[[id number]]        Abnormal            Final result                 Please view results for these tests on the individual orders.    CBC Auto Differential [[id number]]  (Abnormal) Collected: 02/24/25 2056    Specimen: Blood Updated: 02/24/25 2106     WBC 11.57 10*3/mm3      RBC 3.74 10*6/mm3      Hemoglobin 11.2 g/dL      Hematocrit 35.5 %      MCV 94.9 fL      MCH 29.9 pg      MCHC 31.5 g/dL      RDW 15.2 %      RDW-SD 53.4 fl      MPV 8.3 fL      Platelets 494 10*3/mm3      Neutrophil % 77.9 %      Lymphocyte % 15.1 %      Monocyte % 5.8 %      Eosinophil % 0.6 %      Basophil % 0.3 %      Immature Grans % 0.3 %      Neutrophils, Absolute 9.00 10*3/mm3      Lymphocytes, Absolute 1.75 10*3/mm3      Monocytes, Absolute 0.67 10*3/mm3      Eosinophils, Absolute 0.07 10*3/mm3      Basophils, Absolute 0.04 10*3/mm3      Immature Grans, Absolute 0.04 10*3/mm3      nRBC 0.0 /100 WBC     POC Lactate [[id number]]  (Normal) Collected: 02/24/25 2104    Specimen: Blood Updated: 02/24/25 2105     Lactate 2.0 mmol/L      Comment: Serial Number: [id number]:  [id number]       Blood Culture - Blood, Arm, Left [[id number]] Collected: 02/24/25 2056    Specimen: Blood from Arm, Left Updated: 02/24/25 2104    Blood Culture - Blood, Arm, Right [[id number]] Collected: 02/24/25 2056    Specimen: Blood from Arm, Right Updated: 02/24/25 2104             Imaging Results (Last 24 Hours)       ** No results found for the last 24 hours. **              Assessment & Plan        This is a 54 y.o. female with:    Active and Resolved Problems  Active Hospital Problems    Diagnosis  POA    **Cellulitis of right lower extremity [L03.115]  Yes    Cellulitis of right leg [L03.115]  Yes      Resolved Hospital Problems   No resolved problems to display.       Sepsis due to RLE cellulitis  Severe PAD  status post right  iliac thrombectomy, right iliofemoral popliteal thrombectomy, right femoral endarterectomy   Intractable right leg pain   Patient meets SIRS criteria with , temp 101.4  S/P 30 ml/kg NS bolus in ED  12/19 - right iliac thrombectomy. 12/20 - right iliofemoral popliteal thrombectomy, right femoral endarterectomy with patch, right lower extremity arteriogram and close right calf fasciotomy. 12/23 - emergent 4 compartment fasciotomies.  Was seen in follow up by vascular surgery on 2/18, it was felt that there was no indication for infection and recommended continue wound care   Patient is started on IV vancomycin and cefepime  Will hold eliquis and heparinize while inpatient should she warrant any I&D, may resume eliquis if no plans for surgical intervention  Vascular surgery consultation requested  Continue local wound care  Delineate area of erythema, monitor for progression  Follow wound and blood cultures      Type 2 DM  Continue Lantus and SSI       Chronic Anemia  Monitor daily CBC, transfuse as needed       History of GERD  Continue PPI    VTE Prophylaxis:  Pharmacologic VTE prophylaxis orders are present.        The patient desires to be as follows:    CODE STATUS:    Code Status (Patient has no pulse and is not breathing): CPR (Attempt to Resuscitate)  Medical Interventions (Patient has pulse or is breathing): Full Support        Sukh Saldivar, who can be contacted at , is the designated person to make medical decisions on the patient's behalf if She is incapable of doing so. This was clarified with patient and/or next of kin on 2/24/2025 during the course of this H&P.    Admission Status:  I believe this patient meets inpatient status.    Expected Length of Stay: 2-3    PDMP and Medication Dispenses via Sidebar reviewed and consistent with patient reported medications.    I discussed the patient's findings and my recommendations with patient and nursing staff.      Signature:     This document  has been electronically signed by Carlos Llanes Alvarez, MD on February 25, 2025 01:35 [facility] Hospitalist Team

## 2025-02-25 NOTE — ED PROVIDER NOTES
Subjective   History of Present Illness  54-year-old female history of fasciotomy was discharged from the hospital on January 16 and went to rehab.  She states that she has since returned home.  She reports that in the last 3 to 4 days she has had the onset of increasing discomfort in her leg and swelling.  She was seen at the wound care where dressing was placed and the patient told to come to the hospital for additional treatment.  The patient reports she has had purulent drainage.  She said reports subjective fever and chills within the last 24 hours.  She reports no decrease in sensation or circulation and states that the lower leg feels like it is burning.      Review of Systems    Past Medical History:   Diagnosis Date    COPD (chronic obstructive pulmonary disease)     Deep vein thrombosis     Low back pain     Migraine     Neck pain        Allergies   Allergen Reactions    Aspirin GI Intolerance    Ibuprofen Itching       Past Surgical History:   Procedure Laterality Date    FASCIOTOMY Right 12/23/2024    Procedure: FASCIOTOMY LEG;  Surgeon: Chris Delgado MD;  Location: [facility] MAIN OR;  Service: Vascular;  Laterality: Right;    FEMORAL THROMBECTOMY/EMBOLECTOMY Right 12/19/2024    Procedure: FEMORAL right iliofemoral thrombectomy, arteriogram;  Surgeon: Ghassan Celestin MD;  Location: [facility] HYBRID OR;  Service: Vascular;  Laterality: Right;    FEMORAL THROMBECTOMY/EMBOLECTOMY Right 12/20/2024    Procedure: Right femoral thrombectomy, right lower extremity arteriogram and right lower leg facitomies;  Surgeon: Ghassan Celestin MD;  Location: [facility] OR;  Service: Vascular;  Laterality: Right;    LUNG SURGERY         Family History   Problem Relation Age of Onset    Diabetes Mother     Cancer Paternal Uncle     Cancer Paternal Grandmother     COPD Paternal Grandmother     Diabetes Paternal Grandmother        Social History     Socioeconomic History    Marital status:    Tobacco Use     Smoking status: Every Day     Current packs/day: 0.50     Average packs/day: 0.5 packs/day for 40.1 years (20.1 ttl pk-yrs)     Types: Cigarettes     Start date: 1985    Smokeless tobacco: Never   Vaping Use    Vaping status: Never Used   Substance and Sexual Activity    Alcohol use: Not Currently    Drug use: Yes     Types: Marijuana    Sexual activity: Defer           Objective   Physical Exam  Alert [place] Coma Scale 15   HEENT: Pupils equal and reactive to light. Conjunctivae are not injected. Normal tympanic membranes. Oropharynx and nares are normal.   Neck: Supple. Midline trachea. No JVD. No goiter.   Chest: Clear and equal breath sounds bilaterally, regular rate and rhythm without murmur or rub.   Abdomen: Positive bowel sounds, nontender, nondistended. No rebound or peritoneal signs. No CVA tenderness.   Extremities no clubbing or cyanosis patient has erythematous changes noted to most lower extremity the patient still has a completely healed fasciotomy incisions the patient does not have areas of induration or areas suggestive of abscess formation pulses are intact and symmetrical.  Hernandez's test is normal there is some calf discomfort to direct pressure but no classic Homans' sign motor sensory exam is normal. The full range of motion is intact   Skin: Warm and dry, no rashes or petechia.   Lymphatic: No regional lymphadenopathy.   Procedures           ED Course        Due to significant overcrowding in the emergency department patient was evaluated by myself in a hallway bed.  This examination might be limited by privacy concerns, noise, exposure issues, and the patient not wearing a hospital gown.  Explained to the patient our limitations and our overcrowding.  They were in agreement to continue the exam and treatment at this time.    Patient eventually placed in room                                   Labs Reviewed   COMPREHENSIVE METABOLIC PANEL - Abnormal; Notable for the following components:        Result Value    Glucose 163 (*)     Sodium 131 (*)     Potassium 3.4 (*)     Chloride 94 (*)     Total Protein 8.8 (*)     Alkaline Phosphatase 131 (*)     All other components within normal limits    Narrative:     GFR Categories in Chronic Kidney Disease (CKD)      GFR Category          GFR (mL/min/1.73)    Interpretation  G1                     90 or greater         Normal or high (1)  G2                      60-89                Mild decrease (1)  G3a                   45-59                Mild to moderate decrease  G3b                   30-44                Moderate to severe decrease  G4                    15-29                Severe decrease  G5                    14 or less           Kidney failure          (1)In the absence of evidence of kidney disease, neither GFR category G1 or G2 fulfill the criteria for CKD.    eGFR calculation 2021 CKD-EPI creatinine equation, which does not include race as a factor   CBC WITH AUTO DIFFERENTIAL - Abnormal; Notable for the following components:    WBC 11.57 (*)     RBC 3.74 (*)     Hemoglobin 11.2 (*)     Platelets 494 (*)     Neutrophil % 77.9 (*)     Lymphocyte % 15.1 (*)     Neutrophils, Absolute 9.00 (*)     All other components within normal limits   POC LACTATE - Normal   BLOOD CULTURE   BLOOD CULTURE   RAINBOW DRAW    Narrative:     The following orders were created for panel order Coin Draw.  Procedure                               Abnormality         Status                     ---------                               -----------         ------                     Green Top (Gel)[[id number]]                                  Final result               Lavender Top[[id number]]                                     Final result               Gold Top - SST[[id number]]                                   Final result               Light Blue Top[[id number]]                                   Final result                 Please view results for these tests on the  individual orders.   POC LACTATE   CBC AND DIFFERENTIAL    Narrative:     The following orders were created for panel order CBC & Differential.  Procedure                               Abnormality         Status                     ---------                               -----------         ------                     CBC Auto Differential[[id number]]        Abnormal            Final result                 Please view results for these tests on the individual orders.   GREEN TOP   LAVENDER TOP   GOLD TOP - SST   LIGHT BLUE TOP     Medications   sodium chloride 0.9 % flush 10 mL (has no administration in time range)   Pharmacy to dose vancomycin (has no administration in time range)   ampicillin-sulbactam (UNASYN) 3 g in sodium chloride 0.9 % 100 mL MBP (3 g Intravenous New Bag 2/24/25 2133)   vancomycin IVPB 1500 mg in 0.9% NaCl (Premix) 500 mL (has no administration in time range)   ondansetron (ZOFRAN) injection 4 mg (4 mg Intravenous Given 2/24/25 2133)   morphine injection 4 mg (4 mg Intravenous Given 2/24/25 2133)     No radiology results for the last day              Medical Decision Making  Cultures were obtained the patient started on IV vancomycin and Unasyn.  The patient case was discussed the hospitalist service.  The patient will need wound service consultation she was agreeable with this plan of treatment    Amount and/or Complexity of Data Reviewed  Labs: ordered.    Risk  OTC drugs.  Prescription drug management.        Final diagnoses:   Cellulitis of right lower extremity   History of fasciotomy       ED Disposition  ED Disposition       ED Disposition   Decision to Admit    Condition   --    Comment   Level of Care: Telemetry [5]   Diagnosis: Cellulitis of right lower extremity [[id number]]   Admitting Physician: LLANES ALVAREZ, CARLOS [[id number]]   Attending Physician: LLANES ALVAREZ, CARLOS [[id number]]                 No follow-up provider specified.       Medication List      No changes were made to your  prescriptions during this visit.            Huy Luis MD  02/24/25 6779

## 2025-02-25 NOTE — CONSULTS
Name: Yarelis Jackson ADMIT: 2025   : 1970  PCP: Rohit Schrader DO    MRN: [id number] LOS: 0 days   AGE/SEX: 54 y.o. female  ROOM:    [facility]      Patient Care Team:  Rohit Schrader DO as PCP - General (Internal Medicine)  Uli Starr MD as Consulting Physician (Nephrology)  Chief Complaint   Patient presents with    Wound Check       CC: Right leg cellulitis    Subjective     Inpatient Vascular Surgery Consult  Consult performed by: Alyssa Sneed APRN  Consult ordered by: Llanes Alvarez, Carlos, MD          History of Present Illness  Yarelis Jackson is a 54 y.o. female with a past medical history of COPD, diabetes mellitus, PAD, and tobacco abuse who presented to [facility] on 2025 for cellulitis.  The patient has been seeing outpatient wound care to manage her fasciotomy wounds, she was noticed to have some greenish discharge yesterday and a wound culture was obtained, she was started on oral antibiotics.  She was also evaluated by her primary care provider yesterday to establish care.  She was instructed if she had fever or chills to present to the ER.  She reports last night she had a fever of 101.4 which is why she presented for evaluation.  Blood cultures were obtained.  Wound culture with heavy gram-positive cocci growth.  She was started on antibiotics and admitted for further workup and care.  Vascular surgery was consulted to evaluate.  She has undergone the following vascular surgery interventions:    Right iliac thrombectomy on 2024  Right iliofemoral popliteal thrombectomy, right femoral endarterectomy with patch, close right calf fasciotomies on 2024  Completion of right 4 compartment fasciotomies on 2024    She was seen in the office by Dr. Celestin last week with plans to continue local wound care and follow-up in 3 weeks.  Patient reports pain to her right lower extremity that has been present since her initial  surgery.  She is unable to move her toes or dorsiflex/plantarflex her right foot.  She tells me she is not ambulatory due to the pain.  She is maintained on Eliquis at home due to her history of arterial thrombosis.    Review of Systems   Constitutional:  Positive for fever.   Musculoskeletal:  Positive for gait problem and myalgias.   Skin:  Positive for wound.       Past Medical History:   Diagnosis Date    COPD (chronic obstructive pulmonary disease)     Deep vein thrombosis     Low back pain     Migraine     Neck pain      Past Surgical History:   Procedure Laterality Date    FASCIOTOMY Right 12/23/2024    Procedure: FASCIOTOMY LEG;  Surgeon: Chris Delgado MD;  Location: [facility] MAIN OR;  Service: Vascular;  Laterality: Right;    FEMORAL THROMBECTOMY/EMBOLECTOMY Right 12/19/2024    Procedure: FEMORAL right iliofemoral thrombectomy, arteriogram;  Surgeon: Ghassan Celestin MD;  Location: [facility] HYBRID OR;  Service: Vascular;  Laterality: Right;    FEMORAL THROMBECTOMY/EMBOLECTOMY Right 12/20/2024    Procedure: Right femoral thrombectomy, right lower extremity arteriogram and right lower leg facitomies;  Surgeon: Ghassan Celestin MD;  Location: [facility] OR;  Service: Vascular;  Laterality: Right;    LUNG SURGERY       Family History   Problem Relation Age of Onset    Diabetes Mother     Cancer Paternal Uncle     Cancer Paternal Grandmother     COPD Paternal Grandmother     Diabetes Paternal Grandmother        Social History     Tobacco Use    Smoking status: Every Day     Current packs/day: 0.50     Average packs/day: 0.5 packs/day for 40.2 years (20.1 ttl pk-yrs)     Types: Cigarettes     Start date: 1985    Smokeless tobacco: Never   Vaping Use    Vaping status: Never Used   Substance Use Topics    Alcohol use: Not Currently    Drug use: Yes     Types: Marijuana     (Not in a hospital admission)    budesonide-formoterol, 2 puff, Inhalation, BID - RT  cefepime, 2,000 mg, Intravenous,  "Q8H  insulin glargine, 20 Units, Subcutaneous, Nightly  insulin lispro, 2-7 Units, Subcutaneous, 4x Daily AC & at Bedtime  insulin lispro, 8 Units, Subcutaneous, TID With Meals  pantoprazole, 40 mg, Oral, Q AM  sodium chloride, 10 mL, Intravenous, Q12H  vancomycin, 1,000 mg, Intravenous, Q12H      heparin, 12 Units/kg/hr, Last Rate: 12 Units/kg/hr (02/25/25 0746)  Pharmacy to dose vancomycin,   Pharmacy To Dose:,         acetaminophen **OR** acetaminophen **OR** acetaminophen    senna-docusate sodium **AND** polyethylene glycol **AND** bisacodyl **AND** bisacodyl    Calcium Replacement - Follow Nurse / BPA Driven Protocol    dextrose    dextrose    glucagon (human recombinant)    heparin    heparin    HYDROcodone-acetaminophen    Magnesium Standard Dose Replacement - Follow Nurse / BPA Driven Protocol    Pharmacy to dose vancomycin    Pharmacy To Dose:    Phosphorus Replacement - Follow Nurse / BPA Driven Protocol    Potassium Replacement - Follow Nurse / BPA Driven Protocol    sodium chloride    sodium chloride    sodium chloride  Aspirin and Ibuprofen    Objective     Physical Exam:   NAD, alert and oriented  RRR  Lungs clear  Abd soft, benign  Palpable right DP pulse              Vital Signs and Labs:  Vital Signs Patient Vitals for the past 24 hrs:   BP Temp Temp src Pulse Resp SpO2 Height Weight   02/25/25 0816 -- -- -- 98 16 100 % -- --   02/25/25 0814 -- -- -- 84 16 100 % -- --   02/25/25 0210 128/68 -- -- 111 -- 96 % -- --   02/25/25 0100 -- (!) 101 °F (38.3 °C) -- 116 -- 98 % -- --   02/25/25 0000 -- -- -- 115 -- 95 % -- --   02/24/25 2355 141/70 -- -- (!) 121 -- 95 % -- --   02/24/25 2330 148/85 -- -- 117 -- 91 % -- --   02/24/25 2216 159/88 -- -- 104 -- 96 % -- --   02/24/25 2022 137/68 (!) 101.4 °F (38.6 °C) Oral 116 12 97 % 162.6 cm (64\") 74.8 kg (165 lb)     BMI:  Body mass index is 28.32 kg/m².    CBC    Results from last 7 days   Lab Units 02/25/25  0359 02/24/25  2056   WBC 10*3/mm3 10.54 11.57* "   HEMOGLOBIN g/dL 9.1* 11.2*   PLATELETS 10*3/mm3 366 494*     BMP   Results from last 7 days   Lab Units 02/25/25  0359 02/24/25 2056   SODIUM mmol/L  --  131*   POTASSIUM mmol/L 4.0 3.4*   CHLORIDE mmol/L  --  94*   CO2 mmol/L  --  24.6   BUN mg/dL  --  12   CREATININE mg/dL  --  0.65   GLUCOSE mg/dL  --  163*     Coag   Results from last 7 days   Lab Units 02/25/25  0359   INR  1.62*   APTT seconds 37.2*     HbA1C   Lab Results   Component Value Date    HGBA1C 15.80 (H) 12/18/2024     Infection   Results from last 7 days   Lab Units 02/24/25  0830   WOUNDCX  Heavy growth (4+) Gram Positive Cocci*     Radiology(recent) No radiology results for the last day    VTE Prophylaxis:  Pharmacologic VTE prophylaxis orders are present.        Active Hospital Problems    Diagnosis  POA    **Cellulitis of right lower extremity [L03.115]  Yes    Cellulitis of right leg [L03.115]  Yes      Resolved Hospital Problems   No resolved problems to display.       Assessment & Plan   Assessment / Plan     Cellulitis of right lower extremity    Cellulitis of right leg      54 y.o. female status post right leg thrombectomy and fasciotomies who presents with fever and chills, drainage from right leg fasciotomy wounds  Blood cultures pending, wound cultures with gram-positive cocci growth, antibiotics per primary team  Right leg is warm to touch with a palpable right DP pulse, no signs of acute limb ischemia  Continue local wound care to fasciotomy sites and antibiotics  No plans for acute vascular surgery intervention  Recommend continuing Eliquis    Thank you for this consultation.           NICHOLAS Castro  [facility] Vascular Surgery  02/25/25   O: [phone]  F: [phone]

## 2025-02-25 NOTE — DISCHARGE PLACEMENT REQUEST
"Janet Alexander \"BRYANNA\" (54 y.o. Female)       Date of Birth   1970    Social Security Number       Address   [address] [place] [address] [place] [address]    Home Phone   [phone]    MRN   [id number]       [facility]   [employer]    Marital Status                               Admission Date   2/24/25    Admission Type   Emergency    Admitting Provider   Llanes Alvarez, Carlos, MD    Attending Provider   Usama Ware MD    Department, Room/Bed   [facility] EMERGENCY DEPARTMENT, 26/26       Discharge Date       Discharge Disposition       Discharge Destination                                 Attending Provider: Usama Ware MD    Allergies: Aspirin, Ibuprofen    Isolation: None   Infection: MRSA No Isolation this Admit (12/19/24)   Code Status: CPR    Ht: 162.6 cm (64\")   Wt: 74.8 kg (165 lb)    Admission Cmt: None   Principal Problem: Cellulitis of right lower extremity [L03.115]                   Active Insurance as of 2/24/2025       Primary Coverage       Payor Plan Insurance Group Employer/Plan Group    [id number]       Payor Plan Address Payor Plan Phone Number Payor Plan Fax Number Effective Dates    MAIL STOP:   9/1/2021 - None Entered    [address] [address]       [facility] [id number]         Subscriber Name Subscriber Birth Date Member ID       JANET ALEXANDER 1970 [id number]                     Emergency Contacts        (Rel.) Home Phone Work Phone Mobile Phone    Sukh Saldivar (Son) -- -- [phone]    Rohit Saldivar (Son) -- -- [phone]    KYAW HU (Mother) [phone] -- [phone]                "

## 2025-02-25 NOTE — NURSING NOTE
Pt requested leg to be rewrapped and dressed. No orders for how to be dressed, but wound care consulted for AM. Pt reports that they have used multiple things in the past for dressing including silver and vaseline gauze.   Leg cleaned w/pt's wound cleanser that she brought from home. Then dressed w/vaseline gauze - abd pads - kerlix - ACE bandage.

## 2025-02-26 ENCOUNTER — APPOINTMENT (OUTPATIENT)
Dept: CT IMAGING | Facility: HOSPITAL | Age: 55
End: 2025-02-26
Payer: MEDICAID

## 2025-02-26 DIAGNOSIS — Z12.31 ENCOUNTER FOR SCREENING MAMMOGRAM FOR MALIGNANT NEOPLASM OF BREAST: Primary | ICD-10-CM

## 2025-02-26 LAB
ABO GROUP BLD: NORMAL
ANION GAP SERPL CALCULATED.3IONS-SCNC: 9 MMOL/L (ref 5–15)
ANION GAP SERPL CALCULATED.3IONS-SCNC: 9.6 MMOL/L (ref 5–15)
APTT PPP: 36.7 SECONDS (ref 22.7–35.4)
APTT PPP: 37.3 SECONDS (ref 22.7–35.4)
BACTERIA SPEC AEROBE CULT: ABNORMAL
BASOPHILS # BLD AUTO: 0.03 10*3/MM3 (ref 0–0.2)
BASOPHILS # BLD AUTO: 0.05 10*3/MM3 (ref 0–0.2)
BASOPHILS NFR BLD AUTO: 0.4 % (ref 0–1.5)
BASOPHILS NFR BLD AUTO: 0.8 % (ref 0–1.5)
BLD GP AB SCN SERPL QL: NEGATIVE
BUN SERPL-MCNC: 9 MG/DL (ref 6–20)
BUN SERPL-MCNC: 9 MG/DL (ref 6–20)
BUN/CREAT SERPL: 16.7 (ref 7–25)
BUN/CREAT SERPL: 16.7 (ref 7–25)
CALCIUM SPEC-SCNC: 9.2 MG/DL (ref 8.6–10.5)
CALCIUM SPEC-SCNC: 9.2 MG/DL (ref 8.6–10.5)
CHLORIDE SERPL-SCNC: 102 MMOL/L (ref 98–107)
CHLORIDE SERPL-SCNC: 105 MMOL/L (ref 98–107)
CO2 SERPL-SCNC: 24 MMOL/L (ref 22–29)
CO2 SERPL-SCNC: 24.4 MMOL/L (ref 22–29)
CREAT SERPL-MCNC: 0.54 MG/DL (ref 0.57–1)
CREAT SERPL-MCNC: 0.54 MG/DL (ref 0.57–1)
DEPRECATED RDW RBC AUTO: 52.8 FL (ref 37–54)
DEPRECATED RDW RBC AUTO: 53.4 FL (ref 37–54)
EGFRCR SERPLBLD CKD-EPI 2021: 110.2 ML/MIN/1.73
EGFRCR SERPLBLD CKD-EPI 2021: 110.2 ML/MIN/1.73
EOSINOPHIL # BLD AUTO: 0.11 10*3/MM3 (ref 0–0.4)
EOSINOPHIL # BLD AUTO: 0.21 10*3/MM3 (ref 0–0.4)
EOSINOPHIL NFR BLD AUTO: 1.6 % (ref 0.3–6.2)
EOSINOPHIL NFR BLD AUTO: 3.4 % (ref 0.3–6.2)
ERYTHROCYTE [DISTWIDTH] IN BLOOD BY AUTOMATED COUNT: 14.9 % (ref 12.3–15.4)
ERYTHROCYTE [DISTWIDTH] IN BLOOD BY AUTOMATED COUNT: 15 % (ref 12.3–15.4)
GLUCOSE BLDC GLUCOMTR-MCNC: 140 MG/DL (ref 70–105)
GLUCOSE BLDC GLUCOMTR-MCNC: 141 MG/DL (ref 70–105)
GLUCOSE BLDC GLUCOMTR-MCNC: 156 MG/DL (ref 70–105)
GLUCOSE BLDC GLUCOMTR-MCNC: 192 MG/DL (ref 70–105)
GLUCOSE SERPL-MCNC: 177 MG/DL (ref 65–99)
GLUCOSE SERPL-MCNC: 224 MG/DL (ref 65–99)
GRAM STN SPEC: ABNORMAL
GRAM STN SPEC: ABNORMAL
HCT VFR BLD AUTO: 30.8 % (ref 34–46.6)
HCT VFR BLD AUTO: 32.8 % (ref 34–46.6)
HGB BLD-MCNC: 10 G/DL (ref 12–15.9)
HGB BLD-MCNC: 9.5 G/DL (ref 12–15.9)
IMM GRANULOCYTES # BLD AUTO: 0.02 10*3/MM3 (ref 0–0.05)
IMM GRANULOCYTES # BLD AUTO: 0.03 10*3/MM3 (ref 0–0.05)
IMM GRANULOCYTES NFR BLD AUTO: 0.3 % (ref 0–0.5)
IMM GRANULOCYTES NFR BLD AUTO: 0.4 % (ref 0–0.5)
INR PPP: 1.22 (ref 0.9–1.1)
LYMPHOCYTES # BLD AUTO: 1.94 10*3/MM3 (ref 0.7–3.1)
LYMPHOCYTES # BLD AUTO: 1.99 10*3/MM3 (ref 0.7–3.1)
LYMPHOCYTES NFR BLD AUTO: 28.7 % (ref 19.6–45.3)
LYMPHOCYTES NFR BLD AUTO: 32.3 % (ref 19.6–45.3)
MCH RBC QN AUTO: 29.4 PG (ref 26.6–33)
MCH RBC QN AUTO: 29.4 PG (ref 26.6–33)
MCHC RBC AUTO-ENTMCNC: 30.5 G/DL (ref 31.5–35.7)
MCHC RBC AUTO-ENTMCNC: 30.8 G/DL (ref 31.5–35.7)
MCV RBC AUTO: 95.4 FL (ref 79–97)
MCV RBC AUTO: 96.5 FL (ref 79–97)
MONOCYTES # BLD AUTO: 0.41 10*3/MM3 (ref 0.1–0.9)
MONOCYTES # BLD AUTO: 0.44 10*3/MM3 (ref 0.1–0.9)
MONOCYTES NFR BLD AUTO: 6.5 % (ref 5–12)
MONOCYTES NFR BLD AUTO: 6.6 % (ref 5–12)
NEUTROPHILS NFR BLD AUTO: 3.49 10*3/MM3 (ref 1.7–7)
NEUTROPHILS NFR BLD AUTO: 4.2 10*3/MM3 (ref 1.7–7)
NEUTROPHILS NFR BLD AUTO: 56.6 % (ref 42.7–76)
NEUTROPHILS NFR BLD AUTO: 62.4 % (ref 42.7–76)
NRBC BLD AUTO-RTO: 0 /100 WBC (ref 0–0.2)
NRBC BLD AUTO-RTO: 0 /100 WBC (ref 0–0.2)
PLATELET # BLD AUTO: 418 10*3/MM3 (ref 140–450)
PLATELET # BLD AUTO: 436 10*3/MM3 (ref 140–450)
PMV BLD AUTO: 8.3 FL (ref 6–12)
PMV BLD AUTO: 8.3 FL (ref 6–12)
POTASSIUM SERPL-SCNC: 3.5 MMOL/L (ref 3.5–5.2)
POTASSIUM SERPL-SCNC: 3.8 MMOL/L (ref 3.5–5.2)
PROTHROMBIN TIME: 15.4 SECONDS (ref 11.7–14.2)
RBC # BLD AUTO: 3.23 10*6/MM3 (ref 3.77–5.28)
RBC # BLD AUTO: 3.4 10*6/MM3 (ref 3.77–5.28)
RH BLD: POSITIVE
SODIUM SERPL-SCNC: 136 MMOL/L (ref 136–145)
SODIUM SERPL-SCNC: 138 MMOL/L (ref 136–145)
T&S EXPIRATION DATE: NORMAL
VANCOMYCIN PEAK SERPL-MCNC: 11.6 MCG/ML (ref 20–40)
VANCOMYCIN TROUGH SERPL-MCNC: 5.88 MCG/ML (ref 5–20)
WBC NRBC COR # BLD AUTO: 6.17 10*3/MM3 (ref 3.4–10.8)
WBC NRBC COR # BLD AUTO: 6.75 10*3/MM3 (ref 3.4–10.8)

## 2025-02-26 PROCEDURE — 85610 PROTHROMBIN TIME: CPT | Performed by: INTERNAL MEDICINE

## 2025-02-26 PROCEDURE — 85025 COMPLETE CBC W/AUTO DIFF WBC: CPT | Performed by: STUDENT IN AN ORGANIZED HEALTH CARE EDUCATION/TRAINING PROGRAM

## 2025-02-26 PROCEDURE — 25010000002 CEFEPIME PER 500 MG: Performed by: INTERNAL MEDICINE

## 2025-02-26 PROCEDURE — 63710000001 INSULIN GLARGINE PER 5 UNITS: Performed by: STUDENT IN AN ORGANIZED HEALTH CARE EDUCATION/TRAINING PROGRAM

## 2025-02-26 PROCEDURE — 86900 BLOOD TYPING SEROLOGIC ABO: CPT | Performed by: INTERNAL MEDICINE

## 2025-02-26 PROCEDURE — 73701 CT LOWER EXTREMITY W/DYE: CPT

## 2025-02-26 PROCEDURE — 25810000003 SODIUM CHLORIDE 0.9 % SOLUTION 250 ML FLEX CONT: Performed by: INTERNAL MEDICINE

## 2025-02-26 PROCEDURE — 80202 ASSAY OF VANCOMYCIN: CPT | Performed by: SURGERY

## 2025-02-26 PROCEDURE — 82948 REAGENT STRIP/BLOOD GLUCOSE: CPT | Performed by: STUDENT IN AN ORGANIZED HEALTH CARE EDUCATION/TRAINING PROGRAM

## 2025-02-26 PROCEDURE — 63710000001 INSULIN LISPRO (HUMAN) PER 5 UNITS: Performed by: STUDENT IN AN ORGANIZED HEALTH CARE EDUCATION/TRAINING PROGRAM

## 2025-02-26 PROCEDURE — 85025 COMPLETE CBC W/AUTO DIFF WBC: CPT | Performed by: INTERNAL MEDICINE

## 2025-02-26 PROCEDURE — 80048 BASIC METABOLIC PNL TOTAL CA: CPT | Performed by: INTERNAL MEDICINE

## 2025-02-26 PROCEDURE — 82948 REAGENT STRIP/BLOOD GLUCOSE: CPT

## 2025-02-26 PROCEDURE — 86850 RBC ANTIBODY SCREEN: CPT | Performed by: INTERNAL MEDICINE

## 2025-02-26 PROCEDURE — 25810000003 SODIUM CHLORIDE 0.9 % SOLUTION 250 ML FLEX CONT: Performed by: SURGERY

## 2025-02-26 PROCEDURE — 25010000002 CEFEPIME PER 500 MG: Performed by: STUDENT IN AN ORGANIZED HEALTH CARE EDUCATION/TRAINING PROGRAM

## 2025-02-26 PROCEDURE — 25010000002 VANCOMYCIN HCL 1.25 G RECONSTITUTED SOLUTION 1 EACH VIAL: Performed by: INTERNAL MEDICINE

## 2025-02-26 PROCEDURE — 25510000001 IOPAMIDOL PER 1 ML: Performed by: INTERNAL MEDICINE

## 2025-02-26 PROCEDURE — 85730 THROMBOPLASTIN TIME PARTIAL: CPT | Performed by: STUDENT IN AN ORGANIZED HEALTH CARE EDUCATION/TRAINING PROGRAM

## 2025-02-26 PROCEDURE — 99024 POSTOP FOLLOW-UP VISIT: CPT | Performed by: NURSE PRACTITIONER

## 2025-02-26 PROCEDURE — 25010000002 VANCOMYCIN 1 G RECONSTITUTED SOLUTION 1 EACH VIAL: Performed by: SURGERY

## 2025-02-26 PROCEDURE — 86901 BLOOD TYPING SEROLOGIC RH(D): CPT | Performed by: INTERNAL MEDICINE

## 2025-02-26 PROCEDURE — 97162 PT EVAL MOD COMPLEX 30 MIN: CPT

## 2025-02-26 PROCEDURE — 85730 THROMBOPLASTIN TIME PARTIAL: CPT | Performed by: INTERNAL MEDICINE

## 2025-02-26 RX ORDER — IOPAMIDOL 755 MG/ML
100 INJECTION, SOLUTION INTRAVASCULAR
Status: COMPLETED | OUTPATIENT
Start: 2025-02-26 | End: 2025-02-26

## 2025-02-26 RX ADMIN — CEFEPIME 2000 MG: 2 INJECTION, POWDER, FOR SOLUTION INTRAVENOUS at 03:42

## 2025-02-26 RX ADMIN — VANCOMYCIN HYDROCHLORIDE 1250 MG: 1.25 INJECTION, POWDER, LYOPHILIZED, FOR SOLUTION INTRAVENOUS at 17:02

## 2025-02-26 RX ADMIN — INSULIN GLARGINE 20 UNITS: 100 INJECTION, SOLUTION SUBCUTANEOUS at 21:39

## 2025-02-26 RX ADMIN — HYDROCODONE BITARTRATE AND ACETAMINOPHEN 1 TABLET: 10; 325 TABLET ORAL at 15:39

## 2025-02-26 RX ADMIN — IOPAMIDOL 100 ML: 755 INJECTION, SOLUTION INTRAVENOUS at 21:19

## 2025-02-26 RX ADMIN — Medication 10 ML: at 22:11

## 2025-02-26 RX ADMIN — HYDROCODONE BITARTRATE AND ACETAMINOPHEN 1 TABLET: 10; 325 TABLET ORAL at 08:59

## 2025-02-26 RX ADMIN — CEFEPIME 2000 MG: 2 INJECTION, POWDER, FOR SOLUTION INTRAVENOUS at 14:48

## 2025-02-26 RX ADMIN — CEFEPIME 2000 MG: 2 INJECTION, POWDER, FOR SOLUTION INTRAVENOUS at 21:38

## 2025-02-26 RX ADMIN — INSULIN LISPRO 8 UNITS: 100 INJECTION, SOLUTION INTRAVENOUS; SUBCUTANEOUS at 12:17

## 2025-02-26 RX ADMIN — PANTOPRAZOLE SODIUM 40 MG: 40 TABLET, DELAYED RELEASE ORAL at 05:30

## 2025-02-26 RX ADMIN — SODIUM CHLORIDE 1000 MG: 9 INJECTION, SOLUTION INTRAVENOUS at 10:17

## 2025-02-26 RX ADMIN — INSULIN LISPRO 2 UNITS: 100 INJECTION, SOLUTION INTRAVENOUS; SUBCUTANEOUS at 12:17

## 2025-02-26 RX ADMIN — Medication 10 ML: at 09:01

## 2025-02-26 RX ADMIN — HYDROCODONE BITARTRATE AND ACETAMINOPHEN 1 TABLET: 10; 325 TABLET ORAL at 21:39

## 2025-02-26 RX ADMIN — APIXABAN 5 MG: 5 TABLET, FILM COATED ORAL at 08:59

## 2025-02-26 NOTE — PROGRESS NOTES
[facility] MEDICINE SERVICE  DAILY PROGRESS NOTE    NAME: Yarelis Jackson  : 1970  MRN: [id number]      LOS: 1 day     PROVIDER OF SERVICE: Nakul Keith MD    Chief Complaint: Cellulitis of right lower extremity    Subjective:     Interval History:    Patient seen and evaluated at bedside.   H&P, consults labs and imaging reviewed.  Patient is diabetic and lives with her parents.  Denies any chest pain nausea vomiting diarrhea or abdominal pain.  Treatment plan discussed with patient. All questions addressed.     Review of Systems:   All 21 ROS were negative except mentioned above.    Objective:     Vital Signs  Temp:  [98 °F (36.7 °C)-99.4 °F (37.4 °C)] 98.2 °F (36.8 °C)  Heart Rate:  [83-93] 83  Resp:  [16-19] 18  BP: (124-145)/(69-89) 132/89   Body mass index is 28.32 kg/m².    Physical Exam   General: No acute distress, appears stated age  Neuro: Awake and alert, oriented x3, no focal deficits appreciated  Head: Atraumatic, normocephalic  HEENT: EOMI, anicteric, normal sclerae and conjunctivae, moist mucus membranes  Neck: supple, no lymphadenopathy  CV: RRR, soft heart sounds, no murmurs appreciated, no peripheral edema  Pulm: Decreased breath sounds, no increased work of breathing, no adventitious sounds  Abd: Soft, nontender, nondistended  Skin: Warm, dry and dressed right lower extremity  Psych: Appropriate mood and affect    Scheduled Meds   apixaban, 5 mg, Oral, Q12H  budesonide-formoterol, 2 puff, Inhalation, BID - RT  cefepime, 2,000 mg, Intravenous, Q8H  insulin glargine, 20 Units, Subcutaneous, Nightly  insulin lispro, 2-7 Units, Subcutaneous, 4x Daily AC & at Bedtime  insulin lispro, 8 Units, Subcutaneous, TID With Meals  pantoprazole, 40 mg, Oral, Q AM  sodium chloride, 10 mL, Intravenous, Q12H  vancomycin, 1,000 mg, Intravenous, Q12H       PRN Meds     acetaminophen **OR** acetaminophen **OR** acetaminophen    senna-docusate sodium **AND** polyethylene glycol **AND** bisacodyl  **AND** bisacodyl    Calcium Replacement - Follow Nurse / BPA Driven Protocol    dextrose    dextrose    glucagon (human recombinant)    heparin    heparin    HYDROcodone-acetaminophen    Magnesium Standard Dose Replacement - Follow Nurse / BPA Driven Protocol    Pharmacy to dose vancomycin    Pharmacy To Dose:    Phosphorus Replacement - Follow Nurse / BPA Driven Protocol    Potassium Replacement - Follow Nurse / BPA Driven Protocol    sodium chloride    sodium chloride    sodium chloride   Infusions  Pharmacy to dose vancomycin,   Pharmacy To Dose:,           Diagnostic Data    Results from last 7 days   Lab Units 02/26/25  0252 02/25/25  0359 02/24/25  2056   WBC 10*3/mm3 6.75   < > 11.57*   HEMOGLOBIN g/dL 9.5*   < > 11.2*   HEMATOCRIT % 30.8*   < > 35.5   PLATELETS 10*3/mm3 418   < > 494*   GLUCOSE mg/dL 224*  --  163*   CREATININE mg/dL 0.54*  --  0.65   BUN mg/dL 9  --  12   SODIUM mmol/L 138  --  131*   POTASSIUM mmol/L 3.8   < > 3.4*   AST (SGOT) U/L  --   --  15   ALT (SGPT) U/L  --   --  9   ALK PHOS U/L  --   --  131*   BILIRUBIN mg/dL  --   --  0.3   ANION GAP mmol/L 9.0  --  12.4    < > = values in this interval not displayed.       No radiology results for the last day    Interval results reviewed.    Assessment/Plan:     Sepsis due to RLE cellulitis  Severe PAD  status post right iliac thrombectomy, right iliofemoral popliteal thrombectomy, right femoral endarterectomy   Intractable right leg pain      12/19 - right iliac thrombectomy. 12/20 - right iliofemoral popliteal thrombectomy, right femoral endarterectomy with patch, right lower extremity arteriogram and close right calf fasciotomy. 12/23 - emergent 4 compartment fasciotomies.  Wound culture positive for heavy growth of Streptococcus pyogenes  Blood cultures no growth  Continue vancomycin and cefepime  for strep to coccus pyogenes group A cellulitis.  Per vas surgery, no need for surgical intervention at this time  Hold Eliquis as patient  may go for AKA  Discussed with care nurse Caitlyn, patient with purulent wrist drainage from medial side of the wound    Type 2 DM  Continue Lantus and SSI     Chronic Anemia  Monitor daily CBC, transfuse as needed      Treatment plan discussed with RN.     VTE Prophylaxis:  Pharmacologic VTE prophylaxis orders are present.         Code status is   Code Status and Medical Interventions: CPR (Attempt to Resuscitate); Full Support   Ordered at: 02/24/25 2955     Code Status (Patient has no pulse and is not breathing):    CPR (Attempt to Resuscitate)     Medical Interventions (Patient has pulse or is breathing):    Full Support       Plan for disposition:   Barriers to Discharge: possible AKA  Anticipated Date of Discharge: 2/28/25  Place of Discharge: pending pt      Time: 40 minutes     Signature: Electronically signed by Nakul Keith MD, 02/26/25, 09:50 [facility].  [facility] Hospitalist Team

## 2025-02-26 NOTE — NURSING NOTE
54-year-old female with a history of fasciotomies to her right lower extremity.  Patient presents to the hospital with increasing discomfort and swelling to her leg.  Patient last seen at wound care center on February 24.  Patient reports a wound culture was obtained at that time.    Patient reports that she there has been some improvement in pain since coming to the hospital and receiving antibiotics.  Patient is currently being followed by vascular services.    The wound base is to the lateral and medial fasciotomy sites are beefy red granulating.  The medial fasciotomy site.  The distal end has a tunneled area.  I can measure at least 6 cm.  The area is warm red and hard distal embolic to the calf area.  There is some superficial blistering of skin loss when I push on the calf as well as around the opening a large amount of purulent exudate is noted.  There is also purulent exudate coming from the very distal end of the fasciotomy site.  No odor is noted.  The lateral fasciotomy site is red and granulating.  No issues noted.    The wounds were soaked in Vashe soaked gauze.  I applied a moisturizer to the dry skin around the wounds.  I applied Opticell AG.  I also used a strip of Opticell AG and packed it into the tunneled area.  Covered with ABD pads Curlex and I used a 4 inch and a 6 inch Ace wrap wrapping from the base of the toes to just below the knee.    Have some concerns about the purulent area-treat with antibiotics versus surgical evaluation.  Patient is being followed by vascular services

## 2025-02-26 NOTE — PLAN OF CARE
Goal Outcome Evaluation:                               Patient seen by wound care, dressing changed. Complaints of rle pain, treated per MAR. Able to make needs known, plan of care ongoing.

## 2025-02-26 NOTE — THERAPY EVALUATION
Patient Name: Yarelis Jackson  : 1970    MRN: [id number]                              Today's Date: 2025       Admit Date: 2025    Visit Dx:     ICD-10-CM ICD-9-CM   1. Cellulitis of right lower extremity  L03.115 682.6   2. History of fasciotomy  Z98.890 V15.29     Patient Active Problem List   Diagnosis    Benign essential hypertension    Cervical radiculopathy    Chronic obstructive pulmonary disease    Cigarette smoker    Hyperlipidemia    Menopause    Migraine headache    DKA (diabetic ketoacidosis)    Arterial thrombosis    Leg pain    H/O fasciotomy    Non-pressure ulcer of lower extremity with fat layer exposed, right    Cellulitis of right lower extremity    Cellulitis of right leg     Past Medical History:   Diagnosis Date    COPD (chronic obstructive pulmonary disease)     Diabetes mellitus     DKA (diabetic ketoacidosis)     Low back pain     Migraine     Neck pain     Peripheral vascular disease      Past Surgical History:   Procedure Laterality Date    FASCIOTOMY Right 2024    Procedure: FASCIOTOMY LEG;  Surgeon: Chris Delgado MD;  Location: [facility] MAIN OR;  Service: Vascular;  Laterality: Right;    FEMORAL THROMBECTOMY/EMBOLECTOMY Right 2024    Procedure: FEMORAL right iliofemoral thrombectomy, arteriogram;  Surgeon: Ghassan Celestin MD;  Location: [facility] HYBRID OR;  Service: Vascular;  Laterality: Right;    FEMORAL THROMBECTOMY/EMBOLECTOMY Right 2024    Procedure: Right femoral thrombectomy, right lower extremity arteriogram and right lower leg facitomies;  Surgeon: Ghassan Celestin MD;  Location: [facility] HYBRID OR;  Service: Vascular;  Laterality: Right;    LUNG SURGERY        General Information       Row Name 25 1148          Physical Therapy Time and Intention    Document Type evaluation  -     Mode of Treatment physical therapy  -       Row Name 25 1148          General Information    Patient Profile Reviewed yes  -AH     Prior  Level of Function independent:;all household mobility;transfer;w/c or scooter;bed mobility;ADL's  except mom helps her with shower transfer. Pt is independent from a manual WC level. Reports she was able to walk with the walker for ~1 week until RLE pain increased again. She reports having some R foot/ankle movement when walking.  -     Existing Precautions/Restrictions fall  pt refusing bed/chair alarms.  -     Barriers to Rehab medically complex;previous functional deficit  -       Row Name 02/26/25 1148          Living Environment    People in Home parent(s)  -[facility] Name 02/26/25 1148          Home Main Entrance    Number of Stairs, Main Entrance one  -     Stair Railings, Main Entrance none  -       Row Name 02/26/25 1148          Stairs Within Home, Primary    Number of Stairs, Within Home, Primary none  -       Row Name 02/26/25 1148          Cognition    Orientation Status (Cognition) oriented x 4  -       Row Name 02/26/25 1148          Safety Issues/Impairments Affecting Functional Mobility    Comment, Safety Issues/Impairments (Mobility) Pt is aware of deficits. No safety concerns noted.  -               User Key  (r) = Recorded By, (t) = Taken By, (c) = Cosigned By      Initials Name Provider Type     Soila Frias, PT Physical Therapist                   Mobility       Row Name 02/26/25 1151          Bed Mobility    Bed Mobility bed mobility (all) activities  -     All Activities, [place] (Bed Mobility) modified independence  -     Assistive Device (Bed Mobility) bed rails  -     Comment, (Bed Mobility) supine to/from sit  -       Row Name 02/26/25 1151          Bed-Chair Transfer    Bed-Chair [place] (Transfers) modified independence  -     Comment, (Bed-Chair Transfer) bed to manual WC and back. Also completes manual WC to/from toilet transfers unassisted with use of grab bar.  -       Row Name 02/26/25 1151          Gait/Stairs (Locomotion)    Patient  was able to Ambulate no, other medical factors prevent ambulation  -     Reason Patient was unable to Ambulate Uncontrolled Pain;Excessive Weakness  pt unable to tolerate WB on painful RLE - chronic problem (since Dec. 2024)  -     Comment, (Gait/Stairs) Pt demos ability to independently manage WC brakes and leg rests. She also self propels in room and negotiates small space into bathroom,independently with BUE propulsion.  -               User Key  (r) = Recorded By, (t) = Taken By, (c) = Cosigned By      Initials Name Provider Type     Soila Frias, PT Physical Therapist                   Obj/Interventions       Row Name 02/26/25 1153          Range of Motion Comprehensive    Comment, General Range of Motion R foot/ankle with no active movement. R foot resting in PF and toes in neutral. R knee flexion is also noted to be limited - partially by edema and partially by RLE wrap.  -[facility] Name 02/26/25 1153          Strength Comprehensive (MMT)    Comment, General Manual Muscle Testing (MMT) Assessment LLE WFL. R hip 3/5, R knee 3-/5, R foot/ankle 0/5.  -[facility] Name 02/26/25 1153          Motor Skills    Motor Skills functional endurance  -     Functional Endurance FAIR  -[facility] Name 02/26/25 1153          Balance    Balance Assessment sitting static balance;sitting dynamic balance;standing static balance;standing dynamic balance  -     Static Sitting Balance independent  -     Dynamic Sitting Balance independent  -     Position, Sitting Balance sitting edge of bed  -     Static Standing Balance standby assist  -     Dynamic Standing Balance standby assist  -     Position/Device Used, Standing Balance supported  stands briefly with LLE WB and UE support on grab bar.  -       Row Name 02/26/25 1153          Sensory Assessment (Somatosensory)    Sensory Assessment (Somatosensory) --  RLE hypersensitivity  -               User Key  (r) = Recorded By, (t) = Taken By, (c) =  Cosigned By      Initials Name Provider Type     Soila Frias, ELISABETH Physical Therapist                   Goals/Plan    No documentation.                  Clinical Impression       Row Name 02/26/25 1606          Pain    Pretreatment Pain Rating 7/10  -     Posttreatment Pain Rating 7/10  -     Pain Location extremity  -     Pain Side/Orientation lower;right  -AH     Pain Management Interventions activity modification encouraged  -     Response to Pain Interventions activity participation with tolerable pain  -       Row Name 02/26/25 1606          Plan of Care Review    Plan of Care Reviewed With patient  -     Outcome Evaluation Yarelis Jackson is a 54 y.o. female with PMH of COPD, migraine, and obesity, who presented from home with poorly managed RLE pain with sepsis and RLE cellulitis. Pt's had a long and complicated medical course which started with DKA in Dec 2024. During that admission she was intubated, required CRRT, had 3 emergent vascular surgeries including 4 compartment fasciotomies due to critical right leg ischemia. ~4 weeks ago pt returned to the hospital with poorly managed pain; after that admission she discharged to SNF for several weeks. Pt wasn't able to progress to walking at SNF due to ongoing RLE pain. She has since returned home at WC level in parent's home. Pt reports she was able to care for herself at home. She has a WC and a RW.  Pt presents at/near her baseline; able to complete bed mobility, bed to WC transfer, WC propulsion, transfers on/off toilet, manage toilet hygiene independently, and return to bed unassisted. Pt denies therapy needs at this time. She continues to be limited in RLE foot/ankle and knee strength and ROM, but is not able to tolerate stretching or exercises necessary for improvment.  Pt is safe to return home once medically stable. PT recommends pt transition to outpatient therapy once RLE pain is better managed.  PT will sign off and complete order.   -       Row Name 02/26/25 1606          Therapy Assessment/Plan (PT)    Criteria for Skilled Interventions Met (PT) no;does not meet criteria for skilled intervention  -     Therapy Frequency (PT) evaluation only  -       Row Name 02/26/25 1606          Positioning and Restraints    Post Treatment Position bed  -     In Bed notified nsg;supine;encouraged to call for assist;call light within reach;exit alarm on;RLE elevated  -               User Key  (r) = Recorded By, (t) = Taken By, (c) = Cosigned By      Initials Name Provider Type     Soila Frias, PT Physical Therapist                   Outcome Measures       Row Name 02/26/25 1616 02/26/25 0720       How much help from another person do you currently need...    Turning from your back to your side while in flat bed without using bedrails? 4  - 3  -EH    Moving from lying on back to sitting on the side of a flat bed without bedrails? 4  - 3  -EH    Moving to and from a bed to a chair (including a wheelchair)? 4  - 2  -    Standing up from a chair using your arms (e.g., wheelchair, bedside chair)? 4  - 2  -EH    Climbing 3-5 steps with a railing? 1  - 1  -    To walk in hospital room? 1  - 2  -EH    AM-PAC 6 Clicks Score (PT) 18  - 13  -    Highest Level of Mobility Goal 6 --> Walk 10 steps or more  - 4 --> Transfer to chair/commode  -      Row Name 02/26/25 1616          Functional Assessment    Outcome Measure Options AM-PAC 6 Clicks Basic Mobility (PT)  -               User Key  (r) = Recorded By, (t) = Taken By, (c) = Cosigned By      Initials Name Provider Type     Harshal Duran, RN Registered Nurse    Soila Hermosillo, PT Physical Therapist                                 Physical Therapy Education       Title: PT OT SLP Therapies (Done)       Topic: Physical Therapy (Done)       Point: Mobility training (Done)       Learning Progress Summary            Patient Acceptance, E, VU by  at 2/26/2025 1616                       Point: Home exercise program (Done)       Learning Progress Summary            Patient Acceptance, E, VU by  at 2/26/2025 1616                      Point: Body mechanics (Done)       Learning Progress Summary            Patient Acceptance, E, VU by  at 2/26/2025 1616                      Point: Precautions (Done)       Learning Progress Summary            Patient Acceptance, E, VU by  at 2/26/2025 1616                                      User Key       Initials Effective Dates Name Provider Type Discipline     12/04/23 -  Soila Frias, PT Physical Therapist PT                  PT Recommendation and Plan     Outcome Evaluation: Yarelis Jackson is a 54 y.o. female with PMH of COPD, migraine, and obesity, who presented from home with poorly managed RLE pain with sepsis and RLE cellulitis. Pt's had a long and complicated medical course which started with DKA in Dec 2024. During that admission she was intubated, required CRRT, had 3 emergent vascular surgeries including 4 compartment fasciotomies due to critical right leg ischemia. ~4 weeks ago pt returned to the hospital with poorly managed pain; after that admission she discharged to SNF for several weeks. Pt wasn't able to progress to walking at SNF due to ongoing RLE pain. She has since returned home at WC level in parent's home. Pt reports she was able to care for herself at home. She has a WC and a RW.  Pt presents at/near her baseline; able to complete bed mobility, bed to WC transfer, WC propulsion, transfers on/off toilet, manage toilet hygiene independently, and return to bed unassisted. Pt denies therapy needs at this time. She continues to be limited in RLE foot/ankle and knee strength and ROM, but is not able to tolerate stretching or exercises necessary for improvment.  Pt is safe to return home once medically stable. PT recommends pt transition to outpatient therapy once RLE pain is better managed.  PT will sign off and complete order.      Time Calculation:         PT Charges       Row Name 02/26/25 1621             Time Calculation    Start Time 0948  -      Stop Time 1000  -      Time Calculation (min) 12 min  -      PT Non-Billable Time (min) 0 min  -      PT Received On 02/26/25  -         Time Calculation- PT    Total Timed Code Minutes- PT 0 minute(s)  -                User Key  (r) = Recorded By, (t) = Taken By, (c) = Cosigned By      Initials Name Provider Type     Soila Frias, PT Physical Therapist                  Therapy Charges for Today       Code Description Service Date Service Provider Modifiers Qty    [id number] HC PT EVAL MOD COMPLEXITY 3 2/26/2025 Soila Frias, PT GP 1            PT G-Codes  Outcome Measure Options: AM-PAC 6 Clicks Basic Mobility (PT)  AM-PAC 6 Clicks Score (PT): 18  PT Discharge Summary  Anticipated Discharge Disposition (PT): home with assist (pt needs home health nursing for wound care.)    Soila Frias, ELISABETH  2/26/2025

## 2025-02-26 NOTE — PROGRESS NOTES
"Pharmacy Antimicrobial Dosing Service    Subjective:  Yarelis Jackson is a 54 y.o.female admitted with worsening pain and . Pharmacy has been consulted to dose Vancomycin for possible SSTI.    PMH: DM, PAD with recent right critical limb ischemia status post right iliac thrombectomy; recent hospitalization at [facility]      Assessment/Plan    1. Day #2 Vancomycin: Goal -600 mcg*h/mL. Patient received vancomycin 1500 mg IV x 1, followed by 1000 mg IV q12h. Based on vancomycin peak and trough levels collected this morning, estimated steady state AUC will be subtherapeutic. T1/2 is ~ 5 hrs, will switch to q8h dosing. Will increase the dose of vancomycin to 1250 mg IV (~17 mg/kg) q8h for a predicted AUC of 502 mcg*h/mL. Will schedule vancomycin trough level tomorrow morning.     2. Day #2 Cefepime: 2gm IV q8h for estCrCl > 60 mL/min.     Will continue to monitor drug levels, renal function, culture and sensitivities, and patient clinical status.       Objective:  Relevant clinical data and objective history reviewed:  162.6 cm (64\")   74.8 kg (165 lb)   Ideal body weight: 54.7 kg (120 lb 9.5 oz)  Adjusted ideal body weight: 62.8 kg (138 lb 5.7 oz)  Body mass index is 28.32 kg/m².    Results from last 7 days   Lab Units 02/26/25  0913 02/26/25 0252   VANCOMYCIN PK mcg/mL  --  11.60*   VANCOMYCIN TR mcg/mL 5.88  --      Results from last 7 days   Lab Units 02/26/25  0252 02/24/25 2056   CREATININE mg/dL 0.54* 0.65     Estimated Creatinine Clearance: 119.3 mL/min (A) (by C-G formula based on SCr of 0.54 mg/dL (L)).  I/O last 3 completed shifts:  In: 840 [P.O.:840]  Out: 200 [Urine:200]    Results from last 7 days   Lab Units 02/26/25  0252 02/25/25  0359 02/24/25 2056   WBC 10*3/mm3 6.75 10.54 11.57*     Temperature    02/26/25 0432 02/26/25 0754 02/26/25 1210   Temp: 98 °F (36.7 °C) 98.2 °F (36.8 °C) 98 °F (36.7 °C)     Baseline culture/source/susceptibility:  Microbiology Results (last 10 days)       Procedure " Component Value - Date/Time    Blood Culture - Blood, Arm, Left [[id number]]  (Normal) Collected: 02/24/25 2056    Lab Status: Preliminary result Specimen: Blood from Arm, Left Updated: 02/25/25 2116     Blood Culture No growth at 24 hours    Blood Culture - Blood, Arm, Right [[id number]]  (Normal) Collected: 02/24/25 2056    Lab Status: Preliminary result Specimen: Blood from Arm, Right Updated: 02/25/25 2116     Blood Culture No growth at 24 hours    Wound Culture - Wound, Leg, Right [[id number]]  (Abnormal) Collected: 02/24/25 0830    Lab Status: Final result Specimen: Wound from Leg, Right Updated: 02/26/25 0855     Wound Culture Heavy growth (4+) Streptococcus pyogenes (Group A)     Gram Stain Many (4+) WBCs per low power field      Many (4+) Gram positive cocci in pairs and chains            Joanna Poon [facility]  02/26/25 12:17 EST

## 2025-02-26 NOTE — PLAN OF CARE
Problem: Adult Inpatient Plan of Care  Goal: Plan of Care Review  Recent Flowsheet Documentation  Taken 2/26/2025 1606 by Soila Frias PT  Outcome Evaluation:   Yarelis Jackson is a 54 y.o. female with PMH of COPD, migraine, and obesity, who presented from home with poorly managed RLE pain with sepsis and RLE cellulitis. Pt's had a long and complicated medical course which started with DKA in Dec 2024. During that admission she was intubated, required CRRT, had 3 emergent vascular surgeries including 4 compartment fasciotomies due to critical right leg ischemia. ~4 weeks ago pt returned to the hospital with poorly managed pain   after that admission she discharged to SNF for several weeks. Pt wasn't able to progress to walking at SNF due to ongoing RLE pain. She has since returned home at  level in parent's home. Pt reports she was able to care for herself at home. She has a WC and a RW.  Pt presents at/near her baseline   able to complete bed mobility, bed to WC transfer, WC propulsion, transfers on/off toilet, manage toilet hygiene independently, and return to bed unassisted. Pt denies therapy needs at this time. She continues to be limited in RLE foot/ankle and knee strength and ROM, but is not able to tolerate stretching or exercises necessary for improvment.  Pt is safe to return home once medically stable. PT recommends pt transition to outpatient therapy once RLE pain is better managed.  PT will sign off and complete order.  Plan of Care Reviewed With: patient      Anticipated Discharge Disposition (PT): home with assist (pt needs home health nursing for wound care.)

## 2025-02-26 NOTE — CASE MANAGEMENT/SOCIAL WORK
Continued Stay Note   Cesar     Patient Name: Yarelis Jackson  MRN: [id number]  Today's Date: 2/26/2025    Admit Date: 2/24/2025    Plan: DC PLAN: Routine home with parents. [facility] referral pending.     Discharge Plan       Row Name 02/26/25 1627       Plan    Plan DC PLAN: Routine home with parents. [facility] referral pending.    Patient/Family in Agreement with Plan yes    Plan Comments DC barriers:  Wound care following, Wound cultures pending. IV abx.                      Expected Discharge Date and Time       Expected Discharge Date Expected Discharge Time    Feb 27, 2025             Olga Chopra RN    Case Management  [phone]

## 2025-02-26 NOTE — PROGRESS NOTES
[facility] Vascular Surgery Progress Note    Name: Yarelis Jackson ADMIT: 2025   : 1970  PCP: Rohit Schrader DO    MRN: [id number] LOS: 1 days   AGE/SEX: 54 y.o. female  ROOM: [address] [place] [address]    CC: Right leg cellulitis    Subjective     Patient reports feeling much better today than she has in a couple of weeks.  Pain in her leg has almost resolved.  She is very pleased.    Objective     Scheduled Medications:   apixaban, 5 mg, Oral, Q12H  budesonide-formoterol, 2 puff, Inhalation, BID - RT  cefepime, 2,000 mg, Intravenous, Q8H  insulin glargine, 20 Units, Subcutaneous, Nightly  insulin lispro, 2-7 Units, Subcutaneous, 4x Daily AC & at Bedtime  insulin lispro, 8 Units, Subcutaneous, TID With Meals  pantoprazole, 40 mg, Oral, Q AM  sodium chloride, 10 mL, Intravenous, Q12H  vancomycin, 1,000 mg, Intravenous, Q12H        Active Infusions:  Pharmacy to dose vancomycin,   Pharmacy To Dose:,         As Needed Medications:    acetaminophen **OR** acetaminophen **OR** acetaminophen    senna-docusate sodium **AND** polyethylene glycol **AND** bisacodyl **AND** bisacodyl    Calcium Replacement - Follow Nurse / BPA Driven Protocol    dextrose    dextrose    glucagon (human recombinant)    heparin    heparin    HYDROcodone-acetaminophen    Magnesium Standard Dose Replacement - Follow Nurse / BPA Driven Protocol    Pharmacy to dose vancomycin    Pharmacy To Dose:    Phosphorus Replacement - Follow Nurse / BPA Driven Protocol    Potassium Replacement - Follow Nurse / BPA Driven Protocol    sodium chloride    sodium chloride    sodium chloride    Vital Signs  Vitals:    25 0754   BP: 132/89   Pulse: 83   Resp: 18   Temp: 98.2 °F (36.8 °C)   SpO2: 98%      Body mass index is 28.32 kg/m².     Physical Exam:  NAD, alert and oriented  RRR  Lungs clear  Abd soft, benign  Vascular: Palpable right DP pulse      right leg            Results Review:     CBC    Results from last 7 days   Lab Units  02/26/25 0252 02/25/25 0359 02/24/25 2056   WBC 10*3/mm3 6.75 10.54 11.57*   HEMOGLOBIN g/dL 9.5* 9.1* 11.2*   PLATELETS 10*3/mm3 418 366 494*     BMP   Results from last 7 days   Lab Units 02/26/25 0252 02/25/25 0359 02/24/25 2056   SODIUM mmol/L 138  --  131*   POTASSIUM mmol/L 3.8 4.0 3.4*   CHLORIDE mmol/L 105  --  94*   CO2 mmol/L 24.0  --  24.6   BUN mg/dL 9  --  12   CREATININE mg/dL 0.54*  --  0.65   GLUCOSE mg/dL 224*  --  163*     Coag   Results from last 7 days   Lab Units 02/26/25  0331 02/26/25 0252 02/25/25 0359   INR   --   --  1.62*   APTT seconds 37.3* 36.7* 37.2*     HbA1C   Lab Results   Component Value Date    HGBA1C 15.80 (H) 12/18/2024     Infection   Results from last 7 days   Lab Units 02/24/25 2056 02/24/25  0830   BLOODCX  No growth at 24 hours  No growth at 24 hours  --    WOUNDCX   --  Heavy growth (4+) Gram Positive Cocci*     Radiology(recent) No radiology results for the last day    VTE Prophylaxis:  Pharmacologic VTE prophylaxis orders are present.         Problems:    Active Hospital Problems:  Active Hospital Problems    Diagnosis  POA    **Cellulitis of right lower extremity [L03.115]  Yes    Cellulitis of right leg [L03.115]  Yes      Resolved Hospital Problems   No resolved problems to display.        Assessment & Plan   Assessment / Plan     Cellulitis of right lower extremity    Cellulitis of right leg      54-year-old female status post right leg thrombectomy and fasciotomies who presents with fever and chills, drainage from right leg fasciotomy wounds  Blood cultures NGTD, wound culture with Streptococcus pyogenes growth, antibiotics per primary team  Wound care nurse to evaluate and provide recommendations for fasciotomy sites  She has no signs of acute limb ischemia, her right leg is warm to touch with palpable right DP pulse, no plans for surgical intervention  Continue Eliquis  Medical management per primary team    ADDENDUM:  Notified by wound care APRN that  there is significant purulence draining from tunneled area within the medial shadowing site.  Dr. Soares evaluated and I&D is appropriate.  Hold anticoagulation and plan for surgery tomorrow.          NICHOLAS Castro  [facility]  02/26/25   O: [phone]  F: [phone]

## 2025-02-26 NOTE — PLAN OF CARE
Goal Outcome Evaluation:  Plan of Care Reviewed With: patient        Progress: no change  Outcome Evaluation: Pt VSS, Here with cellulitis of right lowr extremiyy, IV antibiotics, Pt is alert and oriented X 4, Pt has been educated on hospital saftey and triggering a high falls risk.Pt understands and has refused all saftey measures, No alarms , and requested a wheelchair to take herself to the bathroom. Call light in reach, Plan on going

## 2025-02-27 ENCOUNTER — ANESTHESIA (OUTPATIENT)
Dept: PERIOP | Facility: HOSPITAL | Age: 55
End: 2025-02-27
Payer: MEDICAID

## 2025-02-27 ENCOUNTER — ANESTHESIA EVENT (OUTPATIENT)
Dept: PERIOP | Facility: HOSPITAL | Age: 55
End: 2025-02-27
Payer: MEDICAID

## 2025-02-27 LAB
GLUCOSE BLDC GLUCOMTR-MCNC: 119 MG/DL (ref 70–105)
GLUCOSE BLDC GLUCOMTR-MCNC: 125 MG/DL (ref 70–105)
GLUCOSE BLDC GLUCOMTR-MCNC: 130 MG/DL (ref 70–105)
GLUCOSE BLDC GLUCOMTR-MCNC: 96 MG/DL (ref 70–105)
POTASSIUM SERPL-SCNC: 4.2 MMOL/L (ref 3.5–5.2)
VANCOMYCIN TROUGH SERPL-MCNC: 13.9 MCG/ML (ref 5–20)

## 2025-02-27 PROCEDURE — 80202 ASSAY OF VANCOMYCIN: CPT | Performed by: INTERNAL MEDICINE

## 2025-02-27 PROCEDURE — 82948 REAGENT STRIP/BLOOD GLUCOSE: CPT | Performed by: STUDENT IN AN ORGANIZED HEALTH CARE EDUCATION/TRAINING PROGRAM

## 2025-02-27 PROCEDURE — 99024 POSTOP FOLLOW-UP VISIT: CPT | Performed by: STUDENT IN AN ORGANIZED HEALTH CARE EDUCATION/TRAINING PROGRAM

## 2025-02-27 PROCEDURE — 82948 REAGENT STRIP/BLOOD GLUCOSE: CPT

## 2025-02-27 PROCEDURE — 25810000003 SODIUM CHLORIDE 0.9 % SOLUTION 250 ML FLEX CONT: Performed by: INTERNAL MEDICINE

## 2025-02-27 PROCEDURE — 84132 ASSAY OF SERUM POTASSIUM: CPT | Performed by: INTERNAL MEDICINE

## 2025-02-27 PROCEDURE — 25010000002 VANCOMYCIN HCL 1.25 G RECONSTITUTED SOLUTION 1 EACH VIAL: Performed by: INTERNAL MEDICINE

## 2025-02-27 PROCEDURE — 25010000002 CEFEPIME PER 500 MG: Performed by: INTERNAL MEDICINE

## 2025-02-27 PROCEDURE — 63710000001 INSULIN GLARGINE PER 5 UNITS: Performed by: STUDENT IN AN ORGANIZED HEALTH CARE EDUCATION/TRAINING PROGRAM

## 2025-02-27 PROCEDURE — 94799 UNLISTED PULMONARY SVC/PX: CPT

## 2025-02-27 RX ORDER — POTASSIUM CHLORIDE 1500 MG/1
40 TABLET, EXTENDED RELEASE ORAL EVERY 4 HOURS
Status: COMPLETED | OUTPATIENT
Start: 2025-02-27 | End: 2025-02-27

## 2025-02-27 RX ADMIN — CEFEPIME 2000 MG: 2 INJECTION, POWDER, FOR SOLUTION INTRAVENOUS at 05:42

## 2025-02-27 RX ADMIN — HYDROCODONE BITARTRATE AND ACETAMINOPHEN 1 TABLET: 10; 325 TABLET ORAL at 03:42

## 2025-02-27 RX ADMIN — HYDROCODONE BITARTRATE AND ACETAMINOPHEN 1 TABLET: 10; 325 TABLET ORAL at 09:42

## 2025-02-27 RX ADMIN — POTASSIUM CHLORIDE 40 MEQ: 1500 TABLET, EXTENDED RELEASE ORAL at 09:42

## 2025-02-27 RX ADMIN — VANCOMYCIN HYDROCHLORIDE 1250 MG: 1.25 INJECTION, POWDER, LYOPHILIZED, FOR SOLUTION INTRAVENOUS at 10:57

## 2025-02-27 RX ADMIN — VANCOMYCIN HYDROCHLORIDE 1250 MG: 1.25 INJECTION, POWDER, LYOPHILIZED, FOR SOLUTION INTRAVENOUS at 01:28

## 2025-02-27 RX ADMIN — Medication 10 ML: at 09:56

## 2025-02-27 RX ADMIN — CEFEPIME 2000 MG: 2 INJECTION, POWDER, FOR SOLUTION INTRAVENOUS at 22:05

## 2025-02-27 RX ADMIN — PANTOPRAZOLE SODIUM 40 MG: 40 TABLET, DELAYED RELEASE ORAL at 05:42

## 2025-02-27 RX ADMIN — CEFEPIME 2000 MG: 2 INJECTION, POWDER, FOR SOLUTION INTRAVENOUS at 14:55

## 2025-02-27 RX ADMIN — INSULIN GLARGINE 20 UNITS: 100 INJECTION, SOLUTION SUBCUTANEOUS at 22:04

## 2025-02-27 RX ADMIN — POTASSIUM CHLORIDE 40 MEQ: 1500 TABLET, EXTENDED RELEASE ORAL at 04:53

## 2025-02-27 RX ADMIN — Medication 10 ML: at 21:23

## 2025-02-27 RX ADMIN — VANCOMYCIN HYDROCHLORIDE 1250 MG: 1.25 INJECTION, POWDER, LYOPHILIZED, FOR SOLUTION INTRAVENOUS at 22:05

## 2025-02-27 RX ADMIN — HYDROCODONE BITARTRATE AND ACETAMINOPHEN 1 TABLET: 10; 325 TABLET ORAL at 22:36

## 2025-02-27 RX ADMIN — BUDESONIDE AND FORMOTEROL FUMARATE DIHYDRATE 2 PUFF: 160; 4.5 AEROSOL RESPIRATORY (INHALATION) at 20:16

## 2025-02-27 RX ADMIN — HYDROCODONE BITARTRATE AND ACETAMINOPHEN 1 TABLET: 10; 325 TABLET ORAL at 15:56

## 2025-02-27 NOTE — PROGRESS NOTES
"Pharmacy Antimicrobial Dosing Service    Subjective:  Yarelis Jackson is a 54 y.o.female admitted with worsening pain and . Pharmacy has been consulted to dose Vancomycin for possible SSTI.    PMH: DM, PAD with recent right critical limb ischemia status post right iliac thrombectomy; recent hospitalization at [facility], I&D of right lower extremity       Assessment/Plan    1. Day #3 Vancomycin: Goal -600 mcg*h/mL. Patient received vancomycin 1500 mg IV x 1, followed by 1000 mg IV q12h, later increased to vancomycin 1250 mg IV (~17 mg/kg) q8h. Based on vancomycin trough level collected this morning, estimated steady state AUC will be therapeutic at 529 mcg*h/mL, T1/2 is ~ 6 hrs. Will continue current dose and schedule vancomycin trough in 2 days unless it will be clinically indicated sooner.     2. Day #3 Cefepime: 2gm IV q8h for estCrCl > 60 mL/min.     Will continue to monitor drug levels, renal function, culture and sensitivities, and patient clinical status.       Objective:  Relevant clinical data and objective history reviewed:  162.6 cm (64\")   74.8 kg (165 lb)   Ideal body weight: 54.7 kg (120 lb 9.5 oz)  Adjusted ideal body weight: 62.8 kg (138 lb 5.7 oz)  Body mass index is 28.32 kg/m².    Results from last 7 days   Lab Units 02/27/25  1016 02/26/25  0913 02/26/25 0252   VANCOMYCIN PK mcg/mL  --   --  11.60*   VANCOMYCIN TR mcg/mL 13.90 5.88  --      Results from last 7 days   Lab Units 02/26/25 2256 02/26/25 0252 02/24/25 2056   CREATININE mg/dL 0.54* 0.54* 0.65     Estimated Creatinine Clearance: 119.3 mL/min (A) (by C-G formula based on SCr of 0.54 mg/dL (L)).  I/O last 3 completed shifts:  In: 600 [P.O.:600]  Out: 200 [Urine:200]    Results from last 7 days   Lab Units 02/26/25 2256 02/26/25 0252 02/25/25  0359   WBC 10*3/mm3 6.17 6.75 10.54     Temperature    02/26/25 2032 02/27/25 0433 02/27/25 1201   Temp: 98.6 °F (37 °C) 98.1 °F (36.7 °C) 97.9 °F (36.6 °C)     Baseline " culture/source/susceptibility:  Microbiology Results (last 10 days)       Procedure Component Value - Date/Time    Blood Culture - Blood, Arm, Left [[id number]]  (Normal) Collected: 02/24/25 2056    Lab Status: Preliminary result Specimen: Blood from Arm, Left Updated: 02/26/25 2116     Blood Culture No growth at 2 days    Blood Culture - Blood, Arm, Right [[id number]]  (Normal) Collected: 02/24/25 2056    Lab Status: Preliminary result Specimen: Blood from Arm, Right Updated: 02/26/25 2116     Blood Culture No growth at 2 days    Wound Culture - Wound, Leg, Right [[id number]]  (Abnormal) Collected: 02/24/25 0830    Lab Status: Final result Specimen: Wound from Leg, Right Updated: 02/26/25 0855     Wound Culture Heavy growth (4+) Streptococcus pyogenes (Group A)     Gram Stain Many (4+) WBCs per low power field      Many (4+) Gram positive cocci in pairs and chains            Joanna Poon [facility]  02/27/25 12:58 EST

## 2025-02-27 NOTE — PAYOR COMM NOTE
"RECONSIDERATION FOR CASE# [id number]     Obs order 2/24/25  Inpatient order 2/25/25    ER admit   Patient now pending I&D in OR, anticoagulant on hold.  Continued IV Cefepime, IV Vancomycin   Updated MD notes and clinical attached.  ===========  AUTHORIZATION PENDING:   PLEASE FAX OR CALL DETERMINATION TO CONTACT BELOW:       THANK YOU,    GARCIA Jain, RN  Utilization Review  [facility]  Phone: [phone]  Fax: [phone]      NPI: [id number]  Tax ID: [id number]        Janet Alexander \"BRYANNA\" (54 y.o. Female)       Date of Birth   1970    Social Security Number       Address   [address] [place] [address] [place] [facility]    Home Phone   [phone]    MRN   [id number]       Orthodox   [facility]    Marital Status                               Admission Date   2/24/25    Admission Type   Emergency    Admitting Provider   Llanes Alvarez, Carlos, MD    Attending Provider   Nakul Keith MD    Department, Room/Bed   [facility] SURGICAL INPATIENT, 4129/1       Discharge Date       Discharge Disposition       Discharge Destination                                 Attending Provider: Nakul Keith MD    Allergies: Aspirin, Ibuprofen    Isolation: None   Infection: MRSA No Isolation this Admit (12/19/24)   Code Status: CPR    Ht: 162.6 cm (64\")   Wt: 74.8 kg (165 lb)    Admission Cmt: None   Principal Problem: Cellulitis of right lower extremity [L03.115]                   Active Insurance as of 2/24/2025       Primary Coverage       Payor Plan Insurance Group Employer/Plan Group    [id number]       Payor Plan Address Payor Plan Phone Number Payor Plan Fax Number Effective Dates    MAIL STOP:   9/1/2021 - None Entered    [address] [address]       [facility] [address]         Subscriber Name Subscriber Birth Date Member ID       JANET ALEXANDER 1970 [id number]                     Emergency Contacts        (Rel.) Home " "Phone Work Phone Mobile Phone    Sukh Saldivar (Son) -- -- [phone]    Rohit Saldivar (Son) -- -- [phone]    KYAW HU (Mother) [phone] -- [phone]                 History & Physical        Llanes Alvarez, Carlos, MD at 25 [phone]              [facility] Medicine Services  History & Physical    Patient Name: Yarelis Jackson  : 1970  MRN: [id number]  Primary Care Physician:  Rohit Schrader DO  Date of admission: 2025  Date and Time of Service: 2025 at 2340    Subjective      Chief Complaint: \"right leg pain, swelling, fever\"    History of Present Illness: Yarelis Jackson is a 54 y.o. female with a PMH of COPD, GERD, Hypertension, type 2 DM, PAD with recent right critical limb ischemia status post right iliac thrombectomy, right iliofemoral popliteal thrombectomy, right femoral endarterectomy, ongoing smoking, she was hospitalized from  through 25 and was seen in follow up by vascular surgery 6 days ago for follow up of fasciotomy wounds apparently she was doing well  who presented to [facility] on 2025 with worsening pain and swelling with erythema since past couple of days, started having subjective fever and chills earlier today. In ED she is noted to have fever temp 101.4, tachycardic 116 x min. CBC labs notable for Hb 11.2, wbc 11.57. The decision to admit was made.       Review of Systems   Constitutional:  Positive for chills, fatigue and fever.   HENT:  Negative for drooling.    Respiratory:  Positive for cough. Negative for choking, chest tightness and stridor.    Cardiovascular:  Positive for leg swelling. Negative for chest pain.   Gastrointestinal:  Negative for abdominal pain and diarrhea.   Genitourinary:  Negative for dysuria and genital sores.   Musculoskeletal:  Positive for joint swelling. Negative for arthralgias.   Skin:  Positive for wound. Negative for pallor.   Neurological:  Negative for headaches.   Psychiatric/Behavioral:  " Negative for confusion.        Personal History     Past Medical History:   Diagnosis Date    COPD (chronic obstructive pulmonary disease)     Deep vein thrombosis     Low back pain     Migraine     Neck pain        Past Surgical History:   Procedure Laterality Date    FASCIOTOMY Right 12/23/2024    Procedure: FASCIOTOMY LEG;  Surgeon: Chris Delgado MD;  Location: [facility] MAIN OR;  Service: Vascular;  Laterality: Right;    FEMORAL THROMBECTOMY/EMBOLECTOMY Right 12/19/2024    Procedure: FEMORAL right iliofemoral thrombectomy, arteriogram;  Surgeon: Ghassan Celestin MD;  Location: [facility] HYBRID OR;  Service: Vascular;  Laterality: Right;    FEMORAL THROMBECTOMY/EMBOLECTOMY Right 12/20/2024    Procedure: Right femoral thrombectomy, right lower extremity arteriogram and right lower leg facitomies;  Surgeon: Ghassan Celestin MD;  Location: [facility] OR;  Service: Vascular;  Laterality: Right;    LUNG SURGERY         Family History: family history includes COPD in her paternal grandmother; Cancer in her paternal grandmother and paternal uncle; Diabetes in her mother and paternal grandmother. Otherwise pertinent FHx was reviewed and not pertinent to current issue.    Social History:  reports that she has been smoking cigarettes. She started smoking about 40 years ago. She has a 20.1 pack-year smoking history. She has never used smokeless tobacco. She reports that she does not currently use alcohol. She reports current drug use. Drug: Marijuana.    Home Medications:  Prior to Admission Medications       Prescriptions Last Dose Informant Patient Reported? Taking?    Eliquis 5 MG tablet tablet 2/24/2025  Yes Yes    Take 1 tablet by mouth Every 12 (Twelve) Hours.    Insulin Glargine (LANTUS SOLOSTAR) 100 UNIT/ML injection pen 2/24/2025  No Yes    Inject 22 Units under the skin into the appropriate area as directed Every Night for 136 days.    Insulin Lispro, 1 Unit Dial, (HumaLOG KwikPen) 100 UNIT/ML  solution pen-injector 2/24/2025  No Yes    9 units with each with each meal with sliding scale not more than 15 units with each meal    naloxone (NARCAN) 4 MG/0.1ML nasal spray Unknown  Yes No    Administer  into the nostril(s) as directed by provider.    oxyCODONE (ROXICODONE) 15 MG immediate release tablet 2/24/2025  No Yes    Take 1 tablet by mouth Every 4 (Four) Hours As Needed for Moderate Pain.    pantoprazole (PROTONIX) 40 MG EC tablet 2/24/2025  Yes Yes    Take 1 tablet by mouth.    sodium chloride (NS) 0.9 % irrigation 2/23/2025  Yes Yes    Irrigate with 0.033 mL to the affected area as directed by provider 1 (One) Time.    budesonide-formoterol (Symbicort) 160-4.5 MCG/ACT inhaler Unknown  No No    Inhale 2 puffs 2 (Two) Times a Day for 30 days.    doxycycline (MONODOX) 100 MG capsule 2/24/2025  Yes Yes    Take 1 capsule by mouth 2 (Two) Times a Day.    HYDROcodone-acetaminophen (NORCO)  MG per tablet Unknown  No No    Take 1 tablet by mouth Every 6 (Six) Hours As Needed for Moderate Pain or Severe Pain for up to 10 days.    naloxone (NARCAN) 4 MG/0.1ML nasal spray Unknown  No No    Call 911. Don't prime. [place] in 1 nostril for overdose. Repeat in 2-3 minutes in other nostril if no or minimal breathing/responsiveness.              Allergies:  Allergies   Allergen Reactions    Aspirin GI Intolerance    Ibuprofen Itching       Objective      Vitals:   Temp:  [97.9 °F (36.6 °C)-101.4 °F (38.6 °C)] 101 °F (38.3 °C)  Heart Rate:  [104-121] 115  Resp:  [12] 12  BP: (126-159)/(68-88) 141/70  Body mass index is 28.32 kg/m².  Physical Exam  Constitutional:       General: She is not in acute distress.     Appearance: Normal appearance. She is not toxic-appearing.   HENT:      Head: Normocephalic.      Nose: Nose normal.      Mouth/Throat:      Mouth: Mucous membranes are dry.   Eyes:      Extraocular Movements: Extraocular movements intact.      Pupils: Pupils are equal, round, and reactive to light.    Cardiovascular:      Rate and Rhythm: Regular rhythm. Tachycardia present.      Pulses: Normal pulses.      Heart sounds: Normal heart sounds. No murmur heard.  Pulmonary:      Effort: Pulmonary effort is normal. No respiratory distress.      Breath sounds: Normal breath sounds.   Abdominal:      General: Abdomen is flat. There is no distension.      Palpations: Abdomen is soft.      Tenderness: There is no abdominal tenderness.   Musculoskeletal:         General: Normal range of motion.      Cervical back: Neck supple.      Right lower leg: Edema present.      Left lower leg: No edema.      Comments: RLE pedal edema with marked circumferential erythema entire right leg with surgical wound medial aspect with purulent draining. Tender to touch, warmth.    Skin:     General: Skin is warm.      Capillary Refill: Capillary refill takes less than 2 seconds.      Findings: Erythema and lesion present.   Neurological:      Mental Status: She is alert and oriented to person, place, and time.   Psychiatric:         Behavior: Behavior normal.         Diagnostic Data:  Lab Results (last 24 hours)       Procedure Component Value Units Date/Time    Comprehensive Metabolic Panel [[id number]]  (Abnormal) Collected: 02/24/25 2056    Specimen: Blood Updated: 02/24/25 2130     Glucose 163 mg/dL      BUN 12 mg/dL      Creatinine 0.65 mg/dL      Sodium 131 mmol/L      Potassium 3.4 mmol/L      Chloride 94 mmol/L      CO2 24.6 mmol/L      Calcium 9.8 mg/dL      Total Protein 8.8 g/dL      Albumin 3.7 g/dL      ALT (SGPT) 9 U/L      AST (SGOT) 15 U/L      Alkaline Phosphatase 131 U/L      Total Bilirubin 0.3 mg/dL      Globulin 5.1 gm/dL      A/G Ratio 0.7 g/dL      BUN/Creatinine Ratio 18.5     Anion Gap 12.4 mmol/L      eGFR 105.4 mL/min/1.73     Narrative:      GFR Categories in Chronic Kidney Disease (CKD)      GFR Category          GFR (mL/min/1.73)    Interpretation  G1                     90 or greater         Normal or high  (1)  G2                      60-89                Mild decrease (1)  G3a                   45-59                Mild to moderate decrease  G3b                   30-44                Moderate to severe decrease  G4                    15-29                Severe decrease  G5                    14 or less           Kidney failure          (1)In the absence of evidence of kidney disease, neither GFR category G1 or G2 fulfill the criteria for CKD.    eGFR calculation 2021 CKD-EPI creatinine equation, which does not include race as a factor    [place] Draw [[id number]] Collected: 02/24/25 2056    Specimen: Blood Updated: 02/24/25 2115    Narrative:      The following orders were created for panel order [place] Draw.  Procedure                               Abnormality         Status                     ---------                               -----------         ------                     Green Top (Gel)[[id number]]                                  Final result               Lavender Top[[id number]]                                     Final result               Gold Top - SST[[id number]]                                   Final result               Light Blue Top[[id number]]                                   Final result                 Please view results for these tests on the individual orders.    Green Top (Gel) [[id number]] Collected: 02/24/25 2056    Specimen: Blood Updated: 02/24/25 2115     Extra Tube Hold for add-ons.     Comment: Auto resulted.       Lavender Top [[id number]] Collected: 02/24/25 2056    Specimen: Blood Updated: 02/24/25 2115     Extra Tube hold for add-on     Comment: Auto resulted       Gold Top - SST [[id number]] Collected: 02/24/25 2056    Specimen: Blood Updated: 02/24/25 2115     Extra Tube Hold for add-ons.     Comment: Auto resulted.       Light Blue Top [[id number]] Collected: 02/24/25 2056    Specimen: Blood Updated: 02/24/25 2115     Extra Tube Hold for add-ons.     Comment: Auto resulted        CBC & Differential [[id number]]  (Abnormal) Collected: 02/24/25 2056    Specimen: Blood Updated: 02/24/25 2106    Narrative:      The following orders were created for panel order CBC & Differential.  Procedure                               Abnormality         Status                     ---------                               -----------         ------                     CBC Auto Differential[[id number]]        Abnormal            Final result                 Please view results for these tests on the individual orders.    CBC Auto Differential [[id number]]  (Abnormal) Collected: 02/24/25 2056    Specimen: Blood Updated: 02/24/25 2106     WBC 11.57 10*3/mm3      RBC 3.74 10*6/mm3      Hemoglobin 11.2 g/dL      Hematocrit 35.5 %      MCV 94.9 fL      MCH 29.9 pg      MCHC 31.5 g/dL      RDW 15.2 %      RDW-SD 53.4 fl      MPV 8.3 fL      Platelets 494 10*3/mm3      Neutrophil % 77.9 %      Lymphocyte % 15.1 %      Monocyte % 5.8 %      Eosinophil % 0.6 %      Basophil % 0.3 %      Immature Grans % 0.3 %      Neutrophils, Absolute 9.00 10*3/mm3      Lymphocytes, Absolute 1.75 10*3/mm3      Monocytes, Absolute 0.67 10*3/mm3      Eosinophils, Absolute 0.07 10*3/mm3      Basophils, Absolute 0.04 10*3/mm3      Immature Grans, Absolute 0.04 10*3/mm3      nRBC 0.0 /100 WBC     POC Lactate [[id number]]  (Normal) Collected: 02/24/25 2104    Specimen: Blood Updated: 02/24/25 2105     Lactate 2.0 mmol/L      Comment: Serial Number: [id number]:  [id number]       Blood Culture - Blood, Arm, Left [[id number]] Collected: 02/24/25 2056    Specimen: Blood from Arm, Left Updated: 02/24/25 2104    Blood Culture - Blood, Arm, Right [[id number]] Collected: 02/24/25 2056    Specimen: Blood from Arm, Right Updated: 02/24/25 2104             Imaging Results (Last 24 Hours)       ** No results found for the last 24 hours. **              Assessment & Plan        This is a 54 y.o. female with:    Active and Resolved Problems  Active Hospital  Problems    Diagnosis  POA    **Cellulitis of right lower extremity [L03.115]  Yes    Cellulitis of right leg [L03.115]  Yes      Resolved Hospital Problems   No resolved problems to display.       Sepsis due to RLE cellulitis  Severe PAD  status post right iliac thrombectomy, right iliofemoral popliteal thrombectomy, right femoral endarterectomy   Intractable right leg pain   Patient meets SIRS criteria with , temp 101.4  S/P 30 ml/kg NS bolus in ED  12/19 - right iliac thrombectomy. 12/20 - right iliofemoral popliteal thrombectomy, right femoral endarterectomy with patch, right lower extremity arteriogram and close right calf fasciotomy. 12/23 - emergent 4 compartment fasciotomies.  Was seen in follow up by vascular surgery on 2/18, it was felt that there was no indication for infection and recommended continue wound care   Patient is started on IV vancomycin and cefepime  Will hold eliquis and heparinize while inpatient should she warrant any I&D, may resume eliquis if no plans for surgical intervention  Vascular surgery consultation requested  Continue local wound care  Delineate area of erythema, monitor for progression  Follow wound and blood cultures      Type 2 DM  Continue Lantus and SSI       Chronic Anemia  Monitor daily CBC, transfuse as needed       History of GERD  Continue PPI    VTE Prophylaxis:  Pharmacologic VTE prophylaxis orders are present.        The patient desires to be as follows:    CODE STATUS:    Code Status (Patient has no pulse and is not breathing): CPR (Attempt to Resuscitate)  Medical Interventions (Patient has pulse or is breathing): Full Support        Sukh Saldivar, who can be contacted at , is the designated person to make medical decisions on the patient's behalf if She is incapable of doing so. This was clarified with patient and/or next of kin on 2/24/2025 during the course of this H&P.    Admission Status:  I believe this patient meets inpatient  status.    Expected Length of Stay: 2-3    PDMP and Medication Dispenses via Sidebar reviewed and consistent with patient reported medications.    I discussed the patient's findings and my recommendations with patient and nursing staff.      Signature:     This document has been electronically signed by Carlos Llanes Alvarez, MD on February 25, 2025 01:35 [facility] Hospitalist Team     Electronically signed by Llanes Alvarez, Carlos, MD at 02/25/25 0230          Emergency Department Notes        Huy Luis MD at 02/24/25 7938          Subjective   History of Present Illness  54-year-old female history of fasciotomy was discharged from the hospital on January 16 and went to rehab.  She states that she has since returned home.  She reports that in the last 3 to 4 days she has had the onset of increasing discomfort in her leg and swelling.  She was seen at the wound care where dressing was placed and the patient told to come to the hospital for additional treatment.  The patient reports she has had purulent drainage.  She said reports subjective fever and chills within the last 24 hours.  She reports no decrease in sensation or circulation and states that the lower leg feels like it is burning.      Review of Systems    Past Medical History:   Diagnosis Date    COPD (chronic obstructive pulmonary disease)     Deep vein thrombosis     Low back pain     Migraine     Neck pain        Allergies   Allergen Reactions    Aspirin GI Intolerance    Ibuprofen Itching       Past Surgical History:   Procedure Laterality Date    FASCIOTOMY Right 12/23/2024    Procedure: FASCIOTOMY LEG;  Surgeon: Chris Delgado MD;  Location: [facility] MAIN OR;  Service: Vascular;  Laterality: Right;    FEMORAL THROMBECTOMY/EMBOLECTOMY Right 12/19/2024    Procedure: FEMORAL right iliofemoral thrombectomy, arteriogram;  Surgeon: Ghassan Celestin MD;  Location: [facility] HYBRID OR;  Service: Vascular;  Laterality: Right;    FEMORAL  THROMBECTOMY/EMBOLECTOMY Right 12/20/2024    Procedure: Right femoral thrombectomy, right lower extremity arteriogram and right lower leg facitomies;  Surgeon: Ghassan Celestin MD;  Location: [facility];  Service: Vascular;  Laterality: Right;    LUNG SURGERY         Family History   Problem Relation Age of Onset    Diabetes Mother     Cancer Paternal Uncle     Cancer Paternal Grandmother     COPD Paternal Grandmother     Diabetes Paternal Grandmother        Social History     Socioeconomic History    Marital status:    Tobacco Use    Smoking status: Every Day     Current packs/day: 0.50     Average packs/day: 0.5 packs/day for 40.1 years (20.1 ttl pk-yrs)     Types: Cigarettes     Start date: 1985    Smokeless tobacco: Never   Vaping Use    Vaping status: Never Used   Substance and Sexual Activity    Alcohol use: Not Currently    Drug use: Yes     Types: Marijuana    Sexual activity: Defer           Objective   Physical Exam  Alert Christian Coma Scale 15   HEENT: Pupils equal and reactive to light. Conjunctivae are not injected. Normal tympanic membranes. Oropharynx and nares are normal.   Neck: Supple. Midline trachea. No JVD. No goiter.   Chest: Clear and equal breath sounds bilaterally, regular rate and rhythm without murmur or rub.   Abdomen: Positive bowel sounds, nontender, nondistended. No rebound or peritoneal signs. No CVA tenderness.   Extremities no clubbing or cyanosis patient has erythematous changes noted to most lower extremity the patient still has a completely healed fasciotomy incisions the patient does not have areas of induration or areas suggestive of abscess formation pulses are intact and symmetrical.  Hernandez's test is normal there is some calf discomfort to direct pressure but no classic Homans' sign motor sensory exam is normal. The full range of motion is intact   Skin: Warm and dry, no rashes or petechia.   Lymphatic: No regional lymphadenopathy.    Procedures          ED Course        Due to significant overcrowding in the emergency department patient was evaluated by myself in a hallway bed.  This examination might be limited by privacy concerns, noise, exposure issues, and the patient not wearing a hospital gown.  Explained to the patient our limitations and our overcrowding.  They were in agreement to continue the exam and treatment at this time.    Patient eventually placed in room                                   Labs Reviewed   COMPREHENSIVE METABOLIC PANEL - Abnormal; Notable for the following components:       Result Value    Glucose 163 (*)     Sodium 131 (*)     Potassium 3.4 (*)     Chloride 94 (*)     Total Protein 8.8 (*)     Alkaline Phosphatase 131 (*)     All other components within normal limits    Narrative:     GFR Categories in Chronic Kidney Disease (CKD)      GFR Category          GFR (mL/min/1.73)    Interpretation  G1                     90 or greater         Normal or high (1)  G2                      60-89                Mild decrease (1)  G3a                   45-59                Mild to moderate decrease  G3b                   30-44                Moderate to severe decrease  G4                    15-29                Severe decrease  G5                    14 or less           Kidney failure          (1)In the absence of evidence of kidney disease, neither GFR category G1 or G2 fulfill the criteria for CKD.    eGFR calculation 2021 CKD-EPI creatinine equation, which does not include race as a factor   CBC WITH AUTO DIFFERENTIAL - Abnormal; Notable for the following components:    WBC 11.57 (*)     RBC 3.74 (*)     Hemoglobin 11.2 (*)     Platelets 494 (*)     Neutrophil % 77.9 (*)     Lymphocyte % 15.1 (*)     Neutrophils, Absolute 9.00 (*)     All other components within normal limits   POC LACTATE - Normal   BLOOD CULTURE   BLOOD CULTURE   RAINBOW DRAW    Narrative:     The following orders were created for panel order [place]  Draw.  Procedure                               Abnormality         Status                     ---------                               -----------         ------                     Green Top (Gel)[[id number]]                                  Final result               Lavender Top[[id number]]                                     Final result               Gold Top - SST[[id number]]                                   Final result               Light Blue Top[[id number]]                                   Final result                 Please view results for these tests on the individual orders.   POC LACTATE   CBC AND DIFFERENTIAL    Narrative:     The following orders were created for panel order CBC & Differential.  Procedure                               Abnormality         Status                     ---------                               -----------         ------                     CBC Auto Differential[[id number]]        Abnormal            Final result                 Please view results for these tests on the individual orders.   GREEN TOP   LAVENDER TOP   GOLD TOP - SST   LIGHT BLUE TOP     Medications   sodium chloride 0.9 % flush 10 mL (has no administration in time range)   Pharmacy to dose vancomycin (has no administration in time range)   ampicillin-sulbactam (UNASYN) 3 g in sodium chloride 0.9 % 100 mL MBP (3 g Intravenous New Bag 2/24/25 2133)   vancomycin IVPB 1500 mg in 0.9% NaCl (Premix) 500 mL (has no administration in time range)   ondansetron (ZOFRAN) injection 4 mg (4 mg Intravenous Given 2/24/25 2133)   morphine injection 4 mg (4 mg Intravenous Given 2/24/25 2133)     No radiology results for the last day              Medical Decision Making  Cultures were obtained the patient started on IV vancomycin and Unasyn.  The patient case was discussed the hospitalist service.  The patient will need wound service consultation she was agreeable with this plan of treatment    Amount and/or Complexity of Data  Reviewed  Labs: ordered.    Risk  OTC drugs.  Prescription drug management.        Final diagnoses:   Cellulitis of right lower extremity   History of fasciotomy       ED Disposition  ED Disposition       ED Disposition   Decision to Admit    Condition   --    Comment   Level of Care: Telemetry [5]   Diagnosis: Cellulitis of right lower extremity [673737]   Admitting Physician: LLANES ALVAREZ, CARLOS [[id number]]   Attending Physician: LLANES ALVAREZ, CARLOS [[id number]]                 No follow-up provider specified.       Medication List      No changes were made to your prescriptions during this visit.            Huy Luis MD  02/24/25 2328      Electronically signed by Huy Luis MD at 02/24/25 2328       Vital Signs (last day)       Date/Time Temp Temp src Pulse Resp BP Patient Position SpO2    02/27/25 1201 97.9 (36.6) Oral 83 19 145/86 Lying 97    02/27/25 0500 -- -- -- -- 154/83 Lying --    02/27/25 0433 98.1 (36.7) Oral 99 18 165/92 Lying 98    02/26/25 2032 98.6 (37) Oral 89 16 143/85 Lying 97    02/26/25 1636 98.1 (36.7) Oral 91 17 139/84 Lying 98    02/26/25 1210 98 (36.7) Oral 93 18 133/74 Lying 96    02/26/25 0754 98.2 (36.8) Oral 83 18 132/89 Lying 98    02/26/25 0432 98 (36.7) Oral 86 18 139/85 Lying 97    02/26/25 0105 98.2 (36.8) Oral 91 18 124/72 Lying 96          Facility-Administered Medications as of 2/27/2025   Medication Dose Route Frequency Provider Last Rate Last Admin    acetaminophen (TYLENOL) tablet 650 mg  650 mg Oral Q4H PRN Llanes Alvarez, Carlos, MD        Or    acetaminophen (TYLENOL) 160 MG/5ML oral solution 650 mg  650 mg Oral Q4H PRN Llanes Alvarez, Carlos, MD        Or    acetaminophen (TYLENOL) suppository 650 mg  650 mg Rectal Q4H PRN Llanes Alvarez, Carlos, MD        [Held by provider] apixaban (ELIQUIS) tablet 5 mg  5 mg Oral Q12H Usama Ware MD   5 mg at 02/26/25 0859    sennosides-docusate (PERICOLACE) 8.6-50 MG per tablet 2 tablet  2 tablet Oral BID PRN  Llanes Alvarez, Carlos, MD        And    polyethylene glycol (MIRALAX) packet 17 g  17 g Oral Daily PRN Llanes Alvarez, Carlos, MD        And    bisacodyl (DULCOLAX) EC tablet 5 mg  5 mg Oral Daily PRN Llanes Alvarez, Carlos, MD        And    bisacodyl (DULCOLAX) suppository 10 mg  10 mg Rectal Daily PRN Llanes Alvarez, Carlos, MD        budesonide-formoterol (SYMBICORT) 160-4.5 MCG/ACT inhaler 2 puff  2 puff Inhalation BID - RT Llanes Alvarez, Carlos, MD   2 puff at 02/25/25 2152    Calcium Replacement - Follow Nurse / BPA Driven Protocol   Not Applicable PRN Llanes Alvarez, Carlos, MD        [COMPLETED] cefepime 2000 mg IVPB in 100 mL NS (MBP)  2,000 mg Intravenous Once Llanes Alvarez, Carlos, MD   Stopped at 02/25/25 0713    cefepime 2000 mg IVPB in 100 mL NS (MBP)  2,000 mg Intravenous Q8H Nakul Keith MD   2,000 mg at 02/27/25 0542    dextrose (D50W) (25 g/50 mL) IV injection 25 g  25 g Intravenous Q15 Min PRN Llanes Alvarez, Carlos, MD        dextrose (GLUTOSE) oral gel 15 g  15 g Oral Q15 Min PRN Llanes Alvarez, Carlos, MD        glucagon (GLUCAGEN) injection 1 mg  1 mg Intramuscular Q15 Min PRN Llanes Alvarez, Carlos, MD        HYDROcodone-acetaminophen (NORCO)  MG per tablet 1 tablet  1 tablet Oral Q6H PRN Llanes Alvarez, Carlos, MD   1 tablet at 02/27/25 0942    insulin glargine (LANTUS, SEMGLEE) injection 20 Units  20 Units Subcutaneous Nightly Llanes Alvarez, Carlos, MD   20 Units at 02/26/25 2139    insulin lispro (HUMALOG/ADMELOG) injection 2-7 Units  2-7 Units Subcutaneous 4x Daily AC & at Bedtime Llanes Alvarez, Carlos, MD   2 Units at 02/26/25 1217    insulin lispro (HUMALOG/ADMELOG) injection 8 Units  8 Units Subcutaneous TID With Meals Llanes Alvarez, Carlos, MD   8 Units at 02/26/25 1217    [COMPLETED] iopamidol (ISOVUE-370) 76 % injection 100 mL  100 mL Intravenous Once in imaging Nakul Keith MD   100 mL at 02/26/25 6497    Magnesium Standard Dose Replacement - Follow Nurse / BPA Driven  Protocol   Not Applicable PRN Llanes Alvarez, Carlos, MD        [COMPLETED] morphine injection 4 mg  4 mg Intravenous Once Huy Luis MD   4 mg at 02/24/25 2133    [COMPLETED] ondansetron (ZOFRAN) injection 4 mg  4 mg Intravenous Once Huy Luis MD   4 mg at 02/24/25 2133    pantoprazole (PROTONIX) EC tablet 40 mg  40 mg Oral Q AM Llanes Alvarez, Carlos, MD   40 mg at 02/27/25 0542    Pharmacy to dose vancomycin   Not Applicable Continuous PRN Nakul Keith MD        Phosphorus Replacement - Follow Nurse / BPA Driven Protocol   Not Applicable PRN Llanes Alvarez, Carlos, MD        [COMPLETED] potassium chloride (KLOR-CON M20) CR tablet 40 mEq  40 mEq Oral Q4H Huy Luis MD   40 mEq at 02/25/25 0351    [COMPLETED] potassium chloride (KLOR-CON M20) CR tablet 40 mEq  40 mEq Oral Q4H Nakul Keith MD   40 mEq at 02/27/25 0942    Potassium Replacement - Follow Nurse / BPA Driven Protocol   Not Applicable PRN Llanes Alvarez, Carlos, MD        [COMPLETED] sepsis fluid NS 0.9 % bolus 2,244 mL  30 mL/kg Intravenous Once Llanes Alvarez, Carlos, MD   2,244 mL at 02/25/25 0008    sodium chloride 0.9 % flush 10 mL  10 mL Intravenous PRN Huy Luis MD        sodium chloride 0.9 % flush 10 mL  10 mL Intravenous Q12H Llanes Alvarez, Carlos, MD   10 mL at 02/27/25 0956    sodium chloride 0.9 % flush 10 mL  10 mL Intravenous PRN Llanes Alvarez, Carlos, MD        sodium chloride 0.9 % infusion 40 mL  40 mL Intravenous PRN Llanes Alvarez, Carlos, MD        Vancomycin HCl 1,250 mg in sodium chloride 0.9 % 250 mL VTB  1,250 mg Intravenous Q8H Nakul Keith  mL/hr at 02/27/25 1057 1,250 mg at 02/27/25 1057    [COMPLETED] vancomycin IVPB 1500 mg in 0.9% NaCl (Premix) 500 mL  20 mg/kg Intravenous Once Huy Luis MD   Stopped at 02/25/25 0713        Physician Progress Notes (last 48 hours)        Nakul Keith MD at 02/27/25 [address] [place] [address] MEDICINE SERVICE  DAILY PROGRESS NOTE    NAME:  Yarelis Jackson  : 1970  MRN: [id number]      LOS: 2 days     PROVIDER OF SERVICE: Nakul Keith MD    Chief Complaint: Cellulitis of right lower extremity    Subjective:   Denies chest pain nausea vomiting diarrhea or melena  Interval History:    Patient seen and evaluated at bedside.   Patient will be going for I&D of right lower extremity this morning.  Treatment plan discussed with patient. All questions addressed.     Review of Systems:   All 21 ROS were negative except mentioned above.    Objective:     Vital Signs  Temp:  [98 °F (36.7 °C)-98.6 °F (37 °C)] 98.1 °F (36.7 °C)  Heart Rate:  [89-99] 99  Resp:  [16-18] 18  BP: (133-165)/(74-92) 154/83   Body mass index is 28.32 kg/m².    Physical Exam   General: No acute distress, appears stated age  Neuro: Awake and alert, oriented x3, no focal deficits appreciated  Head: Atraumatic, normocephalic  HEENT: EOMI, anicteric, normal sclerae and conjunctivae, moist mucus membranes  Neck: supple, no lymphadenopathy  CV: RRR, soft heart sounds, no murmurs appreciated, no peripheral edema  Pulm: Decreased breath sounds, no increased work of breathing, no adventitious sounds  Abd: Soft, nontender, nondistended  Skin: Warm, dry and right dressed lower extremity  Psych: Appropriate mood and affect    Scheduled Meds   [Held by provider] apixaban, 5 mg, Oral, Q12H  budesonide-formoterol, 2 puff, Inhalation, BID - RT  cefepime, 2,000 mg, Intravenous, Q8H  insulin glargine, 20 Units, Subcutaneous, Nightly  insulin lispro, 2-7 Units, Subcutaneous, 4x Daily AC & at Bedtime  insulin lispro, 8 Units, Subcutaneous, TID With Meals  pantoprazole, 40 mg, Oral, Q AM  sodium chloride, 10 mL, Intravenous, Q12H  vancomycin, 1,250 mg, Intravenous, Q8H       PRN Meds     acetaminophen **OR** acetaminophen **OR** acetaminophen    senna-docusate sodium **AND** polyethylene glycol **AND** bisacodyl **AND** bisacodyl    Calcium Replacement - Follow Nurse / BPA Driven Protocol    dextrose     dextrose    glucagon (human recombinant)    HYDROcodone-acetaminophen    Magnesium Standard Dose Replacement - Follow Nurse / BPA Driven Protocol    Pharmacy to dose vancomycin    Phosphorus Replacement - Follow Nurse / BPA Driven Protocol    Potassium Replacement - Follow Nurse / BPA Driven Protocol    sodium chloride    sodium chloride    sodium chloride   Infusions  Pharmacy to dose vancomycin,           Diagnostic Data    Results from last 7 days   Lab Units 02/26/25  2256 02/25/25  0359 02/24/25  2056   WBC 10*3/mm3 6.17   < > 11.57*   HEMOGLOBIN g/dL 10.0*   < > 11.2*   HEMATOCRIT % 32.8*   < > 35.5   PLATELETS 10*3/mm3 436   < > 494*   GLUCOSE mg/dL 177*   < > 163*   CREATININE mg/dL 0.54*   < > 0.65   BUN mg/dL 9   < > 12   SODIUM mmol/L 136   < > 131*   POTASSIUM mmol/L 3.5   < > 3.4*   AST (SGOT) U/L  --   --  15   ALT (SGPT) U/L  --   --  9   ALK PHOS U/L  --   --  131*   BILIRUBIN mg/dL  --   --  0.3   ANION GAP mmol/L 9.6   < > 12.4    < > = values in this interval not displayed.       CT Lower Extremity Right With Contrast    Result Date: 2/27/2025  Impression: 1.Large rim-enhancing fluid and gas collection in the deep soft tissues of the posterior-inferior lower leg consistent with abscess. There appears to be a tract extending to a medial wound. The abscess is in the inferior posterior compartment musculature  with a smaller rim-enhancing component extending superiorly in the deep musculature to the mid lower leg. 2.Additional smaller abscess in the inferior aspect of the anterior compartment musculature with rim-enhancing tracts extending to a lateral wound. 3.Diffuse subcutaneous edema with skin thickening which may indicate cellulitis. 4.No definite CT findings of osteomyelitis at this time. Electronically Signed: Sam Haynes  2/27/2025 9:02 AM EST  Workstation ID: [id number]     Interval results reviewed.    Assessment/Plan:   Sepsis due to RLE cellulitis  Severe PAD  status post right iliac  thrombectomy, right iliofemoral popliteal thrombectomy, right femoral endarterectomy   Intractable right leg pain       - right iliac thrombectomy.  - right iliofemoral popliteal thrombectomy, right femoral endarterectomy with patch, right lower extremity arteriogram and close right calf fasciotomy.  - emergent 4 compartment fasciotomies.  Wound culture positive for heavy growth of Streptococcus pyogenes  Blood cultures no growth  Continue vancomycin and cefepime  for strep to coccus pyogenes group A cellulitis.  Surgery following the patient  Was detailed as patient will go for wound debridement       Type 2 DM  Continue Lantus and SSI     Chronic Anemia  Monitor daily CBC, transfuse as needed           Treatment plan discussed with RN.     VTE Prophylaxis:  Pharmacologic VTE prophylaxis orders are present.         Code status is   Code Status and Medical Interventions: CPR (Attempt to Resuscitate); Full Support   Ordered at: 25 0787     Code Status (Patient has no pulse and is not breathing):    CPR (Attempt to Resuscitate)     Medical Interventions (Patient has pulse or is breathing):    Full Support       Plan for disposition:   Barriers to Discharge: wound debridement  Anticipated Date of Discharge: 25  Place of Discharge: pending pt              Time: 40 minutes     Signature: Electronically signed by Nakul Keith MD, 25, 10:38 EST.  [facility]     Electronically signed by Nakul Keith MD at 25 1041       Nakul Keith MD at 25 0950              [facility] MEDICINE SERVICE  DAILY PROGRESS NOTE    NAME: Yarelis Jackson  : 1970  MRN: [id number]      LOS: 1 day     PROVIDER OF SERVICE: Nakul Keith MD    Chief Complaint: Cellulitis of right lower extremity    Subjective:     Interval History:    Patient seen and evaluated at bedside.   H&P, consults labs and imaging reviewed.  Patient is diabetic and lives with her parents.  Denies any  chest pain nausea vomiting diarrhea or abdominal pain.  Treatment plan discussed with patient. All questions addressed.     Review of Systems:   All 21 ROS were negative except mentioned above.    Objective:     Vital Signs  Temp:  [98 °F (36.7 °C)-99.4 °F (37.4 °C)] 98.2 °F (36.8 °C)  Heart Rate:  [83-93] 83  Resp:  [16-19] 18  BP: (124-145)/(69-89) 132/89   Body mass index is 28.32 kg/m².    Physical Exam   General: No acute distress, appears stated age  Neuro: Awake and alert, oriented x3, no focal deficits appreciated  Head: Atraumatic, normocephalic  HEENT: EOMI, anicteric, normal sclerae and conjunctivae, moist mucus membranes  Neck: supple, no lymphadenopathy  CV: RRR, soft heart sounds, no murmurs appreciated, no peripheral edema  Pulm: Decreased breath sounds, no increased work of breathing, no adventitious sounds  Abd: Soft, nontender, nondistended  Skin: Warm, dry and dressed right lower extremity  Psych: Appropriate mood and affect    Scheduled Meds   apixaban, 5 mg, Oral, Q12H  budesonide-formoterol, 2 puff, Inhalation, BID - RT  cefepime, 2,000 mg, Intravenous, Q8H  insulin glargine, 20 Units, Subcutaneous, Nightly  insulin lispro, 2-7 Units, Subcutaneous, 4x Daily AC & at Bedtime  insulin lispro, 8 Units, Subcutaneous, TID With Meals  pantoprazole, 40 mg, Oral, Q AM  sodium chloride, 10 mL, Intravenous, Q12H  vancomycin, 1,000 mg, Intravenous, Q12H       PRN Meds     acetaminophen **OR** acetaminophen **OR** acetaminophen    senna-docusate sodium **AND** polyethylene glycol **AND** bisacodyl **AND** bisacodyl    Calcium Replacement - Follow Nurse / BPA Driven Protocol    dextrose    dextrose    glucagon (human recombinant)    heparin    heparin    HYDROcodone-acetaminophen    Magnesium Standard Dose Replacement - Follow Nurse / BPA Driven Protocol    Pharmacy to dose vancomycin    Pharmacy To Dose:    Phosphorus Replacement - Follow Nurse / BPA Driven Protocol    Potassium Replacement - Follow Nurse /  BPA Driven Protocol    sodium chloride    sodium chloride    sodium chloride   Infusions  Pharmacy to dose vancomycin,   Pharmacy To Dose:,           Diagnostic Data    Results from last 7 days   Lab Units 02/26/25  0252 02/25/25  0359 02/24/25  2056   WBC 10*3/mm3 6.75   < > 11.57*   HEMOGLOBIN g/dL 9.5*   < > 11.2*   HEMATOCRIT % 30.8*   < > 35.5   PLATELETS 10*3/mm3 418   < > 494*   GLUCOSE mg/dL 224*  --  163*   CREATININE mg/dL 0.54*  --  0.65   BUN mg/dL 9  --  12   SODIUM mmol/L 138  --  131*   POTASSIUM mmol/L 3.8   < > 3.4*   AST (SGOT) U/L  --   --  15   ALT (SGPT) U/L  --   --  9   ALK PHOS U/L  --   --  131*   BILIRUBIN mg/dL  --   --  0.3   ANION GAP mmol/L 9.0  --  12.4    < > = values in this interval not displayed.       No radiology results for the last day    Interval results reviewed.    Assessment/Plan:     Sepsis due to RLE cellulitis  Severe PAD  status post right iliac thrombectomy, right iliofemoral popliteal thrombectomy, right femoral endarterectomy   Intractable right leg pain      12/19 - right iliac thrombectomy. 12/20 - right iliofemoral popliteal thrombectomy, right femoral endarterectomy with patch, right lower extremity arteriogram and close right calf fasciotomy. 12/23 - emergent 4 compartment fasciotomies.  Wound culture positive for heavy growth of Streptococcus pyogenes  Blood cultures no growth  Continue vancomycin and cefepime  for strep to coccus pyogenes group A cellulitis.  Per vas surgery, no need for surgical intervention at this time  Hold Eliquis as patient may go for AKA  Discussed with care nurse Caitlyn, patient with purulent wrist drainage from medial side of the wound    Type 2 DM  Continue Lantus and SSI     Chronic Anemia  Monitor daily CBC, transfuse as needed      Treatment plan discussed with RN.     VTE Prophylaxis:  Pharmacologic VTE prophylaxis orders are present.         Code status is   Code Status and Medical Interventions: CPR (Attempt to Resuscitate);  Full Support   Ordered at: 25 2336     Code Status (Patient has no pulse and is not breathing):    CPR (Attempt to Resuscitate)     Medical Interventions (Patient has pulse or is breathing):    Full Support       Plan for disposition:   Barriers to Discharge: possible AKA  Anticipated Date of Discharge: 25  Place of Discharge: pending pt      Time: 40 minutes     Signature: Electronically signed by Nakul Keith MD, 25, 09:50 EST.  [facility] Hospitalist Team     Electronically signed by Nakul Keith MD at 25 1218       Alyssa Sneed APRN at 25 0816          [facility] Vascular Surgery Progress Note    Name: Yarelis Jackson ADMIT: 2025   : 1970  PCP: Rohit Schrader DO    MRN: [id number] LOS: 1 days   AGE/SEX: 54 y.o. female  ROOM: [address] [place] [address]    CC: Right leg cellulitis    Subjective     Patient reports feeling much better today than she has in a couple of weeks.  Pain in her leg has almost resolved.  She is very pleased.    Objective     Scheduled Medications:   apixaban, 5 mg, Oral, Q12H  budesonide-formoterol, 2 puff, Inhalation, BID - RT  cefepime, 2,000 mg, Intravenous, Q8H  insulin glargine, 20 Units, Subcutaneous, Nightly  insulin lispro, 2-7 Units, Subcutaneous, 4x Daily AC & at Bedtime  insulin lispro, 8 Units, Subcutaneous, TID With Meals  pantoprazole, 40 mg, Oral, Q AM  sodium chloride, 10 mL, Intravenous, Q12H  vancomycin, 1,000 mg, Intravenous, Q12H        Active Infusions:  Pharmacy to dose vancomycin,   Pharmacy To Dose:,         As Needed Medications:    acetaminophen **OR** acetaminophen **OR** acetaminophen    senna-docusate sodium **AND** polyethylene glycol **AND** bisacodyl **AND** bisacodyl    Calcium Replacement - Follow Nurse / BPA Driven Protocol    dextrose    dextrose    glucagon (human recombinant)    heparin    heparin    HYDROcodone-acetaminophen    Magnesium Standard Dose Replacement - Follow Nurse / BPA Driven  Protocol    Pharmacy to dose vancomycin    Pharmacy To Dose:    Phosphorus Replacement - Follow Nurse / BPA Driven Protocol    Potassium Replacement - Follow Nurse / BPA Driven Protocol    sodium chloride    sodium chloride    sodium chloride    Vital Signs  Vitals:    02/26/25 0754   BP: 132/89   Pulse: 83   Resp: 18   Temp: 98.2 °F (36.8 °C)   SpO2: 98%      Body mass index is 28.32 kg/m².     Physical Exam:  NAD, alert and oriented  RRR  Lungs clear  Abd soft, benign  Vascular: Palpable right DP pulse     2/26 right leg            Results Review:     CBC    Results from last 7 days   Lab Units 02/26/25  0252 02/25/25 0359 02/24/25 2056   WBC 10*3/mm3 6.75 10.54 11.57*   HEMOGLOBIN g/dL 9.5* 9.1* 11.2*   PLATELETS 10*3/mm3 418 366 494*     BMP   Results from last 7 days   Lab Units 02/26/25  0252 02/25/25 0359 02/24/25 2056   SODIUM mmol/L 138  --  131*   POTASSIUM mmol/L 3.8 4.0 3.4*   CHLORIDE mmol/L 105  --  94*   CO2 mmol/L 24.0  --  24.6   BUN mg/dL 9  --  12   CREATININE mg/dL 0.54*  --  0.65   GLUCOSE mg/dL 224*  --  163*     Coag   Results from last 7 days   Lab Units 02/26/25  0331 02/26/25  0252 02/25/25 0359   INR   --   --  1.62*   APTT seconds 37.3* 36.7* 37.2*     HbA1C   Lab Results   Component Value Date    HGBA1C 15.80 (H) 12/18/2024     Infection   Results from last 7 days   Lab Units 02/24/25 2056 02/24/25  0830   BLOODCX  No growth at 24 hours  No growth at 24 hours  --    WOUNDCX   --  Heavy growth (4+) Gram Positive Cocci*     Radiology(recent) No radiology results for the last day    VTE Prophylaxis:  Pharmacologic VTE prophylaxis orders are present.         Problems:    Active Hospital Problems:  Active Hospital Problems    Diagnosis  POA    **Cellulitis of right lower extremity [L03.115]  Yes    Cellulitis of right leg [L03.115]  Yes      Resolved Hospital Problems   No resolved problems to display.        Assessment & Plan   Assessment / Plan     Cellulitis of right lower  extremity    Cellulitis of right leg      54-year-old female status post right leg thrombectomy and fasciotomies who presents with fever and chills, drainage from right leg fasciotomy wounds  Blood cultures NGTD, wound culture with Streptococcus pyogenes growth, antibiotics per primary team  Wound care nurse to evaluate and provide recommendations for fasciotomy sites  She has no signs of acute limb ischemia, her right leg is warm to touch with palpable right DP pulse, no plans for surgical intervention  Continue Eliquis  Medical management per primary team    ADDENDUM:  Notified by wound care APRN that there is significant purulence draining from tunneled area within the medial shadowing site.  Dr. Soares evaluated and I&D is appropriate.  Hold anticoagulation and plan for surgery tomorrow.         NICHOLAS Castro  [facility]  02/26/25   O: [phone]  F: [phone]    Electronically signed by Alyssa Sneed APRN at 02/27/25 8658

## 2025-02-27 NOTE — PLAN OF CARE
Goal Outcome Evaluation:   Patient scheduled for surgery tomorrow. NPO at midnight. VSS. Call light and personal items within reach. Bed in low position.

## 2025-02-27 NOTE — CASE MANAGEMENT/SOCIAL WORK
Continued Stay Note  [facility]     Patient Name: Yarelis Jackson  MRN: [id number]  Today's Date: 2/27/2025    Admit Date: 2/24/2025    Plan: DC PLAN:  Routine home with parents. [facility] referral pending.       Discharge Plan       Row Name 02/27/25 1200       Plan    Plan DC PLAN:  Routine home with parents. [facility] referral pending.    Plan Comments Wound care following, Wound cultures pending, IV abx, Surgery today 2/27 at 1600.    Reached out to [facility] regarding referral, awaiting response.                       Expected Discharge Date and Time       Expected Discharge Date Expected Discharge Time    Feb 27, 2025           Olga Chopra RN   Case Management  [phone]

## 2025-02-27 NOTE — PLAN OF CARE
Goal Outcome Evaluation:  Plan of Care Reviewed With: patient        Progress: no change  Outcome Evaluation: Pt VSS, Pt has been NPO for an I & D of the right leg with Dr. Soares, Pain treated per MAR, Call light in reach, Plan on going.

## 2025-02-27 NOTE — PROGRESS NOTES
[facility] MEDICINE SERVICE  DAILY PROGRESS NOTE    NAME: Yarelis Jackson  : 1970  MRN: [id number]      LOS: 2 days     PROVIDER OF SERVICE: Nakul Keith MD    Chief Complaint: Cellulitis of right lower extremity    Subjective:   Denies chest pain nausea vomiting diarrhea or melena  Interval History:    Patient seen and evaluated at bedside.   Patient will be going for I&D of right lower extremity this morning.  Treatment plan discussed with patient. All questions addressed.     Review of Systems:   All 21 ROS were negative except mentioned above.    Objective:     Vital Signs  Temp:  [98 °F (36.7 °C)-98.6 °F (37 °C)] 98.1 °F (36.7 °C)  Heart Rate:  [89-99] 99  Resp:  [16-18] 18  BP: (133-165)/(74-92) 154/83   Body mass index is 28.32 kg/m².    Physical Exam   General: No acute distress, appears stated age  Neuro: Awake and alert, oriented x3, no focal deficits appreciated  Head: Atraumatic, normocephalic  HEENT: EOMI, anicteric, normal sclerae and conjunctivae, moist mucus membranes  Neck: supple, no lymphadenopathy  CV: RRR, soft heart sounds, no murmurs appreciated, no peripheral edema  Pulm: Decreased breath sounds, no increased work of breathing, no adventitious sounds  Abd: Soft, nontender, nondistended  Skin: Warm, dry and right dressed lower extremity  Psych: Appropriate mood and affect    Scheduled Meds   [Held by provider] apixaban, 5 mg, Oral, Q12H  budesonide-formoterol, 2 puff, Inhalation, BID - RT  cefepime, 2,000 mg, Intravenous, Q8H  insulin glargine, 20 Units, Subcutaneous, Nightly  insulin lispro, 2-7 Units, Subcutaneous, 4x Daily AC & at Bedtime  insulin lispro, 8 Units, Subcutaneous, TID With Meals  pantoprazole, 40 mg, Oral, Q AM  sodium chloride, 10 mL, Intravenous, Q12H  vancomycin, 1,250 mg, Intravenous, Q8H       PRN Meds     acetaminophen **OR** acetaminophen **OR** acetaminophen    senna-docusate sodium **AND** polyethylene glycol **AND** bisacodyl **AND** bisacodyl     Calcium Replacement - Follow Nurse / BPA Driven Protocol    dextrose    dextrose    glucagon (human recombinant)    HYDROcodone-acetaminophen    Magnesium Standard Dose Replacement - Follow Nurse / BPA Driven Protocol    Pharmacy to dose vancomycin    Phosphorus Replacement - Follow Nurse / BPA Driven Protocol    Potassium Replacement - Follow Nurse / BPA Driven Protocol    sodium chloride    sodium chloride    sodium chloride   Infusions  Pharmacy to dose vancomycin,           Diagnostic Data    Results from last 7 days   Lab Units 02/26/25  2256 02/25/25  0359 02/24/25  2056   WBC 10*3/mm3 6.17   < > 11.57*   HEMOGLOBIN g/dL 10.0*   < > 11.2*   HEMATOCRIT % 32.8*   < > 35.5   PLATELETS 10*3/mm3 436   < > 494*   GLUCOSE mg/dL 177*   < > 163*   CREATININE mg/dL 0.54*   < > 0.65   BUN mg/dL 9   < > 12   SODIUM mmol/L 136   < > 131*   POTASSIUM mmol/L 3.5   < > 3.4*   AST (SGOT) U/L  --   --  15   ALT (SGPT) U/L  --   --  9   ALK PHOS U/L  --   --  131*   BILIRUBIN mg/dL  --   --  0.3   ANION GAP mmol/L 9.6   < > 12.4    < > = values in this interval not displayed.       CT Lower Extremity Right With Contrast    Result Date: 2/27/2025  Impression: 1.Large rim-enhancing fluid and gas collection in the deep soft tissues of the posterior-inferior lower leg consistent with abscess. There appears to be a tract extending to a medial wound. The abscess is in the inferior posterior compartment musculature  with a smaller rim-enhancing component extending superiorly in the deep musculature to the mid lower leg. 2.Additional smaller abscess in the inferior aspect of the anterior compartment musculature with rim-enhancing tracts extending to a lateral wound. 3.Diffuse subcutaneous edema with skin thickening which may indicate cellulitis. 4.No definite CT findings of osteomyelitis at this time. Electronically Signed: Sam Haynes  2/27/2025 9:02 AM EST  Workstation ID: [id number]     Interval results reviewed.    Assessment/Plan:    Sepsis due to RLE cellulitis  Severe PAD  status post right iliac thrombectomy, right iliofemoral popliteal thrombectomy, right femoral endarterectomy   Intractable right leg pain      12/19 - right iliac thrombectomy. 12/20 - right iliofemoral popliteal thrombectomy, right femoral endarterectomy with patch, right lower extremity arteriogram and close right calf fasciotomy. 12/23 - emergent 4 compartment fasciotomies.  Wound culture positive for heavy growth of Streptococcus pyogenes  Blood cultures no growth  Continue vancomycin and cefepime  for strep to coccus pyogenes group A cellulitis.  Surgery following the patient  Was detailed as patient will go for wound debridement       Type 2 DM  Continue Lantus and SSI     Chronic Anemia  Monitor daily CBC, transfuse as needed           Treatment plan discussed with RN.     VTE Prophylaxis:  Pharmacologic VTE prophylaxis orders are present.         Code status is   Code Status and Medical Interventions: CPR (Attempt to Resuscitate); Full Support   Ordered at: 02/24/25 2336     Code Status (Patient has no pulse and is not breathing):    CPR (Attempt to Resuscitate)     Medical Interventions (Patient has pulse or is breathing):    Full Support       Plan for disposition:   Barriers to Discharge: wound debridement  Anticipated Date of Discharge: 2/28/25  Place of Discharge: pending pt              Time: 40 minutes     Signature: Electronically signed by Nakul Keith MD, 02/27/25, 10:38 EST.  [facility] Hospitalist Team

## 2025-02-27 NOTE — PROGRESS NOTES
Name: Yarelis Jackson ADMIT: 2025   : 1970  PCP: Rohit Schrader DO    MRN: [id number] LOS: 2 days   AGE/SEX: 54 y.o. female  ROOM:  [place] [id number]/     CC: Right leg infection  Interval History: No acute events overnight.  Resting comfortably in bed today.  Unfortunately surgery today has to be canceled and rescheduled for tomorrow due to the OR availability.  Subjective   Subjective     Review of Systems  Objective   Objective     Vitals:   Temp:  [97.9 °F (36.6 °C)-98.6 °F (37 °C)] 97.9 °F (36.6 °C)  Heart Rate:  [83-99] 83  Resp:  [16-19] 19  BP: (143-165)/(83-92) 145/86    I/O this shift:  In: 240 [P.O.:240]  Out: -     Scheduled Meds:     [Held by provider] apixaban, 5 mg, Oral, Q12H  budesonide-formoterol, 2 puff, Inhalation, BID - RT  cefepime, 2,000 mg, Intravenous, Q8H  insulin glargine, 20 Units, Subcutaneous, Nightly  insulin lispro, 2-7 Units, Subcutaneous, 4x Daily AC & at Bedtime  insulin lispro, 8 Units, Subcutaneous, TID With Meals  pantoprazole, 40 mg, Oral, Q AM  sodium chloride, 10 mL, Intravenous, Q12H  vancomycin, 1,250 mg, Intravenous, Q8H      IV Meds:   Pharmacy to dose vancomycin,         Physical Exam      Data Reviewed:  CBC    Results from last 7 days   Lab Units 25  0252 25  03525   WBC 10*3/mm3 6.17 6.75 10.54 11.57*   HEMOGLOBIN g/dL 10.0* 9.5* 9.1* 11.2*   PLATELETS 10*3/mm3 436 418 366 494*     BMP   Results from last 7 days   Lab Units 25  1455 25  2256 25  0252 25  03525   SODIUM mmol/L  --  136 138  --  131*   POTASSIUM mmol/L 4.2 3.5 3.8 4.0 3.4*   CHLORIDE mmol/L  --  102 105  --  94*   CO2 mmol/L  --  24.4 24.0  --  24.6   BUN mg/dL  --  9 9  --  12   CREATININE mg/dL  --  0.54* 0.54*  --  0.65   GLUCOSE mg/dL  --  177* 224*  --  163*     Cr Clearance Estimated Creatinine Clearance: 119.3 mL/min (A) (by C-G formula based on SCr of 0.54 mg/dL (L)).  Coag   Results from last 7 days    Lab Units 02/26/25 2256 02/26/25  0331 02/26/25 0252 02/25/25 0359   INR  1.22*  --   --  1.62*   APTT seconds  --  37.3* 36.7* 37.2*     HbA1C   Lab Results   Component Value Date    HGBA1C 15.80 (H) 12/18/2024     Blood Glucose   Glucose   Date/Time Value Ref Range Status   02/27/2025 1655 96 70 - 105 mg/dL Final     Comment:     Serial Number: [id number]:  [id number]   02/27/2025 1200 130 (H) 70 - 105 mg/dL Final     Comment:     Serial Number: [id number]:  [id number]   02/27/2025 0744 125 (H) 70 - 105 mg/dL Final     Comment:     Serial Number: [id number]:  [id number]   02/26/2025 2131 156 (H) 70 - 105 mg/dL Final     Comment:     Serial Number: [id number]:  [id number]   02/26/2025 1634 141 (H) 70 - 105 mg/dL Final     Comment:     Serial Number: [id number]:  [id number]   02/26/2025 1208 192 (H) 70 - 105 mg/dL Final     Comment:     Serial Number: [id number]:  [id number]   02/26/2025 0752 140 (H) 70 - 105 mg/dL Final     Comment:     Serial Number: [id number]:  [id number]   02/25/2025 1950 149 (H) 70 - 105 mg/dL Final     Comment:     Serial Number: [id number]:  [id number]     Infection   Results from last 7 days   Lab Units 02/24/25 2056 02/24/25  0830   BLOODCX  No growth at 2 days  No growth at 2 days  --    WOUNDCX   --  Heavy growth (4+) Streptococcus pyogenes (Group A)*     CMP   Results from last 7 days   Lab Units 02/27/25  1455 02/26/25 2256 02/26/25 0252 02/25/25 0359 02/24/25 2056   SODIUM mmol/L  --  136 138  --  131*   POTASSIUM mmol/L 4.2 3.5 3.8 4.0 3.4*   CHLORIDE mmol/L  --  102 105  --  94*   CO2 mmol/L  --  24.4 24.0  --  24.6   BUN mg/dL  --  9 9  --  12   CREATININE mg/dL  --  0.54* 0.54*  --  0.65   GLUCOSE mg/dL  --  177* 224*  --  163*   ALBUMIN g/dL  --   --   --   --  3.7   BILIRUBIN mg/dL  --   --   --   --  0.3   ALK PHOS U/L  --   --   --   --  131*   AST (SGOT) U/L  --   --   --   --  15   ALT (SGPT) U/L  --   --   --   --   "9     ABG      UA      TELLY  No results found for: \"POCMETH\", \"POCAMPHET\", \"POCBARBITUR\", \"POCBENZO\", \"POCCOCAINE\", \"POCOPIATES\", \"POCOXYCODO\", \"POCPHENCYC\", \"POCPROPOXY\", \"POCTHC\", \"POCTRICYC\"  [facility]   Results from last 7 days   Lab Units 02/26/25  2256 02/26/25  0331 02/26/25  0252 02/25/25  0359 02/24/25 2056   INR  1.22*  --   --  1.62*  --    APTT seconds  --  37.3* 36.7* 37.2*  --    HEMOGLOBIN g/dL 10.0*  --  9.5* 9.1* 11.2*   PLATELETS 10*3/mm3 436  --  418 366 494*   CREATININE mg/dL 0.54*  --  0.54*  --  0.65     Radiology(recent) CT Lower Extremity Right With Contrast    Result Date: 2/27/2025  Impression: 1.Large rim-enhancing fluid and gas collection in the deep soft tissues of the posterior-inferior lower leg consistent with abscess. There appears to be a tract extending to a medial wound. The abscess is in the inferior posterior compartment musculature  with a smaller rim-enhancing component extending superiorly in the deep musculature to the mid lower leg. 2.Additional smaller abscess in the inferior aspect of the anterior compartment musculature with rim-enhancing tracts extending to a lateral wound. 3.Diffuse subcutaneous edema with skin thickening which may indicate cellulitis. 4.No definite CT findings of osteomyelitis at this time. Electronically Signed: Sam Haynes  2/27/2025 9:02 AM EST  Workstation ID: [id number]     Most notable findings include: Creatinine 0.54. CT shows abscesses in right lower leg.     Active Hospital Problems:   Active Hospital Problems    Diagnosis  POA    **Cellulitis of right lower extremity [L03.115]  Yes    Cellulitis of right leg [L03.115]  Yes      Resolved Hospital Problems   No resolved problems to display.      Assessment & Plan     Assessment / Plan     54-year-old lady with previous right leg thrombectomy x 2 secondary to acute limb ischemia who required extensive fasciotomies for compartment syndrome now with infected fasciotomy sites and an " abscess.  Continue holding Eliquis  Continue antibiotics per primary team  Unfortunately patient had to be rescheduled for tomorrow due to the OR availability.  Okay for diet for now, n.p.o. after midnight again..     Santino Soares II, MD  02/27/25  17:50 EST  Available via Secure Chat during the day for non-urgent issues.  After hours and for urgent issues please call the office at [phone]

## 2025-02-28 LAB
ANION GAP SERPL CALCULATED.3IONS-SCNC: 7.7 MMOL/L (ref 5–15)
BASOPHILS # BLD MANUAL: 0.07 10*3/MM3 (ref 0–0.2)
BASOPHILS NFR BLD MANUAL: 1 % (ref 0–1.5)
BUN SERPL-MCNC: 8 MG/DL (ref 6–20)
BUN/CREAT SERPL: 10.3 (ref 7–25)
CALCIUM SPEC-SCNC: 9.5 MG/DL (ref 8.6–10.5)
CHLORIDE SERPL-SCNC: 102 MMOL/L (ref 98–107)
CO2 SERPL-SCNC: 25.3 MMOL/L (ref 22–29)
CREAT SERPL-MCNC: 0.78 MG/DL (ref 0.57–1)
DACRYOCYTES BLD QL SMEAR: ABNORMAL
DEPRECATED RDW RBC AUTO: 51.8 FL (ref 37–54)
EGFRCR SERPLBLD CKD-EPI 2021: 91 ML/MIN/1.73
EOSINOPHIL # BLD MANUAL: 0.15 10*3/MM3 (ref 0–0.4)
EOSINOPHIL NFR BLD MANUAL: 2 % (ref 0.3–6.2)
ERYTHROCYTE [DISTWIDTH] IN BLOOD BY AUTOMATED COUNT: 14.8 % (ref 12.3–15.4)
GLUCOSE BLDC GLUCOMTR-MCNC: 115 MG/DL (ref 70–105)
GLUCOSE BLDC GLUCOMTR-MCNC: 135 MG/DL (ref 70–105)
GLUCOSE BLDC GLUCOMTR-MCNC: 175 MG/DL (ref 70–105)
GLUCOSE BLDC GLUCOMTR-MCNC: 200 MG/DL (ref 70–105)
GLUCOSE BLDC GLUCOMTR-MCNC: 259 MG/DL (ref 70–105)
GLUCOSE SERPL-MCNC: 140 MG/DL (ref 65–99)
HCT VFR BLD AUTO: 31.4 % (ref 34–46.6)
HGB BLD-MCNC: 9.8 G/DL (ref 12–15.9)
INR PPP: 1.17 (ref 0.9–1.1)
LYMPHOCYTES # BLD MANUAL: 2.05 10*3/MM3 (ref 0.7–3.1)
LYMPHOCYTES NFR BLD MANUAL: 2 % (ref 5–12)
MCH RBC QN AUTO: 29.5 PG (ref 26.6–33)
MCHC RBC AUTO-ENTMCNC: 31.2 G/DL (ref 31.5–35.7)
MCV RBC AUTO: 94.6 FL (ref 79–97)
MONOCYTES # BLD: 0.15 10*3/MM3 (ref 0.1–0.9)
NEUTROPHILS # BLD AUTO: 4.91 10*3/MM3 (ref 1.7–7)
NEUTROPHILS NFR BLD MANUAL: 64 % (ref 42.7–76)
NEUTS BAND NFR BLD MANUAL: 3 % (ref 0–5)
PLATELET # BLD AUTO: 458 10*3/MM3 (ref 140–450)
PMV BLD AUTO: 8.2 FL (ref 6–12)
POIKILOCYTOSIS BLD QL SMEAR: ABNORMAL
POTASSIUM SERPL-SCNC: 4.6 MMOL/L (ref 3.5–5.2)
PROTHROMBIN TIME: 14.9 SECONDS (ref 11.7–14.2)
RBC # BLD AUTO: 3.32 10*6/MM3 (ref 3.77–5.28)
SCAN SLIDE: NORMAL
SMALL PLATELETS BLD QL SMEAR: ABNORMAL
SODIUM SERPL-SCNC: 135 MMOL/L (ref 136–145)
VARIANT LYMPHS NFR BLD MANUAL: 28 % (ref 19.6–45.3)
WBC MORPH BLD: NORMAL
WBC NRBC COR # BLD AUTO: 7.33 10*3/MM3 (ref 3.4–10.8)

## 2025-02-28 PROCEDURE — 82948 REAGENT STRIP/BLOOD GLUCOSE: CPT

## 2025-02-28 PROCEDURE — 25010000002 VANCOMYCIN HCL 1.25 G RECONSTITUTED SOLUTION 1 EACH VIAL: Performed by: STUDENT IN AN ORGANIZED HEALTH CARE EDUCATION/TRAINING PROGRAM

## 2025-02-28 PROCEDURE — 25010000002 VANCOMYCIN HCL 1.25 G RECONSTITUTED SOLUTION 1 EACH VIAL: Performed by: INTERNAL MEDICINE

## 2025-02-28 PROCEDURE — 63710000001 INSULIN GLARGINE PER 5 UNITS: Performed by: STUDENT IN AN ORGANIZED HEALTH CARE EDUCATION/TRAINING PROGRAM

## 2025-02-28 PROCEDURE — 82948 REAGENT STRIP/BLOOD GLUCOSE: CPT | Performed by: NURSE ANESTHETIST, CERTIFIED REGISTERED

## 2025-02-28 PROCEDURE — 25010000002 HYDROMORPHONE 1 MG/ML SOLUTION: Performed by: STUDENT IN AN ORGANIZED HEALTH CARE EDUCATION/TRAINING PROGRAM

## 2025-02-28 PROCEDURE — 25810000003 SODIUM CHLORIDE 0.9 % SOLUTION 250 ML FLEX CONT: Performed by: INTERNAL MEDICINE

## 2025-02-28 PROCEDURE — 85007 BL SMEAR W/DIFF WBC COUNT: CPT | Performed by: INTERNAL MEDICINE

## 2025-02-28 PROCEDURE — 25010000002 PIPERACILLIN SOD-TAZOBACTAM PER 1 G: Performed by: INTERNAL MEDICINE

## 2025-02-28 PROCEDURE — 25010000002 HYDROMORPHONE 1 MG/ML SOLUTION: Performed by: NURSE ANESTHETIST, CERTIFIED REGISTERED

## 2025-02-28 PROCEDURE — 87075 CULTR BACTERIA EXCEPT BLOOD: CPT | Performed by: STUDENT IN AN ORGANIZED HEALTH CARE EDUCATION/TRAINING PROGRAM

## 2025-02-28 PROCEDURE — 25810000003 LACTATED RINGERS PER 1000 ML: Performed by: NURSE ANESTHETIST, CERTIFIED REGISTERED

## 2025-02-28 PROCEDURE — 63710000001 INSULIN LISPRO (HUMAN) PER 5 UNITS: Performed by: STUDENT IN AN ORGANIZED HEALTH CARE EDUCATION/TRAINING PROGRAM

## 2025-02-28 PROCEDURE — 80048 BASIC METABOLIC PNL TOTAL CA: CPT | Performed by: INTERNAL MEDICINE

## 2025-02-28 PROCEDURE — 25010000002 PROPOFOL 1000 MG/100ML EMULSION: Performed by: NURSE ANESTHETIST, CERTIFIED REGISTERED

## 2025-02-28 PROCEDURE — 87147 CULTURE TYPE IMMUNOLOGIC: CPT | Performed by: STUDENT IN AN ORGANIZED HEALTH CARE EDUCATION/TRAINING PROGRAM

## 2025-02-28 PROCEDURE — 85610 PROTHROMBIN TIME: CPT | Performed by: INTERNAL MEDICINE

## 2025-02-28 PROCEDURE — 87186 SC STD MICRODIL/AGAR DIL: CPT | Performed by: STUDENT IN AN ORGANIZED HEALTH CARE EDUCATION/TRAINING PROGRAM

## 2025-02-28 PROCEDURE — 0J9N0ZZ DRAINAGE OF RIGHT LOWER LEG SUBCUTANEOUS TISSUE AND FASCIA, OPEN APPROACH: ICD-10-PCS | Performed by: STUDENT IN AN ORGANIZED HEALTH CARE EDUCATION/TRAINING PROGRAM

## 2025-02-28 PROCEDURE — 87205 SMEAR GRAM STAIN: CPT | Performed by: STUDENT IN AN ORGANIZED HEALTH CARE EDUCATION/TRAINING PROGRAM

## 2025-02-28 PROCEDURE — 25010000002 DEXAMETHASONE PER 1 MG: Performed by: NURSE ANESTHETIST, CERTIFIED REGISTERED

## 2025-02-28 PROCEDURE — 25810000003 LACTATED RINGERS PER 1000 ML: Performed by: STUDENT IN AN ORGANIZED HEALTH CARE EDUCATION/TRAINING PROGRAM

## 2025-02-28 PROCEDURE — 25010000002 MIDAZOLAM PER 1 MG: Performed by: NURSE ANESTHETIST, CERTIFIED REGISTERED

## 2025-02-28 PROCEDURE — 85025 COMPLETE CBC W/AUTO DIFF WBC: CPT | Performed by: INTERNAL MEDICINE

## 2025-02-28 PROCEDURE — 25810000003 SODIUM CHLORIDE 0.9 % SOLUTION 250 ML FLEX CONT: Performed by: STUDENT IN AN ORGANIZED HEALTH CARE EDUCATION/TRAINING PROGRAM

## 2025-02-28 PROCEDURE — 25010000002 LIDOCAINE PF 2% 2 % SOLUTION: Performed by: NURSE ANESTHETIST, CERTIFIED REGISTERED

## 2025-02-28 PROCEDURE — 87070 CULTURE OTHR SPECIMN AEROBIC: CPT | Performed by: STUDENT IN AN ORGANIZED HEALTH CARE EDUCATION/TRAINING PROGRAM

## 2025-02-28 PROCEDURE — 82948 REAGENT STRIP/BLOOD GLUCOSE: CPT | Performed by: STUDENT IN AN ORGANIZED HEALTH CARE EDUCATION/TRAINING PROGRAM

## 2025-02-28 PROCEDURE — 87076 CULTURE ANAEROBE IDENT EACH: CPT | Performed by: STUDENT IN AN ORGANIZED HEALTH CARE EDUCATION/TRAINING PROGRAM

## 2025-02-28 PROCEDURE — 25010000002 FENTANYL CITRATE (PF) 100 MCG/2ML SOLUTION: Performed by: NURSE ANESTHETIST, CERTIFIED REGISTERED

## 2025-02-28 PROCEDURE — 25010000002 FENTANYL CITRATE (PF) 50 MCG/ML SOLUTION: Performed by: NURSE ANESTHETIST, CERTIFIED REGISTERED

## 2025-02-28 PROCEDURE — 25010000002 CEFEPIME PER 500 MG: Performed by: INTERNAL MEDICINE

## 2025-02-28 PROCEDURE — 10061 I&D ABSCESS COMP/MULTIPLE: CPT | Performed by: STUDENT IN AN ORGANIZED HEALTH CARE EDUCATION/TRAINING PROGRAM

## 2025-02-28 PROCEDURE — 25010000002 HEPARIN (PORCINE) PER 1000 UNITS: Performed by: STUDENT IN AN ORGANIZED HEALTH CARE EDUCATION/TRAINING PROGRAM

## 2025-02-28 PROCEDURE — 25010000002 ONDANSETRON PER 1 MG: Performed by: NURSE ANESTHETIST, CERTIFIED REGISTERED

## 2025-02-28 DEVICE — LIGACLIP MCA MULTIPLE CLIP APPLIERS, 20 SMALL CLIPS
Type: IMPLANTABLE DEVICE | Site: LEG | Status: FUNCTIONAL
Brand: LIGACLIP

## 2025-02-28 RX ORDER — PROCHLORPERAZINE EDISYLATE 5 MG/ML
10 INJECTION INTRAMUSCULAR; INTRAVENOUS EVERY 6 HOURS PRN
Status: DISCONTINUED | OUTPATIENT
Start: 2025-02-28 | End: 2025-02-28 | Stop reason: HOSPADM

## 2025-02-28 RX ORDER — ATROPINE SULFATE 0.4 MG/ML
0.4 INJECTION, SOLUTION INTRAMUSCULAR; INTRAVENOUS; SUBCUTANEOUS ONCE AS NEEDED
Status: DISCONTINUED | OUTPATIENT
Start: 2025-02-28 | End: 2025-02-28 | Stop reason: HOSPADM

## 2025-02-28 RX ORDER — LIDOCAINE HYDROCHLORIDE 20 MG/ML
INJECTION, SOLUTION EPIDURAL; INFILTRATION; INTRACAUDAL; PERINEURAL AS NEEDED
Status: DISCONTINUED | OUTPATIENT
Start: 2025-02-28 | End: 2025-02-28 | Stop reason: SURG

## 2025-02-28 RX ORDER — SODIUM CHLORIDE, SODIUM LACTATE, POTASSIUM CHLORIDE, CALCIUM CHLORIDE 600; 310; 30; 20 MG/100ML; MG/100ML; MG/100ML; MG/100ML
INJECTION, SOLUTION INTRAVENOUS CONTINUOUS PRN
Status: DISCONTINUED | OUTPATIENT
Start: 2025-02-28 | End: 2025-02-28 | Stop reason: SURG

## 2025-02-28 RX ORDER — DIPHENHYDRAMINE HYDROCHLORIDE 50 MG/ML
12.5 INJECTION INTRAMUSCULAR; INTRAVENOUS
Status: DISCONTINUED | OUTPATIENT
Start: 2025-02-28 | End: 2025-02-28 | Stop reason: HOSPADM

## 2025-02-28 RX ORDER — ONDANSETRON 2 MG/ML
INJECTION INTRAMUSCULAR; INTRAVENOUS AS NEEDED
Status: DISCONTINUED | OUTPATIENT
Start: 2025-02-28 | End: 2025-02-28 | Stop reason: SURG

## 2025-02-28 RX ORDER — IPRATROPIUM BROMIDE AND ALBUTEROL SULFATE 2.5; .5 MG/3ML; MG/3ML
3 SOLUTION RESPIRATORY (INHALATION) ONCE AS NEEDED
Status: DISCONTINUED | OUTPATIENT
Start: 2025-02-28 | End: 2025-02-28 | Stop reason: HOSPADM

## 2025-02-28 RX ORDER — MIDAZOLAM HYDROCHLORIDE 1 MG/ML
INJECTION, SOLUTION INTRAMUSCULAR; INTRAVENOUS AS NEEDED
Status: DISCONTINUED | OUTPATIENT
Start: 2025-02-28 | End: 2025-02-28 | Stop reason: SURG

## 2025-02-28 RX ORDER — PROPOFOL 10 MG/ML
INJECTION, EMULSION INTRAVENOUS AS NEEDED
Status: DISCONTINUED | OUTPATIENT
Start: 2025-02-28 | End: 2025-02-28 | Stop reason: SURG

## 2025-02-28 RX ORDER — OXYCODONE HYDROCHLORIDE 5 MG/1
10 TABLET ORAL EVERY 4 HOURS PRN
Status: DISCONTINUED | OUTPATIENT
Start: 2025-02-28 | End: 2025-03-03 | Stop reason: HOSPADM

## 2025-02-28 RX ORDER — SODIUM CHLORIDE, SODIUM LACTATE, POTASSIUM CHLORIDE, CALCIUM CHLORIDE 600; 310; 30; 20 MG/100ML; MG/100ML; MG/100ML; MG/100ML
20 INJECTION, SOLUTION INTRAVENOUS ONCE
Status: COMPLETED | OUTPATIENT
Start: 2025-02-28 | End: 2025-02-28

## 2025-02-28 RX ORDER — DEXAMETHASONE SODIUM PHOSPHATE 4 MG/ML
INJECTION, SOLUTION INTRA-ARTICULAR; INTRALESIONAL; INTRAMUSCULAR; INTRAVENOUS; SOFT TISSUE AS NEEDED
Status: DISCONTINUED | OUTPATIENT
Start: 2025-02-28 | End: 2025-02-28 | Stop reason: SURG

## 2025-02-28 RX ORDER — PROMETHAZINE HYDROCHLORIDE 25 MG/1
25 TABLET ORAL ONCE AS NEEDED
Status: DISCONTINUED | OUTPATIENT
Start: 2025-02-28 | End: 2025-02-28 | Stop reason: HOSPADM

## 2025-02-28 RX ORDER — NALOXONE HCL 0.4 MG/ML
0.2 VIAL (ML) INJECTION AS NEEDED
Status: DISCONTINUED | OUTPATIENT
Start: 2025-02-28 | End: 2025-02-28 | Stop reason: HOSPADM

## 2025-02-28 RX ORDER — SODIUM HYPOCHLORITE 1.25 MG/ML
SOLUTION TOPICAL 2 TIMES DAILY
Status: DISCONTINUED | OUTPATIENT
Start: 2025-02-28 | End: 2025-03-03 | Stop reason: HOSPADM

## 2025-02-28 RX ORDER — OXYCODONE HYDROCHLORIDE 5 MG/1
5 TABLET ORAL EVERY 4 HOURS PRN
Status: DISCONTINUED | OUTPATIENT
Start: 2025-02-28 | End: 2025-03-03 | Stop reason: HOSPADM

## 2025-02-28 RX ORDER — METOPROLOL SUCCINATE 25 MG/1
25 TABLET, EXTENDED RELEASE ORAL
Status: DISCONTINUED | OUTPATIENT
Start: 2025-02-28 | End: 2025-03-03 | Stop reason: HOSPADM

## 2025-02-28 RX ORDER — ONDANSETRON 2 MG/ML
4 INJECTION INTRAMUSCULAR; INTRAVENOUS ONCE AS NEEDED
Status: DISCONTINUED | OUTPATIENT
Start: 2025-02-28 | End: 2025-02-28 | Stop reason: HOSPADM

## 2025-02-28 RX ORDER — DEXMEDETOMIDINE HYDROCHLORIDE 100 UG/ML
INJECTION, SOLUTION INTRAVENOUS AS NEEDED
Status: DISCONTINUED | OUTPATIENT
Start: 2025-02-28 | End: 2025-02-28 | Stop reason: SURG

## 2025-02-28 RX ORDER — HEPARIN SODIUM 5000 [USP'U]/ML
5000 INJECTION, SOLUTION INTRAVENOUS; SUBCUTANEOUS EVERY 8 HOURS SCHEDULED
Status: DISCONTINUED | OUTPATIENT
Start: 2025-02-28 | End: 2025-03-03

## 2025-02-28 RX ORDER — FLUMAZENIL 0.1 MG/ML
0.2 INJECTION INTRAVENOUS AS NEEDED
Status: DISCONTINUED | OUTPATIENT
Start: 2025-02-28 | End: 2025-02-28 | Stop reason: HOSPADM

## 2025-02-28 RX ORDER — FENTANYL CITRATE 50 UG/ML
INJECTION, SOLUTION INTRAMUSCULAR; INTRAVENOUS AS NEEDED
Status: DISCONTINUED | OUTPATIENT
Start: 2025-02-28 | End: 2025-02-28 | Stop reason: SURG

## 2025-02-28 RX ORDER — FENTANYL CITRATE 50 UG/ML
50 INJECTION, SOLUTION INTRAMUSCULAR; INTRAVENOUS
Status: DISCONTINUED | OUTPATIENT
Start: 2025-02-28 | End: 2025-02-28 | Stop reason: HOSPADM

## 2025-02-28 RX ORDER — PROMETHAZINE HYDROCHLORIDE 25 MG/1
25 SUPPOSITORY RECTAL ONCE AS NEEDED
Status: DISCONTINUED | OUTPATIENT
Start: 2025-02-28 | End: 2025-02-28 | Stop reason: HOSPADM

## 2025-02-28 RX ADMIN — VANCOMYCIN HYDROCHLORIDE 1250 MG: 1.25 INJECTION, POWDER, LYOPHILIZED, FOR SOLUTION INTRAVENOUS at 05:36

## 2025-02-28 RX ADMIN — SODIUM CHLORIDE, SODIUM LACTATE, POTASSIUM CHLORIDE, AND CALCIUM CHLORIDE: .6; .31; .03; .02 INJECTION, SOLUTION INTRAVENOUS at 07:21

## 2025-02-28 RX ADMIN — INSULIN GLARGINE 20 UNITS: 100 INJECTION, SOLUTION SUBCUTANEOUS at 21:15

## 2025-02-28 RX ADMIN — DEXMEDETOMIDINE HYDROCHLORIDE 10 MCG: 100 INJECTION, SOLUTION INTRAVENOUS at 07:43

## 2025-02-28 RX ADMIN — HYDROMORPHONE HYDROCHLORIDE 0.5 MG: 1 INJECTION, SOLUTION INTRAMUSCULAR; INTRAVENOUS; SUBCUTANEOUS at 08:50

## 2025-02-28 RX ADMIN — MIDAZOLAM 2 MG: 1 INJECTION INTRAMUSCULAR; INTRAVENOUS at 07:21

## 2025-02-28 RX ADMIN — Medication 10 ML: at 21:15

## 2025-02-28 RX ADMIN — FENTANYL CITRATE 50 MCG: 50 INJECTION, SOLUTION INTRAMUSCULAR; INTRAVENOUS at 07:40

## 2025-02-28 RX ADMIN — HEPARIN SODIUM 5000 UNITS: 5000 INJECTION INTRAVENOUS; SUBCUTANEOUS at 14:49

## 2025-02-28 RX ADMIN — HYDROMORPHONE HYDROCHLORIDE 0.5 MG: 1 INJECTION, SOLUTION INTRAMUSCULAR; INTRAVENOUS; SUBCUTANEOUS at 08:20

## 2025-02-28 RX ADMIN — PIPERACILLIN AND TAZOBACTAM 3.38 G: 3; .375 INJECTION, POWDER, FOR SOLUTION INTRAVENOUS at 21:13

## 2025-02-28 RX ADMIN — INSULIN LISPRO 8 UNITS: 100 INJECTION, SOLUTION INTRAVENOUS; SUBCUTANEOUS at 12:32

## 2025-02-28 RX ADMIN — VANCOMYCIN HYDROCHLORIDE 1250 MG: 1.25 INJECTION, POWDER, LYOPHILIZED, FOR SOLUTION INTRAVENOUS at 16:36

## 2025-02-28 RX ADMIN — PROPOFOL INJECTABLE EMULSION 200 MG: 10 INJECTION, EMULSION INTRAVENOUS at 07:29

## 2025-02-28 RX ADMIN — CEFEPIME 2000 MG: 2 INJECTION, POWDER, FOR SOLUTION INTRAVENOUS at 05:37

## 2025-02-28 RX ADMIN — ONDANSETRON 4 MG: 2 INJECTION, SOLUTION INTRAMUSCULAR; INTRAVENOUS at 08:17

## 2025-02-28 RX ADMIN — HYDROMORPHONE HYDROCHLORIDE 0.5 MG: 1 INJECTION, SOLUTION INTRAMUSCULAR; INTRAVENOUS; SUBCUTANEOUS at 18:11

## 2025-02-28 RX ADMIN — HYDROMORPHONE HYDROCHLORIDE 0.5 MG: 1 INJECTION, SOLUTION INTRAMUSCULAR; INTRAVENOUS; SUBCUTANEOUS at 12:43

## 2025-02-28 RX ADMIN — METOPROLOL SUCCINATE 25 MG: 25 TABLET, EXTENDED RELEASE ORAL at 14:49

## 2025-02-28 RX ADMIN — HYDROMORPHONE HYDROCHLORIDE 0.5 MG: 1 INJECTION, SOLUTION INTRAMUSCULAR; INTRAVENOUS; SUBCUTANEOUS at 23:20

## 2025-02-28 RX ADMIN — SODIUM CHLORIDE, SODIUM LACTATE, POTASSIUM CHLORIDE, CALCIUM CHLORIDE 20 ML/HR: 20; 30; 600; 310 INJECTION, SOLUTION INTRAVENOUS at 07:05

## 2025-02-28 RX ADMIN — FENTANYL CITRATE 50 MCG: 50 INJECTION, SOLUTION INTRAMUSCULAR; INTRAVENOUS at 07:27

## 2025-02-28 RX ADMIN — OXYCODONE 5 MG: 5 TABLET ORAL at 16:36

## 2025-02-28 RX ADMIN — VANCOMYCIN HYDROCHLORIDE 1250 MG: 1.25 INJECTION, POWDER, LYOPHILIZED, FOR SOLUTION INTRAVENOUS at 23:40

## 2025-02-28 RX ADMIN — LIDOCAINE HYDROCHLORIDE 80 MG: 20 INJECTION, SOLUTION EPIDURAL; INFILTRATION; INTRACAUDAL; PERINEURAL at 07:28

## 2025-02-28 RX ADMIN — OXYCODONE 10 MG: 5 TABLET ORAL at 21:14

## 2025-02-28 RX ADMIN — INSULIN LISPRO 4 UNITS: 100 INJECTION, SOLUTION INTRAVENOUS; SUBCUTANEOUS at 12:32

## 2025-02-28 RX ADMIN — FENTANYL CITRATE 50 MCG: 50 INJECTION, SOLUTION INTRAMUSCULAR; INTRAVENOUS at 08:31

## 2025-02-28 RX ADMIN — HEPARIN SODIUM 5000 UNITS: 5000 INJECTION INTRAVENOUS; SUBCUTANEOUS at 21:15

## 2025-02-28 RX ADMIN — DEXAMETHASONE SODIUM PHOSPHATE 4 MG: 4 INJECTION, SOLUTION INTRAMUSCULAR; INTRAVENOUS at 07:38

## 2025-02-28 RX ADMIN — PIPERACILLIN AND TAZOBACTAM 3.38 G: 3; .375 INJECTION, POWDER, FOR SOLUTION INTRAVENOUS at 14:49

## 2025-02-28 RX ADMIN — OXYCODONE 10 MG: 5 TABLET ORAL at 09:44

## 2025-02-28 NOTE — ANESTHESIA POSTPROCEDURE EVALUATION
Patient: Yarelis Jackson    Procedure Summary       Date: 02/28/25 Room / Location: [facility] OR 01 / [facility] MAIN OR    Anesthesia Start: 0721 Anesthesia Stop: 0817    Procedure: INCISION AND DRAINAGE RIGHT LEG (Right) Diagnosis:     Surgeons: Santino Soares II, MD Provider: Alessandro Pizano MD    Anesthesia Type: general ASA Status: 3            Anesthesia Type: general    Vitals  Vitals Value Taken Time   /75 02/28/25 0909   Temp 97.5 °F (36.4 °C) 02/28/25 0909   Pulse 78 02/28/25 0910   Resp 14 02/28/25 0909   SpO2 93 % 02/28/25 0910   Vitals shown include unfiled device data.        Post Anesthesia Care and Evaluation    Patient location during evaluation: PACU  Patient participation: complete - patient participated  Level of consciousness: awake  Pain scale: See nurse's notes for pain score.  Pain management: adequate    Airway patency: patent  Anesthetic complications: No anesthetic complications  PONV Status: none  Cardiovascular status: acceptable  Respiratory status: acceptable and spontaneous ventilation  Hydration status: acceptable    Comments: Patient seen and examined postoperatively; vital signs stable; SpO2 greater than or equal to 90%; cardiopulmonary status stable; nausea/vomiting adequately controlled; pain adequately controlled; no apparent anesthesia complications; patient discharged from anesthesia care when discharge criteria were met

## 2025-02-28 NOTE — PLAN OF CARE
Goal Outcome Evaluation:   POD1. Call light within reach. Bed in low position.

## 2025-02-28 NOTE — ANESTHESIA PREPROCEDURE EVALUATION
Anesthesia Evaluation     Patient summary reviewed and Nursing notes reviewed   NPO Solid Status: > 8 hours  NPO Liquid Status: > 8 hours           Airway   Mallampati: II  TM distance: >3 FB  Neck ROM: full  No difficulty expected  Dental - normal exam     Pulmonary - normal exam   (+) COPD,  Cardiovascular - normal exam    ECG reviewed    (+) hypertension, PVD, hyperlipidemia      Neuro/Psych  (+) headaches, numbness  GI/Hepatic/Renal/Endo    (+) diabetes mellitus type 2 using insulin    Musculoskeletal     (+) neck pain  Abdominal  - normal exam    Bowel sounds: normal.   Substance History      OB/GYN          Other        ROS/Med Hx Other: right leg thrombectomy x 2 secondary to acute limb ischemia who required extensive fasciotomies for compartment syndrome now with infected fasciotomy sites and an abscess.    ·  Left ventricular systolic function is normal. Left ventricular ejection fraction appears to be 56 - 60%.  ·  Left ventricular diastolic function was normal.  ·  No significant valvular disease.                  Anesthesia Plan    ASA 3     general     intravenous induction     Anesthetic plan, risks, benefits, and alternatives have been provided, discussed and informed consent has been obtained with: patient.    Plan discussed with CRNA.    CODE STATUS:    Code Status (Patient has no pulse and is not breathing): CPR (Attempt to Resuscitate)  Medical Interventions (Patient has pulse or is breathing): Full Support

## 2025-02-28 NOTE — ANESTHESIA PROCEDURE NOTES
Airway  Urgency: elective    Date/Time: 2/28/2025 7:31 AM    General Information and Staff    Patient location during procedure: KATHI KAUFMAN: Ney Velazquez SRNA  Indications and Patient Condition  Indications for airway management: airway protection    Preoxygenated: yes  Mask difficulty assessment: 0 - not attempted    Final Airway Details  Final airway type: supraglottic airway      Successful airway: I-gel  Size 4     Number of attempts at approach: 1  Assessment: lips, teeth, and gum same as pre-op and atraumatic intubation

## 2025-02-28 NOTE — OP NOTE
[facility]    Date of Admission:  2/24/2025  Today's Date:  02/28/25  Santino Soares II, MD  [facility]    Preoperative Diagnosis:   Right leg abscess    Postoperative Diagnosis:   Same    Procedure Performed:   Incision and drainage of right leg abscess    Surgeon:   Santino Soares II, MD    Assistant:    Mikayla TRAN, Provided critical assistance in exposure, retraction, and suction that overall decrease blood loss and operative time.    Anesthesia:   General    Estimated Blood Loss:   100mL    Findings:    Copious amounts of pus drained from medial leg wound.  Cavity explored and some fibrinopurulent exudate was able to be bluntly debrided.  Subcutaneous track extending cephalad on the leg was opened up with minimal purulent drainage from it.  There is a small cavity on the lateral aspect of the leg that was opened up but produced serous fluid.     Wounds irrigated copiously with warm normal saline and then packed with Betadine and peroxide soaked gauze.    Implants:    Implant Name Type Inv. Item Serial No.  Lot No. LRB No. Used Action   CLIPAPPLR M/ ENDO LIGACLIP 9 3/8IN SM - [id number] Implant CLIPAPPLR M/ ENDO LIGACLIP 9 3/8IN   [employer]  [employer] J [employer] J [id number] 1 Implanted       Staff:   Circulator: Breanna Frederick RN; Lucrecia Feliciano RN  Scrub Person: Jojo Davison  Assistant: Mikayla Blackburn CSA  Other: Macy Truong RN    Specimen:   Wound cultures sent to microbiology for aerobic and anaerobic.     Complications:   None IntraOp    Dispo:   to PACU    Indication for procedure:   54 y.o. female with previous episode of acute limb ischemia requiring surgical thrombectomy x 2, 4 compartment lower leg fasciotomies, redo fasciotomies.  Patient has had chronic leg wounds for the last few months that have been improving with wound care however he was admitted with leg infection.  Was found to have purulent drainage from her wounds and so plans made  for I&D today.  Details of this procedure including risks benefits and alternatives were discussed with the patient and she verbalized understanding and agrees to proceed.    Description of procedure:   Patient was taken to the OR placed supine on table.  General anesthesia was initiated by the anesthesia service.  Once the patient was asleep her right leg was prepped with Betadine circumferentially and draped in sterile fashion.  A timeout was performed.  I began procedure by taking a culture of the wound from the medial aspect of the lower leg where there was purulent drainage.  This was passed off the field to be sent to microbiology.  I was then able to bluntly open up this small defect in her wound and dissect into a large cavity of pus.  Copious amounts of pus were drained.  There was some loose fibrinopurulent exudate that was able to be gently debrided with a laparotomy sponge.  There was good bleeding from the underlying tissues.  Underlying muscle appeared healthy and well-perfused, although clearly very inflamed.  I extended the incision up the leg approximately 4 cm with electrocautery.  Combination of electrocautery, surgical clips, silk suture ligation was used to obtain hemostasis.  Once we are satisfied with hemostasis I turned my attention to the lateral leg wound.  There was a small defect at the distal aspect of the lateral leg wound I was able to bluntly dissect into and this produced some serous fluid.  This cavity was much smaller than the medial leg cavity.  They did not seem to connect.  This wound did not track anywhere.  Both wounds were irrigated copiously with warm normal saline.  I then packed each wound with Kerlix gauze that had been soaked in Betadine and peroxide.  The leg was then dressed with ABD pads, 3 rolls of Kerlix, and 2 Ace wraps.  The patient was then awaken from anesthesia and transported to PACU in good condition.  The patient tolerated the procedure well and there were  no intraoperative complications and all sponge instrument and needle counts were correct.      Santino Soares II, MD  02/28/25     Active Hospital Problems    Diagnosis  POA    **Cellulitis of right lower extremity [L03.115]  Yes    Cellulitis of right leg [L03.115]  Yes      Resolved Hospital Problems   No resolved problems to display.

## 2025-02-28 NOTE — PROGRESS NOTES
[facility] MEDICINE SERVICE  DAILY PROGRESS NOTE    NAME: Yarelis Jackson  : 1970  MRN: [id number]      LOS: 3 days     PROVIDER OF SERVICE: Nakul Keith MD    Chief Complaint: Cellulitis of right lower extremity    Subjective:     Interval History:    Patient seen and evaluated at bedside.   Status post I&D of right lower extremity abscess pain 6 out of 10 in intensity denies any complain no bowel movement.  Treatment plan discussed with patient. All questions addressed.     Review of Systems:   All 21 ROS were negative except mentioned above.    Objective:     Vital Signs  Temp:  [97.5 °F (36.4 °C)-98.6 °F (37 °C)] 97.7 °F (36.5 °C)  Heart Rate:  [] 115  Resp:  [10-19] 19  BP: (111-160)/(63-90) 160/90  Flow (L/min) (Oxygen Therapy):  [6] 6   Body mass index is 28.32 kg/m².    Physical Exam   General: No acute distress, appears stated age  Neuro: Awake and alert, oriented x3, no focal deficits appreciated  Head: Atraumatic, normocephalic  HEENT: EOMI, anicteric, normal sclerae and conjunctivae, moist mucus membranes  Neck: supple, no lymphadenopathy  CV: RRR, soft heart sounds, no murmurs appreciated, no peripheral edema  Pulm: Decreased breath sounds, no increased work of breathing, no adventitious sounds  Abd: Soft, nontender, nondistended  Skin: Warm, dry and right lower extremity dressed  Psych: Appropriate mood and affect    Scheduled Meds   [Held by provider] apixaban, 5 mg, Oral, Q12H  budesonide-formoterol, 2 puff, Inhalation, BID - RT  cefepime, 2,000 mg, Intravenous, Q8H  heparin (porcine), 5,000 Units, Subcutaneous, Q8H  insulin glargine, 20 Units, Subcutaneous, Nightly  insulin lispro, 2-7 Units, Subcutaneous, 4x Daily AC & at Bedtime  insulin lispro, 8 Units, Subcutaneous, TID With Meals  pantoprazole, 40 mg, Oral, Q AM  sodium chloride, 10 mL, Intravenous, Q12H  vancomycin, 1,250 mg, Intravenous, Q8H       PRN Meds     acetaminophen **OR** acetaminophen **OR** acetaminophen     senna-docusate sodium **AND** polyethylene glycol **AND** bisacodyl **AND** bisacodyl    Calcium Replacement - Follow Nurse / BPA Driven Protocol    dextrose    dextrose    glucagon (human recombinant)    HYDROmorphone    Magnesium Standard Dose Replacement - Follow Nurse / BPA Driven Protocol    oxyCODONE **OR** oxyCODONE    Pharmacy to dose vancomycin    Phosphorus Replacement - Follow Nurse / BPA Driven Protocol    Potassium Replacement - Follow Nurse / BPA Driven Protocol    sodium chloride    sodium chloride    sodium chloride   Infusions  Pharmacy to dose vancomycin,           Diagnostic Data    Results from last 7 days   Lab Units 02/28/25  0059 02/25/25  0359 02/24/25  2056   WBC 10*3/mm3 7.33   < > 11.57*   HEMOGLOBIN g/dL 9.8*   < > 11.2*   HEMATOCRIT % 31.4*   < > 35.5   PLATELETS 10*3/mm3 458*   < > 494*   GLUCOSE mg/dL 140*   < > 163*   CREATININE mg/dL 0.78   < > 0.65   BUN mg/dL 8   < > 12   SODIUM mmol/L 135*   < > 131*   POTASSIUM mmol/L 4.6   < > 3.4*   AST (SGOT) U/L  --   --  15   ALT (SGPT) U/L  --   --  9   ALK PHOS U/L  --   --  131*   BILIRUBIN mg/dL  --   --  0.3   ANION GAP mmol/L 7.7   < > 12.4    < > = values in this interval not displayed.       CT Lower Extremity Right With Contrast    Result Date: 2/27/2025  Impression: 1.Large rim-enhancing fluid and gas collection in the deep soft tissues of the posterior-inferior lower leg consistent with abscess. There appears to be a tract extending to a medial wound. The abscess is in the inferior posterior compartment musculature  with a smaller rim-enhancing component extending superiorly in the deep musculature to the mid lower leg. 2.Additional smaller abscess in the inferior aspect of the anterior compartment musculature with rim-enhancing tracts extending to a lateral wound. 3.Diffuse subcutaneous edema with skin thickening which may indicate cellulitis. 4.No definite CT findings of osteomyelitis at this time. Electronically Signed: Sam  Nett  2/27/2025 9:02 AM EST  Workstation ID: [id number]     Interval results reviewed.    Assessment/Plan:   Abscess of right lower extremity  Sepsis due to RLE cellulitis  Severe PAD  status post right iliac thrombectomy, right iliofemoral popliteal thrombectomy, right femoral endarterectomy   Intractable right leg pain   Patient underwent debridement and abscess I&D of right lower extremity on 2/28/2025 12/19 - right iliac thrombectomy. 12/20 - right iliofemoral popliteal thrombectomy, right femoral endarterectomy with patch, right lower extremity arteriogram and close right calf fasciotomy. 12/23 - emergent 4 compartment fasciotomies.  Culture positive for gram-positive cocci, wound culture positive for heavy growth of Streptococcus pyogenes  Blood cultures no growth  Continue vancomycin and change cefepime to Zosyn to cover anaerobes for strep to coccus pyogenes group A cellulitis.  Surgery following the patient        Type 2 DM  Continue Lantus and SSI     Chronic Anemia  Monitor daily CBC, transfuse as needed     Thrombocytosis  Most likely due to acute phase reactant, anemia and due to infection    Hypertension  Blood pressure has been running high will add metoprolol XL 25 mg daily    Treatment plan discussed with RN.     VTE Prophylaxis:  Pharmacologic VTE prophylaxis orders are present.         Code status is   Code Status and Medical Interventions: CPR (Attempt to Resuscitate); Full Support   Ordered at: 02/24/25 1405     Code Status (Patient has no pulse and is not breathing):    CPR (Attempt to Resuscitate)     Medical Interventions (Patient has pulse or is breathing):    Full Support       Plan for disposition:   Barriers to Discharge: Awaiting aerobic and anaerobic cultures   Anticipated Date of Discharge: 3/2/25  Place of Discharge: Home      Time: 40 minutes     Signature: Electronically signed by Nakul Keith MD, 02/28/25, 13:01 EST.  [facility] Hospitalist Team

## 2025-02-28 NOTE — ANESTHESIA POSTPROCEDURE EVALUATION
Patient: Yarelis Jackson    Procedure Summary       Date: 02/28/25 Room / Location: [facility] OR 01 / [facility] MAIN OR    Anesthesia Start: 0721 Anesthesia Stop: 0817    Procedure: INCISION AND DRAINAGE RIGHT LEG (Right) Diagnosis:     Surgeons: Santino Soares II, MD Provider: Alessandro Pizano MD    Anesthesia Type: general ASA Status: 3            Anesthesia Type: general    Vitals  Vitals Value Taken Time   /75 02/28/25 0909   Temp 97.5 °F (36.4 °C) 02/28/25 0909   Pulse 78 02/28/25 0910   Resp 14 02/28/25 0909   SpO2 93 % 02/28/25 0910   Vitals shown include unfiled device data.        Post Anesthesia Care and Evaluation    Patient location during evaluation: PACU  Patient participation: complete - patient participated  Level of consciousness: awake  Pain scale: See nurse's notes for pain score.  Pain management: adequate    Airway patency: patent  Anesthetic complications: No anesthetic complications  PONV Status: none  Cardiovascular status: acceptable  Respiratory status: acceptable and spontaneous ventilation  Hydration status: acceptable    Comments: Patient seen and examined postoperatively; vital signs stable; SpO2 greater than or equal to 90%; cardiopulmonary status stable; nausea/vomiting adequately controlled; pain adequately controlled; no apparent anesthesia complications; patient discharged from anesthesia care when discharge criteria were met

## 2025-03-01 LAB
BACTERIA SPEC AEROBE CULT: NORMAL
BACTERIA SPEC AEROBE CULT: NORMAL
BASOPHILS # BLD AUTO: 0.04 10*3/MM3 (ref 0–0.2)
BASOPHILS NFR BLD AUTO: 0.4 % (ref 0–1.5)
CREAT SERPL-MCNC: 0.87 MG/DL (ref 0.57–1)
DEPRECATED RDW RBC AUTO: 52.1 FL (ref 37–54)
EGFRCR SERPLBLD CKD-EPI 2021: 79.8 ML/MIN/1.73
EOSINOPHIL # BLD AUTO: 0.06 10*3/MM3 (ref 0–0.4)
EOSINOPHIL NFR BLD AUTO: 0.5 % (ref 0.3–6.2)
ERYTHROCYTE [DISTWIDTH] IN BLOOD BY AUTOMATED COUNT: 15 % (ref 12.3–15.4)
GLUCOSE BLDC GLUCOMTR-MCNC: 118 MG/DL (ref 70–105)
GLUCOSE BLDC GLUCOMTR-MCNC: 142 MG/DL (ref 70–105)
GLUCOSE BLDC GLUCOMTR-MCNC: 147 MG/DL (ref 70–105)
GLUCOSE BLDC GLUCOMTR-MCNC: 165 MG/DL (ref 70–105)
GLUCOSE BLDC GLUCOMTR-MCNC: 207 MG/DL (ref 70–105)
HBA1C MFR BLD: 7.44 % (ref 4.8–5.6)
HCT VFR BLD AUTO: 30.1 % (ref 34–46.6)
HGB BLD-MCNC: 9.4 G/DL (ref 12–15.9)
IMM GRANULOCYTES # BLD AUTO: 0.09 10*3/MM3 (ref 0–0.05)
IMM GRANULOCYTES NFR BLD AUTO: 0.8 % (ref 0–0.5)
LYMPHOCYTES # BLD AUTO: 3.52 10*3/MM3 (ref 0.7–3.1)
LYMPHOCYTES NFR BLD AUTO: 31.1 % (ref 19.6–45.3)
MCH RBC QN AUTO: 29.5 PG (ref 26.6–33)
MCHC RBC AUTO-ENTMCNC: 31.2 G/DL (ref 31.5–35.7)
MCV RBC AUTO: 94.4 FL (ref 79–97)
MONOCYTES # BLD AUTO: 0.95 10*3/MM3 (ref 0.1–0.9)
MONOCYTES NFR BLD AUTO: 8.4 % (ref 5–12)
NEUTROPHILS NFR BLD AUTO: 58.8 % (ref 42.7–76)
NEUTROPHILS NFR BLD AUTO: 6.66 10*3/MM3 (ref 1.7–7)
NRBC BLD AUTO-RTO: 0 /100 WBC (ref 0–0.2)
PLATELET # BLD AUTO: 469 10*3/MM3 (ref 140–450)
PMV BLD AUTO: 8.2 FL (ref 6–12)
RBC # BLD AUTO: 3.19 10*6/MM3 (ref 3.77–5.28)
VANCOMYCIN TROUGH SERPL-MCNC: 27.5 MCG/ML (ref 5–20)
WBC NRBC COR # BLD AUTO: 11.32 10*3/MM3 (ref 3.4–10.8)

## 2025-03-01 PROCEDURE — 25010000002 CEFTRIAXONE PER 250 MG: Performed by: NURSE PRACTITIONER

## 2025-03-01 PROCEDURE — 25810000003 SODIUM CHLORIDE 0.9 % SOLUTION 250 ML FLEX CONT: Performed by: STUDENT IN AN ORGANIZED HEALTH CARE EDUCATION/TRAINING PROGRAM

## 2025-03-01 PROCEDURE — 83036 HEMOGLOBIN GLYCOSYLATED A1C: CPT | Performed by: NURSE PRACTITIONER

## 2025-03-01 PROCEDURE — 25010000002 HYDROMORPHONE 1 MG/ML SOLUTION: Performed by: STUDENT IN AN ORGANIZED HEALTH CARE EDUCATION/TRAINING PROGRAM

## 2025-03-01 PROCEDURE — 99024 POSTOP FOLLOW-UP VISIT: CPT | Performed by: SURGERY

## 2025-03-01 PROCEDURE — 82948 REAGENT STRIP/BLOOD GLUCOSE: CPT

## 2025-03-01 PROCEDURE — 85025 COMPLETE CBC W/AUTO DIFF WBC: CPT | Performed by: STUDENT IN AN ORGANIZED HEALTH CARE EDUCATION/TRAINING PROGRAM

## 2025-03-01 PROCEDURE — 80202 ASSAY OF VANCOMYCIN: CPT | Performed by: STUDENT IN AN ORGANIZED HEALTH CARE EDUCATION/TRAINING PROGRAM

## 2025-03-01 PROCEDURE — 63710000001 INSULIN GLARGINE PER 5 UNITS: Performed by: STUDENT IN AN ORGANIZED HEALTH CARE EDUCATION/TRAINING PROGRAM

## 2025-03-01 PROCEDURE — 94799 UNLISTED PULMONARY SVC/PX: CPT

## 2025-03-01 PROCEDURE — 82565 ASSAY OF CREATININE: CPT | Performed by: STUDENT IN AN ORGANIZED HEALTH CARE EDUCATION/TRAINING PROGRAM

## 2025-03-01 PROCEDURE — 25010000002 VANCOMYCIN 1 G RECONSTITUTED SOLUTION 1 EACH VIAL: Performed by: STUDENT IN AN ORGANIZED HEALTH CARE EDUCATION/TRAINING PROGRAM

## 2025-03-01 PROCEDURE — 94664 DEMO&/EVAL PT USE INHALER: CPT

## 2025-03-01 PROCEDURE — 94761 N-INVAS EAR/PLS OXIMETRY MLT: CPT

## 2025-03-01 PROCEDURE — 25010000002 PIPERACILLIN SOD-TAZOBACTAM PER 1 G: Performed by: INTERNAL MEDICINE

## 2025-03-01 PROCEDURE — 63710000001 INSULIN LISPRO (HUMAN) PER 5 UNITS: Performed by: STUDENT IN AN ORGANIZED HEALTH CARE EDUCATION/TRAINING PROGRAM

## 2025-03-01 PROCEDURE — 25010000002 HEPARIN (PORCINE) PER 1000 UNITS: Performed by: STUDENT IN AN ORGANIZED HEALTH CARE EDUCATION/TRAINING PROGRAM

## 2025-03-01 RX ADMIN — DAKIN'S SOLUTION 0.125% (QUARTER STRENGTH): 0.12 SOLUTION at 10:44

## 2025-03-01 RX ADMIN — OXYCODONE 10 MG: 5 TABLET ORAL at 05:46

## 2025-03-01 RX ADMIN — Medication 10 ML: at 08:23

## 2025-03-01 RX ADMIN — HYDROMORPHONE HYDROCHLORIDE 0.5 MG: 1 INJECTION, SOLUTION INTRAMUSCULAR; INTRAVENOUS; SUBCUTANEOUS at 08:23

## 2025-03-01 RX ADMIN — METOPROLOL SUCCINATE 25 MG: 25 TABLET, EXTENDED RELEASE ORAL at 08:23

## 2025-03-01 RX ADMIN — PIPERACILLIN AND TAZOBACTAM 3.38 G: 3; .375 INJECTION, POWDER, FOR SOLUTION INTRAVENOUS at 12:08

## 2025-03-01 RX ADMIN — OXYCODONE 10 MG: 5 TABLET ORAL at 10:42

## 2025-03-01 RX ADMIN — BUDESONIDE AND FORMOTEROL FUMARATE DIHYDRATE 2 PUFF: 160; 4.5 AEROSOL RESPIRATORY (INHALATION) at 08:07

## 2025-03-01 RX ADMIN — HYDROMORPHONE HYDROCHLORIDE 0.5 MG: 1 INJECTION, SOLUTION INTRAMUSCULAR; INTRAVENOUS; SUBCUTANEOUS at 22:28

## 2025-03-01 RX ADMIN — CEFTRIAXONE 2000 MG: 2 INJECTION, POWDER, FOR SOLUTION INTRAMUSCULAR; INTRAVENOUS at 14:54

## 2025-03-01 RX ADMIN — HEPARIN SODIUM 5000 UNITS: 5000 INJECTION INTRAVENOUS; SUBCUTANEOUS at 14:54

## 2025-03-01 RX ADMIN — SODIUM CHLORIDE 1000 MG: 9 INJECTION, SOLUTION INTRAVENOUS at 13:06

## 2025-03-01 RX ADMIN — OXYCODONE 10 MG: 5 TABLET ORAL at 16:38

## 2025-03-01 RX ADMIN — PIPERACILLIN AND TAZOBACTAM 3.38 G: 3; .375 INJECTION, POWDER, FOR SOLUTION INTRAVENOUS at 06:01

## 2025-03-01 RX ADMIN — HEPARIN SODIUM 5000 UNITS: 5000 INJECTION INTRAVENOUS; SUBCUTANEOUS at 06:01

## 2025-03-01 RX ADMIN — HYDROMORPHONE HYDROCHLORIDE 0.5 MG: 1 INJECTION, SOLUTION INTRAMUSCULAR; INTRAVENOUS; SUBCUTANEOUS at 10:59

## 2025-03-01 RX ADMIN — HEPARIN SODIUM 5000 UNITS: 5000 INJECTION INTRAVENOUS; SUBCUTANEOUS at 22:28

## 2025-03-01 RX ADMIN — PANTOPRAZOLE SODIUM 40 MG: 40 TABLET, DELAYED RELEASE ORAL at 06:01

## 2025-03-01 RX ADMIN — INSULIN LISPRO 3 UNITS: 100 INJECTION, SOLUTION INTRAVENOUS; SUBCUTANEOUS at 16:53

## 2025-03-01 RX ADMIN — INSULIN GLARGINE 20 UNITS: 100 INJECTION, SOLUTION SUBCUTANEOUS at 22:28

## 2025-03-01 RX ADMIN — HYDROMORPHONE HYDROCHLORIDE 0.5 MG: 1 INJECTION, SOLUTION INTRAMUSCULAR; INTRAVENOUS; SUBCUTANEOUS at 16:38

## 2025-03-01 NOTE — PROGRESS NOTES
Name: Yarelis Jackson ADMIT: 2025   : 1970  PCP: Rohit Schrader DO    MRN: [id number] LOS: 4 days   AGE/SEX: 54 y.o. female  ROOM: [address] [place] [address]    54 y.o. female with infected right fasciotomy wounds.  Having severe pain with dressing changes.  Feels about the same since admission.    Scheduled Medications:   [Held by provider] apixaban, 5 mg, Oral, Q12H  budesonide-formoterol, 2 puff, Inhalation, BID - RT  cefTRIAXone, 2,000 mg, Intravenous, Q24H  heparin (porcine), 5,000 Units, Subcutaneous, Q8H  insulin glargine, 20 Units, Subcutaneous, Nightly  insulin lispro, 2-7 Units, Subcutaneous, 4x Daily AC & at Bedtime  insulin lispro, 8 Units, Subcutaneous, TID With Meals  metoprolol succinate XL, 25 mg, Oral, Q24H  pantoprazole, 40 mg, Oral, Q AM  sodium chloride, 10 mL, Intravenous, Q12H  sodium hypochlorite, , Topical, BID  vancomycin, 1,000 mg, Intravenous, Q12H    Active Infusions:Pharmacy to dose vancomycin,     Vital Signs  Vital Signs Patient Vitals for the past 24 hrs:   BP Temp Temp src Pulse Resp SpO2   25 1214 110/72 97.8 °F (36.6 °C) Oral 76 18 95 %   25 0809 -- -- -- 74 18 --   25 0807 -- -- -- 72 18 --   25 0501 142/81 98.1 °F (36.7 °C) Oral 80 20 99 %   25 0002 134/78 97.9 °F (36.6 °C) Oral 77 16 98 %   25 1900 129/85 98.4 °F (36.9 °C) Oral 90 20 97 %     Physical Exam: Washed leg with soap and water.  Lateral fasciotomy wound clean, granulating.  Medial fasciotomy wound with some pooled blood (patient declined packing earlier in the day).  Packed very gently.    CBC    Results from last 7 days   Lab Units 25  0511 25  0059 25  2256 25  0252 25  0359 256   WBC 10*3/mm3 11.32* 7.33 6.17 6.75 10.54 11.57*   HEMOGLOBIN g/dL 9.4* 9.8* 10.0* 9.5* 9.1* 11.2*   PLATELETS 10*3/mm3 469* 458* 436 418 366 494*     BMP   Results from last 7 days   Lab Units 25  0511 25  0059 25  1455 25  2256  02/26/25  0252 02/25/25  0359 02/24/25 2056   SODIUM mmol/L  --  135*  --  136 138  --  131*   POTASSIUM mmol/L  --  4.6 4.2 3.5 3.8 4.0 3.4*   CHLORIDE mmol/L  --  102  --  102 105  --  94*   CO2 mmol/L  --  25.3  --  24.4 24.0  --  24.6   BUN mg/dL  --  8  --  9 9  --  12   CREATININE mg/dL 0.87 0.78  --  0.54* 0.54*  --  0.65   GLUCOSE mg/dL  --  140*  --  177* 224*  --  163*     Cr Clearance Estimated Creatinine Clearance: 74 mL/min (by C-G formula based on SCr of 0.87 mg/dL).  Infection   Results from last 7 days   Lab Units 02/28/25  0743 02/24/25 2056 02/24/25  0830   BLOODCX   --  No growth at 4 days  No growth at 4 days  --    WOUNDCX  Culture in progress  --  Heavy growth (4+) Streptococcus pyogenes (Group A)*     Assessment & Plan   Assessment & Plan    Cellulitis of right lower extremity    Cellulitis of right leg    54 y.o. female with right medial fasciotomy wound infection, post I&D.  Appears to have group A strep infection.  Hoping inflammation will subside so that the patient is more willing to undergo dressing changes, but this may be the problem.  May be able to tighten the antibiotic spectrum given culture results.    Ghassan Celestin MD  03/01/25, 17:14 EST    Office contact: [phone]

## 2025-03-01 NOTE — PLAN OF CARE
Goal Outcome Evaluation:  Plan of Care Reviewed With: patient        Progress: improving  Outcome Evaluation: Patient has had complaints of pain this shift, She states she takes comfort 15 at home and that the dilaudid is the only thing that is working for her here. I educated the patient on alternating her prn medications to get the best chances of pain control.

## 2025-03-01 NOTE — PROGRESS NOTES
"Pharmacy Antimicrobial Dosing Service    Subjective:  Yarelis Jackson is a 54 y.o.female admitted with worsening pain and . Pharmacy has been consulted to dose Vancomycin for possible SSTI.    PMH: DM, PAD with recent right critical limb ischemia status post right iliac thrombectomy; recent hospitalization at [facility], I&D of right lower extremity       Assessment/Plan    1. Day #6 Vancomycin: Goal -600 mcg*h/mL. Patient was receiving vancomycin 1250 mg every 8 hrs (17 mg/kg actual BW). Trough drawn 3/1 at 0511= 27.5 mcg/ml with a predicted AUC= 796 mcg*h/ml. Dose decreased to vancomycin 1000 mg every 12 hours (13 mg/kg actual BW) for predicted  mcg*h/ml. Trough ordered for 3/2 at 1100.    2. Day #1 Ceftriaxone: 2gm IV q24 hr.    Will continue to monitor drug levels, renal function, culture and sensitivities, and patient clinical status.       Objective:  Relevant clinical data and objective history reviewed:  162.6 cm (64\")   74.8 kg (165 lb)   Ideal body weight: 54.7 kg (120 lb 9.5 oz)  Adjusted ideal body weight: 62.8 kg (138 lb 5.7 oz)  Body mass index is 28.32 kg/m².    Results from last 7 days   Lab Units 03/01/25  0511 02/27/25  1016 02/26/25  0913 02/26/25  0252   VANCOMYCIN PK mcg/mL  --   --   --  11.60*   VANCOMYCIN TR mcg/mL 27.50* 13.90 5.88  --      Results from last 7 days   Lab Units 03/01/25  0511 02/28/25  0059 02/26/25  2256   CREATININE mg/dL 0.87 0.78 0.54*     Estimated Creatinine Clearance: 74 mL/min (by C-G formula based on SCr of 0.87 mg/dL).  I/O last 3 completed shifts:  In: 860 [P.O.:660; I.V.:200]  Out: -     Results from last 7 days   Lab Units 03/01/25  0511 02/28/25  0059 02/26/25  2256   WBC 10*3/mm3 11.32* 7.33 6.17     Temperature    03/01/25 0002 03/01/25 0501 03/01/25 1214   Temp: 97.9 °F (36.6 °C) 98.1 °F (36.7 °C) 97.8 °F (36.6 °C)     Baseline culture/source/susceptibility:  Microbiology Results (last 10 days)       Procedure Component Value - Date/Time    Wound " Culture - Swab, Leg, Right [[id number]] Collected: 02/28/25 0743    Lab Status: Preliminary result Specimen: Swab from Leg, Right Updated: 03/01/25 0947     Wound Culture Culture in progress     Gram Stain Many (4+) WBCs per low power field      Rare (1+) Gram positive cocci    Blood Culture - Blood, Arm, Left [[id number]]  (Normal) Collected: 02/24/25 2056    Lab Status: Preliminary result Specimen: Blood from Arm, Left Updated: 02/28/25 2115     Blood Culture No growth at 4 days    Blood Culture - Blood, Arm, Right [[id number]]  (Normal) Collected: 02/24/25 2056    Lab Status: Preliminary result Specimen: Blood from Arm, Right Updated: 02/28/25 2115     Blood Culture No growth at 4 days    Wound Culture - Wound, Leg, Right [[id number]]  (Abnormal) Collected: 02/24/25 0830    Lab Status: Final result Specimen: Wound from Leg, Right Updated: 02/26/25 0855     Wound Culture Heavy growth (4+) Streptococcus pyogenes (Group A)     Gram Stain Many (4+) WBCs per low power field      Many (4+) Gram positive cocci in pairs and chains            Geeta Mclaughlin, PharmD  03/01/25 14:47 EST

## 2025-03-01 NOTE — CONSULTS
Infectious Diseases Consult Note    Referring Provider: Nakul Keith MD    Reason for Consultation: Right leg abscess    Patient Care Team:  Rohit Schrader DO as PCP - General (Internal Medicine)  Uli Starr MD as Consulting Physician (Nephrology)    Chief complaint pain and swelling to the right leg at admission    Subjective     History of present illness:      This is a 54-year-old female presents to the hospital on 2/24/2025 with complaints of worsening right leg pain and swelling.  Wound care mention patient had some greenish drainage when she started to have high-grade fevers she was instructed to come to the ED.  Patient's status post multiple thrombectomies in December 2024.  Patient states that she has difficulty moving her right foot and is not ambulatory because of the pain.  Status post incision and drainage of right leg abscess on 2/20/2025.  Denies any current fever chills, shortness of breath, GI symptoms or urinary symptoms.  Denies being diabetic or immunocompromised    Review of Systems   Review of Systems   Constitutional: Negative.    HENT: Negative.     Eyes: Negative.    Respiratory: Negative.     Cardiovascular: Negative.  Positive for leg swelling.   Gastrointestinal: Negative.    Endocrine: Negative.    Genitourinary: Negative.    Musculoskeletal: Negative.    Skin: Negative.  Positive for color change.   Neurological: Negative.    Psychiatric/Behavioral: Negative.     All other systems reviewed and are negative.      Medications  Medications Prior to Admission   Medication Sig Dispense Refill Last Dose/Taking    doxycycline (MONODOX) 100 MG capsule Take 1 capsule by mouth 2 (Two) Times a Day.   2/24/2025 Evening    Eliquis 5 MG tablet tablet Take 1 tablet by mouth Every 12 (Twelve) Hours.   2/24/2025 Evening    Insulin Glargine (LANTUS SOLOSTAR) 100 UNIT/ML injection pen Inject 22 Units under the skin into the appropriate area as directed Every Night for 136 days. 15 mL 1  2/24/2025 Evening    Insulin Lispro, 1 Unit Dial, (HumaLOG KwikPen) 100 UNIT/ML solution pen-injector 9 units with each with each meal with sliding scale not more than 15 units with each meal 15 mL 1 2/24/2025 Noon    naloxone (NARCAN) 4 MG/0.1ML nasal spray Call 911. Don't prime. [place] in 1 nostril for overdose. Repeat in 2-3 minutes in other nostril if no or minimal breathing/responsiveness. 1 each 1 Taking    oxyCODONE (ROXICODONE) 15 MG immediate release tablet Take 1 tablet by mouth Every 4 (Four) Hours As Needed for Moderate Pain. 24 tablet 0 2/24/2025 Evening    pantoprazole (PROTONIX) 40 MG EC tablet Take 1 tablet by mouth.   2/24/2025    sodium chloride (NS) 0.9 % irrigation Irrigate with 0.033 mL to the affected area as directed by provider 1 (One) Time.   2/23/2025       History  Past Medical History:   Diagnosis Date    COPD (chronic obstructive pulmonary disease)     Diabetes mellitus     DKA (diabetic ketoacidosis)     Low back pain     Migraine     Neck pain     Peripheral vascular disease      Past Surgical History:   Procedure Laterality Date    FASCIOTOMY Right 12/23/2024    Procedure: FASCIOTOMY LEG;  Surgeon: Chris Delgado MD;  Location: [facility] MAIN OR;  Service: Vascular;  Laterality: Right;    FEMORAL THROMBECTOMY/EMBOLECTOMY Right 12/19/2024    Procedure: FEMORAL right iliofemoral thrombectomy, arteriogram;  Surgeon: Ghassan Celestin MD;  Location: [facility] HYBRID OR;  Service: Vascular;  Laterality: Right;    FEMORAL THROMBECTOMY/EMBOLECTOMY Right 12/20/2024    Procedure: Right femoral thrombectomy, right lower extremity arteriogram and right lower leg facitomies;  Surgeon: Ghassan Celestin MD;  Location: [facility] HYBRID OR;  Service: Vascular;  Laterality: Right;    LUNG SURGERY         Family History  Family History   Problem Relation Age of Onset    Diabetes Mother     Cancer Paternal Uncle     Cancer Paternal Grandmother     COPD Paternal Grandmother     Diabetes Paternal  Grandmother        Social History   reports that she has been smoking cigarettes. She started smoking about 40 years ago. She has a 20.1 pack-year smoking history. She has never used smokeless tobacco. She reports that she does not currently use alcohol. She reports current drug use. Drug: Marijuana.    Allergies  Aspirin and Ibuprofen    Objective     Vital Signs   Vital Signs (last 24 hours)         02/28 0700  03/01 0659 03/01 0700  03/01 1536   Most Recent      Temp (°F) 97.5 -  98.4      97.8     97.8 (36.6) 03/01 1214    Heart Rate 75 -  115    72 -  76     76 03/01 1214    Resp 10 -  20      18     18 03/01 1214    /86 -  160/90      110/72     110/72 03/01 1214    SpO2 (%) 93 -  100      95     95 03/01 1214    Flow (L/min) (Oxygen Therapy)   6                    Physical Exam:  Physical Exam  Vitals and nursing note reviewed.   Constitutional:       General: She is not in acute distress.     Appearance: She is well-developed and normal weight. She is ill-appearing. She is not diaphoretic.   HENT:      Head: Normocephalic and atraumatic.   Eyes:      General: No scleral icterus.     Extraocular Movements: Extraocular movements intact.      Conjunctiva/sclera: Conjunctivae normal.      Pupils: Pupils are equal, round, and reactive to light.   Cardiovascular:      Rate and Rhythm: Normal rate and regular rhythm.      Heart sounds: Normal heart sounds, S1 normal and S2 normal. No murmur heard.  Pulmonary:      Effort: Pulmonary effort is normal. No respiratory distress.      Breath sounds: Normal breath sounds. No stridor. No wheezing or rales.   Chest:      Chest wall: No tenderness.   Abdominal:      General: Bowel sounds are normal. There is no distension.      Palpations: Abdomen is soft. There is no mass.      Tenderness: There is no abdominal tenderness. There is no guarding.   Musculoskeletal:         General: No swelling, tenderness or deformity. Normal range of motion.      Cervical back: Neck  supple.   Skin:     General: Skin is warm and dry.      Coloration: Skin is not pale.      Findings: No bruising, erythema or rash.      Comments: Currently with dressing from the right foot to below the knee.  No obvious erythema warmth streaking above the knee   Neurological:      General: No focal deficit present.      Mental Status: She is alert and oriented to person, place, and time. Mental status is at baseline.      Cranial Nerves: No cranial nerve deficit.   Psychiatric:         Mood and Affect: Mood normal.         Microbiology  Microbiology Results (last 10 days)       Procedure Component Value - Date/Time    Wound Culture - Swab, Leg, Right [[id number]] Collected: 02/28/25 0743    Lab Status: Preliminary result Specimen: Swab from Leg, Right Updated: 03/01/25 0947     Wound Culture Culture in progress     Gram Stain Many (4+) WBCs per low power field      Rare (1+) Gram positive cocci    Blood Culture - Blood, Arm, Left [[id number]]  (Normal) Collected: 02/24/25 2056    Lab Status: Preliminary result Specimen: Blood from Arm, Left Updated: 02/28/25 2115     Blood Culture No growth at 4 days    Blood Culture - Blood, Arm, Right [[id number]]  (Normal) Collected: 02/24/25 2056    Lab Status: Preliminary result Specimen: Blood from Arm, Right Updated: 02/28/25 2115     Blood Culture No growth at 4 days    Wound Culture - Wound, Leg, Right [[id number]]  (Abnormal) Collected: 02/24/25 0830    Lab Status: Final result Specimen: Wound from Leg, Right Updated: 02/26/25 0855     Wound Culture Heavy growth (4+) Streptococcus pyogenes (Group A)     Gram Stain Many (4+) WBCs per low power field      Many (4+) Gram positive cocci in pairs and chains            Laboratory  Results from last 7 days   Lab Units 03/01/25  0511   WBC 10*3/mm3 11.32*   HEMOGLOBIN g/dL 9.4*   HEMATOCRIT % 30.1*   PLATELETS 10*3/mm3 469*     Results from last 7 days   Lab Units 03/01/25  0511 02/28/25  0059   SODIUM mmol/L  --  135*   POTASSIUM  mmol/L  --  4.6   CHLORIDE mmol/L  --  102   CO2 mmol/L  --  25.3   BUN mg/dL  --  8   CREATININE mg/dL 0.87 0.78   GLUCOSE mg/dL  --  140*   CALCIUM mg/dL  --  9.5     Results from last 7 days   Lab Units 03/01/25  0511 02/28/25  0059   SODIUM mmol/L  --  135*   POTASSIUM mmol/L  --  4.6   CHLORIDE mmol/L  --  102   CO2 mmol/L  --  25.3   BUN mg/dL  --  8   CREATININE mg/dL 0.87 0.78   GLUCOSE mg/dL  --  140*   CALCIUM mg/dL  --  9.5                   Radiology  Imaging Results (Last 72 Hours)       Procedure Component Value Units Date/Time    CT Lower Extremity Right With Contrast [[id number]] Collected: 02/27/25 0839     Updated: 02/27/25 0904    Narrative:      CT LOWER EXTREMITY RIGHT W CONTRAST    Date of Exam: 2/26/2025 9:00 PM EST    Indication: right medial calf abscess.    Comparison: None available.    Technique: Axial CT images were obtained of the right lower extremity after the uneventful intravenous administration of iodinated contrast.  Sagittal and coronal reconstructions were performed.  Automated exposure control and iterative reconstruction   methods were used.      Findings:  In the deep soft tissues of the posterior-inferior lower leg, there is a rim-enhancing fluid and gas collection consistent with abscess. The abscess appears to involve the inferior posterior compartment musculature including the inferior soleus and   gastrocnemius muscles, and likely the flexor hallucis longus muscle. The largest area of the collection measures up to approximately 4 x 3.6 cm on axial series 5 image 106 and up to 10 cm craniocaudal as seen on image 31 of series 7. There is a smaller   rim-enhancing component extending superiorly along the deep margin of the soleus muscle into the mid lower leg by approximately 8 cm.    There appear to be tracts extending to the posterior-medial skin surface at the superior and inferior aspects of the large gas and fluid collection, where there appears to be overlying wounds.  There is a large longitudinal linear defect of the   subcutaneous tissues along the posterior-medial lower leg, which may represent a larger postoperative or chronic wound. There is also an defect in the subcutaneous tissues at the lateral aspect of the lower leg which could also represent a chronic or   postoperative wound. There is extensive subcutaneous edema of the visualized lower leg. There is diffuse skin thickening which may indicate cellulitis.    A rim-enhancing tract extends from the inferior lateral soft tissue defect on axial series 5 image 113 into the anterior compartment musculature. This can also be seen on sagittal series 7 images 47-38. There is a small rim-enhancing collection in the   anterior compartment musculature seen on axial image 106 and on coronal image 35. This appears to measure up to 6 cm craniocaudal and up to 1.1 x 0.9 cm in axial dimension. An additional small tract seen on axial mage 104 is seen at the superior aspect   of the collection.    There appear to be small superficial surgical clips at the lateral mid lower leg.  No significant effusion at the knee and ankle. No definite CT findings to indicate osteomyelitis at this time. There are venous varicosities. There appears to be   enhancement of lower leg vascular structures extending into the ankle/foot.      Impression:      Impression:  1.Large rim-enhancing fluid and gas collection in the deep soft tissues of the posterior-inferior lower leg consistent with abscess. There appears to be a tract extending to a medial wound. The abscess is in the inferior posterior compartment musculature   with a smaller rim-enhancing component extending superiorly in the deep musculature to the mid lower leg.  2.Additional smaller abscess in the inferior aspect of the anterior compartment musculature with rim-enhancing tracts extending to a lateral wound.  3.Diffuse subcutaneous edema with skin thickening which may indicate cellulitis.  4.No  definite CT findings of osteomyelitis at this time.              Electronically Signed: Sam Haynes    2/27/2025 9:02 AM EST    Workstation ID: [id number]            Cardiology      Results Review:  I have reviewed all clinical data, test, lab, and imaging results.       Schedule Meds  [Held by provider] apixaban, 5 mg, Oral, Q12H  budesonide-formoterol, 2 puff, Inhalation, BID - RT  cefTRIAXone, 2,000 mg, Intravenous, Q24H  heparin (porcine), 5,000 Units, Subcutaneous, Q8H  insulin glargine, 20 Units, Subcutaneous, Nightly  insulin lispro, 2-7 Units, Subcutaneous, 4x Daily AC & at Bedtime  insulin lispro, 8 Units, Subcutaneous, TID With Meals  metoprolol succinate XL, 25 mg, Oral, Q24H  pantoprazole, 40 mg, Oral, Q AM  sodium chloride, 10 mL, Intravenous, Q12H  sodium hypochlorite, , Topical, BID  vancomycin, 1,000 mg, Intravenous, Q12H        Infusion Meds  Pharmacy to dose vancomycin,         PRN Meds    acetaminophen **OR** acetaminophen **OR** acetaminophen    senna-docusate sodium **AND** polyethylene glycol **AND** bisacodyl **AND** bisacodyl    Calcium Replacement - Follow Nurse / BPA Driven Protocol    dextrose    dextrose    glucagon (human recombinant)    HYDROmorphone    Magnesium Standard Dose Replacement - Follow Nurse / BPA Driven Protocol    oxyCODONE **OR** oxyCODONE    Pharmacy to dose vancomycin    Phosphorus Replacement - Follow Nurse / BPA Driven Protocol    Potassium Replacement - Follow Nurse / BPA Driven Protocol    sodium chloride    sodium chloride    sodium chloride      Assessment & Plan       Assessment    Right leg abscess of the posterior/inferior lower leg with an additional smaller abscess in the inferior aspect of the anterior compartment musculature.  CT did not show any findings consistent with osteomyelitis.  Initial cultures growing group A streptococcus and she is status post incision and drainage on 2/28/2025 with interoperative cultures pending    History of right iliac  thrombectomy on 12/19/2024, right iliofemoral popliteal thrombectomy, right femoral enterectomy with patch, close right calf fasciotomies on 12/20/2024 and completion of right for composite fasciotomies on 12/23/2024    COPD with tobacco abuse    Type 2 diabetes-most recent A1c is 15.8    Plan    Discontinue IV Zosyn-do not recommend using IV Zosyn and IV vancomycin together due to the high risk of acute kidney injury  Start IV ceftriaxone 2 g every 24 hours  Continue IV vancomycin-asked pharmacy to monitor dose for now  Waiting on interoperative cultures  Patient will need 2 weeks of antimicrobial therapy since surgery  Continue supportive care  A.m. labs  Tobacco abuse cessation  Patient will need to get her blood sugars under control to heal this infection and avoid future infections    Barbara Peña, APRN  03/01/25  15:36 EST    Note is dictated utilizing voice recognition software/Dragon

## 2025-03-01 NOTE — PROGRESS NOTES
[facility] MEDICINE SERVICE  DAILY PROGRESS NOTE    NAME: Yarelis Jackson  : 1970  MRN: [id number]      LOS: 4 days     PROVIDER OF SERVICE: Nakul Keith MD    Chief Complaint: Cellulitis of right lower extremity    Subjective:     Interval History:    Patient seen and evaluated at bedside.   Patient sitting in wheelchair denies any complain no bowel movement  Treatment plan discussed with patient. All questions addressed.     Review of Systems:   All 21 ROS were negative except mentioned above.    Objective:     Vital Signs  Temp:  [97.7 °F (36.5 °C)-98.4 °F (36.9 °C)] 98.1 °F (36.7 °C)  Heart Rate:  [] 74  Resp:  [16-20] 18  BP: (126-160)/(71-90) 142/81   Body mass index is 28.32 kg/m².    Physical Exam   General: No acute distress, appears stated age  Neuro: Awake and alert, oriented x3, no focal deficits appreciated  Head: Atraumatic, normocephalic  HEENT: EOMI, anicteric, normal sclerae and conjunctivae, moist mucus membranes  Neck: supple, no lymphadenopathy  CV: RRR, soft heart sounds, no murmurs appreciated, no peripheral edema  Pulm: Decreased breath sounds, no increased work of breathing, no adventitious sounds  Abd: Soft, nontender, nondistended  Skin: Warm, dry and right lower extremity dressed  Psych: Appropriate mood and affect    Scheduled Meds   [Held by provider] apixaban, 5 mg, Oral, Q12H  budesonide-formoterol, 2 puff, Inhalation, BID - RT  heparin (porcine), 5,000 Units, Subcutaneous, Q8H  insulin glargine, 20 Units, Subcutaneous, Nightly  insulin lispro, 2-7 Units, Subcutaneous, 4x Daily AC & at Bedtime  insulin lispro, 8 Units, Subcutaneous, TID With Meals  metoprolol succinate XL, 25 mg, Oral, Q24H  pantoprazole, 40 mg, Oral, Q AM  piperacillin-tazobactam, 3.375 g, Intravenous, Q8H  sodium chloride, 10 mL, Intravenous, Q12H  sodium hypochlorite, , Topical, BID  vancomycin, 1,000 mg, Intravenous, Q12H       PRN Meds     acetaminophen **OR** acetaminophen **OR**  acetaminophen    senna-docusate sodium **AND** polyethylene glycol **AND** bisacodyl **AND** bisacodyl    Calcium Replacement - Follow Nurse / BPA Driven Protocol    dextrose    dextrose    glucagon (human recombinant)    HYDROmorphone    Magnesium Standard Dose Replacement - Follow Nurse / BPA Driven Protocol    oxyCODONE **OR** oxyCODONE    Pharmacy to dose vancomycin    Phosphorus Replacement - Follow Nurse / BPA Driven Protocol    Potassium Replacement - Follow Nurse / BPA Driven Protocol    sodium chloride    sodium chloride    sodium chloride   Infusions  Pharmacy to dose vancomycin,           Diagnostic Data    Results from last 7 days   Lab Units 03/01/25  0511 02/28/25  0059 02/25/25  0359 02/24/25  2056   WBC 10*3/mm3 11.32* 7.33   < > 11.57*   HEMOGLOBIN g/dL 9.4* 9.8*   < > 11.2*   HEMATOCRIT % 30.1* 31.4*   < > 35.5   PLATELETS 10*3/mm3 469* 458*   < > 494*   GLUCOSE mg/dL  --  140*   < > 163*   CREATININE mg/dL 0.87 0.78   < > 0.65   BUN mg/dL  --  8   < > 12   SODIUM mmol/L  --  135*   < > 131*   POTASSIUM mmol/L  --  4.6   < > 3.4*   AST (SGOT) U/L  --   --   --  15   ALT (SGPT) U/L  --   --   --  9   ALK PHOS U/L  --   --   --  131*   BILIRUBIN mg/dL  --   --   --  0.3   ANION GAP mmol/L  --  7.7   < > 12.4    < > = values in this interval not displayed.       No radiology results for the last day    Interval results reviewed.    Assessment/Plan:   Hypertension  Abscess of right lower extremity  Sepsis due to RLE cellulitis  Severe PAD  status post right iliac thrombectomy, right iliofemoral popliteal thrombectomy, right femoral endarterectomy   Intractable right leg pain     Patient underwent debridement and abscess I&D of right lower extremity on 2/28/2025  Wound culture positive for gram-positive cocci, anaerobic cultures pending.  12/19 - right iliac thrombectomy. 12/20 - right iliofemoral popliteal thrombectomy, right femoral endarterectomy with patch, right lower extremity arteriogram and  close right calf fasciotomy. 12/23 - emergent 4 compartment fasciotomies.  Culture positive for gram-positive cocci, wound culture positive for heavy growth of Streptococcus pyogenes  Blood cultures no growth  Continue vancomycin and Zosyn to cover anaerobes  Surgery following the patient  Will consult ID for recommendations of antibiotics and duration at discharge     Type 2 DM  Continue Lantus and SSI, blood sugar under good control     Chronic Anemia  Monitor daily CBC, transfuse as needed.  Hemoglobin stable     Thrombocytosis  Most likely due to acute phase reactant, anemia and due to infection, continue to monitor white blood count     Hypertension  Blood pressure better, continue metoprolol XL 25 mg daily    Treatment plan discussed with RN.     VTE Prophylaxis:  Pharmacologic VTE prophylaxis orders are present.         Code status is   Code Status and Medical Interventions: CPR (Attempt to Resuscitate); Full Support   Ordered at: 02/24/25 5086     Code Status (Patient has no pulse and is not breathing):    CPR (Attempt to Resuscitate)     Medical Interventions (Patient has pulse or is breathing):    Full Support       Plan for disposition:     Barriers to Discharge: Awaiting anaerobic culture and ID  Anticipated Date of Discharge: 3/2/25  Place of Discharge: Home         Time: 40 minutes     Signature: Electronically signed by Nakul Keith MD, 03/01/25, 09:56 EST.  [facility] Hospitalist Team

## 2025-03-01 NOTE — PLAN OF CARE
Goal Outcome Evaluation:      Pt is resting in bed. Able to make needs known. Medicating pain per MAR. Dressing changed to RLE. Refused to have wound repacked but was agreeable to cover wound with new dressing. Up to wheelchair independently. VSS. Plan of care ongoing.

## 2025-03-02 LAB
ANION GAP SERPL CALCULATED.3IONS-SCNC: 9.1 MMOL/L (ref 5–15)
ANION GAP SERPL CALCULATED.3IONS-SCNC: 9.8 MMOL/L (ref 5–15)
BASOPHILS # BLD AUTO: 0.05 10*3/MM3 (ref 0–0.2)
BASOPHILS NFR BLD AUTO: 0.5 % (ref 0–1.5)
BUN SERPL-MCNC: 14 MG/DL (ref 6–20)
BUN SERPL-MCNC: 17 MG/DL (ref 6–20)
BUN/CREAT SERPL: 18.9 (ref 7–25)
BUN/CREAT SERPL: 23 (ref 7–25)
CALCIUM SPEC-SCNC: 9.7 MG/DL (ref 8.6–10.5)
CALCIUM SPEC-SCNC: 9.8 MG/DL (ref 8.6–10.5)
CHLORIDE SERPL-SCNC: 100 MMOL/L (ref 98–107)
CHLORIDE SERPL-SCNC: 102 MMOL/L (ref 98–107)
CO2 SERPL-SCNC: 24.2 MMOL/L (ref 22–29)
CO2 SERPL-SCNC: 24.9 MMOL/L (ref 22–29)
CREAT SERPL-MCNC: 0.74 MG/DL (ref 0.57–1)
CREAT SERPL-MCNC: 0.74 MG/DL (ref 0.57–1)
DEPRECATED RDW RBC AUTO: 54.9 FL (ref 37–54)
EGFRCR SERPLBLD CKD-EPI 2021: 96.9 ML/MIN/1.73
EGFRCR SERPLBLD CKD-EPI 2021: 96.9 ML/MIN/1.73
EOSINOPHIL # BLD AUTO: 0.27 10*3/MM3 (ref 0–0.4)
EOSINOPHIL NFR BLD AUTO: 2.8 % (ref 0.3–6.2)
ERYTHROCYTE [DISTWIDTH] IN BLOOD BY AUTOMATED COUNT: 15.2 % (ref 12.3–15.4)
GLUCOSE BLDC GLUCOMTR-MCNC: 131 MG/DL (ref 70–105)
GLUCOSE BLDC GLUCOMTR-MCNC: 138 MG/DL (ref 70–105)
GLUCOSE BLDC GLUCOMTR-MCNC: 152 MG/DL (ref 70–105)
GLUCOSE BLDC GLUCOMTR-MCNC: 163 MG/DL (ref 70–105)
GLUCOSE SERPL-MCNC: 135 MG/DL (ref 65–99)
GLUCOSE SERPL-MCNC: 154 MG/DL (ref 65–99)
HCT VFR BLD AUTO: 30.5 % (ref 34–46.6)
HGB BLD-MCNC: 9.4 G/DL (ref 12–15.9)
IMM GRANULOCYTES # BLD AUTO: 0.1 10*3/MM3 (ref 0–0.05)
IMM GRANULOCYTES NFR BLD AUTO: 1 % (ref 0–0.5)
LYMPHOCYTES # BLD AUTO: 3.36 10*3/MM3 (ref 0.7–3.1)
LYMPHOCYTES NFR BLD AUTO: 34.4 % (ref 19.6–45.3)
MCH RBC QN AUTO: 29.7 PG (ref 26.6–33)
MCHC RBC AUTO-ENTMCNC: 30.8 G/DL (ref 31.5–35.7)
MCV RBC AUTO: 96.5 FL (ref 79–97)
MONOCYTES # BLD AUTO: 0.61 10*3/MM3 (ref 0.1–0.9)
MONOCYTES NFR BLD AUTO: 6.3 % (ref 5–12)
NEUTROPHILS NFR BLD AUTO: 5.37 10*3/MM3 (ref 1.7–7)
NEUTROPHILS NFR BLD AUTO: 55 % (ref 42.7–76)
NRBC BLD AUTO-RTO: 0 /100 WBC (ref 0–0.2)
PLATELET # BLD AUTO: 513 10*3/MM3 (ref 140–450)
PMV BLD AUTO: 8.2 FL (ref 6–12)
POTASSIUM SERPL-SCNC: 3.8 MMOL/L (ref 3.5–5.2)
POTASSIUM SERPL-SCNC: 3.9 MMOL/L (ref 3.5–5.2)
RBC # BLD AUTO: 3.16 10*6/MM3 (ref 3.77–5.28)
SODIUM SERPL-SCNC: 134 MMOL/L (ref 136–145)
SODIUM SERPL-SCNC: 136 MMOL/L (ref 136–145)
VANCOMYCIN TROUGH SERPL-MCNC: 13.8 MCG/ML (ref 5–20)
WBC NRBC COR # BLD AUTO: 9.76 10*3/MM3 (ref 3.4–10.8)

## 2025-03-02 PROCEDURE — 82948 REAGENT STRIP/BLOOD GLUCOSE: CPT

## 2025-03-02 PROCEDURE — 25010000002 CEFTRIAXONE PER 250 MG: Performed by: NURSE PRACTITIONER

## 2025-03-02 PROCEDURE — 94799 UNLISTED PULMONARY SVC/PX: CPT

## 2025-03-02 PROCEDURE — 80048 BASIC METABOLIC PNL TOTAL CA: CPT | Performed by: NURSE PRACTITIONER

## 2025-03-02 PROCEDURE — 25010000002 HYDROMORPHONE 1 MG/ML SOLUTION: Performed by: STUDENT IN AN ORGANIZED HEALTH CARE EDUCATION/TRAINING PROGRAM

## 2025-03-02 PROCEDURE — 94664 DEMO&/EVAL PT USE INHALER: CPT

## 2025-03-02 PROCEDURE — 94761 N-INVAS EAR/PLS OXIMETRY MLT: CPT

## 2025-03-02 PROCEDURE — 99024 POSTOP FOLLOW-UP VISIT: CPT | Performed by: SURGERY

## 2025-03-02 PROCEDURE — 85025 COMPLETE CBC W/AUTO DIFF WBC: CPT | Performed by: INTERNAL MEDICINE

## 2025-03-02 PROCEDURE — 25810000003 SODIUM CHLORIDE 0.9 % SOLUTION 250 ML FLEX CONT: Performed by: STUDENT IN AN ORGANIZED HEALTH CARE EDUCATION/TRAINING PROGRAM

## 2025-03-02 PROCEDURE — 25010000002 HEPARIN (PORCINE) PER 1000 UNITS: Performed by: STUDENT IN AN ORGANIZED HEALTH CARE EDUCATION/TRAINING PROGRAM

## 2025-03-02 PROCEDURE — 80202 ASSAY OF VANCOMYCIN: CPT | Performed by: INTERNAL MEDICINE

## 2025-03-02 PROCEDURE — 25010000002 VANCOMYCIN HCL 1.25 G RECONSTITUTED SOLUTION 1 EACH VIAL: Performed by: STUDENT IN AN ORGANIZED HEALTH CARE EDUCATION/TRAINING PROGRAM

## 2025-03-02 PROCEDURE — 63710000001 INSULIN GLARGINE PER 5 UNITS: Performed by: STUDENT IN AN ORGANIZED HEALTH CARE EDUCATION/TRAINING PROGRAM

## 2025-03-02 PROCEDURE — 25010000002 VANCOMYCIN 1 G RECONSTITUTED SOLUTION 1 EACH VIAL: Performed by: STUDENT IN AN ORGANIZED HEALTH CARE EDUCATION/TRAINING PROGRAM

## 2025-03-02 RX ADMIN — CEFTRIAXONE 2000 MG: 2 INJECTION, POWDER, FOR SOLUTION INTRAMUSCULAR; INTRAVENOUS at 13:37

## 2025-03-02 RX ADMIN — HEPARIN SODIUM 5000 UNITS: 5000 INJECTION INTRAVENOUS; SUBCUTANEOUS at 09:13

## 2025-03-02 RX ADMIN — SODIUM CHLORIDE 1000 MG: 9 INJECTION, SOLUTION INTRAVENOUS at 00:43

## 2025-03-02 RX ADMIN — VANCOMYCIN HYDROCHLORIDE 1250 MG: 1.25 INJECTION, POWDER, LYOPHILIZED, FOR SOLUTION INTRAVENOUS at 23:57

## 2025-03-02 RX ADMIN — HYDROMORPHONE HYDROCHLORIDE 0.5 MG: 1 INJECTION, SOLUTION INTRAMUSCULAR; INTRAVENOUS; SUBCUTANEOUS at 10:25

## 2025-03-02 RX ADMIN — Medication 10 ML: at 09:13

## 2025-03-02 RX ADMIN — OXYCODONE 10 MG: 5 TABLET ORAL at 16:57

## 2025-03-02 RX ADMIN — HEPARIN SODIUM 5000 UNITS: 5000 INJECTION INTRAVENOUS; SUBCUTANEOUS at 21:57

## 2025-03-02 RX ADMIN — Medication 10 ML: at 20:54

## 2025-03-02 RX ADMIN — SODIUM CHLORIDE 1000 MG: 9 INJECTION, SOLUTION INTRAVENOUS at 11:32

## 2025-03-02 RX ADMIN — SENNOSIDES AND DOCUSATE SODIUM 2 TABLET: 50; 8.6 TABLET ORAL at 09:12

## 2025-03-02 RX ADMIN — DAKIN'S SOLUTION 0.125% (QUARTER STRENGTH): 0.12 SOLUTION at 09:13

## 2025-03-02 RX ADMIN — METOPROLOL SUCCINATE 25 MG: 25 TABLET, EXTENDED RELEASE ORAL at 09:13

## 2025-03-02 RX ADMIN — BUDESONIDE AND FORMOTEROL FUMARATE DIHYDRATE 2 PUFF: 160; 4.5 AEROSOL RESPIRATORY (INHALATION) at 08:43

## 2025-03-02 RX ADMIN — PANTOPRAZOLE SODIUM 40 MG: 40 TABLET, DELAYED RELEASE ORAL at 09:13

## 2025-03-02 RX ADMIN — HEPARIN SODIUM 5000 UNITS: 5000 INJECTION INTRAVENOUS; SUBCUTANEOUS at 16:57

## 2025-03-02 RX ADMIN — INSULIN GLARGINE 20 UNITS: 100 INJECTION, SOLUTION SUBCUTANEOUS at 21:57

## 2025-03-02 RX ADMIN — OXYCODONE 10 MG: 5 TABLET ORAL at 20:53

## 2025-03-02 RX ADMIN — OXYCODONE 10 MG: 5 TABLET ORAL at 09:12

## 2025-03-02 NOTE — PLAN OF CARE
Goal Outcome Evaluation:  Plan of Care Reviewed With: patient        Progress: improving  Outcome Evaluation: Patient resting, Still having a moderate amount of pain. Patient makes her needs known, has her call light in reach.

## 2025-03-02 NOTE — PROGRESS NOTES
Infectious Diseases Progress Note      LOS: 5 days   Patient Care Team:  Rohit Schrader DO as PCP - General (Internal Medicine)  Uli Starr MD as Consulting Physician (Nephrology)    Chief Complaint: Pain and swelling to the right leg    Subjective       The patient has been afebrile for the last 24 hours.  The patient is on room air, hemodynamically stable, and is tolerating antimicrobial therapy..  Feeling better today      Review of Systems:   Review of Systems   Constitutional: Negative.    HENT: Negative.     Eyes: Negative.    Respiratory: Negative.     Cardiovascular:  Positive for leg swelling.   Gastrointestinal: Negative.    Endocrine: Negative.    Genitourinary: Negative.    Musculoskeletal: Negative.    Skin:  Positive for color change.   Neurological: Negative.    Psychiatric/Behavioral: Negative.     All other systems reviewed and are negative.       Objective     Vital Signs  Temp:  [97.6 °F (36.4 °C)-98 °F (36.7 °C)] 97.6 °F (36.4 °C)  Heart Rate:  [75-88] 80  Resp:  [15-18] 18  BP: (113-127)/(70-76) 123/75    Physical Exam:  Physical Exam  Vitals and nursing note reviewed.   Constitutional:       General: She is not in acute distress.     Appearance: She is well-developed and normal weight. She is ill-appearing. She is not diaphoretic.   HENT:      Head: Normocephalic and atraumatic.   Eyes:      General: No scleral icterus.     Extraocular Movements: Extraocular movements intact.      Conjunctiva/sclera: Conjunctivae normal.      Pupils: Pupils are equal, round, and reactive to light.   Cardiovascular:      Rate and Rhythm: Normal rate and regular rhythm.      Heart sounds: Normal heart sounds, S1 normal and S2 normal. No murmur heard.  Pulmonary:      Effort: Pulmonary effort is normal. No respiratory distress.      Breath sounds: Normal breath sounds. No stridor. No wheezing or rales.   Chest:      Chest wall: No tenderness.   Abdominal:      General: Bowel sounds are normal.  There is no distension.      Palpations: Abdomen is soft. There is no mass.      Tenderness: There is no abdominal tenderness. There is no guarding.   Musculoskeletal:         General: No swelling, tenderness or deformity.      Cervical back: Neck supple.   Skin:     General: Skin is warm and dry.      Coloration: Skin is not pale.      Findings: No bruising, erythema or rash.      Comments:  Currently with dressing from the right foot to below the knee.  No obvious erythema warmth streaking above the knee    Neurological:      Mental Status: She is alert and oriented to person, place, and time.   Psychiatric:         Mood and Affect: Mood normal.          Results Review:    I have reviewed all clinical data, test, lab, and imaging results.     Radiology  No Radiology Exams Resulted Within Past 24 Hours    Cardiology    Laboratory    Results from last 7 days   Lab Units 03/01/25  0511 02/28/25  0059 02/26/25 2256 02/26/25 0252 02/25/25 0359 02/24/25 2056   WBC 10*3/mm3 11.32* 7.33 6.17 6.75 10.54 11.57*   HEMOGLOBIN g/dL 9.4* 9.8* 10.0* 9.5* 9.1* 11.2*   HEMATOCRIT % 30.1* 31.4* 32.8* 30.8* 29.0* 35.5   PLATELETS 10*3/mm3 469* 458* 436 418 366 494*     Results from last 7 days   Lab Units 03/02/25  0433 03/01/25  0511 02/28/25  0059 02/27/25  1455 02/26/25 2256 02/26/25  0252 02/25/25  0359 02/24/25 2056   SODIUM mmol/L 136  --  135*  --  136 138  --  131*   POTASSIUM mmol/L 3.8  --  4.6 4.2 3.5 3.8 4.0 3.4*   CHLORIDE mmol/L 102  --  102  --  102 105  --  94*   CO2 mmol/L 24.2  --  25.3  --  24.4 24.0  --  24.6   BUN mg/dL 14  --  8  --  9 9  --  12   CREATININE mg/dL 0.74 0.87 0.78  --  0.54* 0.54*  --  0.65   GLUCOSE mg/dL 135*  --  140*  --  177* 224*  --  163*   ALBUMIN g/dL  --   --   --   --   --   --   --  3.7   BILIRUBIN mg/dL  --   --   --   --   --   --   --  0.3   ALK PHOS U/L  --   --   --   --   --   --   --  131*   AST (SGOT) U/L  --   --   --   --   --   --   --  15   ALT (SGPT) U/L  --   --    --   --   --   --   --  9   CALCIUM mg/dL 9.7  --  9.5  --  9.2 9.2  --  9.8                 Microbiology   Microbiology Results (last 10 days)       Procedure Component Value - Date/Time    Wound Culture - Swab, Leg, Right [[id number]]  (Abnormal) Collected: 02/28/25 0743    Lab Status: Preliminary result Specimen: Swab from Leg, Right Updated: 03/02/25 0918     Wound Culture Rare growth Staphylococcus aureus      Rare growth Streptococcus pyogenes (Group A)     Comment:   This organism is considered to be universally susceptible to penicillin.  No further antibiotic testing will be performed. If Clindamycin or Erythromycin is the drug of choice, notify the laboratory within 7 days to request susceptibility testing.        Gram Stain Many (4+) WBCs per low power field      Rare (1+) Gram positive cocci    Blood Culture - Blood, Arm, Left [[id number]]  (Normal) Collected: 02/24/25 2056    Lab Status: Final result Specimen: Blood from Arm, Left Updated: 03/01/25 2115     Blood Culture No growth at 5 days    Blood Culture - Blood, Arm, Right [[id number]]  (Normal) Collected: 02/24/25 2056    Lab Status: Final result Specimen: Blood from Arm, Right Updated: 03/01/25 2115     Blood Culture No growth at 5 days    Wound Culture - Wound, Leg, Right [[id number]]  (Abnormal) Collected: 02/24/25 0830    Lab Status: Final result Specimen: Wound from Leg, Right Updated: 02/26/25 0855     Wound Culture Heavy growth (4+) Streptococcus pyogenes (Group A)     Gram Stain Many (4+) WBCs per low power field      Many (4+) Gram positive cocci in pairs and chains            Medication Review:       Schedule Meds  [Held by provider] apixaban, 5 mg, Oral, Q12H  budesonide-formoterol, 2 puff, Inhalation, BID - RT  cefTRIAXone, 2,000 mg, Intravenous, Q24H  heparin (porcine), 5,000 Units, Subcutaneous, Q8H  insulin glargine, 20 Units, Subcutaneous, Nightly  insulin lispro, 2-7 Units, Subcutaneous, 4x Daily AC & at Bedtime  insulin lispro, 8  Units, Subcutaneous, TID With Meals  metoprolol succinate XL, 25 mg, Oral, Q24H  pantoprazole, 40 mg, Oral, Q AM  sodium chloride, 10 mL, Intravenous, Q12H  sodium hypochlorite, , Topical, BID  [START ON 3/3/2025] vancomycin, 1,250 mg, Intravenous, Q12H        Infusion Meds  Pharmacy to dose vancomycin,         PRN Meds    acetaminophen **OR** acetaminophen **OR** acetaminophen    senna-docusate sodium **AND** polyethylene glycol **AND** bisacodyl **AND** bisacodyl    Calcium Replacement - Follow Nurse / BPA Driven Protocol    dextrose    dextrose    glucagon (human recombinant)    HYDROmorphone    Magnesium Standard Dose Replacement - Follow Nurse / BPA Driven Protocol    oxyCODONE **OR** oxyCODONE    Pharmacy to dose vancomycin    Phosphorus Replacement - Follow Nurse / BPA Driven Protocol    Potassium Replacement - Follow Nurse / BPA Driven Protocol    sodium chloride    sodium chloride    sodium chloride        Assessment & Plan       Antimicrobial Therapy   1.  IV ceftriaxone        2.  IV vancomycin        3.        4.        5.          Assessment     Right leg abscess of the posterior/inferior lower leg with an additional smaller abscess in the inferior aspect of the anterior compartment musculature.  CT did not show any findings consistent with osteomyelitis.  Initial cultures growing group A streptococcus and she is status post incision and drainage on 2/28/2025 with interoperative cultures growing both group B streptococcus and Staphylococcus aureus     History of right iliac thrombectomy on 12/19/2024, right iliofemoral popliteal thrombectomy, right femoral enterectomy with patch, close right calf fasciotomies on 12/20/2024 and completion of right for composite fasciotomies on 12/23/2024     COPD with tobacco abuse     Type 2 diabetes-most recent A1c is 15.8     Plan     Continue IV ceftriaxone 2 g every 24 hours  Continue IV vancomycin-asked pharmacy to monitor dose for now  Waiting on interoperative  cultures  Patient will need 2 weeks of antimicrobial therapy since surgery  Continue supportive care  A.m. labs  Tobacco abuse cessation  Patient will need to get her blood sugars under control to heal this infection and avoid future infection    Barbara Peña, APRN  03/02/25  15:09 EST    Note is dictated utilizing voice recognition software/Dragon

## 2025-03-02 NOTE — PLAN OF CARE
Goal Outcome Evaluation:         Pt is resting in bed. Able to make needs known. Dressing change done to RLE. Medicating pain per MAR. Continue IV abx. VSS. Plan of care ongoing.

## 2025-03-02 NOTE — PROGRESS NOTES
[facility] MEDICINE SERVICE  DAILY PROGRESS NOTE    NAME: Yarelis Jackson  : 1970  MRN: [id number]      LOS: 5 days     PROVIDER OF SERVICE: Nakul Keith MD    Chief Complaint: Cellulitis of right lower extremity    Subjective:     Interval History:    Patient seen and evaluated at bedside.   Patient lying in bed denies any chest pain nausea vomiting diarrhea.  Has had a bowel movement  Treatment plan discussed with patient. All questions addressed.     Review of Systems:   All 21 ROS were negative except mentioned above.    Objective:     Vital Signs  Temp:  [97.8 °F (36.6 °C)-98 °F (36.7 °C)] 98 °F (36.7 °C)  Heart Rate:  [75-88] 88  Resp:  [15-18] 16  BP: (110-127)/(70-76) 127/76   Body mass index is 28.32 kg/m².    Physical Exam   General: No acute distress, appears stated age  Neuro: Awake and alert, oriented x3, no focal deficits appreciated  Head: Atraumatic, normocephalic  HEENT: EOMI, anicteric, normal sclerae and conjunctivae, moist mucus membranes  Neck: supple, no lymphadenopathy  CV: RRR, soft heart sounds, no murmurs appreciated, no peripheral edema  Pulm: Decreased breath sounds, no increased work of breathing, no adventitious sounds  Abd: Soft, nontender, nondistended  Skin: Warm, dry and dressed right lower extremity  Psych: Appropriate mood and affect    Scheduled Meds   [Held by provider] apixaban, 5 mg, Oral, Q12H  budesonide-formoterol, 2 puff, Inhalation, BID - RT  cefTRIAXone, 2,000 mg, Intravenous, Q24H  heparin (porcine), 5,000 Units, Subcutaneous, Q8H  insulin glargine, 20 Units, Subcutaneous, Nightly  insulin lispro, 2-7 Units, Subcutaneous, 4x Daily AC & at Bedtime  insulin lispro, 8 Units, Subcutaneous, TID With Meals  metoprolol succinate XL, 25 mg, Oral, Q24H  pantoprazole, 40 mg, Oral, Q AM  sodium chloride, 10 mL, Intravenous, Q12H  sodium hypochlorite, , Topical, BID  vancomycin, 1,000 mg, Intravenous, Q12H       PRN Meds     acetaminophen **OR** acetaminophen  **OR** acetaminophen    senna-docusate sodium **AND** polyethylene glycol **AND** bisacodyl **AND** bisacodyl    Calcium Replacement - Follow Nurse / BPA Driven Protocol    dextrose    dextrose    glucagon (human recombinant)    HYDROmorphone    Magnesium Standard Dose Replacement - Follow Nurse / BPA Driven Protocol    oxyCODONE **OR** oxyCODONE    Pharmacy to dose vancomycin    Phosphorus Replacement - Follow Nurse / BPA Driven Protocol    Potassium Replacement - Follow Nurse / BPA Driven Protocol    sodium chloride    sodium chloride    sodium chloride   Infusions  Pharmacy to dose vancomycin,           Diagnostic Data    Results from last 7 days   Lab Units 03/02/25  0433 03/01/25  0511 02/25/25  0359 02/24/25  2056   WBC 10*3/mm3  --  11.32*   < > 11.57*   HEMOGLOBIN g/dL  --  9.4*   < > 11.2*   HEMATOCRIT %  --  30.1*   < > 35.5   PLATELETS 10*3/mm3  --  469*   < > 494*   GLUCOSE mg/dL 135*  --    < > 163*   CREATININE mg/dL 0.74 0.87   < > 0.65   BUN mg/dL 14  --    < > 12   SODIUM mmol/L 136  --    < > 131*   POTASSIUM mmol/L 3.8  --    < > 3.4*   AST (SGOT) U/L  --   --   --  15   ALT (SGPT) U/L  --   --   --  9   ALK PHOS U/L  --   --   --  131*   BILIRUBIN mg/dL  --   --   --  0.3   ANION GAP mmol/L 9.8  --    < > 12.4    < > = values in this interval not displayed.       No radiology results for the last day    Interval results reviewed.    Assessment/Plan:     Hypertension  Abscess of right lower extremity  Sepsis due to RLE cellulitis  Severe PAD  status post right iliac thrombectomy, right iliofemoral popliteal thrombectomy, right femoral endarterectomy   Intractable right leg pain   Type 2 diabetes mellitus    Patient underwent debridement and abscess I&D of right lower extremity on 2/28/2025  Wound culture positive for Staphylococcus aureus and group A Streptococcus pyogenes, anaerobic cultures pending.  Patient on ceftriaxone and vancomycin and ID recommended 2 weeks of antibiotic since last  surgery date.    12/19 - right iliac thrombectomy.    12/20 - right iliofemoral popliteal thrombectomy, right femoral endarterectomy with patch, right lower extremity arteriogram and close right calf fasciotomy.   12/23 - emergent 4 compartment fasciotomies.    Blood cultures no growth  ID and vascular surgery following the patient.  Appreciate ID input    Type 2 DM  Continue Lantus and SSI, blood sugar under good control     Chronic Anemia  Monitor daily CBC, transfuse as needed.  Hemoglobin stable     Thrombocytosis  Most likely due to acute phase reactant, anemia and due to infection, continue to monitor white blood count     Hypertension  Blood pressure better, continue metoprolol XL 25 mg daily      Treatment plan discussed with RN.     VTE Prophylaxis:  Pharmacologic VTE prophylaxis orders are present.         Code status is   Code Status and Medical Interventions: CPR (Attempt to Resuscitate); Full Support   Ordered at: 02/24/25 2166     Code Status (Patient has no pulse and is not breathing):    CPR (Attempt to Resuscitate)     Medical Interventions (Patient has pulse or is breathing):    Full Support       Plan for disposition:   Barriers to Discharge: anaerobic culture and arrangement for IV antibiotics  Anticipated Date of Discharge: 3/3/25  Place of Discharge: Home           Time: 40 minutes     Signature: Electronically signed by Nakul Keith MD, 03/02/25, 10:40 EST.  [facility] Hospitalist Team

## 2025-03-02 NOTE — PROGRESS NOTES
Name: Yarelis Jackson ADMIT: 2025   : 1970  PCP: Rohit Schrader DO    MRN: [id number] LOS: 5 days   AGE/SEX: 54 y.o. female  ROOM: [address] [place] [address]    54 y.o. female with infected right medial calf fasciotomy wound, post I&D.  Appears comfortable.  Dressing just changed.    Scheduled Medications:   [Held by provider] apixaban, 5 mg, Oral, Q12H  budesonide-formoterol, 2 puff, Inhalation, BID - RT  cefTRIAXone, 2,000 mg, Intravenous, Q24H  heparin (porcine), 5,000 Units, Subcutaneous, Q8H  insulin glargine, 20 Units, Subcutaneous, Nightly  insulin lispro, 2-7 Units, Subcutaneous, 4x Daily AC & at Bedtime  insulin lispro, 8 Units, Subcutaneous, TID With Meals  metoprolol succinate XL, 25 mg, Oral, Q24H  pantoprazole, 40 mg, Oral, Q AM  sodium chloride, 10 mL, Intravenous, Q12H  sodium hypochlorite, , Topical, BID  vancomycin, 1,000 mg, Intravenous, Q12H    Active Infusions:Pharmacy to dose vancomycin,     Vital Signs  Vital Signs Patient Vitals for the past 24 hrs:   BP Temp Temp src Pulse Resp SpO2   25 1156 123/75 97.6 °F (36.4 °C) Oral 80 18 97 %   25 0847 -- -- -- 88 16 95 %   25 0843 -- -- -- 88 16 95 %   25 0424 127/76 98 °F (36.7 °C) Oral 75 15 97 %   25 2100 113/70 97.9 °F (36.6 °C) Oral 80 16 98 %     I/O:  I/O last 3 completed shifts:  In: 1040 [P.O.:1040]  Out: -     CBC    Results from last 7 days   Lab Units 25  0511 25  0059 25  2256 25  0252 25  0359 25  2056   WBC 10*3/mm3 11.32* 7.33 6.17 6.75 10.54 11.57*   HEMOGLOBIN g/dL 9.4* 9.8* 10.0* 9.5* 9.1* 11.2*   PLATELETS 10*3/mm3 469* 458* 436 418 366 494*     BMP   Results from last 7 days   Lab Units 25  0433 25  0511 25  0059 25  1455 25  2256 25  0252 25  0359 25   SODIUM mmol/L 136  --  135*  --  136 138  --  131*   POTASSIUM mmol/L 3.8  --  4.6 4.2 3.5 3.8 4.0 3.4*   CHLORIDE mmol/L 102  --  102  --  102 105  --   94*   CO2 mmol/L 24.2  --  25.3  --  24.4 24.0  --  24.6   BUN mg/dL 14  --  8  --  9 9  --  12   CREATININE mg/dL 0.74 0.87 0.78  --  0.54* 0.54*  --  0.65   GLUCOSE mg/dL 135*  --  140*  --  177* 224*  --  163*     Cr Clearance Estimated Creatinine Clearance: 87 mL/min (by C-G formula based on SCr of 0.74 mg/dL).    Assessment & Plan   Assessment & Plan    Cellulitis of right lower extremity    Cellulitis of right leg    54 y.o. female with infected left right medial calf fasciotomy wound.  Cultures growing mostly MSSA and group A strep.  Continuing local wound care, but patient declining the type of dressing changes that we will be most effective in turning her wound around.  Encouraged.    Ghassan Celestin MD  03/02/25, 13:01 EST    Office contact: [phone]

## 2025-03-02 NOTE — CASE MANAGEMENT/SOCIAL WORK
Continued Stay Note  [facility]     Patient Name: Yarelis Jackson  MRN: [id number]  Today's Date: 3/2/2025    Admit Date: 2/24/2025    Plan: Plan for home with parent and [facility]; accepted. HH order per MD.   Discharge Plan       Row Name 03/02/25 1539       Plan    Plan Plan for home with parent and [facility]; accepted. HH order per MD.             Sydnie Valera RN BSN  Weekend   [facility]  Phone: [phone]  Fax: [phone]

## 2025-03-02 NOTE — PROGRESS NOTES
"Pharmacy Antimicrobial Dosing Service    Subjective:  Yarelis Jackson is a 54 y.o.female admitted with worsening pain and . Pharmacy has been consulted to dose Vancomycin for possible SSTI.    PMH: DM, PAD with recent right critical limb ischemia status post right iliac thrombectomy; recent hospitalization at [facility], I&D of right lower extremity       Assessment/Plan    1. Day #7 Vancomycin: Goal -600 mcg*h/mL. Current dose is vancomycin 1000 mg every 12 hours (13 mg/kg actual BW). Trough 3/2 at 1042= 13.8 mcg/ml for predicted AUC= 379 mcg*h/ml. Will adjust vancomycin 1250 mg every 12 hours (17 mg/kg actual BW) for predicted AUC= 473 mcg*h/ml. No follow levels currently ordered as vancomycin is scheduled to stop 3/4. I recommend a follow up level 3/5 if vancomycin is continued.     2. Day #2 Ceftriaxone: 2gm IV q24 hr.    Will continue to monitor drug levels, renal function, culture and sensitivities, and patient clinical status.       Objective:  Relevant clinical data and objective history reviewed:  162.6 cm (64\")   74.8 kg (165 lb)   Ideal body weight: 54.7 kg (120 lb 9.5 oz)  Adjusted ideal body weight: 62.8 kg (138 lb 5.7 oz)  Body mass index is 28.32 kg/m².    Results from last 7 days   Lab Units 03/02/25  1042 03/01/25  0511 02/27/25  1016 02/26/25  0913 02/26/25  0252   VANCOMYCIN PK mcg/mL  --   --   --   --  11.60*   VANCOMYCIN TR mcg/mL 13.80 27.50* 13.90 5.88  --      Results from last 7 days   Lab Units 03/02/25  0433 03/01/25  0511 02/28/25  0059   CREATININE mg/dL 0.74 0.87 0.78     Estimated Creatinine Clearance: 87 mL/min (by C-G formula based on SCr of 0.74 mg/dL).  I/O last 3 completed shifts:  In: 1040 [P.O.:1040]  Out: -     Results from last 7 days   Lab Units 03/01/25  0511 02/28/25  0059 02/26/25  2256   WBC 10*3/mm3 11.32* 7.33 6.17     Temperature    03/01/25 2100 03/02/25 0424 03/02/25 1156   Temp: 97.9 °F (36.6 °C) 98 °F (36.7 °C) 97.6 °F (36.4 °C)     Baseline " culture/source/susceptibility:  Microbiology Results (last 10 days)       Procedure Component Value - Date/Time    Wound Culture - Swab, Leg, Right [[id number]]  (Abnormal) Collected: 02/28/25 0743    Lab Status: Preliminary result Specimen: Swab from Leg, Right Updated: 03/02/25 0918     Wound Culture Rare growth Staphylococcus aureus      Rare growth Streptococcus pyogenes (Group A)     Comment:   This organism is considered to be universally susceptible to penicillin.  No further antibiotic testing will be performed. If Clindamycin or Erythromycin is the drug of choice, notify the laboratory within 7 days to request susceptibility testing.        Gram Stain Many (4+) WBCs per low power field      Rare (1+) Gram positive cocci    Blood Culture - Blood, Arm, Left [[id number]]  (Normal) Collected: 02/24/25 2056    Lab Status: Final result Specimen: Blood from Arm, Left Updated: 03/01/25 2115     Blood Culture No growth at 5 days    Blood Culture - Blood, Arm, Right [[id number]]  (Normal) Collected: 02/24/25 2056    Lab Status: Final result Specimen: Blood from Arm, Right Updated: 03/01/25 2115     Blood Culture No growth at 5 days    Wound Culture - Wound, Leg, Right [[id number]]  (Abnormal) Collected: 02/24/25 0830    Lab Status: Final result Specimen: Wound from Leg, Right Updated: 02/26/25 0855     Wound Culture Heavy growth (4+) Streptococcus pyogenes (Group A)     Gram Stain Many (4+) WBCs per low power field      Many (4+) Gram positive cocci in pairs and chains            Geeta Mclaughlin, PharmD  03/02/25 15:06 EST

## 2025-03-03 ENCOUNTER — DOCUMENTATION (OUTPATIENT)
Dept: HOME HEALTH SERVICES | Facility: HOME HEALTHCARE | Age: 55
End: 2025-03-03
Payer: MEDICAID

## 2025-03-03 ENCOUNTER — READMISSION MANAGEMENT (OUTPATIENT)
Dept: CALL CENTER | Facility: HOSPITAL | Age: 55
End: 2025-03-03
Payer: MEDICAID

## 2025-03-03 VITALS
BODY MASS INDEX: 28.17 KG/M2 | HEART RATE: 84 BPM | DIASTOLIC BLOOD PRESSURE: 90 MMHG | SYSTOLIC BLOOD PRESSURE: 147 MMHG | OXYGEN SATURATION: 96 % | HEIGHT: 64 IN | RESPIRATION RATE: 19 BRPM | WEIGHT: 165 LBS | TEMPERATURE: 98 F

## 2025-03-03 LAB
BACTERIA SPEC AEROBE CULT: ABNORMAL
BACTERIA SPEC AEROBE CULT: ABNORMAL
GLUCOSE BLDC GLUCOMTR-MCNC: 123 MG/DL (ref 70–105)
GLUCOSE BLDC GLUCOMTR-MCNC: 150 MG/DL (ref 70–105)
GRAM STN SPEC: ABNORMAL
GRAM STN SPEC: ABNORMAL

## 2025-03-03 PROCEDURE — C1751 CATH, INF, PER/CENT/MIDLINE: HCPCS

## 2025-03-03 PROCEDURE — 82948 REAGENT STRIP/BLOOD GLUCOSE: CPT | Performed by: STUDENT IN AN ORGANIZED HEALTH CARE EDUCATION/TRAINING PROGRAM

## 2025-03-03 PROCEDURE — 25010000002 CEFTRIAXONE PER 250 MG: Performed by: NURSE PRACTITIONER

## 2025-03-03 PROCEDURE — 25010000002 HYDROMORPHONE 1 MG/ML SOLUTION: Performed by: STUDENT IN AN ORGANIZED HEALTH CARE EDUCATION/TRAINING PROGRAM

## 2025-03-03 PROCEDURE — 25010000002 HEPARIN (PORCINE) PER 1000 UNITS: Performed by: STUDENT IN AN ORGANIZED HEALTH CARE EDUCATION/TRAINING PROGRAM

## 2025-03-03 PROCEDURE — 36410 VNPNXR 3YR/> PHY/QHP DX/THER: CPT

## 2025-03-03 PROCEDURE — 94761 N-INVAS EAR/PLS OXIMETRY MLT: CPT

## 2025-03-03 PROCEDURE — 94799 UNLISTED PULMONARY SVC/PX: CPT

## 2025-03-03 PROCEDURE — 99024 POSTOP FOLLOW-UP VISIT: CPT | Performed by: NURSE PRACTITIONER

## 2025-03-03 PROCEDURE — 94664 DEMO&/EVAL PT USE INHALER: CPT

## 2025-03-03 PROCEDURE — 05H933Z INSERTION OF INFUSION DEVICE INTO RIGHT BRACHIAL VEIN, PERCUTANEOUS APPROACH: ICD-10-PCS | Performed by: INTERNAL MEDICINE

## 2025-03-03 RX ORDER — SODIUM CHLORIDE 9 MG/ML
40 INJECTION, SOLUTION INTRAVENOUS AS NEEDED
Status: DISCONTINUED | OUTPATIENT
Start: 2025-03-03 | End: 2025-03-03 | Stop reason: HOSPADM

## 2025-03-03 RX ORDER — OXYCODONE HYDROCHLORIDE 10 MG/1
10 TABLET ORAL EVERY 4 HOURS PRN
Qty: 16 TABLET | Refills: 0 | Status: SHIPPED | OUTPATIENT
Start: 2025-03-03 | End: 2025-03-07

## 2025-03-03 RX ORDER — SODIUM CHLORIDE 0.9 % (FLUSH) 0.9 %
10 SYRINGE (ML) INJECTION AS NEEDED
Status: DISCONTINUED | OUTPATIENT
Start: 2025-03-03 | End: 2025-03-03 | Stop reason: HOSPADM

## 2025-03-03 RX ORDER — BUDESONIDE AND FORMOTEROL FUMARATE DIHYDRATE 160; 4.5 UG/1; UG/1
2 AEROSOL RESPIRATORY (INHALATION)
Qty: 10.2 G | Refills: 12 | Status: SHIPPED | OUTPATIENT
Start: 2025-03-03 | End: 2025-04-02

## 2025-03-03 RX ORDER — SODIUM CHLORIDE 0.9 % (FLUSH) 0.9 %
10 SYRINGE (ML) INJECTION EVERY 12 HOURS SCHEDULED
Status: DISCONTINUED | OUTPATIENT
Start: 2025-03-03 | End: 2025-03-03 | Stop reason: HOSPADM

## 2025-03-03 RX ORDER — METOPROLOL SUCCINATE 25 MG/1
25 TABLET, EXTENDED RELEASE ORAL
Qty: 30 TABLET | Refills: 0 | Status: SHIPPED | OUTPATIENT
Start: 2025-03-04 | End: 2025-04-03

## 2025-03-03 RX ORDER — BUDESONIDE AND FORMOTEROL FUMARATE DIHYDRATE 160; 4.5 UG/1; UG/1
2 AEROSOL RESPIRATORY (INHALATION)
Qty: 1 EACH | Refills: 12 | Status: CANCELLED | OUTPATIENT
Start: 2025-03-03 | End: 2025-04-02

## 2025-03-03 RX ORDER — METOPROLOL SUCCINATE 25 MG/1
25 TABLET, EXTENDED RELEASE ORAL
Qty: 30 TABLET | Refills: 0 | Status: CANCELLED | OUTPATIENT
Start: 2025-03-04

## 2025-03-03 RX ORDER — SODIUM HYPOCHLORITE 1.25 MG/ML
1 SOLUTION TOPICAL 2 TIMES DAILY
Qty: 473 ML | Refills: 0 | Status: SHIPPED | OUTPATIENT
Start: 2025-03-03 | End: 2025-03-04 | Stop reason: SDUPTHER

## 2025-03-03 RX ORDER — SODIUM HYPOCHLORITE 1.25 MG/ML
1 SOLUTION TOPICAL 2 TIMES DAILY
Qty: 28380 ML | Refills: 0 | Status: CANCELLED | OUTPATIENT
Start: 2025-03-03 | End: 2025-04-02

## 2025-03-03 RX ADMIN — HYDROMORPHONE HYDROCHLORIDE 0.5 MG: 1 INJECTION, SOLUTION INTRAMUSCULAR; INTRAVENOUS; SUBCUTANEOUS at 10:13

## 2025-03-03 RX ADMIN — METOPROLOL SUCCINATE 25 MG: 25 TABLET, EXTENDED RELEASE ORAL at 08:40

## 2025-03-03 RX ADMIN — OXYCODONE 10 MG: 5 TABLET ORAL at 03:26

## 2025-03-03 RX ADMIN — HYDROMORPHONE HYDROCHLORIDE 0.5 MG: 1 INJECTION, SOLUTION INTRAMUSCULAR; INTRAVENOUS; SUBCUTANEOUS at 00:04

## 2025-03-03 RX ADMIN — OXYCODONE 10 MG: 5 TABLET ORAL at 13:24

## 2025-03-03 RX ADMIN — PANTOPRAZOLE SODIUM 40 MG: 40 TABLET, DELAYED RELEASE ORAL at 05:06

## 2025-03-03 RX ADMIN — BUDESONIDE AND FORMOTEROL FUMARATE DIHYDRATE 2 PUFF: 160; 4.5 AEROSOL RESPIRATORY (INHALATION) at 07:38

## 2025-03-03 RX ADMIN — HEPARIN SODIUM 5000 UNITS: 5000 INJECTION INTRAVENOUS; SUBCUTANEOUS at 05:06

## 2025-03-03 RX ADMIN — OXYCODONE 10 MG: 5 TABLET ORAL at 08:40

## 2025-03-03 RX ADMIN — DAKIN'S SOLUTION 0.125% (QUARTER STRENGTH): 0.12 SOLUTION at 00:46

## 2025-03-03 RX ADMIN — DAKIN'S SOLUTION 0.125% (QUARTER STRENGTH): 0.12 SOLUTION at 08:42

## 2025-03-03 RX ADMIN — CEFTRIAXONE 2000 MG: 2 INJECTION, POWDER, FOR SOLUTION INTRAMUSCULAR; INTRAVENOUS at 13:26

## 2025-03-03 RX ADMIN — Medication 10 ML: at 09:35

## 2025-03-03 RX ADMIN — HYDROMORPHONE HYDROCHLORIDE 0.5 MG: 1 INJECTION, SOLUTION INTRAMUSCULAR; INTRAVENOUS; SUBCUTANEOUS at 05:06

## 2025-03-03 NOTE — DISCHARGE SUMMARY
"             [facility] Medicine Services  Discharge Summary    Date of Service: 3/3/2025  Patient Name: Yarelis Jackson  : 1970  MRN: [id number]    Date of Admission: 2025  Discharge Diagnosis: Cellulitis of right lower extremity  Date of Discharge: 3/3/2025  Primary Care Physician: Rohit Schrader DO    Presenting Problem:   Cellulitis of right leg [L03.115]  History of fasciotomy [Z98.890]  Cellulitis of right lower extremity [L03.115]    Active and Resolved Hospital Problems:  Active Hospital Problems    Diagnosis POA    **Cellulitis of right lower extremity [L03.115] Yes    Cellulitis of right leg [L03.115] Yes      Resolved Hospital Problems   No resolved problems to display.         Hospital Course     HPI:  \"Yarelis Jackson is a 54 y.o. female with a PMH of COPD, GERD, Hypertension, type 2 DM, PAD with recent right critical limb ischemia status post right iliac thrombectomy, right iliofemoral popliteal thrombectomy, right femoral endarterectomy, ongoing smoking, she was hospitalized from  through 25 and was seen in follow up by vascular surgery 6 days ago for follow up of fasciotomy wounds apparently she was doing well  who presented to [facility] on 2025 with worsening pain and swelling with erythema since past couple of days, started having subjective fever and chills earlier today. In ED she is noted to have fever temp 101.4, tachycardic 116 x min. CBC labs notable for Hb 11.2, wbc 11.57. The decision to admit was made. \"    Hospital Course:    Patient was admitted to the hospital.  She received normal saline bolus.   - right iliac thrombectomy.  - right iliofemoral popliteal thrombectomy, right femoral endarterectomy with patch, right lower extremity arteriogram and close right calf fasciotomy.  - emergent 4 compartment fasciotomies.    Patient was seen in follow up by vascular surgery on , it was felt that there was no indication for " infection and recommended continue wound care.    Patient was started on IV vancomycin and cefepime, later cefepime changed to Zosyn.  Patient Eliquis was held and was given heparin subcu for DVT prophylaxis.  Vascular surgery was consulted.    Wound care also evaluated the patient and  there was significant purulence draining from tunneled area within the medial shadowing site.  Vascular surgery obtained CT right lower extremity with contrast which showed abscess.  Patient was taken to the OR and I&D of the abscess was done by vascular surgery.    ID was also consulted and Zosyn was discontinued due to high risk of acute kidney injury.  Started on IV ceftriaxone 2 g every 24 hours and IV vancomycin IV every 12 hours, pharmacy monitored doses. vancomycin was discontinued on 3/3/2025.    ID recommended that patient needs 2 weeks of IV antibiotic with ceftriaxone since surgery.      Patient was advised to quit smoking    DISCHARGE Follow Up Recommendations for labs and diagnostics:   Follow-up with PCP in 2 to 3 days  Follow-up with vascular surgery in 2 weeks  Reasons For Change In Medications and Indications for New Medications:  Ceftriaxone 2 g IV every 24 hours  Narcotics  Metoprolol XL 25 mg daily  Vital Signs:  Temp:  [97.7 °F (36.5 °C)-98.1 °F (36.7 °C)] 98 °F (36.7 °C)  Heart Rate:  [82-91] 84  Resp:  [17-19] 19  BP: (116-148)/(75-90) 147/90    Physical Exam:  Vitals and nursing note reviewed.   Constitutional:       Appearance: Normal appearance.   HENT:      Head: Normocephalic and atraumatic.   Cardiovascular:      Rate and Rhythm: Normal rate and rhythm.      Heart sounds: Normal heart sounds.   Pulmonary:      Effort: Pulmonary effort is normal.      Breath sounds: Decreased breath sounds.   Abdominal:      Palpations: Abdomen is soft.   Musculoskeletal:      Cervical back: Neck supple.  Right lower extremity is dressed  Neurological:      Mental Status: Mental status is at baseline.     Pertinent  and/or  Most Recent Results     LAB RESULTS:      Lab 03/02/25 2252 03/01/25  0511 02/28/25  0059 02/26/25 2256 02/26/25  0331 02/26/25  0252 02/25/25  0359 02/24/25  2104   WBC 9.76 11.32* 7.33 6.17  --  6.75 10.54  --    HEMOGLOBIN 9.4* 9.4* 9.8* 10.0*  --  9.5* 9.1*  --    HEMATOCRIT 30.5* 30.1* 31.4* 32.8*  --  30.8* 29.0*  --    PLATELETS 513* 469* 458* 436  --  418 366  --    NEUTROS ABS 5.37 6.66 4.91 3.49  --  4.20 7.69*  --    IMMATURE GRANS (ABS) 0.10* 0.09*  --  0.02  --  0.03 0.04  --    LYMPHS ABS 3.36* 3.52*  --  1.99  --  1.94 1.82  --    MONOS ABS 0.61 0.95*  --  0.41  --  0.44 0.90  --    EOS ABS 0.27 0.06 0.15 0.21  --  0.11 0.06  --    MCV 96.5 94.4 94.6 96.5  --  95.4 95.1  --    LACTATE  --   --   --   --   --   --   --  2.0   PROTIME  --   --  14.9* 15.4*  --   --  19.3*  --    APTT  --   --   --   --  37.3* 36.7* 37.2*  --          Lab 03/02/25 2252 03/02/25  0433 03/01/25  0511 02/28/25 0059 02/27/25  1455 02/26/25 2256 02/26/25  0252   SODIUM 134* 136  --  135*  --  136 138   POTASSIUM 3.9 3.8  --  4.6 4.2 3.5 3.8   CHLORIDE 100 102  --  102  --  102 105   CO2 24.9 24.2  --  25.3  --  24.4 24.0   ANION GAP 9.1 9.8  --  7.7  --  9.6 9.0   BUN 17 14  --  8  --  9 9   CREATININE 0.74 0.74 0.87 0.78  --  0.54* 0.54*   EGFR 96.9 96.9 79.8 91.0  --  110.2 110.2   GLUCOSE 154* 135*  --  140*  --  177* 224*   CALCIUM 9.8 9.7  --  9.5  --  9.2 9.2   HEMOGLOBIN A1C  --   --  7.44*  --   --   --   --          Lab 02/24/25 2056   TOTAL PROTEIN 8.8*   ALBUMIN 3.7   GLOBULIN 5.1   ALT (SGPT) 9   AST (SGOT) 15   BILIRUBIN 0.3   ALK PHOS 131*         Lab 02/28/25  0059 02/26/25  2256 02/25/25  0359   PROTIME 14.9* 15.4* 19.3*   INR 1.17* 1.22* 1.62*             Lab 02/26/25  2256   ABO TYPING O   RH TYPING Positive   ANTIBODY SCREEN Negative         Brief Urine Lab Results  (Last result in the past 365 days)        Color   Clarity   Blood   Leuk Est   Nitrite   Protein   CREAT   Urine HCG        12/18/24 6149  Yellow   Clear   Moderate (2+)   Negative   Negative   100 mg/dL (2+)                 Microbiology Results (last 10 days)       Procedure Component Value - Date/Time    Anaerobic Culture - Swab, Leg, Right [[id number]]  (Normal) Collected: 02/28/25 0743    Lab Status: Preliminary result Specimen: Swab from Leg, Right Updated: 03/03/25 1017     Anaerobic Culture Screening for Anaerobes    Wound Culture - Swab, Leg, Right [[id number]]  (Abnormal)  (Susceptibility) Collected: 02/28/25 0743    Lab Status: Final result Specimen: Swab from Leg, Right Updated: 03/03/25 1010     Wound Culture Rare growth Staphylococcus aureus      Rare growth Streptococcus pyogenes (Group A)     Comment:   This organism is considered to be universally susceptible to penicillin.  No further antibiotic testing will be performed. If Clindamycin or Erythromycin is the drug of choice, notify the laboratory within 7 days to request susceptibility testing.        Gram Stain Many (4+) WBCs per low power field      Rare (1+) Gram positive cocci    Susceptibility        Staphylococcus aureus      RENETTA      Clindamycin Susceptible      Erythromycin Resistant      Oxacillin Susceptible      Rifampin Susceptible      Tetracycline Susceptible      Trimethoprim + Sulfamethoxazole Susceptible      Vancomycin Susceptible                           Blood Culture - Blood, Arm, Left [[id number]]  (Normal) Collected: 02/24/25 2056    Lab Status: Final result Specimen: Blood from Arm, Left Updated: 03/01/25 2115     Blood Culture No growth at 5 days    Blood Culture - Blood, Arm, Right [[id number]]  (Normal) Collected: 02/24/25 2056    Lab Status: Final result Specimen: Blood from Arm, Right Updated: 03/01/25 2115     Blood Culture No growth at 5 days    Wound Culture - Wound, Leg, Right [[id number]]  (Abnormal) Collected: 02/24/25 0830    Lab Status: Final result Specimen: Wound from Leg, Right Updated: 02/26/25 0855     Wound Culture Heavy growth (4+) Streptococcus  pyogenes (Group A)     Gram Stain Many (4+) WBCs per low power field      Many (4+) Gram positive cocci in pairs and chains            CT Lower Extremity Right With Contrast    Result Date: 2/27/2025  Impression: Impression: 1.Large rim-enhancing fluid and gas collection in the deep soft tissues of the posterior-inferior lower leg consistent with abscess. There appears to be a tract extending to a medial wound. The abscess is in the inferior posterior compartment musculature  with a smaller rim-enhancing component extending superiorly in the deep musculature to the mid lower leg. 2.Additional smaller abscess in the inferior aspect of the anterior compartment musculature with rim-enhancing tracts extending to a lateral wound. 3.Diffuse subcutaneous edema with skin thickening which may indicate cellulitis. 4.No definite CT findings of osteomyelitis at this time. Electronically Signed: Sam Haynes  2/27/2025 9:02 AM EST  Workstation ID: [id number]     Results for orders placed during the hospital encounter of 12/18/24    Duplex Lower Extremity Art / Grafts - Right CAR    Interpretation Summary    Limited study due to body habitus and current dressing site.  Patent external iliac and femoral-popliteal arterial flow with low flow noted below the common femoral.  Common femoral artery poorly visualized.  Cannot rule out occlusion of the common femoral artery.      Results for orders placed during the hospital encounter of 12/18/24    Duplex Lower Extremity Art / Grafts - Right CAR    Interpretation Summary    Limited study due to body habitus and current dressing site.  Patent external iliac and femoral-popliteal arterial flow with low flow noted below the common femoral.  Common femoral artery poorly visualized.  Cannot rule out occlusion of the common femoral artery.      Results for orders placed during the hospital encounter of 12/18/24    Adult Transthoracic Echo Complete w/ Color, Spectral and Contrast if Necessary  Per Protocol    Interpretation Summary    Left ventricular systolic function is normal. Left ventricular ejection fraction appears to be 56 - 60%.    Left ventricular diastolic function was normal.    No significant valvular disease.    Estimated right ventricular systolic pressure from tricuspid regurgitation is normal (<35 mmHg).    Electronically signed by Raúl Madrid MD, 12/20/24, 1:26 PM EST.      Labs Pending at Discharge:  Pending Results       None            Procedures Performed  Procedure(s):  INCISION AND DRAINAGE RIGHT LEG       Consults:   Consults       Date and Time Order Name Status Description    3/1/2025 10:03 AM Inpatient Infectious Diseases Consult Completed     2/25/2025  1:33 AM Inpatient Vascular Surgery Consult Completed             Discharge Details        Discharge Medications        New Medications        Instructions Start Date   cefTRIAXone 2,000 mg in sodium chloride 0.9 % 100 mL IVPB   2,000 mg, Intravenous, Every 24 Hours      metoprolol succinate XL 25 MG 24 hr tablet  Commonly known as: TOPROL-XL   25 mg, Oral, Every 24 Hours Scheduled   Start Date: March 4, 2025     sodium hypochlorite 0.125 % solution topical solution 0.125%  Commonly known as: DAKIN'S 1/4 STRENGTH   473 mL, Topical, 2 Times Daily             Changes to Medications        Instructions Start Date   oxyCODONE 10 MG tablet  Commonly known as: ROXICODONE  What changed:   medication strength  how much to take  reasons to take this   10 mg, Oral, Every 4 Hours PRN             Continue These Medications        Instructions Start Date   budesonide-formoterol 160-4.5 MCG/ACT inhaler  Commonly known as: Symbicort   2 puffs, Inhalation, 2 Times Daily - RT      Eliquis 5 MG tablet tablet  Generic drug: apixaban   5 mg, Every 12 Hours Scheduled      Insulin Lispro (1 Unit Dial) 100 UNIT/ML solution pen-injector  Commonly known as: HumaLOG KwikPen   9 units with each with each meal with sliding scale  not more than 15 units with each meal      Lantus SoloStar 100 UNIT/ML injection pen  Generic drug: Insulin Glargine   22 Units, Subcutaneous, Nightly      naloxone 4 MG/0.1ML nasal spray  Commonly known as: NARCAN   Call 911. Don't prime. [place] in 1 nostril for overdose. Repeat in 2-3 minutes in other nostril if no or minimal breathing/responsiveness.      pantoprazole 40 MG EC tablet  Commonly known as: PROTONIX   40 mg      sodium chloride 0.9 % irrigation  Commonly known as: NS   0.033 mL, Once             Stop These Medications      doxycycline 100 MG capsule  Commonly known as: MONODOX              Allergies   Allergen Reactions    Aspirin GI Intolerance    Ibuprofen Itching       Discharge Disposition:   Skilled Nursing Facility ([facility] - External)    Diet:  Diet Instructions       Diet: Cardiac Diets, Diabetic Diets; Low Sodium (2g); Regular (IDDSI 7); Thin (IDDSI 0); Consistent Carbohydrate      Discharge Diet:  Cardiac Diets  Diabetic Diets       Cardiac Diet: Low Sodium (2g)    Texture: Regular (IDDSI 7)    Fluid Consistency: Thin (IDDSI 0)    Diabetic Diet: Consistent Carbohydrate    Diet: Diabetic Diets, Cardiac Diets; Low Sodium (2g); Regular (IDDSI 7); Thin (IDDSI 0); Consistent Carbohydrate      Discharge Diet:  Diabetic Diets  Cardiac Diets       Cardiac Diet: Low Sodium (2g)    Texture: Regular (IDDSI 7)    Fluid Consistency: Thin (IDDSI 0)    Diabetic Diet: Consistent Carbohydrate            Discharge Activity:   Activity Instructions       Activity as Tolerated      Activity as Tolerated              CODE STATUS:  Code Status and Medical Interventions: CPR (Attempt to Resuscitate); Full Support   Ordered at: 02/24/25 0546     Code Status (Patient has no pulse and is not breathing):    CPR (Attempt to Resuscitate)     Medical Interventions (Patient has pulse or is breathing):    Full Support       Future Appointments   Date Time Provider Department Center   3/11/2025 11:15 AM Ghassan Celestin MD  [facility]   3/24/2025  3:00 PM Nargis Schrader,  [facility]       Additional Instructions for the Follow-ups that You Need to Schedule       Ambulatory Referral to Home Health   As directed      Face to Face Visit Date: 3/3/2025   Follow-up provider for Plan of Care?: I treated the patient in an acute care facility and will not continue treatment after discharge.   Follow-up provider: NARGIS SCHRADER [[id number]]   Reason/Clinical Findings: Dressing changes and antibiotic infusion   Describe mobility limitations that make leaving home difficult: debility   Nursing/Therapeutic Services Requested: Skilled Nursing Physical Therapy   Skilled nursing orders: Wound care dressing/changes Other   PT orders: Therapeutic exercise Gait Training Transfer training Strengthening Home safety assessment   Weight Bearing Status: As Tolerated   Frequency: 1 Week 1        Ambulatory Referral to Home Health   As directed      Face to Face Visit Date: 3/3/2025   Follow-up provider for Plan of Care?: I treated the patient in an acute care facility and will not continue treatment after discharge.   Follow-up provider: NARGIS SCHRADER [[id number]]   Reason/Clinical Findings: IV antibiotics and PT   Describe mobility limitations that make leaving home difficult: debility   Nursing/Therapeutic Services Requested: Skilled Nursing Physical Therapy   Skilled nursing orders: Wound care dressing/changes Infusion therapy   PT orders: Total joint pathway Therapeutic exercise Gait Training Transfer training Strengthening Home safety assessment   Weight Bearing Status: As Tolerated   Frequency: 1 Week 1        Discharge Follow-up with PCP   As directed       Currently Documented PCP:    Nargis Schrader DO    PCP Phone Number:    [phone]     Follow Up Details: 2-3 days        Discharge Follow-up with PCP   As directed       Currently Documented PCP:    Nargis Schrader DO    PCP Phone Number:    [phone]     Follow Up Details: 2-3 d         Discharge Follow-up with Specified Provider: Vascular surgery; 2 Weeks   As directed      To: Vascular surgery   Follow Up: 2 Weeks   Follow Up Details: follow-up on right lower extremity wound        Discharge Follow-up with Specified Provider: vascular surgery; 2 Weeks   As directed      To: vascular surgery   Follow Up: 2 Weeks   Follow Up Details: Follow-up on right lower extremity wound                Time spent on Discharge including face to face service:  >30 minutes    Signature: Electronically signed by Nakul Keith MD, 03/03/25, 12:38 EST.  [facility]ist [facility]

## 2025-03-03 NOTE — PROGRESS NOTES
Infectious Diseases Progress Note      LOS: 6 days   Patient Care Team:  Rohit Schrader DO as PCP - General (Internal Medicine)  Uli Starr MD as Consulting Physician (Nephrology)    Chief Complaint: Pain and swelling to the right leg    Subjective       The patient has been afebrile for the last 24 hours.  The patient is on room air, hemodynamically stable, and is tolerating antimicrobial therapy..  Feeling better today      Review of Systems:   Review of Systems   Constitutional: Negative.    HENT: Negative.     Eyes: Negative.    Respiratory: Negative.     Cardiovascular:  Positive for leg swelling.   Gastrointestinal: Negative.    Endocrine: Negative.    Genitourinary: Negative.    Musculoskeletal: Negative.    Skin:  Positive for color change.   Neurological: Negative.    Psychiatric/Behavioral: Negative.     All other systems reviewed and are negative.       Objective     Vital Signs  Temp:  [97.7 °F (36.5 °C)-98.1 °F (36.7 °C)] 98 °F (36.7 °C)  Heart Rate:  [82-91] 84  Resp:  [17-19] 19  BP: (116-148)/(75-90) 147/90    Physical Exam:  Physical Exam  Vitals and nursing note reviewed.   Constitutional:       General: She is not in acute distress.     Appearance: She is well-developed and normal weight. She is ill-appearing. She is not diaphoretic.   HENT:      Head: Normocephalic and atraumatic.   Eyes:      General: No scleral icterus.     Extraocular Movements: Extraocular movements intact.      Conjunctiva/sclera: Conjunctivae normal.      Pupils: Pupils are equal, round, and reactive to light.   Cardiovascular:      Rate and Rhythm: Normal rate and regular rhythm.      Heart sounds: Normal heart sounds, S1 normal and S2 normal. No murmur heard.  Pulmonary:      Effort: Pulmonary effort is normal. No respiratory distress.      Breath sounds: Normal breath sounds. No stridor. No wheezing or rales.   Chest:      Chest wall: No tenderness.   Abdominal:      General: Bowel sounds are normal.  There is no distension.      Palpations: Abdomen is soft. There is no mass.      Tenderness: There is no abdominal tenderness. There is no guarding.   Musculoskeletal:         General: No swelling, tenderness or deformity.      Cervical back: Neck supple.   Skin:     General: Skin is warm and dry.      Coloration: Skin is not pale.      Findings: No bruising, erythema or rash.      Comments:  Currently with dressing from the right foot to below the knee.  No obvious erythema warmth streaking above the knee    Neurological:      Mental Status: She is alert and oriented to person, place, and time.   Psychiatric:         Mood and Affect: Mood normal.          Results Review:    I have reviewed all clinical data, test, lab, and imaging results.     Radiology  No Radiology Exams Resulted Within Past 24 Hours    Cardiology    Laboratory    Results from last 7 days   Lab Units 03/02/25 2252 03/01/25  0511 02/28/25  0059 02/26/25 2256 02/26/25  0252 02/25/25 0359 02/24/25 2056   WBC 10*3/mm3 9.76 11.32* 7.33 6.17 6.75 10.54 11.57*   HEMOGLOBIN g/dL 9.4* 9.4* 9.8* 10.0* 9.5* 9.1* 11.2*   HEMATOCRIT % 30.5* 30.1* 31.4* 32.8* 30.8* 29.0* 35.5   PLATELETS 10*3/mm3 513* 469* 458* 436 418 366 494*     Results from last 7 days   Lab Units 03/02/25 2252 03/02/25  0433 03/01/25  0511 02/28/25 0059 02/27/25  1455 02/26/25 2256 02/26/25  0252 02/25/25  0359 02/24/25 2056   SODIUM mmol/L 134* 136  --  135*  --  136 138  --  131*   POTASSIUM mmol/L 3.9 3.8  --  4.6 4.2 3.5 3.8 4.0 3.4*   CHLORIDE mmol/L 100 102  --  102  --  102 105  --  94*   CO2 mmol/L 24.9 24.2  --  25.3  --  24.4 24.0  --  24.6   BUN mg/dL 17 14  --  8  --  9 9  --  12   CREATININE mg/dL 0.74 0.74 0.87 0.78  --  0.54* 0.54*  --  0.65   GLUCOSE mg/dL 154* 135*  --  140*  --  177* 224*  --  163*   ALBUMIN g/dL  --   --   --   --   --   --   --   --  3.7   BILIRUBIN mg/dL  --   --   --   --   --   --   --   --  0.3   ALK PHOS U/L  --   --   --   --   --   --    --   --  131*   AST (SGOT) U/L  --   --   --   --   --   --   --   --  15   ALT (SGPT) U/L  --   --   --   --   --   --   --   --  9   CALCIUM mg/dL 9.8 9.7  --  9.5  --  9.2 9.2  --  9.8                 Microbiology   Microbiology Results (last 10 days)       Procedure Component Value - Date/Time    Anaerobic Culture - Swab, Leg, Right [[id number]]  (Normal) Collected: 02/28/25 0743    Lab Status: Preliminary result Specimen: Swab from Leg, Right Updated: 03/03/25 1017     Anaerobic Culture Screening for Anaerobes    Wound Culture - Swab, Leg, Right [[id number]]  (Abnormal)  (Susceptibility) Collected: 02/28/25 0743    Lab Status: Final result Specimen: Swab from Leg, Right Updated: 03/03/25 1010     Wound Culture Rare growth Staphylococcus aureus      Rare growth Streptococcus pyogenes (Group A)     Comment:   This organism is considered to be universally susceptible to penicillin.  No further antibiotic testing will be performed. If Clindamycin or Erythromycin is the drug of choice, notify the laboratory within 7 days to request susceptibility testing.        Gram Stain Many (4+) WBCs per low power field      Rare (1+) Gram positive cocci    Susceptibility        Staphylococcus aureus      RENETTA      Clindamycin Susceptible      Erythromycin Resistant      Oxacillin Susceptible      Rifampin Susceptible      Tetracycline Susceptible      Trimethoprim + Sulfamethoxazole Susceptible      Vancomycin Susceptible                           Blood Culture - Blood, Arm, Left [[id number]]  (Normal) Collected: 02/24/25 2056    Lab Status: Final result Specimen: Blood from Arm, Left Updated: 03/01/25 2115     Blood Culture No growth at 5 days    Blood Culture - Blood, Arm, Right [[id number]]  (Normal) Collected: 02/24/25 2056    Lab Status: Final result Specimen: Blood from Arm, Right Updated: 03/01/25 2115     Blood Culture No growth at 5 days    Wound Culture - Wound, Leg, Right [[id number]]  (Abnormal) Collected: 02/24/25 0830     Lab Status: Final result Specimen: Wound from Leg, Right Updated: 02/26/25 0855     Wound Culture Heavy growth (4+) Streptococcus pyogenes (Group A)     Gram Stain Many (4+) WBCs per low power field      Many (4+) Gram positive cocci in pairs and chains            Medication Review:       Schedule Meds  apixaban, 5 mg, Oral, Q12H  budesonide-formoterol, 2 puff, Inhalation, BID - RT  cefTRIAXone, 2,000 mg, Intravenous, Q24H  insulin glargine, 20 Units, Subcutaneous, Nightly  insulin lispro, 2-7 Units, Subcutaneous, 4x Daily AC & at Bedtime  insulin lispro, 8 Units, Subcutaneous, TID With Meals  metoprolol succinate XL, 25 mg, Oral, Q24H  pantoprazole, 40 mg, Oral, Q AM  sodium chloride, 10 mL, Intravenous, Q12H  sodium chloride, 10 mL, Intravenous, Q12H  sodium hypochlorite, , Topical, BID        Infusion Meds         PRN Meds    acetaminophen **OR** acetaminophen **OR** acetaminophen    senna-docusate sodium **AND** polyethylene glycol **AND** bisacodyl **AND** bisacodyl    Calcium Replacement - Follow Nurse / BPA Driven Protocol    dextrose    dextrose    glucagon (human recombinant)    Magnesium Standard Dose Replacement - Follow Nurse / BPA Driven Protocol    oxyCODONE **OR** oxyCODONE    Phosphorus Replacement - Follow Nurse / BPA Driven Protocol    Potassium Replacement - Follow Nurse / BPA Driven Protocol    sodium chloride    sodium chloride    sodium chloride    sodium chloride    sodium chloride        Assessment & Plan       Antimicrobial Therapy   1.  IV ceftriaxone        2.          3.        4.        5.          Assessment     Right leg abscess of the posterior/inferior lower leg with an additional smaller abscess in the inferior aspect of the anterior compartment musculature.  CT did not show any findings consistent with osteomyelitis.  Initial cultures growing group A streptococcus and she is status post incision and drainage on 2/28/2025 with interoperative cultures growing both group B  streptococcus and methicillin susceptible Staphylococcus aureus     History of right iliac thrombectomy on 12/19/2024, right iliofemoral popliteal thrombectomy, right femoral enterectomy with patch, close right calf fasciotomies on 12/20/2024 and completion of right for composite fasciotomies on 12/23/2024     COPD with tobacco abuse     Type 2 diabetes-most recent A1c is 15.8     Plan     Continue IV ceftriaxone 2 g every 24 hours-patient will need 2 weeks of treatment-last day on 3/14/2025  Discontinue IV vancomycin  Patient will need a midline before discharge-please remove when IV antibiotics are completed  Weekly labs CBC and creatinine x 2 weeks  Continued wound care  Patient will need 2 weeks of antimicrobial therapy since surgery  Continue supportive care  Okay to discharge from Infectious Disease standpoint  Not much more to add from infectious disease standpoint-we will sign off at this time-please call with any questions.  Tobacco abuse cessation  Patient will need to get her blood sugars under control to heal this infection and avoid future infection    Please fax all post discharge lab results, imaging studies and correspondence to this fax number [phone]  For any question or concern please contact our service number [phone]    Barbara Peña, NICHOLAS  03/03/25  15:54 EST    Note is dictated utilizing voice recognition software/Dragon

## 2025-03-03 NOTE — PLAN OF CARE
Goal Outcome Evaluation:Pt remains painful this shift and pain managed per MAR. Pt medicated before dressing change to RLE.  Continues on IV abx, plan of care ongoing

## 2025-03-03 NOTE — PLAN OF CARE
Problem: Adult Inpatient Plan of Care  Goal: Optimal Comfort and Wellbeing  Intervention: Monitor Pain and Promote Comfort  Description: Assess pain level, treatment efficacy and patient response at regular intervals using a consistent pain scale.  Consider the presence and impact of preexisting chronic pain.  Encourage patient and caregiver involvement in pain assessment, interventions and safety measures.  Promote activity; balance with sleep and rest to enhance healing.  Recent Flowsheet Documentation  Taken 1/1/2025 2108 by Soila Manuel RN  Pain Management Interventions:   position adjusted   pillow support provided   pain medication given   Goal Outcome Evaluation:                                             This patient has been assessed with a concern for Malnutrition and has been determined to have a diagnosis/diagnoses of Moderate protein-calorie malnutrition.    This patient is being managed with:   Diet Consistent Carbohydrate/No Snacks-  Supplement Feeding Modality:  Oral  Ensure Max Cans or Servings Per Day:  2       Frequency:  Two Times a day  Entered: Feb 27 2025 10:04AM

## 2025-03-03 NOTE — PLAN OF CARE
Goal Outcome Evaluation:   Patient d/c to home with family. Call light within reach. Bed in low position.

## 2025-03-03 NOTE — PROGRESS NOTES
Enter Query Response Below      Query Response: Sepsis ruled in              If applicable, please update the problem list.

## 2025-03-03 NOTE — CASE MANAGEMENT/SOCIAL WORK
Continued Stay Note   [facility]     Patient Name: Yarelis Jackson  MRN: [id number]  Today's Date: 3/3/2025    Admit Date: 2/24/2025    Plan: DC PLAN: Routine home with parents. [facility] accepted. [facility] for IV abx.       Discharge Plan       Row Name 03/03/25 1159       Plan    Plan DC PLAN: Routine home with parents. [facility] accepted. [facility] for IV abx.        Patient/Family in Agreement with Plan yes    Plan Comments Final wound cultures pending. DCP report sent to [facility], message sent to [facility]. Verbalized understanding. Anticipate discharge later today.                      Expected Discharge Date and Time       Expected Discharge Date Expected Discharge Time    Mar 4, 2025           Olga Chopra RN   Case Management  [phone]

## 2025-03-03 NOTE — DISCHARGE INSTRUCTIONS
Continue wet-to-dry dressing changes with Dakin's to the right calf wounds twice daily  Follow-up with Dr. Celestin next week for a wound check

## 2025-03-03 NOTE — OUTREACH NOTE
Prep Survey      Flowsheet Row Responses   [facility] facility patient discharged from? [facility]   Is LACE score < 7 ? No   Eligibility TCM   Discharge Disposition Home-Health Care Svc   Discharge diagnosis Cellulitis of right lower extremity (L0   Does the patient have one of the following disease processes/diagnoses(primary or secondary)? Other   Does the patient have Home health ordered? Yes   What is the Home health agency?  [facility]   Is there a DME ordered? Yes   What DME was ordered? [facility]   Prep survey completed? Yes            IMAN BROWN - Registered Nurse

## 2025-03-03 NOTE — PROGRESS NOTES
Start PACC Note    Home Health Referral    Evaluated patient on Home Care and services available. Patient offered choice of available HHC and agreeable to nursing and PT services with [facility].    Care Types:   Isolation Precautions: [unfilled]    Social Determinants of Health:  Tobacco Use: High Risk (2/28/2025)    Patient History     Smoking Tobacco Use: Every Day     Smokeless Tobacco Use: Never     Passive Exposure: Not on file     Social History     Substance and Sexual Activity   Alcohol Use Not Currently     Social History     Substance and Sexual Activity   Drug Use Yes    Types: Marijuana       Does the patient have any financial resource strain?   Does the patient have any food insecurities?   Does the patient have any housing instabilities?     If any of the above is noted as yes - consider a MSW evaluation once the patient returns home.    START PATIENT REGISTRATION INFORMATION  Order Information  Order Signing Physician: No att. providers found  Service Ordered RN?: Yes  Service Ordered PT?: Yes  Service Ordered OT?: No  Service Ordered ST?: No  Service Ordered MSW?: No  Service Ordered HHA?: No  Following Physician: Rohit Schrader DO  Following Physician Phone: [phone]  Overseeing Physician: Rohit Schradre,   (Required for Residents Only)  Agreeable to Follow? Yes  Date/Time of Call 03/03/25 15:32 EST, Spoke with: via secure chat    Care Coordination  Same Day SOC?: No  Primary Care Physician: Rohit Schrader DO  Primary Care Physician Phone: [phone]  Primary Care Physician Address: [address] [place] [address] / [place] [address]  Visit Instructions: please call prior to visit so she can put her dog up and instruct on what door to come to  Service Discharge Location Type: Home  Service Facility Name:   Service Floor Facility:   Service Room No:     Demographics  Patient Last Name: Renetta  Patient First Name: Yarelis  Language/Communication Barrier:  none/english  Service Address: [address]  Service City: [place]  Service State: [place]  Service Zip: [address]  Service Home Phone: [phone]  Other Phone Numbers:   Telephone Information:   Mobile [phone]     Emergency Contact:   Extended Emergency Contact Information  Primary Emergency Contact: Sukh Saldivar  Mobile Phone: [phone]  Relation: Son  Secondary Emergency Contact: Rohit Saldivar  Mobile Phone: [phone]  Relation: Son  Mother: KYAW HU  Home Phone: [phone]  Mobile Phone: [phone]    Admission Information  Admit Date: 02/24/2025  Patient Status at Discharge:   Admitting Diagnosis: Right Lower extremity Cellulitis    Caregiver Information  Caregiver First Name:  Caregiver Last Name:   Caregiver Relationship to Patient:   Caregiver Phone Number:   Caregiver Notes:     HITECH  Hi-Tech List  HIGHTECH: HI TECH - IV  Orders: Continue IV ceftriaxone 2 g every 24 hours   IV Method of Administration: #18G/10CM PowerGlide   Date and Time of Next Dose Due: 3/4/25  Infusion Company: [facility]  Infectious Disease Physician: Dr. Adenike Montero    Teachable Caregiver  Teachable Caregiver First Name: Sukh  Teachable Caregiver Last Name: Yariel  Teachable Caregiver Relationship to Patient: son  Teachable Caregiver Phone Number: [phone]  Teachable Caregiver Notes: N/A  Teachable Caregiver available for Start of Care visit?: Yes  Teachable Caregiver agreeable to provide skilled High Tech care per physician's orders?: Yes      END PATIENT REGISTRATION INFORMATION    Start PACC Summary    Additional Comments: [facility] to have supplies/medication delivered to home by noon 3/4/25 per crystal    END PACC Summary    Discharge Date: Pending    Referral Source: JOSEFA Guerrero    Signed By: Marti Martinez RN, 3/3/2025, 15:32 EST     Date/Time: 03/03/25 15:32 EST    End PACC Note

## 2025-03-03 NOTE — CONSULTS
Midline Line Insertion Procedure Note    Procedure: Insertion of #18G/10CM PowerGlide    Indications:  Home IV therapy    Procedure Details:   Sterile technique was used including antiseptics, cap, gloves, hand hygiene, mask, and sheet.    #18G/10CM PowerGlide inserted to the R Brachial vein per hospital protocol by LORRAINE Gurrola.     Non-pulsatile blood return: yes    Ultrasound Guidance: Yes    Lot #: [id number]  Expiration date: 11-    Complications:  none    Findings:  Catheter inserted to 10 cm, with 0 cm exposed.   Mid upper arm circumference is 35 cm.  Catheter was flushed with 10 cc NS and sterile dressing applied.   Patient tolerated procedure well.    Recommendations:  Verbal and/or written Care/Maintenance instructions provided to patient.   Primary nurse notified that midline is okay to use at this time.     Alida Gurrola RN  03/03/25  14:05 EST

## 2025-03-03 NOTE — PROGRESS NOTES
Name: Yarelis Jackson ADMIT: 2025   : 1970  PCP: Rohit Schrader DO    MRN: [id number] LOS: 6 days   AGE/SEX: 54 y.o. female  ROOM: [address] [place] [address]    54 y.o. female with infected right medial calf fasciotomy wound, post I&D.    Patient resting in bed.  Reports pain is fairly well-controlled.  Eager to go home    Scheduled Medications:   [Held by provider] apixaban, 5 mg, Oral, Q12H  budesonide-formoterol, 2 puff, Inhalation, BID - RT  cefTRIAXone, 2,000 mg, Intravenous, Q24H  heparin (porcine), 5,000 Units, Subcutaneous, Q8H  insulin glargine, 20 Units, Subcutaneous, Nightly  insulin lispro, 2-7 Units, Subcutaneous, 4x Daily AC & at Bedtime  insulin lispro, 8 Units, Subcutaneous, TID With Meals  metoprolol succinate XL, 25 mg, Oral, Q24H  pantoprazole, 40 mg, Oral, Q AM  sodium chloride, 10 mL, Intravenous, Q12H  sodium hypochlorite, , Topical, BID  vancomycin, 1,250 mg, Intravenous, Q12H    Active Infusions:Pharmacy to dose vancomycin,     Vital Signs  Vital Signs Patient Vitals for the past 24 hrs:   BP Temp Temp src Pulse Resp SpO2   25 0740 -- -- -- 90 18 96 %   25 0738 -- -- -- 90 17 96 %   25 0418 116/75 98.1 °F (36.7 °C) Oral 91 18 96 %   25 2051 135/84 97.7 °F (36.5 °C) Oral 82 17 97 %   25 1156 123/75 97.6 °F (36.4 °C) Oral 80 18 97 %   25 0847 -- -- -- 88 16 95 %   25 0843 -- -- -- 88 16 95 %     I/O:  I/O last 3 completed shifts:  In: 1320 [P.O.:1320]  Out: -     CBC    Results from last 7 days   Lab Units 25  2252 25  0511 25  0059 25 25  0252 25  0359 25   WBC 10*3/mm3 9.76 11.32* 7.33 6.17 6.75 10.54 11.57*   HEMOGLOBIN g/dL 9.4* 9.4* 9.8* 10.0* 9.5* 9.1* 11.2*   PLATELETS 10*3/mm3 513* 469* 458* 436 418 366 494*     BMP   Results from last 7 days   Lab Units 25  2252 25  0433 25  0511 25  0059 25  1455 256 25  0252 25  02/24/25 2056   SODIUM mmol/L 134* 136  --  135*  --  136 138  --  131*   POTASSIUM mmol/L 3.9 3.8  --  4.6 4.2 3.5 3.8 4.0 3.4*   CHLORIDE mmol/L 100 102  --  102  --  102 105  --  94*   CO2 mmol/L 24.9 24.2  --  25.3  --  24.4 24.0  --  24.6   BUN mg/dL 17 14  --  8  --  9 9  --  12   CREATININE mg/dL 0.74 0.74 0.87 0.78  --  0.54* 0.54*  --  0.65   GLUCOSE mg/dL 154* 135*  --  140*  --  177* 224*  --  163*     Cr Clearance Estimated Creatinine Clearance: 87 mL/min (by C-G formula based on SCr of 0.74 mg/dL).    Assessment & Plan   Assessment & Plan    Cellulitis of right lower extremity    Cellulitis of right leg    Right lateral calf incision 3/3      Right medial calf incision 3/3          54 y.o. female with infected left right medial calf fasciotomy wound.  Status post I&D on 2/28  ID following and planning for 2 weeks of antimicrobial therapy  Patient has been compliant with her dressing changes, continue Dakin's twice daily dressing changes for now, wounds are looking healthy with good granulation tissue  Okay to discharge home once home health and IV antibiotics have been arranged  Keep follow-up appointment with Dr. Celestin on 3/11    NICHOLAS Castro  03/03/25, 08:18 EST    Office contact: [phone]

## 2025-03-03 NOTE — DISCHARGE INSTR - ACTIVITY
Gradually increase activity  No driving until released by MD and no longer taking narcotics  No hot tubs, tub baths ,or swimming pools until incisions have healed

## 2025-03-04 ENCOUNTER — TELEPHONE (OUTPATIENT)
Age: 55
End: 2025-03-04
Payer: MEDICAID

## 2025-03-04 ENCOUNTER — TRANSITIONAL CARE MANAGEMENT TELEPHONE ENCOUNTER (OUTPATIENT)
Dept: CALL CENTER | Facility: HOSPITAL | Age: 55
End: 2025-03-04
Payer: MEDICAID

## 2025-03-04 RX ORDER — SODIUM HYPOCHLORITE 1.25 MG/ML
1 SOLUTION TOPICAL 2 TIMES DAILY
Qty: 473 ML | Refills: 0 | Status: SHIPPED | OUTPATIENT
Start: 2025-03-04 | End: 2025-04-03

## 2025-03-04 NOTE — OUTREACH NOTE
Call Center TCM Note      Flowsheet Row Responses   [facility] facility patient discharged from? [facility]   Does the patient have one of the following disease processes/diagnoses(primary or secondary)? Other   TCM attempt successful? No  [VR-Mother, Ledy Laurent]   Unsuccessful attempts Attempt 1            Yoli Son RN    3/4/2025, 09:27 EST

## 2025-03-04 NOTE — TELEPHONE ENCOUNTER
Pt called and asked if Dakins solution could be sent to [facility] pharmacy to be picked up, Rx sent to [facility].

## 2025-03-04 NOTE — OUTREACH NOTE
Call Center TCM Note      Flowsheet Row Responses   [facility] patient discharged from? [facility]   Does the patient have one of the following disease processes/diagnoses(primary or secondary)? Other   TCM attempt successful? Yes   Call start time 1400   Call end time 1404   Discharge diagnosis Cellulitis of right lower extremity (L0   Is patient permission given to speak with other caregiver? Yes   List who call center can speak with Mother   Person spoke with today (if not patient) and relationship Mother   Meds reviewed with patient/caregiver? Yes   Is the patient having any side effects they believe may be caused by any medication additions or changes? No   Does the patient have all medications ordered at discharge? Yes   Is the patient taking all medications as directed (includes completed medication regime)? Yes   Does the patient have an appointment with their PCP within 7-14 days of discharge? No   Nursing Interventions Patient declined scheduling/rescheduling appointment at this time, Routed TCM call to PCP office   What is the Home health agency?  [facility]   Has home health visited the patient within 72 hours of discharge? Yes   What DME was ordered? [facility]   Psychosocial issues? No   Did the patient receive a copy of their discharge instructions? Yes   Nursing interventions Reviewed instructions with patient   What is the patient's perception of their health status since discharge? Improving   Is the patient/caregiver able to teach back signs and symptoms related to disease process for when to call PCP? Yes   Is the patient/caregiver able to teach back signs and symptoms related to disease process for when to call 911? Yes   Is the patient/caregiver able to teach back the hierarchy of who to call/visit for symptoms/problems? PCP, Specialist, Home health nurse, Urgent Care, ED, 911 Yes   If the patient is a current smoker, are they able to teach back resources for cessation?  Smoking cessation medications  [Not currently smoking]   TCM call completed? Yes   Wrap up additional comments Mother reports patient still having some leg pain but feels she is improving.   Call end time 1404   Would this patient benefit from a Referral to [facility] Social Work? No   Is the patient interested in additional calls from an ambulatory ? No            Yoli Son RN    3/4/2025, 14:04 EST

## 2025-03-04 NOTE — PROGRESS NOTES
RELAY  I called Kimberli, I left VM asking her to call us to make a HOSPITAL FOLLOW UP APPT with Dr Schrader.    HUB, please make appt for her.

## 2025-03-04 NOTE — CASE MANAGEMENT/SOCIAL WORK
Case Management Discharge Note      Final Note: Routine home with Home Health         Selected Continued Care - Discharged on 3/3/2025 Admission date: 2/24/2025 - Discharge disposition: Home-Health Care Svc     Discharged home with Home Health and [facility] abx.            Home Medical Care Coordination complete.      Service Provider Services Address Phone Fax Patient Preferred     [facility] [address] [facility] [place] [phone] --                    Selected Continued Care - Prior Encounters Includes continued care and service providers with selected services from prior encounters from 11/26/2024 to 3/3/2025      Discharged on 1/16/2025 Admission date: 1/12/2025 - Discharge disposition: Skilled Nursing Facility (DC - External)                    Transportation Services  Private: Car    Final Discharge Disposition Code: 06 - home with home health care

## 2025-03-05 ENCOUNTER — OFFICE VISIT (OUTPATIENT)
Dept: FAMILY MEDICINE CLINIC | Facility: CLINIC | Age: 55
End: 2025-03-05
Payer: MEDICAID

## 2025-03-05 VITALS
RESPIRATION RATE: 15 BRPM | DIASTOLIC BLOOD PRESSURE: 78 MMHG | OXYGEN SATURATION: 94 % | TEMPERATURE: 98 F | BODY MASS INDEX: 28.31 KG/M2 | SYSTOLIC BLOOD PRESSURE: 113 MMHG | HEART RATE: 94 BPM | HEIGHT: 64 IN

## 2025-03-05 DIAGNOSIS — L03.115 CELLULITIS OF RIGHT LOWER LEG: Primary | ICD-10-CM

## 2025-03-05 DIAGNOSIS — E11.65 TYPE 2 DIABETES MELLITUS WITH HYPERGLYCEMIA, UNSPECIFIED WHETHER LONG TERM INSULIN USE: ICD-10-CM

## 2025-03-05 LAB — BACTERIA SPEC ANAEROBE CULT: ABNORMAL

## 2025-03-05 RX ORDER — ACYCLOVIR 400 MG/1
1 TABLET ORAL
Qty: 1 EACH | Refills: 0 | Status: SHIPPED | OUTPATIENT
Start: 2025-03-05 | End: 2026-02-28

## 2025-03-05 RX ORDER — ACYCLOVIR 400 MG/1
1 TABLET ORAL CONTINUOUS
Qty: 1 EACH | Refills: 0 | Status: SHIPPED | OUTPATIENT
Start: 2025-03-05 | End: 2026-02-28

## 2025-03-05 NOTE — PROGRESS NOTES
"Chief Complaint  Hospital Follow Up Visit (Cellulitis of right lower extremity F D/C 3/3/25)    Subjective        Yarelis Jackson presents to [facility]  History of Present Illness  Patient is here for follow-up from hospital visit which occurred after her last visit here.  Patient had cellulitis of her right lower leg.  Where she had previous arterial blood clot in fasciectomy.  Patient was having purulent pus at last visit.  She was given doxycycline from wound management which we discussed was not really probably going to be taking care of this extent of disease.  So she went to the emergency room as advised.  She was evaluated admitted for a stay in hand again have some debridement and IV antibiotic she now has a PICC line.  She is wanting to get a Dexcom for her diabetes.  Which has been poorly controlled.  Objective   Vital Signs:  /78 (BP Location: Left arm, Patient Position: Sitting, Cuff Size: Adult)   Pulse 94   Temp 98 °F (36.7 °C) (Infrared)   Resp 15   Ht 162.6 cm (64.02\")   SpO2 94%   BMI 28.31 kg/m²   Estimated body mass index is 28.31 kg/m² as calculated from the following:    Height as of this encounter: 162.6 cm (64.02\").    Weight as of 2/24/25: 74.8 kg (165 lb).            Review of Systems   Skin:  Positive for wound.   All other systems reviewed and are negative.       Physical Exam  Vitals and nursing note reviewed.   Constitutional:       Appearance: Normal appearance.      Comments: Patient looks much better than last visit looks well.   HENT:      Head: Normocephalic.      Nose: Nose normal.   Eyes:      Conjunctiva/sclera: Conjunctivae normal.   Cardiovascular:      Rate and Rhythm: Regular rhythm.   Pulmonary:      Effort: Pulmonary effort is normal.      Breath sounds: Normal breath sounds.   Abdominal:      Palpations: Abdomen is soft.   Musculoskeletal:         General: Swelling present.   Skin:     General: Skin is warm and dry. "   Neurological:      General: No focal deficit present.      Mental Status: She is alert.   Psychiatric:         Mood and Affect: Mood normal.         Behavior: Behavior normal.        Result Review :                Assessment and Plan   Diagnoses and all orders for this visit:    1. Cellulitis of right lower leg (Primary)    2. Type 2 diabetes mellitus with hyperglycemia, unspecified whether long term insulin use  -     Continuous Glucose Sensor (Dexcom G7 Sensor) misc; Inject 1 Units under the skin into the appropriate area as directed Continuous for 360 days.  Dispense: 1 each; Refill: 0  -     Continuous Glucose  (Dexcom G7 ) device; Use 1 Units 5 (Five) Times a Day As Needed (glucose monitoring) for up to 360 days.  Dispense: 1 each; Refill: 0    I spent 20 minutes caring for Yarelis on this date of service. This time includes time spent by me in the following activities: preparing for the visit, reviewing tests, obtaining and/or reviewing a separately obtained history, performing a medically appropriate examination and/or evaluation, counseling and educating the patient/family/caregiver, ordering medications, tests, or procedures, and documenting information in the medical record.           Follow Up   Return in about 3 months (around 6/5/2025).  Patient was given instructions and counseling regarding her condition or for health maintenance advice. Please see specific information pulled into the AVS if appropriate.

## 2025-03-05 NOTE — PROGRESS NOTES
Patient's anaerobic culture now positive for Prevotella species.  I did speak with the patient on the phone and explained that I am adding p.o. Flagyl 500 mg 3 times daily for 2 weeks and I am calling it into the [facility] pharmacy in [place].  She understands that she needs to take this antibiotic as well as continue taking her IV Rocephin at home

## 2025-03-07 ENCOUNTER — TELEPHONE (OUTPATIENT)
Dept: FAMILY MEDICINE CLINIC | Facility: CLINIC | Age: 55
End: 2025-03-07
Payer: MEDICAID

## 2025-03-07 NOTE — PROGRESS NOTES
"Chief Complaint  Arterial thrombosis    Subjective      HPI: Yarelis Jackson is a 54 y.o. female for follow-up of right calf fasciotomy wounds.  Recently hospitalized for infection, underwent operative debridement.  Discharged on oral metronidazole and IV ceftriaxone via PICC line.  Her son is doing twice daily Dakin's solution dressing changes.  Having much less pain with dressing changes.    Objective   Vital Signs:  /92   Pulse 94   Ht 162.6 cm (64.02\")   Wt 74.8 kg (165 lb)   SpO2 99%   BMI 28.31 kg/m²   Estimated body mass index is 28.31 kg/m² as calculated from the following:    Height as of this encounter: 162.6 cm (64.02\").    Weight as of this encounter: 74.8 kg (165 lb).        Yarelis Jackson  reports that she has been smoking cigarettes. She started smoking about 40 years ago. She has a 20.1 pack-year smoking history. She has never used smokeless tobacco..     Exam: Lower limb hygiene is unfavorable.  There is dry, scaling skin and Betadine that had been there from previous hospitalization still on toes and toenails.  Lower limb washed carefully with Hibiclens.  Lateral fasciotomy wound is korina and healing well.  The wound is flat and there is no tunneling.  There is a deeper medial fasciotomy wound with some thick purulent drainage.  The proximal port looks good.  No cellulitis.  Foot swollen and insensate.       Assessment and Plan     Diagnoses and all orders for this visit:    1. Arterial thrombosis (Primary)    2. Non-pressure ulcer of lower extremity with fat layer exposed, right    3. H/O fasciotomy    Other orders  -     sodium hypochlorite (DAKIN'S) 0.25 % topical solution; Apply  topically to the appropriate area as directed 2 (Two) Times a Day.  Dispense: 500 mL; Refill: 3    Summary: 54-year-old woman with history of acute right lower extremity ischemia, treated with femoral thrombectomies and fasciotomies, now with large fasciotomy wounds.  Experiencing problems with " adherence to wound care regimen, and was recently hospitalized for infection of her medial calf incision.  She presents today doing better, able to undergo dressing changes with less pain.  As before, the lateral fasciotomy wound looks good.  The medial is more concerning.  Hygiene is also fair.  I emphasized to her that the limb is not yet saved and we are still at risk of above-knee amputation.  Recommend soap and water hygiene to the limb.  Continue Dakin's solution dressing changes.  Refill of Dakin solution sent to her pharmacy.  May pursue PT as indicated.  Return in 3 weeks for wound reassessment.    Follow Up     Return in about 3 weeks (around 4/1/2025).  Patient was given instructions and counseling regarding her condition or for health maintenance advice. Please see specific information pulled into the AVS if appropriate.

## 2025-03-11 ENCOUNTER — TELEPHONE (OUTPATIENT)
Dept: FAMILY MEDICINE CLINIC | Facility: CLINIC | Age: 55
End: 2025-03-11
Payer: MEDICAID

## 2025-03-11 ENCOUNTER — LAB REQUISITION (OUTPATIENT)
Dept: LAB | Facility: HOSPITAL | Age: 55
End: 2025-03-11
Payer: MEDICAID

## 2025-03-11 ENCOUNTER — OFFICE VISIT (OUTPATIENT)
Age: 55
End: 2025-03-11
Payer: MEDICAID

## 2025-03-11 VITALS
OXYGEN SATURATION: 99 % | WEIGHT: 165 LBS | HEIGHT: 64 IN | BODY MASS INDEX: 28.17 KG/M2 | DIASTOLIC BLOOD PRESSURE: 92 MMHG | SYSTOLIC BLOOD PRESSURE: 154 MMHG | HEART RATE: 94 BPM

## 2025-03-11 DIAGNOSIS — T81.41XA INFECTION FOLLOWING A PROCEDURE, SUPERFICIAL INCISIONAL SURGICAL SITE, INITIAL ENCOUNTER: ICD-10-CM

## 2025-03-11 DIAGNOSIS — Z98.890 H/O FASCIOTOMY: ICD-10-CM

## 2025-03-11 DIAGNOSIS — I74.9 ARTERIAL THROMBOSIS: Primary | ICD-10-CM

## 2025-03-11 DIAGNOSIS — L97.912 NON-PRESSURE ULCER OF LOWER EXTREMITY WITH FAT LAYER EXPOSED, RIGHT: ICD-10-CM

## 2025-03-11 LAB
BASOPHILS # BLD AUTO: 0.09 10*3/MM3 (ref 0–0.2)
BASOPHILS NFR BLD AUTO: 0.9 % (ref 0–1.5)
CREAT SERPL-MCNC: 0.72 MG/DL (ref 0.57–1)
DEPRECATED RDW RBC AUTO: 53.3 FL (ref 37–54)
EGFRCR SERPLBLD CKD-EPI 2021: 100.1 ML/MIN/1.73
EOSINOPHIL # BLD AUTO: 0.21 10*3/MM3 (ref 0–0.4)
EOSINOPHIL NFR BLD AUTO: 2 % (ref 0.3–6.2)
ERYTHROCYTE [DISTWIDTH] IN BLOOD BY AUTOMATED COUNT: 15.1 % (ref 12.3–15.4)
HCT VFR BLD AUTO: 24.1 % (ref 34–46.6)
HGB BLD-MCNC: 7.3 G/DL (ref 12–15.9)
IMM GRANULOCYTES # BLD AUTO: 0.03 10*3/MM3 (ref 0–0.05)
IMM GRANULOCYTES NFR BLD AUTO: 0.3 % (ref 0–0.5)
LYMPHOCYTES # BLD AUTO: 4.35 10*3/MM3 (ref 0.7–3.1)
LYMPHOCYTES NFR BLD AUTO: 42 % (ref 19.6–45.3)
MCH RBC QN AUTO: 29.3 PG (ref 26.6–33)
MCHC RBC AUTO-ENTMCNC: 30.3 G/DL (ref 31.5–35.7)
MCV RBC AUTO: 96.8 FL (ref 79–97)
MONOCYTES # BLD AUTO: 0.48 10*3/MM3 (ref 0.1–0.9)
MONOCYTES NFR BLD AUTO: 4.6 % (ref 5–12)
NEUTROPHILS NFR BLD AUTO: 5.2 10*3/MM3 (ref 1.7–7)
NEUTROPHILS NFR BLD AUTO: 50.2 % (ref 42.7–76)
NRBC BLD AUTO-RTO: 0 /100 WBC (ref 0–0.2)
PLATELET # BLD AUTO: 649 10*3/MM3 (ref 140–450)
PMV BLD AUTO: 8.5 FL (ref 6–12)
RBC # BLD AUTO: 2.49 10*6/MM3 (ref 3.77–5.28)
WBC NRBC COR # BLD AUTO: 10.36 10*3/MM3 (ref 3.4–10.8)

## 2025-03-11 PROCEDURE — 85025 COMPLETE CBC W/AUTO DIFF WBC: CPT | Performed by: INTERNAL MEDICINE

## 2025-03-11 PROCEDURE — 82565 ASSAY OF CREATININE: CPT | Performed by: INTERNAL MEDICINE

## 2025-03-11 PROCEDURE — 99024 POSTOP FOLLOW-UP VISIT: CPT | Performed by: SURGERY

## 2025-03-11 RX ORDER — SODIUM HYPOCHLORITE 2.5 MG/ML
SOLUTION TOPICAL 2 TIMES DAILY
Qty: 500 ML | Refills: 3 | Status: SHIPPED | OUTPATIENT
Start: 2025-03-11

## 2025-03-11 RX ORDER — SODIUM HYPOCHLORITE 1.25 MG/ML
SOLUTION TOPICAL
Qty: 473 ML | Refills: 0 | Status: SHIPPED | OUTPATIENT
Start: 2025-03-11

## 2025-03-11 RX ORDER — METRONIDAZOLE 500 MG/1
TABLET ORAL
COMMUNITY
Start: 2025-03-05

## 2025-03-11 NOTE — TELEPHONE ENCOUNTER
Caller: [facility]    Relationship: Other    Best call back number: [phone]     What is the best time to reach you: 8:00 AM TO 4:30 PM    Who are you requesting to speak with (clinical staff, provider,  specific staff member): DR JONES    Do you know the name of the person who called: AMANDA    What was the call regarding: AMANDA STATED THE PATIENT WILL BE RECEIVING PHYSICAL THERAPY WITH HOME HEALTH ONCE A WEEK FOR EIGHT WEEKS BEGINNING 03-.    AMANDA STATED THE PATIENTS ASSESSMENT WAS COMPLETED 03-.

## 2025-03-13 DIAGNOSIS — E11.65 TYPE 2 DIABETES MELLITUS WITH HYPERGLYCEMIA, UNSPECIFIED WHETHER LONG TERM INSULIN USE: ICD-10-CM

## 2025-03-14 ENCOUNTER — TELEPHONE (OUTPATIENT)
Dept: FAMILY MEDICINE CLINIC | Facility: CLINIC | Age: 55
End: 2025-03-14
Payer: MEDICAID

## 2025-03-14 ENCOUNTER — PATIENT MESSAGE (OUTPATIENT)
Dept: FAMILY MEDICINE CLINIC | Facility: CLINIC | Age: 55
End: 2025-03-14
Payer: MEDICAID

## 2025-03-14 DIAGNOSIS — M79.604 PAIN OF RIGHT LOWER EXTREMITY: Primary | ICD-10-CM

## 2025-03-14 DIAGNOSIS — E11.10 DM (DIABETES MELLITUS) TYPE 2, UNCONTROLLED, WITH KETOACIDOSIS: ICD-10-CM

## 2025-03-14 RX ORDER — HYDROCODONE BITARTRATE AND ACETAMINOPHEN 10; 325 MG/1; MG/1
1 TABLET ORAL EVERY 8 HOURS PRN
Qty: 63 TABLET | Refills: 0 | Status: SHIPPED | OUTPATIENT
Start: 2025-03-14 | End: 2025-04-04

## 2025-03-14 RX ORDER — INSULIN LISPRO 100 [IU]/ML
INJECTION, SOLUTION INTRAVENOUS; SUBCUTANEOUS
Qty: 15 ML | Refills: 1 | Status: SHIPPED | OUTPATIENT
Start: 2025-03-14

## 2025-03-14 NOTE — TELEPHONE ENCOUNTER
"Caller: Yarelis Jackson \"BRYANNA\"    Relationship: Self    Best call back number: [phone]    Requested Prescriptions:   HYDROcodone-acetaminophen (NORCO)  MG per tablet      Insulin Lispro, 1 Unit Dial, (HumaLOG KwikPen) 100 UNIT/ML solution pen-injector       Insulin Glargine (LANTUS SOLOSTAR) 100 UNIT/ML injection pen   Continuous Glucose Sensor (Dexcom G7 Sensor) [facility]     Pharmacy where request should be sent: [facility] [id number], [place] [address] - [phone]  - [phone] FX     Last office visit with prescribing clinician: 3/5/2025   Last telemedicine visit with prescribing clinician: Visit date not found   Next office visit with prescribing clinician: 6/5/2025     Does the patient have less than a 3 day supply:  [x] Yes  [] No    Would you like a call back once the refill request has been completed: [] Yes [x] No    If the office needs to give you a call back, can they leave a voicemail: [] Yes [x] No    Alicia David Rep   03/14/25 08:46 EDT         "

## 2025-03-17 DIAGNOSIS — E11.65 TYPE 2 DIABETES MELLITUS WITH HYPERGLYCEMIA, WITH LONG-TERM CURRENT USE OF INSULIN: Primary | ICD-10-CM

## 2025-03-17 DIAGNOSIS — E11.10 DM (DIABETES MELLITUS) TYPE 2, UNCONTROLLED, WITH KETOACIDOSIS: ICD-10-CM

## 2025-03-17 DIAGNOSIS — Z79.4 TYPE 2 DIABETES MELLITUS WITH HYPERGLYCEMIA, WITH LONG-TERM CURRENT USE OF INSULIN: Primary | ICD-10-CM

## 2025-03-17 RX ORDER — ACYCLOVIR 400 MG/1
TABLET ORAL
Qty: 1 EACH | Refills: 0 | Status: SHIPPED | OUTPATIENT
Start: 2025-03-17

## 2025-03-21 DIAGNOSIS — E11.65 TYPE 2 DIABETES MELLITUS WITH HYPERGLYCEMIA, UNSPECIFIED WHETHER LONG TERM INSULIN USE: ICD-10-CM

## 2025-03-21 RX ORDER — ACYCLOVIR 400 MG/1
1 TABLET ORAL
Qty: 9 EACH | Refills: 0 | Status: CANCELLED | OUTPATIENT
Start: 2025-03-21

## 2025-03-28 DIAGNOSIS — E11.65 TYPE 2 DIABETES MELLITUS WITH HYPERGLYCEMIA, UNSPECIFIED WHETHER LONG TERM INSULIN USE: ICD-10-CM

## 2025-03-28 RX ORDER — ACYCLOVIR 400 MG/1
1 TABLET ORAL CONTINUOUS
Qty: 1 EACH | Refills: 3 | Status: SHIPPED | OUTPATIENT
Start: 2025-03-28 | End: 2025-04-27

## 2025-03-28 RX ORDER — METRONIDAZOLE 500 MG/1
TABLET ORAL
OUTPATIENT
Start: 2025-03-28

## 2025-03-28 RX ORDER — PANTOPRAZOLE SODIUM 40 MG/1
40 TABLET, DELAYED RELEASE ORAL DAILY
Qty: 30 TABLET | Refills: 0 | Status: SHIPPED | OUTPATIENT
Start: 2025-03-28 | End: 2025-04-27

## 2025-03-28 RX ORDER — APIXABAN 5 MG/1
5 TABLET, FILM COATED ORAL EVERY 12 HOURS SCHEDULED
Qty: 60 TABLET | Refills: 2 | Status: SHIPPED | OUTPATIENT
Start: 2025-03-28 | End: 2025-06-26

## 2025-03-28 NOTE — TELEPHONE ENCOUNTER
Rx Refill Note  Requested Prescriptions     Pending Prescriptions Disp Refills    Eliquis 5 MG tablet tablet 60 tablet      Sig: Take 1 tablet by mouth Every 12 (Twelve) Hours.    pantoprazole (PROTONIX) 40 MG EC tablet       Sig: Take 1 tablet by mouth.    metroNIDAZOLE (FLAGYL) 500 MG tablet      Continuous Glucose Sensor (Dexcom G7 Sensor) misc 1 each 0      Last office visit with prescribing clinician: 3/5/2025   Last telemedicine visit with prescribing clinician: Visit date not found   Next office visit with prescribing clinician: 6/5/2025                         Would you like a call back once the refill request has been completed: [] Yes [] No    If the office needs to give you a call back, can they leave a voicemail: [] Yes [] No    Krysta Armendariz MA  03/28/25, 13:00 EDT

## 2025-04-02 ENCOUNTER — DOCUMENTATION (OUTPATIENT)
Dept: WOUND CARE | Facility: HOSPITAL | Age: 55
End: 2025-04-02
Payer: MEDICAID

## 2025-04-03 ENCOUNTER — TELEPHONE (OUTPATIENT)
Age: 55
End: 2025-04-03
Payer: MEDICAID

## 2025-04-03 NOTE — TELEPHONE ENCOUNTER
Nini ngo/  [facility] called stating that they had made an appointment for this patient on Monday April 7 but had questions. They would like to know if this is okay and if Dr. Celestin is okay with them continuing to see her, or if she still needs wound care.     [place] patient, however Dr. Celestin is currently in with patient's and APRN is out.     Call back is Nini ngo/ Wound Care [phone]

## 2025-04-04 ENCOUNTER — TELEPHONE (OUTPATIENT)
Age: 55
End: 2025-04-04
Payer: MEDICAID

## 2025-04-04 DIAGNOSIS — E11.65 TYPE 2 DIABETES MELLITUS WITH HYPERGLYCEMIA, UNSPECIFIED WHETHER LONG TERM INSULIN USE: Primary | ICD-10-CM

## 2025-04-04 DIAGNOSIS — M79.604 PAIN OF RIGHT LOWER EXTREMITY: ICD-10-CM

## 2025-04-04 RX ORDER — HYDROCODONE BITARTRATE AND ACETAMINOPHEN 10; 325 MG/1; MG/1
1 TABLET ORAL EVERY 8 HOURS PRN
Qty: 63 TABLET | Refills: 0 | Status: SHIPPED | OUTPATIENT
Start: 2025-04-04 | End: 2025-04-25

## 2025-04-04 RX ORDER — METOPROLOL SUCCINATE 25 MG/1
25 TABLET, EXTENDED RELEASE ORAL
Qty: 30 TABLET | Refills: 0 | Status: SHIPPED | OUTPATIENT
Start: 2025-04-04 | End: 2025-05-04

## 2025-04-04 RX ORDER — GLUCAGON INJECTION, SOLUTION 1 MG/.2ML
1 INJECTION, SOLUTION SUBCUTANEOUS ONCE AS NEEDED
Qty: 0.4 ML | Refills: 1 | Status: SHIPPED | OUTPATIENT
Start: 2025-04-04

## 2025-04-04 NOTE — TELEPHONE ENCOUNTER
[facility] wound care m wanting to know if they could proceed with seeing the patient before they see Dr. Celestin. I tried calling back but had to leave a voicemail. I informed them to continue the wound care that Dr. Brush has stated in the last note. Which is, dakin's solution dressing changes until he is seen again. If they need him to get in sooner than 4/22, let us know and we will make that appt.     [phone]

## 2025-04-04 NOTE — TELEPHONE ENCOUNTER
Will refill her pain medicine this time but she needs to do a urine drug screen and also sign a pain contract.

## 2025-04-04 NOTE — TELEPHONE ENCOUNTER
"Caller: Yarelis Jackson \"BRYANNA\"    Relationship: Self    Best call back number: [phone]     Requested Prescriptions:   Requested Prescriptions     Pending Prescriptions Disp Refills    HYDROcodone-acetaminophen (NORCO)  MG per tablet 63 tablet 0     Sig: Take 1 tablet by mouth Every 8 (Eight) Hours As Needed for Moderate Pain for up to 21 days.    metoprolol succinate XL (TOPROL-XL) 25 MG 24 hr tablet 30 tablet 0     Sig: Take 1 tablet by mouth Daily for 30 days.        Pharmacy where request should be sent: [facility] [id number] [facility], [place] - [address] [place] [address] PLZ - [phone]  - [phone] FX     Last office visit with prescribing clinician: 3/5/2025   Last telemedicine visit with prescribing clinician: Visit date not found   Next office visit with prescribing clinician: 6/5/2025     Does the patient have less than a 3 day supply:  [x] Yes  [] No    Would you like a call back once the refill request has been completed: [] Yes [x] No    If the office needs to give you a call back, can they leave a voicemail: [] Yes [x] No    Alicia Ellsworth   04/04/25 12:57 EDT     "

## 2025-04-07 DIAGNOSIS — E11.10 DM (DIABETES MELLITUS) TYPE 2, UNCONTROLLED, WITH KETOACIDOSIS: ICD-10-CM

## 2025-04-07 DIAGNOSIS — L65.9 HAIR LOSS: Primary | ICD-10-CM

## 2025-04-07 RX ORDER — INSULIN LISPRO 100 [IU]/ML
INJECTION, SOLUTION INTRAVENOUS; SUBCUTANEOUS
Qty: 15 ML | Refills: 1 | Status: SHIPPED | OUTPATIENT
Start: 2025-04-07

## 2025-04-10 DIAGNOSIS — E11.65 TYPE 2 DIABETES MELLITUS WITH HYPERGLYCEMIA, UNSPECIFIED WHETHER LONG TERM INSULIN USE: Primary | ICD-10-CM

## 2025-04-11 ENCOUNTER — LAB (OUTPATIENT)
Dept: LAB | Facility: HOSPITAL | Age: 55
End: 2025-04-11
Payer: MEDICAID

## 2025-04-11 DIAGNOSIS — L65.9 HAIR LOSS: ICD-10-CM

## 2025-04-11 LAB
TSH SERPL DL<=0.05 MIU/L-ACNC: 5.05 UIU/ML (ref 0.27–4.2)
WHOLE BLOOD HOLD SPECIMEN: NORMAL

## 2025-04-11 PROCEDURE — 84443 ASSAY THYROID STIM HORMONE: CPT

## 2025-04-11 PROCEDURE — 84402 ASSAY OF FREE TESTOSTERONE: CPT

## 2025-04-11 PROCEDURE — 36415 COLL VENOUS BLD VENIPUNCTURE: CPT

## 2025-04-11 PROCEDURE — 84403 ASSAY OF TOTAL TESTOSTERONE: CPT

## 2025-04-11 PROCEDURE — 82627 DEHYDROEPIANDROSTERONE: CPT

## 2025-04-12 LAB — DHEA-S SERPL-MCNC: 38.1 UG/DL (ref 41.2–243.7)

## 2025-04-14 ENCOUNTER — RESULTS FOLLOW-UP (OUTPATIENT)
Dept: FAMILY MEDICINE CLINIC | Facility: CLINIC | Age: 55
End: 2025-04-14
Payer: MEDICAID

## 2025-04-14 DIAGNOSIS — E03.9 HYPOTHYROIDISM, UNSPECIFIED TYPE: Primary | ICD-10-CM

## 2025-04-14 RX ORDER — LEVOTHYROXINE SODIUM 25 UG/1
25 TABLET ORAL
Qty: 30 TABLET | Refills: 0 | Status: SHIPPED | OUTPATIENT
Start: 2025-04-14 | End: 2025-05-14

## 2025-04-15 LAB
TESTOST FREE SERPL-MCNC: 0.5 PG/ML (ref 0–4.2)
TESTOST SERPL-MCNC: 3 NG/DL (ref 4–50)

## 2025-04-16 ENCOUNTER — OFFICE VISIT (OUTPATIENT)
Dept: WOUND CARE | Facility: HOSPITAL | Age: 55
End: 2025-04-16
Payer: MEDICAID

## 2025-04-16 PROCEDURE — G0463 HOSPITAL OUTPT CLINIC VISIT: HCPCS

## 2025-04-22 ENCOUNTER — OFFICE VISIT (OUTPATIENT)
Age: 55
End: 2025-04-22
Payer: MEDICAID

## 2025-04-22 VITALS
HEIGHT: 64 IN | HEART RATE: 116 BPM | BODY MASS INDEX: 29.37 KG/M2 | WEIGHT: 172 LBS | TEMPERATURE: 99.2 F | DIASTOLIC BLOOD PRESSURE: 97 MMHG | SYSTOLIC BLOOD PRESSURE: 168 MMHG | OXYGEN SATURATION: 97 %

## 2025-04-22 DIAGNOSIS — Z98.890 H/O FASCIOTOMY: ICD-10-CM

## 2025-04-22 DIAGNOSIS — I74.9 ARTERIAL THROMBOSIS: Primary | ICD-10-CM

## 2025-04-22 DIAGNOSIS — L97.912 NON-PRESSURE ULCER OF LOWER EXTREMITY WITH FAT LAYER EXPOSED, RIGHT: ICD-10-CM

## 2025-04-22 PROCEDURE — 3077F SYST BP >= 140 MM HG: CPT | Performed by: SURGERY

## 2025-04-22 PROCEDURE — 1160F RVW MEDS BY RX/DR IN RCRD: CPT | Performed by: SURGERY

## 2025-04-22 PROCEDURE — 99213 OFFICE O/P EST LOW 20 MIN: CPT | Performed by: SURGERY

## 2025-04-22 PROCEDURE — 1159F MED LIST DOCD IN RCRD: CPT | Performed by: SURGERY

## 2025-04-22 PROCEDURE — 3080F DIAST BP >= 90 MM HG: CPT | Performed by: SURGERY

## 2025-04-22 NOTE — PROGRESS NOTES
"Chief Complaint  Follow-up (INCISION AND DRAINAGE RIGHT LEG )    Subjective      HPI: Yarelis Jackson is a 55 y.o. female with history of acute right lower extremity ischemia, post femoral thromboendarterectomy and patch and fasciotomies returns for follow-up of infected fasciotomy wounds.  Doing well.  Has been dismissed from wound care.  Wound care consists of one fourth strength Dakin solution dressings, changed once daily.    Objective   Vital Signs:  /97 (BP Location: Right arm, Patient Position: Sitting, Cuff Size: Adult)   Pulse 116   Temp 99.2 °F (37.3 °C) (Oral)   Ht 162.6 cm (64\")   Wt 78 kg (172 lb)   SpO2 97%   BMI 29.52 kg/m²   Estimated body mass index is 29.52 kg/m² as calculated from the following:    Height as of this encounter: 162.6 cm (64\").    Weight as of this encounter: 78 kg (172 lb).        Yarelis Jackson  reports that she has been smoking cigarettes. She started smoking about 40 years ago. She has a 20.2 pack-year smoking history. She has never used smokeless tobacco..     Exam: Smells strongly of tobacco smoke.  There has been major healing in both wounds.  She now has a single wound on the lateral distal fasciotomy wound measuring about 5 x 0.7 x 0.3 cm, clean and granulating with no cellulitis.  There is a 5 x 1 x 0.1 cm clean, granulating wound in the proximal medial calf.  The wound distally is pretty much healed.  There remains a foot drop.  Dressing change.  Applied Aquaphor moisturizing ointment to dry skin surrounding wound.    Personal review of data: Note from wound care 4/16/2025       Assessment and Plan     Diagnoses and all orders for this visit:    1. Arterial thrombosis (Primary)    2. Non-pressure ulcer of lower extremity with fat layer exposed, right    3. H/O fasciotomy    Summary: 54-year-old woman with history of acute right lower extremity ischemia related to femoral thrombus, who underwent thrombectomy and then thromboendarterectomy with fasciotomy.  " Struggled with infected fasciotomy wounds, for which she required rehospitalization and operative debridement.  Comes in today doing well.  No pain.  Wounds are nearly healed, and I think are going to go on to heal without difficulty.  She does not have peripheral arterial disease, and I think her wound is going to heal shortly.  Offered ongoing wound follow-up or return as needed.  She opts for the latter.  Continue Dakin's solution dressing change.  Prescription for ankle-foot orthosis to [facility].    Follow Up     Return if symptoms worsen or fail to improve.  Patient was given instructions and counseling regarding her condition or for health maintenance advice. Please see specific information pulled into the AVS if appropriate.

## 2025-05-02 RX ORDER — METOPROLOL SUCCINATE 25 MG/1
25 TABLET, EXTENDED RELEASE ORAL
Qty: 30 TABLET | Refills: 0 | Status: SHIPPED | OUTPATIENT
Start: 2025-05-02 | End: 2025-06-01

## 2025-05-11 DIAGNOSIS — E03.9 HYPOTHYROIDISM, UNSPECIFIED TYPE: ICD-10-CM

## 2025-05-12 ENCOUNTER — OFFICE VISIT (OUTPATIENT)
Dept: FAMILY MEDICINE CLINIC | Facility: CLINIC | Age: 55
End: 2025-05-12
Payer: MEDICAID

## 2025-05-12 VITALS
BODY MASS INDEX: 29.51 KG/M2 | SYSTOLIC BLOOD PRESSURE: 118 MMHG | HEART RATE: 92 BPM | TEMPERATURE: 98.2 F | HEIGHT: 64 IN | DIASTOLIC BLOOD PRESSURE: 80 MMHG | OXYGEN SATURATION: 97 %

## 2025-05-12 DIAGNOSIS — L03.115 CELLULITIS OF RIGHT LOWER LEG: Primary | ICD-10-CM

## 2025-05-12 PROCEDURE — 3079F DIAST BP 80-89 MM HG: CPT | Performed by: INTERNAL MEDICINE

## 2025-05-12 PROCEDURE — 1159F MED LIST DOCD IN RCRD: CPT | Performed by: INTERNAL MEDICINE

## 2025-05-12 PROCEDURE — 1160F RVW MEDS BY RX/DR IN RCRD: CPT | Performed by: INTERNAL MEDICINE

## 2025-05-12 PROCEDURE — 1125F AMNT PAIN NOTED PAIN PRSNT: CPT | Performed by: INTERNAL MEDICINE

## 2025-05-12 PROCEDURE — 3074F SYST BP LT 130 MM HG: CPT | Performed by: INTERNAL MEDICINE

## 2025-05-12 PROCEDURE — 99213 OFFICE O/P EST LOW 20 MIN: CPT | Performed by: INTERNAL MEDICINE

## 2025-05-12 PROCEDURE — 3051F HG A1C>EQUAL 7.0%<8.0%: CPT | Performed by: INTERNAL MEDICINE

## 2025-05-12 RX ORDER — SODIUM HYPOCHLORITE 1.25 MG/ML
SOLUTION TOPICAL
Qty: 473 ML | Refills: 0 | Status: SHIPPED | OUTPATIENT
Start: 2025-05-12

## 2025-05-12 RX ORDER — ACYCLOVIR 400 MG/1
TABLET ORAL
COMMUNITY
Start: 2025-05-06 | End: 2025-05-19

## 2025-05-12 RX ORDER — LEVOTHYROXINE SODIUM 25 UG/1
TABLET ORAL
Qty: 30 TABLET | Refills: 0 | Status: SHIPPED | OUTPATIENT
Start: 2025-05-12

## 2025-05-12 NOTE — PROGRESS NOTES
"Chief Complaint  Thyroid Problem    Subjective        Yarelis Jackson presents to [facility]  History of Present Illness  F/u patient's right leg is healing well.  She has been discharged from wound care.  She has swelling and unable ambulate on her right foot secondary to foot drop she is post to get a prosthesis to help her ambulate tomorrow patient has someone coming to her home to help with her dressings.  She is doing well her hair loss is stopped.  She is voicing no other complaints.  Blood pressure is good  Objective   Vital Signs:  /80 (BP Location: Right arm, Patient Position: Sitting, Cuff Size: Adult)   Pulse 92   Temp 98.2 °F (36.8 °C) (Infrared)   Ht 162.6 cm (64.02\")   SpO2 97%   BMI 29.51 kg/m²   Estimated body mass index is 29.51 kg/m² as calculated from the following:    Height as of this encounter: 162.6 cm (64.02\").    Weight as of 4/22/25: 78 kg (172 lb).            Review of Systems     Physical Exam  Vitals and nursing note reviewed.   Constitutional:       Appearance: Normal appearance.   HENT:      Head: Normocephalic.      Nose: Nose normal.      Mouth/Throat:      Mouth: Mucous membranes are moist.   Eyes:      Conjunctiva/sclera: Conjunctivae normal.   Cardiovascular:      Rate and Rhythm: Regular rhythm.      Heart sounds: Normal heart sounds.   Pulmonary:      Effort: Pulmonary effort is normal.      Breath sounds: Normal breath sounds.   Abdominal:      Palpations: Abdomen is soft.   Musculoskeletal:         General: Swelling and tenderness present.        Legs:       Comments: Patient has wounds on her leg is demonstrated.  There is edema there she has swelling of her foot.  Flaccid in her foot at the ankle.   Skin:     Findings: No erythema.   Neurological:      Mental Status: She is alert. Mental status is at baseline.   Psychiatric:         Mood and Affect: Mood normal.         Behavior: Behavior normal.        Result Review :  The " following data was reviewed by: Rohit Schrader DO on 05/12/2025:  Common labs          3/1/2025    05:11 3/2/2025    04:33 3/2/2025    22:52 3/11/2025    15:00   Common Labs   Glucose  135  154     BUN  14  17     Creatinine 0.87  0.74  0.74  0.72    Sodium  136  134     Potassium  3.8  3.9     Chloride  102  100     Calcium  9.7  9.8     WBC 11.32   9.76  10.36    Hemoglobin 9.4   9.4  7.3    Hematocrit 30.1   30.5  24.1    Platelets 469   513  649    Hemoglobin A1C 7.44         Data reviewed : Recent hospital records recent office visits           Assessment and Plan   Diagnoses and all orders for this visit:    1. Cellulitis of right lower leg (Primary)  -     Ambulatory Referral to Podiatry      Will send to podiatry for care of her nails she is diabetic blood sugars are looking good she is doing well we will continue current medications see her back in 3 months       Follow Up   Return in about 3 months (around 8/12/2025).  Patient was given instructions and counseling regarding her condition or for health maintenance advice. Please see specific information pulled into the AVS if appropriate.

## 2025-05-19 DIAGNOSIS — Z79.4 TYPE 2 DIABETES MELLITUS WITH KETOACIDOSIS WITHOUT COMA, WITH LONG-TERM CURRENT USE OF INSULIN: Primary | ICD-10-CM

## 2025-05-19 DIAGNOSIS — E11.10 TYPE 2 DIABETES MELLITUS WITH KETOACIDOSIS WITHOUT COMA, WITH LONG-TERM CURRENT USE OF INSULIN: Primary | ICD-10-CM

## 2025-05-19 RX ORDER — ACYCLOVIR 400 MG/1
TABLET ORAL
Qty: 1 EACH | Refills: 2 | Status: SHIPPED | OUTPATIENT
Start: 2025-05-19 | End: 2025-05-19 | Stop reason: SDUPTHER

## 2025-05-19 RX ORDER — ACYCLOVIR 400 MG/1
1 TABLET ORAL
Qty: 9 EACH | Refills: 2 | Status: SHIPPED | OUTPATIENT
Start: 2025-05-19 | End: 2025-11-15

## 2025-05-19 NOTE — TELEPHONE ENCOUNTER
Rx Refill Note  Requested Prescriptions     Pending Prescriptions Disp Refills    Continuous Glucose Sensor (Dexcom G7 Sensor) misc [Pharmacy Med Name: DEXCOM G7 SENSOR] 1 each      Sig: APPLY SENSOR TO SKIN FOR CONTINUOUS BLOOD SUGAR MONITORING, REPLACE EVERY 10 DAYS      Last office visit with prescribing clinician: 5/12/2025   Last telemedicine visit with prescribing clinician: Visit date not found   Next office visit with prescribing clinician: 6/5/2025                         Would you like a call back once the refill request has been completed: [] Yes [] No    If the office needs to give you a call back, can they leave a voicemail: [] Yes [] No    Krysta Armendariz MA  05/19/25, 10:22 EDT

## 2025-05-22 ENCOUNTER — OUTSIDE FACILITY SERVICE (OUTPATIENT)
Dept: FAMILY MEDICINE CLINIC | Facility: CLINIC | Age: 55
End: 2025-05-22
Payer: OTHER GOVERNMENT

## 2025-05-22 DIAGNOSIS — I87.2 PERIPHERAL VENOUS INSUFFICIENCY: ICD-10-CM

## 2025-05-22 DIAGNOSIS — L03.115 CELLULITIS OF RIGHT LOWER LEG: Primary | ICD-10-CM

## 2025-05-25 LAB
NCCN CRITERIA FLAG: NORMAL
TYRER CUZICK SCORE: 7.4

## 2025-05-27 RX ORDER — METOPROLOL SUCCINATE 25 MG/1
25 TABLET, EXTENDED RELEASE ORAL
Qty: 30 TABLET | Refills: 0 | Status: SHIPPED | OUTPATIENT
Start: 2025-05-27 | End: 2025-06-26

## 2025-05-29 ENCOUNTER — TELEPHONE (OUTPATIENT)
Dept: FAMILY MEDICINE CLINIC | Facility: CLINIC | Age: 55
End: 2025-05-29
Payer: MEDICAID

## 2025-05-29 NOTE — TELEPHONE ENCOUNTER
I called pt, she said she thought they were done anyway. She states she don't answer unknown calls and has not checked her vm.

## 2025-05-29 NOTE — TELEPHONE ENCOUNTER
Caller: NITHIN HERRERA [facility]    Relationship to patient: Home Health    Best call back number: [phone]    Patient is needing: THEY ARE DISCHARGING HER DUE TO NOT BEING ABLE TO CONTACT HER.

## 2025-06-02 ENCOUNTER — OFFICE VISIT (OUTPATIENT)
Dept: WOUND CARE | Facility: HOSPITAL | Age: 55
End: 2025-06-02
Payer: OTHER GOVERNMENT

## 2025-06-02 PROCEDURE — G0463 HOSPITAL OUTPT CLINIC VISIT: HCPCS

## 2025-06-05 ENCOUNTER — OFFICE VISIT (OUTPATIENT)
Dept: FAMILY MEDICINE CLINIC | Facility: CLINIC | Age: 55
End: 2025-06-05
Payer: OTHER GOVERNMENT

## 2025-06-05 VITALS
SYSTOLIC BLOOD PRESSURE: 122 MMHG | WEIGHT: 172 LBS | HEIGHT: 64 IN | DIASTOLIC BLOOD PRESSURE: 70 MMHG | TEMPERATURE: 97.8 F | BODY MASS INDEX: 29.37 KG/M2 | HEART RATE: 92 BPM | OXYGEN SATURATION: 95 %

## 2025-06-05 DIAGNOSIS — I87.2 PERIPHERAL VENOUS INSUFFICIENCY: ICD-10-CM

## 2025-06-05 DIAGNOSIS — E03.9 HYPOTHYROIDISM, UNSPECIFIED TYPE: ICD-10-CM

## 2025-06-05 DIAGNOSIS — E11.65 TYPE 2 DIABETES MELLITUS WITH HYPERGLYCEMIA, WITH LONG-TERM CURRENT USE OF INSULIN: Primary | ICD-10-CM

## 2025-06-05 DIAGNOSIS — Z79.4 TYPE 2 DIABETES MELLITUS WITH HYPERGLYCEMIA, WITH LONG-TERM CURRENT USE OF INSULIN: Primary | ICD-10-CM

## 2025-06-05 NOTE — PROGRESS NOTES
"Chief Complaint  Follow-up (Fitted for brace, goes back to [facility] to get the leg brace.)    Subjective        Yarelis Jackson presents to [facility]  History of Present Illness  F/u , hair better. Waiting on brace. Sees wound care.  Patient has no complaints.  She is doing well on the thyroid medication  Objective   Vital Signs:  /70 (BP Location: Right arm, Patient Position: Sitting, Cuff Size: Adult)   Pulse 92   Temp 97.8 °F (36.6 °C) (Infrared)   Ht 162.6 cm (64.02\")   Wt 78 kg (172 lb)   SpO2 95%   BMI 29.51 kg/m²   Estimated body mass index is 29.51 kg/m² as calculated from the following:    Height as of this encounter: 162.6 cm (64.02\").    Weight as of this encounter: 78 kg (172 lb).            Review of Systems   Cardiovascular:  Positive for leg swelling.   All other systems reviewed and are negative.       Physical Exam  Vitals and nursing note reviewed.   Constitutional:       Appearance: Normal appearance.   HENT:      Head: Normocephalic.   Cardiovascular:      Heart sounds: Normal heart sounds.   Pulmonary:      Breath sounds: Normal breath sounds.   Abdominal:      Palpations: Abdomen is soft.      Tenderness: There is no abdominal tenderness.   Musculoskeletal:         General: No tenderness.      Right lower leg: Edema present.   Skin:     General: Skin is warm and dry.   Neurological:      General: No focal deficit present.      Mental Status: She is alert.   Psychiatric:         Mood and Affect: Mood normal.         Behavior: Behavior normal.        Result Review :      Data reviewed : Previous records          Assessment and Plan   Diagnoses and all orders for this visit:    1. Type 2 diabetes mellitus with hyperglycemia, with long-term current use of insulin (Primary)    2. Hypothyroidism, unspecified type  -     TSH; Future    3. Peripheral venous insufficiency    Reviewed patient.  She is seeing wound care and they put a dressing " on her right leg.  But there is still some swelling.  We discussed using the shoe that she got from orthotics with the brace which will give her some ambulation she still has a dropfoot.  Also discussed her improvement with her thyroid medication her hair is not falling out and it is growing back.  She is feeling well blood sugars have been good per her report.  Will continue current course.  At next visit we will check a TSH.  She will continue to monitor blood pressures.         Follow Up   Return in about 3 months (around 9/5/2025).  Patient was given instructions and counseling regarding her condition or for health maintenance advice. Please see specific information pulled into the AVS if appropriate.

## 2025-06-09 ENCOUNTER — OFFICE VISIT (OUTPATIENT)
Dept: WOUND CARE | Facility: HOSPITAL | Age: 55
End: 2025-06-09
Payer: OTHER GOVERNMENT

## 2025-06-11 ENCOUNTER — DOCUMENTATION (OUTPATIENT)
Dept: WOUND CARE | Facility: HOSPITAL | Age: 55
End: 2025-06-11
Payer: OTHER GOVERNMENT

## 2025-06-12 DIAGNOSIS — E03.9 HYPOTHYROIDISM, UNSPECIFIED TYPE: ICD-10-CM

## 2025-06-12 RX ORDER — LEVOTHYROXINE SODIUM 25 UG/1
TABLET ORAL
Qty: 30 TABLET | Refills: 0 | Status: SHIPPED | OUTPATIENT
Start: 2025-06-12

## 2025-06-12 NOTE — TELEPHONE ENCOUNTER
Rx Refill Note  Requested Prescriptions     Pending Prescriptions Disp Refills    levothyroxine (SYNTHROID, LEVOTHROID) 25 MCG tablet [Pharmacy Med Name: LEVOTHYROXINE 25 MCG TABLET] 30 tablet 0     Sig: TAKE 1 TABLET BY MOUTH IN THE MORNING FOR 30 DAYS      Last office visit with prescribing clinician: 6/5/2025   Last telemedicine visit with prescribing clinician: Visit date not found   Next office visit with prescribing clinician: 7/7/2025                         Would you like a call back once the refill request has been completed: [] Yes [] No    If the office needs to give you a call back, can they leave a voicemail: [] Yes [] No    Krysta Armendariz MA  06/12/25, 10:03 EDT

## 2025-06-16 ENCOUNTER — OFFICE VISIT (OUTPATIENT)
Dept: WOUND CARE | Facility: HOSPITAL | Age: 55
End: 2025-06-16
Payer: OTHER GOVERNMENT

## 2025-06-23 ENCOUNTER — OFFICE VISIT (OUTPATIENT)
Dept: WOUND CARE | Facility: HOSPITAL | Age: 55
End: 2025-06-23
Payer: OTHER GOVERNMENT

## 2025-06-23 ENCOUNTER — TELEPHONE (OUTPATIENT)
Dept: FAMILY MEDICINE CLINIC | Facility: CLINIC | Age: 55
End: 2025-06-23
Payer: OTHER GOVERNMENT

## 2025-06-23 DIAGNOSIS — L97.812 NON-PRESSURE CHRONIC ULCER OF OTHER PART OF RIGHT LOWER LEG WITH FAT LAYER EXPOSED: ICD-10-CM

## 2025-06-23 DIAGNOSIS — L98.499 TYPE 2 DIABETES MELLITUS WITH OTHER SKIN ULCER, UNSPECIFIED WHETHER LONG TERM INSULIN USE: ICD-10-CM

## 2025-06-23 DIAGNOSIS — E11.622 TYPE 2 DIABETES MELLITUS WITH OTHER SKIN ULCER, UNSPECIFIED WHETHER LONG TERM INSULIN USE: ICD-10-CM

## 2025-06-23 DIAGNOSIS — I87.2 VENOUS INSUFFICIENCY (CHRONIC) (PERIPHERAL): Primary | ICD-10-CM

## 2025-06-23 DIAGNOSIS — R26.89 OTHER ABNORMALITIES OF GAIT AND MOBILITY: ICD-10-CM

## 2025-06-23 NOTE — TELEPHONE ENCOUNTER
Spoke with Yarelis Jackson. Verbalized understanding, no questions. She stated she would do the test and send it out.

## 2025-06-26 RX ORDER — METOPROLOL SUCCINATE 25 MG/1
25 TABLET, EXTENDED RELEASE ORAL
Qty: 30 TABLET | Refills: 0 | Status: SHIPPED | OUTPATIENT
Start: 2025-06-26 | End: 2025-07-26

## 2025-06-26 RX ORDER — APIXABAN 5 MG/1
5 TABLET, FILM COATED ORAL EVERY 12 HOURS SCHEDULED
Qty: 60 TABLET | Refills: 2 | Status: SHIPPED | OUTPATIENT
Start: 2025-06-26

## 2025-06-30 ENCOUNTER — OFFICE VISIT (OUTPATIENT)
Dept: WOUND CARE | Facility: HOSPITAL | Age: 55
End: 2025-06-30
Payer: OTHER GOVERNMENT

## 2025-07-07 ENCOUNTER — OFFICE VISIT (OUTPATIENT)
Dept: WOUND CARE | Facility: HOSPITAL | Age: 55
End: 2025-07-07
Payer: OTHER GOVERNMENT

## 2025-07-07 ENCOUNTER — OFFICE VISIT (OUTPATIENT)
Dept: FAMILY MEDICINE CLINIC | Facility: CLINIC | Age: 55
End: 2025-07-07
Payer: OTHER GOVERNMENT

## 2025-07-07 VITALS
OXYGEN SATURATION: 96 % | SYSTOLIC BLOOD PRESSURE: 130 MMHG | BODY MASS INDEX: 29.37 KG/M2 | DIASTOLIC BLOOD PRESSURE: 80 MMHG | HEART RATE: 97 BPM | TEMPERATURE: 98.1 F | RESPIRATION RATE: 16 BRPM | WEIGHT: 172 LBS | HEIGHT: 64 IN

## 2025-07-07 DIAGNOSIS — Z87.891 PERSONAL HISTORY OF TOBACCO USE, PRESENTING HAZARDS TO HEALTH: Primary | ICD-10-CM

## 2025-07-07 DIAGNOSIS — E11.65 TYPE 2 DIABETES MELLITUS WITH HYPERGLYCEMIA, WITH LONG-TERM CURRENT USE OF INSULIN: ICD-10-CM

## 2025-07-07 DIAGNOSIS — Z71.6 TOBACCO ABUSE COUNSELING: ICD-10-CM

## 2025-07-07 DIAGNOSIS — Z12.11 SCREEN FOR COLON CANCER: ICD-10-CM

## 2025-07-07 DIAGNOSIS — Z12.2 SCREENING FOR LUNG CANCER: ICD-10-CM

## 2025-07-07 DIAGNOSIS — Z79.4 TYPE 2 DIABETES MELLITUS WITH HYPERGLYCEMIA, WITH LONG-TERM CURRENT USE OF INSULIN: ICD-10-CM

## 2025-07-07 DIAGNOSIS — Z00.00 ANNUAL PHYSICAL EXAM: ICD-10-CM

## 2025-07-07 DIAGNOSIS — Z12.31 ENCOUNTER FOR SCREENING MAMMOGRAM FOR MALIGNANT NEOPLASM OF BREAST: ICD-10-CM

## 2025-07-07 PROCEDURE — G0296 VISIT TO DETERM LDCT ELIG: HCPCS | Performed by: INTERNAL MEDICINE

## 2025-07-07 PROCEDURE — 3075F SYST BP GE 130 - 139MM HG: CPT | Performed by: INTERNAL MEDICINE

## 2025-07-07 PROCEDURE — 3051F HG A1C>EQUAL 7.0%<8.0%: CPT | Performed by: INTERNAL MEDICINE

## 2025-07-07 PROCEDURE — 1159F MED LIST DOCD IN RCRD: CPT | Performed by: INTERNAL MEDICINE

## 2025-07-07 PROCEDURE — 1160F RVW MEDS BY RX/DR IN RCRD: CPT | Performed by: INTERNAL MEDICINE

## 2025-07-07 PROCEDURE — 3079F DIAST BP 80-89 MM HG: CPT | Performed by: INTERNAL MEDICINE

## 2025-07-07 PROCEDURE — 99214 OFFICE O/P EST MOD 30 MIN: CPT | Performed by: INTERNAL MEDICINE

## 2025-07-07 RX ORDER — ATORVASTATIN CALCIUM 20 MG/1
20 TABLET, FILM COATED ORAL DAILY
Qty: 30 TABLET | Refills: 0 | Status: SHIPPED | OUTPATIENT
Start: 2025-07-07

## 2025-07-07 RX ORDER — NICOTINE 21 MG/24HR
1 PATCH, TRANSDERMAL 24 HOURS TRANSDERMAL EVERY 24 HOURS
Qty: 30 PATCH | Refills: 0 | Status: SHIPPED | OUTPATIENT
Start: 2025-07-07 | End: 2025-08-06

## 2025-07-07 RX ORDER — PROCHLORPERAZINE 25 MG/1
SUPPOSITORY RECTAL
Qty: 1 EACH | Refills: 1 | Status: SHIPPED | OUTPATIENT
Start: 2025-07-07

## 2025-07-07 NOTE — PATIENT INSTRUCTIONS
Please review the decision aid used during our discussion regarding the Low dose lung cancer screening visit today.                          Please review the decision aid used during our discussion regarding the Low dose lung cancer screening visit today.                          Please review the decision aid used during our discussion regarding the Low dose lung cancer screening visit today.                          Please review the decision aid used during our discussion regarding the Low dose lung cancer screening visit today.                          For more information:    Quit Now [place]  1-800-QUIT-NOW  https://[place].quitlogix.org/en-[place]/  Steps to Quit Smoking  Smoking tobacco can be harmful to your health and can affect almost every organ in your body. Smoking puts you, and those around you, at risk for developing many serious chronic diseases. Quitting smoking is difficult, but it is one of the best things that you can do for your health. It is never too late to quit.  What are the benefits of quitting smoking?  When you quit smoking, you lower your risk of developing serious diseases and conditions, such as:  Lung cancer or lung disease, such as COPD.  Heart disease.  Stroke.  Heart attack.  Infertility.  Osteoporosis and bone fractures.  Additionally, symptoms such as coughing, wheezing, and shortness of breath may get better when you quit. You may also find that you get sick less often because your body is stronger at fighting off colds and infections. If you are pregnant, quitting smoking can help to reduce your chances of having a baby of low birth weight.  How do I get ready to quit?  When you decide to quit smoking, create a plan to make sure that you are successful. Before you quit:  Pick a date to quit. Set a date within the next two weeks to give you time to prepare.  Write down the reasons why you are quitting. Keep this list in places where you will see it often, such as on your  bathroom mirror or in your car or wallet.  Identify the people, places, things, and activities that make you want to smoke (triggers) and avoid them. Make sure to take these actions:  Throw away all cigarettes at home, at work, and in your car.  Throw away smoking accessories, such as ashtrays and lighters.  Clean your car and make sure to empty the ashtray.  Clean your home, including curtains and carpets.  Tell your family, friends, and coworkers that you are quitting. Support from your loved ones can make quitting easier.  Talk with your health care provider about your options for quitting smoking.  Find out what treatment options are covered by your health insurance.  What strategies can I use to quit smoking?  Talk with your healthcare provider about different strategies to quit smoking. Some strategies include:  Quitting smoking altogether instead of gradually lessening how much you smoke over a period of time. Research shows that quitting “cold turkey” is more successful than gradually quitting.  Attending in-person counseling to help you build problem-solving skills. You are more likely to have success in quitting if you attend several counseling sessions. Even short sessions of 10 minutes can be effective.  Finding resources and support systems that can help you to quit smoking and remain smoke-free after you quit. These resources are most helpful when you use them often. They can include:  Online chats with a counselor.  Telephone quitlines.  Printed self-help materials.  Support groups or group counseling.  Text messaging programs.  Mobile phone applications.  Taking medicines to help you quit smoking. (If you are pregnant or breastfeeding, talk with your health care provider first.) Some medicines contain nicotine and some do not. Both types of medicines help with cravings, but the medicines that include nicotine help to relieve withdrawal symptoms. Your health care provider may recommend:  Nicotine  patches, gum, or lozenges.  Nicotine inhalers or sprays.  Non-nicotine medicine that is taken by mouth.  Talk with your health care provider about combining strategies, such as taking medicines while you are also receiving in-person counseling. Using these two strategies together makes you more likely to succeed in quitting than if you used either strategy on its own.  If you are pregnant or breastfeeding, talk with your health care provider about finding counseling or other support strategies to quit smoking. Do not take medicine to help you quit smoking unless told to do so by your health care provider.  What things can I do to make it easier to quit?  Quitting smoking might feel overwhelming at first, but there is a lot that you can do to make it easier. Take these important actions:  Reach out to your family and friends and ask that they support and encourage you during this time. Call telephone quitlines, reach out to support groups, or work with a counselor for support.  Ask people who smoke to avoid smoking around you.  Avoid places that trigger you to smoke, such as bars, parties, or smoke-break areas at work.  Spend time around people who do not smoke.  Lessen stress in your life, because stress can be a smoking trigger for some people. To lessen stress, try:  Exercising regularly.  Deep-breathing exercises.  Yoga.  Meditating.  Performing a body scan. This involves closing your eyes, scanning your body from head to toe, and noticing which parts of your body are particularly tense. Purposefully relax the muscles in those areas.  Download or purchase mobile phone or tablet apps (applications) that can help you stick to your quit plan by providing reminders, tips, and encouragement. There are many free apps, such as QuitGuide from the CDC (Centers for Disease Control and Prevention). You can find other support for quitting smoking (smoking cessation) through smokefree.gov and other websites.  How will I feel  when I quit smoking?  Within the first 24 hours of quitting smoking, you may start to feel some withdrawal symptoms. These symptoms are usually most noticeable 2-3 days after quitting, but they usually do not last beyond 2-3 weeks. Changes or symptoms that you might experience include:  Mood swings.  Restlessness, anxiety, or irritation.  Difficulty concentrating.  Dizziness.  Strong cravings for sugary foods in addition to nicotine.  Mild weight gain.  Constipation.  Nausea.  Coughing or a sore throat.  Changes in how your medicines work in your body.  A depressed mood.  Difficulty sleeping (insomnia).  After the first 2-3 weeks of quitting, you may start to notice more positive results, such as:  Improved sense of smell and taste.  Decreased coughing and sore throat.  Slower heart rate.  Lower blood pressure.  Clearer skin.  The ability to breathe more easily.  Fewer sick days.  Quitting smoking is very challenging for most people. Do not get discouraged if you are not successful the first time. Some people need to make many attempts to quit before they achieve long-term success. Do your best to stick to your quit plan, and talk with your health care provider if you have any questions or concerns.  This information is not intended to replace advice given to you by your health care provider. Make sure you discuss any questions you have with your health care provider.  Document Released: 12/12/2002 Document Revised: 08/15/2017 Document Reviewed: 05/03/2016  [facility] Interactive Patient Education © 2017 [facility] [employer]

## 2025-07-07 NOTE — PROGRESS NOTES
" Low-Dose Lung Cancer CT Screening Visit    CHIEF COMPLAINT:    Shared Decision Making  I am discussing tobacco cessation today with Yarelis Jackson    SMOKING HISTORY:     Social History     Tobacco Use   Smoking Status Every Day    Current packs/day: 0.50    Average packs/day: 0.5 packs/day for 40.5 years (20.3 ttl pk-yrs)    Types: Cigarettes    Start date: 1985   Smokeless Tobacco Never       SUBJECTIVE:     Yarelis Jackson is currently smoking 1 pack(s) per day with a  40 pack year history.  Reports no use of alternate forms of tobacco, electronic cigarettes, marijuana or other substances.  Based on the recommendation of the [place] Preventive Services Task Force, this patient is at high risk for lung cancer and a low-dose CT screening scan is recommended.     The patient has had no hemoptysis, unintentional weight loss or increasing shortness of breath. The patient is asymptomatic and has no signs or symptoms of lung cancer.     Together we discussed the potential benefits and potential harms of being screened for lung cancer including the potential for follow up diagnostic testing, risk for over diagnosis, false positive rate and radiation exposure using the [facility] Lung Cancer Screening Shared Decision-Making Tool. A copy of this decision aid resource has been provided in the after visit summary.  We also reviewed the patient's smoking history and counseled her on the importance and health benefits of stopping the use of tobacco products.      OBJECTIVE:    /80 (BP Location: Left arm, Patient Position: Sitting, Cuff Size: Adult)   Pulse 97   Temp 98.1 °F (36.7 °C) (Infrared)   Resp 16   Ht 162.6 cm (64.02\")   Wt 78 kg (172 lb)   LMP  (LMP Unknown)   SpO2 96%   BMI 29.51 kg/m²   General: no distress, alert and oriented  Chest: Lung sounds are clear to auscultation, no wheezes, rales or rhonchi, with symmetric air entry. No respiratory distress  Cardiovascular: RRR with no murmur " auscultated    Abdomen soft nontender  Extremities slight swelling right lower extremity.  Skin no rashes  Neuro at baseline    Smoking Cessation discussion:     We discussed that there are a number of resources and interventions to assist with smoking cessation if needed in the future.   On a scale of zero to ten, the patient rates their motivation to quit at a 5 out of 10 today.  Referral to stop smoking class has been offered. Medication options for tobacco cessation have been discussed with the patient.     Recommendations for continued lung cancer screening:      We discussed the NCCN guidelines for lung cancer screening and the patient verbalized understanding that annual screening is recommended until fifteen years beyond smoking as long as they have no other disease or comorbidity that would prevent them from receiving cancer treatments such as surgery should a lung cancer be detected.  After review of the NCCN guidelines and recommendations for ongoing screening, the patient verbalized understanding of recommendations for follow-up.  The patient has decided to proceed with a Low Dose Lung Cancer Screening CT today.      5 minutes face-to-face spent counseling patient on the continued health benefits of having quit tobacco, maintaining smoking abstinence, smoking cessation strategies and resources, including the 1-800-Quit-Now referenced in the AVS, as well as the importance of adherence to annual lung cancer low-dose CT screening.       Low-Dose Lung Cancer CT Screening Visit    CHIEF COMPLAINT:    Shared Decision Making  I am discussing tobacco cessation today with Yarelis Jackson    SMOKING HISTORY:     Social History     Tobacco Use   Smoking Status Every Day    Current packs/day: 0.50    Average packs/day: 0.5 packs/day for 40.5 years (20.3 ttl pk-yrs)    Types: Cigarettes    Start date: 1985   Smokeless Tobacco Never       SUBJECTIVE:     Yarelis Jackson is currently smoking 1 pack(s) per day with a   "40 pack year history.  Reports no use of alternate forms of tobacco, electronic cigarettes, marijuana or other substances.  Based on the recommendation of the [place] Preventive Services Task Force, this patient is at high risk for lung cancer and a low-dose CT screening scan is recommended.     The patient has had no hemoptysis, unintentional weight loss or increasing shortness of breath. The patient is asymptomatic and has no signs or symptoms of lung cancer.     Together we discussed the potential benefits and potential harms of being screened for lung cancer including the potential for follow up diagnostic testing, risk for over diagnosis, false positive rate and radiation exposure using the [facility] Lung Cancer Screening Shared Decision-Making Tool. A copy of this decision aid resource has been provided in the after visit summary.  We also reviewed the patient's smoking history and counseled her on the importance and health benefits of stopping the use of tobacco products.      OBJECTIVE:    /80 (BP Location: Left arm, Patient Position: Sitting, Cuff Size: Adult)   Pulse 97   Temp 98.1 °F (36.7 °C) (Infrared)   Resp 16   Ht 162.6 cm (64.02\")   Wt 78 kg (172 lb)   LMP  (LMP Unknown)   SpO2 96%   BMI 29.51 kg/m²   General: no distress, alert and oriented  Chest: Lung sounds are clear to auscultation, no wheezes, rales or rhonchi, with symmetric air entry. No respiratory distress  Cardiovascular: RRR with no murmur auscultated      Smoking Cessation discussion:     We discussed that there are a number of resources and interventions to assist with smoking cessation if needed in the future.   On a scale of zero to ten, the patient rates their motivation to quit at a 5 out of 10 today.  Referral to stop smoking class has been offered. Medication options for tobacco cessation have been discussed with the patient.     Recommendations for continued lung cancer screening:      We discussed the " NCCN guidelines for lung cancer screening and the patient verbalized understanding that annual screening is recommended until fifteen years beyond smoking as long as they have no other disease or comorbidity that would prevent them from receiving cancer treatments such as surgery should a lung cancer be detected.  After review of the NCCN guidelines and recommendations for ongoing screening, the patient verbalized understanding of recommendations for follow-up.  The patient has decided to proceed with a Low Dose Lung Cancer Screening CT today.      5 minutes face-to-face spent counseling patient on the continued health benefits of having quit tobacco, maintaining smoking abstinence, smoking cessation strategies and resources, including the 1-800-Quit-Now referenced in the AVS, as well as the importance of adherence to annual lung cancer low-dose CT screening.       Low-Dose Lung Cancer CT Screening Visit    CHIEF COMPLAINT:    Shared Decision Making  I am discussing tobacco cessation today with Yarelis Jackson    SMOKING HISTORY:     Social History     Tobacco Use   Smoking Status Every Day    Current packs/day: 0.50    Average packs/day: 0.5 packs/day for 40.5 years (20.3 ttl pk-yrs)    Types: Cigarettes    Start date: 1985   Smokeless Tobacco Never       SUBJECTIVE:     Yarelis Jackson is currently smoking 1 pack(s) per day with a  40 pack year history.  Reports no use of alternate forms of tobacco, electronic cigarettes, marijuana or other substances.  Based on the recommendation of the [place] Preventive Services Task Force, this patient is at high risk for lung cancer and a low-dose CT screening scan is recommended.     The patient has had no hemoptysis, unintentional weight loss or increasing shortness of breath. The patient is asymptomatic and has no signs or symptoms of lung cancer.     Together we discussed the potential benefits and potential harms of being screened for lung cancer including the  "potential for follow up diagnostic testing, risk for over diagnosis, false positive rate and radiation exposure using the [facility] Lung Cancer Screening Shared Decision-Making Tool. A copy of this decision aid resource has been provided in the after visit summary.  We also reviewed the patient's smoking history and counseled her on the importance and health benefits of stopping the use of tobacco products.      OBJECTIVE:    /80 (BP Location: Left arm, Patient Position: Sitting, Cuff Size: Adult)   Pulse 97   Temp 98.1 °F (36.7 °C) (Infrared)   Resp 16   Ht 162.6 cm (64.02\")   Wt 78 kg (172 lb)   LMP  (LMP Unknown)   SpO2 96%   BMI 29.51 kg/m²   General: no distress, alert and oriented  Chest: Lung sounds are clear to auscultation, no wheezes, rales or rhonchi, with symmetric air entry. No respiratory distress  Cardiovascular: RRR with no murmur auscultated      Smoking Cessation discussion:     We discussed that there are a number of resources and interventions to assist with smoking cessation if needed in the future.   On a scale of zero to ten, the patient rates their motivation to quit at a 5 out of 10 today.  Referral to stop smoking class has been offered. Medication options for tobacco cessation have been discussed with the patient.     Recommendations for continued lung cancer screening:      We discussed the NCCN guidelines for lung cancer screening and the patient verbalized understanding that annual screening is recommended until fifteen years beyond smoking as long as they have no other disease or comorbidity that would prevent them from receiving cancer treatments such as surgery should a lung cancer be detected.  After review of the NCCN guidelines and recommendations for ongoing screening, the patient verbalized understanding of recommendations for follow-up.  The patient has decided to proceed with a Low Dose Lung Cancer Screening CT today.      1 minutes face-to-face spent " counseling patient on the continued health benefits of having quit tobacco, maintaining smoking abstinence, smoking cessation strategies and resources, including the 1-800-Quit-Now referenced in the AVS, as well as the importance of adherence to annual lung cancer low-dose CT screening.      Answers submitted by the patient for this visit:  Problem not listed (Submitted on 7/6/2025)  Chief Complaint: Other medical problem  Reason for appointment: Blood work  abdominal pain: No  anorexia: No  joint pain: No  change in stool: No  chest pain: No  chills: No  nasal congestion: No  cough: No  diaphoresis: No  fatigue: No  fever: No  headaches: No  joint swelling: No  myalgias: No  nausea: No  neck pain: No  numbness: No  rash: No  sore throat: No  swollen glands: No  dysuria: No  vertigo: No  visual change: No  vomiting: No  weakness: No  Onset: at an unknown time  Chronicity: new  Frequency: monthly  Medications tried: N/A  Additional information: N/A     Low-Dose Lung Cancer CT Screening Visit    CHIEF COMPLAINT:    Shared Decision Making  I am discussing tobacco cessation today with Yarelis Jackson    SMOKING HISTORY:     Social History     Tobacco Use   Smoking Status Every Day    Current packs/day: 0.50    Average packs/day: 0.5 packs/day for 40.5 years (20.3 ttl pk-yrs)    Types: Cigarettes    Start date: 1985   Smokeless Tobacco Never       SUBJECTIVE:     Yarelis Jackson is currently smoking 1 pack(s) per day with a  40 pack year history.  Reports no use of alternate forms of tobacco, electronic cigarettes, marijuana or other substances.  Based on the recommendation of the [place] Preventive Services Task Force, this patient is at high risk for lung cancer and a low-dose CT screening scan is recommended.     The patient has had no hemoptysis, unintentional weight loss or increasing shortness of breath. The patient is asymptomatic and has no signs or symptoms of lung cancer.     Together we discussed the  "potential benefits and potential harms of being screened for lung cancer including the potential for follow up diagnostic testing, risk for over diagnosis, false positive rate and radiation exposure using the [facility] Lung Cancer Screening Shared Decision-Making Tool. A copy of this decision aid resource has been provided in the after visit summary.  We also reviewed the patient's smoking history and counseled her on the importance and health benefits of stopping the use of tobacco products.      OBJECTIVE:    /80 (BP Location: Left arm, Patient Position: Sitting, Cuff Size: Adult)   Pulse 97   Temp 98.1 °F (36.7 °C) (Infrared)   Resp 16   Ht 162.6 cm (64.02\")   Wt 78 kg (172 lb)   LMP  (LMP Unknown)   SpO2 96%   BMI 29.51 kg/m²   General: no distress, alert and oriented  Chest: Lung sounds are clear to auscultation, no wheezes, rales or rhonchi, with symmetric air entry. No respiratory distress  Cardiovascular: RRR with no murmur auscultated      Smoking Cessation discussion:     We discussed that there are a number of resources and interventions to assist with smoking cessation if needed in the future.   On a scale of zero to ten, the patient rates their motivation to quit at a 5 out of 10 today.  Referral to stop smoking class has been offered. Medication options for tobacco cessation have been discussed with the patient.     Recommendations for continued lung cancer screening:      We discussed the NCCN guidelines for lung cancer screening and the patient verbalized understanding that annual screening is recommended until fifteen years beyond smoking as long as they have no other disease or comorbidity that would prevent them from receiving cancer treatments such as surgery should a lung cancer be detected.  After review of the NCCN guidelines and recommendations for ongoing screening, the patient verbalized understanding of recommendations for follow-up.  The patient has decided to proceed " with a Low Dose Lung Cancer Screening CT today.      6 minutes face-to-face spent counseling patient on the continued health benefits of having quit tobacco, maintaining smoking abstinence, smoking cessation strategies and resources, including the 1-800-Quit-Now referenced in the AVS, as well as the importance of adherence to annual lung cancer low-dose CT screening.

## 2025-07-14 ENCOUNTER — OFFICE VISIT (OUTPATIENT)
Dept: WOUND CARE | Facility: HOSPITAL | Age: 55
End: 2025-07-14
Payer: OTHER GOVERNMENT

## 2025-07-14 DIAGNOSIS — E03.9 HYPOTHYROIDISM, UNSPECIFIED TYPE: ICD-10-CM

## 2025-07-14 RX ORDER — LEVOTHYROXINE SODIUM 25 UG/1
TABLET ORAL
Qty: 30 TABLET | Refills: 0 | Status: SHIPPED | OUTPATIENT
Start: 2025-07-14

## 2025-07-21 ENCOUNTER — TELEPHONE (OUTPATIENT)
Dept: FAMILY MEDICINE CLINIC | Facility: CLINIC | Age: 55
End: 2025-07-21
Payer: OTHER GOVERNMENT

## 2025-07-25 ENCOUNTER — TELEPHONE (OUTPATIENT)
Dept: FAMILY MEDICINE CLINIC | Facility: CLINIC | Age: 55
End: 2025-07-25

## 2025-07-28 ENCOUNTER — OFFICE VISIT (OUTPATIENT)
Dept: WOUND CARE | Facility: HOSPITAL | Age: 55
End: 2025-07-28
Payer: OTHER GOVERNMENT

## 2025-08-05 DIAGNOSIS — Z71.6 TOBACCO ABUSE COUNSELING: ICD-10-CM

## 2025-08-05 RX ORDER — NICOTINE 21 MG/24HR
1 PATCH, TRANSDERMAL 24 HOURS TRANSDERMAL EVERY 24 HOURS
Qty: 30 PATCH | Refills: 0 | Status: SHIPPED | OUTPATIENT
Start: 2025-08-05 | End: 2025-09-04

## 2025-08-08 DIAGNOSIS — E11.65 TYPE 2 DIABETES MELLITUS WITH HYPERGLYCEMIA, WITH LONG-TERM CURRENT USE OF INSULIN: ICD-10-CM

## 2025-08-08 DIAGNOSIS — Z79.4 TYPE 2 DIABETES MELLITUS WITH HYPERGLYCEMIA, WITH LONG-TERM CURRENT USE OF INSULIN: ICD-10-CM

## 2025-08-08 RX ORDER — ATORVASTATIN CALCIUM 20 MG/1
20 TABLET, FILM COATED ORAL DAILY
Qty: 30 TABLET | Refills: 0 | Status: SHIPPED | OUTPATIENT
Start: 2025-08-08

## 2025-08-11 ENCOUNTER — OFFICE VISIT (OUTPATIENT)
Dept: WOUND CARE | Facility: HOSPITAL | Age: 55
End: 2025-08-11
Payer: OTHER GOVERNMENT

## 2025-08-12 DIAGNOSIS — E03.9 HYPOTHYROIDISM, UNSPECIFIED TYPE: ICD-10-CM

## 2025-08-18 ENCOUNTER — OFFICE VISIT (OUTPATIENT)
Dept: WOUND CARE | Facility: HOSPITAL | Age: 55
End: 2025-08-18
Payer: OTHER GOVERNMENT

## 2025-08-18 RX ORDER — LEVOTHYROXINE SODIUM 25 UG/1
TABLET ORAL
Qty: 30 TABLET | Refills: 0 | Status: SHIPPED | OUTPATIENT
Start: 2025-08-18

## 2025-08-22 ENCOUNTER — OFFICE VISIT (OUTPATIENT)
Age: 55
End: 2025-08-22
Payer: OTHER GOVERNMENT

## 2025-08-22 VITALS — WEIGHT: 180 LBS | BODY MASS INDEX: 30.73 KG/M2 | HEIGHT: 64 IN | RESPIRATION RATE: 20 BRPM

## 2025-08-22 DIAGNOSIS — M21.541 ACQUIRED TALIPES VARUS OF RIGHT FOOT: ICD-10-CM

## 2025-08-22 DIAGNOSIS — M25.571 CHRONIC PAIN OF RIGHT ANKLE: Primary | ICD-10-CM

## 2025-08-22 DIAGNOSIS — G89.29 CHRONIC PAIN OF RIGHT ANKLE: Primary | ICD-10-CM

## 2025-08-22 DIAGNOSIS — M21.371 RIGHT FOOT DROP: ICD-10-CM

## 2025-08-22 DIAGNOSIS — S93.314A DISLOCATION OF RIGHT SUBTALAR JOINT, INITIAL ENCOUNTER: ICD-10-CM

## 2025-08-22 DIAGNOSIS — E11.42 TYPE 2 DIABETES MELLITUS WITH DIABETIC POLYNEUROPATHY, WITH LONG-TERM CURRENT USE OF INSULIN: ICD-10-CM

## 2025-08-22 DIAGNOSIS — Z79.4 TYPE 2 DIABETES MELLITUS WITH DIABETIC POLYNEUROPATHY, WITH LONG-TERM CURRENT USE OF INSULIN: ICD-10-CM

## 2025-08-22 PROCEDURE — 1159F MED LIST DOCD IN RCRD: CPT | Performed by: PODIATRIST

## 2025-08-22 PROCEDURE — 99214 OFFICE O/P EST MOD 30 MIN: CPT | Performed by: PODIATRIST

## 2025-08-22 PROCEDURE — 1160F RVW MEDS BY RX/DR IN RCRD: CPT | Performed by: PODIATRIST

## 2025-08-25 ENCOUNTER — OFFICE VISIT (OUTPATIENT)
Dept: WOUND CARE | Facility: HOSPITAL | Age: 55
End: 2025-08-25
Payer: OTHER GOVERNMENT

## (undated) DEVICE — SYRINGE KIT,PACKAGED,,150FT,MK 7(ANGIO-ARTERION, 150ML SYR KIT W/QFT,MC)(60729385): Brand: MEDRAD® MARK 7 ARTERION DISPOSABLE SYRINGE 150 ML WITH QUICK FILL TUBE

## (undated) DEVICE — PAD,ABDOMINAL,5"X9",STERILE,LF,1/PK: Brand: MEDLINE INDUSTRIES, INC.

## (undated) DEVICE — SOLUTION,WATER,IRRIGATION,1000ML,STERILE: Brand: MEDLINE

## (undated) DEVICE — PK EXTREM 50

## (undated) DEVICE — BANDAGE,GAUZE,BULKEE II,4.5"X4.1YD,STRL: Brand: MEDLINE

## (undated) DEVICE — ESWAB LQ AMIES  REG. NYLON FLOCKED  APPLICATOR: Brand: ESWAB™

## (undated) DEVICE — GOWN,REINFRCE,POLY,SIRUS,BREATH SLV,XXLG: Brand: MEDLINE

## (undated) DEVICE — PINNACLE R/O II INTRODUCER SHEATH WITH RADIOPAQUE MARKER: Brand: PINNACLE

## (undated) DEVICE — CATH GUIDE SOFTVU SELECT UT BR BERN .035 4F 65CM

## (undated) DEVICE — RADIFOCUS TORQUE DEVICE MULTI-TORQUE VISE: Brand: RADIFOCUS TORQUE DEVICE

## (undated) DEVICE — GOWN,SIRUS,POLYRNF,BRTHSLV,2XL,18/CS: Brand: MEDLINE

## (undated) DEVICE — NAVICROSS SUPPORT CATHETER: Brand: NAVICROSS

## (undated) DEVICE — THE STERILE CAMERA HANDLE COVER IS FOR USE WITH THE STERIS SURGICAL LIGHTING AND VISUALIZATION SYSTEMS.

## (undated) DEVICE — EXTENSION SET, MALE LUER LOCK ADAPTER WITH RETRACTABLE COLLAR

## (undated) DEVICE — SPNG LAP PREWSH SFTPK 18X18IN STRL PK/5

## (undated) DEVICE — COVER,LIGHT HANDLE,FLX,1/PK: Brand: MEDLINE INDUSTRIES, INC.

## (undated) DEVICE — SUT SILK 2/0 SH CR8 18IN CR8 C012D

## (undated) DEVICE — PK ATS CUST W CARDIOTOMY RESEVOIR

## (undated) DEVICE — GLV SURG BIOGEL LTX PF 7 1/2

## (undated) DEVICE — BNDG,ELSTC,MATRIX,STRL,6"X5YD,LF,HOOK&LP: Brand: MEDLINE

## (undated) DEVICE — BG ISOL DRWSTR INVISISHIELD 20X20IN

## (undated) DEVICE — GLV SURG BIOGEL LTX PF 8 1/2

## (undated) DEVICE — SNAP KAP: Brand: UNBRANDED

## (undated) DEVICE — THE STERILE LIGHT HANDLE COVER IS USED WITH STERIS SURGICAL LIGHTING AND VISUALIZATION SYSTEMS.

## (undated) DEVICE — BNDG,ELSTC,MATRIX,STRL,4"X5YD,LF,HOOK&LP: Brand: MEDLINE

## (undated) DEVICE — WET SKIN PREP TRAY: Brand: MEDLINE INDUSTRIES, INC.

## (undated) DEVICE — FOGARTY ARTERIAL EMBOLECTOMY CATHETER 4F 80CM: Brand: FOGARTY

## (undated) DEVICE — Device

## (undated) DEVICE — SOL IRR NACL 0.9PCT BO 1000ML

## (undated) DEVICE — GOWN,REINFORCE,POLY,SIRUS,BREATH SLV,XLG: Brand: MEDLINE

## (undated) DEVICE — GAUZE,SPONGE,FLUFF,6"X6.75",STRL,5/TRAY: Brand: MEDLINE

## (undated) DEVICE — PK MINOR VASC 50

## (undated) DEVICE — KT SURG TURNOVER 050

## (undated) DEVICE — STERILE POLYISOPRENE POWDER-FREE SURGICAL GLOVES: Brand: PROTEXIS

## (undated) DEVICE — DRAPE,HAND,STERILE: Brand: MEDLINE

## (undated) DEVICE — GOWN,SIRUS,POLYRNF,BRTHSLV,XL,30/CS: Brand: MEDLINE

## (undated) DEVICE — SUT ETHLN 2/0 PS 18IN 585H

## (undated) DEVICE — GLV SURG DERMASSURE GRN LF PF 8.5

## (undated) DEVICE — FOGARTY ARTERIAL EMBOLECTOMY CATHETER 3F 80CM: Brand: FOGARTY

## (undated) DEVICE — PROVE COVER: Brand: UNBRANDED

## (undated) DEVICE — ST ACC MICROPUNCTURE STFF .018 ECHO/PLDM/TP 4F/10CM 21G/7CM

## (undated) DEVICE — LN INJ CONTRST FLXCIL HP F/M LL 1200PSI72

## (undated) DEVICE — SOL NACL 0.9PCT 1000ML

## (undated) DEVICE — COVER,MAYO STAND,STERILE: Brand: MEDLINE

## (undated) DEVICE — MICRO TIP WIPE: Brand: DEVON

## (undated) DEVICE — ST ACC MICROPUNCTURE STFF/CANN PLAT/TP 4F 21G 40CM

## (undated) DEVICE — SOL IRRIG NACL 1000ML

## (undated) DEVICE — RADIFOCUS GLIDEWIRE: Brand: GLIDEWIRE

## (undated) DEVICE — PROXIMATE RH ROTATING HEAD SKIN STAPLERS (35 WIDE) CONTAINS 35 STAINLESS STEEL STAPLES: Brand: PROXIMATE

## (undated) DEVICE — CVR HNDL LT SURG ACCSSRY BLU STRL

## (undated) DEVICE — SYS PERFUS SEP PLATLT W TIPS CUST

## (undated) DEVICE — SOL PREP POVIDONE IODINE

## (undated) DEVICE — UNDRGLV SURG BIOGEL PIMICROINDICATOR SYNTH SZ7.5PF STRL PR/2

## (undated) DEVICE — COVADERM: Brand: DEROYAL

## (undated) DEVICE — TUBING, SUCTION, 1/4" X 12', STRAIGHT: Brand: MEDLINE

## (undated) DEVICE — CVR PROB 96IN LF STRL